# Patient Record
Sex: FEMALE | Race: WHITE | Employment: OTHER | URBAN - METROPOLITAN AREA
[De-identification: names, ages, dates, MRNs, and addresses within clinical notes are randomized per-mention and may not be internally consistent; named-entity substitution may affect disease eponyms.]

---

## 2017-06-09 LAB
AST SERPL-CCNC: 12 U/L (ref 10–35)
CHOLEST SERPL-MCNC: 231 MG/DL (ref 125–200)
CREAT SERPL-MCNC: 0.62 MG/DL (ref 0.5–0.99)
GFR SERPL CREATININE-BSD FRML MDRD: 93 ML/MIN/1.73M2
GLUCOSE SERPL-MCNC: 110 MG/DL (ref 65–99)
HBA1C MFR BLD: 6.5 % (ref 0–5.7)
HDLC SERPL-MCNC: 62 MG/DL
LDLC SERPL CALC-MCNC: 145 MG/DL
NONHDLC SERPL-MCNC: 169 MG/DL
POTASSIUM SERPL-SCNC: 5 MMOL/L (ref 3.5–5.3)
TRIGL SERPL-MCNC: 120 MG/DL

## 2017-07-16 ENCOUNTER — APPOINTMENT (OUTPATIENT)
Dept: GENERAL RADIOLOGY | Facility: CLINIC | Age: 67
End: 2017-07-16
Attending: FAMILY MEDICINE
Payer: COMMERCIAL

## 2017-07-16 ENCOUNTER — HOSPITAL ENCOUNTER (EMERGENCY)
Facility: CLINIC | Age: 67
Discharge: HOME | End: 2017-07-16
Attending: FAMILY MEDICINE | Admitting: FAMILY MEDICINE
Payer: COMMERCIAL

## 2017-07-16 VITALS
WEIGHT: 116 LBS | BODY MASS INDEX: 19.33 KG/M2 | HEIGHT: 65 IN | OXYGEN SATURATION: 95 % | DIASTOLIC BLOOD PRESSURE: 76 MMHG | RESPIRATION RATE: 15 BRPM | SYSTOLIC BLOOD PRESSURE: 136 MMHG | TEMPERATURE: 97.7 F | HEART RATE: 84 BPM

## 2017-07-16 DIAGNOSIS — R41.82 ALTERED MENTAL STATUS, UNSPECIFIED ALTERED MENTAL STATUS TYPE: ICD-10-CM

## 2017-07-16 LAB
ALBUMIN SERPL-MCNC: 3.4 G/DL (ref 3.4–5)
ALBUMIN UR-MCNC: NEGATIVE MG/DL
ALP SERPL-CCNC: 144 U/L (ref 40–150)
ALT SERPL W P-5'-P-CCNC: 16 U/L (ref 0–50)
AMORPH CRY #/AREA URNS HPF: ABNORMAL /HPF
ANION GAP SERPL CALCULATED.3IONS-SCNC: 9 MMOL/L (ref 3–14)
APPEARANCE UR: ABNORMAL
AST SERPL W P-5'-P-CCNC: 16 U/L (ref 0–45)
BACTERIA #/AREA URNS HPF: ABNORMAL /HPF
BASOPHILS # BLD AUTO: 0.1 10E9/L (ref 0–0.2)
BASOPHILS NFR BLD AUTO: 0.5 %
BILIRUB SERPL-MCNC: 0.3 MG/DL (ref 0.2–1.3)
BILIRUB UR QL STRIP: NEGATIVE
BUN SERPL-MCNC: 15 MG/DL (ref 7–30)
CALCIUM SERPL-MCNC: 8.4 MG/DL (ref 8.5–10.1)
CHLORIDE SERPL-SCNC: 108 MMOL/L (ref 94–109)
CO2 SERPL-SCNC: 24 MMOL/L (ref 20–32)
COLOR UR AUTO: YELLOW
CREAT SERPL-MCNC: 0.62 MG/DL (ref 0.52–1.04)
DIFFERENTIAL METHOD BLD: ABNORMAL
EOSINOPHIL # BLD AUTO: 0.1 10E9/L (ref 0–0.7)
EOSINOPHIL NFR BLD AUTO: 0.7 %
ERYTHROCYTE [DISTWIDTH] IN BLOOD BY AUTOMATED COUNT: 13.1 % (ref 10–15)
GFR SERPL CREATININE-BSD FRML MDRD: ABNORMAL ML/MIN/1.7M2
GLUCOSE SERPL-MCNC: 182 MG/DL (ref 70–99)
GLUCOSE UR STRIP-MCNC: 50 MG/DL
HCT VFR BLD AUTO: 42.2 % (ref 35–47)
HGB BLD-MCNC: 13.7 G/DL (ref 11.7–15.7)
HGB UR QL STRIP: NEGATIVE
IMM GRANULOCYTES # BLD: 0 10E9/L (ref 0–0.4)
IMM GRANULOCYTES NFR BLD: 0.2 %
KETONES UR STRIP-MCNC: NEGATIVE MG/DL
LEUKOCYTE ESTERASE UR QL STRIP: NEGATIVE
LYMPHOCYTES # BLD AUTO: 1.5 10E9/L (ref 0.8–5.3)
LYMPHOCYTES NFR BLD AUTO: 13.2 %
MCH RBC QN AUTO: 30.2 PG (ref 26.5–33)
MCHC RBC AUTO-ENTMCNC: 32.5 G/DL (ref 31.5–36.5)
MCV RBC AUTO: 93 FL (ref 78–100)
MONOCYTES # BLD AUTO: 0.6 10E9/L (ref 0–1.3)
MONOCYTES NFR BLD AUTO: 5.7 %
MUCOUS THREADS #/AREA URNS LPF: PRESENT /LPF
NEUTROPHILS # BLD AUTO: 8.8 10E9/L (ref 1.6–8.3)
NEUTROPHILS NFR BLD AUTO: 79.7 %
NITRATE UR QL: NEGATIVE
PH UR STRIP: 5 PH (ref 5–7)
PLATELET # BLD AUTO: 254 10E9/L (ref 150–450)
POTASSIUM SERPL-SCNC: 4.1 MMOL/L (ref 3.4–5.3)
PROT SERPL-MCNC: 6.9 G/DL (ref 6.8–8.8)
RBC # BLD AUTO: 4.54 10E12/L (ref 3.8–5.2)
RBC #/AREA URNS AUTO: 1 /HPF (ref 0–2)
SODIUM SERPL-SCNC: 141 MMOL/L (ref 133–144)
SP GR UR STRIP: 1.01 (ref 1–1.03)
SQUAMOUS #/AREA URNS AUTO: 11 /HPF (ref 0–1)
TROPONIN I SERPL-MCNC: NORMAL UG/L (ref 0–0.04)
URN SPEC COLLECT METH UR: ABNORMAL
UROBILINOGEN UR STRIP-MCNC: 0 MG/DL (ref 0–2)
WBC # BLD AUTO: 11 10E9/L (ref 4–11)
WBC #/AREA URNS AUTO: 2 /HPF (ref 0–2)

## 2017-07-16 PROCEDURE — 80053 COMPREHEN METABOLIC PANEL: CPT | Performed by: FAMILY MEDICINE

## 2017-07-16 PROCEDURE — 93005 ELECTROCARDIOGRAM TRACING: CPT | Performed by: FAMILY MEDICINE

## 2017-07-16 PROCEDURE — 25000128 H RX IP 250 OP 636: Performed by: FAMILY MEDICINE

## 2017-07-16 PROCEDURE — 71010 XR CHEST PORT 1 VW: CPT | Mod: TC

## 2017-07-16 PROCEDURE — 84484 ASSAY OF TROPONIN QUANT: CPT | Performed by: FAMILY MEDICINE

## 2017-07-16 PROCEDURE — 93010 ELECTROCARDIOGRAM REPORT: CPT | Mod: Z6 | Performed by: FAMILY MEDICINE

## 2017-07-16 PROCEDURE — 81001 URINALYSIS AUTO W/SCOPE: CPT | Performed by: FAMILY MEDICINE

## 2017-07-16 PROCEDURE — 85025 COMPLETE CBC W/AUTO DIFF WBC: CPT | Performed by: FAMILY MEDICINE

## 2017-07-16 PROCEDURE — 36415 COLL VENOUS BLD VENIPUNCTURE: CPT | Performed by: FAMILY MEDICINE

## 2017-07-16 PROCEDURE — 96360 HYDRATION IV INFUSION INIT: CPT | Performed by: FAMILY MEDICINE

## 2017-07-16 PROCEDURE — 99285 EMERGENCY DEPT VISIT HI MDM: CPT | Mod: 25 | Performed by: FAMILY MEDICINE

## 2017-07-16 PROCEDURE — 99285 EMERGENCY DEPT VISIT HI MDM: CPT | Mod: Z6 | Performed by: FAMILY MEDICINE

## 2017-07-16 RX ORDER — LIDOCAINE 40 MG/G
CREAM TOPICAL
Status: DISCONTINUED | OUTPATIENT
Start: 2017-07-16 | End: 2017-07-16 | Stop reason: HOSPADM

## 2017-07-16 RX ORDER — SODIUM CHLORIDE 9 MG/ML
1000 INJECTION, SOLUTION INTRAVENOUS CONTINUOUS
Status: DISCONTINUED | OUTPATIENT
Start: 2017-07-16 | End: 2017-07-16 | Stop reason: HOSPADM

## 2017-07-16 RX ADMIN — SODIUM CHLORIDE 1000 ML: 9 INJECTION, SOLUTION INTRAVENOUS at 16:10

## 2017-07-16 ASSESSMENT — ENCOUNTER SYMPTOMS
DIAPHORESIS: 1
HEADACHES: 0
WEAKNESS: 1
NAUSEA: 1
CHILLS: 1
LIGHT-HEADEDNESS: 1
MYALGIAS: 0
ARTHRALGIAS: 0
SEIZURES: 0

## 2017-07-16 NOTE — ED PROVIDER NOTES
"  History     Chief Complaint   Patient presents with     Seizures     The history is provided by the patient, the spouse, a relative and medical records.     Khalida Redding is a 66 year old female who is here with family.  Today she had a sense of upset stomach and nausea and went into the bathroom.  She sat down on the toilet as a place to sit, not to use the bathroom, and had episode of sweating and felt faint.  She was too weak to stand up and eventually her family found her and lowered her to the floor.  She was incontinent of stool and urine.  They were able to get her cleaned up and brought her here to the emergency department.  She remained mentally alert the entire time and did not have any tonic-clonic movement patterns.  She has had this in the past.  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  She has a history of stroke, but is not had any strokelike symptoms.  She does not currently follow up with neurology.  She sees Dr. Wilburn in [place] for her regular care and does not have a neurologist.  No known history of heart disease.    She was seen here in the ED August 25, 2016 by Annalisa Kwan PA-C:  Khalida Redding is a 66 year old female with history of CVA, seizure, hypertension, diabetes who presents to the ED with loss of consciousness. Patient reports her symptoms of altered consciousness, diarrhea, diaphoresis, shakiness are consistent with her typical presentation of \"seizure.\" Daughter and grandson confirm this information that she has these episodes and they usually pass without complication. She denied any symptoms of chest pain, shortness of breath, nausea. Vitals initially with hypotension of 67/47 and heart rate of 49. Within a few minutes blood pressure had improved to 106/62 and a pulse of 71. Patient at that time was growing more alert and appropriately responding to " "questions. Within 30 minutes of presenting to the ED, she had returned to her baseline and was alert and oriented. Blood glucose was 144, CBC and BMP were unremarkable. Troponin was negative. Nurse noted possibly blood tinged stool, however occult blood stool test was negative. EKG showed normal sinus rhythm. Chest x-ray was negative for acute cardiopulmonary process. She received 1L of IV fluids in the ED. She was observed for two hours in the ED without recurrence of symptoms. At that point patient was deemed medically stable for discharge. Though patient reports this is typical for her \"seizures\" I suspect that it is more likely a vasovagal episode. I advised her to continue to hydrate at home and follow up with her PCP within the week to discuss this ED visit and her \"seizure symptoms.\" I discussed with her that if her symptoms recurr she should return to the emergency department. She and her family expressed understanding. She was discharged from the ED in stable condition.    Nurse Triage Note:   Pt states she gets dizzy and diaphoretic before having a seizure. Pt's last seizure was nine months ago and she claims they are time out seizures. Pt states she can usually get to the bathroom and sits on the toilet which is what she did today.  Pt did have abdominal pain which sense subsided. Pt last meal at 8 am this morning.  A&O x4, denies pain, no SOB.       I have reviewed the Medications, Allergies, Past Medical and Surgical History, and Social History in the Epic system.    Allergies:   Allergies   Allergen Reactions     Ampicillin          No current facility-administered medications on file prior to encounter.   No current outpatient prescriptions on file prior to encounter.    There is no problem list on file for this patient.      History reviewed. No pertinent surgical history.    Social History   Substance Use Topics     Smoking status: Current Every Day Smoker     Packs/day: 0.50     Smokeless tobacco: " "Not on file     Alcohol use No         There is no immunization history on file for this patient.    BMI: Estimated body mass index is 19.3 kg/(m^2) as calculated from the following:    Height as of this encounter: 1.651 m (5' 5\").    Weight as of this encounter: 52.6 kg (116 lb).      Review of Systems   Constitutional: Positive for chills and diaphoresis.   Gastrointestinal: Positive for nausea.   Musculoskeletal: Negative for arthralgias and myalgias.   Neurological: Positive for syncope, weakness and light-headedness. Negative for seizures and headaches.   All other systems reviewed and are negative.      Physical Exam   BP: 109/62  Pulse: 84  Temp: 97.7  F (36.5  C)  Resp: 15  Height: 165.1 cm (5' 5\")  Weight: 52.6 kg (116 lb)  SpO2: 98 %    Physical Exam   Constitutional: She is oriented to person, place, and time. She appears well-developed and well-nourished. No distress.   HENT:   Head: Normocephalic and atraumatic.   Right Ear: External ear normal.   Left Ear: External ear normal.   Nose: Nose normal.   Mouth/Throat: Oropharynx is clear and moist.   Eyes: Conjunctivae and EOM are normal. Pupils are equal, round, and reactive to light.   Neck: Normal range of motion. Neck supple. No JVD present.   Cardiovascular: Normal rate, regular rhythm, normal heart sounds and intact distal pulses.    No murmur heard.  Pulmonary/Chest: Effort normal and breath sounds normal. No respiratory distress. She has no wheezes. She has no rales.   Abdominal: Soft. Bowel sounds are normal. She exhibits no distension. There is no tenderness. There is no rebound.   Musculoskeletal: She exhibits no edema, tenderness or deformity.   Lymphadenopathy:     She has no cervical adenopathy.   Neurological: She is alert and oriented to person, place, and time. No cranial nerve deficit.   Skin: Skin is warm and dry. No rash noted. No erythema. No pallor.   Psychiatric: She has a normal mood and affect. Her behavior is normal. Judgment and " thought content normal.   Nursing note and vitals reviewed.      ED Course     ED Course     Procedures    EKG reviewed by me.  Normal sinus rhythm.  Rate 77 bpm.  Normal axis.  No ST segment abnormalities.    Results for orders placed or performed during the hospital encounter of 07/16/17 (from the past 24 hour(s))   XR Chest Port 1 View    Narrative    XR CHEST PORT 1 VW   7/16/2017 3:44 PM     HISTORY: Syncopal episode.    COMPARISON: 8/25/2016.    FINDINGS: Negative chest. No interval change.      Impression    IMPRESSION: Negative.    ANTOINETTE SPICER MD   CBC with platelets differential   Result Value Ref Range    WBC 11.0 4.0 - 11.0 10e9/L    RBC Count 4.54 3.8 - 5.2 10e12/L    Hemoglobin 13.7 11.7 - 15.7 g/dL    Hematocrit 42.2 35.0 - 47.0 %    MCV 93 78 - 100 fl    MCH 30.2 26.5 - 33.0 pg    MCHC 32.5 31.5 - 36.5 g/dL    RDW 13.1 10.0 - 15.0 %    Platelet Count 254 150 - 450 10e9/L    Diff Method Automated Method     % Neutrophils 79.7 %    % Lymphocytes 13.2 %    % Monocytes 5.7 %    % Eosinophils 0.7 %    % Basophils 0.5 %    % Immature Granulocytes 0.2 %    Absolute Neutrophil 8.8 (H) 1.6 - 8.3 10e9/L    Absolute Lymphocytes 1.5 0.8 - 5.3 10e9/L    Absolute Monocytes 0.6 0.0 - 1.3 10e9/L    Absolute Eosinophils 0.1 0.0 - 0.7 10e9/L    Absolute Basophils 0.1 0.0 - 0.2 10e9/L    Abs Immature Granulocytes 0.0 0 - 0.4 10e9/L   Comprehensive metabolic panel   Result Value Ref Range    Sodium 141 133 - 144 mmol/L    Potassium 4.1 3.4 - 5.3 mmol/L    Chloride 108 94 - 109 mmol/L    Carbon Dioxide 24 20 - 32 mmol/L    Anion Gap 9 3 - 14 mmol/L    Glucose 182 (H) 70 - 99 mg/dL    Urea Nitrogen 15 7 - 30 mg/dL    Creatinine 0.62 0.52 - 1.04 mg/dL    GFR Estimate >90  Non  GFR Calc   >60 mL/min/1.7m2    GFR Estimate If Black >90   GFR Calc   >60 mL/min/1.7m2    Calcium 8.4 (L) 8.5 - 10.1 mg/dL    Bilirubin Total 0.3 0.2 - 1.3 mg/dL    Albumin 3.4 3.4 - 5.0 g/dL    Protein Total 6.9  6.8 - 8.8 g/dL    Alkaline Phosphatase 144 40 - 150 U/L    ALT 16 0 - 50 U/L    AST 16 0 - 45 U/L   Troponin I   Result Value Ref Range    Troponin I ES  0.000 - 0.045 ug/L     <0.015  The 99th percentile for upper reference range is 0.045 ug/L.  Troponin values in   the range of 0.045 - 0.120 ug/L may be associated with risks of adverse   clinical events.     UA with Microscopic reflex to Culture   Result Value Ref Range    Color Urine Yellow     Appearance Urine Slightly Cloudy     Glucose Urine 50 (A) NEG mg/dL    Bilirubin Urine Negative NEG    Ketones Urine Negative NEG mg/dL    Specific Gravity Urine 1.009 1.003 - 1.035    Blood Urine Negative NEG    pH Urine 5.0 5.0 - 7.0 pH    Protein Albumin Urine Negative NEG mg/dL    Urobilinogen mg/dL 0.0 0.0 - 2.0 mg/dL    Nitrite Urine Negative NEG    Leukocyte Esterase Urine Negative NEG    Source Unspecified Urine     WBC Urine 2 0 - 2 /HPF    RBC Urine 1 0 - 2 /HPF    Bacteria Urine Few (A) NEG /HPF    Squamous Epithelial /HPF Urine 11 (H) 0 - 1 /HPF    Mucous Urine Present (A) NEG /LPF    Amorphous Crystals Few (A) NEG /HPF       Medications   lidocaine 1 % 1 mL (not administered)   lidocaine (LMX4) kit (not administered)   sodium chloride (PF) 0.9% PF flush 3 mL (3 mLs Intracatheter Given 7/16/17 1611)   sodium chloride (PF) 0.9% PF flush 3 mL (not administered)   0.9% sodium chloride BOLUS (0 mLs Intravenous Stopped 7/16/17 1716)     Followed by   0.9% sodium chloride infusion (not administered)         Assessments & Plan (with Medical Decision Making)  Khalida is a 66-year-old female here with a change in mental status today.  She has had these episodes in the past, and was seen in the emergency department in August 2016 with a similar episode.  Today she had been feeling weak, then was pale and diaphoretic and when she sat down almost fainted and fell to the floor.  Her family lowered her to the floor and she regained consciousness and felt better.  By the time  she arrived in the emergency had normal blood pressures and was feeling better.  Vital signs remained normal in the ED.  Exam shows no abnormalities.  Specifically she has a regular heart rate and regular rhythm.  Her labs today were unremarkable including troponin, urine, CBC and CMP.  Chest x-ray was unremarkable.  I think she is having vasovagal syncopal events or irregular heart rhythms and we are going to set her up for a  Zio Patch to be placed here at [facility].  She was discharged from the ED.       I have reviewed the nursing notes.    I have reviewed the findings, diagnosis, plan and need for follow up with the patient.       New Prescriptions    No medications on file       Final diagnoses:   Altered mental status, unspecified altered mental status type       7/16/2017   [facility] EMERGENCY DEPARTMENT     Julius Kwan MD  07/16/17 5980

## 2017-07-16 NOTE — ED NOTES
Pt states she gets dizzy and diaphoretic before having a seizure. Pt's last seizure was nine months ago and she claims they are time out seizures. Pt states she can usually get to the bathroom and sits on the toilet which is what she did today.  Pt did have abdominal pain which sense subsided. Pt last meal at 8 am this morning.  A&O x4, denies pain, no SOB.

## 2017-07-16 NOTE — ED AVS SNAPSHOT
[facility] Emergency Department    [address] [place] [address]    Phone:  [phone]    Fax:  [phone]                                       Khalida Redding   MRN: [id number]    Department:  [facility] Emergency Department   Date of Visit:  7/16/2017           Patient Information     Date Of Birth          1950        Your diagnoses for this visit were:     Altered mental status, unspecified altered mental status type        You were seen by Julius Kwan MD.      Follow-up Information     Schedule an appointment as soon as possible for a visit with Suhail Wilburn    Specialty:  Family Practice    Why:  As needed    Contact information:    [place] [id number]    [facility] [address]  [phone]          Discharge Instructions         Altered Level of Consciousness  Level of consciousness (LOC) is a measure of a person s ability to interact with other people and to react to the surroundings. A person with an altered level of consciousness may not respond to touch or voices. He or she may look vacant or blank and may not make eye contact with others. He or she may be limp and may not move for a long time or show little interest in moving. He or she may also be confused.  There are many causes of altered LOC. They include low blood sugar, infection, medicines, injuries, or other medical problems.  I think your symptoms are likely caused by an irregular heart beat.   Altered LOC is a medical emergency. The healthcare provider will do tests to help find the cause. These may include blood tests and imaging tests. The person is treated so breathing and heart rate are stable. An intravenous (IV) line may be put into a vein in the arm or hand to give medicines. Once the cause of altered LOC is found, the goal is to treat the cause.  In almost all cases, the person will be admitted to the hospital for observation.  Home care  When your loved one is  released from the hospital, you will be given guidelines for caring for him or her. In general:    Follow the healthcare provider's instructions for giving any prescribed medicines to your child.    Stay with your loved one or have another responsible adult look after him or her. Watch carefully for any return of symptoms or changes in behavior.    If the person has diabetes, make sure that any approved medicines are given on time and as prescribed.  Follow-up care  Follow up with cardiology to get a Zio Patch placed. You can call [phone] to schedule an appointment to get this placed.   When to seek medical advice  Call the healthcare provider right away for any of the following:    Symptoms of altered LOC return    New symptoms appear    Thank you for choosing our Emergency Department for your care.     Sincerely,    Dr Alexandr Kwan M.D.          24 Hour Appointment Hotline       To make an appointment at any [facility] [place], call [phone] ([phone]). If you don't have a family doctor or clinic, we will help you find one. [facility] clinics are conveniently located to serve the needs of you and your family.          ED Discharge Orders     Zio Patch Holter                    Review of your medicines      Notice     You have not been prescribed any medications.            Procedures and tests performed during your visit     CBC with platelets differential    Comprehensive metabolic panel    EKG 12-lead, tracing only    Peripheral IV catheter    Troponin I    UA with Microscopic reflex to Culture    XR Chest Port 1 View      Orders Needing Specimen Collection     None      Pending Results     No orders found from 7/14/2017 to 7/17/2017.            Pending Culture Results     No orders found from 7/14/2017 to 7/17/2017.            Pending Results Instructions     If you had any lab results that were not finalized at the time of your Discharge, you can call the ED Lab Result RN at [phone]. You  "will be contacted by this team for any positive Lab results or changes in treatment. The nurses are available 7 days a week from 10A to 6:30P.  You can leave a message 24 hours per day and they will return your call.        Thank you for choosing [facility]       Thank you for choosing [facility] for your care. Our goal is always to provide you with excellent care. Hearing back from our patients is one way we can continue to improve our services. Please take a few minutes to complete the written survey that you may receive in the mail after you visit with us. Thank you!        [employer] Information     [employer] lets you send messages to your doctor, view your test results, renew your prescriptions, schedule appointments and more. To sign up, go to [email or web address] . Click on \"Log in\" on the left side of the screen, which will take you to the Welcome page. Then click on \"Sign up Now\" on the right side of the page.     You will be asked to enter the access code listed below, as well as some personal information. Please follow the directions to create your username and password.     Your access code is: [email or web address]  Expires: 10/14/2017  3:07 PM     Your access code will  in 90 days. If you need help or a new code, please call your [facility] clinic or [phone].        Care EveryWhere ID     This is your Care EveryWhere ID. This could be used by other organizations to access your [facility] medical records  [id number]        Equal Access to Services     EMIGDIO WALLS : Hadii danni Turner, waaxda luradhaadaha, qaybta kaalmada kaity, doug ulrich. So [facility] [phone].    ATENCIÓN: Si habla español, tiene a peterson disposición servicios gratuitos de asistencia lingüística. Llame al [phone].    We comply with applicable federal civil rights laws and [place] laws. We do not discriminate on the basis of race, color, national origin, age, disability sex, sexual orientation or " gender identity.            After Visit Summary       This is your record. Keep this with you and show to your community pharmacist(s) and doctor(s) at your next visit.

## 2017-07-16 NOTE — DISCHARGE INSTRUCTIONS
Altered Level of Consciousness  Level of consciousness (LOC) is a measure of a person s ability to interact with other people and to react to the surroundings. A person with an altered level of consciousness may not respond to touch or voices. He or she may look vacant or blank and may not make eye contact with others. He or she may be limp and may not move for a long time or show little interest in moving. He or she may also be confused.  There are many causes of altered LOC. They include low blood sugar, infection, medicines, injuries, or other medical problems.  I think your symptoms are likely caused by an irregular heart beat.   Altered LOC is a medical emergency. The healthcare provider will do tests to help find the cause. These may include blood tests and imaging tests. The person is treated so breathing and heart rate are stable. An intravenous (IV) line may be put into a vein in the arm or hand to give medicines. Once the cause of altered LOC is found, the goal is to treat the cause.  In almost all cases, the person will be admitted to the hospital for observation.  Home care  When your loved one is released from the hospital, you will be given guidelines for caring for him or her. In general:    Follow the healthcare provider's instructions for giving any prescribed medicines to your child.    Stay with your loved one or have another responsible adult look after him or her. Watch carefully for any return of symptoms or changes in behavior.    If the person has diabetes, make sure that any approved medicines are given on time and as prescribed.  Follow-up care  Follow up with cardiology to get a Zio Patch placed. You can call [phone] to schedule an appointment to get this placed.   When to seek medical advice  Call the healthcare provider right away for any of the following:    Symptoms of altered LOC return    New symptoms appear    Thank you for choosing our Emergency Department for your care.      Sincerely,    Dr Alexandr Kwan M.D.

## 2017-07-16 NOTE — ED AVS SNAPSHOT
[facility] Emergency Department    [address] [place] [address]    Phone:  [phone]    Fax:  [phone]                                       Khalida Redding   MRN: [id number]    Department:  [facility] Emergency Department   Date of Visit:  7/16/2017           After Visit Summary Signature Page     I have received my discharge instructions, and my questions have been answered. I have discussed any challenges I see with this plan with the nurse or doctor.    ..........................................................................................................................................  Patient/Patient Representative Signature      ..........................................................................................................................................  Patient Representative Print Name and Relationship to Patient    ..................................................               ................................................  Date                                            Time    ..........................................................................................................................................  Reviewed by Signature/Title    ...................................................              ..............................................  Date                                                            Time

## 2017-07-16 NOTE — ED NOTES
Seizure at home, with hx of seizures.  Did not hit head, as she was sitting on toilet, complains of abdominal.

## 2017-07-17 ENCOUNTER — HOSPITAL ENCOUNTER (OUTPATIENT)
Dept: CARDIOLOGY | Facility: CLINIC | Age: 67
Discharge: HOME | End: 2017-07-17
Attending: FAMILY MEDICINE | Admitting: FAMILY MEDICINE
Payer: COMMERCIAL

## 2017-07-17 DIAGNOSIS — R41.82 ALTERED MENTAL STATUS, UNSPECIFIED ALTERED MENTAL STATUS TYPE: ICD-10-CM

## 2017-07-17 PROCEDURE — 0298T ZZC EXT ECG > 48HR TO 21 DAY REVIEW AND INTERPRETATN: CPT | Performed by: INTERNAL MEDICINE

## 2017-07-17 PROCEDURE — 0296T ZIO PATCH HOLTER: CPT | Performed by: FAMILY MEDICINE

## 2017-08-22 ENCOUNTER — TELEPHONE (OUTPATIENT)
Dept: ONCOLOGY | Facility: CLINIC | Age: 67
End: 2017-08-22

## 2017-08-22 NOTE — TELEPHONE ENCOUNTER
Reason for Call:  Request for results:    Name of test or procedure: Zio patch results- daughter very upset that nobody has called her for these results- says this was ordered by ER and that her primary doctor is on vacation for 2 weeks. Can somebody who's covering for Dr. Wilburn please call this patient?     Date of test of procedure: 07/17    Location of the test or procedure: [facility] to leave the result message on voice mail or with a family member? YES    Phone number Patient can be reached at:  Home number on file [phone] (home) Laura     Additional comments: none    Call taken on 8/22/2017 at 3:51 PM by Monica Perez

## 2017-08-22 NOTE — TELEPHONE ENCOUNTER
Patient does not have a primary in [place], she has never been seen by the [facility] except in ER. Ordering provider should advise.     Daniel Pichardo, CMA

## 2017-08-23 ENCOUNTER — APPOINTMENT (OUTPATIENT)
Dept: MRI IMAGING | Facility: CLINIC | Age: 67
End: 2017-08-23
Attending: EMERGENCY MEDICINE
Payer: COMMERCIAL

## 2017-08-23 ENCOUNTER — APPOINTMENT (OUTPATIENT)
Dept: GENERAL RADIOLOGY | Facility: CLINIC | Age: 67
End: 2017-08-23
Attending: EMERGENCY MEDICINE
Payer: COMMERCIAL

## 2017-08-23 ENCOUNTER — HOSPITAL ENCOUNTER (EMERGENCY)
Facility: CLINIC | Age: 67
Discharge: HOME | End: 2017-08-23
Attending: EMERGENCY MEDICINE | Admitting: EMERGENCY MEDICINE
Payer: COMMERCIAL

## 2017-08-23 ENCOUNTER — APPOINTMENT (OUTPATIENT)
Dept: CT IMAGING | Facility: CLINIC | Age: 67
End: 2017-08-23
Attending: EMERGENCY MEDICINE
Payer: COMMERCIAL

## 2017-08-23 VITALS
WEIGHT: 116.2 LBS | TEMPERATURE: 97.8 F | RESPIRATION RATE: 16 BRPM | SYSTOLIC BLOOD PRESSURE: 160 MMHG | BODY MASS INDEX: 19.34 KG/M2 | DIASTOLIC BLOOD PRESSURE: 96 MMHG | OXYGEN SATURATION: 94 %

## 2017-08-23 DIAGNOSIS — S70.02XA CONTUSION OF LEFT HIP, INITIAL ENCOUNTER: ICD-10-CM
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
## 2017-08-23 DIAGNOSIS — S00.03XA SCALP CONTUSION: ICD-10-CM

## 2017-08-23 DIAGNOSIS — S00.03XA HEMATOMA OF SCALP, INITIAL ENCOUNTER: ICD-10-CM

## 2017-08-23 DIAGNOSIS — S61.212A LACERATION OF RIGHT MIDDLE FINGER WITHOUT FOREIGN BODY WITHOUT DAMAGE TO NAIL, INITIAL ENCOUNTER: ICD-10-CM

## 2017-08-23 LAB
CREAT BLD-MCNC: 0.5 MG/DL (ref 0.52–1.04)
GFR SERPL CREATININE-BSD FRML MDRD: >90 ML/MIN/1.7M2

## 2017-08-23 PROCEDURE — A9585 GADOBUTROL INJECTION: HCPCS | Performed by: EMERGENCY MEDICINE

## 2017-08-23 PROCEDURE — 96361 HYDRATE IV INFUSION ADD-ON: CPT | Performed by: EMERGENCY MEDICINE

## 2017-08-23 PROCEDURE — 96374 THER/PROPH/DIAG INJ IV PUSH: CPT | Performed by: EMERGENCY MEDICINE

## 2017-08-23 PROCEDURE — 25000128 H RX IP 250 OP 636: Performed by: EMERGENCY MEDICINE

## 2017-08-23 PROCEDURE — 25000132 ZZH RX MED GY IP 250 OP 250 PS 637: Performed by: EMERGENCY MEDICINE

## 2017-08-23 PROCEDURE — 99285 EMERGENCY DEPT VISIT HI MDM: CPT | Mod: 25 | Performed by: EMERGENCY MEDICINE

## 2017-08-23 PROCEDURE — 82565 ASSAY OF CREATININE: CPT | Performed by: EMERGENCY MEDICINE

## 2017-08-23 PROCEDURE — 70553 MRI BRAIN STEM W/O & W/DYE: CPT

## 2017-08-23 PROCEDURE — 73502 X-RAY EXAM HIP UNI 2-3 VIEWS: CPT | Mod: TC

## 2017-08-23 PROCEDURE — 70450 CT HEAD/BRAIN W/O DYE: CPT

## 2017-08-23 PROCEDURE — 99285 EMERGENCY DEPT VISIT HI MDM: CPT | Mod: Z6 | Performed by: EMERGENCY MEDICINE

## 2017-08-23 RX ORDER — DIPHENHYDRAMINE HYDROCHLORIDE 50 MG/ML
25 INJECTION INTRAMUSCULAR; INTRAVENOUS ONCE
Status: COMPLETED | OUTPATIENT
Start: 2017-08-23 | End: 2017-08-23

## 2017-08-23 RX ORDER — HYDROCODONE BITARTRATE AND ACETAMINOPHEN 5; 325 MG/1; MG/1
1 TABLET ORAL EVERY 6 HOURS PRN
Qty: 10 TABLET | Refills: 0 | Status: SHIPPED | OUTPATIENT
Start: 2017-08-23 | End: 2018-11-23

## 2017-08-23 RX ORDER — LEVETIRACETAM 1000 MG/1
1000 TABLET ORAL
COMMUNITY
End: 2018-10-22

## 2017-08-23 RX ORDER — HYDROCODONE BITARTRATE AND ACETAMINOPHEN 5; 325 MG/1; MG/1
1-2 TABLET ORAL ONCE
Status: COMPLETED | OUTPATIENT
Start: 2017-08-23 | End: 2017-08-23

## 2017-08-23 RX ORDER — GADOBUTROL 604.72 MG/ML
7.5 INJECTION INTRAVENOUS ONCE
Status: COMPLETED | OUTPATIENT
Start: 2017-08-23 | End: 2017-08-23

## 2017-08-23 RX ADMIN — HYDROCODONE BITARTRATE AND ACETAMINOPHEN 2 TABLET: 5; 325 TABLET ORAL at 16:07

## 2017-08-23 RX ADMIN — SODIUM CHLORIDE 1000 ML: 9 INJECTION, SOLUTION INTRAVENOUS at 18:41

## 2017-08-23 RX ADMIN — GADOBUTROL 5.5 ML: 604.72 INJECTION INTRAVENOUS at 17:59

## 2017-08-23 RX ADMIN — DIPHENHYDRAMINE HYDROCHLORIDE 25 MG: 50 INJECTION, SOLUTION INTRAMUSCULAR; INTRAVENOUS at 18:43

## 2017-08-23 ASSESSMENT — ENCOUNTER SYMPTOMS
WOUND: 1
NECK PAIN: 1
NAUSEA: 0
HEADACHES: 1

## 2017-08-23 NOTE — TELEPHONE ENCOUNTER
The ordering doctor was Dr. Kwan in the ER- Can we please find somebody to call this lady back. She was very upset and verbally challenging to talk to on the phone. I will send this to all teams and hope for somebody that can call her.

## 2017-08-23 NOTE — ED AVS SNAPSHOT
[facility] Emergency Department    [address] [place] [address]    Phone:  [phone]    Fax:  [phone]                                       Khalida Redding   MRN: [id number]    Department:  [facility] Emergency Department   Date of Visit:  8/23/2017           Patient Information     Date Of Birth          1950        Your diagnoses for this visit were:     Fall down stairs, initial encounter     Scalp contusion     Laceration of right middle finger without foreign body without damage to nail, initial encounter     Contusion of left hip, initial encounter     Hematoma of scalp, initial encounter        You were seen by Char Gonzalez MD.      Follow-up Information     Follow up with Suhail Wilburn    Specialty:  Family Practice    Contact information:    [place] [address] [address]   [facility] [address]  [phone]          Discharge Instructions       The CT and MRI scan did not show any acute abnormalities.    Okay to continue ice to the painful area of the head.    Ice or heat to muscle soreness.    Tylenol for pain.  Vicodin for severe pain.    Follow-up in clinic if not improving over the next couple weeks.    Return at any time for concerns.    I hope you heal quickly!!        Discharge References/Attachments     [facility] (ENGLISH)      Future Appointments        Provider Department Dept Phone Center    8/24/2017 10:00 AM Richy Joseph MD [facility] [phone] [facility]      24 Hour Appointment Hotline       To make an appointment at any [facility], call [phone] ([phone]). If you don't have a family doctor or clinic, we will help you find one. [facility] are conveniently located to serve the needs of you and your family.             Review of your medicines      START taking        Dose / Directions Last dose taken    HYDROcodone-acetaminophen 5-325 MG per tablet   Commonly known as:   NORCO   Dose:  1 tablet   Quantity:  10 tablet        Take 1 tablet by mouth every 6 hours as needed for moderate to severe pain   Refills:  0          Our records show that you are taking the medicines listed below. If these are incorrect, please call your family doctor or clinic.        Dose / Directions Last dose taken    GABAPENTIN PO   Dose:  300 mg        Take 300 mg by mouth daily   Refills:  0        levETIRAcetam 1000 MG Tabs   Dose:  1000 mg        Take 1,000 mg by mouth 2 times daily   Refills:  0        LISINOPRIL PO   Dose:  2.5 mg        Take 2.5 mg by mouth daily   Refills:  0        METFORMIN HCL PO   Dose:  500 mg        Take 500 mg by mouth daily (with breakfast)   Refills:  0        PLAVIX PO   Dose:  75 mg        Take 75 mg by mouth daily   Refills:  0        SIMVASTATIN PO   Dose:  40 mg        Take 40 mg by mouth daily   Refills:  0        VITAMIN B 12 PO   Dose:  1000 mcg        Take 1,000 mcg by mouth daily   Refills:  0                Prescriptions were sent or printed at these locations (1 Prescription)                   [facility] [address] [place] [address] [address]    [address] [facility] [address]    Telephone:  [phone]   Fax:  [phone]   Hours:                  Printed at Department/Unit printer (1 of 1)         HYDROcodone-acetaminophen (NORCO) 5-325 MG per tablet                Procedures and tests performed during your visit     Creatinine POCT    Head CT w/o contrast    MR Brain w/o & w Contrast    XR Pelvis w Hip Left 1 View      Orders Needing Specimen Collection     None      Pending Results     No orders found from 8/21/2017 to 8/24/2017.            Pending Culture Results     No orders found from 8/21/2017 to 8/24/2017.            Pending Results Instructions     If you had any lab results that were not finalized at the time of your Discharge, you can call the ED Lab Result RN at [phone]. You will be contacted by this team for any positive Lab results  "or changes in treatment. The nurses are available 7 days a week from 10A to 6:30P.  You can leave a message 24 hours per day and they will return your call.        Thank you for choosing [facility]       Thank you for choosing [facility] for your care. Our goal is always to provide you with excellent care. Hearing back from our patients is one way we can continue to improve our services. Please take a few minutes to complete the written survey that you may receive in the mail after you visit with us. Thank you!        [facility] Information     [employer] lets you send messages to your doctor, view your test results, renew your prescriptions, schedule appointments and more. To sign up, go to [email or web address] . Click on \"Log in\" on the left side of the screen, which will take you to the Welcome page. Then click on \"Sign up Now\" on the right side of the page.     You will be asked to enter the access code listed below, as well as some personal information. Please follow the directions to create your username and password.     Your access code is: [email or web address]  Expires: 10/14/2017  3:07 PM     Your access code will  in 90 days. If you need help or a new code, please call your [facility] clinic or [phone].        Care EveryWhere ID     This is your Care EveryWhere ID. This could be used by other organizations to access your [facility] medical records  [id number]        Equal Access to Services     EMIGDIO WALLS : Hadii danni Turner, waaxda kenya, qaybta kaalmadoug palm . So [facility] [phone].    ATENCIÓN: Si habla español, tiene a peterson disposición servicios gratuitos de asistencia lingüística. Llame al [phone].    We comply with applicable federal civil rights laws and [place] laws. We do not discriminate on the basis of race, color, national origin, age, disability sex, sexual orientation or gender identity.            After Visit Summary       This " is your record. Keep this with you and show to your community pharmacist(s) and doctor(s) at your next visit.

## 2017-08-23 NOTE — ED PROVIDER NOTES
History     Chief Complaint   Patient presents with     Fall     The history is provided by the patient and a relative.     Khalida Redding is a 67 year old female who presents to the ED with her  and daughter,  after a fall. She reports that she was walking up the stairs with her phone in her hand when she accidentally fell backwards approximately four steps and landed on concrete. She states that the left posterior area of her head and left buttocks and thigh are the sore spots. The patient says that her daughter had to get her up because she was too sore to due so herself. There was no loss of consciousness. She has a headache but no nausea or other complaints at this point. She also notes a small cut on her right middle finger tip.  She has some tightness and tenderness on the left side of her neck. No midline tenderness. Patient has history of left hip surgery.  Patient denies any weakness or dizziness prior to the fall. She has history of stroke and seizures. She is on Plavix.    I have reviewed the Medications, Allergies, Past Medical and Surgical History, and Social History in the Epic system.    There is no problem list on file for this patient.    Past Medical History:   Diagnosis Date     CVA (cerebral vascular accident) (H)      Diabetes (H)      Seizures (H)      History reviewed. No pertinent surgical history.    No family history on file.    Social History   Substance Use Topics     Smoking status: Current Every Day Smoker     Packs/day: 1.00     Smokeless tobacco: Not on file     Alcohol use No        There is no immunization history on file for this patient.    Allergies   Allergen Reactions     Ampicillin      Current Outpatient Prescriptions   Medication Sig Dispense Refill     Clopidogrel Bisulfate (PLAVIX PO) Take 75 mg by mouth daily       Cyanocobalamin (VITAMIN B 12 PO) Take 1,000 mcg by mouth daily       LISINOPRIL PO Take 2.5 mg by mouth daily       SIMVASTATIN PO Take 40 mg by  mouth daily       METFORMIN HCL PO Take 500 mg by mouth daily (with breakfast)       levETIRAcetam 1000 MG TABS Take 1,000 mg by mouth 2 times daily       GABAPENTIN PO Take 300 mg by mouth daily       HYDROcodone-acetaminophen (NORCO) 5-325 MG per tablet Take 1 tablet by mouth every 6 hours as needed for moderate to severe pain 10 tablet 0     Review of Systems   HENT:        Positive for head injury   Gastrointestinal: Negative for nausea.   Musculoskeletal: Positive for neck pain.        Positive for left buttocks and thigh tenderness   Skin: Positive for wound (small laceration to left middle finger tip).   Neurological: Positive for headaches.        Negative for loss of consciousness   All other systems reviewed and are negative.    Physical Exam   BP: (!) 157/97  Heart Rate: 101  Temp: 97.8  F (36.6  C)  Resp: 20  Weight: 52.7 kg (116 lb 3.2 oz)  SpO2: 94 %  Physical Exam   Constitutional: She is oriented to person, place, and time. She appears well-developed and well-nourished.   HENT:   Head: Head is with contusion.       Right Ear: Tympanic membrane and external ear normal.   Left Ear: Tympanic membrane and external ear normal.   Nose: Nose normal.   Mouth/Throat: Oropharynx is clear and moist.   Eyes: Conjunctivae and EOM are normal. Pupils are equal, round, and reactive to light.   Neck: Normal range of motion. Neck supple. Muscular tenderness present.   No midline neck or any step-offs or tenderness   Cardiovascular: Regular rhythm, normal heart sounds and intact distal pulses.    Borderline tachycardia   Pulmonary/Chest: Effort normal and breath sounds normal. She exhibits no tenderness.   Abdominal: Soft. Bowel sounds are normal. There is no tenderness.   Musculoskeletal: Normal range of motion.        Left upper leg: She exhibits tenderness.        Legs:  Neurological: She is alert and oriented to person, place, and time.   Skin: Skin is warm and dry. Laceration (tiny) noted. She is not diaphoretic.    Psychiatric: She has a normal mood and affect. Her behavior is normal.   Nursing note and vitals reviewed.    ED Course (with Medical Decision Making)    Pt seen and examined by me.  RN and EPIC notes reviewed.      Patient with fall down stairs onto concrete. No loss of consciousness. She is on Plavix. Concern for intracranial bleed. The cut on her finger is very tiny and will not need closure. This will be cleaned. CT scan planned.     CT scan as below shows no acute intracranial bleed. There is some question of mass effect and it is recommended that she have an MRI scan.     IV placed an MRI done. Results as below show nothing acute. Results were discussed with the patient and her family. Recommend continuing to ice the area on her head. Tylenol if needed for pain. I did give her a small amount of Vicodin for severe pain. She will stay with her family and be safe tonight. They will return at any time for concerns.      Procedures       Results for orders placed or performed during the hospital encounter of 08/23/17 (from the past 24 hour(s))   Head CT w/o contrast    Narrative    CT SCAN OF THE HEAD WITHOUT CONTRAST   8/23/2017 3:53 PM     HISTORY: Fall; pain - on Plavix.    TECHNIQUE:  Axial images of the head and coronal reformations without  IV contrast material. Radiation dose for this scan was reduced using  automated exposure control, adjustment of the mA and/or kV according  to patient size, or iterative reconstruction technique.    COMPARISON: None.    FINDINGS: Large left lateral scalp hematoma. No underlying skull  fracture or extra-axial hematoma identified. No evidence of acute  intracranial hemorrhage.    Fairly large region of hypodensity in the anterior paramedian right  frontal lobe. Additional area of cortical and subcortical white matter  encephalomalacia within the posterior paramedian right frontal lobe.  There appears to be ex vacuo dilatation of the frontal horn of the  right lateral  ventricle, however there is also leftward midline shift  anteriorly of 5 mm at the level of the septum pellucidum. Patchy  periventricular white matter hypodensity is also seen in the left  cerebral hemisphere. There is a focal hypodensity in the right basal  ganglia that may represent lacunar infarct or dilated perivascular  space.    Bony calvarium and bones of the skull base appear intact. Visualized  paranasal sinuses are clear.      Impression    IMPRESSION:  1. Large left lateral scalp hematoma without underlying skull  fracture.  2. No evidence of acute intracranial hemorrhage.  3. Fairly large hypodense area in the paramedian right frontal lobe  with associated loss of the cortex. There is also expected dilatation  of the frontal horn of the right lateral ventricle. Findings have the  appearance of encephalomalacia from previous infarct, but there is  mass effect in this region and leftward midline shift. This is  atypical and acute component of ischemia or possibly underlying mass  cannot be excluded. Recommend follow-up with contrast-enhanced brain  MRI. Additional area of encephalomalacia in the posterior right  frontal lobe has the appearance of previous infarct.  4. Patchy periventricular white matter hypodensities which are  nonspecific, but probably related to chronic microvascular ischemic  disease.    Results discussed with Soledad Gonzalez at 4:15 PM on 8/23/2017.    RAYSHAWN BLANKENSHIP MD   Creatinine POCT   Result Value Ref Range    Creatinine 0.5 (L) 0.52 - 1.04 mg/dL    GFR Estimate >90 >60 mL/min/1.7m2    GFR Estimate If Black >90 >60 mL/min/1.7m2   XR Pelvis w Hip Left 1 View    Narrative    PELVIS AND LEFT HIP THREE VIEWS   8/23/2017 6:19 PM     HISTORY: Fall. Pain.    COMPARISON: None.      Impression    IMPRESSION:   1. A left hip arthroplasty is in place. No evidence of hardware  failure.  2. No visualized acute fracture or malalignment of the pelvis or left  hip.     PEPE ESPINOZA MD   MR  Brain w/o & w Contrast    Narrative    MR BRAIN W/O & W CONTRAST  8/23/2017 6:22 PM     HISTORY:  fall; encephalomalacia; h/o stroke; unusual CT findings.    TECHNIQUE: Multiplanar, multisequence MRI of the brain without and  with 5.5 mL Gadavist IV contrast material.    COMPARISON: CT dated 8/23/2017    FINDINGS:  Areas of encephalomalacia are seen in both frontal lobes,  this is more prominent on the right than the left. Ex vacuo  enlargement of the frontal horn of the right lateral ventricle is  noted. The temporal horn of the right lateral ventricle was not  enlarged. The location of the septum pellucidum is in usual in that it  is slightly to the left of midline. With the encephalomalacia on the  right, expected position of the septum pellucidum would be to the  right of midline. The cause of this appearance is uncertain. No  underlying mass lesion is identified. No enhancing structures are  seen. Its possible that there is a ependymal cyst in the frontal horn  of the right lateral ventricle.. There are no gadolinium enhancing  lesions.  The facial structures appear normal.  The arteries at the  base of the brain and the dural venous sinuses appear patent.   Extensive soft tissue swelling is seen over the left parietal region.  No intracranial bleed. No subdural or epidural.      Impression    IMPRESSION:   1. Soft tissue swelling over the left parietal region.  2. Encephalomalacia in both frontal lobes.  3. The location of the septum pellucidum to the left of midline is  unusual with the encephalomalacia being greater on the right than the  left. The exact cause of this is uncertain. No underlying mass lesion  is seen. Its possible it is due to a cyst in the frontal horn of the  right lateral ventricle. These ependymal cysts can be difficult to  appreciate on imaging studies. If there is a cyst in the right lateral  ventricle is not causing any significant obstruction since the  temporal horn of the right  lateral ventricle is not dilated. The  appearance of the lateral ventricles is likely an incidental finding..    ROCIO SARABIA MD       Medications   HYDROcodone-acetaminophen (NORCO) 5-325 MG per tablet 1-2 tablet (2 tablets Oral Given 8/23/17 1607)   gadobutrol (GADAVIST) injection 7.5 mL (5.5 mLs Intravenous Given 8/23/17 1759)   diphenhydrAMINE (BENADRYL) injection 25 mg (25 mg Intravenous Given 8/23/17 1843)   0.9% sodium chloride BOLUS (0 mLs Intravenous Stopped 8/23/17 1925)     Assessments & Plan     I have reviewed the findings, diagnosis, plan and need for follow up with the patient.    Discharge Medication List as of 8/23/2017  7:25 PM      START taking these medications    Details   HYDROcodone-acetaminophen (NORCO) 5-325 MG per tablet Take 1 tablet by mouth every 6 hours as needed for moderate to severe pain, Disp-10 tablet, R-0, Local Print             Final diagnoses:   Fall down stairs, initial encounter   Scalp contusion   Laceration of right middle finger without foreign body without damage to nail, initial encounter   Contusion of left hip, initial encounter   Hematoma of scalp, initial encounter     Disposition: Patient discharged home in the care of her family in stable condition. Follow-up for recheck with primary care provider. Return for concerns.    This document serves as a record of services personally performed by Char Gonzalez MD. It was created on their behalf by Chelsea Mcdaniels, a trained medical scribe. The creation of this record is based on the provider's personal observations and the statements of the patient. This document has been checked and approved by the attending provider.    Note: Chart documentation done in part with Dragon Voice Recognition software. Although reviewed after completion, some word and grammatical errors may remain.    8/23/2017   [facility] EMERGENCY DEPARTMENT     Char Gonzalez MD  08/23/17 7636

## 2017-08-23 NOTE — ED AVS SNAPSHOT
[facility] Emergency Department    [address] [place] [address]    Phone:  [phone]    Fax:  [phone]                                       Khalida Redding   MRN: [id number]    Department:  [facility] Emergency Department   Date of Visit:  8/23/2017           After Visit Summary Signature Page     I have received my discharge instructions, and my questions have been answered. I have discussed any challenges I see with this plan with the nurse or doctor.    ..........................................................................................................................................  Patient/Patient Representative Signature      ..........................................................................................................................................  Patient Representative Print Name and Relationship to Patient    ..................................................               ................................................  Date                                            Time    ..........................................................................................................................................  Reviewed by Signature/Title    ...................................................              ..............................................  Date                                                            Time

## 2017-08-23 NOTE — TELEPHONE ENCOUNTER
I spoke with Laura and she is aware we have an appt made for tomorrow with Dr. Joseph to discuss results.

## 2017-08-23 NOTE — ED NOTES
Was talking on the phone and slipped down 4 cement steps. Patient states hit her head and landed on her left side.

## 2017-08-24 ENCOUNTER — OFFICE VISIT (OUTPATIENT)
Dept: CARDIOLOGY | Facility: CLINIC | Age: 67
End: 2017-08-24
Payer: COMMERCIAL

## 2017-08-24 VITALS
DIASTOLIC BLOOD PRESSURE: 78 MMHG | HEIGHT: 65 IN | SYSTOLIC BLOOD PRESSURE: 130 MMHG | HEART RATE: 98 BPM | BODY MASS INDEX: 19.94 KG/M2 | WEIGHT: 119.7 LBS | OXYGEN SATURATION: 98 %

## 2017-08-24 DIAGNOSIS — R55 SYNCOPE, UNSPECIFIED SYNCOPE TYPE: Primary | ICD-10-CM

## 2017-08-24 PROCEDURE — 99214 OFFICE O/P EST MOD 30 MIN: CPT | Performed by: INTERNAL MEDICINE

## 2017-08-24 NOTE — MR AVS SNAPSHOT
"              After Visit Summary   8/24/2017    Khalida Redding    MRN: [id number]           Patient Information     Date Of Birth          1950        Visit Information        Provider Department      8/24/2017 10:00 AM Richy Joseph MD [facility]        Today's Diagnoses     Syncope, unspecified syncope type    -  1       Follow-ups after your visit        Additional Services     Follow-Up with Cardiac Advanced Practice Provider                 Future tests that were ordered for you today     Open Future Orders        Priority Expected Expires Ordered    Follow-Up with Cardiac Advanced Practice Provider Routine 12/20/2017 8/24/2018 8/24/2017    ICD/Pacemaker/Loop Recorder Procedure Routine 8/31/2017 8/24/2018 8/24/2017    Echocardiogram Routine 8/31/2017 8/24/2018 8/24/2017            Who to contact     If you have questions or need follow up information about today's clinic visit or your schedule please contact [facility] directly at [phone].  Normal or non-critical lab and imaging results will be communicated to you by Validushart, letter or phone within 4 business days after the clinic has received the results. If you do not hear from us within 7 days, please contact the clinic through Validushart or phone. If you have a critical or abnormal lab result, we will notify you by phone as soon as possible.  Submit refill requests through ClickBus or call your pharmacy and they will forward the refill request to us. Please allow 3 business days for your refill to be completed.          Additional Information About Your Visit        MyChart Information     ClickBus lets you send messages to your doctor, view your test results, renew your prescriptions, schedule appointments and more. To sign up, go to [email or web address] . Click on \"Log in\" on the left side of the screen, which will take you to the Welcome page. Then click on \"Sign up Now\" on the right side of the page.     You " "will be asked to enter the access code listed below, as well as some personal information. Please follow the directions to create your username and password.     Your access code is: [email or web address]  Expires: 10/14/2017  3:07 PM     Your access code will  in 90 days. If you need help or a new code, please call your [facility] clinic or [phone].        Care EveryWhere ID     This is your Care EveryWhere ID. This could be used by other organizations to access your [facility] medical records  [id number]        Your Vitals Were     Pulse Height Pulse Oximetry BMI (Body Mass Index)          98 1.651 m (5' 5\") 98% 19.92 kg/m2         Blood Pressure from Last 3 Encounters:   17 130/78   17 (!) 160/96   17 136/76    Weight from Last 3 Encounters:   17 54.3 kg (119 lb 11.2 oz)   17 52.7 kg (116 lb 3.2 oz)   17 52.6 kg (116 lb)               Primary Care Provider Office Phone # Fax #    Suhail HAGER [place] [phone]       [address]   [facility] [address]        Equal Access to Services     EMIGDIO WALLS AH: Hadii danni ku hadasho Soomaali, waaxda luqadaha, qaybta kaalmada adeegyada, waxay idiin haylori herrera . So [facility] [phone].    ATENCIÓN: Si habla español, tiene a peterson disposición servicios gratuitos de asistencia lingüística. Llame al [phone].    We comply with applicable federal civil rights laws and [place] laws. We do not discriminate on the basis of race, color, national origin, age, disability sex, sexual orientation or gender identity.            Thank you!     Thank you for choosing [facility]  for your care. Our goal is always to provide you with excellent care. Hearing back from our patients is one way we can continue to improve our services. Please take a few minutes to complete the written survey that you may receive in the mail after your visit with us. Thank you!             Your Updated " Medication List - Protect others around you: Learn how to safely use, store and throw away your medicines at www.disposemymeds.org.          This list is accurate as of: 8/24/17 10:19 AM.  Always use your most recent med list.                   Brand Name Dispense Instructions for use Diagnosis    GABAPENTIN PO      Take 300 mg by mouth daily        HYDROcodone-acetaminophen 5-325 MG per tablet    NORCO    10 tablet    Take 1 tablet by mouth every 6 hours as needed for moderate to severe pain        levETIRAcetam 1000 MG Tabs      Take 1,000 mg by mouth 2 times daily        LISINOPRIL PO      Take 2.5 mg by mouth daily        METFORMIN HCL PO      Take 500 mg by mouth daily (with breakfast)        PLAVIX PO      Take 75 mg by mouth daily        SIMVASTATIN PO      Take 40 mg by mouth daily        VITAMIN B 12 PO      Take 1,000 mcg by mouth daily

## 2017-08-24 NOTE — PROGRESS NOTES
"HISTORY OF PRESENT ILLNESS:  I saw Ms. Palomino for evaluation of recurrent transient loss of consciousness, frequent falls and nonsustained atrial tachycardia on a ZIO Patch monitor.  She is a 67-year-old long-term heavy smoker who has had a stroke multiple times.  The last stroke was about 2 years ago that happened in [place].  That stroke left her with weakened left arm and leg and she is currently using a walker for ambulation.      The patient has had transient loss of consciousness for over 10 years.  The problem appeared to be more frequent over the last 2 years after her major stroke.  She also has had frequent falls with the last fall occurring yesterday that required ER visit.      She came in to the clinic with her  and daughter today.  They  stated that her last \"seizure\" was in 06/2016.  The patient was sitting on the toilet and she yelled for help.  When the patient was seen by her , she was diaphoretic and partially responsive.  She did not have seizure activities and did not have complete loss of consciousness.  She has been on seizure medications for years but currently does not have a neurologist.      PAST MEDICAL HISTORY:  Remarkable for diabetes.  She continues to smoke cigarettes.      PHYSICAL EXAMINATION:   VITAL SIGNS:  Blood pressure was 130/78, heart rate 98 beats per minute, body weight 119 pounds.  She uses a walker.   HEENT:  Eyes and ENT were unremarkable.   LUNGS:  Clear.   CARDIAC:  Rhythm was regular and heart sounds were normal without murmur.   ABDOMEN:  Examination showed no hepatomegaly.   EXTREMITIES:  There was no pedal edema.      Her recent EKG showed normal sinus rhythm.  She previously had an EKG event monitor during which she did not have symptoms.  The recent ZIO Patch monitor detected some nonsustained atrial tachycardia without symptoms.      ASSESSMENT AND RECOMMENDATIONS:  Ms. Palomino is a 67-year-old white female with long-term heavy " "smoking.  She has had multiple strokes.  There is a diagnosis of seizure disorder but the clinical presentation reported by the family members today seemed to indicate syncope.  The detection of nonsustained atrial tachycardia on the ZIO Patch monitor may not have any relationship to her transient loss of consciousness and usually that does not require intervention.  For further evaluation, I have ordered an echocardiography.  I also recommended an implantable loop recorder for further diagnosis of her \"seizure\" disorder.  Previous event monitor and recent ZIO Patch monitor did not record her cardiac rhythm during her symptoms of loss of consciousness.  Hopefully, the implantable loop recorder will be able to record at least 1 episode of transient loss of consciousness.  The risks and benefits of loop recorder implantation were explained to the patient and her family today.  They expressed understanding and consented for the procedure.  Tentatively, I have also scheduled for the patient to be by our nurse practitioner in 2017 for followup.      cc:      Suhail Wilburn MD    [facility]   [address]   [place] [address]         ROCÍO MCCLURE MD             D: 2017 10:30   T: 2017 11:07   MT: SATHISH      Name:     SHAI CAMPBELL   MRN:      [id number]        Account:      [id number]   :      1950           Service Date: 2017      Document: [id number]      "

## 2017-08-24 NOTE — LETTER
"8/24/2017    ANTOINETTE SHETH  [facility]   [address]  [facility] [address]    RE: Khalida Redding       Dear Colleague,    I had the pleasure of seeing Khalida Redding in the [facility].    I saw Ms. Palomino for evaluation of recurrent transient loss of consciousness, frequent falls and nonsustained atrial tachycardia on a ZIO Patch monitor.  She is a 67-year-old long-term heavy smoker who has had a stroke multiple times.  The last stroke was about 2 years ago that happened in [place].  That stroke left her with weakened left arm and leg and she is currently using a walker for ambulation.      The patient has had transient loss of consciousness for over 10 years.  The problem appeared to be more frequent over the last 2 years after her major stroke.  She also has had frequent falls with the last fall occurring yesterday that required ER visit.      She came in to the clinic with her  and daughter today.  They  stated that her last \"seizure\" was in 06/2016.  The patient was sitting on the toilet and she yelled for help.  When the patient was seen by her , she was diaphoretic and partially responsive.  She did not have seizure activities and did not have complete loss of consciousness.  She has been on seizure medications for years but currently does not have a neurologist.      PAST MEDICAL HISTORY:  Remarkable for diabetes.  She continues to smoke cigarettes.      PHYSICAL EXAMINATION:   VITAL SIGNS:  Blood pressure was 130/78, heart rate 98 beats per minute, body weight 119 pounds.  She uses a walker.   HEENT:  Eyes and ENT were unremarkable.   LUNGS:  Clear.   CARDIAC:  Rhythm was regular and heart sounds were normal without murmur.   ABDOMEN:  Examination showed no hepatomegaly.   EXTREMITIES:  There was no pedal edema.      Her recent EKG showed normal sinus rhythm.  She previously had an EKG event monitor during which she did not " "have symptoms.  The recent ZIO Patch monitor detected some nonsustained atrial tachycardia without symptoms.   Outpatient Encounter Prescriptions as of 2017   Medication Sig Dispense Refill     Clopidogrel Bisulfate (PLAVIX PO) Take 75 mg by mouth daily       Cyanocobalamin (VITAMIN B 12 PO) Take 1,000 mcg by mouth daily       LISINOPRIL PO Take 2.5 mg by mouth daily       SIMVASTATIN PO Take 40 mg by mouth daily       METFORMIN HCL PO Take 500 mg by mouth daily (with breakfast)       levETIRAcetam 1000 MG TABS Take 1,000 mg by mouth 2 times daily       GABAPENTIN PO Take 300 mg by mouth daily       HYDROcodone-acetaminophen (NORCO) 5-325 MG per tablet Take 1 tablet by mouth every 6 hours as needed for moderate to severe pain 10 tablet 0     [] HYDROcodone-acetaminophen (NORCO) 5-325 MG per tablet 1-2 tablet        [] gadobutrol (GADAVIST) injection 7.5 mL        [] diphenhydrAMINE (BENADRYL) injection 25 mg        [] 0.9% sodium chloride BOLUS        No facility-administered encounter medications on file as of 2017.       ASSESSMENT AND RECOMMENDATIONS:  Ms. Palomino is a 67-year-old white female with long-term heavy smoking.  She has had multiple strokes.  There is a diagnosis of seizure disorder but the clinical presentation reported by the family members today seemed to indicate syncope.  The detection of nonsustained atrial tachycardia on the ZIO Patch monitor may not have any relationship to her transient loss of consciousness and usually that does not require intervention.  For further evaluation, I have ordered an echocardiography.  I also recommended an implantable loop recorder for further diagnosis of her \"seizure\" disorder.  Previous event monitor and recent ZIO Patch monitor did not record her cardiac rhythm during her symptoms of loss of consciousness.  Hopefully, the implantable loop recorder will be able to record at least 1 episode of transient loss of " consciousness.  The risks and benefits of loop recorder implantation were explained to the patient and her family today.  They expressed understanding and consented for the procedure.  Tentatively, I have also scheduled for the patient to be by our nurse practitioner in 12/2017 for followup.     Again, thank you for allowing me to participate in the care of your patient.      Sincerely,    Richy Joseph MD     [facility]

## 2017-08-24 NOTE — DISCHARGE INSTRUCTIONS
The CT and MRI scan did not show any acute abnormalities.    Okay to continue ice to the painful area of the head.    Ice or heat to muscle soreness.    Tylenol for pain.  Vicodin for severe pain.    Follow-up in clinic if not improving over the next couple weeks.    Return at any time for concerns.    I hope you heal quickly!!

## 2017-08-24 NOTE — LETTER
8/24/2017      RE: Khalida Redding  [address]  [facility] [address]       Dear Colleague,    Thank you for the opportunity to participate in the care of your patient, Khalida Redding, at the [facility]. Please see a copy of my visit note below.    HPI and Plan:   See dictation    Orders Placed This Encounter   Procedures     Follow-Up with Cardiac Advanced Practice Provider     ICD/Pacemaker/Loop Recorder Procedure     Echocardiogram       No orders of the defined types were placed in this encounter.      There are no discontinued medications.      Encounter Diagnosis   Name Primary?     Syncope, unspecified syncope type Yes       CURRENT MEDICATIONS:  Current Outpatient Prescriptions   Medication Sig Dispense Refill     Clopidogrel Bisulfate (PLAVIX PO) Take 75 mg by mouth daily       Cyanocobalamin (VITAMIN B 12 PO) Take 1,000 mcg by mouth daily       LISINOPRIL PO Take 2.5 mg by mouth daily       SIMVASTATIN PO Take 40 mg by mouth daily       METFORMIN HCL PO Take 500 mg by mouth daily (with breakfast)       levETIRAcetam 1000 MG TABS Take 1,000 mg by mouth 2 times daily       GABAPENTIN PO Take 300 mg by mouth daily       HYDROcodone-acetaminophen (NORCO) 5-325 MG per tablet Take 1 tablet by mouth every 6 hours as needed for moderate to severe pain 10 tablet 0       ALLERGIES     Allergies   Allergen Reactions     Ampicillin        PAST MEDICAL HISTORY:  Past Medical History:   Diagnosis Date     Atrial tachycardia (H)     nonsustained, detected on Ziopatch     CVA (cerebral vascular accident) (H)      Diabetes (H)      Falls      Seizures (H)      Smoking        PAST SURGICAL HISTORY:  No past surgical history on file.    FAMILY HISTORY:  No family history on file.    SOCIAL HISTORY:  Social History     Social History     Marital status:      Spouse name: N/A     Number of children: N/A     Years of education: N/A     Social  "History Main Topics     Smoking status: Current Every Day Smoker     Packs/day: 1.00     Smokeless tobacco: Not on file     Alcohol use No     Drug use: No     Sexual activity: Not on file     Other Topics Concern     Not on file     Social History Narrative       Review of Systems:  Skin:  Positive for bruising From fall yesterday down stairs, lump on head, tripped   Eyes:  Positive for glasses    ENT:  Negative      Respiratory:  Negative       Cardiovascular:  Negative for;palpitations;chest pain;edema;fatigue lightheadedness;dizziness;Positive for    Gastroenterology: Negative      Genitourinary:  Negative      Musculoskeletal:  Positive for   3 out 10 from fall   Neurologic:  Negative      Psychiatric:  Positive for sleep disturbances    Heme/Lymph/Imm:  Positive for allergies    Endocrine:  Positive for diabetes      Physical Exam:  Vitals: /78 (BP Location: Right arm, Patient Position: Fowlers, Cuff Size: Adult Large)  Pulse 98  Ht 1.651 m (5' 5\")  Wt 54.3 kg (119 lb 11.2 oz)  SpO2 98%  BMI 19.92 kg/m2    Constitutional:  cooperative, alert and oriented, well developed, well nourished, in no acute distress        Skin:  warm and dry to the touch, no apparent skin lesions or masses noted        Head:  normocephalic, no masses or lesions        Eyes:  pupils equal and round, conjunctivae and lids unremarkable, sclera white, no xanthalasma, EOMS intact, no nystagmus        ENT:  no pallor or cyanosis, dentition good        Neck:  carotid pulses are full and equal bilaterally, JVP normal, no carotid bruit, no thyromegaly        Chest:  normal breath sounds, clear to auscultation, normal A-P diameter, normal symmetry, normal respiratory excursion, no use of accessory muscles          Cardiac: regular rhythm, normal S1/S2, no S3 or S4, apical impulse not displaced, no murmurs, gallops or rubs                  Abdomen:  abdomen soft, non-tender, BS normoactive, no mass, no HSM, no bruits        Vascular: " "pulses full and equal, no bruits auscultated                                        Extremities and Back:            weakened left arm and leg    Neurological:  affect appropriate, oriented to time, person and place            HISTORY OF PRESENT ILLNESS:  I saw Ms. Palomino for evaluation of recurrent transient loss of consciousness, frequent falls and nonsustained atrial tachycardia on a ZIO Patch monitor.  She is a 67-year-old long-term heavy smoker who has had a stroke multiple times.  The last stroke was about 2 years ago that happened in [place].  That stroke left her with weakened left arm and leg and she is currently using a walker for ambulation.      The patient has had transient loss of consciousness for over 10 years.  The problem appeared to be more frequent over the last 2 years after her major stroke.  She also has had frequent falls with the last fall occurring yesterday that required ER visit.      She came in to the clinic with her  and daughter today.  They  stated that her last \"seizure\" was in 06/2016.  The patient was sitting on the toilet and she yelled for help.  When the patient was seen by her , she was diaphoretic and partially responsive.  She did not have seizure activities and did not have complete loss of consciousness.  She has been on seizure medications for years but currently does not have a neurologist.      PAST MEDICAL HISTORY:  Remarkable for diabetes.  She continues to smoke cigarettes.      PHYSICAL EXAMINATION:   VITAL SIGNS:  Blood pressure was 130/78, heart rate 98 beats per minute, body weight 119 pounds.  She uses a walker.   HEENT:  Eyes and ENT were unremarkable.   LUNGS:  Clear.   CARDIAC:  Rhythm was regular and heart sounds were normal without murmur.   ABDOMEN:  Examination showed no hepatomegaly.   EXTREMITIES:  There was no pedal edema.      Her recent EKG showed normal sinus rhythm.  She previously had an EKG event monitor during which " "she did not have symptoms.  The recent ZIO Patch monitor detected some nonsustained atrial tachycardia without symptoms.      ASSESSMENT AND RECOMMENDATIONS:  Ms. Palomino is a 67-year-old white female with long-term heavy smoking.  She has had multiple strokes.  There is a diagnosis of seizure disorder but the clinical presentation reported by the family members today seemed to indicate syncope.  The detection of nonsustained atrial tachycardia on the ZIO Patch monitor may not have any relationship to her transient loss of consciousness and usually that does not require intervention.  For further evaluation, I have ordered an echocardiography.  I also recommended an implantable loop recorder for further diagnosis of her \"seizure\" disorder.  Previous event monitor and recent ZIO Patch monitor did not record her cardiac rhythm during her symptoms of loss of consciousness.  Hopefully, the implantable loop recorder will be able to record at least 1 episode of transient loss of consciousness.  The risks and benefits of loop recorder implantation were explained to the patient and her family today.  They expressed understanding and consented for the procedure.  Tentatively, I have also scheduled for the patient to be by our nurse practitioner in 12/2017 for followup.      Richy Joseph MD    CC:      Suhail Wilburn MD    [facility]   [address]   [place] [address]          "

## 2017-08-24 NOTE — PROGRESS NOTES
HPI and Plan:   See dictation    Orders Placed This Encounter   Procedures     Follow-Up with Cardiac Advanced Practice Provider     ICD/Pacemaker/Loop Recorder Procedure     Echocardiogram       No orders of the defined types were placed in this encounter.      There are no discontinued medications.      Encounter Diagnosis   Name Primary?     Syncope, unspecified syncope type Yes       CURRENT MEDICATIONS:  Current Outpatient Prescriptions   Medication Sig Dispense Refill     Clopidogrel Bisulfate (PLAVIX PO) Take 75 mg by mouth daily       Cyanocobalamin (VITAMIN B 12 PO) Take 1,000 mcg by mouth daily       LISINOPRIL PO Take 2.5 mg by mouth daily       SIMVASTATIN PO Take 40 mg by mouth daily       METFORMIN HCL PO Take 500 mg by mouth daily (with breakfast)       levETIRAcetam 1000 MG TABS Take 1,000 mg by mouth 2 times daily       GABAPENTIN PO Take 300 mg by mouth daily       HYDROcodone-acetaminophen (NORCO) 5-325 MG per tablet Take 1 tablet by mouth every 6 hours as needed for moderate to severe pain 10 tablet 0       ALLERGIES     Allergies   Allergen Reactions     Ampicillin        PAST MEDICAL HISTORY:  Past Medical History:   Diagnosis Date     Atrial tachycardia (H)     nonsustained, detected on Ziopatch     CVA (cerebral vascular accident) (H)      Diabetes (H)      Falls      Seizures (H)      Smoking        PAST SURGICAL HISTORY:  No past surgical history on file.    FAMILY HISTORY:  No family history on file.    SOCIAL HISTORY:  Social History     Social History     Marital status:      Spouse name: N/A     Number of children: N/A     Years of education: N/A     Social History Main Topics     Smoking status: Current Every Day Smoker     Packs/day: 1.00     Smokeless tobacco: Not on file     Alcohol use No     Drug use: No     Sexual activity: Not on file     Other Topics Concern     Not on file     Social History Narrative       Review of Systems:  Skin:  Positive for bruising From fall  "yesterday down stairs, lump on head, tripped   Eyes:  Positive for glasses    ENT:  Negative      Respiratory:  Negative       Cardiovascular:  Negative for;palpitations;chest pain;edema;fatigue lightheadedness;dizziness;Positive for    Gastroenterology: Negative      Genitourinary:  Negative      Musculoskeletal:  Positive for   3 out 10 from fall   Neurologic:  Negative      Psychiatric:  Positive for sleep disturbances    Heme/Lymph/Imm:  Positive for allergies    Endocrine:  Positive for diabetes      Physical Exam:  Vitals: /78 (BP Location: Right arm, Patient Position: Fowlers, Cuff Size: Adult Large)  Pulse 98  Ht 1.651 m (5' 5\")  Wt 54.3 kg (119 lb 11.2 oz)  SpO2 98%  BMI 19.92 kg/m2    Constitutional:  cooperative, alert and oriented, well developed, well nourished, in no acute distress        Skin:  warm and dry to the touch, no apparent skin lesions or masses noted        Head:  normocephalic, no masses or lesions        Eyes:  pupils equal and round, conjunctivae and lids unremarkable, sclera white, no xanthalasma, EOMS intact, no nystagmus        ENT:  no pallor or cyanosis, dentition good        Neck:  carotid pulses are full and equal bilaterally, JVP normal, no carotid bruit, no thyromegaly        Chest:  normal breath sounds, clear to auscultation, normal A-P diameter, normal symmetry, normal respiratory excursion, no use of accessory muscles          Cardiac: regular rhythm, normal S1/S2, no S3 or S4, apical impulse not displaced, no murmurs, gallops or rubs                  Abdomen:  abdomen soft, non-tender, BS normoactive, no mass, no HSM, no bruits        Vascular: pulses full and equal, no bruits auscultated                                        Extremities and Back:            weakened left arm and leg    Neurological:  affect appropriate, oriented to time, person and place              CC  No referring provider defined for this encounter.              "

## 2017-08-28 ENCOUNTER — HOSPITAL ENCOUNTER (OUTPATIENT)
Dept: CARDIOLOGY | Facility: CLINIC | Age: 67
Discharge: HOME | End: 2017-08-28
Attending: INTERNAL MEDICINE | Admitting: INTERNAL MEDICINE
Payer: COMMERCIAL

## 2017-08-28 DIAGNOSIS — R55 SYNCOPE, UNSPECIFIED SYNCOPE TYPE: ICD-10-CM

## 2017-08-28 PROCEDURE — 93306 TTE W/DOPPLER COMPLETE: CPT | Mod: 26 | Performed by: INTERNAL MEDICINE

## 2017-08-28 PROCEDURE — 93306 TTE W/DOPPLER COMPLETE: CPT

## 2017-08-30 ENCOUNTER — TELEPHONE (OUTPATIENT)
Dept: CARDIOLOGY | Facility: CLINIC | Age: 67
End: 2017-08-30

## 2017-08-30 NOTE — TELEPHONE ENCOUNTER
"Writer called pt with results of normal echo and she verbalized understanding. Wants to cancel loop recorder implantation due to normal echo findings and explained to pt that echo will not diagnose any arrhthymias such as PAF, tachycardia, pauses etc..  can be heard in back ground stating, \"You are going to have it done.\" Pt will call us back if she decides to cancel procedure. AYDEE Hu RN.  "

## 2017-09-08 DIAGNOSIS — R55 SYNCOPE, UNSPECIFIED SYNCOPE TYPE: Primary | ICD-10-CM

## 2017-09-08 RX ORDER — SODIUM CHLORIDE 450 MG/100ML
INJECTION, SOLUTION INTRAVENOUS CONTINUOUS
Status: CANCELLED | OUTPATIENT
Start: 2017-09-08

## 2017-09-08 RX ORDER — CLINDAMYCIN PHOSPHATE 900 MG/50ML
900 INJECTION, SOLUTION INTRAVENOUS
Status: CANCELLED | OUTPATIENT
Start: 2017-09-08

## 2017-09-08 RX ORDER — LIDOCAINE 40 MG/G
CREAM TOPICAL
Status: CANCELLED | OUTPATIENT
Start: 2017-09-08

## 2017-09-11 ENCOUNTER — HOSPITAL ENCOUNTER (OUTPATIENT)
Facility: CLINIC | Age: 67
Discharge: HOME | End: 2017-09-11
Attending: INTERNAL MEDICINE | Admitting: INTERNAL MEDICINE
Payer: COMMERCIAL

## 2017-09-11 ENCOUNTER — APPOINTMENT (OUTPATIENT)
Dept: CARDIOLOGY | Facility: CLINIC | Age: 67
End: 2017-09-11
Attending: INTERNAL MEDICINE
Payer: COMMERCIAL

## 2017-09-11 VITALS
RESPIRATION RATE: 16 BRPM | WEIGHT: 114 LBS | DIASTOLIC BLOOD PRESSURE: 90 MMHG | HEART RATE: 68 BPM | TEMPERATURE: 97 F | OXYGEN SATURATION: 95 % | SYSTOLIC BLOOD PRESSURE: 166 MMHG | HEIGHT: 65 IN | BODY MASS INDEX: 18.99 KG/M2

## 2017-09-11 DIAGNOSIS — R55 SYNCOPE, UNSPECIFIED SYNCOPE TYPE: ICD-10-CM

## 2017-09-11 LAB
ANION GAP SERPL CALCULATED.3IONS-SCNC: 6 MMOL/L (ref 3–14)
BUN SERPL-MCNC: 14 MG/DL (ref 7–30)
CALCIUM SERPL-MCNC: 8.7 MG/DL (ref 8.5–10.1)
CHLORIDE SERPL-SCNC: 107 MMOL/L (ref 94–109)
CO2 SERPL-SCNC: 28 MMOL/L (ref 20–32)
CREAT SERPL-MCNC: 0.67 MG/DL (ref 0.52–1.04)
ERYTHROCYTE [DISTWIDTH] IN BLOOD BY AUTOMATED COUNT: 12.9 % (ref 10–15)
GFR SERPL CREATININE-BSD FRML MDRD: 87 ML/MIN/1.7M2
GLUCOSE SERPL-MCNC: 111 MG/DL (ref 70–99)
HCT VFR BLD AUTO: 38.1 % (ref 35–47)
HGB BLD-MCNC: 12.8 G/DL (ref 11.7–15.7)
MCH RBC QN AUTO: 30.2 PG (ref 26.5–33)
MCHC RBC AUTO-ENTMCNC: 33.6 G/DL (ref 31.5–36.5)
MCV RBC AUTO: 90 FL (ref 78–100)
PLATELET # BLD AUTO: 301 10E9/L (ref 150–450)
POTASSIUM SERPL-SCNC: 3.8 MMOL/L (ref 3.4–5.3)
RBC # BLD AUTO: 4.24 10E12/L (ref 3.8–5.2)
SODIUM SERPL-SCNC: 141 MMOL/L (ref 133–144)
WBC # BLD AUTO: 5.4 10E9/L (ref 4–11)

## 2017-09-11 PROCEDURE — 40000065 ZZH STATISTIC EKG NON-CHARGEABLE

## 2017-09-11 PROCEDURE — 36415 COLL VENOUS BLD VENIPUNCTURE: CPT | Performed by: INTERNAL MEDICINE

## 2017-09-11 PROCEDURE — 93005 ELECTROCARDIOGRAM TRACING: CPT

## 2017-09-11 PROCEDURE — 85027 COMPLETE CBC AUTOMATED: CPT | Performed by: INTERNAL MEDICINE

## 2017-09-11 PROCEDURE — 99152 MOD SED SAME PHYS/QHP 5/>YRS: CPT

## 2017-09-11 PROCEDURE — 27210995 ZZH RX 272: Performed by: INTERNAL MEDICINE

## 2017-09-11 PROCEDURE — 33282 ZZHC LOOP RECORDER IMPLANT: CPT

## 2017-09-11 PROCEDURE — C1764 EVENT RECORDER, CARDIAC: HCPCS

## 2017-09-11 PROCEDURE — S0077 INJECTION, CLINDAMYCIN PHOSP: HCPCS | Performed by: INTERNAL MEDICINE

## 2017-09-11 PROCEDURE — 25000125 ZZHC RX 250: Performed by: INTERNAL MEDICINE

## 2017-09-11 PROCEDURE — 99152 MOD SED SAME PHYS/QHP 5/>YRS: CPT | Performed by: INTERNAL MEDICINE

## 2017-09-11 PROCEDURE — 40000852 ZZH STATISTIC HEART CATH LAB OR EP LAB

## 2017-09-11 PROCEDURE — 25000128 H RX IP 250 OP 636: Performed by: INTERNAL MEDICINE

## 2017-09-11 PROCEDURE — 80048 BASIC METABOLIC PNL TOTAL CA: CPT | Performed by: INTERNAL MEDICINE

## 2017-09-11 PROCEDURE — 0JH602Z INSERTION OF MONITORING DEVICE INTO CHEST SUBCUTANEOUS TISSUE AND FASCIA, OPEN APPROACH: ICD-10-PCS | Performed by: INTERNAL MEDICINE

## 2017-09-11 PROCEDURE — 99153 MOD SED SAME PHYS/QHP EA: CPT

## 2017-09-11 PROCEDURE — 93010 ELECTROCARDIOGRAM REPORT: CPT | Performed by: INTERNAL MEDICINE

## 2017-09-11 PROCEDURE — 27210847 ZZH KIT ACCESS PPM ICD CR2

## 2017-09-11 PROCEDURE — 33282 ZZHC LOOP RECORDER IMPLANT: CPT | Performed by: INTERNAL MEDICINE

## 2017-09-11 RX ORDER — ATROPINE SULFATE 0.1 MG/ML
.5-1 INJECTION INTRAVENOUS
Status: DISCONTINUED | OUTPATIENT
Start: 2017-09-11 | End: 2017-09-11 | Stop reason: HOSPADM

## 2017-09-11 RX ORDER — CLINDAMYCIN PHOSPHATE 900 MG/50ML
900 INJECTION, SOLUTION INTRAVENOUS
Status: COMPLETED | OUTPATIENT
Start: 2017-09-11 | End: 2017-09-11

## 2017-09-11 RX ORDER — PROTAMINE SULFATE 10 MG/ML
1-5 INJECTION, SOLUTION INTRAVENOUS
Status: DISCONTINUED | OUTPATIENT
Start: 2017-09-11 | End: 2017-09-11 | Stop reason: HOSPADM

## 2017-09-11 RX ORDER — BUPIVACAINE HYDROCHLORIDE 2.5 MG/ML
10-30 INJECTION, SOLUTION EPIDURAL; INFILTRATION; INTRACAUDAL
Status: DISCONTINUED | OUTPATIENT
Start: 2017-09-11 | End: 2017-09-11 | Stop reason: HOSPADM

## 2017-09-11 RX ORDER — HEPARIN SODIUM 1000 [USP'U]/ML
1000-10000 INJECTION, SOLUTION INTRAVENOUS; SUBCUTANEOUS EVERY 5 MIN PRN
Status: DISCONTINUED | OUTPATIENT
Start: 2017-09-11 | End: 2017-09-11 | Stop reason: HOSPADM

## 2017-09-11 RX ORDER — DIPHENHYDRAMINE HYDROCHLORIDE 50 MG/ML
25-50 INJECTION INTRAMUSCULAR; INTRAVENOUS
Status: DISCONTINUED | OUTPATIENT
Start: 2017-09-11 | End: 2017-09-11 | Stop reason: HOSPADM

## 2017-09-11 RX ORDER — IBUTILIDE FUMARATE 1 MG/10ML
0.01 INJECTION, SOLUTION INTRAVENOUS
Status: DISCONTINUED | OUTPATIENT
Start: 2017-09-11 | End: 2017-09-11 | Stop reason: HOSPADM

## 2017-09-11 RX ORDER — LORAZEPAM 2 MG/ML
.5-2 INJECTION INTRAMUSCULAR EVERY 10 MIN PRN
Status: DISCONTINUED | OUTPATIENT
Start: 2017-09-11 | End: 2017-09-11 | Stop reason: HOSPADM

## 2017-09-11 RX ORDER — FLUMAZENIL 0.1 MG/ML
0.2 INJECTION, SOLUTION INTRAVENOUS
Status: DISCONTINUED | OUTPATIENT
Start: 2017-09-11 | End: 2017-09-11 | Stop reason: HOSPADM

## 2017-09-11 RX ORDER — LIDOCAINE HYDROCHLORIDE 10 MG/ML
10-30 INJECTION, SOLUTION EPIDURAL; INFILTRATION; INTRACAUDAL; PERINEURAL
Status: DISCONTINUED | OUTPATIENT
Start: 2017-09-11 | End: 2017-09-11 | Stop reason: HOSPADM

## 2017-09-11 RX ORDER — NALOXONE HYDROCHLORIDE 0.4 MG/ML
0.4 INJECTION, SOLUTION INTRAMUSCULAR; INTRAVENOUS; SUBCUTANEOUS EVERY 5 MIN PRN
Status: DISCONTINUED | OUTPATIENT
Start: 2017-09-11 | End: 2017-09-11 | Stop reason: HOSPADM

## 2017-09-11 RX ORDER — NALOXONE HYDROCHLORIDE 0.4 MG/ML
.1-.4 INJECTION, SOLUTION INTRAMUSCULAR; INTRAVENOUS; SUBCUTANEOUS
Status: DISCONTINUED | OUTPATIENT
Start: 2017-09-11 | End: 2017-09-11 | Stop reason: HOSPADM

## 2017-09-11 RX ORDER — FUROSEMIDE 10 MG/ML
20-100 INJECTION INTRAMUSCULAR; INTRAVENOUS
Status: DISCONTINUED | OUTPATIENT
Start: 2017-09-11 | End: 2017-09-11 | Stop reason: HOSPADM

## 2017-09-11 RX ORDER — SODIUM CHLORIDE 450 MG/100ML
INJECTION, SOLUTION INTRAVENOUS CONTINUOUS
Status: DISCONTINUED | OUTPATIENT
Start: 2017-09-11 | End: 2017-09-11 | Stop reason: HOSPADM

## 2017-09-11 RX ORDER — LIDOCAINE 40 MG/G
CREAM TOPICAL
Status: DISCONTINUED | OUTPATIENT
Start: 2017-09-11 | End: 2017-09-11 | Stop reason: HOSPADM

## 2017-09-11 RX ORDER — ADENOSINE 3 MG/ML
6-12 INJECTION, SOLUTION INTRAVENOUS EVERY 5 MIN PRN
Status: DISCONTINUED | OUTPATIENT
Start: 2017-09-11 | End: 2017-09-11 | Stop reason: HOSPADM

## 2017-09-11 RX ORDER — FENTANYL CITRATE 50 UG/ML
25-50 INJECTION, SOLUTION INTRAMUSCULAR; INTRAVENOUS
Status: DISCONTINUED | OUTPATIENT
Start: 2017-09-11 | End: 2017-09-11 | Stop reason: HOSPADM

## 2017-09-11 RX ORDER — PROMETHAZINE HYDROCHLORIDE 25 MG/ML
6.25-25 INJECTION, SOLUTION INTRAMUSCULAR; INTRAVENOUS EVERY 4 HOURS PRN
Status: DISCONTINUED | OUTPATIENT
Start: 2017-09-11 | End: 2017-09-11 | Stop reason: HOSPADM

## 2017-09-11 RX ORDER — ONDANSETRON 2 MG/ML
4 INJECTION INTRAMUSCULAR; INTRAVENOUS EVERY 4 HOURS PRN
Status: DISCONTINUED | OUTPATIENT
Start: 2017-09-11 | End: 2017-09-11 | Stop reason: HOSPADM

## 2017-09-11 RX ORDER — PROTAMINE SULFATE 10 MG/ML
5-40 INJECTION, SOLUTION INTRAVENOUS EVERY 10 MIN PRN
Status: DISCONTINUED | OUTPATIENT
Start: 2017-09-11 | End: 2017-09-11 | Stop reason: HOSPADM

## 2017-09-11 RX ORDER — KETOROLAC TROMETHAMINE 30 MG/ML
15 INJECTION, SOLUTION INTRAMUSCULAR; INTRAVENOUS
Status: DISCONTINUED | OUTPATIENT
Start: 2017-09-11 | End: 2017-09-11 | Stop reason: HOSPADM

## 2017-09-11 RX ORDER — DOBUTAMINE HYDROCHLORIDE 200 MG/100ML
5-40 INJECTION INTRAVENOUS CONTINUOUS PRN
Status: DISCONTINUED | OUTPATIENT
Start: 2017-09-11 | End: 2017-09-11 | Stop reason: HOSPADM

## 2017-09-11 RX ORDER — IBUTILIDE FUMARATE 1 MG/10ML
1 INJECTION, SOLUTION INTRAVENOUS
Status: DISCONTINUED | OUTPATIENT
Start: 2017-09-11 | End: 2017-09-11 | Stop reason: HOSPADM

## 2017-09-11 RX ORDER — LIDOCAINE HYDROCHLORIDE AND EPINEPHRINE 10; 10 MG/ML; UG/ML
10-30 INJECTION, SOLUTION INFILTRATION; PERINEURAL
Status: DISCONTINUED | OUTPATIENT
Start: 2017-09-11 | End: 2017-09-11 | Stop reason: HOSPADM

## 2017-09-11 RX ORDER — MORPHINE SULFATE 2 MG/ML
1-2 INJECTION, SOLUTION INTRAMUSCULAR; INTRAVENOUS EVERY 5 MIN PRN
Status: DISCONTINUED | OUTPATIENT
Start: 2017-09-11 | End: 2017-09-11 | Stop reason: HOSPADM

## 2017-09-11 RX ADMIN — CLINDAMYCIN PHOSPHATE 900 MG: 18 INJECTION, SOLUTION INTRAVENOUS at 12:50

## 2017-09-11 RX ADMIN — SODIUM CHLORIDE: 4.5 INJECTION, SOLUTION INTRAVENOUS at 12:05

## 2017-09-11 RX ADMIN — MIDAZOLAM HYDROCHLORIDE 1 MG: 1 INJECTION, SOLUTION INTRAMUSCULAR; INTRAVENOUS at 13:01

## 2017-09-11 RX ADMIN — FENTANYL CITRATE 25 MCG: 50 INJECTION, SOLUTION INTRAMUSCULAR; INTRAVENOUS at 13:01

## 2017-09-11 NOTE — DISCHARGE INSTRUCTIONS
EP Study & Loop Recorder Implant Discharge Instructions    After you go home:      Have an adult stay with you until tomorrow.    You may resume your normal diet.       For 24 hours - due to the sedation you received:    Relax and take it easy.    Do NOT make any important or legal decisions.    Do NOT drive or operate machines at home or at work.    Do NOT drink alcohol.    Care of Chest Incision:      Keep the bandage on for 3 days. You may remove the dressing on Thursday.. Change it only if it gets loose or soaked. If you need to change it, use 4x4-inch gauze and a large clear bandage.     Leave the strips of tape on. They will fall off on their own, or we will remove them at your first check-up.    Check your wound daily for signs of infection, such as increased redness, severe swelling or draining. Fever may also be a sign of infection. Call us if you see any of these signs.    If there are no signs of infection, you may shower after the bandage comes off in 3 days. If you take a tub bath, keep the wound dry.    No soaking the incision (swimming pool, bathtub, hot tub) for 2 weeks.    You may have mild to medium pain for 3 to 5 days. Take Acetaminophen (Tylenol) or Ibuprofen (Advil) for the pain. If the pain persists or is severe, call us.    Care of Groin Puncture Site:      For the first 24 hrs - check the puncture site every 1-2 hours while awake.    For the first 2 days, when you cough, sneeze, laugh or move your bowels, hold your hand over the puncture site and press firmly.    Remove the bandaid after 24 hours. If there is minor oozing, apply another bandaid and remove it after 12 hours.    It is normal to have a small bruise or pea size lump at the site.    Do NOT take a bath, or use a hot tub or pool for at least 3 days. Do NOT scrub the site. Do not use lotion or powder near the puncture site.    Activity    Chest:    Avoid strenuous activity until incision well healed.    Groin:    For 2 days:    No  stooping or squatting    Do NOT do any heavy activity such as exercise, lifting, or straining.     No housework, yard work or any activity that make you sweat    Do NOT lift more than 10 pounds    Bleeding:    Chest:    If you start bleeding from the incision site, sit down and press firmly on the site for 10 minutes.     Once bleeding stops, call [facility] as soon as you can.    Groin:     If you start bleeding from the site in your groin, lie down flat and press firmly on the site for 10 minutes.     Once bleeding stops, lay flat for 2 hours.     Call [facility] as soon as you can.       Call 911 right away if you have heavy bleeding or bleeding that does not stop.      Medicines:      Take your medications, including blood thinners, unless your provider tells you not to.    If you have stopped any medicines, check with your provider about when to restart them.    If you have pain or shortness of breath, you may take Advil (ibuprofen) or Tylenol (acetaminophen).    Follow Up Appointments:      Follow up with Device Clinic at [facility] of patient preference in 7-10 days.    Call the clinic if:      You have increased pain or a large or growing hard lump around the site.    The site is red, swollen, hot or tender.    Blood or fluid is draining from the site.    You have chills or a fever greater than 101 F (38 C).    Your leg turns feels numb, cool or changes color.    Increased pain in the chest and/or groin.    New pain in the back or belly that you cannot control with Tylenol.    Shortness of breath or chest pain that is not relieved by Advil or Tylenol.    Recurrent irregular or fast heart rate lasting over 2 hours.    Any questions or concerns.    Heart rhythms:    You may have some premature heartbeats. These feel very strong. They may make you feel that the fast heart rhythm (tachycardia) is going to start again.  Give it time. The premature beats should occur less often.    Telling others  about your device:      Before you leave the hospital, you will receive a temporary ID card. A permanent card will be mailed to you about 6 to 8 weeks later. Always carry the ID card with you. It has important details about your device.    You may also get a Medical Alert bracelet or tag that says you have a pacemaker or a defibrillator (ICD). Go to [email or web address].     Always tell doctors, dentists and other care providers that you have a device implanted in you.    Let us know before you plan any surgeries. Your care team must take special steps to keep you safe during certain procedures. These steps will depend on the type of device you have. Your provider will need to see your ID card. They may need to call us for instructions.    Device Safety:      Please refer to device  s booklet for further information.      [facility] [place]:    [phone] [facility] (7 days a week)

## 2017-09-11 NOTE — PROGRESS NOTES
1210 Report received from Veronica Pack RN.  1235 Dr Joseph at bedside to speak with pt & family.   1250 Pt to EP lab at this time.

## 2017-09-11 NOTE — PROGRESS NOTES
Admitted for loop recorder. Denies c/o. Procedure explained and questions answered. States understanding. Family at bedside.

## 2017-09-11 NOTE — IP AVS SNAPSHOT
MRN:[id number]                      After Visit Summary   9/11/2017    Khalida Redding    MRN: [id number]           Visit Information        Department      9/11/2017 10:49 AM [facility]          Review of your medicines      CONTINUE these medicines which have NOT CHANGED        Dose / Directions    GABAPENTIN PO        Dose:  300 mg   Take 300 mg by mouth daily   Refills:  0       HYDROcodone-acetaminophen 5-325 MG per tablet   Commonly known as:  NORCO        Dose:  1 tablet   Take 1 tablet by mouth every 6 hours as needed for moderate to severe pain   Quantity:  10 tablet   Refills:  0       levETIRAcetam 1000 MG Tabs        Dose:  1000 mg   Take 1,000 mg by mouth 2 times daily   Refills:  0       LISINOPRIL PO        Dose:  5 mg   Take 5 mg by mouth daily   Refills:  0       METFORMIN HCL PO        Dose:  500 mg   Take 500 mg by mouth daily (with breakfast)   Refills:  0       PLAVIX PO        Dose:  75 mg   Take 75 mg by mouth daily   Refills:  0       SIMVASTATIN PO        Dose:  40 mg   Take 40 mg by mouth daily   Refills:  0       VITAMIN B 12 PO        Dose:  1000 mcg   Take 1,000 mcg by mouth daily   Refills:  0                Protect others around you: Learn how to safely use, store and throw away your medicines at www.disposemymeds.org.         Follow-ups after your visit        Additional Services     Follow-Up with Electrophysiologist                  Care Instructions        After Care Instructions     Discharge Instructions - Follow up with Device Check RN        Follow up with Device Check RN in 7-10 days.            Discharge Instructions - Keep incision dry for 72 hours       Keep incision dry for 72 hours (unless Derma Luu was applied)                  Further instructions from your care team         EP Study & Loop Recorder Implant Discharge Instructions    After you go home:      Have an adult stay with you until tomorrow.    You may resume your  normal diet.       For 24 hours - due to the sedation you received:    Relax and take it easy.    Do NOT make any important or legal decisions.    Do NOT drive or operate machines at home or at work.    Do NOT drink alcohol.    Care of Chest Incision:      Keep the bandage on for 3 days. You may remove the dressing on Thursday.. Change it only if it gets loose or soaked. If you need to change it, use 4x4-inch gauze and a large clear bandage.     Leave the strips of tape on. They will fall off on their own, or we will remove them at your first check-up.    Check your wound daily for signs of infection, such as increased redness, severe swelling or draining. Fever may also be a sign of infection. Call us if you see any of these signs.    If there are no signs of infection, you may shower after the bandage comes off in 3 days. If you take a tub bath, keep the wound dry.    No soaking the incision (swimming pool, bathtub, hot tub) for 2 weeks.    You may have mild to medium pain for 3 to 5 days. Take Acetaminophen (Tylenol) or Ibuprofen (Advil) for the pain. If the pain persists or is severe, call us.    Care of Groin Puncture Site:      For the first 24 hrs - check the puncture site every 1-2 hours while awake.    For the first 2 days, when you cough, sneeze, laugh or move your bowels, hold your hand over the puncture site and press firmly.    Remove the bandaid after 24 hours. If there is minor oozing, apply another bandaid and remove it after 12 hours.    It is normal to have a small bruise or pea size lump at the site.    Do NOT take a bath, or use a hot tub or pool for at least 3 days. Do NOT scrub the site. Do not use lotion or powder near the puncture site.    Activity    Chest:    Avoid strenuous activity until incision well healed.    Groin:    For 2 days:    No stooping or squatting    Do NOT do any heavy activity such as exercise, lifting, or straining.     No housework, yard work or any activity that make  you sweat    Do NOT lift more than 10 pounds    Bleeding:    Chest:    If you start bleeding from the incision site, sit down and press firmly on the site for 10 minutes.     Once bleeding stops, call [facility] as soon as you can.    Groin:     If you start bleeding from the site in your groin, lie down flat and press firmly on the site for 10 minutes.     Once bleeding stops, lay flat for 2 hours.     Call [facility] as soon as you can.       Call 911 right away if you have heavy bleeding or bleeding that does not stop.      Medicines:      Take your medications, including blood thinners, unless your provider tells you not to.    If you have stopped any medicines, check with your provider about when to restart them.    If you have pain or shortness of breath, you may take Advil (ibuprofen) or Tylenol (acetaminophen).    Follow Up Appointments:      Follow up with Device Clinic at [facility] of patient preference in 7-10 days.    Call the clinic if:      You have increased pain or a large or growing hard lump around the site.    The site is red, swollen, hot or tender.    Blood or fluid is draining from the site.    You have chills or a fever greater than 101 F (38 C).    Your leg turns feels numb, cool or changes color.    Increased pain in the chest and/or groin.    New pain in the back or belly that you cannot control with Tylenol.    Shortness of breath or chest pain that is not relieved by Advil or Tylenol.    Recurrent irregular or fast heart rate lasting over 2 hours.    Any questions or concerns.    Heart rhythms:    You may have some premature heartbeats. These feel very strong. They may make you feel that the fast heart rhythm (tachycardia) is going to start again.  Give it time. The premature beats should occur less often.    Telling others about your device:      Before you leave the hospital, you will receive a temporary ID card. A permanent card will be mailed to you about 6 to 8  "weeks later. Always carry the ID card with you. It has important details about your device.    You may also get a Medical Alert bracelet or tag that says you have a pacemaker or a defibrillator (ICD). Go to [email or web address].     Always tell doctors, dentists and other care providers that you have a device implanted in you.    Let us know before you plan any surgeries. Your care team must take special steps to keep you safe during certain procedures. These steps will depend on the type of device you have. Your provider will need to see your ID card. They may need to call us for instructions.    Device Safety:      Please refer to device  s booklet for further information.      [facility] [place]:    [phone] [facility] (7 days a week)             Additional Information About Your Visit        MyChart Information     [employer] Mobile[employer] lets you send messages to your doctor, view your test results, renew your prescriptions, schedule appointments and more. To sign up, go to [email or web address] . Click on \"Log in\" on the left side of the screen, which will take you to the Welcome page. Then click on \"Sign up Now\" on the right side of the page.     You will be asked to enter the access code listed below, as well as some personal information. Please follow the directions to create your username and password.     Your access code is: [email or web address]  Expires: 10/14/2017  3:07 PM     Your access code will  in 90 days. If you need help or a new code, please call your [place] clinic or [phone].        Care EveryWhere ID     This is your Care EveryWhere ID. This could be used by other organizations to access your [place] medical records  [id number]        Your Vitals Were     Blood Pressure Pulse Temperature Respirations Height Weight    159/87 (BP Location: Right arm, Cuff Size: Adult Small) 68 97  F (36.1  C) (Oral) 16 1.651 m (5' 5\") 51.7 kg (114 lb)    Pulse Oximetry BMI " (Body Mass Index)                95% 18.97 kg/m2           Primary Care Provider Office Phone # Fax #    Suhail Wilburn [phone]      Equal Access to Services     EMIGDIO WALLS : Hadchristian danni hughes gilberto Turner, waaxda luqadaha, qaybta kaalmada kaity, doug rae alonangel luis abdul laUsmanlori ulrich. So [facility] [phone].    ATENCIÓN: Si habla español, tiene a peterson disposición servicios gratuitos de asistencia lingüística. Llame al [phone].    We comply with applicable federal civil rights laws and [place] laws. We do not discriminate on the basis of race, color, national origin, age, disability sex, sexual orientation or gender identity.            Thank you!     Thank you for choosing [facility] for your care. Our goal is always to provide you with excellent care. Hearing back from our patients is one way we can continue to improve our services. Please take a few minutes to complete the written survey that you may receive in the mail after you visit with us. Thank you!             Medication List: This is a list of all your medications and when to take them. Check marks below indicate your daily home schedule. Keep this list as a reference.      Medications           Morning Afternoon Evening Bedtime As Needed    GABAPENTIN PO   Take 300 mg by mouth daily                                HYDROcodone-acetaminophen 5-325 MG per tablet   Commonly known as:  NORCO   Take 1 tablet by mouth every 6 hours as needed for moderate to severe pain                                levETIRAcetam 1000 MG Tabs   Take 1,000 mg by mouth 2 times daily                                LISINOPRIL PO   Take 5 mg by mouth daily                                METFORMIN HCL PO   Take 500 mg by mouth daily (with breakfast)                                PLAVIX PO   Take 75 mg by mouth daily                                SIMVASTATIN PO   Take 40 mg by mouth daily                                VITAMIN B 12 PO   Take 1,000 mcg by  mouth daily

## 2017-09-11 NOTE — IP AVS SNAPSHOT
[place] [address]    [place] [address]    Phone:  [phone]                                       After Visit Summary   9/11/2017    Khalida Redding    MRN: [id number]           After Visit Summary Signature Page     I have received my discharge instructions, and my questions have been answered. I have discussed any challenges I see with this plan with the nurse or doctor.    ..........................................................................................................................................  Patient/Patient Representative Signature      ..........................................................................................................................................  Patient Representative Print Name and Relationship to Patient    ..................................................               ................................................  Date                                            Time    ..........................................................................................................................................  Reviewed by Signature/Title    ...................................................              ..............................................  Date                                                            Time

## 2017-09-11 NOTE — PROGRESS NOTES
Up to bathroom x 2 to void. Denies pain. BP elevated. Will take BP meds when she gets home. Left chest drsg D/I. [employer] here to see pt. Discharge instructions done with pt and family. IV DCD.     1430 Discharged to door #1 with family and instructions.

## 2017-09-11 NOTE — PROGRESS NOTES
Loop recorder implantation for recurrent transient loss of consciousness with unknown etiology.  No complications.

## 2017-09-13 LAB — INTERPRETATION ECG - MUSE: NORMAL

## 2017-09-14 ENCOUNTER — OFFICE VISIT (OUTPATIENT)
Dept: CARDIOLOGY | Facility: CLINIC | Age: 67
End: 2017-09-14
Payer: COMMERCIAL

## 2017-09-14 DIAGNOSIS — Z45.09 ENCOUNTER FOR LOOP RECORDER CHECK: ICD-10-CM

## 2017-09-14 DIAGNOSIS — R55 SYNCOPE: Primary | ICD-10-CM

## 2017-09-14 PROCEDURE — 93291 INTERROG DEV EVAL SCRMS IP: CPT

## 2017-09-14 NOTE — PROGRESS NOTES
MedL2 Reveal Linq Loop Recorder   Symptom: NONE Tachy: NONE Pause: NONE Jeffery: NONE  AT: NONE AF: 0 % of time in AT/AF: NONE  Heart rate: 92 bpm/ SR Battery: NEW  Careplan: Start quarterly remote transmissions.discussed activator and carelink monitor.   Steri strip removed. Edges well approx; minimal bruising, no drainage sml

## 2017-09-14 NOTE — MR AVS SNAPSHOT
"              After Visit Summary   9/14/2017    Khalida Redding    MRN: [id number]           Patient Information     Date Of Birth          1950        Visit Information        Provider Department      9/14/2017 2:30 PM CARDIO DEVICE NURSE [facility]        Today's Diagnoses     Syncope    -  1    Encounter for loop recorder check           Follow-ups after your visit        Your next 10 appointments already scheduled     Jan 04, 2018  4:30 PM CST   Remote ICD Check with CARRILLO DCR2   [facility] [place] ([facility])    [address] [place] [id number] [phone]           This appointment is for a remote check of your debrillator.  This is not an appointment at the office.              Who to contact     If you have questions or need follow up information about today's clinic visit or your schedule please contact [facility] directly at [phone].  Normal or non-critical lab and imaging results will be communicated to you by Strikinglyhart, letter or phone within 4 business days after the clinic has received the results. If you do not hear from us within 7 days, please contact the clinic through [employer]kinglyhart or phone. If you have a critical or abnormal lab result, we will notify you by phone as soon as possible.  Submit refill requests through [employer] or call your pharmacy and they will forward the refill request to us. Please allow 3 business days for your refill to be completed.          Additional Information About Your Visit        Strikinglyhart Information     [employer] lets you send messages to your doctor, view your test results, renew your prescriptions, schedule appointments and more. To sign up, go to [email or web address] . Click on \"Log in\" on the left side of the screen, which will take you to the Welcome page. Then click on \"Sign up Now\" on the right side of the page.     You will be asked to enter the access code " listed below, as well as some personal information. Please follow the directions to create your username and password.     Your access code is: [email or web address]  Expires: 10/14/2017  3:07 PM     Your access code will  in 90 days. If you need help or a new code, please call your [place] clinic or [phone].        Care EveryWhere ID     This is your Care EveryWhere ID. This could be used by other organizations to access your [place] medical records  [id number]         Blood Pressure from Last 3 Encounters:   17 166/90   17 130/78   17 (!) 160/96    Weight from Last 3 Encounters:   17 51.7 kg (114 lb)   17 54.3 kg (119 lb 11.2 oz)   17 52.7 kg (116 lb 3.2 oz)              We Performed the Following     INTERR DEVICE EVAL IN PERSON, ILR ([id number])        Primary Care Provider Office Phone # Fax #    Suhail HAGER Cosmo [phone]       [place] [id number]   [facility] [address]        Equal Access to Services     JEFF WALLS : Hadii aad ku hadasho Soomaali, waaxda luqadaha, qaybta kaalmada adeegyada, doug ulrich. So [facility] [phone].    ATENCIÓN: Si habla español, tiene a peterson disposición servicios gratuitos de asistencia lingüística. [facility] [phone].    We comply with applicable federal civil rights laws and [place] laws. We do not discriminate on the basis of race, color, national origin, age, disability sex, sexual orientation or gender identity.            Thank you!     Thank you for choosing [facility]  for your care. Our goal is always to provide you with excellent care. Hearing back from our patients is one way we can continue to improve our services. Please take a few minutes to complete the written survey that you may receive in the mail after your visit with us. Thank you!             Your Updated Medication List - Protect others around you: Learn how to safely use, store and throw away  your medicines at www.disposemymeds.org.          This list is accurate as of: 9/14/17  3:05 PM.  Always use your most recent med list.                   Brand Name Dispense Instructions for use Diagnosis    GABAPENTIN PO      Take 300 mg by mouth daily        HYDROcodone-acetaminophen 5-325 MG per tablet    NORCO    10 tablet    Take 1 tablet by mouth every 6 hours as needed for moderate to severe pain        levETIRAcetam 1000 MG Tabs      Take 1,000 mg by mouth 2 times daily        LISINOPRIL PO      Take 5 mg by mouth daily        METFORMIN HCL PO      Take 500 mg by mouth daily (with breakfast)        PLAVIX PO      Take 75 mg by mouth daily        SIMVASTATIN PO      Take 40 mg by mouth daily        VITAMIN B 12 PO      Take 1,000 mcg by mouth daily

## 2017-12-07 ENCOUNTER — OFFICE VISIT (OUTPATIENT)
Dept: CARDIOLOGY | Facility: CLINIC | Age: 67
End: 2017-12-07
Payer: COMMERCIAL

## 2017-12-07 VITALS
HEIGHT: 65 IN | RESPIRATION RATE: 12 BRPM | BODY MASS INDEX: 19.46 KG/M2 | DIASTOLIC BLOOD PRESSURE: 86 MMHG | WEIGHT: 116.8 LBS | SYSTOLIC BLOOD PRESSURE: 140 MMHG | HEART RATE: 96 BPM | OXYGEN SATURATION: 97 %

## 2017-12-07 DIAGNOSIS — R55 SYNCOPE, UNSPECIFIED SYNCOPE TYPE: Primary | ICD-10-CM

## 2017-12-07 PROCEDURE — 99213 OFFICE O/P EST LOW 20 MIN: CPT | Performed by: NURSE PRACTITIONER

## 2017-12-07 ASSESSMENT — PAIN SCALES - GENERAL: PAINLEVEL: NO PAIN (0)

## 2017-12-07 NOTE — MR AVS SNAPSHOT
"              After Visit Summary   12/7/2017    Khalida Redding    MRN: [id number]           Patient Information     Date Of Birth          1950        Visit Information        Provider Department      12/7/2017 2:45 PM Monica Gutierrez APRN CNP [facility]        Care Instructions    Have your blood pressure checked in 2-3 months  See us in 1 year          Follow-ups after your visit        Your next 10 appointments already scheduled     Jan 04, 2018  4:30 PM CST   Remote ICD Check with CARRILLO DCR2   [facility] ([facility])    [address] [place] [address] [address]  [facility] [address] [phone]           This appointment is for a remote check of your debrillator.  This is not an appointment at the office.              Who to contact     If you have questions or need follow up information about today's clinic visit or your schedule please contact [facility] directly at [phone].  Normal or non-critical lab and imaging results will be communicated to you by Cl[employer]hart, letter or phone within 4 business days after the clinic has received the results. If you do not hear from us within 7 days, please contact the clinic through App.nett or phone. If you have a critical or abnormal lab result, we will notify you by phone as soon as possible.  Submit refill requests through Yozons or call your pharmacy and they will forward the refill request to us. Please allow 3 business days for your refill to be completed.          Additional Information About Your Visit        [employer] Information     [employer] lets you send messages to your doctor, view your test results, renew your prescriptions, schedule appointments and more. To sign up, go to [email or web address] . Click on \"Log in\" on the left side of the screen, which will take you to the Welcome page. Then click on \"Sign up Now\" on the " "right side of the page.     You will be asked to enter the access code listed below, as well as some personal information. Please follow the directions to create your username and password.     Your access code is: [email or web address]  Expires: 3/7/2018  3:08 PM     Your access code will  in 90 days. If you need help or a new code, please call your [place] clinic or [phone].        Care EveryWhere ID     This is your Care EveryWhere ID. This could be used by other organizations to access your [place] medical records  [id number]        Your Vitals Were     Pulse Respirations Height Pulse Oximetry BMI (Body Mass Index)       96 12 1.651 m (5' 5\") 97% 19.44 kg/m2        Blood Pressure from Last 3 Encounters:   17 140/86   17 166/90   17 130/78    Weight from Last 3 Encounters:   17 53 kg (116 lb 12.8 oz)   17 51.7 kg (114 lb)   17 54.3 kg (119 lb 11.2 oz)              Today, you had the following     No orders found for display       Primary Care Provider Office Phone # Fax #    Suhail HAGER [place] [phone]       [address]   [facility] [address]        Equal Access to Services     [facility] TITI : Hadii aad ku hadasho Soomaali, waaxda luqadaha, qaybta kaalmada adeegyada, doug herrera . So [facility] [phone].    ATENCIÓN: Si habla español, tiene a peterson disposición servicios gratuitos de asistencia lingüística. Llame al [phone].    We comply with applicable federal civil rights laws and [place] laws. We do not discriminate on the basis of race, color, national origin, age, disability, sex, sexual orientation, or gender identity.            Thank you!     Thank you for choosing [facility]  for your care. Our goal is always to provide you with excellent care. Hearing back from our patients is one way we can continue to improve our services. Please take a few minutes to " complete the written survey that you may receive in the mail after your visit with us. Thank you!             Your Updated Medication List - Protect others around you: Learn how to safely use, store and throw away your medicines at www.disposemymeds.org.          This list is accurate as of: 12/7/17  3:14 PM.  Always use your most recent med list.                   Brand Name Dispense Instructions for use Diagnosis    GABAPENTIN PO      Take 300 mg by mouth daily        HYDROcodone-acetaminophen 5-325 MG per tablet    NORCO    10 tablet    Take 1 tablet by mouth every 6 hours as needed for moderate to severe pain        levETIRAcetam 1000 MG Tabs      Take 1,000 mg by mouth 2 times daily        LISINOPRIL PO      Take 5 mg by mouth daily        METFORMIN HCL PO      Take 500 mg by mouth daily (with breakfast)        PLAVIX PO      Take 75 mg by mouth daily        SIMVASTATIN PO      Take 40 mg by mouth daily        VITAMIN B 12 PO      Take 1,000 mcg by mouth daily

## 2017-12-07 NOTE — LETTER
"12/7/2017      ANTOINETTE SHETH  [facility] [address]  [facility] [address]      RE: Khalida Redding       Dear Colleague,    I had the pleasure of seeing Khalida Redding in the [facility].    HISTORY OF PRESENT ILLNESS:  Ms. Redding is a delightful 67-year-old woman that I am having the pleasure of meeting today at our [place] location.  She was seen in consultation by Dr. Joseph on 08/24.  She has a long-term smoking history and has had multiple strokes in the past.  The last stroke was 2 years ago while in [place].  This left her with left arm weakness and leg weakness.  She currently uses a walker for ambulation.      The patient has had a history of transient loss of consciousness and had an episode in 06/2016 where they suspected a \"seizure.\"  Since the transient loss of consciousness as well as the strokes, a loop recorder was recommended to help diagnose the cause of these issues.      Overall, she has felt well.  She has not had any recurrent episodes of loss of consciousness or seizures.  Her initial device check showed no arrhythmias.  Pocket has healed nicely.  Blood pressure is slightly elevated, but stable.  She is here today with her daughter and .  All other review of systems are noted below.      ASSESSMENT AND PLAN:  Khalida is a delightful 67-year-old woman here today for followup.   1.  The loss of consciousness as well as previous strokes.  Etiology is unknown.  She underwent a loop recorder.  So far no arrhythmias are noted.  We will check this every 3 months.  Pocket has healed nicely.     2.  Hypertension.  On low-dose lisinopril at 5 mg daily.  The patient states that her blood pressure is low.  I have asked that she have a blood pressure check again in 2-3 months with her primary care provider just to assure that this is stable.   3.  Seizures.  She has been no activity.  I do note that the patient is on Keppra 1000 mg b.i.d. "      We will plan on seeing her in 1 year at our [place] location.  If there are questions or concerns or any arrhythmias, we will be in touch with the patient.        Outpatient Encounter Prescriptions as of 12/7/2017   Medication Sig Dispense Refill     Clopidogrel Bisulfate (PLAVIX PO) Take 75 mg by mouth daily       Cyanocobalamin (VITAMIN B 12 PO) Take 1,000 mcg by mouth daily       LISINOPRIL PO Take 5 mg by mouth daily        SIMVASTATIN PO Take 40 mg by mouth daily       METFORMIN HCL PO Take 500 mg by mouth daily (with breakfast)       levETIRAcetam 1000 MG TABS Take 1,000 mg by mouth 2 times daily       GABAPENTIN PO Take 300 mg by mouth daily       HYDROcodone-acetaminophen (NORCO) 5-325 MG per tablet Take 1 tablet by mouth every 6 hours as needed for moderate to severe pain 10 tablet 0     No facility-administered encounter medications on file as of 12/7/2017.        Again, thank you for allowing me to participate in the care of your patient.      Sincerely,    Monica Gutierrez, CLARISSA, APRN CNP     [facility]

## 2017-12-08 NOTE — PROGRESS NOTES
"HISTORY OF PRESENT ILLNESS:  Ms. Redding is a delightful 67-year-old woman that I am having the pleasure of meeting today at our [place] location.  She was seen in consultation by Dr. Joseph on 08/24.  She has a long-term smoking history and has had multiple strokes in the past.  The last stroke was 2 years ago while in [place].  This left her with left arm weakness and leg weakness.  She currently uses a walker for ambulation.      The patient has had a history of transient loss of consciousness and had an episode in 06/2016 where they suspected a \"seizure.\"  Since the transient loss of consciousness as well as the strokes, a loop recorder was recommended to help diagnose the cause of these issues.      Overall, she has felt well.  She has not had any recurrent episodes of loss of consciousness or seizures.  Her initial device check showed no arrhythmias.  Pocket has healed nicely.  Blood pressure is slightly elevated, but stable.  She is here today with her daughter and .  All other review of systems are noted below.      ASSESSMENT AND PLAN:  Khalida is a delightful 67-year-old woman here today for followup.   1.  The loss of consciousness as well as previous strokes.  Etiology is unknown.  She underwent a loop recorder.  So far no arrhythmias are noted.  We will check this every 3 months.  Pocket has healed nicely.     2.  Hypertension.  On low-dose lisinopril at 5 mg daily.  The patient states that her blood pressure is low.  I have asked that she have a blood pressure check again in 2-3 months with her primary care provider just to assure that this is stable.   3.  Seizures.  She has been no activity.  I do note that the patient is on Keppra 1000 mg b.i.d.      We will plan on seeing her in 1 year at our [place] location.  If there are questions or concerns or any arrhythmias, we will be in touch with the patient.         GIANA ZULUAGA NP             D: 12/08/2017 15:05   T: 12/08/2017 17:42   " MT: JOSE      Name:     SHAI CAMPBELL   MRN:      [id number]        Account:      [id number]   :      1950           Service Date: 2017      Document: [id number]

## 2017-12-08 NOTE — PROGRESS NOTES
HPI and Plan: #[id number]  See dictation    Orders Placed This Encounter   Procedures     Follow-Up with Cardiac Advanced Practice Provider       No orders of the defined types were placed in this encounter.      There are no discontinued medications.      Encounter Diagnosis   Name Primary?     Syncope, unspecified syncope type Yes       CURRENT MEDICATIONS:  Current Outpatient Prescriptions   Medication Sig Dispense Refill     Clopidogrel Bisulfate (PLAVIX PO) Take 75 mg by mouth daily       Cyanocobalamin (VITAMIN B 12 PO) Take 1,000 mcg by mouth daily       LISINOPRIL PO Take 5 mg by mouth daily        SIMVASTATIN PO Take 40 mg by mouth daily       METFORMIN HCL PO Take 500 mg by mouth daily (with breakfast)       levETIRAcetam 1000 MG TABS Take 1,000 mg by mouth 2 times daily       GABAPENTIN PO Take 300 mg by mouth daily       HYDROcodone-acetaminophen (NORCO) 5-325 MG per tablet Take 1 tablet by mouth every 6 hours as needed for moderate to severe pain 10 tablet 0       ALLERGIES     Allergies   Allergen Reactions     Ampicillin        PAST MEDICAL HISTORY:  Past Medical History:   Diagnosis Date     Atrial tachycardia (H)     nonsustained, detected on Ziopatch     CVA (cerebral vascular accident) (H)      Diabetes (H)      Falls      Seizures (H)      Smoking      Syncope        PAST SURGICAL HISTORY:  Past Surgical History:   Procedure Laterality Date     HIP SURGERY Left        FAMILY HISTORY:  No family history on file.    SOCIAL HISTORY:  Social History     Social History     Marital status:      Spouse name: N/A     Number of children: N/A     Years of education: N/A     Social History Main Topics     Smoking status: Current Every Day Smoker     Packs/day: 1.00     Smokeless tobacco: Not on file     Alcohol use No     Drug use: No     Sexual activity: Not on file     Other Topics Concern     Not on file     Social History Narrative       Review of Systems:  Skin:  Positive for bruising on plavix  "  Eyes:  Positive for      ENT:  Negative      Respiratory:  Negative cough     Cardiovascular:  Negative for;palpitations;chest pain;edema;fatigue      Gastroenterology: Negative heartburn    Genitourinary:  Negative      Musculoskeletal:  Positive for back pain    Neurologic:  Negative headaches    Psychiatric:  Positive for sleep disturbances fragmented sleep   Heme/Lymph/Imm:  Positive for allergies    Endocrine:  Positive for diabetes      Physical Exam:  Vitals: /86  Pulse 96  Resp 12  Ht 1.651 m (5' 5\")  Wt 53 kg (116 lb 12.8 oz)  SpO2 97%  BMI 19.44 kg/m2    Constitutional:  cooperative, alert and oriented, well developed, well nourished, in no acute distress        Skin:  warm and dry to the touch, no apparent skin lesions or masses noted     loop implant well healed    Head:  normocephalic, no masses or lesions        Eyes:  pupils equal and round        Lymph:      ENT:  no pallor or cyanosis, dentition good        Neck:  JVP normal        Respiratory:  clear to auscultation prolonged expiration       Cardiac: regular rhythm, normal S1/S2, no S3 or S4, apical impulse not displaced, no murmurs, gallops or rubs                not assessed this visit                                        GI:  not assessed this visit        Extremities and Muscular Skeletal:  no edema;no deformities, clubbing, cyanosis, erythema observed         weakened left arm and leg    Neurological:  no gross motor deficits        Psych:  Alert and Oriented x 3        CC  No referring provider defined for this encounter.              "

## 2018-01-05 ENCOUNTER — ALLIED HEALTH/NURSE VISIT (OUTPATIENT)
Dept: CARDIOLOGY | Facility: CLINIC | Age: 68
End: 2018-01-05
Payer: COMMERCIAL

## 2018-01-05 DIAGNOSIS — Z45.09 ENCOUNTER FOR LOOP RECORDER CHECK: Primary | ICD-10-CM

## 2018-01-05 DIAGNOSIS — R55 SYNCOPE, UNSPECIFIED SYNCOPE TYPE: ICD-10-CM

## 2018-01-05 PROCEDURE — 93297 REM INTERROG DEV EVAL ICPMS: CPT | Performed by: INTERNAL MEDICINE

## 2018-01-05 PROCEDURE — 93299 C ICM/ILR REMOTE TECH SERV: CPT | Performed by: INTERNAL MEDICINE

## 2018-01-05 NOTE — MR AVS SNAPSHOT
After Visit Summary   1/5/2018    Khalida Redding    MRN: [id number]           Patient Information     Date Of Birth          1950        Visit Information        Provider Department      1/5/2018 4:30 PM CARRILLO DCR2 [facility]        Today's Diagnoses     Encounter for loop recorder check    -  1    Syncope, unspecified syncope type           Follow-ups after your visit        Your next 10 appointments already scheduled     Jan 05, 2018  4:30 PM CST   Remote ICD Check with CARRILLO DCR2   [facility] ([facility])    [address]  [facility] [address] [phone]           This appointment is for a remote check of your debrillator.  This is not an appointment at the office.            Apr 19, 2018  4:30 PM CDT   Remote ICD Check with CARRILLO DCR2   [facility] ([facility])    [address]  [facility] [id number] [phone]           This appointment is for a remote check of your debrillator.  This is not an appointment at the office.              Who to contact     If you have questions or need follow up information about today's clinic visit or your schedule please contact [facility] directly at [phone].  Normal or non-critical lab and imaging results will be communicated to you by MyChart, letter or phone within 4 business days after the clinic has received the results. If you do not hear from us within 7 days, please contact the clinic through MyChart or phone. If you have a critical or abnormal lab result, we will notify you by phone as soon as possible.  Submit refill requests through Asurint or call your pharmacy and they will forward the refill request to us. Please allow 3 business days for your refill to be completed.          Additional Information About Your Visit        [facility]  "Information     [employer] lets you send messages to your doctor, view your test results, renew your prescriptions, schedule appointments and more. To sign up, go to [email or web address] . Click on \"Log in\" on the left side of the screen, which will take you to the Welcome page. Then click on \"Sign up Now\" on the right side of the page.     You will be asked to enter the access code listed below, as well as some personal information. Please follow the directions to create your username and password.     Your access code is: [email or web address]  Expires: 3/7/2018  3:08 PM     Your access code will  in 90 days. If you need help or a new code, please call your [place] clinic or [phone].        Care EveryWhere ID     This is your Care EveryWhere ID. This could be used by other organizations to access your [place] medical records  [id number]         Blood Pressure from Last 3 Encounters:   17 140/86   17 166/90   17 130/78    Weight from Last 3 Encounters:   17 53 kg (116 lb 12.8 oz)   17 51.7 kg (114 lb)   17 54.3 kg (119 lb 11.2 oz)              We Performed the Following     C ICM DEVICE INTERROGAT REMOTE (60908)     ILR REMOTE TECH SERV (48579)        Primary Care Provider Office Phone # Fax #    Suhail Wilburn [phone]       [address]   [facility] [address]        Equal Access to Services     [facility]: Hadii aad ku hadasho Soomaali, waaxda luqadaha, qaybta kaalmada adeegjoslyn, doug herrera . So [facility] [phone].    ATENCIÓN: Si habla español, tiene a peterson disposición servicios gratuitos de asistencia lingüística. Llame al [phone].    We comply with applicable federal civil rights laws and [place] laws. We do not discriminate on the basis of race, color, national origin, age, disability, sex, sexual orientation, or gender identity.            Thank you!     Thank you for choosing [facility]  for your care. Our goal is always to provide you with excellent care. Hearing back from our patients is one way we can continue to improve our services. Please take a few minutes to complete the written survey that you may receive in the mail after your visit with us. Thank you!             Your Updated Medication List - Protect others around you: Learn how to safely use, store and throw away your medicines at www.disposemymeds.org.          This list is accurate as of: 1/5/18 10:34 AM.  Always use your most recent med list.                   Brand Name Dispense Instructions for use Diagnosis    GABAPENTIN PO      Take 300 mg by mouth daily        HYDROcodone-acetaminophen 5-325 MG per tablet    NORCO    10 tablet    Take 1 tablet by mouth every 6 hours as needed for moderate to severe pain        levETIRAcetam 1000 MG Tabs      Take 1,000 mg by mouth 2 times daily        LISINOPRIL PO      Take 5 mg by mouth daily        METFORMIN HCL PO      Take 500 mg by mouth daily (with breakfast)        PLAVIX PO      Take 75 mg by mouth daily        SIMVASTATIN PO      Take 40 mg by mouth daily        VITAMIN B 12 PO      Take 1,000 mcg by mouth daily

## 2018-01-05 NOTE — PROGRESS NOTES
"[employer] Reveal Linq Loop Recorder   Symptom: 0 Tachy: 0 Pause: 0 Jeffery: 0  AT: 0 AF: 0  Time in AT/AF: 0 %  Heart rate: Stable with good variability Battery: \"OK\"  Careplan: Next remote in 3 month or with any syncope      I have reviewed and interpreted the device interrogation. The device is functioning within normal device parameters. I agree with the current findings, assessment and plan.     "

## 2018-02-19 LAB
AST SERPL-CCNC: 12 U/L (ref 10–35)
CHOLEST SERPL-MCNC: 166 MG/DL
CREAT SERPL-MCNC: 0.77 MG/DL (ref 0.5–0.99)
GFR SERPL CREATININE-BSD FRML MDRD: 79 ML/MIN/1.73M2
GLUCOSE SERPL-MCNC: 135 MG/DL (ref 65–99)
HBA1C MFR BLD: 5.9 % (ref 0–?)
HDLC SERPL-MCNC: 53 MG/DL
LDLC SERPL CALC-MCNC: 88 MG/DL
NONHDLC SERPL-MCNC: 113 MG/DL
POTASSIUM SERPL-SCNC: 4.8 MMOL/L (ref 3.5–5.3)
TRIGL SERPL-MCNC: 146 MG/DL
TSH SERPL-ACNC: 3.72 MIU/L (ref 0.4–4.5)

## 2018-04-20 ENCOUNTER — ALLIED HEALTH/NURSE VISIT (OUTPATIENT)
Dept: CARDIOLOGY | Facility: CLINIC | Age: 68
End: 2018-04-20
Payer: COMMERCIAL

## 2018-04-20 DIAGNOSIS — Z45.09 ENCOUNTER FOR LOOP RECORDER CHECK: Primary | ICD-10-CM

## 2018-04-20 DIAGNOSIS — R55 SYNCOPE, UNSPECIFIED SYNCOPE TYPE: ICD-10-CM

## 2018-04-20 PROCEDURE — 93298 REM INTERROG DEV EVAL SCRMS: CPT | Performed by: INTERNAL MEDICINE

## 2018-04-20 PROCEDURE — 93299 C ICM/ILR REMOTE TECH SERV: CPT | Performed by: INTERNAL MEDICINE

## 2018-04-20 NOTE — PROGRESS NOTES
[employer] Reveal Linq Loop Recorder   Symptom: 0 Tachy: 0 Pause: 0 Jeffery: 0  AT: 0 AF: 0  Time in AT/AF: 0 %  Heart rate: no data available Battery: OK  Careplan: remote in 3 months, scheduled. Called pt with results and plan.   REBECA FOOTE

## 2018-04-20 NOTE — MR AVS SNAPSHOT
After Visit Summary   4/20/2018    Khalida Redding    MRN: [id number]           Patient Information     Date Of Birth          1950        Visit Information        Provider Department      4/20/2018 4:30 PM CARRILLO DCR2 [facility]        Today's Diagnoses     Encounter for loop recorder check    -  1    Syncope, unspecified syncope type           Follow-ups after your visit        Your next 10 appointments already scheduled     Apr 20, 2018  4:30 PM CDT   Remote ICD Check with CARRILLO DCR2   [facility] ([facility])    [id number]  [facility] [id number] [phone] OPT 2           This appointment is for a remote check of your debrillator.  This is not an appointment at the office.            Aug 09, 2018  4:30 PM CDT   Remote ICD Check with CARRILLO DCR2   [facility] ([facility])    [id number]  [facility] [address] [phone] OPT 2           This appointment is for a remote check of your debrillator.  This is not an appointment at the office.              Who to contact     If you have questions or need follow up information about today's clinic visit or your schedule please contact [facility] directly at [phone].  Normal or non-critical lab and imaging results will be communicated to you by MyChart, letter or phone within 4 business days after the clinic has received the results. If you do not hear from us within 7 days, please contact the clinic through MyChart or phone. If you have a critical or abnormal lab result, we will notify you by phone as soon as possible.  Submit refill requests through [employer] or call your pharmacy and they will forward the refill request to us. Please allow 3 business days for your refill to be completed.          Additional Information About Your Visit       "  MyChart Information     eSight lets you send messages to your doctor, view your test results, renew your prescriptions, schedule appointments and more. To sign up, go to [email or web address] . Click on \"Log in\" on the left side of the screen, which will take you to the Welcome page. Then click on \"Sign up Now\" on the right side of the page.     You will be asked to enter the access code listed below, as well as some personal information. Please follow the directions to create your username and password.     Your access code is: [email or web address]  Expires: 2018  4:02 PM     Your access code will  in 90 days. If you need help or a new code, please call your [facility] clinic or [phone].        Care EveryWhere ID     This is your Care EveryWhere ID. This could be used by other organizations to access your [facility] medical records  [id number]         Blood Pressure from Last 3 Encounters:   17 140/86   17 166/90   17 130/78    Weight from Last 3 Encounters:   17 53 kg (116 lb 12.8 oz)   17 51.7 kg (114 lb)   17 54.3 kg (119 lb 11.2 oz)              We Performed the Following     C INTERROGATION DEVICE EVAL, ILR Remote (85543)     ILR REMOTE TECH SERV (32974)        Primary Care Provider Office Phone # Fax #    Suhail Wilburn [phone]       [address]   [facility] [address]        Equal Access to Services     [facility]: Hadii aad ku hadasho Soomaali, waaxda luqadaha, qaybta kaalmada adeegyalópez, doug ulrich. So [facility] [phone].    ATENCIÓN: Si habla español, tiene a peterson disposición servicios gratuitos de asistencia lingüística. Brianame al [phone].    We comply with applicable federal civil rights laws and [place] laws. We do not discriminate on the basis of race, color, national origin, age, disability, sex, sexual orientation, or gender identity.            Thank you!     Thank you for " choosing [facility]  for your care. Our goal is always to provide you with excellent care. Hearing back from our patients is one way we can continue to improve our services. Please take a few minutes to complete the written survey that you may receive in the mail after your visit with us. Thank you!             Your Updated Medication List - Protect others around you: Learn how to safely use, store and throw away your medicines at www.disposemymeds.org.          This list is accurate as of 4/20/18  4:02 PM.  Always use your most recent med list.                   Brand Name Dispense Instructions for use Diagnosis    GABAPENTIN PO      Take 300 mg by mouth daily        HYDROcodone-acetaminophen 5-325 MG per tablet    NORCO    10 tablet    Take 1 tablet by mouth every 6 hours as needed for moderate to severe pain        levETIRAcetam 1000 MG Tabs      Take 1,000 mg by mouth 2 times daily        LISINOPRIL PO      Take 5 mg by mouth daily        METFORMIN HCL PO      Take 500 mg by mouth daily (with breakfast)        PLAVIX PO      Take 75 mg by mouth daily        SIMVASTATIN PO      Take 40 mg by mouth daily        VITAMIN B 12 PO      Take 1,000 mcg by mouth daily

## 2018-07-16 ENCOUNTER — TRANSFERRED RECORDS (OUTPATIENT)
Dept: HEALTH INFORMATION MANAGEMENT | Facility: CLINIC | Age: 68
End: 2018-07-16

## 2018-07-16 LAB
AST SERPL-CCNC: 13 U/L (ref 10–35)
CHOLEST SERPL-MCNC: 167 MG/DL
CREAT SERPL-MCNC: 0.69 MG/DL (ref 0.5–0.99)
GFR SERPL CREATININE-BSD FRML MDRD: 89 ML/MIN/1.73M2
GLUCOSE SERPL-MCNC: 121 MG/DL (ref 65–99)
HBA1C MFR BLD: 6.4 % (ref 0–5.7)
HDLC SERPL-MCNC: 53 MG/DL
LDLC SERPL CALC-MCNC: 89 MG/DL
NONHDLC SERPL-MCNC: 114 MG/DL
POTASSIUM SERPL-SCNC: 4.8 MMOL/L (ref 3.5–5.3)
TRIGL SERPL-MCNC: 148 MG/DL

## 2018-07-30 ENCOUNTER — HOSPITAL ENCOUNTER (EMERGENCY)
Facility: CLINIC | Age: 68
Discharge: HOME | End: 2018-07-30
Attending: PHYSICIAN ASSISTANT | Admitting: PHYSICIAN ASSISTANT
Payer: COMMERCIAL

## 2018-07-30 VITALS
DIASTOLIC BLOOD PRESSURE: 92 MMHG | RESPIRATION RATE: 19 BRPM | HEART RATE: 71 BPM | BODY MASS INDEX: 20.63 KG/M2 | WEIGHT: 124 LBS | OXYGEN SATURATION: 96 % | SYSTOLIC BLOOD PRESSURE: 149 MMHG | TEMPERATURE: 96.3 F

## 2018-07-30 DIAGNOSIS — R19.7 DIARRHEA, UNSPECIFIED TYPE: ICD-10-CM

## 2018-07-30 DIAGNOSIS — R41.82 ALTERED MENTAL STATUS, UNSPECIFIED ALTERED MENTAL STATUS TYPE: ICD-10-CM

## 2018-07-30 LAB
ALBUMIN SERPL-MCNC: 3.8 G/DL (ref 3.4–5)
ALBUMIN UR-MCNC: NEGATIVE MG/DL
ALP SERPL-CCNC: 121 U/L (ref 40–150)
ALT SERPL W P-5'-P-CCNC: 16 U/L (ref 0–50)
ANION GAP SERPL CALCULATED.3IONS-SCNC: 12 MMOL/L (ref 3–14)
APPEARANCE UR: ABNORMAL
AST SERPL W P-5'-P-CCNC: 11 U/L (ref 0–45)
BASOPHILS # BLD AUTO: 0.1 10E9/L (ref 0–0.2)
BASOPHILS NFR BLD AUTO: 0.6 %
BILIRUB SERPL-MCNC: 0.5 MG/DL (ref 0.2–1.3)
BILIRUB UR QL STRIP: NEGATIVE
BUN SERPL-MCNC: 19 MG/DL (ref 7–30)
CALCIUM SERPL-MCNC: 9.3 MG/DL (ref 8.5–10.1)
CHLORIDE SERPL-SCNC: 106 MMOL/L (ref 94–109)
CO2 SERPL-SCNC: 25 MMOL/L (ref 20–32)
COLOR UR AUTO: YELLOW
CREAT SERPL-MCNC: 0.74 MG/DL (ref 0.52–1.04)
DIFFERENTIAL METHOD BLD: ABNORMAL
EOSINOPHIL NFR BLD AUTO: 0.6 %
ERYTHROCYTE [DISTWIDTH] IN BLOOD BY AUTOMATED COUNT: 13 % (ref 10–15)
GFR SERPL CREATININE-BSD FRML MDRD: 77 ML/MIN/1.7M2
GLUCOSE SERPL-MCNC: 164 MG/DL (ref 70–99)
GLUCOSE UR STRIP-MCNC: 50 MG/DL
HCT VFR BLD AUTO: 47.9 % (ref 35–47)
HGB BLD-MCNC: 15.9 G/DL (ref 11.7–15.7)
HGB UR QL STRIP: NEGATIVE
HYALINE CASTS #/AREA URNS LPF: 4 /LPF (ref 0–2)
IMM GRANULOCYTES # BLD: 0.1 10E9/L (ref 0–0.4)
IMM GRANULOCYTES NFR BLD: 0.7 %
KETONES UR STRIP-MCNC: NEGATIVE MG/DL
LEUKOCYTE ESTERASE UR QL STRIP: NEGATIVE
LYMPHOCYTES # BLD AUTO: 1.4 10E9/L (ref 0.8–5.3)
LYMPHOCYTES NFR BLD AUTO: 11.4 %
MAGNESIUM SERPL-MCNC: 2 MG/DL (ref 1.6–2.3)
MCH RBC QN AUTO: 30.4 PG (ref 26.5–33)
MCHC RBC AUTO-ENTMCNC: 33.2 G/DL (ref 31.5–36.5)
MCV RBC AUTO: 92 FL (ref 78–100)
MONOCYTES # BLD AUTO: 0.5 10E9/L (ref 0–1.3)
MONOCYTES NFR BLD AUTO: 4.1 %
MUCOUS THREADS #/AREA URNS LPF: PRESENT /LPF
NEUTROPHILS # BLD AUTO: 10.2 10E9/L (ref 1.6–8.3)
NEUTROPHILS NFR BLD AUTO: 82.6 %
NITRATE UR QL: NEGATIVE
NRBC # BLD AUTO: 0 10*3/UL
NRBC BLD AUTO-RTO: 0 /100
PH UR STRIP: 5 PH (ref 5–7)
PLATELET # BLD AUTO: 222 10E9/L (ref 150–450)
POTASSIUM SERPL-SCNC: 3.8 MMOL/L (ref 3.4–5.3)
PROT SERPL-MCNC: 7.5 G/DL (ref 6.8–8.8)
RBC # BLD AUTO: 5.23 10E12/L (ref 3.8–5.2)
RBC #/AREA URNS AUTO: <1 /HPF (ref 0–2)
SODIUM SERPL-SCNC: 143 MMOL/L (ref 133–144)
SOURCE: ABNORMAL
SP GR UR STRIP: 1.01 (ref 1–1.03)
SQUAMOUS #/AREA URNS AUTO: 5 /HPF (ref 0–1)
UROBILINOGEN UR STRIP-MCNC: 0 MG/DL (ref 0–2)
WBC # BLD AUTO: 12.3 10E9/L (ref 4–11)
WBC #/AREA URNS AUTO: 3 /HPF (ref 0–5)

## 2018-07-30 PROCEDURE — 80177 DRUG SCRN QUAN LEVETIRACETAM: CPT | Performed by: PHYSICIAN ASSISTANT

## 2018-07-30 PROCEDURE — 99284 EMERGENCY DEPT VISIT MOD MDM: CPT | Performed by: PHYSICIAN ASSISTANT

## 2018-07-30 PROCEDURE — 85025 COMPLETE CBC W/AUTO DIFF WBC: CPT | Performed by: PHYSICIAN ASSISTANT

## 2018-07-30 PROCEDURE — 83735 ASSAY OF MAGNESIUM: CPT | Performed by: PHYSICIAN ASSISTANT

## 2018-07-30 PROCEDURE — 80053 COMPREHEN METABOLIC PANEL: CPT | Performed by: PHYSICIAN ASSISTANT

## 2018-07-30 PROCEDURE — 99283 EMERGENCY DEPT VISIT LOW MDM: CPT | Mod: 25 | Performed by: PHYSICIAN ASSISTANT

## 2018-07-30 PROCEDURE — 93010 ELECTROCARDIOGRAM REPORT: CPT | Mod: Z6 | Performed by: PHYSICIAN ASSISTANT

## 2018-07-30 PROCEDURE — 93005 ELECTROCARDIOGRAM TRACING: CPT | Performed by: PHYSICIAN ASSISTANT

## 2018-07-30 PROCEDURE — 81001 URINALYSIS AUTO W/SCOPE: CPT | Performed by: PHYSICIAN ASSISTANT

## 2018-07-30 PROCEDURE — 25000128 H RX IP 250 OP 636: Performed by: PHYSICIAN ASSISTANT

## 2018-07-30 RX ORDER — SODIUM CHLORIDE 9 MG/ML
1000 INJECTION, SOLUTION INTRAVENOUS CONTINUOUS
Status: DISCONTINUED | OUTPATIENT
Start: 2018-07-30 | End: 2018-07-30 | Stop reason: HOSPADM

## 2018-07-30 RX ADMIN — SODIUM CHLORIDE 1000 ML: 9 INJECTION, SOLUTION INTRAVENOUS at 19:02

## 2018-07-30 NOTE — ED PROVIDER NOTES
History     Chief Complaint   Patient presents with     Loss of Consciousness     The history is provided by the patient, the spouse and a relative.     Khalida Redding is a 68 year old female who presents to the emergency department for loss of consciousness. Patients  reports Khalida was in the bathroom when she yelled for him. He states when he got to the bathroom she was passed out, slumped over on the toilet with her head down. He states he got her out of the bathroom, she was dead weight, and got her to her bedroom and laid her down on her bed. He went to get something and when he came back to the room she was on the floor. He then picked her back up and put her in bed again. He states he left the room again and once again when he returned, she was sitting on the potty chair. He states by that time his daughter was there to help and they took her to the shower to wash her off, put a pull up on her and brought her here to the emergency department. Patient states she is sleepy. She states she remembers calling for her  and the next thing she remembers is her daughter arriving at the house. Patient reports not having any pain, no chest pain or headache and no recent fall.    Problem List:    There are no active problems to display for this patient.       Past Medical History:    Past Medical History:   Diagnosis Date     Atrial tachycardia (H)      CVA (cerebral vascular accident) (H)      Diabetes (H)      Falls      Seizures (H)      Smoking      Syncope        Past Surgical History:    Past Surgical History:   Procedure Laterality Date     HIP SURGERY Left        Family History:    No family history on file.    Social History:  Marital Status:   [2]  Social History   Substance Use Topics     Smoking status: Current Every Day Smoker     Packs/day: 1.00     Smokeless tobacco: Not on file     Alcohol use No        Medications:      Clopidogrel Bisulfate (PLAVIX PO)   Cyanocobalamin  (VITAMIN B 12 PO)   GABAPENTIN PO   HYDROcodone-acetaminophen (NORCO) 5-325 MG per tablet   levETIRAcetam 1000 MG TABS   LISINOPRIL PO   METFORMIN HCL PO   SIMVASTATIN PO         Review of Systems   All other systems reviewed and are negative.      Physical Exam   BP: (!) 89/54  Pulse: 71  Heart Rate: 75  Temp: 96.3  F (35.7  C)  Resp: 12  Weight: 56.2 kg (124 lb)  SpO2: 96 %      Physical Exam   Constitutional: She is oriented to person, place, and time. She appears well-developed and well-nourished. No distress.   HENT:   Head: Normocephalic and atraumatic.   Mouth/Throat: Oropharynx is clear and moist. No oropharyngeal exudate.   Eyes: Pupils are equal, round, and reactive to light. Right eye exhibits no discharge. Left eye exhibits no discharge.   Neck: Normal range of motion. Neck supple. No thyromegaly present.   Cardiovascular: Normal rate, regular rhythm, normal heart sounds and intact distal pulses.  Exam reveals no gallop and no friction rub.    No murmur heard.  Pulmonary/Chest: Effort normal and breath sounds normal. No respiratory distress. She has no wheezes. She has no rales. She exhibits no tenderness.   Abdominal: Soft. Bowel sounds are normal. She exhibits no distension and no mass. There is no tenderness. There is no rebound and no guarding.   Musculoskeletal: Normal range of motion. She exhibits no edema, tenderness or deformity.   Lymphadenopathy:     She has no cervical adenopathy.   Neurological: She is alert and oriented to person, place, and time. She has normal strength. No cranial nerve deficit or sensory deficit. She displays a negative Romberg sign. GCS eye subscore is 4. GCS verbal subscore is 5. GCS motor subscore is 6.   Reflex Scores:       Tricep reflexes are 2+ on the right side and 2+ on the left side.       Bicep reflexes are 2+ on the right side and 2+ on the left side.       Patellar reflexes are 2+ on the right side and 2+ on the left side.  Skin: Skin is warm and dry. No rash  noted. She is not diaphoretic. No erythema. No pallor.   Psychiatric: She has a normal mood and affect. Her behavior is normal. Judgment normal.   Nursing note and vitals reviewed.      ED Course     ED Course     Procedures               EKG Interpretation:      Interpreted by Keron Stubbs  Time reviewed: 1845  Symptoms at time of EKG: none   Rhythm: normal sinus   Rate: normal  Axis: normal  Ectopy: none  Conduction: normal  ST Segments/ T Waves: No ST-T wave changes  Q Waves: none  Comparison to prior: No old EKG available    Clinical Impression: normal EKG          Critical Care time:  none               Results for orders placed or performed during the hospital encounter of 07/30/18 (from the past 24 hour(s))   CBC with platelets differential   Result Value Ref Range    WBC 12.3 (H) 4.0 - 11.0 10e9/L    RBC Count 5.23 (H) 3.8 - 5.2 10e12/L    Hemoglobin 15.9 (H) 11.7 - 15.7 g/dL    Hematocrit 47.9 (H) 35.0 - 47.0 %    MCV 92 78 - 100 fl    MCH 30.4 26.5 - 33.0 pg    MCHC 33.2 31.5 - 36.5 g/dL    RDW 13.0 10.0 - 15.0 %    Platelet Count 222 150 - 450 10e9/L    Diff Method Automated Method     % Neutrophils 82.6 %    % Lymphocytes 11.4 %    % Monocytes 4.1 %    % Eosinophils 0.6 %    % Basophils 0.6 %    % Immature Granulocytes 0.7 %    Nucleated RBCs 0 0 /100    Absolute Neutrophil 10.2 (H) 1.6 - 8.3 10e9/L    Absolute Lymphocytes 1.4 0.8 - 5.3 10e9/L    Absolute Monocytes 0.5 0.0 - 1.3 10e9/L    Absolute Basophils 0.1 0.0 - 0.2 10e9/L    Abs Immature Granulocytes 0.1 0 - 0.4 10e9/L    Absolute Nucleated RBC 0.0    Comprehensive metabolic panel   Result Value Ref Range    Sodium 143 133 - 144 mmol/L    Potassium 3.8 3.4 - 5.3 mmol/L    Chloride 106 94 - 109 mmol/L    Carbon Dioxide 25 20 - 32 mmol/L    Anion Gap 12 3 - 14 mmol/L    Glucose 164 (H) 70 - 99 mg/dL    Urea Nitrogen 19 7 - 30 mg/dL    Creatinine 0.74 0.52 - 1.04 mg/dL    GFR Estimate 77 >60 mL/min/1.7m2    GFR Estimate If Black >90 >60  mL/min/1.7m2    Calcium 9.3 8.5 - 10.1 mg/dL    Bilirubin Total 0.5 0.2 - 1.3 mg/dL    Albumin 3.8 3.4 - 5.0 g/dL    Protein Total 7.5 6.8 - 8.8 g/dL    Alkaline Phosphatase 121 40 - 150 U/L    ALT 16 0 - 50 U/L    AST 11 0 - 45 U/L   Magnesium   Result Value Ref Range    Magnesium 2.0 1.6 - 2.3 mg/dL   UA reflex to Microscopic and Culture   Result Value Ref Range    Color Urine Yellow     Appearance Urine Slightly Cloudy     Glucose Urine 50 (A) NEG^Negative mg/dL    Bilirubin Urine Negative NEG^Negative    Ketones Urine Negative NEG^Negative mg/dL    Specific Gravity Urine 1.012 1.003 - 1.035    Blood Urine Negative NEG^Negative    pH Urine 5.0 5.0 - 7.0 pH    Protein Albumin Urine Negative NEG^Negative mg/dL    Urobilinogen mg/dL 0.0 0.0 - 2.0 mg/dL    Nitrite Urine Negative NEG^Negative    Leukocyte Esterase Urine Negative NEG^Negative    Source Unspecified Urine     RBC Urine <1 0 - 2 /HPF    WBC Urine 3 0 - 5 /HPF    Squamous Epithelial /HPF Urine 5 (H) 0 - 1 /HPF    Mucous Urine Present (A) NEG^Negative /LPF    Hyaline Casts 4 (H) 0 - 2 /LPF       Medications   0.9% sodium chloride BOLUS (0 mLs Intravenous Stopped 7/30/18 2006)     Followed by   sodium chloride 0.9% infusion (not administered)       Assessments & Plan (with Medical Decision Making)  1. Diarrhea, unspecified type    2. Altered mental status,  Syncope vs Seizure         68 year old female with a history of generalized tonic-clonic seizure disorder, atrial tachycardia, diabetes, and CVA who presents for evaluation of an episode of altered level of consciousness at home.  She was sitting on the toilet and experiencing explosive diarrhea when she became weak and slumped over.  She called for her 's help.  He reports that she was not responsive at the time but was not having her typical tonic-clonic movements that she has with previous seizure issues.  She had another episode of diarrhea in the bedroom, when her  carried her there.   She was alert and oriented after the episode, and did not have any postictal state.  No tongue biting.  The patient's daughter and   drove her to the ED.  On exam her initial blood pressure was 89/54, but on recheck evaluation it was in the 116/70 range on multiple rechecks.  Pulse 71, temperature 96.3, and oxygen saturation 96% on room air.  Patient was alert and oriented.  Normal neurological exam.  She did not appear to be postictal.  Exam completely normal.  Laboratory evaluation with CBC showing a mild elevation in the white blood cell count 12,300 and stable differential.  She does not have any signs or symptoms suggestive of infection at this time.  Afebrile.  I think this is likely related to demargination.  Hemoglobin stable at 15.9.  Comprehensive metabolic panel with a mild elevation in the glucose 264 related to her type 2 diabetes.  Magnesium 2.0 normal.  Patient did check her blood sugar about 2 hours prior to this episode, and it was 160 at that time.  Therefore, I doubt hypoglycemia episode.  The symptoms could have all been triggered by the explosive diarrhea.  Symptoms are most consistent with an episode of syncope, but she certainly could have had a seizure.  I did run Kera lab levels, but these will not return for the next couple days.  She was given 1 L of IV normal saline for rehydration here in the ED.  She did not experience any further diarrhea here.  She denies any blood or pus in the stool.  No abdominal pain currently.  Therefore, I do not think stool studies are indicated in the acute setting.  Urinalysis displays no evidence for infection..  Patient and family states that she has had multiple episodes like this in the past where they have been unsure if it is true syncope or seizure disorder.  I recommended further follow-up with her neurologist as well.  Follow-up with PCP for more of a generalized recheck in the next 1 week.  They will schedule that.  The patient was in  agreement with this plan and was suitable for discharge.  She did walk herself to the bathroom without assistance and had no alteration in her gait.  She feels that her strength has returned.  Return to the ED if symptoms change or worsening.     I have reviewed the nursing notes.    I have reviewed the findings, diagnosis, plan and need for follow up with the patient.       New Prescriptions    No medications on file       Final diagnoses:   Diarrhea, unspecified type   Altered mental status,  Syncope vs Seizure     This document serves as a record of services personally performed by Keron Stubbs PA-C. It was created on their behalf by Lolis Jones, a trained medical scribe. The creation of this record is based on the provider's personal observations and the statements of the patient. This document has been checked and approved by the attending provider.    Note: Chart documentation done in part with Dragon Voice Recognition software. Although reviewed after completion, some word and grammatical errors may remain.    7/30/2018   Keron Stubbs PA-C   [facility] EMERGENCY DEPARTMENT     Keron Stubbs PA-C  07/30/18 2034

## 2018-07-30 NOTE — ED TRIAGE NOTES
Patient reportedly passed out for about 15 minutes and slid off the bed, She was drenched in sweat and was incontinent of stool at that time. She is hypotensive at this time.

## 2018-07-30 NOTE — ED AVS SNAPSHOT
[facility] Emergency Department    [address] [place] [address]    Phone:  [phone]    Fax:  [phone]                                       Khalida Redding   MRN: [id number]    Department:  [facility] Emergency Department   Date of Visit:  7/30/2018           Patient Information     Date Of Birth          1950        Your diagnoses for this visit were:     Diarrhea, unspecified type     Altered mental status,  Syncope vs Seizure        You were seen by Keron Stubbs PA-C.      Follow-up Information     Follow up with Suhail Wilburn Schedule an appointment as soon as possible for a visit in 3 days.    Specialty:  Family Practice    Why:  For ED recheck    Contact information:    [address]   [facility] [address]  [phone]          Discharge Instructions       It was a pleasure working with you today!  I am thankful that you are feeling better!    Please continue your regular medication regimen.  Please ensure that you are drinking at least 8 glasses of water daily to maintain adequate hydration.    Please schedule a recheck appointment with your primary care provider the end of this week to recheck your condition.  It would also be wise to have a follow-up appointment with your neurologist in the next few weeks.        Your next 10 appointments already scheduled     Aug 09, 2018  4:30 PM CDT   Remote ICD Check with CARRILLO DCR2   [facility] ([facility])    [address] [place], [place] [id number]  [facility] [id number] [phone] OPT 2           This appointment is for a remote check of your debrillator.  This is not an appointment at the office.              24 Hour Appointment Hotline       To make an appointment at any [facility], call [phone] ([phone]). If you don't have a family doctor or clinic, we will help you find one. [facility] are conveniently located to  serve the needs of you and your family.             Review of your medicines      Our records show that you are taking the medicines listed below. If these are incorrect, please call your family doctor or clinic.        Dose / Directions Last dose taken    GABAPENTIN PO   Dose:  300 mg        Take 300 mg by mouth daily   Refills:  0        HYDROcodone-acetaminophen 5-325 MG per tablet   Commonly known as:  NORCO   Dose:  1 tablet   Quantity:  10 tablet        Take 1 tablet by mouth every 6 hours as needed for moderate to severe pain   Refills:  0        levETIRAcetam 1000 MG Tabs   Dose:  1000 mg        Take 1,000 mg by mouth 2 times daily   Refills:  0        LISINOPRIL PO   Dose:  5 mg        Take 5 mg by mouth daily   Refills:  0        METFORMIN HCL PO   Dose:  500 mg        Take 500 mg by mouth daily (with breakfast)   Refills:  0        PLAVIX PO   Dose:  75 mg        Take 75 mg by mouth daily   Refills:  0        SIMVASTATIN PO   Dose:  40 mg        Take 40 mg by mouth daily   Refills:  0        VITAMIN B 12 PO   Dose:  1000 mcg        Take 1,000 mcg by mouth daily   Refills:  0                Procedures and tests performed during your visit     CBC with platelets differential    Comprehensive metabolic panel    EKG 12-lead, tracing only    Keppra (Levetiracetam) Level    Magnesium    Peripheral IV catheter    UA reflex to Microscopic and Culture      Orders Needing Specimen Collection     None      Pending Results     Date and Time Order Name Status Description    7/30/2018 1817 Keppra (Levetiracetam) Level In process             Pending Culture Results     No orders found from 7/28/2018 to 7/31/2018.            Pending Results Instructions     If you had any lab results that were not finalized at the time of your Discharge, you can call the ED Lab Result RN at [phone]. You will be contacted by this team for any positive Lab results or changes in treatment. The nurses are available 7 days a week from  "10A to 6:30P.  You can leave a message 24 hours per day and they will return your call.        Thank you for choosing [facility]       Thank you for choosing [facility] for your care. Our goal is always to provide you with excellent care. Hearing back from our patients is one way we can continue to improve our services. Please take a few minutes to complete the written survey that you may receive in the mail after you visit with us. Thank you!        [employer] Information     [employer] lets you send messages to your doctor, view your test results, renew your prescriptions, schedule appointments and more. To sign up, go to [email or web address] . Click on \"Log in\" on the left side of the screen, which will take you to the Welcome page. Then click on \"Sign up Now\" on the right side of the page.     You will be asked to enter the access code listed below, as well as some personal information. Please follow the directions to create your username and password.     Your access code is: [email or web address]  Expires: 10/28/2018  8:35 PM     Your access code will  in 90 days. If you need help or a new code, please call your [facility] clinic or [phone].        Care EveryWhere ID     This is your Care EveryWhere ID. This could be used by other organizations to access your [facility] medical records  [id number]        Equal Access to Services     [employer] WALLS : Hadii danni wigginso Sonicol, waaxda luqadaha, qaybta kaalmada kaity, doug ulrich. So [facility] [phone].    ATENCIÓN: Si habla español, tiene a peterson disposición servicios gratuitos de asistencia lingüística. Vernell cope [phone].    We comply with applicable federal civil rights laws and [place] laws. We do not discriminate on the basis of race, color, national origin, age, disability, sex, sexual orientation, or gender identity.            After Visit Summary       This is your record. Keep this with you and show to your community " pharmacist(s) and doctor(s) at your next visit.

## 2018-07-30 NOTE — ED AVS SNAPSHOT
[facility] Emergency Department    [address] [place] [address]    Phone:  [phone]    Fax:  [phone]                                       Khalida Redding   MRN: [id number]    Department:  [facility] Emergency Department   Date of Visit:  7/30/2018           After Visit Summary Signature Page     I have received my discharge instructions, and my questions have been answered. I have discussed any challenges I see with this plan with the nurse or doctor.    ..........................................................................................................................................  Patient/Patient Representative Signature      ..........................................................................................................................................  Patient Representative Print Name and Relationship to Patient    ..................................................               ................................................  Date                                            Time    ..........................................................................................................................................  Reviewed by Signature/Title    ...................................................              ..............................................  Date                                                            Time

## 2018-07-31 NOTE — DISCHARGE INSTRUCTIONS
It was a pleasure working with you today!  I am thankful that you are feeling better!    Please continue your regular medication regimen.  Please ensure that you are drinking at least 8 glasses of water daily to maintain adequate hydration.    Please schedule a recheck appointment with your primary care provider the end of this week to recheck your condition.  It would also be wise to have a follow-up appointment with your neurologist in the next few weeks.

## 2018-08-01 ENCOUNTER — TELEPHONE (OUTPATIENT)
Dept: EMERGENCY MEDICINE | Facility: CLINIC | Age: 68
End: 2018-08-01

## 2018-08-01 LAB — LEVETIRACETAM SERPL-MCNC: <2 UG/ML (ref 12–46)

## 2018-08-01 NOTE — TELEPHONE ENCOUNTER
[facility] Emergency Department Lab result notification:    [facility] ED lab result protocol used  Miscellaneous Protocol    Reason for call  Notify of lab results, assess symptoms,  review ED providers recommendations/discharge instructions (if necessary) and advise per ED lab result f/u protocol    Lab Result  Abnormal Result.  Final Keppra level is <2 and this level is [low].  Normal reference range is 12-46  Resulted after [facility] ED visit on this date 7/30/18.  [facility] patient, RN to notify patient/parent of result and advise to relay result to their PCP immediately.     Information table from ED Provider visit on 7/30/18  Symptoms reported at ED visit (Chief complaint, HPI) Khalida Redding is a 68 year old female who presents to the emergency department for loss of consciousness. Patients  reports Khalida was in the bathroom when she yelled for him. He states when he got to the bathroom she was passed out, slumped over on the toilet with her head down. He states he got her out of the bathroom, she was dead weight, and got her to her bedroom and laid her down on her bed. He went to get something and when he came back to the room she was on the floor. He then picked her back up and put her in bed again. He states he left the room again and once again when he returned, she was sitting on the potty chair. He states by that time his daughter was there to help and they took her to the shower to wash her off, put a pull up on her and brought her here to the emergency department. Patient states she is sleepy. She states she remembers calling for her  and the next thing she remembers is her daughter arriving at the house. Patient reports not having any pain, no chest pain or headache and no recent fall.   ED providers Impression and Plan (applicable information) 68 year old female with a history of generalized tonic-clonic seizure disorder, atrial tachycardia,  diabetes, and CVA who presents for evaluation of an episode of altered level of consciousness at home.  She was sitting on the toilet and experiencing explosive diarrhea when she became weak and slumped over.  She called for her 's help.  He reports that she was not responsive at the time but was not having her typical tonic-clonic movements that she has with previous seizure issues.  She had another episode of diarrhea in the bedroom, when her  carried her there.  She was alert and oriented after the episode, and did not have any postictal state.  No tongue biting.  The patient's daughter and   drove her to the ED.  On exam her initial blood pressure was 89/54, but on recheck evaluation it was in the 116/70 range on multiple rechecks.  Pulse 71, temperature 96.3, and oxygen saturation 96% on room air.  Patient was alert and oriented.  Normal neurological exam.  She did not appear to be postictal.  Exam completely normal.  Laboratory evaluation with CBC showing a mild elevation in the white blood cell count 12,300 and stable differential.  She does not have any signs or symptoms suggestive of infection at this time.  Afebrile.  I think this is likely related to demargination.  Hemoglobin stable at 15.9.  Comprehensive metabolic panel with a mild elevation in the glucose 264 related to her type 2 diabetes.  Magnesium 2.0 normal.  Patient did check her blood sugar about 2 hours prior to this episode, and it was 160 at that time.  Therefore, I doubt hypoglycemia episode.  The symptoms could have all been triggered by the explosive diarrhea.  Symptoms are most consistent with an episode of syncope, but she certainly could have had a seizure.  I did run [facility] lab levels, but these will not return for the next couple days.  She was given 1 L of IV normal saline for rehydration here in the ED.  She did not experience any further diarrhea here.  She denies any blood or pus in the stool.  No abdominal  pain currently.  Therefore, I do not think stool studies are indicated in the acute setting.  Urinalysis displays no evidence for infection..  Patient and family states that she has had multiple episodes like this in the past where they have been unsure if it is true syncope or seizure disorder.  I recommended further follow-up with her neurologist as well.  Follow-up with PCP for more of a generalized recheck in the next 1 week.  They will schedule that.  The patient was in agreement with this plan and was suitable for discharge.  She did walk herself to the bathroom without assistance and had no alteration in her gait.  She feels that her strength has returned.  Return to the ED if symptoms change or worsening.   Miscellaneous information N/A     RN Assessment (Patient s current Symptoms), include time called.  [Insert Left message here if message left]  Advised of Keppra level in addition to normal reference range which she did write down, did advise to relay to PCP.  Khalida states she does not have a neurologist, only sees her PCP.  States she may have her dtr call for results and she does give her permission to release results to her.    Please Contact your PCP clinic or return to the Emergency department if your:    Symptoms return.    Symptoms worsen or other concerning symptom's.    PCP follow-up Questions asked: YES       Sandra Castellano RN    [facility] RN  Lung Nodule and ED Lab Results F/U RN  Epic pool (ED late result f/u RN) : [id number]   # [phone]    Copy of Lab result   Order   Keppra (Levetiracetam) Level [SSB1152] (Order [id number])   Exam Information   Exam Date Exam Time Accession # Results    7/30/18  6:45 PM [id number]    Component Results   Component Value Flag Ref Range Units Status Collected Lab   Keppra (Levetiracetam) Level <2 (L) 12 - 46 ug/mL Final 07/30/2018  6:45 PM [facility] Lab

## 2018-08-17 ENCOUNTER — DOCUMENTATION ONLY (OUTPATIENT)
Dept: CARDIOLOGY | Facility: CLINIC | Age: 68
End: 2018-08-17

## 2018-08-17 NOTE — PROGRESS NOTES
Patient missed [facility] tranmission on 8/9/18. Sent letter 8/10, no response. Sent 1 week letter today

## 2018-09-19 ENCOUNTER — TRANSFERRED RECORDS (OUTPATIENT)
Dept: HEALTH INFORMATION MANAGEMENT | Facility: CLINIC | Age: 68
End: 2018-09-19

## 2018-09-20 PROBLEM — M19.90 OSTEOARTHRITIS: Status: ACTIVE | Noted: 2018-09-20

## 2018-09-20 PROBLEM — M79.662 PAIN IN BOTH LOWER LEGS: Status: ACTIVE | Noted: 2018-09-20

## 2018-09-20 PROBLEM — M79.661 PAIN IN BOTH LOWER LEGS: Status: ACTIVE | Noted: 2018-09-20

## 2018-10-18 ENCOUNTER — TELEPHONE (OUTPATIENT)
Dept: FAMILY MEDICINE | Facility: OTHER | Age: 68
End: 2018-10-18

## 2018-10-18 NOTE — PROGRESS NOTES
SUBJECTIVE:   Khalida Redding is a 68 year old female who presents to clinic today for the following health issues:    COGNITIVE SCREEN  1) Repeat 3 items (Leader, Season, Table)    2) Clock draw: NORMAL  3) 3 item recall: Recalls NO objects   Results: 0 items recalled: PROBABLE COGNITIVE IMPAIRMENT, **INFORM PROVIDER**    Mini-CogTM Copyright ROLO Barbosa. Licensed by the author for use in [facility] [employer]; reprinted with permission (irving@[employer]). All rights reserved.          History of Present Illness     CVA:     Patient history::  Unknown    Residual symptoms::  Difficulty walking, Headaches, Right arm weakness and Left leg weakness    Headache location::  Top of head, Back of the head and Posterior neck    Location of tingling::  Not applicable    Worsened or new symptoms since last visit::  No    Use of ASA daily?:  NO    Diabetes:     Frequency of checking blood sugars::  2 times a day    Diabetic concerns::  None    Hypoglycemia symptoms::  Weak    Paraesthesia present::  No    Eye Exam in the last year::  Yes    10-    Diabetes Management Resources    Hypertension:     Outpatient blood pressures:  Are not being checked    Dietary sodium intake::  No added salt diet    Migraines:     Headache Symptoms are:  Stable    Migraine frequency::  5 per week    Migraine Duration::  1 hour    Ability to perform ADL's::  No    Migraine Rescue/Relief Medications::  None    Effectiveness of rescue/relief medications::  No relief    Migraine Preventative Medications::  None    Neurological symptoms::  None    ER or UC Visits::  None    Diet:  Regular (no restrictions)  Frequency of exercise:  None  Taking medications regularly:  Yes  Medication side effects:  None  Additional concerns today:  No    Problem list and histories reviewed & adjusted, as indicated.  Additional history: as documented    Patient Active Problem List   Diagnosis     Type 2 diabetes mellitus with circulatory disorder, without  long-term current use of insulin (H)     Hyperlipidemia LDL goal <100     Hypertension goal BP (blood pressure) < 140/90     History of CVA (cerebrovascular accident)     Convulsions, unspecified convulsion type (H)     Osteoarthritis     Tobacco abuse disorder     Pain in both lower legs     Dementia     Supraventricular tachycardia (H)     Gait instability     Past Surgical History:   Procedure Laterality Date     HIP SURGERY Left        Social History   Substance Use Topics     Smoking status: Current Every Day Smoker     Packs/day: 1.00     Smokeless tobacco: Never Used     Alcohol use No     History reviewed. No pertinent family history.      Current Outpatient Prescriptions   Medication Sig Dispense Refill     atorvastatin (LIPITOR) 20 MG tablet Take 20 mg by mouth daily       blood glucose monitoring (NO BRAND SPECIFIED) test strip Accu-Chek Violette Plus Meter       cilostazol (PLETAL) 50 MG tablet Take 50 mg by mouth 2 times daily       Clopidogrel Bisulfate (PLAVIX PO) Take 75 mg by mouth daily       Cyanocobalamin (VITAMIN B 12 PO) Take 1,000 mcg by mouth daily       gabapentin (NEURONTIN) 300 MG capsule Take 300 mg by mouth At Bedtime       HYDROcodone-acetaminophen (NORCO) 5-325 MG per tablet Take 1 tablet by mouth every 6 hours as needed for moderate to severe pain 10 tablet 0     levETIRAcetam (KEPPRA) 500 MG tablet Take 500 mg by mouth 2 times daily 2 tabs twice daily       LISINOPRIL PO Take 5 mg by mouth daily        meloxicam (MOBIC) 7.5 MG tablet Take 7.5 mg by mouth daily       memantine (NAMENDA) 10 MG tablet Take 10 mg by mouth 2 times daily Take 0.5 tab twice daily       metFORMIN (GLUCOPHAGE-XR) 500 MG 24 hr tablet Take 500 mg by mouth daily Take 3 tabs daily       quinapril (ACCUPRIL) 5 MG tablet Take 5 mg by mouth daily       SIMVASTATIN PO Take 40 mg by mouth daily       [DISCONTINUED] GABAPENTIN PO Take 300 mg by mouth daily       [DISCONTINUED] levETIRAcetam 1000 MG TABS Take 1,000 mg by  mouth 2 times daily       [DISCONTINUED] METFORMIN HCL PO Take 500 mg by mouth daily (with breakfast)       Allergies   Allergen Reactions     Ampicillin      Recent Labs   Lab Test  07/30/18   1845 07/16/18 02/19/18 07/16/17   1632 06/09/17   A1C   --   6.4*  5.9*   --    --   6.5*   LDL   --   89  88   --    --   145*   HDL   --   53  53   --    --   62   TRIG   --   148  146   --    --   120   ALT  16   --    --    --   16   --    CR  0.74  0.69  0.77   < >  0.62  0.62   GFRESTIMATED  77  89  79   < >  >90  Non  GFR Calc    93   GFRESTBLACK  >90  103  91   < >  >90   GFR Calc    107   POTASSIUM  3.8  4.8  4.8   < >  4.1  5.0   TSH   --    --   3.72   --    --    --     < > = values in this interval not displayed.      BP Readings from Last 3 Encounters:   10/22/18 138/80   07/30/18 (!) 149/92   12/07/17 140/86    Wt Readings from Last 3 Encounters:   10/22/18 132 lb 4.8 oz (60 kg)   07/30/18 124 lb (56.2 kg)   12/07/17 116 lb 12.8 oz (53 kg)                  Labs reviewed in EPIC    ROS:  Constitutional, HEENT, cardiovascular, pulmonary, gi and gu systems are negative, except as otherwise noted.    OBJECTIVE:     /80  Pulse 84  Temp 97.8  F (36.6  C) (Temporal)  Resp 14  Wt 132 lb 4.8 oz (60 kg)  SpO2 99%  BMI 22.02 kg/m2  Body mass index is 22.02 kg/(m^2).  GENERAL: healthy, alert and no distress  RESP: lungs clear to auscultation - no rales, rhonchi or wheezes  CV: regular rate and rhythm, normal S1 S2, no S3 or S4, no murmur, click or rub, no peripheral edema and peripheral pulses strong  ABDOMEN: soft, nontender, no hepatosplenomegaly, no masses and bowel sounds normal  MS: no gross musculoskeletal defects noted, no edema  SKIN: no suspicious lesions or rashes to visible skin today.  NEURO: Normal strength and tone, mentation intact and speech normal  PSYCH: tangential and appears to be distracted.    Diagnostic Test Results:  No results found for this or any  previous visit (from the past 24 hour(s)).    ASSESSMENT/PLAN:       ICD-10-CM    1. Type 2 diabetes mellitus with other circulatory complication, without long-term current use of insulin (H) E11.59 Albumin Random Urine Quantitative with Creat Ratio     NEUROLOGY ADULT REFERRAL   2. Screen for colon cancer Z12.11 Colonoscopy [06497]     GASTROENTEROLOGY ADULT REF PROCEDURE ONLY [facility] [phone]; [place] Provider   3. Asymptomatic postmenopausal status Z78.0 DEXA HIP/PELVIS/SPINE - Future   4. Visit for screening mammogram Z12.31 MA SCREENING DIGITAL BILAT - Future  (s+30)   5. Need for hepatitis C screening test Z11.59 Hepatitis C Screen Reflex to HCV RNA Quant and Genotype     **Hepatitis C Screen Reflex to RNA FUTURE anytime   6. Need for prophylactic vaccination and inoculation against influenza Z23 FLU VACCINE, INCREASED ANTIGEN, PRESV FREE, AGE 65+ [05948]     ADMIN INFLUENZA (For MEDICARE Patients ONLY) []   7. Need for prophylactic vaccination with tetanus-diphtheria (Td) Z23 TDAP, IM (10 - 64 YRS) - Adacel   8. Convulsions, unspecified convulsion type (H) R56.9 NEUROLOGY ADULT REFERRAL     Keppra (Levetiracetam) Level   9. Dementia without behavioral disturbance, unspecified dementia type F03.90 NEUROLOGY ADULT REFERRAL   10. Supraventricular tachycardia (H) I47.1 NEUROLOGY ADULT REFERRAL   11. Gait instability R26.81 NEUROLOGY ADULT REFERRAL   12. History of CVA (cerebrovascular accident) Z86.73 NEUROLOGY ADULT REFERRAL   13. Hypertension goal BP (blood pressure) < 140/90 I10 NEUROLOGY ADULT REFERRAL       At this point time would have her continue her current medications and follow-up with neurology for the concerns as noted within the request for consultation.  Further evaluation with colonoscopy, DEXA scan, mammography and labs today is recommended as well.  In any case I would want her back in to see us in about 3 months.    Abdirahman Shrestha PA-C  [facility]  for HPI/ROS submitted by the patient on 10/22/2018   If you checked off any problems, how difficult have these problems made it for you to do your work, take care of things at home, or get along with other people?: Extremely difficult  PHQ9 TOTAL SCORE: 16    Conflicting answers have been found for some questions. Please document the patient's answers manually.

## 2018-10-22 ENCOUNTER — OFFICE VISIT (OUTPATIENT)
Dept: FAMILY MEDICINE | Facility: OTHER | Age: 68
End: 2018-10-22
Payer: COMMERCIAL

## 2018-10-22 VITALS
BODY MASS INDEX: 22.02 KG/M2 | OXYGEN SATURATION: 99 % | RESPIRATION RATE: 14 BRPM | SYSTOLIC BLOOD PRESSURE: 138 MMHG | TEMPERATURE: 97.8 F | WEIGHT: 132.3 LBS | HEART RATE: 84 BPM | DIASTOLIC BLOOD PRESSURE: 80 MMHG

## 2018-10-22 DIAGNOSIS — R56.9 CONVULSIONS, UNSPECIFIED CONVULSION TYPE (H): ICD-10-CM

## 2018-10-22 DIAGNOSIS — Z12.31 VISIT FOR SCREENING MAMMOGRAM: ICD-10-CM

## 2018-10-22 DIAGNOSIS — I10 HYPERTENSION GOAL BP (BLOOD PRESSURE) < 140/90: ICD-10-CM

## 2018-10-22 DIAGNOSIS — Z11.59 NEED FOR HEPATITIS C SCREENING TEST: ICD-10-CM

## 2018-10-22 DIAGNOSIS — F03.90 DEMENTIA WITHOUT BEHAVIORAL DISTURBANCE, UNSPECIFIED DEMENTIA TYPE: ICD-10-CM

## 2018-10-22 DIAGNOSIS — E11.59 TYPE 2 DIABETES MELLITUS WITH OTHER CIRCULATORY COMPLICATION, WITHOUT LONG-TERM CURRENT USE OF INSULIN (H): Primary | ICD-10-CM

## 2018-10-22 DIAGNOSIS — I47.10 SUPRAVENTRICULAR TACHYCARDIA (H): ICD-10-CM

## 2018-10-22 DIAGNOSIS — R26.81 GAIT INSTABILITY: ICD-10-CM

## 2018-10-22 DIAGNOSIS — Z12.11 SCREEN FOR COLON CANCER: ICD-10-CM

## 2018-10-22 DIAGNOSIS — Z23 NEED FOR PROPHYLACTIC VACCINATION AND INOCULATION AGAINST INFLUENZA: ICD-10-CM

## 2018-10-22 DIAGNOSIS — Z23 NEED FOR PROPHYLACTIC VACCINATION WITH TETANUS-DIPHTHERIA (TD): ICD-10-CM

## 2018-10-22 DIAGNOSIS — Z86.73 HISTORY OF CVA (CEREBROVASCULAR ACCIDENT): ICD-10-CM

## 2018-10-22 DIAGNOSIS — Z78.0 ASYMPTOMATIC POSTMENOPAUSAL STATUS: ICD-10-CM

## 2018-10-22 LAB
CREAT UR-MCNC: 32 MG/DL
MICROALBUMIN UR-MCNC: <5 MG/L
MICROALBUMIN/CREAT UR: NORMAL MG/G CR (ref 0–25)

## 2018-10-22 PROCEDURE — 82043 UR ALBUMIN QUANTITATIVE: CPT | Performed by: PHYSICIAN ASSISTANT

## 2018-10-22 PROCEDURE — 90662 IIV NO PRSV INCREASED AG IM: CPT | Performed by: PHYSICIAN ASSISTANT

## 2018-10-22 PROCEDURE — G0008 ADMIN INFLUENZA VIRUS VAC: HCPCS | Performed by: PHYSICIAN ASSISTANT

## 2018-10-22 PROCEDURE — 36415 COLL VENOUS BLD VENIPUNCTURE: CPT | Performed by: PHYSICIAN ASSISTANT

## 2018-10-22 PROCEDURE — G0472 HEP C SCREEN HIGH RISK/OTHER: HCPCS | Performed by: PHYSICIAN ASSISTANT

## 2018-10-22 PROCEDURE — 99214 OFFICE O/P EST MOD 30 MIN: CPT | Mod: 25 | Performed by: PHYSICIAN ASSISTANT

## 2018-10-22 PROCEDURE — 80177 DRUG SCRN QUAN LEVETIRACETAM: CPT | Mod: 90 | Performed by: PHYSICIAN ASSISTANT

## 2018-10-22 PROCEDURE — 90715 TDAP VACCINE 7 YRS/> IM: CPT | Performed by: PHYSICIAN ASSISTANT

## 2018-10-22 PROCEDURE — 99000 SPECIMEN HANDLING OFFICE-LAB: CPT | Performed by: PHYSICIAN ASSISTANT

## 2018-10-22 PROCEDURE — 90472 IMMUNIZATION ADMIN EACH ADD: CPT | Mod: GY | Performed by: PHYSICIAN ASSISTANT

## 2018-10-22 RX ORDER — MEMANTINE HYDROCHLORIDE 10 MG/1
10 TABLET ORAL 2 TIMES DAILY
COMMUNITY
End: 2019-05-17

## 2018-10-22 RX ORDER — QUINAPRIL 5 1/1
5 TABLET ORAL DAILY
COMMUNITY
End: 2019-03-14

## 2018-10-22 RX ORDER — METFORMIN HCL 500 MG
500 TABLET, EXTENDED RELEASE 24 HR ORAL DAILY
COMMUNITY
End: 2019-02-11

## 2018-10-22 RX ORDER — CILOSTAZOL 50 MG/1
50 TABLET ORAL 2 TIMES DAILY
COMMUNITY
End: 2019-05-17

## 2018-10-22 RX ORDER — LEVETIRACETAM 500 MG/1
500 TABLET ORAL 2 TIMES DAILY
COMMUNITY
End: 2019-05-17

## 2018-10-22 RX ORDER — ATORVASTATIN CALCIUM 20 MG/1
20 TABLET, FILM COATED ORAL DAILY
COMMUNITY
End: 2019-04-02

## 2018-10-22 RX ORDER — GABAPENTIN 300 MG/1
300 CAPSULE ORAL AT BEDTIME
COMMUNITY
End: 2019-05-17

## 2018-10-22 RX ORDER — MELOXICAM 7.5 MG/1
7.5 TABLET ORAL DAILY
COMMUNITY
End: 2019-03-14

## 2018-10-22 ASSESSMENT — PATIENT HEALTH QUESTIONNAIRE - PHQ9
SUM OF ALL RESPONSES TO PHQ QUESTIONS 1-9: 16
10. IF YOU CHECKED OFF ANY PROBLEMS, HOW DIFFICULT HAVE THESE PROBLEMS MADE IT FOR YOU TO DO YOUR WORK, TAKE CARE OF THINGS AT HOME, OR GET ALONG WITH OTHER PEOPLE: EXTREMELY DIFFICULT
SUM OF ALL RESPONSES TO PHQ QUESTIONS 1-9: 16

## 2018-10-22 ASSESSMENT — PAIN SCALES - GENERAL: PAINLEVEL: MODERATE PAIN (5)

## 2018-10-22 NOTE — PROGRESS NOTES
Prior to injection verified patient identity using patient's name and date of birth.  Due to injection administration, patient instructed to remain in clinic for 15 minutes  afterwards, and to report any adverse reaction to me immediately.      Injectable Influenza Immunization Documentation    1.  Is the person to be vaccinated sick today?   No    2. Does the person to be vaccinated have an allergy to a component   of the vaccine?   No  Egg Allergy Algorithm Link    3. Has the person to be vaccinated ever had a serious reaction   to influenza vaccine in the past?   No    4. Has the person to be vaccinated ever had Guillain-Barré syndrome?   No    Form completed by Radha Elias [facility] ([facility])         Screening Questionnaire for Adult Immunization    Are you sick today?   No   Do you have allergies to medications, food, a vaccine component or latex?   No   Have you ever had a serious reaction after receiving a vaccination?   No   Do you have a long-term health problem with heart disease, lung disease, asthma, kidney disease, metabolic disease (e.g. diabetes), anemia, or other blood disorder?   No   Do you have cancer, leukemia, HIV/AIDS, or any other immune system problem?   No   In the past 3 months, have you taken medications that affect  your immune system, such as prednisone, other steroids, or anticancer drugs; drugs for the treatment of rheumatoid arthritis, Crohn s disease, or psoriasis; or have you had radiation treatments?   No   Have you had a seizure, or a brain or other nervous system problem?   No   During the past year, have you received a transfusion of blood or blood     products, or been given immune (gamma) globulin or antiviral drug?   No   For women: Are you pregnant or is there a chance you could become        pregnant during the next month?   No   Have you received any vaccinations in the past 4 weeks?   No     Immunization questionnaire answers were all negative.        Per orders of   Abdirahman Shrestha PA-C  , injection of Tdap. Flu given by Radha Elias. Patient instructed to remain in clinic for 15 minutes afterwards, and to report any adverse reaction to me immediately.       Screening performed by Radha Elias on 10/22/2018 at 11:09 AM.

## 2018-10-22 NOTE — MR AVS SNAPSHOT
After Visit Summary   10/22/2018    Khalida Redding    MRN: [id number]           Patient Information     Date Of Birth          1950        Visit Information        Provider Department      10/22/2018 10:20 AM Abdirahman Munoz PA-C [facility]        Today's Diagnoses     Type 2 diabetes mellitus with other circulatory complication, without long-term current use of insulin (H)    -  1    Screen for colon cancer        Asymptomatic postmenopausal status        Visit for screening mammogram        Need for hepatitis C screening test        Need for prophylactic vaccination and inoculation against influenza        Need for prophylactic vaccination with tetanus-diphtheria (Td)        Convulsions, unspecified convulsion type (H)        Dementia without behavioral disturbance, unspecified dementia type        Supraventricular tachycardia (H)        Gait instability        History of CVA (cerebrovascular accident)        Hypertension goal BP (blood pressure) < 140/90           Follow-ups after your visit        Additional Services     GASTROENTEROLOGY ADULT REF PROCEDURE ONLY [facility] [phone]; [place] Provider       Last Lab Result: Creatinine (mg/dL)       Date                     Value                 07/30/2018               0.74             ----------  Body mass index is 22.02 kg/(m^2).     Needed:  No  Language:  English    Patient will be contacted to schedule procedure.     Please be aware that coverage of these services is subject to the terms and limitations of your health insurance plan.  Call member services at your health plan with any benefit or coverage questions.  Any procedures must be performed at a [facility] facility OR coordinated by your clinic's referral office.    Please bring the following with you to your appointment:    (1) Any X-Rays, CTs or MRIs which have been performed.  Contact the facility where they were done to arrange for pick  up prior to your scheduled appointment.    (2) List of current medications   (3) This referral request   (4) Any documents/labs given to you for this referral            NEUROLOGY ADULT REFERRAL       Your provider has referred you for the following:   Consult at [facility]: [facility] - [place] [phone]   [email or web address]  [place] [phone]   [email or web address]    Please be aware that coverage of these services is subject to the terms and limitations of your health insurance plan.  Call member services at your health plan with any benefit or coverage questions.      Please bring the following with you to your appointment:    (1) Any X-Rays, CTs or MRIs which have been performed.  Contact the facility where they were done to arrange for  prior to your scheduled appointment.    (2) List of current medications  (3) This referral request   (4) Any documents/labs given to you for this referral                  Follow-up notes from your care team     Return in about 3 months (around 1/22/2019) for Medication Recheck.      Future tests that were ordered for you today     Open Future Orders        Priority Expected Expires Ordered    **Hepatitis C Screen Reflex to RNA FUTURE anytime Routine 10/22/2018 10/22/2019 10/22/2018    Colonoscopy [26534] Routine  1/20/2019 10/22/2018    DEXA HIP/PELVIS/SPINE - Future Routine  10/18/2019 10/22/2018    MA SCREENING DIGITAL BILAT - Future  (s+30) Routine  10/18/2019 10/22/2018            Who to contact     If you have questions or need follow up information about today's clinic visit or your schedule please contact [facility] directly at [phone].  Normal or non-critical lab and imaging results will be communicated to you by MyChart, letter or phone within 4 business days after the clinic has received the results. If you do not hear from us  within 7 days, please contact the clinic through [employer] or phone. If you have a critical or abnormal lab result, we will notify you by phone as soon as possible.  Submit refill requests through [email or web address] or call your pharmacy and they will forward the refill request to us. Please allow 3 business days for your refill to be completed.          Additional Information About Your Visit        Care EveryWhere ID     This is your Care EveryWhere ID. This could be used by other organizations to access your [facility] medical records  [id number]        Your Vitals Were     Pulse Temperature Respirations Pulse Oximetry BMI (Body Mass Index)       84 97.8  F (36.6  C) (Temporal) 14 99% 22.02 kg/m2        Blood Pressure from Last 3 Encounters:   10/22/18 138/80   07/30/18 (!) 149/92   12/07/17 140/86    Weight from Last 3 Encounters:   10/22/18 132 lb 4.8 oz (60 kg)   07/30/18 124 lb (56.2 kg)   12/07/17 116 lb 12.8 oz (53 kg)              We Performed the Following     Albumin Random Urine Quantitative with Creat Ratio     GASTROENTEROLOGY ADULT REF PROCEDURE ONLY [facility] [phone]; [place] Provider     Hepatitis C Screen Reflex to HCV RNA Quant and Genotype     Keppra (Levetiracetam) Level     NEUROLOGY ADULT REFERRAL     TDAP, IM (10 - 64 YRS) - Adacel        Primary Care Provider Office Phone # Fax #    Abdirahman Munoz PA-C [phone] [address] [facility] [address]        Equal Access to Services     EMIGDIO WALLS AH: Hadii aad ku hadasho Soomaali, waaxda luqadaha, qaybta kaalmada adeegyada, doug juanin butch ulrich. So [facility] [phone].    ATENCIÓN: Si habla español, tiene a peterson disposición servicios gratuitos de asistencia lingüística. Llame al [phone].    We comply with applicable federal civil rights laws and [place] laws. We do not discriminate on the basis of race, color, national origin, age, disability, sex, sexual orientation, or gender  identity.            Thank you!     Thank you for choosing [facility]  for your care. Our goal is always to provide you with excellent care. Hearing back from our patients is one way we can continue to improve our services. Please take a few minutes to complete the written survey that you may receive in the mail after your visit with us. Thank you!             Your Updated Medication List - Protect others around you: Learn how to safely use, store and throw away your medicines at www.disposemymeds.org.          This list is accurate as of 10/22/18 10:48 AM.  Always use your most recent med list.                   Brand Name Dispense Instructions for use Diagnosis    atorvastatin 20 MG tablet    LIPITOR     Take 20 mg by mouth daily        blood glucose monitoring test strip    no brand specified     Accu-Chek Violette Plus Meter        cilostazol 50 MG tablet    PLETAL     Take 50 mg by mouth 2 times daily        gabapentin 300 MG capsule    NEURONTIN     Take 300 mg by mouth At Bedtime        HYDROcodone-acetaminophen 5-325 MG per tablet    NORCO    10 tablet    Take 1 tablet by mouth every 6 hours as needed for moderate to severe pain        levETIRAcetam 500 MG tablet    KEPPRA     Take 500 mg by mouth 2 times daily 2 tabs twice daily        LISINOPRIL PO      Take 5 mg by mouth daily        meloxicam 7.5 MG tablet    MOBIC     Take 7.5 mg by mouth daily        memantine 10 MG tablet    NAMENDA     Take 10 mg by mouth 2 times daily Take 0.5 tab twice daily        metFORMIN 500 MG 24 hr tablet    GLUCOPHAGE-XR     Take 500 mg by mouth daily Take 3 tabs daily        PLAVIX PO      Take 75 mg by mouth daily        quinapril 5 MG tablet    Accupril     Take 5 mg by mouth daily        SIMVASTATIN PO      Take 40 mg by mouth daily        VITAMIN B 12 PO      Take 1,000 mcg by mouth daily

## 2018-10-23 LAB
HCV AB SERPL QL IA: NONREACTIVE
LEVETIRACETAM SERPL-MCNC: <2 UG/ML (ref 12–46)

## 2018-10-23 ASSESSMENT — PATIENT HEALTH QUESTIONNAIRE - PHQ9: SUM OF ALL RESPONSES TO PHQ QUESTIONS 1-9: 16

## 2018-10-24 ENCOUNTER — TELEPHONE (OUTPATIENT)
Dept: FAMILY MEDICINE | Facility: OTHER | Age: 68
End: 2018-10-24

## 2018-10-24 NOTE — LETTER
October 24, 2018      Khalida Redding  [address]  [place] [address]        Dear ,    We are writing to inform you of your test results.  Other than the Keppra level being low her labs are within normal limits.  If you are not having any seizure-like activity I would not adjust the dose of her Keppra.  In any case I would encourage you to recheck this with a neurologist sometime soon.  Electronically signed:        If you have any questions or concerns, please call the clinic at the number listed above.       Sincerely,        Abdirahman Shrestha PA-C

## 2018-10-24 NOTE — TELEPHONE ENCOUNTER
Per provider letter written.  Radha Elias CMA ([facility])      Notes Recorded by Abdirahman Munoz PA-C on 10/24/2018 at 7:07 AM  Please send a normal lab letter with copy of the results as recorded previously.  Electronically signed:    Abdirahman Munoz PA-C  ------    Notes Recorded by Abdirahman Munoz PA-C on 10/24/2018 at 7:07 AM  Other than the Keppra level being low her labs are within normal limits.  If she is not having any seizure-like activity I would not adjust the dose of her Keppra.  In any case I would encourage you to recheck this with a neurologist sometime soon.  Electronically signed:    Abdirahman Munoz PA-C

## 2018-11-02 ENCOUNTER — TELEPHONE (OUTPATIENT)
Dept: FAMILY MEDICINE | Facility: OTHER | Age: 68
End: 2018-11-02

## 2018-11-28 ENCOUNTER — HOSPITAL ENCOUNTER (OUTPATIENT)
Facility: CLINIC | Age: 68
Discharge: HOME | End: 2018-11-28
Attending: FAMILY MEDICINE | Admitting: FAMILY MEDICINE
Payer: COMMERCIAL

## 2018-11-28 ENCOUNTER — SURGERY (OUTPATIENT)
Age: 68
End: 2018-11-28

## 2018-11-28 VITALS
OXYGEN SATURATION: 97 % | DIASTOLIC BLOOD PRESSURE: 84 MMHG | SYSTOLIC BLOOD PRESSURE: 157 MMHG | RESPIRATION RATE: 10 BRPM | TEMPERATURE: 97.5 F

## 2018-11-28 LAB
COLONOSCOPY: NORMAL
GLUCOSE BLDC GLUCOMTR-MCNC: 123 MG/DL (ref 70–99)

## 2018-11-28 PROCEDURE — 25000128 H RX IP 250 OP 636: Performed by: SURGERY

## 2018-11-28 PROCEDURE — 40000296 ZZH STATISTIC ENDO RECOVERY CLASS 1:2 FIRST HOUR: Performed by: SURGERY

## 2018-11-28 PROCEDURE — 88305 TISSUE EXAM BY PATHOLOGIST: CPT | Performed by: SURGERY

## 2018-11-28 PROCEDURE — 45380 COLONOSCOPY AND BIOPSY: CPT | Mod: PT | Performed by: SURGERY

## 2018-11-28 PROCEDURE — G0500 MOD SEDAT ENDO SERVICE >5YRS: HCPCS | Performed by: SURGERY

## 2018-11-28 PROCEDURE — 88305 TISSUE EXAM BY PATHOLOGIST: CPT | Mod: 26 | Performed by: SURGERY

## 2018-11-28 PROCEDURE — 82962 GLUCOSE BLOOD TEST: CPT

## 2018-11-28 RX ORDER — LIDOCAINE 40 MG/G
CREAM TOPICAL
Status: DISCONTINUED | OUTPATIENT
Start: 2018-11-28 | End: 2018-11-28 | Stop reason: HOSPADM

## 2018-11-28 RX ORDER — ONDANSETRON 2 MG/ML
4 INJECTION INTRAMUSCULAR; INTRAVENOUS
Status: DISCONTINUED | OUTPATIENT
Start: 2018-11-28 | End: 2018-11-28 | Stop reason: HOSPADM

## 2018-11-28 RX ORDER — FENTANYL CITRATE 50 UG/ML
INJECTION, SOLUTION INTRAMUSCULAR; INTRAVENOUS PRN
Status: DISCONTINUED | OUTPATIENT
Start: 2018-11-28 | End: 2018-11-28 | Stop reason: HOSPADM

## 2018-11-28 RX ADMIN — FENTANYL CITRATE 25 MCG: 50 INJECTION, SOLUTION INTRAMUSCULAR; INTRAVENOUS at 10:00

## 2018-11-28 RX ADMIN — MIDAZOLAM 2 MG: 1 INJECTION INTRAMUSCULAR; INTRAVENOUS at 09:49

## 2018-11-28 RX ADMIN — MIDAZOLAM 1 MG: 1 INJECTION INTRAMUSCULAR; INTRAVENOUS at 10:00

## 2018-11-28 RX ADMIN — FENTANYL CITRATE 50 MCG: 50 INJECTION, SOLUTION INTRAMUSCULAR; INTRAVENOUS at 09:49

## 2018-11-28 NOTE — IP AVS SNAPSHOT
[facility]    [address] [facility] [address]    Phone:  [phone]                                       After Visit Summary   11/28/2018    Khalida Redding    MRN: [id number]           After Visit Summary Signature Page     I have received my discharge instructions, and my questions have been answered. I have discussed any challenges I see with this plan with the nurse or doctor.    ..........................................................................................................................................  Patient/Patient Representative Signature      ..........................................................................................................................................  Patient Representative Print Name and Relationship to Patient    ..................................................               ................................................  Date                                   Time    ..........................................................................................................................................  Reviewed by Signature/Title    ...................................................              ..............................................  Date                                               Time          22EPIC Rev 08/18

## 2018-11-28 NOTE — LETTER
[facility]           [address] [facility] [place] [address]           Tel [phone]  Khalida GOMES [facility]  [address] [facility] [place] [address]      November 29, 2018    Dear Khalida,  This letter is to inform you of the results of your pathology report on your colonoscopy.  If you have questions please feel free to call my assistant  At [phone] .    Your pathology report was:  Colon, sigmoid, polypectomy   - Tubular adenoma   - No evidence of high-grade dysplasia or invasive malignancy    Showed an Adenomatous polyp. This is a benign (not cancerous) growth; however these can become cancer over time. This polyp is usually removed completely at the time of the biopsy. Because it is an Adenomatous polyp you do have a slight higher risk for colon cancer. This is why you will need a repeat colonoscopy in approximately 5 years.  If you do have further questions please don t hesitate to call my assistant at  .  We do not have someone answering the phone all the time at my assistants number so if leave a message may take a day or so to get back to you.  So if more urgent then call the below number.    To make an appointment call [phone]: .   Sincerely,   Arcadio Mcmullen D.O.

## 2018-11-28 NOTE — IP AVS SNAPSHOT
MRN:[id number]                      After Visit Summary   11/28/2018    Khalida Redding    MRN: [id number]           Thank you!     Thank you for choosing [facility] for your care. Our goal is always to provide you with excellent care. Hearing back from our patients is one way we can continue to improve our services. Please take a few minutes to complete the written survey that you may receive in the mail after you visit with us. Thank you!        Patient Information     Date Of Birth          1950        About your hospital stay     You were admitted on:  November 28, 2018 You last received care in the:  [facility]    You were discharged on:  November 28, 2018       Who to Call     For medical emergencies, please call 911.  For non-urgent questions about your medical care, please call your primary care provider or clinic, [phone]  For questions related to your surgery, please call your surgery clinic        Attending Provider     Provider Kit Gamez MD Family Practice       Primary Care Provider Office Phone # Fax #    Abdirahman Munoz PA-C [phone]      Your next 10 appointments already scheduled     Dec 06, 2018  2:15 PM CST   Return Visit with CONCEPCION Stapleton CNP   [facility]    [address] [place] [address] [phone]              Further instructions from your care team         [facility]    Home Care Following Endoscopy          Activity:    You have just undergone an endoscopic procedure usually performed with conscious sedation.  Do not work or operate machinery (including a car) for at least 12 hours.      I encourage you to walk and attempt to pass this air as soon as possible.    Diet:    Return to the diet you were on before your procedure but eat lightly for the first 12-24 hours.    Drink plenty of  water.    Resume any regular medications unless otherwise advised by your physician.  Please begin any new medication prescribed as a result of your procedure as directed by your physician.     If you had any biopsy or polyp removed please refrain from aspirin or aspirin products for 2 days.  If on Coumadin please restart as instructed by your physician.   Pain:    You may take Tylenol as needed for pain.  Expected Recovery:    You can expect some mild abdominal fullness and/or discomfort due to the air used to inflate your intestinal tract. It is also normal to have a mild sore throat after upper endoscopy.    Call Your Physician if You Have:      After Colonoscopy:  o Worsening persisting abdominal pain which is worse with activity.  o Fevers (>101 degrees F), chills or shakes.  o Passage of continued blood with bowel movements.   Any questions or concerns about your recovery, please call [phone] or after hours [phone] Nurse Advice Line.    Follow-up Care:    You should receive a call or letter with your results within 1 week. Please call if you have not received a notification of your results.  If asked to return to clinic please make an appointment 1 week after your procedure.  Call [phone].       Pending Results     No orders found from 11/26/2018 to 11/29/2018.            Admission Information     Date & Time Provider Department Dept. Phone    11/28/2018 Kit Thorpe MD [facility] [phone]      Your Vitals Were     Blood Pressure Temperature Respirations Pulse Oximetry          149/73 97.5  F (36.4  C) (Oral) 10 97%        Care EveryWhere ID     This is your Care EveryWhere ID. This could be used by other organizations to access your [facility] medical records  [id number]        Equal Access to Services     [facility] TITI : Dawson Turner, darline zambrano, qadoug contreras. So [facility]  [phone].    ATENCIÓN: Si kyle sanches, tiene a peterson disposición servicios gratuitos de asistencia lingüística. Vernell cope [phone].    We comply with applicable federal civil rights laws and [place] laws. We do not discriminate on the basis of race, color, national origin, age, disability, sex, sexual orientation, or gender identity.               Review of your medicines      UNREVIEWED medicines. Ask your doctor about these medicines        Dose / Directions    atorvastatin 20 MG tablet   Commonly known as:  LIPITOR        Dose:  20 mg   Take 20 mg by mouth daily   Refills:  0       cilostazol 50 MG tablet   Commonly known as:  PLETAL        Dose:  50 mg   Take 50 mg by mouth 2 times daily   Refills:  0       gabapentin 300 MG capsule   Commonly known as:  NEURONTIN        Dose:  300 mg   Take 300 mg by mouth At Bedtime   Refills:  0       levETIRAcetam 500 MG tablet   Commonly known as:  KEPPRA        Dose:  500 mg   Take 500 mg by mouth 2 times daily 2 tabs twice daily   Refills:  0       meloxicam 7.5 MG tablet   Commonly known as:  MOBIC        Dose:  7.5 mg   Take 7.5 mg by mouth daily   Refills:  0       memantine 10 MG tablet   Commonly known as:  NAMENDA        Dose:  10 mg   Take 10 mg by mouth 2 times daily Take 0.5 tab twice daily   Refills:  0       metFORMIN 500 MG 24 hr tablet   Commonly known as:  GLUCOPHAGE-XR        Dose:  500 mg   Take 500 mg by mouth daily Take 3 tabs daily   Refills:  0       PLAVIX PO        Dose:  75 mg   Take 75 mg by mouth daily   Refills:  0       quinapril 5 MG tablet   Commonly known as:  Accupril        Dose:  5 mg   Take 5 mg by mouth daily   Refills:  0       VITAMIN B 12 PO        Dose:  1000 mcg   Take 1,000 mcg by mouth daily   Refills:  0         CONTINUE these medicines which have NOT CHANGED        Dose / Directions    blood glucose monitoring test strip   Commonly known as:  NO BRAND SPECIFIED        Accu-Chek Violette Plus Meter   Refills:  0                 Protect others around you: Learn how to safely use, store and throw away your medicines at www.disposemymeds.org.             Medication List: This is a list of all your medications and when to take them. Check marks below indicate your daily home schedule. Keep this list as a reference.      Medications           Morning Afternoon Evening Bedtime As Needed    atorvastatin 20 MG tablet   Commonly known as:  LIPITOR   Take 20 mg by mouth daily                                blood glucose monitoring test strip   Commonly known as:  NO BRAND SPECIFIED   Accu-Chek Violette Plus Meter                                cilostazol 50 MG tablet   Commonly known as:  PLETAL   Take 50 mg by mouth 2 times daily                                gabapentin 300 MG capsule   Commonly known as:  NEURONTIN   Take 300 mg by mouth At Bedtime                                levETIRAcetam 500 MG tablet   Commonly known as:  KEPPRA   Take 500 mg by mouth 2 times daily 2 tabs twice daily                                meloxicam 7.5 MG tablet   Commonly known as:  MOBIC   Take 7.5 mg by mouth daily                                memantine 10 MG tablet   Commonly known as:  NAMENDA   Take 10 mg by mouth 2 times daily Take 0.5 tab twice daily                                metFORMIN 500 MG 24 hr tablet   Commonly known as:  GLUCOPHAGE-XR   Take 500 mg by mouth daily Take 3 tabs daily                                PLAVIX PO   Take 75 mg by mouth daily                                quinapril 5 MG tablet   Commonly known as:  Accupril   Take 5 mg by mouth daily                                VITAMIN B 12 PO   Take 1,000 mcg by mouth daily

## 2018-11-28 NOTE — DISCHARGE INSTRUCTIONS
[facility]    Home Care Following Endoscopy          Activity:    You have just undergone an endoscopic procedure usually performed with conscious sedation.  Do not work or operate machinery (including a car) for at least 12 hours.      I encourage you to walk and attempt to pass this air as soon as possible.    Diet:    Return to the diet you were on before your procedure but eat lightly for the first 12-24 hours.    Drink plenty of water.    Resume any regular medications unless otherwise advised by your physician.  Please begin any new medication prescribed as a result of your procedure as directed by your physician.     If you had any biopsy or polyp removed please refrain from aspirin or aspirin products for 2 days.  If on Coumadin please restart as instructed by your physician.   Pain:    You may take Tylenol as needed for pain.  Expected Recovery:    You can expect some mild abdominal fullness and/or discomfort due to the air used to inflate your intestinal tract. It is also normal to have a mild sore throat after upper endoscopy.    Call Your Physician if You Have:      After Colonoscopy:  o Worsening persisting abdominal pain which is worse with activity.  o Fevers (>101 degrees F), chills or shakes.  o Passage of continued blood with bowel movements.   Any questions or concerns about your recovery, please call [phone] or after hours [phone] Nurse Advice Line.    Follow-up Care:    You should receive a call or letter with your results within 1 week. Please call if you have not received a notification of your results.  If asked to return to clinic please make an appointment 1 week after your procedure.  Call [phone].

## 2018-11-29 PROBLEM — D12.6 TUBULAR ADENOMA OF COLON: Status: ACTIVE | Noted: 2018-11-29

## 2018-11-29 LAB — COPATH REPORT: NORMAL

## 2018-12-04 ENCOUNTER — TELEPHONE (OUTPATIENT)
Dept: FAMILY MEDICINE | Facility: OTHER | Age: 68
End: 2018-12-04

## 2018-12-04 NOTE — TELEPHONE ENCOUNTER
You placed a referral to [facility] on 10/22/18.    It is unclear if the patient has scheduled yet, not finding a report showing they were seen.     Please forward to your team if further follow up is needed to see if they have made this appointment.      Thank you!   Rachel/Referral Representative for [facility]

## 2018-12-04 NOTE — TELEPHONE ENCOUNTER
"Spoke to patient's daughter (c2c on file), daughter does not understand why patient was referred to [facility] and states \"if it's just for consultation, she is not going to sit through that\". No longer interested in referral to [facility].  "

## 2018-12-06 ENCOUNTER — OFFICE VISIT (OUTPATIENT)
Dept: CARDIOLOGY | Facility: CLINIC | Age: 68
End: 2018-12-06
Payer: COMMERCIAL

## 2018-12-06 VITALS
SYSTOLIC BLOOD PRESSURE: 140 MMHG | HEART RATE: 80 BPM | WEIGHT: 133 LBS | BODY MASS INDEX: 22.16 KG/M2 | HEIGHT: 65 IN | DIASTOLIC BLOOD PRESSURE: 80 MMHG

## 2018-12-06 DIAGNOSIS — R41.82 ALTERED MENTAL STATUS, UNSPECIFIED ALTERED MENTAL STATUS TYPE: Primary | ICD-10-CM

## 2018-12-06 DIAGNOSIS — R55 SYNCOPE, UNSPECIFIED SYNCOPE TYPE: ICD-10-CM

## 2018-12-06 PROCEDURE — 99214 OFFICE O/P EST MOD 30 MIN: CPT | Performed by: NURSE PRACTITIONER

## 2018-12-06 NOTE — LETTER
12/6/2018      Abdirahman Shrestha PA-C  [address] [address] [facility] [address]      RE: Khalida Redding       Dear Colleague,    I had the pleasure of seeing Khalida Redding in the [facility].    Service Date: 12/06/2018      HISTORY OF PRESENT ILLNESS:  Khalida Redding is a 68-year-old woman here today with her .  She was seen in consultation by Dr. Joseph 08/24/2017 for a history of syncope.  She has a long-term tobacco history.  She has had multiple strokes in the past.  Last stroke was about 3 years ago in [place].      She had an episode 06/2016 where they suspected a seizure.  She had transient loss of consciousness.  She underwent implantation of a loop recorder.  This took place 09/11/2017.  Loop transmissions to date have not shown any ventricular arrhythmias.  The patient did have a brief loss of consciousness in October.  There was no transmission noted from the loop recorder.  She did have her transmitter with her today.  I did send a transmission and contacted our device nurse to interrogate this.  She did not send a transmission in August or November.  At today's visit, the patient and her  would like the link removed.  They state nothing has been noted for an arrhythmia.  She states that it gives her quite a bit of discomfort on the current location.      Currently, she denies chest pain, orthopnea, PND or peripheral edema.  Echocardiogram completed 08/2017 showed a normal ejection fraction with impaired relaxation.  All other review of systems and past medical history are noted below.      ASSESSMENT AND PLAN:  Ms. Redding is a 68-year-old woman here today for followup.  Again, she has a history of transient loss of consciousness, frequent falls, nonsustained atrial tachycardia and ZIO Patch.  She has also had multiple strokes in the past.  She has had transient loss of consciousness over the past 11 years.  She underwent a loop implant and has  not had any tess or tachyarrhythmias documented.  I did review this with Dr. Joseph.  He is fine with explanting the device.  An order was placed.  Procedure was explained to the patient.  I did give the option to leave the device in place, however, due to the discomfort she has she wants it removed.  She understands that there is some bleeding, tenderness and a small risk of infection by removing the device.      She will follow up with Cardiology on an as needed basis.  I do not believe her history of syncope is related to an arrhythmia at this time.  Perhaps it is more of a vasovagal or symptomatic hypotension.  She is normotensive at today's visit.      Thank you as always for including us in her care.  Feel free to contact us if you have questions regarding today's assessment and plan.         GIANA ZULUAGA NP             D: 2018   T: 2018   MT: HARLEEN      Name:     SHAI CAMPBELL   MRN:      [id number]        Account:      [id number]   :      1950           Service Date: 2018      Document: [id number]         Outpatient Encounter Medications as of 2018   Medication Sig Dispense Refill     atorvastatin (LIPITOR) 20 MG tablet Take 20 mg by mouth daily       blood glucose monitoring (NO BRAND SPECIFIED) test strip Accu-Chek Violette Plus Meter       cilostazol (PLETAL) 50 MG tablet Take 50 mg by mouth 2 times daily       Clopidogrel Bisulfate (PLAVIX PO) Take 75 mg by mouth daily       Cyanocobalamin (VITAMIN B 12 PO) Take 1,000 mcg by mouth daily       gabapentin (NEURONTIN) 300 MG capsule Take 300 mg by mouth At Bedtime       levETIRAcetam (KEPPRA) 500 MG tablet Take 500 mg by mouth 2 times daily 2 tabs twice daily       meloxicam (MOBIC) 7.5 MG tablet Take 7.5 mg by mouth daily       memantine (NAMENDA) 10 MG tablet Take 10 mg by mouth 2 times daily Take 0.5 tab twice daily       metFORMIN (GLUCOPHAGE-XR) 500 MG 24 hr tablet Take 500 mg by mouth daily Take 3 tabs daily        quinapril (ACCUPRIL) 5 MG tablet Take 5 mg by mouth daily       No facility-administered encounter medications on file as of 12/6/2018.        Again, thank you for allowing me to participate in the care of your patient.      Sincerely,    Monica Gutierrez NP, APRN [facility]

## 2018-12-06 NOTE — LETTER
12/6/2018    Abdirahman Shrestha PA-C  [address] [address] [facility] [address]    RE: Khalida Redding       Dear Colleague,    I had the pleasure of seeing Khalida Redding in the [facility].    HPI and Plan: #[id number]  See dictation    Orders Placed This Encounter   Procedures     ICD/Pacemaker/Loop Recorder Procedure       No orders of the defined types were placed in this encounter.      There are no discontinued medications.      Encounter Diagnosis   Name Primary?     Syncope, unspecified syncope type        CURRENT MEDICATIONS:  Current Outpatient Prescriptions   Medication Sig Dispense Refill     atorvastatin (LIPITOR) 20 MG tablet Take 20 mg by mouth daily       blood glucose monitoring (NO BRAND SPECIFIED) test strip Accu-Chek Violette Plus Meter       cilostazol (PLETAL) 50 MG tablet Take 50 mg by mouth 2 times daily       Clopidogrel Bisulfate (PLAVIX PO) Take 75 mg by mouth daily       Cyanocobalamin (VITAMIN B 12 PO) Take 1,000 mcg by mouth daily       gabapentin (NEURONTIN) 300 MG capsule Take 300 mg by mouth At Bedtime       levETIRAcetam (KEPPRA) 500 MG tablet Take 500 mg by mouth 2 times daily 2 tabs twice daily       meloxicam (MOBIC) 7.5 MG tablet Take 7.5 mg by mouth daily       memantine (NAMENDA) 10 MG tablet Take 10 mg by mouth 2 times daily Take 0.5 tab twice daily       metFORMIN (GLUCOPHAGE-XR) 500 MG 24 hr tablet Take 500 mg by mouth daily Take 3 tabs daily       quinapril (ACCUPRIL) 5 MG tablet Take 5 mg by mouth daily         ALLERGIES     Allergies   Allergen Reactions     Ampicillin        PAST MEDICAL HISTORY:  Past Medical History:   Diagnosis Date     Atrial tachycardia (H)     nonsustained, detected on Ziopatch     Convulsions, unspecified convulsion type (H) 9/20/2018     CVA (cerebral vascular accident) (H)      Dementia 9/20/2018     Diabetes (H)      Falls      History of CVA (cerebrovascular accident) 9/20/2018     Hyperlipidemia LDL goal <100  "9/20/2018     Hypertension goal BP (blood pressure) < 140/90 9/20/2018     Osteoarthritis 9/20/2018     Pain in both lower legs 9/20/2018     Seizures (H)      Smoking      Syncope      Tobacco abuse disorder 9/20/2018     Tubular adenoma of colon 11/29/2018     Type 2 diabetes mellitus with circulatory disorder, 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    SOCIAL HISTORY:  Social History     Social History     Marital status:      Spouse name: N/A     Number of children: N/A     Years of education: N/A     Social History Main Topics     Smoking status: Current Every Day Smoker     Packs/day: 1.00     Smokeless tobacco: Never Used     Alcohol use No     Drug use: No     Sexual activity: Not on file     Other Topics Concern     Not on file     Social History Narrative       Review of Systems:  Skin:          Eyes:         ENT:         Respiratory:          Cardiovascular:         Gastroenterology:        Genitourinary:         Musculoskeletal:         Neurologic:         Psychiatric:         Heme/Lymph/Imm:         Endocrine:           Physical Exam:  Vitals: /80  Pulse 80  Ht 1.651 m (5' 5\")  Wt 60.3 kg (133 lb)  BMI 22.13 kg/m2    Constitutional:  cooperative, alert and oriented, well developed, well nourished, in no acute distress        Skin:  warm and dry to the touch, no apparent skin lesions or masses noted     loop implant well healed    Head:  normocephalic, no masses or lesions        Eyes:  pupils equal and round        Lymph:      ENT:  no pallor or cyanosis, dentition good        Neck:  JVP normal        Respiratory:  clear to auscultation prolonged expiration       Cardiac: regular " rhythm, normal S1/S2, no S3 or S4, apical impulse not displaced, no murmurs, gallops or rubs                not assessed this visit                                        GI:  not assessed this visit        Extremities and Muscular Skeletal:  no edema;no deformities, clubbing, cyanosis, erythema observed         weakened left arm and leg    Neurological:  no gross motor deficits        Psych:  Alert and Oriented x 3        CC  No referring provider defined for this encounter.                Thank you for allowing me to participate in the care of your patient.      Sincerely,     Monica Gutierrez, CLARISSA, APRN [facility] [place]    cc:   No referring provider defined for this encounter.

## 2018-12-06 NOTE — PROGRESS NOTES
Service Date: 12/06/2018      HISTORY OF PRESENT ILLNESS:  Khalida Redding is a 68-year-old woman here today with her .  She was seen in consultation by Dr. Joseph 08/24/2017 for a history of syncope.  She has a long-term tobacco history.  She has had multiple strokes in the past.  Last stroke was about 3 years ago in [place].      She had an episode 06/2016 where they suspected a seizure.  She had transient loss of consciousness.  She underwent implantation of a loop recorder.  This took place 09/11/2017.  Loop transmissions to date have not shown any ventricular arrhythmias.  The patient did have a brief loss of consciousness in October.  There was no transmission noted from the loop recorder.  She did have her transmitter with her today.  I did send a transmission and contacted our device nurse to interrogate this.  She did not send a transmission in August or November.  At today's visit, the patient and her  would like the link removed.  They state nothing has been noted for an arrhythmia.  She states that it gives her quite a bit of discomfort on the current location.      Currently, she denies chest pain, orthopnea, PND or peripheral edema.  Echocardiogram completed 08/2017 showed a normal ejection fraction with impaired relaxation.  All other review of systems and past medical history are noted below.      ASSESSMENT AND PLAN:  Ms. Redding is a 68-year-old woman here today for followup.  Again, she has a history of transient loss of consciousness, frequent falls, nonsustained atrial tachycardia and ZIO Patch.  She has also had multiple strokes in the past.  She has had transient loss of consciousness over the past 11 years.  She underwent a loop implant and has not had any tess or tachyarrhythmias documented.  I did review this with Dr. Joseph.  He is fine with explanting the device.  An order was placed.  Procedure was explained to the patient.  I did give the option to leave the device in  place, however, due to the discomfort she has she wants it removed.  She understands that there is some bleeding, tenderness and a small risk of infection by removing the device.      She will follow up with Cardiology on an as needed basis.  I do not believe her history of syncope is related to an arrhythmia at this time.  Perhaps it is more of a vasovagal or symptomatic hypotension.  She is normotensive at today's visit.      Thank you as always for including us in her care.  Feel free to contact us if you have questions regarding today's assessment and plan.         GIANA ZULUAGA NP             D: 2018   T: 2018   MT: HARLEEN      Name:     SHAI CAMPBELL   MRN:      [id number]        Account:      [id number]   :      1950           Service Date: 2018      Document: [id number]

## 2018-12-06 NOTE — MR AVS SNAPSHOT
"              After Visit Summary   12/6/2018    Khalida Redding    MRN: [id number]           Patient Information     Date Of Birth          1950        Visit Information        Provider Department      12/6/2018 2:15 PM Monica Gutierrez APRN CNP [facility]        Today's Diagnoses     Syncope, unspecified syncope type          Care Instructions    Will have device explanted per your request          Follow-ups after your visit        Future tests that were ordered for you today     Open Future Orders        Priority Expected Expires Ordered    ICD/Pacemaker/Loop Recorder Procedure Routine 12/13/2018 12/6/2019 12/6/2018            Who to contact     If you have questions or need follow up information about today's clinic visit or your schedule please contact [facility] directly at [phone].  Normal or non-critical lab and imaging results will be communicated to you by MyChart, letter or phone within 4 business days after the clinic has received the results. If you do not hear from us within 7 days, please contact the clinic through MyChart or phone. If you have a critical or abnormal lab result, we will notify you by phone as soon as possible.  Submit refill requests through [employer] or call your pharmacy and they will forward the refill request to us. Please allow 3 business days for your refill to be completed.          Additional Information About Your Visit        Care EveryWhere ID     This is your Care EveryWhere ID. This could be used by other organizations to access your [place] medical records  [id number]        Your Vitals Were     Pulse Height BMI (Body Mass Index)             80 1.651 m (5' 5\") 22.13 kg/m2          Blood Pressure from Last 3 Encounters:   12/06/18 140/80   11/28/18 157/84   10/22/18 138/80    Weight from Last 3 Encounters:   12/06/18 60.3 kg (133 lb)   10/22/18 60 kg (132 lb 4.8 oz) "   07/30/18 56.2 kg (124 lb)              We Performed the Following     Follow-Up with Cardiac Advanced Practice Provider        Primary Care Provider Office Phone # Fax #    Abdirahman Munoz PA-C [phone] [address] [facility] [address]        Equal Access to Services     EMIGDIO WALLS : Hadii aad ku hadasho Soomaali, waaxda luqadaha, qaybta kaalmada adeegyada, waxay idiin hayaan adeeg khmartha la'aan ah. So [facility] [phone].    ATENCIÓN: Si habla español, tiene a peterson disposición servicios gratuitos de asistencia lingüística. [facility] [phone].    We comply with applicable federal civil rights laws and [place] laws. We do not discriminate on the basis of race, color, national origin, age, disability, sex, sexual orientation, or gender identity.            Thank you!     Thank you for choosing [facility]  for your care. Our goal is always to provide you with excellent care. Hearing back from our patients is one way we can continue to improve our services. Please take a few minutes to complete the written survey that you may receive in the mail after your visit with us. Thank you!             Your Updated Medication List - Protect others around you: Learn how to safely use, store and throw away your medicines at www.disposemymeds.org.          This list is accurate as of 12/6/18  2:31 PM.  Always use your most recent med list.                   Brand Name Dispense Instructions for use Diagnosis    atorvastatin 20 MG tablet    LIPITOR     Take 20 mg by mouth daily        blood glucose monitoring test strip    NO BRAND SPECIFIED     Accu-Chek Violette Plus Meter        cilostazol 50 MG tablet    PLETAL     Take 50 mg by mouth 2 times daily        gabapentin 300 MG capsule    NEURONTIN     Take 300 mg by mouth At Bedtime        levETIRAcetam 500 MG tablet    KEPPRA     Take 500 mg by mouth 2 times daily 2 tabs twice daily        meloxicam 7.5 MG  tablet    MOBIC     Take 7.5 mg by mouth daily        memantine 10 MG tablet    NAMENDA     Take 10 mg by mouth 2 times daily Take 0.5 tab twice daily        metFORMIN 500 MG 24 hr tablet    GLUCOPHAGE-XR     Take 500 mg by mouth daily Take 3 tabs daily        PLAVIX PO      Take 75 mg by mouth daily        quinapril 5 MG tablet    ACCUPRIL     Take 5 mg by mouth daily        VITAMIN B 12 PO      Take 1,000 mcg by mouth daily

## 2018-12-06 NOTE — PROGRESS NOTES
HPI and Plan: #[id number]  See dictation    Orders Placed This Encounter   Procedures     ICD/Pacemaker/Loop Recorder Procedure       No orders of the defined types were placed in this encounter.      There are no discontinued medications.      Encounter Diagnosis   Name Primary?     Syncope, unspecified syncope type        CURRENT MEDICATIONS:  Current Outpatient Prescriptions   Medication Sig Dispense Refill     atorvastatin (LIPITOR) 20 MG tablet Take 20 mg by mouth daily       blood glucose monitoring (NO BRAND SPECIFIED) test strip Accu-Chek Violette Plus Meter       cilostazol (PLETAL) 50 MG tablet Take 50 mg by mouth 2 times daily       Clopidogrel Bisulfate (PLAVIX PO) Take 75 mg by mouth daily       Cyanocobalamin (VITAMIN B 12 PO) Take 1,000 mcg by mouth daily       gabapentin (NEURONTIN) 300 MG capsule Take 300 mg by mouth At Bedtime       levETIRAcetam (KEPPRA) 500 MG tablet Take 500 mg by mouth 2 times daily 2 tabs twice daily       meloxicam (MOBIC) 7.5 MG tablet Take 7.5 mg by mouth daily       memantine (NAMENDA) 10 MG tablet Take 10 mg by mouth 2 times daily Take 0.5 tab twice daily       metFORMIN (GLUCOPHAGE-XR) 500 MG 24 hr tablet Take 500 mg by mouth daily Take 3 tabs daily       quinapril (ACCUPRIL) 5 MG tablet Take 5 mg by mouth daily         ALLERGIES     Allergies   Allergen Reactions     Ampicillin        PAST MEDICAL HISTORY:  Past Medical History:   Diagnosis Date     Atrial tachycardia (H)     nonsustained, detected on Ziopatch     Convulsions, unspecified convulsion type (H) 9/20/2018     CVA (cerebral vascular accident) (H)      Dementia 9/20/2018     Diabetes (H)      Falls      History of CVA (cerebrovascular accident) 9/20/2018     Hyperlipidemia LDL goal <100 9/20/2018     Hypertension goal BP (blood pressure) < 140/90 9/20/2018     Osteoarthritis 9/20/2018     Pain in both lower legs 9/20/2018     Seizures (H)      Smoking      Syncope      Tobacco abuse disorder 9/20/2018     Tubular  "adenoma of colon 11/29/2018     Type 2 diabetes mellitus with circulatory disorder, 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    SOCIAL HISTORY:  Social History     Social History     Marital status:      Spouse name: N/A     Number of children: N/A     Years of education: N/A     Social History Main Topics     Smoking status: Current Every Day Smoker     Packs/day: 1.00     Smokeless tobacco: Never Used     Alcohol use No     Drug use: No     Sexual activity: Not on file     Other Topics Concern     Not on file     Social History Narrative       Review of Systems:  Skin:          Eyes:         ENT:         Respiratory:          Cardiovascular:         Gastroenterology:        Genitourinary:         Musculoskeletal:         Neurologic:         Psychiatric:         Heme/Lymph/Imm:         Endocrine:           Physical Exam:  Vitals: /80  Pulse 80  Ht 1.651 m (5' 5\")  Wt 60.3 kg (133 lb)  BMI 22.13 kg/m2    Constitutional:  cooperative, alert and oriented, well developed, well nourished, in no acute distress        Skin:  warm and dry to the touch, no apparent skin lesions or masses noted     loop implant well healed    Head:  normocephalic, no masses or lesions        Eyes:  pupils equal and round        Lymph:      ENT:  no pallor or cyanosis, dentition good        Neck:  JVP normal        Respiratory:  clear to auscultation prolonged expiration       Cardiac: regular rhythm, normal S1/S2, no S3 or S4, apical impulse not displaced, no murmurs, gallops or rubs                not assessed this visit                                        GI:  not assessed this visit        Extremities and Muscular " Skeletal:  no edema;no deformities, clubbing, cyanosis, erythema observed         weakened left arm and leg    Neurological:  no gross motor deficits        Psych:  Alert and Oriented x 3        CC  No referring provider defined for this encounter.

## 2018-12-07 ENCOUNTER — APPOINTMENT (OUTPATIENT)
Dept: CARDIOLOGY | Facility: CLINIC | Age: 68
End: 2018-12-07
Payer: COMMERCIAL

## 2018-12-11 ENCOUNTER — ANCILLARY PROCEDURE (OUTPATIENT)
Dept: CARDIOLOGY | Facility: CLINIC | Age: 68
End: 2018-12-11
Attending: INTERNAL MEDICINE
Payer: COMMERCIAL

## 2018-12-11 DIAGNOSIS — Z95.818 STATUS POST PLACEMENT OF IMPLANTABLE LOOP RECORDER: ICD-10-CM

## 2018-12-11 PROCEDURE — 93298 REM INTERROG DEV EVAL SCRMS: CPT | Performed by: INTERNAL MEDICINE

## 2018-12-11 PROCEDURE — 93299 CARDIAC DEVICE CHECK - REMOTE: CPT | Performed by: INTERNAL MEDICINE

## 2018-12-13 ENCOUNTER — TELEPHONE (OUTPATIENT)
Dept: FAMILY MEDICINE | Facility: OTHER | Age: 68
End: 2018-12-13

## 2018-12-13 DIAGNOSIS — R55 SYNCOPE, UNSPECIFIED SYNCOPE TYPE: Primary | ICD-10-CM

## 2018-12-13 NOTE — LETTER
[facility]  [address] [place] [id number]  Phone: [phone]  March 11, 2019      Khalida Redding  [address] [place] [address]      Dear Khalida,    We care about your health and have reviewed your health plan including your medical conditions, medications, and lab results.  Based on this review, it is recommended that you follow up regarding the following health topic(s):  -Breast Cancer Screening    We recommend you take the following action(s):  -schedule a MAMMOGRAM which is due. Please disregard this reminder if you have had this exam elsewhere within the last 1-2 years please let us know so we can update your records.     Please call us at the [facility] - [phone] (or use [employer]) to address the above recommendations.     Thank you for trusting [facility] and we appreciate the opportunity to serve you.  We look forward to supporting your healthcare needs in the future.    Healthy Regards,    Your Health Care Team  [facility]

## 2018-12-13 NOTE — TELEPHONE ENCOUNTER
Summary:    Patient is due/failing the following:   MAMMOGRAM    Action needed:   Schedule a mammogram     Type of outreach:    Phone, spoke to patient.  patient declines at this time. Patient might call back after the holidays    Questions for provider review:    None                                                                                                                                    Carrol Galvin       Chart routed to Care Team .          Panel Management Review      Patient has the following on her problem list:     Diabetes    ASA: Passed    Last A1C  Lab Results   Component Value Date    A1C 6.4 07/16/2018    A1C 5.9 02/19/2018    A1C 6.5 06/09/2017     A1C tested: Passed    Last LDL:    Lab Results   Component Value Date    CHOL 167 07/16/2018     Lab Results   Component Value Date    HDL 53 07/16/2018     Lab Results   Component Value Date    LDL 89 07/16/2018     Lab Results   Component Value Date    TRIG 148 07/16/2018     No results found for: CHOLHDLRATIO  Lab Results   Component Value Date    NHDL 114 07/16/2018       Is the patient on a Statin? YES             Is the patient on Aspirin? NO    Medications     HMG CoA Reductase Inhibitors    atorvastatin (LIPITOR) 20 MG tablet          Last three blood pressure readings:  BP Readings from Last 3 Encounters:   12/06/18 140/80   11/28/18 157/84   10/22/18 138/80            Tobacco History:     History   Smoking Status     Current Every Day Smoker     Packs/day: 1.00   Smokeless Tobacco     Never Used         Hypertension   Last three blood pressure readings:  BP Readings from Last 3 Encounters:   12/06/18 140/80   11/28/18 157/84   10/22/18 138/80     Blood pressure: FAILED    HTN Guidelines:  Age 18-59 BP range:  Less than 140/90  Age 60-85 with Diabetes:  Less than 140/90  Age 60-85 without Diabetes:  less than 150/90      Composite cancer screening  Chart review shows that this patient is due/due soon for the following Mammogram

## 2018-12-17 ENCOUNTER — HOSPITAL ENCOUNTER (OUTPATIENT)
Facility: CLINIC | Age: 68
End: 2018-12-17
Attending: INTERNAL MEDICINE | Admitting: INTERNAL MEDICINE
Payer: COMMERCIAL

## 2018-12-17 DIAGNOSIS — R55 SYNCOPE, UNSPECIFIED SYNCOPE TYPE: ICD-10-CM

## 2019-01-01 ENCOUNTER — TRANSFERRED RECORDS (OUTPATIENT)
Dept: MULTI SPECIALTY CLINIC | Facility: CLINIC | Age: 69
End: 2019-01-01

## 2019-01-01 LAB — RETINOPATHY: NORMAL

## 2019-01-22 ENCOUNTER — TELEPHONE (OUTPATIENT)
Dept: FAMILY MEDICINE | Facility: OTHER | Age: 69
End: 2019-01-22

## 2019-01-22 NOTE — TELEPHONE ENCOUNTER
Summary:    Patient is due/failing the following:   Diabetic follow up and A1C    Action needed:   Patient needs office visit for follow up.    Type of outreach:    Phone, left message for patient to call back.     Questions for provider review:    None                                                                                                                                    Carrol Galvin     Chart routed to Care Team .          Panel Management Review      Patient has the following on her problem list:     Diabetes    ASA: Passed    Last A1C  Lab Results   Component Value Date    A1C 6.4 07/16/2018    A1C 5.9 02/19/2018    A1C 6.5 06/09/2017     A1C tested: Passed    Last LDL:    Lab Results   Component Value Date    CHOL 167 07/16/2018     Lab Results   Component Value Date    HDL 53 07/16/2018     Lab Results   Component Value Date    LDL 89 07/16/2018     Lab Results   Component Value Date    TRIG 148 07/16/2018     No results found for: CHOLHDLRATIO  Lab Results   Component Value Date    NHDL 114 07/16/2018       Is the patient on a Statin? YES             Is the patient on Aspirin? NO    Medications     HMG CoA Reductase Inhibitors    atorvastatin (LIPITOR) 20 MG tablet          Last three blood pressure readings:  BP Readings from Last 3 Encounters:   12/06/18 140/80   11/28/18 157/84   10/22/18 138/80        Tobacco History:     History   Smoking Status     Current Every Day Smoker     Packs/day: 1.00   Smokeless Tobacco     Never Used         Hypertension   Last three blood pressure readings:  BP Readings from Last 3 Encounters:   12/06/18 140/80   11/28/18 157/84   10/22/18 138/80     Blood pressure: Passed    HTN Guidelines:  Age 18-59 BP range:  Less than 140/90  Age 60-85 with Diabetes:  Less than 140/90  Age 60-85 without Diabetes:  less than 150/90      Composite cancer screening  Chart review shows that this patient is due/due soon for the following Mammogram

## 2019-01-22 NOTE — LETTER
[address]  [place] [address]  Phone: [phone]  February 1, 2019      Khalida Redding  [address] [place] [address]      Dear Khalida,    We care about your health and have reviewed your health plan including your medical conditions, medications, and lab results.  Based on this review, it is recommended that you follow up regarding the following health topic(s):  -Diabetes    We recommend you take the following action(s):  -schedule a FOLLOWUP OFFICE APPOINTMENT.  We will perform the following labs:  A1c.     Please call us at the [facility] - [phone] (or use [employer]) to address the above recommendations.     Thank you for trusting [facility] and we appreciate the opportunity to serve you.  We look forward to supporting your healthcare needs in the future.    Healthy Regards,    Your Health Care Team  [facility]

## 2019-02-03 ENCOUNTER — HOSPITAL ENCOUNTER (EMERGENCY)
Facility: CLINIC | Age: 69
Discharge: HOME | End: 2019-02-03
Attending: FAMILY MEDICINE | Admitting: FAMILY MEDICINE
Payer: COMMERCIAL

## 2019-02-03 ENCOUNTER — APPOINTMENT (OUTPATIENT)
Dept: GENERAL RADIOLOGY | Facility: CLINIC | Age: 69
End: 2019-02-03
Payer: COMMERCIAL

## 2019-02-03 VITALS
DIASTOLIC BLOOD PRESSURE: 108 MMHG | RESPIRATION RATE: 19 BRPM | SYSTOLIC BLOOD PRESSURE: 184 MMHG | OXYGEN SATURATION: 93 % | WEIGHT: 133 LBS | BODY MASS INDEX: 22.13 KG/M2 | TEMPERATURE: 96 F | HEART RATE: 86 BPM

## 2019-02-03 DIAGNOSIS — R55 SYNCOPE, UNSPECIFIED SYNCOPE TYPE: ICD-10-CM

## 2019-02-03 DIAGNOSIS — I10 ESSENTIAL HYPERTENSION: ICD-10-CM

## 2019-02-03 DIAGNOSIS — S49.92XA SHOULDER INJURY, LEFT, INITIAL ENCOUNTER: ICD-10-CM

## 2019-02-03 LAB
ALBUMIN SERPL-MCNC: 3.7 G/DL (ref 3.4–5)
ALBUMIN UR-MCNC: NEGATIVE MG/DL
ALP SERPL-CCNC: 143 U/L (ref 40–150)
ALT SERPL W P-5'-P-CCNC: 14 U/L (ref 0–50)
ANION GAP SERPL CALCULATED.3IONS-SCNC: 9 MMOL/L (ref 3–14)
APPEARANCE UR: ABNORMAL
AST SERPL W P-5'-P-CCNC: 19 U/L (ref 0–45)
BACTERIA #/AREA URNS HPF: ABNORMAL /HPF
BASOPHILS # BLD AUTO: 0.1 10E9/L (ref 0–0.2)
BASOPHILS NFR BLD AUTO: 0.6 %
BILIRUB SERPL-MCNC: 0.5 MG/DL (ref 0.2–1.3)
BILIRUB UR QL STRIP: NEGATIVE
BUN SERPL-MCNC: 13 MG/DL (ref 7–30)
CALCIUM SERPL-MCNC: 8.7 MG/DL (ref 8.5–10.1)
CHLORIDE SERPL-SCNC: 105 MMOL/L (ref 94–109)
CO2 SERPL-SCNC: 25 MMOL/L (ref 20–32)
COLOR UR AUTO: ABNORMAL
CREAT SERPL-MCNC: 0.57 MG/DL (ref 0.52–1.04)
DIFFERENTIAL METHOD BLD: ABNORMAL
EOSINOPHIL NFR BLD AUTO: 0.4 %
ERYTHROCYTE [DISTWIDTH] IN BLOOD BY AUTOMATED COUNT: 12.6 % (ref 10–15)
GFR SERPL CREATININE-BSD FRML MDRD: >90 ML/MIN/{1.73_M2}
GLUCOSE SERPL-MCNC: 245 MG/DL (ref 70–99)
GLUCOSE UR STRIP-MCNC: >499 MG/DL
HCT VFR BLD AUTO: 48.7 % (ref 35–47)
HGB BLD-MCNC: 15.7 G/DL (ref 11.7–15.7)
HGB UR QL STRIP: ABNORMAL
IMM GRANULOCYTES # BLD: 0.1 10E9/L (ref 0–0.4)
IMM GRANULOCYTES NFR BLD: 0.5 %
KETONES UR STRIP-MCNC: NEGATIVE MG/DL
LEUKOCYTE ESTERASE UR QL STRIP: ABNORMAL
LYMPHOCYTES # BLD AUTO: 1 10E9/L (ref 0.8–5.3)
LYMPHOCYTES NFR BLD AUTO: 7.7 %
MCH RBC QN AUTO: 29.5 PG (ref 26.5–33)
MCHC RBC AUTO-ENTMCNC: 32.2 G/DL (ref 31.5–36.5)
MCV RBC AUTO: 91 FL (ref 78–100)
MONOCYTES # BLD AUTO: 0.6 10E9/L (ref 0–1.3)
MONOCYTES NFR BLD AUTO: 4.6 %
MUCOUS THREADS #/AREA URNS LPF: PRESENT /LPF
NEUTROPHILS # BLD AUTO: 10.9 10E9/L (ref 1.6–8.3)
NEUTROPHILS NFR BLD AUTO: 86.2 %
NITRATE UR QL: NEGATIVE
NRBC # BLD AUTO: 0 10*3/UL
NRBC BLD AUTO-RTO: 0 /100
NT-PROBNP SERPL-MCNC: 49 PG/ML (ref 0–900)
PH UR STRIP: 5 PH (ref 5–7)
PLATELET # BLD AUTO: 205 10E9/L (ref 150–450)
POTASSIUM SERPL-SCNC: 4.1 MMOL/L (ref 3.4–5.3)
PROT SERPL-MCNC: 7.6 G/DL (ref 6.8–8.8)
RBC # BLD AUTO: 5.33 10E12/L (ref 3.8–5.2)
RBC #/AREA URNS AUTO: 5 /HPF (ref 0–2)
SODIUM SERPL-SCNC: 139 MMOL/L (ref 133–144)
SOURCE: ABNORMAL
SP GR UR STRIP: 1.01 (ref 1–1.03)
SQUAMOUS #/AREA URNS AUTO: 10 /HPF (ref 0–1)
TROPONIN I SERPL-MCNC: <0.015 UG/L (ref 0–0.04)
UROBILINOGEN UR STRIP-MCNC: 0 MG/DL (ref 0–2)
WBC # BLD AUTO: 12.7 10E9/L (ref 4–11)
WBC #/AREA URNS AUTO: 31 /HPF (ref 0–5)

## 2019-02-03 PROCEDURE — 93005 ELECTROCARDIOGRAM TRACING: CPT | Performed by: FAMILY MEDICINE

## 2019-02-03 PROCEDURE — 81001 URINALYSIS AUTO W/SCOPE: CPT | Performed by: FAMILY MEDICINE

## 2019-02-03 PROCEDURE — 71046 X-RAY EXAM CHEST 2 VIEWS: CPT | Mod: TC

## 2019-02-03 PROCEDURE — 93010 ELECTROCARDIOGRAM REPORT: CPT | Mod: Z6 | Performed by: FAMILY MEDICINE

## 2019-02-03 PROCEDURE — 25000128 H RX IP 250 OP 636: Performed by: FAMILY MEDICINE

## 2019-02-03 PROCEDURE — 96374 THER/PROPH/DIAG INJ IV PUSH: CPT | Performed by: FAMILY MEDICINE

## 2019-02-03 PROCEDURE — 84484 ASSAY OF TROPONIN QUANT: CPT | Performed by: FAMILY MEDICINE

## 2019-02-03 PROCEDURE — 80053 COMPREHEN METABOLIC PANEL: CPT | Performed by: FAMILY MEDICINE

## 2019-02-03 PROCEDURE — 99285 EMERGENCY DEPT VISIT HI MDM: CPT | Mod: 25 | Performed by: FAMILY MEDICINE

## 2019-02-03 PROCEDURE — 83880 ASSAY OF NATRIURETIC PEPTIDE: CPT | Performed by: FAMILY MEDICINE

## 2019-02-03 PROCEDURE — 85025 COMPLETE CBC W/AUTO DIFF WBC: CPT | Performed by: FAMILY MEDICINE

## 2019-02-03 RX ORDER — KETOROLAC TROMETHAMINE 15 MG/ML
15 INJECTION, SOLUTION INTRAMUSCULAR; INTRAVENOUS ONCE
Status: COMPLETED | OUTPATIENT
Start: 2019-02-03 | End: 2019-02-03

## 2019-02-03 RX ADMIN — KETOROLAC TROMETHAMINE 15 MG: 15 INJECTION, SOLUTION INTRAMUSCULAR; INTRAVENOUS at 17:03

## 2019-02-03 NOTE — ED AVS SNAPSHOT
[facility] Emergency Department  [address] [place] [address]  Phone:  [phone]  Fax:  [phone]                                    Khalida Redding   MRN: [id number]    Department:  [facility] Emergency Department   Date of Visit:  2/3/2019           After Visit Summary Signature Page    I have received my discharge instructions, and my questions have been answered. I have discussed any challenges I see with this plan with the nurse or doctor.    ..........................................................................................................................................  Patient/Patient Representative Signature      ..........................................................................................................................................  Patient Representative Print Name and Relationship to Patient    ..................................................               ................................................  Date                                   Time    ..........................................................................................................................................  Reviewed by Signature/Title    ...................................................              ..............................................  Date                                               Time          22EPIC Rev 08/18

## 2019-02-03 NOTE — ED PROVIDER NOTES
[facility] ED Provider Note   Patient: Khalida Redding  MRN #:  [id number]  Date of Visit: February 3, 2019    CC:     Chief Complaint   Patient presents with     Dizziness     HPI:  Khalida Redding is a 68 year old female who presented to the emergency department presents to the ED after a syncopal episode. Patient was on the toilet trying to take a bowel movement when she became diaphoretic, dizzy, and lightheaded just prior to fainting. She called for help, her  came into the bathroom to help her to the ground. He heard a pop to her left shoulder when he held her up and she has not been able to move or lift it since. Patient lost control of her bowels when she fainted. He did not witness any seizure-like activity. This event happened around 1300 today and is now feeling 70% back to normal but is feeling very tired which is typical for her recovery. Her s/o explains this type of syncopal episode has happened 10-15X over the years, the last one being 1 year ago. He also mentions that it always happens when she is going to the bathroom on the toilet and always loses control of her bowels. She has some neck stiffness. Patient denies any other symptoms and is not having troubles breathing.    Problem List:  Patient Active Problem List    Diagnosis Date Noted     Syncope, unspecified syncope type 12/17/2018     Priority: Medium     Added automatically from request for surgery [id number]       Tubular adenoma of colon 11/29/2018     Priority: Medium     Supraventricular tachycardia (H) 10/22/2018     Priority: Medium     Gait instability 10/22/2018     Priority: Medium     Type 2 diabetes mellitus with circulatory disorder, without long-term current use of insulin (H) 09/20/2018     Priority: Medium     Hyperlipidemia LDL goal <100 09/20/2018     Priority: Medium     Hypertension goal BP (blood pressure) < 140/90 09/20/2018     Priority:  Medium     History of CVA (cerebrovascular accident) 09/20/2018     Priority: Medium     Convulsions, unspecified convulsion type (H) 09/20/2018     Priority: Medium     Osteoarthritis 09/20/2018     Priority: Medium     Tobacco abuse disorder 09/20/2018     Priority: Medium     Pain in both lower legs 09/20/2018     Priority: Medium     Dementia 09/20/2018     Priority: Medium       Past Medical History:   Diagnosis Date     Atrial tachycardia (H)      Convulsions, unspecified convulsion type (H) 9/20/2018     CVA (cerebral vascular accident) (H)      Dementia 9/20/2018     Diabetes (H)      Falls      History of CVA (cerebrovascular accident) 9/20/2018     Hyperlipidemia LDL goal <100 9/20/2018     Hypertension goal BP (blood pressure) < 140/90 9/20/2018     Osteoarthritis 9/20/2018     Pain in both lower legs 9/20/2018     Seizures (H)      Smoking      Syncope      Tobacco abuse disorder 9/20/2018     Tubular adenoma of colon 11/29/2018     Type 2 diabetes mellitus with circulatory disorder, without long-term current use of insulin (H) 9/20/2018       MEDS:     atorvastatin (LIPITOR) 20 MG tablet   blood glucose monitoring (NO BRAND SPECIFIED) test strip   cilostazol (PLETAL) 50 MG tablet   Clopidogrel Bisulfate (PLAVIX PO)   Cyanocobalamin (VITAMIN B 12 PO)   gabapentin (NEURONTIN) 300 MG capsule   levETIRAcetam (KEPPRA) 500 MG tablet   meloxicam (MOBIC) 7.5 MG tablet   memantine (NAMENDA) 10 MG tablet   metFORMIN (GLUCOPHAGE-XR) 500 MG 24 hr tablet   quinapril (ACCUPRIL) 5 MG tablet       ALLERGIES:    Allergies   Allergen Reactions     Amoxicillin      Ampicillin        Past Surgical History:   Procedure Laterality Date     COLONOSCOPY N/A 11/28/2018    Procedure: Colonoscopy, Polypectomy by Biopsy;  Surgeon: Arcadio Mcmullen DO;  Location: [facility]     HIP SURGERY Left        Social History     Tobacco Use     Smoking status: Current Every Day Smoker     Packs/day: 0.50     Smokeless tobacco:  Never Used   Substance Use Topics     Alcohol use: No     Drug use: No         Review of Systems   Except as noted in HPI, all other systems were reviewed and are negative    Physical Exam     Vitals were reviewed  Patient Vitals for the past 12 hrs:   BP Temp Temp src Pulse Heart Rate Resp SpO2 Weight   02/03/19 1700 (!) 184/108 -- -- 86 87 19 93 % --   02/03/19 1545 (!) 189/113 -- -- 104 104 -- -- --   02/03/19 1543 (!) 197/107 -- -- 81 81 -- -- --   02/03/19 1541 (!) 200/118 -- -- 86 86 -- -- --   02/03/19 1442 159/84 96  F (35.6  C) Temporal -- 84 16 92 % 60.3 kg (133 lb)     GENERAL APPEARANCE: Alert and oriented, mild to moderate distress with pain over the left shoulder and clavicle  FACE: normal facies  EYES: Pupils are equal  HENT: normal external exam  NECK: no adenopathy or asymmetry; tenderness over the left clavicle; low suspicion for shoulder dislocation  RESP: normal respiratory effort; clear breath sounds bilaterally  CV: regular rate and rhythm; no significant murmurs, gallops or rubs  ABD: soft, no tenderness; no rebound or guarding; bowel sounds are normal  MS: no gross deformities noted; normal muscle tone.  EXT: No calf tenderness or pitting edema; as above  SKIN: no worrisome rash  NEURO: no facial droop; no focal deficits, speech is normal        Available Lab/Imaging Results     Results for orders placed or performed during the hospital encounter of 02/03/19 (from the past 24 hour(s))   CBC with platelets differential   Result Value Ref Range    WBC 12.7 (H) 4.0 - 11.0 10e9/L    RBC Count 5.33 (H) 3.8 - 5.2 10e12/L    Hemoglobin 15.7 11.7 - 15.7 g/dL    Hematocrit 48.7 (H) 35.0 - 47.0 %    MCV 91 78 - 100 fl    MCH 29.5 26.5 - 33.0 pg    MCHC 32.2 31.5 - 36.5 g/dL    RDW 12.6 10.0 - 15.0 %    Platelet Count 205 150 - 450 10e9/L    Diff Method Automated Method     % Neutrophils 86.2 %    % Lymphocytes 7.7 %    % Monocytes 4.6 %    % Eosinophils 0.4 %    % Basophils 0.6 %    % Immature  Granulocytes 0.5 %    Nucleated RBCs 0 0 /100    Absolute Neutrophil 10.9 (H) 1.6 - 8.3 10e9/L    Absolute Lymphocytes 1.0 0.8 - 5.3 10e9/L    Absolute Monocytes 0.6 0.0 - 1.3 10e9/L    Absolute Basophils 0.1 0.0 - 0.2 10e9/L    Abs Immature Granulocytes 0.1 0 - 0.4 10e9/L    Absolute Nucleated RBC 0.0    Comprehensive metabolic panel   Result Value Ref Range    Sodium 139 133 - 144 mmol/L    Potassium 4.1 3.4 - 5.3 mmol/L    Chloride 105 94 - 109 mmol/L    Carbon Dioxide 25 20 - 32 mmol/L    Anion Gap 9 3 - 14 mmol/L    Glucose 245 (H) 70 - 99 mg/dL    Urea Nitrogen 13 7 - 30 mg/dL    Creatinine 0.57 0.52 - 1.04 mg/dL    GFR Estimate >90 >60 mL/min/[1.73_m2]    GFR Estimate If Black >90 >60 mL/min/[1.73_m2]    Calcium 8.7 8.5 - 10.1 mg/dL    Bilirubin Total 0.5 0.2 - 1.3 mg/dL    Albumin 3.7 3.4 - 5.0 g/dL    Protein Total 7.6 6.8 - 8.8 g/dL    Alkaline Phosphatase 143 40 - 150 U/L    ALT 14 0 - 50 U/L    AST 19 0 - 45 U/L   Troponin I   Result Value Ref Range    Troponin I ES <0.015 0.000 - 0.045 ug/L   Nt probnp inpatient (BNP)   Result Value Ref Range    N-Terminal Pro BNP Inpatient 49 0 - 900 pg/mL   XR Chest 2 Views    Narrative    XR CHEST TWO VIEWS   2/3/2019 4:36 PM     HISTORY: Syncope. Possible clavicle injury.    COMPARISON: 7/16/2017      Impression    IMPRESSION: No acute abnormality. Lungs are well-inflated and clear.  Heart size is normal. Loop recorder noted. No clavicle fractures are  identified. No displaced rib fractures are identified. Old fracture of  the left proximal humerus noted.    JUS VALADEZ MD   UA reflex to Microscopic   Result Value Ref Range    Color Urine Roxi     Appearance Urine Cloudy     Glucose Urine >499 (A) NEG^Negative mg/dL    Bilirubin Urine Negative NEG^Negative    Ketones Urine Negative NEG^Negative mg/dL    Specific Gravity Urine 1.013 1.003 - 1.035    Blood Urine Small (A) NEG^Negative    pH Urine 5.0 5.0 - 7.0 pH    Protein Albumin Urine Negative NEG^Negative  mg/dL    Urobilinogen mg/dL 0.0 0.0 - 2.0 mg/dL    Nitrite Urine Negative NEG^Negative    Leukocyte Esterase Urine Small (A) NEG^Negative    Source Midstream Urine     RBC Urine 5 (H) 0 - 2 /HPF    WBC Urine 31 (H) 0 - 5 /HPF    Bacteria Urine Many (A) NEG^Negative /HPF    Squamous Epithelial /HPF Urine 10 (H) 0 - 1 /HPF    Mucous Urine Present (A) NEG^Negative /LPF            EKG reviewed by me: Normal sinus rhythm with heart rate of 84.  Normal OR, QT and QRS intervals.  Normal axis.  No acute ST-T wave changes.  No evidence for ischemia.    Impression     Final diagnoses:   Syncope   Shoulder injury, left, initial encounter   Essential hypertension         ED Course & Medical Decision Making   Khalida Redding is a 68 year old female who presented to the emergency department with a recurrent episode of syncope that occurred while she was on the toilet trying to have a bowel movement.  She has had approximately 10-15 episodes of syncope over her lifetime with last episode occurring about a year and a half ago.  Patient has had a prior episode of syncope when she was in bed.  She does not think that she was straining or having any severe pain.  She remembers having diaphoresis, and then does not recall anything after that.  Her  reports that she was in the bathroom a long time, and when he called out to see how she was doing, she reported that she was not doing well.  As he went into the bathroom, she was slumped over.  He eventually helped her to the floor where she became incontinent of stool.  Patient then slowly recovered and now is here in the emergency department.  She states that she is almost back to her baseline level of functioning.  She has been worked up in the past including a loop recorder and there has been no abnormal dysrhythmias.  She reports left shoulder pain today, possibly from her  who was trying to prop her up initially and then helped her to the floor.  She has not had any  recent illness except for mild cold.  Her workup reveals an elevation of the white blood count 12.7, with 86% neutrophils.  Hemoglobin is 15.7.  She is afebrile with a temperature of 96 degrees.  She had several elevated blood pressure readings as high as 197/118.  EKG revealed no dysrhythmia or abnormal QT prolongation.  Her competence of metabolic panel reveals an elevated glucose of 245 but all other parameters are normal.  Chest x-ray reveals no acute abnormality.  There is no evidence for clavicle fracture or left shoulder deformity.  Urinalysis reveals many bacteria but 10 squamous epithelial cells.  There is 31 white blood cells and 5 red blood cells.  Urine culture is pending.  Patient had a recurrent syncope episode without any other serious concerns or findings.  She has returned back to her baseline and she did not recall having any palpitations or chest pains before, during or after.  Patient is medically stable for discharge home.  I asked her to push lots of water for hydration.  This may decrease her risk of syncope.  Follow-up with her primary care provider or cardiologist in the next week.  Return if she develops another recurrent episode of syncope.  The patient's blood pressure was elevated during the ER visit and I asked her to monitor her blood pressures at home closely.  Follow-up for recheck later this week.  There are no signs of fracture or dislocation of the left shoulder and she will be treated with a sling.  Apply ice frequently and take ibuprofen/Tylenol.  Consider reimaging if she still has a lot of pain in 7-10 days.             Written after-visit summary and instructions were given at the time of discharge.    Follow up Plan:   No follow-up provider specified.    Discharge Medications: None       This document serves as a record of services personally performed by Prosper Tony MD. It was created on their behalf by Jeffery Lantigua, a trained medical scribe. The creation of this  record is based on the provider's personal observations and the statements of the patient. This document has been checked and approved by the attending provider.    This note was dictated using electronic voice recognition software and although reviewed, may contain grammatical and spelling errors.        Prosper Tony MD  02/03/19 [id number]       Prosper Tony MD  02/03/19 1107

## 2019-02-03 NOTE — DISCHARGE INSTRUCTIONS
We did not find a worrisome cause for your syncope (fainting spell).  Please see the attached handout.  Drink plenty of water throughout the day to keep the urine pale color.  Stay in the sling for comfort and begin range of motion of the left arm as tolerated.  Take Tylenol/ibuprofen as needed for pain.  Ice the left shoulder and collarbone frequently.  Follow-up with your primary care provider within the next several days.  Recheck blood pressures frequently over the next several days.

## 2019-02-03 NOTE — ED TRIAGE NOTES
"Was in the bathroom today and stated she \"didn't feel well.\"   found her in the bathroom and she was \"all sweaty and lost control of her bowels.\"  Now having left shoulder pain.  "

## 2019-02-08 NOTE — PROGRESS NOTES
"  SUBJECTIVE:   Khalida Redding is a 68 year old female who presents to clinic today for the following health issues:      HPI  ED/UC Followup:    Facility:  [facility]   Date of visit: New 2/3  Reason for visit: had a \"spell\"  tried to pick her up and now she has a protruding shoulder.   Current Status: still hurts       Problem list and histories reviewed & adjusted, as indicated.  Additional history: as documented    Patient Active Problem List   Diagnosis     Type 2 diabetes mellitus with circulatory disorder, without long-term current use of insulin (H)     Hyperlipidemia LDL goal <100     Hypertension goal BP (blood pressure) < 140/90     History of CVA (cerebrovascular accident)     Convulsions, unspecified convulsion type (H)     Osteoarthritis     Tobacco abuse disorder     Pain in both lower legs     Dementia     Supraventricular tachycardia (H)     Gait instability     Tubular adenoma of colon     Syncope, unspecified syncope type     Past Surgical History:   Procedure Laterality Date     COLONOSCOPY N/A 11/28/2018    Procedure: Colonoscopy, Polypectomy by Biopsy;  Surgeon: Arcadio Mcmullen DO;  Location:  GI     HIP SURGERY Left        Social History     Tobacco Use     Smoking status: Current Every Day Smoker     Packs/day: 0.50     Smokeless tobacco: Never Used   Substance Use Topics     Alcohol use: No     Frequency: Never     History reviewed. No pertinent family history.      Current Outpatient Medications   Medication Sig Dispense Refill     atorvastatin (LIPITOR) 20 MG tablet Take 20 mg by mouth daily       cilostazol (PLETAL) 50 MG tablet Take 50 mg by mouth 2 times daily       Clopidogrel Bisulfate (PLAVIX PO) Take 75 mg by mouth daily       Cyanocobalamin (VITAMIN B 12 PO) Take 1,000 mcg by mouth daily       gabapentin (NEURONTIN) 300 MG capsule Take 300 mg by mouth At Bedtime       levETIRAcetam (KEPPRA) 500 MG tablet Take 500 mg by mouth 2 times daily 2 tabs twice " daily       meloxicam (MOBIC) 7.5 MG tablet Take 7.5 mg by mouth daily       memantine (NAMENDA) 10 MG tablet Take 10 mg by mouth 2 times daily Take 0.5 tab twice daily       metFORMIN (GLUCOPHAGE-XR) 500 MG 24 hr tablet Take 500 mg by mouth daily Take 3 tabs daily       quinapril (ACCUPRIL) 5 MG tablet Take 5 mg by mouth daily       blood glucose monitoring (NO BRAND SPECIFIED) test strip Accu-Chek Violette Plus Meter       Allergies   Allergen Reactions     Amoxicillin      Ampicillin      Recent Labs   Lab Test 02/03/19  1605 07/30/18  1845 07/16/18 02/19/18 07/16/17  1632 06/09/17   A1C  --   --  6.4* 5.9*  --   --  6.5*   LDL  --   --  89 88  --   --  145*   HDL  --   --  53 53  --   --  62   TRIG  --   --  148 146  --   --  120   ALT 14 16  --   --   --  16  --    CR 0.57 0.74 0.69 0.77   < > 0.62 0.62   GFRESTIMATED >90 77 89 79   < > >90  Non  GFR Calc   93   GFRESTBLACK >90 >90 103 91   < > >90   GFR Calc   107   POTASSIUM 4.1 3.8 4.8 4.8   < > 4.1 5.0   TSH  --   --   --  3.72  --   --   --     < > = values in this interval not displayed.      BP Readings from Last 3 Encounters:   02/11/19 160/80   02/03/19 (!) 184/108   12/06/18 140/80    Wt Readings from Last 3 Encounters:   02/03/19 60.3 kg (133 lb)   12/06/18 60.3 kg (133 lb)   10/22/18 60 kg (132 lb 4.8 oz)                  Labs reviewed in EPIC    ROS:  Constitutional, HEENT, cardiovascular, pulmonary, GI, , musculoskeletal, neuro, skin, endocrine and psych systems are negative, except as otherwise noted.    OBJECTIVE:     /80   Pulse 78   Temp 97.3  F (36.3  C) (Temporal)   Resp 16   There is no height or weight on file to calculate BMI.  GENERAL: healthy, alert and no distress  NECK: no adenopathy, no asymmetry, masses, or scars and thyroid normal to palpation  RESP: lungs clear to auscultation - no rales, rhonchi or wheezes  CV: regular rate and rhythm, normal S1 S2, no S3 or S4, no murmur, click or rub, no  peripheral edema and peripheral pulses strong  NEURO: Normal strength and tone, mentation intact and speech normal  PSYCH: mentation appears normal, affect normal/bright  Musculoskeletal: This point time she struggles to move her left arm at the shoulder.  On visual exam the clavicle appears to be misshapen on the left.  It is tender over the prominence of the proximal clavicle to the midshaft.  Since her spell where her  picked her up was more recent than the last chest x-ray we are going to re-take a look at her clavicle.    Diagnostic Test Results:  No results found for this or any previous visit (from the past 24 hour(s)).  X-ray: fracture of the left clavicle pathology today to my eye.  It will be overread by radiology.    ASSESSMENT/PLAN:     1. Injury of left clavicle, initial encounter  2. Closed displaced fracture of left clavicle, unspecified part of clavicle, initial encounter  Conservative treatment -follow-up in 6-8 weeks with repeat x-rays.  - XR Clavicle Left; Future  - traMADol (ULTRAM) 50 MG tablet; Take 1 tablet (50 mg) by mouth every 6 hours as needed for severe pain  Dispense: 20 tablet; Refill: 0    3. Hypertension goal BP (blood pressure) < 140/90  Problematic with pain from the fracture.  Recheck in 3-4 weeks advised.    4. Type 2 diabetes mellitus with diabetic peripheral angiopathy without gangrene, without long-term current use of insulin (H)  Hemoglobin A1c is elevated today versus previous values and we will increase her medication see her back in 3 months.  - HEMOGLOBIN A1C  - metFORMIN (GLUCOPHAGE-XR) 500 MG 24 hr tablet; Take 2 tablets (1,000 mg) by mouth 2 times daily (with meals)  Dispense: 360 tablet; Refill: 1  - Hemoglobin A1c; Future  - order for DME; Equipment being ordered: glucometer and related supplies.  Dispense: 1 Units; Refill: 0    5. History of CVA (cerebrovascular accident)  6. Convulsions, unspecified convulsion type (H)  7. Supraventricular tachycardia (H)  8.  Asymptomatic postmenopausal status  9. Visit for screening mammogram  Screening advised for the concerns related to the aging process.  Orders have been in place she has declined to have these done in the past.  Encouraged her to reconsider.    Abdirahman Shrestha PA-C  [facility]

## 2019-02-11 ENCOUNTER — OFFICE VISIT (OUTPATIENT)
Dept: FAMILY MEDICINE | Facility: OTHER | Age: 69
End: 2019-02-11
Payer: COMMERCIAL

## 2019-02-11 ENCOUNTER — ANCILLARY PROCEDURE (OUTPATIENT)
Dept: GENERAL RADIOLOGY | Facility: OTHER | Age: 69
End: 2019-02-11
Attending: PHYSICIAN ASSISTANT
Payer: COMMERCIAL

## 2019-02-11 VITALS
DIASTOLIC BLOOD PRESSURE: 80 MMHG | SYSTOLIC BLOOD PRESSURE: 160 MMHG | RESPIRATION RATE: 16 BRPM | TEMPERATURE: 97.3 F | HEART RATE: 78 BPM

## 2019-02-11 DIAGNOSIS — E11.51 TYPE 2 DIABETES MELLITUS WITH DIABETIC PERIPHERAL ANGIOPATHY WITHOUT GANGRENE, WITHOUT LONG-TERM CURRENT USE OF INSULIN (H): ICD-10-CM

## 2019-02-11 DIAGNOSIS — S49.92XA INJURY OF LEFT CLAVICLE, INITIAL ENCOUNTER: ICD-10-CM

## 2019-02-11 DIAGNOSIS — I47.10 SUPRAVENTRICULAR TACHYCARDIA (H): ICD-10-CM

## 2019-02-11 DIAGNOSIS — Z78.0 ASYMPTOMATIC POSTMENOPAUSAL STATUS: ICD-10-CM

## 2019-02-11 DIAGNOSIS — S42.002A CLOSED DISPLACED FRACTURE OF LEFT CLAVICLE, UNSPECIFIED PART OF CLAVICLE, INITIAL ENCOUNTER: ICD-10-CM

## 2019-02-11 DIAGNOSIS — Z86.73 HISTORY OF CVA (CEREBROVASCULAR ACCIDENT): ICD-10-CM

## 2019-02-11 DIAGNOSIS — R56.9 CONVULSIONS, UNSPECIFIED CONVULSION TYPE (H): ICD-10-CM

## 2019-02-11 DIAGNOSIS — Z12.31 VISIT FOR SCREENING MAMMOGRAM: ICD-10-CM

## 2019-02-11 DIAGNOSIS — I10 HYPERTENSION GOAL BP (BLOOD PRESSURE) < 140/90: Primary | ICD-10-CM

## 2019-02-11 LAB — HBA1C MFR BLD: 8 % (ref 0–5.6)

## 2019-02-11 PROCEDURE — 99214 OFFICE O/P EST MOD 30 MIN: CPT | Performed by: PHYSICIAN ASSISTANT

## 2019-02-11 PROCEDURE — 73000 X-RAY EXAM OF COLLAR BONE: CPT | Mod: LT

## 2019-02-11 PROCEDURE — 36415 COLL VENOUS BLD VENIPUNCTURE: CPT | Performed by: PHYSICIAN ASSISTANT

## 2019-02-11 PROCEDURE — 83036 HEMOGLOBIN GLYCOSYLATED A1C: CPT | Performed by: PHYSICIAN ASSISTANT

## 2019-02-11 RX ORDER — METFORMIN HCL 500 MG
1000 TABLET, EXTENDED RELEASE 24 HR ORAL 2 TIMES DAILY WITH MEALS
Qty: 360 TABLET | Refills: 1 | Status: SHIPPED | OUTPATIENT
Start: 2019-02-11 | End: 2019-11-18

## 2019-02-11 RX ORDER — TRAMADOL HYDROCHLORIDE 50 MG/1
50 TABLET ORAL EVERY 6 HOURS PRN
Qty: 20 TABLET | Refills: 0 | Status: SHIPPED | OUTPATIENT
Start: 2019-02-11 | End: 2019-02-14

## 2019-02-11 SDOH — HEALTH STABILITY: MENTAL HEALTH: HOW OFTEN DO YOU HAVE A DRINK CONTAINING ALCOHOL?: NEVER

## 2019-02-11 ASSESSMENT — PAIN SCALES - GENERAL: PAINLEVEL: MODERATE PAIN (5)

## 2019-03-14 ENCOUNTER — TELEPHONE (OUTPATIENT)
Dept: FAMILY MEDICINE | Facility: OTHER | Age: 69
End: 2019-03-14

## 2019-03-14 DIAGNOSIS — S49.92XA INJURY OF LEFT CLAVICLE, INITIAL ENCOUNTER: ICD-10-CM

## 2019-03-14 DIAGNOSIS — S42.002A CLOSED DISPLACED FRACTURE OF LEFT CLAVICLE, UNSPECIFIED PART OF CLAVICLE, INITIAL ENCOUNTER: ICD-10-CM

## 2019-03-14 DIAGNOSIS — I10 HYPERTENSION GOAL BP (BLOOD PRESSURE) < 140/90: Primary | ICD-10-CM

## 2019-03-14 RX ORDER — QUINAPRIL 5 1/1
5 TABLET ORAL DAILY
Qty: 90 TABLET | Refills: 1 | Status: SHIPPED | OUTPATIENT
Start: 2019-03-14 | End: 2019-09-30

## 2019-03-14 RX ORDER — MELOXICAM 7.5 MG/1
7.5 TABLET ORAL DAILY
Qty: 90 TABLET | Refills: 1 | Status: SHIPPED | OUTPATIENT
Start: 2019-03-14 | End: 2019-08-19

## 2019-03-14 NOTE — TELEPHONE ENCOUNTER
Please contact the patient and find out what kind of doses and frequency that she needs refills of.  I do not recall having prescribe these for her before.  I do not recall denying any medications for her.  A previous provider may have denied her medications because they had not seen her for a while.  Electronically signed:    Abdirahman Shrestha PA-C

## 2019-03-14 NOTE — TELEPHONE ENCOUNTER
Called and informed  that we did not receive a refill request and that it may have went to the previous provider. Verified that patient takes the quinapril 5 mg once daily and the meloxicam 7.5 mg once daily.     Pended medication and pharmacy.     Robert Patrick,

## 2019-03-14 NOTE — TELEPHONE ENCOUNTER
I do not see that we received a refill request for either of these medications but I also do not see these on patients medication list. Abdirahman are you willing to refill these medications for patient or should they be going to another provider, perhaps a specialist she sees?    Robert Patrick,

## 2019-03-14 NOTE — TELEPHONE ENCOUNTER
Reason for Call:  Other call back    Detailed comments: pt calling, stating that her [facility] told her that [facility] denied Khalida's Meloxicam and Quinapril. They are wondering why, as this is something she needs. Please advise.    Phone Number Patient can be reached at: Other phone number:  [phone]    Best Time: any     Can we leave a detailed message on this number? YES    Call taken on 3/14/2019 at 1:31 PM by Delaney Goldberg

## 2019-04-02 ENCOUNTER — TELEPHONE (OUTPATIENT)
Dept: FAMILY MEDICINE | Facility: OTHER | Age: 69
End: 2019-04-02

## 2019-04-02 DIAGNOSIS — E78.5 HYPERLIPIDEMIA LDL GOAL <100: ICD-10-CM

## 2019-04-02 DIAGNOSIS — R56.9 CONVULSIONS, UNSPECIFIED CONVULSION TYPE (H): Primary | ICD-10-CM

## 2019-04-02 DIAGNOSIS — Z86.73 HISTORY OF CVA (CEREBROVASCULAR ACCIDENT): ICD-10-CM

## 2019-04-02 RX ORDER — ATORVASTATIN CALCIUM 20 MG/1
20 TABLET, FILM COATED ORAL DAILY
Qty: 90 TABLET | Refills: 1 | Status: SHIPPED | OUTPATIENT
Start: 2019-04-02 | End: 2019-08-19

## 2019-04-02 RX ORDER — CLOPIDOGREL BISULFATE 75 MG/1
75 TABLET ORAL DAILY
Qty: 90 TABLET | Refills: 1 | Status: SHIPPED | OUTPATIENT
Start: 2019-04-02 | End: 2019-08-19

## 2019-04-02 NOTE — TELEPHONE ENCOUNTER
Reason for Call:  Medication or medication refill:    Do you use a [facility]?  Name of the pharmacy and phone number for the current request:  [facility]    Name of the medication requested: Clopidogrel Bisulfate (PLAVIX PO) and atorvastatin (LIPITOR) 20 MG tablet    Other request: Patient has no refills left at pharmacy please call in    Can we leave a detailed message on this number? YES    Phone number patient can be reached at: Home number on file [phone] (home)    Best Time: any    Call taken on 4/2/2019 at 1:50 PM by Cris Dallas

## 2019-04-23 ENCOUNTER — TELEPHONE (OUTPATIENT)
Dept: FAMILY MEDICINE | Facility: OTHER | Age: 69
End: 2019-04-23

## 2019-04-23 NOTE — TELEPHONE ENCOUNTER
I do not see this form present in my in basket around my desk at all.  Electronically signed:    Abdirahman Shrestha PA-C

## 2019-04-25 NOTE — TELEPHONE ENCOUNTER
In my MA box (overhead file over my desk) for completion and scan of the document into the medical record.  Electronically signed:    Abdirahman Shrestha PA-C

## 2019-05-14 NOTE — PROGRESS NOTES
SUBJECTIVE:   Khalida Redding is a 68 year old female who presents to clinic today for the following health issues:  Patient is wondering how many of the medications that she has been prescribed that she has to take.     Patient is here to follow up on her Memantine medication. Pt has been sleeping more than normal. Patient always feels that she is tired. Therefore, that is the reason to discuss which medications she needs to take.     History of Present Illness   Frequency of exercise:  None  Taking medications regularly:  Yes  Medication side effects:  Other  Additional concerns today:  Yes    There is concern as to whether or not she truly has had some convulsions or seizures from her past.  There is a history of [facility] but seems to be some duplication of medications on her med list today.  We clarify the fact that she indeed has been getting clopidogrel and cilostazol and we decided to stop the cilostazol today.  She is on Keppra and gabapentin.  Due to the fact that she has not had another seizure since moving here from [place] and that the true concern for this is minimal and she has had a chance to wean off some of her Keppra in the past we will have her stop that medication completely.  She is been tired recently and we wonder if it may be related to polypharmacy.  We will have her stop her melatonin since it does not seem to help her sleep anyway and she has been quite sleepy during the daytime and follow-up in about 4 weeks.    Additional history: as documented    Reviewed and updated as needed this visit by clinical staff         Reviewed and updated as needed this visit by Provider         Patient Active Problem List   Diagnosis     Type 2 diabetes mellitus with circulatory disorder, without long-term current use of insulin (H)     Hyperlipidemia LDL goal <100     Hypertension goal BP (blood pressure) < 140/90     History of CVA (cerebrovascular accident)     Convulsions, unspecified convulsion  type (H)     Osteoarthritis     Tobacco abuse disorder     Pain in both lower legs     Dementia     Supraventricular tachycardia (H)     Gait instability     Tubular adenoma of colon     Syncope, unspecified syncope type     Past Surgical History:   Procedure Laterality Date     COLONOSCOPY N/A 11/28/2018    Procedure: Colonoscopy, Polypectomy by Biopsy;  Surgeon: Arcadio Mcmullen DO;  Location:  GI     HIP SURGERY Left        Social History     Tobacco Use     Smoking status: Current Every Day Smoker     Packs/day: 0.50     Smokeless tobacco: Never Used   Substance Use Topics     Alcohol use: No     Frequency: Never     History reviewed. No pertinent family history.      Current Outpatient Medications   Medication Sig Dispense Refill     atorvastatin (LIPITOR) 20 MG tablet Take 1 tablet (20 mg) by mouth daily 90 tablet 1     blood glucose monitoring (NO BRAND SPECIFIED) test strip Accu-Chek Violette Plus Meter       clopidogrel (PLAVIX) 75 MG tablet Take 1 tablet (75 mg) by mouth daily 90 tablet 1     Cyanocobalamin (VITAMIN B 12 PO) Take 1,000 mcg by mouth daily       gabapentin (NEURONTIN) 300 MG capsule Take 1 capsule (300 mg) by mouth At Bedtime 90 capsule 1     meloxicam (MOBIC) 7.5 MG tablet Take 1 tablet (7.5 mg) by mouth daily 90 tablet 1     memantine (NAMENDA) 10 MG tablet Take 1 tablet (10 mg) by mouth daily 90 tablet 1     metFORMIN (GLUCOPHAGE-XR) 500 MG 24 hr tablet Take 2 tablets (1,000 mg) by mouth 2 times daily (with meals) 360 tablet 1     order for DME Equipment being ordered: glucometer and related supplies. 1 Units 0     quinapril (ACCUPRIL) 5 MG tablet Take 1 tablet (5 mg) by mouth daily 90 tablet 1     Allergies   Allergen Reactions     Amoxicillin      Ampicillin      BP Readings from Last 3 Encounters:   05/17/19 122/60   02/11/19 160/80   02/03/19 (!) 184/108    Wt Readings from Last 3 Encounters:   05/17/19 62.4 kg (137 lb 9.6 oz)   02/03/19 60.3 kg (133 lb)   12/06/18 60.3  "kg (133 lb)                  Labs reviewed in EPIC    ROS:  Constitutional, HEENT, cardiovascular, pulmonary, GI, , musculoskeletal, neuro, skin, endocrine and psych systems are negative, except as otherwise noted.    OBJECTIVE:     /60 (BP Location: Left arm, Patient Position: Sitting, Cuff Size: Adult Regular)   Pulse 92   Temp 96.7  F (35.9  C) (Temporal)   Resp 16   Ht 1.651 m (5' 5\")   Wt 62.4 kg (137 lb 9.6 oz)   SpO2 96%   BMI 22.90 kg/m    Body mass index is 22.9 kg/m .  GENERAL: healthy, alert and no distress  NECK: no adenopathy, no asymmetry, masses, or scars and trachea midline and normal to palpation  RESP: lungs clear to auscultation - no rales, rhonchi or wheezes  CV: regular rate and rhythm, normal S1 S2, no S3 or S4, no murmur, click or rub, no peripheral edema and peripheral pulses strong  MS: no gross musculoskeletal defects noted, no edema  SKIN: no suspicious lesions or rashes to visible skin today.  NEURO: Normal strength and tone, mentation intact and speech normal  PSYCH: mentation appears normal, affect normal/bright    Diagnostic Test Results:  No results found for this or any previous visit (from the past 24 hour(s)).    ASSESSMENT/PLAN:     1. Asymptomatic postmenopausal status  2. Visit for screening mammogram  3. Dementia without behavioral disturbance, unspecified dementia type  We will check related labs and refilled medications and make the most sense with what she has going on.  Stop medications as noted above.  - memantine (NAMENDA) 10 MG tablet; Take 1 tablet (10 mg) by mouth daily  Dispense: 90 tablet; Refill: 1  - gabapentin (NEURONTIN) 300 MG capsule; Take 1 capsule (300 mg) by mouth At Bedtime  Dispense: 90 capsule; Refill: 1  - Vitamin D Deficiency  - Vitamin B12  - TSH with free T4 reflex  - CBC with platelets    4. Other long term (current) drug therapy   Rechecking labs today.  - Vitamin D Deficiency    5. Hypertension goal BP (blood pressure) < 140/90  Good " control this at this point time no new concerns.    6. Type 2 diabetes mellitus with diabetic peripheral angiopathy without gangrene, without long-term current use of insulin (H)    7. Convulsions, unspecified convulsion type (H)  History of convulsions and somewhat clouded at this point in time.  I have offered a neurology consultation but this point it is declined and she is going to try changing medications and observing carefully.    I have a silvia discussion with the patient her  and the daughter around the stopping medications and the inherent risks of doing so with respect to this patient's health.  We talked about the potential for blood clots, stroke, heart attack, neurologic deficits and changes as well as potential seizures.  They are willing to take the risk and verbalized this.    Follow-up in 4 weeks is advised    Abdirahman Shrestha PA-C  [facility]  Answers for HPI/ROS submitted by the patient on 5/17/2019   Chronic problems general questions HPI Form  If you checked off any problems, how difficult have these problems made it for you to do your work, take care of things at home, or get along with other people?: Somewhat difficult  PHQ9 TOTAL SCORE: 4

## 2019-05-17 ENCOUNTER — OFFICE VISIT (OUTPATIENT)
Dept: FAMILY MEDICINE | Facility: OTHER | Age: 69
End: 2019-05-17
Payer: COMMERCIAL

## 2019-05-17 VITALS
OXYGEN SATURATION: 96 % | BODY MASS INDEX: 22.92 KG/M2 | TEMPERATURE: 96.7 F | SYSTOLIC BLOOD PRESSURE: 122 MMHG | RESPIRATION RATE: 16 BRPM | DIASTOLIC BLOOD PRESSURE: 60 MMHG | WEIGHT: 137.6 LBS | HEART RATE: 92 BPM | HEIGHT: 65 IN

## 2019-05-17 DIAGNOSIS — E11.51 TYPE 2 DIABETES MELLITUS WITH DIABETIC PERIPHERAL ANGIOPATHY WITHOUT GANGRENE, WITHOUT LONG-TERM CURRENT USE OF INSULIN (H): ICD-10-CM

## 2019-05-17 DIAGNOSIS — R56.9 CONVULSIONS, UNSPECIFIED CONVULSION TYPE (H): ICD-10-CM

## 2019-05-17 DIAGNOSIS — Z78.0 ASYMPTOMATIC POSTMENOPAUSAL STATUS: ICD-10-CM

## 2019-05-17 DIAGNOSIS — Z79.899 OTHER LONG TERM (CURRENT) DRUG THERAPY: ICD-10-CM

## 2019-05-17 DIAGNOSIS — I10 HYPERTENSION GOAL BP (BLOOD PRESSURE) < 140/90: Primary | ICD-10-CM

## 2019-05-17 DIAGNOSIS — F03.90 DEMENTIA WITHOUT BEHAVIORAL DISTURBANCE, UNSPECIFIED DEMENTIA TYPE: ICD-10-CM

## 2019-05-17 DIAGNOSIS — Z12.31 VISIT FOR SCREENING MAMMOGRAM: ICD-10-CM

## 2019-05-17 LAB
ERYTHROCYTE [DISTWIDTH] IN BLOOD BY AUTOMATED COUNT: 13.4 % (ref 10–15)
HCT VFR BLD AUTO: 43.1 % (ref 35–47)
HGB BLD-MCNC: 14.1 G/DL (ref 11.7–15.7)
MCH RBC QN AUTO: 30.1 PG (ref 26.5–33)
MCHC RBC AUTO-ENTMCNC: 32.7 G/DL (ref 31.5–36.5)
MCV RBC AUTO: 92 FL (ref 78–100)
PLATELET # BLD AUTO: 248 10E9/L (ref 150–450)
RBC # BLD AUTO: 4.69 10E12/L (ref 3.8–5.2)
TSH SERPL DL<=0.005 MIU/L-ACNC: 2.5 MU/L (ref 0.4–4)
VIT B12 SERPL-MCNC: 463 PG/ML (ref 193–986)
WBC # BLD AUTO: 6.6 10E9/L (ref 4–11)

## 2019-05-17 PROCEDURE — 84443 ASSAY THYROID STIM HORMONE: CPT | Performed by: PHYSICIAN ASSISTANT

## 2019-05-17 PROCEDURE — 82607 VITAMIN B-12: CPT | Performed by: PHYSICIAN ASSISTANT

## 2019-05-17 PROCEDURE — 83036 HEMOGLOBIN GLYCOSYLATED A1C: CPT | Performed by: PHYSICIAN ASSISTANT

## 2019-05-17 PROCEDURE — 85027 COMPLETE CBC AUTOMATED: CPT | Performed by: PHYSICIAN ASSISTANT

## 2019-05-17 PROCEDURE — 36415 COLL VENOUS BLD VENIPUNCTURE: CPT | Performed by: PHYSICIAN ASSISTANT

## 2019-05-17 PROCEDURE — 99214 OFFICE O/P EST MOD 30 MIN: CPT | Performed by: PHYSICIAN ASSISTANT

## 2019-05-17 PROCEDURE — 82306 VITAMIN D 25 HYDROXY: CPT | Performed by: PHYSICIAN ASSISTANT

## 2019-05-17 RX ORDER — MEMANTINE HYDROCHLORIDE 10 MG/1
10 TABLET ORAL DAILY
Qty: 90 TABLET | Refills: 1 | Status: SHIPPED | OUTPATIENT
Start: 2019-05-17 | End: 2019-11-18

## 2019-05-17 RX ORDER — GABAPENTIN 300 MG/1
300 CAPSULE ORAL AT BEDTIME
Qty: 90 CAPSULE | Refills: 1 | Status: SHIPPED | OUTPATIENT
Start: 2019-05-17 | End: 2019-11-18

## 2019-05-17 RX ORDER — PHENOL 1.4 %
10 AEROSOL, SPRAY (ML) MUCOUS MEMBRANE
COMMUNITY
End: 2019-05-17

## 2019-05-17 ASSESSMENT — PATIENT HEALTH QUESTIONNAIRE - PHQ9
10. IF YOU CHECKED OFF ANY PROBLEMS, HOW DIFFICULT HAVE THESE PROBLEMS MADE IT FOR YOU TO DO YOUR WORK, TAKE CARE OF THINGS AT HOME, OR GET ALONG WITH OTHER PEOPLE: SOMEWHAT DIFFICULT
SUM OF ALL RESPONSES TO PHQ QUESTIONS 1-9: 4
SUM OF ALL RESPONSES TO PHQ QUESTIONS 1-9: 4

## 2019-05-17 ASSESSMENT — MIFFLIN-ST. JEOR: SCORE: 1145.03

## 2019-05-17 ASSESSMENT — PAIN SCALES - GENERAL: PAINLEVEL: MODERATE PAIN (4)

## 2019-05-18 LAB — DEPRECATED CALCIDIOL+CALCIFEROL SERPL-MC: 14 UG/L (ref 20–75)

## 2019-05-18 ASSESSMENT — PATIENT HEALTH QUESTIONNAIRE - PHQ9: SUM OF ALL RESPONSES TO PHQ QUESTIONS 1-9: 4

## 2019-05-20 ENCOUNTER — TELEPHONE (OUTPATIENT)
Dept: FAMILY MEDICINE | Facility: OTHER | Age: 69
End: 2019-05-20

## 2019-05-20 NOTE — LETTER
May 20, 2019      Khalida Redding  [address]  [place] [address]        Dear ,    We are writing to inform you of your test results.      Your vitamin D level is low.  It would be wise for you to take a multivitamin with vitamin D every single day and have this rechecked in 2 months.      Abdirahman Shrestha PA-C    Results for orders placed or performed in visit on 05/17/19   Vitamin D Deficiency   Result Value Ref Range    Vitamin D Deficiency screening 14 (L) 20 - 75 ug/L   Vitamin B12   Result Value Ref Range    Vitamin B12 463 193 - 986 pg/mL   TSH with free T4 reflex   Result Value Ref Range    TSH 2.50 0.40 - 4.00 mU/L   CBC with platelets   Result Value Ref Range    WBC 6.6 4.0 - 11.0 10e9/L    RBC Count 4.69 3.8 - 5.2 10e12/L    Hemoglobin 14.1 11.7 - 15.7 g/dL    Hematocrit 43.1 35.0 - 47.0 %    MCV 92 78 - 100 fl    MCH 30.1 26.5 - 33.0 pg    MCHC 32.7 31.5 - 36.5 g/dL    RDW 13.4 10.0 - 15.0 %    Platelet Count 248 150 - 450 10e9/L       If you have any questions or concerns, please call the clinic at the number listed above.       Sincerely,        Abdirahman Shrestha PA-C

## 2019-05-20 NOTE — TELEPHONE ENCOUNTER
Per provider letter written.  Radha Elias CMA ([facility])      Notes recorded by Abdirahman Munoz PA-C on 5/20/2019 at 7:38 AM CDT  Her vitamin D level is low.  It would be wise for her to take a multivitamin with vitamin D every single day and have this rechecked in 2 months.  Staff is to feel free to send a letter with these results.  Electronically signed:    Abdirahman Munoz PA-C

## 2019-05-21 DIAGNOSIS — E11.51 TYPE 2 DIABETES MELLITUS WITH DIABETIC PERIPHERAL ANGIOPATHY WITHOUT GANGRENE, WITHOUT LONG-TERM CURRENT USE OF INSULIN (H): ICD-10-CM

## 2019-05-21 LAB — HBA1C MFR BLD: 7.7 % (ref 0–5.6)

## 2019-05-22 ENCOUNTER — HOSPITAL ENCOUNTER (EMERGENCY)
Facility: CLINIC | Age: 69
Discharge: HOME | End: 2019-05-22
Attending: EMERGENCY MEDICINE | Admitting: EMERGENCY MEDICINE
Payer: COMMERCIAL

## 2019-05-22 ENCOUNTER — APPOINTMENT (OUTPATIENT)
Dept: CT IMAGING | Facility: CLINIC | Age: 69
End: 2019-05-22
Attending: EMERGENCY MEDICINE
Payer: COMMERCIAL

## 2019-05-22 ENCOUNTER — TELEPHONE (OUTPATIENT)
Dept: FAMILY MEDICINE | Facility: OTHER | Age: 69
End: 2019-05-22

## 2019-05-22 VITALS
HEART RATE: 79 BPM | TEMPERATURE: 97.6 F | SYSTOLIC BLOOD PRESSURE: 175 MMHG | DIASTOLIC BLOOD PRESSURE: 90 MMHG | RESPIRATION RATE: 16 BRPM | WEIGHT: 135.6 LBS | OXYGEN SATURATION: 96 % | BODY MASS INDEX: 22.57 KG/M2

## 2019-05-22 DIAGNOSIS — R55 SYNCOPE AND COLLAPSE: ICD-10-CM

## 2019-05-22 LAB
ALBUMIN UR-MCNC: NEGATIVE MG/DL
AMORPH CRY #/AREA URNS HPF: ABNORMAL /HPF
ANION GAP SERPL CALCULATED.3IONS-SCNC: 6 MMOL/L (ref 3–14)
APPEARANCE UR: ABNORMAL
BACTERIA #/AREA URNS HPF: ABNORMAL /HPF
BASOPHILS # BLD AUTO: 0.1 10E9/L (ref 0–0.2)
BASOPHILS NFR BLD AUTO: 0.7 %
BILIRUB UR QL STRIP: NEGATIVE
BUN SERPL-MCNC: 12 MG/DL (ref 7–30)
CALCIUM SERPL-MCNC: 8.8 MG/DL (ref 8.5–10.1)
CHLORIDE SERPL-SCNC: 105 MMOL/L (ref 94–109)
CO2 SERPL-SCNC: 31 MMOL/L (ref 20–32)
COLOR UR AUTO: YELLOW
CREAT SERPL-MCNC: 0.66 MG/DL (ref 0.52–1.04)
DIFFERENTIAL METHOD BLD: NORMAL
EOSINOPHIL NFR BLD AUTO: 1.8 %
ERYTHROCYTE [DISTWIDTH] IN BLOOD BY AUTOMATED COUNT: 12.6 % (ref 10–15)
GFR SERPL CREATININE-BSD FRML MDRD: 89 ML/MIN/{1.73_M2}
GLUCOSE SERPL-MCNC: 235 MG/DL (ref 70–99)
GLUCOSE UR STRIP-MCNC: >499 MG/DL
HCT VFR BLD AUTO: 41.1 % (ref 35–47)
HGB BLD-MCNC: 13.3 G/DL (ref 11.7–15.7)
HGB UR QL STRIP: ABNORMAL
IMM GRANULOCYTES # BLD: 0 10E9/L (ref 0–0.4)
IMM GRANULOCYTES NFR BLD: 0.3 %
KETONES UR STRIP-MCNC: NEGATIVE MG/DL
LEUKOCYTE ESTERASE UR QL STRIP: NEGATIVE
LYMPHOCYTES # BLD AUTO: 2.1 10E9/L (ref 0.8–5.3)
LYMPHOCYTES NFR BLD AUTO: 31.5 %
MCH RBC QN AUTO: 29.6 PG (ref 26.5–33)
MCHC RBC AUTO-ENTMCNC: 32.4 G/DL (ref 31.5–36.5)
MCV RBC AUTO: 91 FL (ref 78–100)
MONOCYTES # BLD AUTO: 0.5 10E9/L (ref 0–1.3)
MONOCYTES NFR BLD AUTO: 7.4 %
MUCOUS THREADS #/AREA URNS LPF: PRESENT /LPF
NEUTROPHILS # BLD AUTO: 4 10E9/L (ref 1.6–8.3)
NEUTROPHILS NFR BLD AUTO: 58.3 %
NITRATE UR QL: NEGATIVE
NRBC # BLD AUTO: 0 10*3/UL
NRBC BLD AUTO-RTO: 0 /100
PH UR STRIP: 5 PH (ref 5–7)
PLATELET # BLD AUTO: 245 10E9/L (ref 150–450)
POTASSIUM SERPL-SCNC: 3.3 MMOL/L (ref 3.4–5.3)
RBC # BLD AUTO: 4.5 10E12/L (ref 3.8–5.2)
RBC #/AREA URNS AUTO: 0 /HPF (ref 0–2)
SODIUM SERPL-SCNC: 142 MMOL/L (ref 133–144)
SOURCE: ABNORMAL
SP GR UR STRIP: 1.01 (ref 1–1.03)
SQUAMOUS #/AREA URNS AUTO: 1 /HPF (ref 0–1)
UROBILINOGEN UR STRIP-MCNC: 0 MG/DL (ref 0–2)
WBC # BLD AUTO: 6.8 10E9/L (ref 4–11)
WBC #/AREA URNS AUTO: 1 /HPF (ref 0–5)

## 2019-05-22 PROCEDURE — 80048 BASIC METABOLIC PNL TOTAL CA: CPT | Performed by: EMERGENCY MEDICINE

## 2019-05-22 PROCEDURE — 70450 CT HEAD/BRAIN W/O DYE: CPT

## 2019-05-22 PROCEDURE — 99285 EMERGENCY DEPT VISIT HI MDM: CPT | Mod: 25 | Performed by: EMERGENCY MEDICINE

## 2019-05-22 PROCEDURE — 81001 URINALYSIS AUTO W/SCOPE: CPT | Performed by: EMERGENCY MEDICINE

## 2019-05-22 PROCEDURE — 93010 ELECTROCARDIOGRAM REPORT: CPT | Mod: Z6 | Performed by: EMERGENCY MEDICINE

## 2019-05-22 PROCEDURE — 87086 URINE CULTURE/COLONY COUNT: CPT | Performed by: EMERGENCY MEDICINE

## 2019-05-22 PROCEDURE — 85025 COMPLETE CBC W/AUTO DIFF WBC: CPT | Performed by: EMERGENCY MEDICINE

## 2019-05-22 PROCEDURE — 93005 ELECTROCARDIOGRAM TRACING: CPT | Performed by: EMERGENCY MEDICINE

## 2019-05-22 NOTE — ED PROVIDER NOTES
History     Chief Complaint   Patient presents with     Loss of Consciousness     HPI  Khalida Redding is a 68 year old female with a history of supraventricular tachycardia, syncope, type 2 diabetes, CVA 5-10 years ago, convulsions and dementia presents to the emergency department complaining of syncope. Patient states she has had recurrent episodes of syncope for the past 4 years. Over the past 4 years she generally has 5-10 episodes a year and the syncope usually happens after she has abdominal pain/bowel movement or any GI distress. With previous episodes she generally has an episode of bowel incontinence. Until last night her only other episode of syncope this year that was in February.     Last night around 6-7 PM she was sitting on the toilet because she had some abdominal pain and felt she had to have a bowel movement. The patient became pale, diaphoretic, shaky, nauseous, and had 1 episode of emesis before she had an episode of syncope.  She states she felt like she was going to pass out so she called for her . Her  ran to the bathroom and caught her before she fell. He laid her down and believes she was unconscious for about 3 minutes. During that time he noted she was not talking, shaking, or moaning. She had an episode of bowel incontinence and gradually came back to consciousness. This morning the patient started having abdominal pain, nausea, and felt the need to go to the bathroom again. She went to the bathroom and felt near syncopal, but she did not have an episode. The patient called her PCP who recommended she come to the ED. The patient had a stroke about 5-10 years ago and has left sided weakness. Daughter states the patient appears to have a slight left sided facial droop when these events occur. She denies chest pain, palpitations, aura, visual changes, headaches, slurred speech, EEG, or neurology consult.    The only time she is had brain imaging was when she had her  stroke.  The daughter would like a CT scan to be done today.  The patient does not feel any focal weakness at this time.  She did not have any pre-syncope palpitations or chest pain.  On previous visits there was concern regarding that this may be cardiac in etiology and therefore a monitor was placed which the patient still has.  It is not turned off but there has not been any episodes of arrhythmia according to the patient and her family that would suggest an etiology for her syncope.        Allergies:  Allergies   Allergen Reactions     Amoxicillin      Ampicillin        Problem List:    Patient Active Problem List    Diagnosis Date Noted     Syncope, unspecified syncope type 12/17/2018     Priority: Medium     Added automatically from request for surgery [id number]       Tubular adenoma of colon 11/29/2018     Priority: Medium     Supraventricular tachycardia (H) 10/22/2018     Priority: Medium     Gait instability 10/22/2018     Priority: Medium     Type 2 diabetes mellitus with circulatory disorder, without long-term current use of insulin (H) 09/20/2018     Priority: Medium     Hyperlipidemia LDL goal <100 09/20/2018     Priority: Medium     Hypertension goal BP (blood pressure) < 140/90 09/20/2018     Priority: Medium     History of CVA (cerebrovascular accident) 09/20/2018     Priority: Medium     Convulsions, unspecified convulsion type (H) 09/20/2018     Priority: Medium     Osteoarthritis 09/20/2018     Priority: Medium     Tobacco abuse disorder 09/20/2018     Priority: Medium     Pain in both lower legs 09/20/2018     Priority: Medium     Dementia 09/20/2018     Priority: Medium        Past Medical History:    Past Medical History:   Diagnosis Date     Atrial tachycardia (H)      Convulsions, unspecified convulsion type (H) 9/20/2018     CVA (cerebral vascular accident) (H)      Dementia 9/20/2018     Diabetes (H)      Falls      History of CVA (cerebrovascular accident) 9/20/2018     Hyperlipidemia LDL  goal <100 9/20/2018     Hypertension goal BP (blood pressure) < 140/90 9/20/2018     Osteoarthritis 9/20/2018     Pain in both lower legs 9/20/2018     Seizures (H)      Smoking      Syncope      Tobacco abuse disorder 9/20/2018     Tubular adenoma of colon 11/29/2018     Type 2 diabetes mellitus with circulatory disorder, without long-term current use of insulin (H) 9/20/2018       Past Surgical History:    Past Surgical History:   Procedure Laterality Date     COLONOSCOPY N/A 11/28/2018    Procedure: Colonoscopy, Polypectomy by Biopsy;  Surgeon: Arcadio Mcmullen DO;  Location:  GI     HIP SURGERY Left        Family History:    History reviewed. No pertinent family history.    Social History:  Marital Status:   [2]  Social History     Tobacco Use     Smoking status: Current Every Day Smoker     Packs/day: 0.50     Smokeless tobacco: Never Used   Substance Use Topics     Alcohol use: No     Frequency: Never     Drug use: No        Medications:      atorvastatin (LIPITOR) 20 MG tablet   blood glucose monitoring (NO BRAND SPECIFIED) test strip   clopidogrel (PLAVIX) 75 MG tablet   Cyanocobalamin (VITAMIN B 12 PO)   gabapentin (NEURONTIN) 300 MG capsule   meloxicam (MOBIC) 7.5 MG tablet   memantine (NAMENDA) 10 MG tablet   metFORMIN (GLUCOPHAGE-XR) 500 MG 24 hr tablet   order for DME   quinapril (ACCUPRIL) 5 MG tablet         Review of Systems   All other systems reviewed and are negative.      Physical Exam   BP: (!) 144/92  Pulse: 79  Heart Rate: 89  Temp: 97.6  F (36.4  C)  Resp: 16  Weight: 61.5 kg (135 lb 9.6 oz)  SpO2: 96 %      Physical Exam   Constitutional: She is oriented to person, place, and time. She appears well-developed and well-nourished. No distress.   HENT:   Head: Normocephalic and atraumatic.   Eyes: No scleral icterus.   Neck: Normal range of motion. Neck supple.   Cardiovascular: Normal rate and regular rhythm.   Pulmonary/Chest: Effort normal and breath sounds normal.    Abdominal: Soft. She exhibits no distension. There is no tenderness. There is no guarding.   Musculoskeletal: Normal range of motion. She exhibits no edema, tenderness or deformity.   Neurological: She is alert and oriented to person, place, and time. No cranial nerve deficit or sensory deficit. She exhibits normal muscle tone. Coordination normal.   Skin: Skin is warm and dry. No rash noted. She is not diaphoretic. No erythema. No pallor.   Psychiatric: She has a normal mood and affect. Her behavior is normal.   Nursing note and vitals reviewed.      ED Course        Procedures               EKG Interpretation:      Interpreted by Danilo Rucker  Time reviewed: 1152  Symptoms at time of EKG: recent syncope    Rhythm: normal sinus   Rate: normal  Axis: normal  Ectopy: none  Conduction: normal  ST Segments/ T Waves: No ST-T wave changes  Q Waves: none  Comparison to prior: No old EKG available    Clinical Impression: normal EKG          Critical Care time:  none       Results for orders placed or performed during the hospital encounter of 05/22/19 (from the past 24 hour(s))   CBC with platelets differential   Result Value Ref Range    WBC 6.8 4.0 - 11.0 10e9/L    RBC Count 4.50 3.8 - 5.2 10e12/L    Hemoglobin 13.3 11.7 - 15.7 g/dL    Hematocrit 41.1 35.0 - 47.0 %    MCV 91 78 - 100 fl    MCH 29.6 26.5 - 33.0 pg    MCHC 32.4 31.5 - 36.5 g/dL    RDW 12.6 10.0 - 15.0 %    Platelet Count 245 150 - 450 10e9/L    Diff Method Automated Method     % Neutrophils 58.3 %    % Lymphocytes 31.5 %    % Monocytes 7.4 %    % Eosinophils 1.8 %    % Basophils 0.7 %    % Immature Granulocytes 0.3 %    Nucleated RBCs 0 0 /100    Absolute Neutrophil 4.0 1.6 - 8.3 10e9/L    Absolute Lymphocytes 2.1 0.8 - 5.3 10e9/L    Absolute Monocytes 0.5 0.0 - 1.3 10e9/L    Absolute Basophils 0.1 0.0 - 0.2 10e9/L    Abs Immature Granulocytes 0.0 0 - 0.4 10e9/L    Absolute Nucleated RBC 0.0    Basic metabolic panel   Result Value Ref Range    Sodium  142 133 - 144 mmol/L    Potassium 3.3 (L) 3.4 - 5.3 mmol/L    Chloride 105 94 - 109 mmol/L    Carbon Dioxide 31 20 - 32 mmol/L    Anion Gap 6 3 - 14 mmol/L    Glucose 235 (H) 70 - 99 mg/dL    Urea Nitrogen 12 7 - 30 mg/dL    Creatinine 0.66 0.52 - 1.04 mg/dL    GFR Estimate 89 >60 mL/min/[1.73_m2]    GFR Estimate If Black >90 >60 mL/min/[1.73_m2]    Calcium 8.8 8.5 - 10.1 mg/dL   UA reflex to Microscopic and Culture   Result Value Ref Range    Color Urine Yellow     Appearance Urine Slightly Cloudy     Glucose Urine >499 (A) NEG^Negative mg/dL    Bilirubin Urine Negative NEG^Negative    Ketones Urine Negative NEG^Negative mg/dL    Specific Gravity Urine 1.009 1.003 - 1.035    Blood Urine Small (A) NEG^Negative    pH Urine 5.0 5.0 - 7.0 pH    Protein Albumin Urine Negative NEG^Negative mg/dL    Urobilinogen mg/dL 0.0 0.0 - 2.0 mg/dL    Nitrite Urine Negative NEG^Negative    Leukocyte Esterase Urine Negative NEG^Negative    Source Unspecified Urine     RBC Urine 0 0 - 2 /HPF    WBC Urine 1 0 - 5 /HPF    Bacteria Urine Many (A) NEG^Negative /HPF    Squamous Epithelial /HPF Urine 1 0 - 1 /HPF    Mucous Urine Present (A) NEG^Negative /LPF    Amorphous Crystals Few (A) NEG^Negative /HPF   Head CT w/o contrast    Narrative    CT OF THE HEAD WITHOUT CONTRAST May 22, 2019 12:50 PM     HISTORY: Syncope, remote history of CVA, non focal exam.    TECHNIQUE: 5 mm thick axial CT images of the head were acquired  without IV contrast material. Radiation dose for this scan was reduced  using automated exposure control, adjustment of the mA and/or kV  according to patient size, or iterative reconstruction technique.    COMPARISON: Brain MR 8/23/2017.    FINDINGS: Chronic encephalomalacia of the anteromedial aspect of the  right frontal lobe is again noted. There is mild diffuse cerebral  volume loss. There are extensive confluent areas of decreased density  in the cerebral white matter bilaterally that are consistent with  sequela of  chronic small vessel ischemic disease.     The ventricles and basal cisterns are within normal limits in  configuration given the degree of cerebral volume loss. There is no  midline shift. There are no extra-axial fluid collections.     No intracranial hemorrhage, mass or recent infarct.    The visualized paranasal sinuses are well-aerated. There is no  mastoiditis. There are no fractures of the visualized bones.       Impression    IMPRESSION: Diffuse cerebral volume loss and cerebral white matter  changes consistent with chronic small vessel ischemic disease. No  evidence for acute intracranial pathology. Chronic encephalomalacia of  the anterior aspect of the right frontal lobe again noted.        NARCISA HEBERT MD       Medications - No data to display    Assessments & Plan (with Medical Decision Making)    Recurrent syncope  Although it is possible this could be a seizure disorder I think is unlikely given that each episode is preceded by some GI distress.  I think this is most likely vasovagal syncope related to her GI issues.  They would like further investigations into whether or not this is a seizure disorder.  I have referred them to neurology.  I do not think this is cardiac in etiology given her normal EKG, no preceding palpitations and her recent cardiac monitoring showing no arrhythmia.  The differential diagnosis, treatment options, risks and follow-up discussed with the patient and her family who agree with the plan.     I have reviewed the nursing notes.    I have reviewed the findings, diagnosis, plan and need for follow up with the patient.       Medication List      There are no discharge medications for this visit.         Final diagnoses:   Syncope and collapse     This document serves as a record of services personally performed by Danilo Rucker MD. It was created on their behalf by Marie Lu, a trained medical scribe. The creation of this record is based on the provider's  personal observations and the statements of the patient. This document has been checked and approved by the attending provider.    Disclaimer: This note consists of symbols derived from keyboarding, dictation and/or voice recognition software. As a result, there may be errors in the script that have gone undetected. Please consider this when interpreting information found in this chart.    5/22/2019   [facility] EMERGENCY DEPARTMENT     Danilo Rucker MD  05/22/19 [id number]

## 2019-05-22 NOTE — TELEPHONE ENCOUNTER
Khalida Redding is a 68 year old female who calls with symptoms. I spoke with her daughter, Laura.     NURSING ASSESSMENT:  Description: Patient has had two episodes of loss of bowel and bladder control, full body and head shaking, and then passing out. Last night she had LOC for about three minutes. She just had another episode this morning, without LOC.  Onset/duration:  Yesterday  Precip. factors:  Daughter reports that she has been seen for this before but never got a diagnosis.   Last exam/Treatment:  5/17/19  Allergies:   Allergies   Allergen Reactions     Amoxicillin      Ampicillin      RECOMMENDED DISPOSITION:  Call 911 - Daughter states she will drive her to the ED.   Will comply with recommendation: YES   If further questions/concerns or if Sx do not improve, worsen or new Sx develop, call your PCP or [facility] as soon as possible.    NOTES:  Disposition was determined by the first positive assessment question, therefore all previous assessment questions were negative.     Guideline used:  Telephone Triage Protocols for Nurses, Fifth Edition, Judi Corona, RN, BSN

## 2019-05-22 NOTE — ED AVS SNAPSHOT
[facility] Emergency Department  [address] [place] [address]  Phone:  [phone]  Fax:  [phone]                                    Khalida Redding   MRN: [id number]    Department:  [facility] Emergency Department   Date of Visit:  5/22/2019           After Visit Summary Signature Page    I have received my discharge instructions, and my questions have been answered. I have discussed any challenges I see with this plan with the nurse or doctor.    ..........................................................................................................................................  Patient/Patient Representative Signature      ..........................................................................................................................................  Patient Representative Print Name and Relationship to Patient    ..................................................               ................................................  Date                                   Time    ..........................................................................................................................................  Reviewed by Signature/Title    ...................................................              ..............................................  Date                                               Time          22EPIC Rev 08/18

## 2019-05-22 NOTE — ED TRIAGE NOTES
Pt comes in with complaints of a syncopal episodes. Pt states she has been having multiple lately.

## 2019-05-23 LAB
BACTERIA SPEC CULT: NORMAL
Lab: NORMAL
SPECIMEN SOURCE: NORMAL

## 2019-05-23 NOTE — RESULT ENCOUNTER NOTE
Emergency Dept/Urgent Care discharge antibiotic (if prescribed): None  No changes in treatment per Urine culture protocol.   no known mental health issues.

## 2019-05-24 NOTE — RESULT ENCOUNTER NOTE
Final urine culture report is NEGATIVE per [facility] Lab Result protocol.    If NEGATIVE result, no change in treatment, per [facility] Lab Result protocol.

## 2019-05-28 ENCOUNTER — OFFICE VISIT (OUTPATIENT)
Dept: FAMILY MEDICINE | Facility: OTHER | Age: 69
End: 2019-05-28
Payer: COMMERCIAL

## 2019-05-28 VITALS
BODY MASS INDEX: 22.81 KG/M2 | RESPIRATION RATE: 20 BRPM | TEMPERATURE: 97.1 F | OXYGEN SATURATION: 96 % | SYSTOLIC BLOOD PRESSURE: 106 MMHG | WEIGHT: 137.1 LBS | HEART RATE: 80 BPM | DIASTOLIC BLOOD PRESSURE: 68 MMHG

## 2019-05-28 DIAGNOSIS — I47.10 SUPRAVENTRICULAR TACHYCARDIA (H): ICD-10-CM

## 2019-05-28 DIAGNOSIS — F03.90 DEMENTIA WITHOUT BEHAVIORAL DISTURBANCE, UNSPECIFIED DEMENTIA TYPE: ICD-10-CM

## 2019-05-28 DIAGNOSIS — E11.51 TYPE 2 DIABETES MELLITUS WITH DIABETIC PERIPHERAL ANGIOPATHY WITHOUT GANGRENE, WITHOUT LONG-TERM CURRENT USE OF INSULIN (H): Primary | ICD-10-CM

## 2019-05-28 DIAGNOSIS — I10 HYPERTENSION GOAL BP (BLOOD PRESSURE) < 140/90: ICD-10-CM

## 2019-05-28 PROCEDURE — 99214 OFFICE O/P EST MOD 30 MIN: CPT | Performed by: PHYSICIAN ASSISTANT

## 2019-05-28 ASSESSMENT — PAIN SCALES - GENERAL: PAINLEVEL: NO PAIN (0)

## 2019-05-28 NOTE — PROGRESS NOTES
Subjective     Khalida Redding is a 68 year old female who presents to clinic today for the following health issues:    HPI   ED/UC Followup:    Facility:  [facility]  Date of visit: 05/22/19  Reason for visit: Loss of consciousness  Current Status: Patient reports feeling a little better.        Patient Active Problem List   Diagnosis     Type 2 diabetes mellitus with circulatory disorder, without long-term current use of insulin (H)     Hyperlipidemia LDL goal <100     Hypertension goal BP (blood pressure) < 140/90     History of CVA (cerebrovascular accident)     Convulsions, unspecified convulsion type (H)     Osteoarthritis     Tobacco abuse disorder     Pain in both lower legs     Dementia     Supraventricular tachycardia (H)     Gait instability     Tubular adenoma of colon     Syncope, unspecified syncope type     Past Surgical History:   Procedure Laterality Date     COLONOSCOPY N/A 11/28/2018    Procedure: Colonoscopy, Polypectomy by Biopsy;  Surgeon: Arcadio Mcmullen DO;  Location:  GI     HIP SURGERY Left        Social History     Tobacco Use     Smoking status: Current Every Day Smoker     Packs/day: 0.50     Smokeless tobacco: Never Used   Substance Use Topics     Alcohol use: No     Frequency: Never     History reviewed. No pertinent family history.      Current Outpatient Medications   Medication Sig Dispense Refill     atorvastatin (LIPITOR) 20 MG tablet Take 1 tablet (20 mg) by mouth daily 90 tablet 1     blood glucose monitoring (NO BRAND SPECIFIED) test strip Accu-Chek Violette Plus Meter       clopidogrel (PLAVIX) 75 MG tablet Take 1 tablet (75 mg) by mouth daily 90 tablet 1     Cyanocobalamin (VITAMIN B 12 PO) Take 1,000 mcg by mouth daily       gabapentin (NEURONTIN) 300 MG capsule Take 1 capsule (300 mg) by mouth At Bedtime 90 capsule 1     meloxicam (MOBIC) 7.5 MG tablet Take 1 tablet (7.5 mg) by mouth daily 90 tablet 1     memantine (NAMENDA) 10 MG tablet Take 1 tablet (10  mg) by mouth daily 90 tablet 1     metFORMIN (GLUCOPHAGE-XR) 500 MG 24 hr tablet Take 2 tablets (1,000 mg) by mouth 2 times daily (with meals) 360 tablet 1     order for DME Equipment being ordered: glucometer and related supplies. 1 Units 0     quinapril (ACCUPRIL) 5 MG tablet Take 1 tablet (5 mg) by mouth daily 90 tablet 1     Allergies   Allergen Reactions     Amoxicillin      Ampicillin      Recent Labs   Lab Test 05/22/19  1233 05/17/19  1345 02/11/19  1550 02/03/19  1605 07/30/18  1845 07/16/18 02/19/18 07/16/17  1632 06/09/17   A1C  --  7.7* 8.0*  --   --  6.4* 5.9*  --   --  6.5*   LDL  --   --   --   --   --  89 88  --   --  145*   HDL  --   --   --   --   --  53 53  --   --  62   TRIG  --   --   --   --   --  148 146  --   --  120   ALT  --   --   --  14 16  --   --   --  16  --    CR 0.66  --   --  0.57 0.74 0.69 0.77   < > 0.62 0.62   GFRESTIMATED 89  --   --  >90 77 89 79   < > >90  Non  GFR Calc   93   GFRESTBLACK >90  --   --  >90 >90 103 91   < > >90   GFR Calc   107   POTASSIUM 3.3*  --   --  4.1 3.8 4.8 4.8   < > 4.1 5.0   TSH  --  2.50  --   --   --   --  3.72  --   --   --     < > = values in this interval not displayed.      BP Readings from Last 3 Encounters:   05/28/19 106/68   05/22/19 175/90   05/17/19 122/60    Wt Readings from Last 3 Encounters:   05/28/19 62.2 kg (137 lb 1.6 oz)   05/22/19 61.5 kg (135 lb 9.6 oz)   05/17/19 62.4 kg (137 lb 9.6 oz)                    Reviewed and updated as needed this visit by Provider         Review of Systems   ROS COMP: Constitutional, HEENT, cardiovascular, pulmonary, GI, , musculoskeletal, neuro, skin, endocrine and psych systems are negative, except as otherwise noted.      Objective    /68 (Cuff Size: Adult Regular)   Pulse 80   Temp 97.1  F (36.2  C) (Temporal)   Resp 20   Wt 62.2 kg (137 lb 1.6 oz)   SpO2 96%   BMI 22.81 kg/m    Body mass index is 22.81 kg/m .  Physical Exam   GENERAL: healthy, alert  and no distress  NECK: no adenopathy, no asymmetry, masses, or scars and thyroid normal to palpation  RESP: lungs clear to auscultation - no rales, rhonchi or wheezes  CV: regular rate and rhythm, normal S1 S2, no S3 or S4, no murmur, click or rub, no peripheral edema and peripheral pulses strong  ABDOMEN: soft, nontender, no hepatosplenomegaly, no masses and bowel sounds normal  MS: no gross musculoskeletal defects noted, no edema  PSYCH: mentation appears normal, affect normal/bright          Assessment & Plan     1. Dementia without behavioral disturbance, unspecified dementia type  2. Type 2 diabetes mellitus with diabetic peripheral angiopathy without gangrene, without long-term current use of insulin (H)  3. Hypertension goal BP (blood pressure) < 140/90  4. Supraventricular tachycardia (H)  - NEUROLOGY ADULT REFERRAL     Tobacco Cessation:   reports that she has been smoking.  She has been smoking about 0.50 packs per day. She has never used smokeless tobacco.  Tobacco Cessation Action Plan: Information offered: Patient not interested at this time        Work on weight loss  Regular exercise    No follow-ups on file.    Abdirahman Shrestha PA-C  [facility]

## 2019-06-06 ENCOUNTER — TELEPHONE (OUTPATIENT)
Dept: FAMILY MEDICINE | Facility: OTHER | Age: 69
End: 2019-06-06

## 2019-06-06 NOTE — TELEPHONE ENCOUNTER
Reason for Call:  Other appointment    Detailed comments: Daughter Laura called, stating neurology consult is booked out to August. Is it ok to wait that long or does she need to be seen sooner? Please call to advise. C2C on file.    Phone Number Patient can be reached at: Other phone number:  [phone]    Best Time: Anytime    Can we leave a detailed message on this number? YES    Call taken on 6/6/2019 at 2:39 PM by Vandana Mckeon

## 2019-06-07 NOTE — TELEPHONE ENCOUNTER
I would advise taking the soonest available appointment that is offered and carefully monitor between now and then.  Electronically signed:    Abdirahman Shrestha PA-C'

## 2019-08-16 ENCOUNTER — TELEPHONE (OUTPATIENT)
Dept: FAMILY MEDICINE | Facility: OTHER | Age: 69
End: 2019-08-16

## 2019-08-16 NOTE — TELEPHONE ENCOUNTER
Summary:    Patient is due/failing the following:   MAMMOGRAM    Action needed:   Schedule a mammogram    Type of outreach:    Phone, spoke to patient.  declines mammograms    Questions for provider review:    None                                                                                                                                    Carrol Galvin     Chart routed to Care Team .    Panel Management Review      Patient has the following on her problem list:     Diabetes    ASA: Passed    Last A1C  Lab Results   Component Value Date    A1C 7.7 05/17/2019    A1C 8.0 02/11/2019    A1C 6.4 07/16/2018    A1C 5.9 02/19/2018    A1C 6.5 06/09/2017     A1C tested: Passed    Last LDL:    Lab Results   Component Value Date    CHOL 167 07/16/2018     Lab Results   Component Value Date    HDL 53 07/16/2018     Lab Results   Component Value Date    LDL 89 07/16/2018     Lab Results   Component Value Date    TRIG 148 07/16/2018     No results found for: CHOLHDLRATIO  Lab Results   Component Value Date    NHDL 114 07/16/2018       Is the patient on a Statin? YES             Is the patient on Aspirin? NO    Medications     HMG CoA Reductase Inhibitors     atorvastatin (LIPITOR) 20 MG tablet             Last three blood pressure readings:  BP Readings from Last 3 Encounters:   05/28/19 106/68   05/22/19 175/90   05/17/19 122/60          Tobacco History:     History   Smoking Status     Current Every Day Smoker     Packs/day: 0.50   Smokeless Tobacco     Never Used         Hypertension   Last three blood pressure readings:  BP Readings from Last 3 Encounters:   05/28/19 106/68   05/22/19 175/90   05/17/19 122/60     Blood pressure: Passed    HTN Guidelines:  Less than 140/90      Composite cancer screening  Chart review shows that this patient is due/due soon for the following Mammogram

## 2019-08-19 DIAGNOSIS — S42.002A CLOSED DISPLACED FRACTURE OF LEFT CLAVICLE, UNSPECIFIED PART OF CLAVICLE, INITIAL ENCOUNTER: ICD-10-CM

## 2019-08-19 DIAGNOSIS — Z86.73 HISTORY OF CVA (CEREBROVASCULAR ACCIDENT): ICD-10-CM

## 2019-08-19 DIAGNOSIS — E78.5 HYPERLIPIDEMIA LDL GOAL <100: ICD-10-CM

## 2019-08-19 DIAGNOSIS — R56.9 CONVULSIONS, UNSPECIFIED CONVULSION TYPE (H): ICD-10-CM

## 2019-08-19 DIAGNOSIS — S49.92XA INJURY OF LEFT CLAVICLE, INITIAL ENCOUNTER: ICD-10-CM

## 2019-08-20 RX ORDER — ATORVASTATIN CALCIUM 20 MG/1
20 TABLET, FILM COATED ORAL DAILY
Qty: 90 TABLET | Refills: 0 | Status: SHIPPED | OUTPATIENT
Start: 2019-08-20 | End: 2019-11-18

## 2019-08-20 RX ORDER — CLOPIDOGREL BISULFATE 75 MG/1
75 TABLET ORAL DAILY
Qty: 90 TABLET | Refills: 2 | Status: SHIPPED | OUTPATIENT
Start: 2019-08-20 | End: 2019-11-18

## 2019-08-20 RX ORDER — MELOXICAM 7.5 MG/1
7.5 TABLET ORAL DAILY
Qty: 90 TABLET | Refills: 0 | Status: SHIPPED | OUTPATIENT
Start: 2019-08-20 | End: 2019-11-18

## 2019-08-20 NOTE — TELEPHONE ENCOUNTER
Pending Prescriptions:                       Disp   Refills    meloxicam (MOBIC) 7.5 MG tablet [Pharmacy*90 tab*1            Sig: TAKE 1 TABLET EVERY DAY    Routing refill request to provider for review/approval because:  Patient is > 64 years old        atorvastatin (LIPITOR) 20 MG tablet [Phar*90 tab*1            Sig: TAKE 1 TABLET (20 MG) BY MOUTH DAILY    Routing refill request to provider for review/approval because:  Labs not current:  LDL        clopidogrel (PLAVIX) 75 MG tablet [Pharma*90 tab*1            Sig: TAKE 1 TABLET (75 MG) BY MOUTH DAILY    Prescription approved per [facility] Refill Protocol.      Zehra Peter, RN, BSN

## 2019-08-22 ENCOUNTER — TRANSFERRED RECORDS (OUTPATIENT)
Dept: HEALTH INFORMATION MANAGEMENT | Facility: CLINIC | Age: 69
End: 2019-08-22

## 2019-09-04 LAB
MDC_IDC_EPISODE_DTM: NORMAL
MDC_IDC_EPISODE_ID: 1
MDC_IDC_EPISODE_TYPE: NORMAL
MDC_IDC_MSMT_BATTERY_STATUS: NORMAL
MDC_IDC_PG_IMPLANT_DTM: NORMAL
MDC_IDC_PG_MFG: NORMAL
MDC_IDC_PG_MODEL: NORMAL
MDC_IDC_PG_SERIAL: NORMAL
MDC_IDC_PG_TYPE: NORMAL
MDC_IDC_SESS_CLINIC_NAME: NORMAL
MDC_IDC_SESS_DTM: NORMAL
MDC_IDC_SESS_TYPE: NORMAL
MDC_IDC_SET_ZONE_DETECTION_INTERVAL: 2000 MS
MDC_IDC_SET_ZONE_DETECTION_INTERVAL: 3000 MS
MDC_IDC_SET_ZONE_DETECTION_INTERVAL: 370 MS
MDC_IDC_SET_ZONE_TYPE: NORMAL
MDC_IDC_STAT_AT_BURDEN_PERCENT: 0 %
MDC_IDC_STAT_AT_DTM_END: NORMAL
MDC_IDC_STAT_AT_DTM_START: NORMAL
MDC_IDC_STAT_EPISODE_RECENT_COUNT: 0
MDC_IDC_STAT_EPISODE_RECENT_COUNT: 1
MDC_IDC_STAT_EPISODE_RECENT_COUNT_DTM_END: NORMAL
MDC_IDC_STAT_EPISODE_RECENT_COUNT_DTM_START: NORMAL
MDC_IDC_STAT_EPISODE_TOTAL_COUNT: 0
MDC_IDC_STAT_EPISODE_TOTAL_COUNT: 1
MDC_IDC_STAT_EPISODE_TOTAL_COUNT_DTM_END: NORMAL
MDC_IDC_STAT_EPISODE_TOTAL_COUNT_DTM_START: NORMAL
MDC_IDC_STAT_EPISODE_TYPE: NORMAL

## 2019-09-09 ENCOUNTER — HOSPITAL ENCOUNTER (OUTPATIENT)
Dept: CT IMAGING | Facility: CLINIC | Age: 69
Discharge: HOME | End: 2019-09-09
Attending: PSYCHIATRY & NEUROLOGY | Admitting: PSYCHIATRY & NEUROLOGY
Payer: COMMERCIAL

## 2019-09-09 DIAGNOSIS — I63.511 CEREBROVASCULAR ACCIDENT (CVA) DUE TO OCCLUSION OF RIGHT MIDDLE CEREBRAL ARTERY (H): ICD-10-CM

## 2019-09-09 DIAGNOSIS — R41.3 MEMORY CHANGE: ICD-10-CM

## 2019-09-09 DIAGNOSIS — G81.94 LEFT HEMIPARESIS (H): ICD-10-CM

## 2019-09-09 DIAGNOSIS — G40.419 EPILEPSY, GENERALIZED TONIC-CLONIC, INTRACTABLE (H): ICD-10-CM

## 2019-09-09 PROCEDURE — 70450 CT HEAD/BRAIN W/O DYE: CPT

## 2019-09-09 NOTE — PROGRESS NOTES
Appropriate assistive devices provided during their visit. yes (Yes, No, N/A) wheelchair/cane,  (list device)    Exam table and/or cart  placed in the lowest position. Yes (Yes, No, N/A)    Brakes on tables/carts/wheelchairs used at all times. yes (Yes, No, N/A)    Non slip footwear applied. yes (Yes, No, NA)    Patient was accompanied by staff throughout visit. yes (Yes, No, N/A)    Equipment safety straps used. n/a (Yes, No, N/A)    Assist with toileting. n/a (Yes, No, N/A)    Анна Madrid  9/9/2019  9:01 AM

## 2019-09-19 ENCOUNTER — TELEPHONE (OUTPATIENT)
Dept: FAMILY MEDICINE | Facility: OTHER | Age: 69
End: 2019-09-19

## 2019-09-19 NOTE — TELEPHONE ENCOUNTER
Spoke with patients daughter informed that she would need to call the specialist for results. Follow up appointment with PCP also made.  Radha Elias CMA (MA)

## 2019-09-19 NOTE — TELEPHONE ENCOUNTER
Clarification is needed.  They should be reviewing this with the ordering provider which appears to be our neurologist.  Electronically signed:    Abdirahman Shrestha PA-C

## 2019-09-19 NOTE — TELEPHONE ENCOUNTER
Reason for Call:  Request for results:    Name of test or procedure: CT scan     Date of test of procedure: 9-9    Location of the test or procedure: [facility] to leave the result message on voice mail or with a family member? YES    Phone number Patient can be reached at:  Other phone number:  Lucy Sanchez [phone]    Additional comments: please call with results.    Call taken on 9/19/2019 at 10:50 AM by Billie Haley

## 2019-09-30 DIAGNOSIS — I10 HYPERTENSION GOAL BP (BLOOD PRESSURE) < 140/90: ICD-10-CM

## 2019-09-30 RX ORDER — QUINAPRIL 5 1/1
5 TABLET ORAL DAILY
Qty: 90 TABLET | Refills: 1 | Status: SHIPPED | OUTPATIENT
Start: 2019-09-30 | End: 2019-11-18

## 2019-09-30 NOTE — TELEPHONE ENCOUNTER
Reason for Call:  Medication or medication refill:    Do you use a [facility]?  Name of the pharmacy and phone number for the current request:  [facility] - [place], [place] - [address]    Name of the medication requested: quinapril (ACCUPRIL) 5 MG     Other request: Patient is completely out of medication. Daughter is unsure how long she can go without this medication. Send through [facility] if it is not urgent . If they need to pick it up asap they stated they would like it sent to [facility] in [place].     Can we leave a detailed message on this number? YES    Phone number patient can be reached at: Home number on file [phone] (home)    Best Time: any    Call taken on 9/30/2019 at 12:16 PM by Marily Lehman

## 2019-09-30 NOTE — TELEPHONE ENCOUNTER
Spoke to patients daughter informed of message below.  Radha Elias CMA ([facility])

## 2019-09-30 NOTE — TELEPHONE ENCOUNTER
Please see below, is it okay for patient to be off medication and wait for it to come from [facility]?    Robert Patrick,

## 2019-11-18 ENCOUNTER — TELEPHONE (OUTPATIENT)
Dept: FAMILY MEDICINE | Facility: OTHER | Age: 69
End: 2019-11-18

## 2019-11-18 ENCOUNTER — OFFICE VISIT (OUTPATIENT)
Dept: FAMILY MEDICINE | Facility: OTHER | Age: 69
End: 2019-11-18
Payer: COMMERCIAL

## 2019-11-18 ENCOUNTER — ANCILLARY PROCEDURE (OUTPATIENT)
Dept: GENERAL RADIOLOGY | Facility: OTHER | Age: 69
End: 2019-11-18
Attending: PHYSICIAN ASSISTANT
Payer: COMMERCIAL

## 2019-11-18 VITALS
BODY MASS INDEX: 23.34 KG/M2 | HEIGHT: 65 IN | WEIGHT: 140.1 LBS | SYSTOLIC BLOOD PRESSURE: 108 MMHG | DIASTOLIC BLOOD PRESSURE: 64 MMHG | RESPIRATION RATE: 16 BRPM | TEMPERATURE: 96.9 F | HEART RATE: 88 BPM

## 2019-11-18 DIAGNOSIS — M54.16 LUMBAR RADICULOPATHY: ICD-10-CM

## 2019-11-18 DIAGNOSIS — E11.59 TYPE 2 DIABETES MELLITUS WITH OTHER CIRCULATORY COMPLICATION, WITHOUT LONG-TERM CURRENT USE OF INSULIN (H): Primary | ICD-10-CM

## 2019-11-18 DIAGNOSIS — M54.16 LUMBAR RADICULOPATHY: Primary | ICD-10-CM

## 2019-11-18 DIAGNOSIS — Z78.0 ASYMPTOMATIC MENOPAUSAL STATE: ICD-10-CM

## 2019-11-18 DIAGNOSIS — S42.002A CLOSED DISPLACED FRACTURE OF LEFT CLAVICLE, UNSPECIFIED PART OF CLAVICLE, INITIAL ENCOUNTER: ICD-10-CM

## 2019-11-18 DIAGNOSIS — E11.51 TYPE 2 DIABETES MELLITUS WITH DIABETIC PERIPHERAL ANGIOPATHY WITHOUT GANGRENE, WITHOUT LONG-TERM CURRENT USE OF INSULIN (H): ICD-10-CM

## 2019-11-18 DIAGNOSIS — F03.90 DEMENTIA WITHOUT BEHAVIORAL DISTURBANCE, UNSPECIFIED DEMENTIA TYPE: ICD-10-CM

## 2019-11-18 DIAGNOSIS — E78.5 HYPERLIPIDEMIA LDL GOAL <100: ICD-10-CM

## 2019-11-18 DIAGNOSIS — R56.9 CONVULSIONS, UNSPECIFIED CONVULSION TYPE (H): ICD-10-CM

## 2019-11-18 DIAGNOSIS — I10 HYPERTENSION GOAL BP (BLOOD PRESSURE) < 140/90: ICD-10-CM

## 2019-11-18 DIAGNOSIS — Z86.73 HISTORY OF CVA (CEREBROVASCULAR ACCIDENT): ICD-10-CM

## 2019-11-18 DIAGNOSIS — Z12.31 ENCOUNTER FOR SCREENING MAMMOGRAM FOR BREAST CANCER: ICD-10-CM

## 2019-11-18 DIAGNOSIS — Z13.820 SCREENING FOR OSTEOPOROSIS: ICD-10-CM

## 2019-11-18 DIAGNOSIS — S49.92XA INJURY OF LEFT CLAVICLE, INITIAL ENCOUNTER: ICD-10-CM

## 2019-11-18 LAB
ALBUMIN UR-MCNC: NEGATIVE MG/DL
APPEARANCE UR: ABNORMAL
BACTERIA #/AREA URNS HPF: ABNORMAL /HPF
BILIRUB UR QL STRIP: NEGATIVE
COLOR UR AUTO: YELLOW
CREAT UR-MCNC: 79 MG/DL
GLUCOSE UR STRIP-MCNC: 500 MG/DL
HBA1C MFR BLD: 8.1 % (ref 0–5.6)
HGB UR QL STRIP: ABNORMAL
KETONES UR STRIP-MCNC: NEGATIVE MG/DL
LDLC SERPL DIRECT ASSAY-MCNC: 87 MG/DL
LEUKOCYTE ESTERASE UR QL STRIP: ABNORMAL
MICROALBUMIN UR-MCNC: 10 MG/L
MICROALBUMIN/CREAT UR: 13.23 MG/G CR (ref 0–25)
NITRATE UR QL: NEGATIVE
NON-SQ EPI CELLS #/AREA URNS LPF: ABNORMAL /LPF
PH UR STRIP: 5 PH (ref 5–7)
RBC #/AREA URNS AUTO: ABNORMAL /HPF
SOURCE: ABNORMAL
SP GR UR STRIP: 1.02 (ref 1–1.03)
UROBILINOGEN UR STRIP-ACNC: 0.2 EU/DL (ref 0.2–1)
WBC #/AREA URNS AUTO: ABNORMAL /HPF

## 2019-11-18 PROCEDURE — 36415 COLL VENOUS BLD VENIPUNCTURE: CPT | Performed by: PHYSICIAN ASSISTANT

## 2019-11-18 PROCEDURE — 83721 ASSAY OF BLOOD LIPOPROTEIN: CPT | Performed by: PHYSICIAN ASSISTANT

## 2019-11-18 PROCEDURE — 99214 OFFICE O/P EST MOD 30 MIN: CPT | Performed by: PHYSICIAN ASSISTANT

## 2019-11-18 PROCEDURE — 82043 UR ALBUMIN QUANTITATIVE: CPT | Performed by: PHYSICIAN ASSISTANT

## 2019-11-18 PROCEDURE — 83036 HEMOGLOBIN GLYCOSYLATED A1C: CPT | Performed by: PHYSICIAN ASSISTANT

## 2019-11-18 PROCEDURE — 72100 X-RAY EXAM L-S SPINE 2/3 VWS: CPT | Mod: FY

## 2019-11-18 PROCEDURE — 81001 URINALYSIS AUTO W/SCOPE: CPT | Performed by: PHYSICIAN ASSISTANT

## 2019-11-18 RX ORDER — ATORVASTATIN CALCIUM 20 MG/1
20 TABLET, FILM COATED ORAL DAILY
Qty: 90 TABLET | Refills: 1 | Status: SHIPPED | OUTPATIENT
Start: 2019-11-18 | End: 2020-05-21

## 2019-11-18 RX ORDER — METFORMIN HCL 500 MG
1000 TABLET, EXTENDED RELEASE 24 HR ORAL 2 TIMES DAILY WITH MEALS
Qty: 360 TABLET | Refills: 1 | Status: SHIPPED | OUTPATIENT
Start: 2019-11-18 | End: 2020-06-16

## 2019-11-18 RX ORDER — MELOXICAM 7.5 MG/1
7.5 TABLET ORAL DAILY
Qty: 90 TABLET | Refills: 1 | Status: SHIPPED | OUTPATIENT
Start: 2019-11-18 | End: 2020-05-21

## 2019-11-18 RX ORDER — MEMANTINE HYDROCHLORIDE 10 MG/1
10 TABLET ORAL DAILY
Qty: 90 TABLET | Refills: 1 | Status: SHIPPED | OUTPATIENT
Start: 2019-11-18 | End: 2020-05-21

## 2019-11-18 RX ORDER — QUINAPRIL 5 1/1
5 TABLET ORAL DAILY
Qty: 90 TABLET | Refills: 1 | Status: SHIPPED | OUTPATIENT
Start: 2019-11-18 | End: 2020-06-16

## 2019-11-18 RX ORDER — CLOPIDOGREL BISULFATE 75 MG/1
75 TABLET ORAL DAILY
Qty: 90 TABLET | Refills: 1 | Status: SHIPPED | OUTPATIENT
Start: 2019-11-18 | End: 2020-06-16

## 2019-11-18 RX ORDER — GABAPENTIN 300 MG/1
300 CAPSULE ORAL AT BEDTIME
Qty: 90 CAPSULE | Refills: 1 | Status: SHIPPED | OUTPATIENT
Start: 2019-11-18 | End: 2020-06-16

## 2019-11-18 ASSESSMENT — MIFFLIN-ST. JEOR: SCORE: 1151.37

## 2019-11-18 ASSESSMENT — PAIN SCALES - GENERAL: PAINLEVEL: MODERATE PAIN (5)

## 2019-11-18 NOTE — TELEPHONE ENCOUNTER
"Spoke to pt and spouse. Gave information below and scheduled recheck. Would like to use the [facility] pharmacy.    \"Notes recorded by Abdirahman Munoz PA-C on 11/18/2019 at 5:03 PM CST  Relatively stable labs are noted with exception of loss of control of hemoglobin A1c and diabetes.  Would recommend that she begin a low-dose glipizide at 2.5 mg daily and follow-up in 3 months.  If she is willing to do this please send a message to me so that I can send a prescription to her pharmacy for her.  Electronically signed:    Abdirahman Munoz PA-C\"      Cary Naranjo CMA ([facility])    "

## 2019-11-19 RX ORDER — GLIPIZIDE 2.5 MG/1
2.5 TABLET, EXTENDED RELEASE ORAL DAILY
Qty: 90 TABLET | Refills: 1 | Status: SHIPPED | OUTPATIENT
Start: 2019-11-19 | End: 2020-05-21

## 2019-11-25 ENCOUNTER — TELEPHONE (OUTPATIENT)
Dept: FAMILY MEDICINE | Facility: OTHER | Age: 69
End: 2019-11-25

## 2019-11-25 ENCOUNTER — HOSPITAL ENCOUNTER (OUTPATIENT)
Dept: BONE DENSITY | Facility: CLINIC | Age: 69
Discharge: HOME | End: 2019-11-25
Attending: PHYSICIAN ASSISTANT | Admitting: PHYSICIAN ASSISTANT
Payer: COMMERCIAL

## 2019-11-25 DIAGNOSIS — Z78.0 ASYMPTOMATIC MENOPAUSAL STATE: ICD-10-CM

## 2019-11-25 DIAGNOSIS — Z13.820 SCREENING FOR OSTEOPOROSIS: ICD-10-CM

## 2019-11-25 DIAGNOSIS — M81.0 AGE-RELATED OSTEOPOROSIS WITHOUT CURRENT PATHOLOGICAL FRACTURE: Primary | ICD-10-CM

## 2019-11-25 PROCEDURE — 77080 DXA BONE DENSITY AXIAL: CPT

## 2019-11-25 RX ORDER — ALENDRONATE SODIUM 70 MG/1
70 TABLET ORAL
Qty: 13 TABLET | Refills: 3 | Status: SHIPPED | OUTPATIENT
Start: 2019-11-25 | End: 2020-06-16

## 2019-11-25 NOTE — PROGRESS NOTES
Patient is osteoporosis and I have advised that she take Fosamax on a weekly basis as directed prescription has been sent to the pharmacy for her.  Electronically signed:    Abdirahman Shrestha PA-C

## 2019-11-25 NOTE — PROGRESS NOTES
Appropriate assistive devices provided during their visit. na (Yes, No, N/A) cane (list device)    Exam table and/or cart  placed in the lowest position. na (Yes, No, N/A)    Brakes on tables/carts/wheelchairs used at all times. na (Yes, No, N/A)    Non slip footwear applied. na (Yes, No, NA)    Patient was accompanied by staff throughout visit. yes (Yes, No, N/A)    Equipment safety straps used. na (Yes, No, N/A)    Assist with toileting. na (Yes, No, N/A)

## 2019-11-25 NOTE — TELEPHONE ENCOUNTER
Left message to call back. Prescription glipizide was refilled for SIX months on 11/19/19. This was sent to [facility].   Radha CHRISTIANSON)

## 2019-11-25 NOTE — TELEPHONE ENCOUNTER
Please call patient to verify.  All medications appear to have been ordered 11/18/2019 with refills.    Azam Lilly RN, BSN

## 2019-11-25 NOTE — TELEPHONE ENCOUNTER
Reason for Call:  Medication or medication refill:    Do you use a [facility]?  Name of the pharmacy and phone number for the current request:  [facility]    Name of the medication requested: for her blood sugars-did not know name of rx    Other request: patient was supposed to get Rx when her other Rx's were refilled but it was not sent to pharmacy.       Can we leave a detailed message on this number? YES    Phone number patient can be reached at: Other phone number:  Laura [phone]    Best Time: any    Call taken on 11/25/2019 at 10:45 AM by Billie Haley

## 2019-11-26 NOTE — TELEPHONE ENCOUNTER
Spoke to daughter. Both messages below given.   Radha Elias CMA ([facility])    Notes recorded by Abdirahman Munoz PA-C on 11/25/2019 at 5:49 PM CST  Thinning of the bones of the lumbar spine is noted on exam.  There is significant bone thinning noted to the right hip.    Patient needs to begin Fosamax 70 mg weekly and follow-up in 1 year.  She is to also be as weightbearing as she possibly can be with plenty of physical exercise.  Electronically signed:    Abdirahman Munoz PA-C

## 2019-12-30 ENCOUNTER — TELEPHONE (OUTPATIENT)
Dept: FAMILY MEDICINE | Facility: OTHER | Age: 69
End: 2019-12-30

## 2019-12-30 NOTE — TELEPHONE ENCOUNTER
Summary:    Patient is due/failing the following:   Eye exam, shingles, lipids, flu shot, a1c, MAMMOGRAM and PHYSICAL    Action needed: 1/1/2019 eye exam  Patient needs office visit for annual medicare wellness visit.  Patient needs fasting lab only appointment, orders placed.  Patient needs nurse only appointment for shingles and flu. Please have pt check with insurance as this may be cheaper at the pharmacy. Our [facility] pharmacy offers this vaccine and then goes into pt's [facility] chart. Can also be done at OV or nurse visit if covered in clinic.  Patient needs to schedule mammogram. Order placed.    Type of outreach:    Phone, left message for patient to call back.     Questions for provider review:    None                                                                                                                                    Cary Naranoj CMA ([facility])       Chart routed to Care Team .    Please abstract the following data from this visit with this patient into the appropriate field in Epic:    Tests that can be patient reported without a hard copy:    Eye exam with ophthalmology on this date: 1/1/2019 and is unchanged from previous exam    Other Tests found in the patient's chart through Chart Review/Care Everywhere:      Panel Management Review      Patient has the following on her problem list:     Diabetes    ASA: Failed    Last A1C  Lab Results   Component Value Date    A1C 8.1 11/18/2019    A1C 7.7 05/17/2019    A1C 8.0 02/11/2019    A1C 6.4 07/16/2018    A1C 5.9 02/19/2018     A1C tested: MONITOR    Last LDL:    Lab Results   Component Value Date    CHOL 167 07/16/2018     Lab Results   Component Value Date    HDL 53 07/16/2018     Lab Results   Component Value Date    LDL 87 11/18/2019    LDL 89 07/16/2018     Lab Results   Component Value Date    TRIG 148 07/16/2018     No results found for: CHOLHDLRATIO  Lab Results   Component Value Date    NHDL 114 07/16/2018       Is the patient on a Statin?  YES             Is the patient on Aspirin? NO    Medications     HMG CoA Reductase Inhibitors     atorvastatin (LIPITOR) 20 MG tablet             Last three blood pressure readings:  BP Readings from Last 3 Encounters:   11/18/19 108/64   05/28/19 106/68 05/22/19 175/90       Date of last diabetes office visit: 11/18/19     Tobacco History:     History   Smoking Status     Current Every Day Smoker     Packs/day: 0.50   Smokeless Tobacco     Never Used         Hypertension   Last three blood pressure readings:  BP Readings from Last 3 Encounters:   11/18/19 108/64   05/28/19 106/68 05/22/19 175/90     Blood pressure: Passed    HTN Guidelines:  Less than 140/90      Composite cancer screening  Chart review shows that this patient is due/due soon for the following Mammogram  Note to [employer]straction: If this section is blank, no results were found via Chart Review/Care Everywhere.

## 2019-12-31 NOTE — TELEPHONE ENCOUNTER
Left message for patient to return call.  Please see below and assist scheduling.  Rosalie Moulton MA on 12/31/2019 at 10:14 AM

## 2019-12-31 NOTE — TELEPHONE ENCOUNTER
Patient's daughter returned call and scheduled physical with fasting labs.     Patient refused mammogram.

## 2020-01-07 NOTE — PATIENT INSTRUCTIONS
Preventive Health Recommendations    See your health care provider every year to    Review health changes.     Discuss preventive care.      Review your medicines if your doctor has prescribed any.      You no longer need a yearly Pap test unless you've had an abnormal Pap test in the past 10 years. If you have vaginal symptoms, such as bleeding or discharge, be sure to talk with your provider about a Pap test.      Every 1 to 2 years, have a mammogram.  If you are over 69, talk with your health care provider about whether or not you want to continue having screening mammograms.      Every 10 years, have a colonoscopy. Or, have a yearly FIT test (stool test). These exams will check for colon cancer.       Have a cholesterol test every 5 years, or more often if your doctor advises it.       Have a diabetes test (fasting glucose) every three years. If you are at risk for diabetes, you should have this test more often.       At age 65, have a bone density scan (DEXA) to check for osteoporosis (brittle bone disease).    Shots:    Get a flu shot each year.    Get a tetanus shot every 10 years.    Talk to your doctor about your pneumonia vaccines. There are now two you should receive - Pneumovax (PPSV 23) and Prevnar (PCV 13).    Talk to your pharmacist about the shingles vaccine.    Talk to your doctor about the hepatitis B vaccine.    Nutrition:     Eat at least 5 servings of fruits and vegetables each day.      Eat whole-grain bread, whole-wheat pasta and brown rice instead of white grains and rice.      Get adequate about Calcium and Vitamin D.     Lifestyle    Exercise at least 150 minutes a week (30 minutes a day, 5 days a week). This will help you control your weight and prevent disease.      Limit alcohol to one drink per day.      No smoking.       Wear sunscreen to prevent skin cancer.       See your dentist twice a year for an exam and cleaning.      See your eye doctor every 1 to 2 years to screen for  conditions such as glaucoma, macular degeneration, cataracts, etc.    Personalized Prevention Plan  You are due for the preventive services outlined below.  Your care team is available to assist you in scheduling these services.  If you have already completed any of these items, please share that information with your care team to update in your medical record.    Health Maintenance Due   Topic Date Due     Discuss Advance Care Planning  1950     Mammogram  1950     Zoster (Shingles) Vaccine (1 of 2) 07/24/1998     Annual Wellness Visit  07/24/2013     Cholesterol Lab  07/16/2019     Diabetic Foot Exam  07/16/2019     Flu Vaccine (1) 09/01/2019     FALL RISK ASSESSMENT  10/22/2019     Eye Exam  01/01/2020     PHQ-2  01/01/2020

## 2020-01-07 NOTE — PROGRESS NOTES
"SUBJECTIVE:   Khalida Redding is a 69 year old female who presents for Preventive Visit.    Are you in the first 12 months of your Medicare coverage?  No    Healthy Habits:     In general, how would you rate your overall health?  Fair    Frequency of exercise:  None    Do you usually eat at least 4 servings of fruit and vegetables a day, include whole grains    & fiber and avoid regularly eating high fat or \"junk\" foods?  No    Taking medications regularly:  Yes    Medication side effects:  None    Ability to successfully perform activities of daily living:  Transportation requires assistance and shopping requires assistance    Home Safety:  No safety concerns identified    Hearing Impairment:  Need to ask people to speak up or repeat themselves    In the past 6 months, have you been bothered by leaking of urine?  No    In general, how would you rate your overall mental or emotional health?  Good      PHQ-2 Total Score: 3    Additional concerns today:  No    Do you feel safe in your environment? Yes    Have you ever done Advance Care Planning? (For example, a Health Directive, POLST, or a discussion with a medical provider or your loved ones about your wishes): No, advance care planning information given to patient to review.  Advanced care planning was discussed at today's visit.      Fall risk  Fallen 2 or more times in the past year?: (P) No  Any fall with injury in the past year?: (P) No    Cognitive Screening Not appropriate due to known dementia    Do you have sleep apnea, excessive snoring or daytime drowsiness?: no    Reviewed and updated as needed this visit by clinical staff         Reviewed and updated as needed this visit by Provider        Social History     Tobacco Use     Smoking status: Current Every Day Smoker     Packs/day: 0.50     Smokeless tobacco: Never Used   Substance Use Topics     Alcohol use: No     Frequency: Never       No flowsheet data found.    Current providers sharing in care " for this patient include:   Patient Care Team:  Abdirahman Munoz PA-C as PCP - General (Physician Assistant)  Abdirahman Munoz PA-C as Assigned PCP    The following health maintenance items are reviewed in Epic and correct as of today:  Health Maintenance   Topic Date Due     ADVANCE CARE PLANNING  1950     MAMMO SCREENING  1950     ZOSTER IMMUNIZATION (1 of 2) 07/24/1998     MEDICARE ANNUAL WELLNESS VISIT  07/24/2013     LIPID  07/16/2019     DIABETIC FOOT EXAM  07/16/2019     INFLUENZA VACCINE (1) 09/01/2019     FALL RISK ASSESSMENT  10/22/2019     EYE EXAM  01/01/2020     PHQ-2  01/01/2020     A1C  05/18/2020     BMP  05/22/2020     MICROALBUMIN  11/18/2020     TSH W/FREE T4 REFLEX  05/17/2021     COLONOSCOPY  11/28/2021     DTAP/TDAP/TD IMMUNIZATION (2 - Td) 10/22/2028     DEXA  Completed     HEPATITIS C SCREENING  Completed     PNEUMOCOCCAL IMMUNIZATION 65+ LOW/MEDIUM RISK  Completed     IPV IMMUNIZATION  Aged Out     MENINGITIS IMMUNIZATION  Aged Out     Lab work is in process  Labs reviewed in EPIC  BP Readings from Last 3 Encounters:   01/13/20 122/74   11/18/19 108/64   05/28/19 106/68    Wt Readings from Last 3 Encounters:   01/13/20 63.6 kg (140 lb 3.2 oz)   11/18/19 63.5 kg (140 lb 1.6 oz)   05/28/19 62.2 kg (137 lb 1.6 oz)                  Patient Active Problem List   Diagnosis     Type 2 diabetes mellitus with circulatory disorder, without long-term current use of insulin (H)     Hyperlipidemia LDL goal <100     Hypertension goal BP (blood pressure) < 140/90     History of CVA (cerebrovascular accident)     Convulsions, unspecified convulsion type (H)     Osteoarthritis     Tobacco abuse disorder     Pain in both lower legs     Dementia (H)     Supraventricular tachycardia (H)     Gait instability     Tubular adenoma of colon     Syncope, unspecified syncope type     Lumbar radiculopathy     Closed displaced fracture of left clavicle, unspecified part of clavicle, initial encounter     Injury  of left clavicle, initial encounter     Age-related osteoporosis without current pathological fracture     Left hemiparesis (H)     Past Surgical History:   Procedure Laterality Date     COLONOSCOPY N/A 11/28/2018    Procedure: Colonoscopy, Polypectomy by Biopsy;  Surgeon: Arcadio Mcmullen DO;  Location: [facility]     HIP SURGERY Left        Social History     Tobacco Use     Smoking status: Current Every Day Smoker     Packs/day: 0.50     Smokeless tobacco: Never Used   Substance Use Topics     Alcohol use: No     Frequency: Never     History reviewed. No pertinent family history.      Current Outpatient Medications   Medication Sig Dispense Refill     alendronate (FOSAMAX) 70 MG tablet Take 1 tablet (70 mg) by mouth every 7 days 13 tablet 3     atorvastatin (LIPITOR) 20 MG tablet Take 1 tablet (20 mg) by mouth daily No further refills without office visit. 90 tablet 1     blood glucose monitoring (NO BRAND SPECIFIED) test strip Accu-Chek Violette Plus Meter       clopidogrel (PLAVIX) 75 MG tablet Take 1 tablet (75 mg) by mouth daily 90 tablet 1     Cyanocobalamin (VITAMIN B 12 PO) Take 1,000 mcg by mouth daily       gabapentin (NEURONTIN) 300 MG capsule Take 1 capsule (300 mg) by mouth At Bedtime 90 capsule 1     glipiZIDE (GLUCOTROL XL) 2.5 MG 24 hr tablet Take 1 tablet (2.5 mg) by mouth daily 90 tablet 1     meloxicam (MOBIC) 7.5 MG tablet Take 1 tablet (7.5 mg) by mouth daily 90 tablet 1     memantine (NAMENDA) 10 MG tablet Take 1 tablet (10 mg) by mouth daily 90 tablet 1     metFORMIN (GLUCOPHAGE-XR) 500 MG 24 hr tablet Take 2 tablets (1,000 mg) by mouth 2 times daily (with meals) 360 tablet 1     order for DME Equipment being ordered: glucometer and related supplies. 1 Units 0     quinapril (ACCUPRIL) 5 MG tablet Take 1 tablet (5 mg) by mouth daily 90 tablet 1     Allergies   Allergen Reactions     Amoxicillin      Ampicillin      Recent Labs   Lab Test 11/18/19  0816 11/18/19  0813 05/22/19  1233  05/17/19  1345 02/11/19  1550 02/03/19  1605 07/30/18  1845 07/16/18 02/19/18 07/16/17  1632 06/09/17   A1C  --  8.1*  --  7.7* 8.0*  --   --  6.4* 5.9*  --   --  6.5*   LDL 87  --   --   --   --   --   --  89 88  --   --  145*   HDL  --   --   --   --   --   --   --  53 53  --   --  62   TRIG  --   --   --   --   --   --   --  148 146  --   --  120   ALT  --   --   --   --   --  14 16  --   --   --  16  --    CR  --   --  0.66  --   --  0.57 0.74 0.69 0.77   < > 0.62 0.62   GFRESTIMATED  --   --  89  --   --  >90 77 89 79   < > >90  Non  GFR Calc   93   GFRESTBLACK  --   --  >90  --   --  >90 >90 103 91   < > >90   GFR Calc   107   POTASSIUM  --   --  3.3*  --   --  4.1 3.8 4.8 4.8   < > 4.1 5.0   TSH  --   --   --  2.50  --   --   --   --  3.72  --   --   --     < > = values in this interval not displayed.      Mammogram Screening: Mammogram Screening: Patient over age 50, mutual decision to screen reflected in health maintenance.  Last 3 Pap and HPV Results:      Review of Systems   Constitutional: Negative for chills and fever.   HENT: Positive for ear pain and hearing loss. Negative for congestion and sore throat.    Eyes: Negative for pain and visual disturbance.   Respiratory: Positive for cough. Negative for shortness of breath.    Cardiovascular: Negative for chest pain, palpitations and peripheral edema.   Gastrointestinal: Positive for constipation and heartburn. Negative for abdominal pain, diarrhea, hematochezia and nausea.   Breasts:  Negative for tenderness, breast mass and discharge.   Genitourinary: Positive for frequency and urgency. Negative for dysuria, genital sores, hematuria, pelvic pain, vaginal bleeding and vaginal discharge.   Musculoskeletal: Positive for arthralgias and myalgias. Negative for joint swelling.   Skin: Negative for rash.   Neurological: Positive for weakness. Negative for dizziness, headaches and paresthesias.   Psychiatric/Behavioral:  "Positive for mood changes. The patient is not nervous/anxious.      Constitutional, HEENT, cardiovascular, pulmonary, GI, , musculoskeletal, neuro, skin, endocrine and psych systems are negative, except as otherwise noted.    OBJECTIVE:   There were no vitals taken for this visit. Estimated body mass index is 23.31 kg/m  as calculated from the following:    Height as of 11/18/19: 1.651 m (5' 5\").    Weight as of 11/18/19: 63.5 kg (140 lb 1.6 oz).  Physical Exam  GENERAL APPEARANCE: healthy, alert and no distress  EYES: Eyes grossly normal to inspection, PERRL and conjunctivae and sclerae normal  HENT: ear canals and TM's normal, nose and mouth without ulcers or lesions, oropharynx clear and oral mucous membranes moist  NECK: no adenopathy, no asymmetry, masses, or scars and thyroid normal to palpation  RESP: lungs clear to auscultation - no rales, rhonchi or wheezes  BREAST: normal without masses, tenderness or nipple discharge and no palpable axillary masses or adenopathy  CV: regular rate and rhythm, normal S1 S2, no S3 or S4, no murmur, click or rub, no peripheral edema and peripheral pulses strong  ABDOMEN: soft, nontender, no hepatosplenomegaly, no masses and bowel sounds normal  MS: no musculoskeletal defects are noted and gait is age appropriate without ataxia  SKIN: no suspicious lesions or rashes  NEURO: Normal strength and tone, sensory exam grossly normal, mentation intact and speech normal  PSYCH: mentation appears normal and affect normal/bright    Diagnostic Test Results:  Labs reviewed in Epic    ASSESSMENT / PLAN:   1. Routine general medical examination at a health care facility  - MA SCREENING DIGITAL BILAT - Future  (s+30); Future  - Lipid panel reflex to direct LDL Fasting  - CBC with platelets  - Comprehensive metabolic panel    2. Left hemiparesis (H)  3. Type 2 diabetes mellitus with diabetic peripheral angiopathy without gangrene, without long-term current use of insulin (H)  - Lipid panel " "reflex to direct LDL Fasting  - FOOT EXAM  - Comprehensive metabolic panel  - TSH  - Hemoglobin A1c    4. Supraventricular tachycardia (H)  5. Convulsions, unspecified convulsion type (H)    6. Encounter for screening mammogram for malignant neoplasm of breast   - MA SCREENING DIGITAL BILAT - Future  (s+30); Future    7. Tobacco abuse disorder  Strongly advised to stop smoking ASAP.    8. Hypertension goal BP (blood pressure) < 140/90  Good control at this point time no new concerns refill medications PRN for neck 6 months    9. Hyperlipidemia LDL goal <100  Rechecking labs today      COUNSELING:  Reviewed preventive health counseling, as reflected in patient instructions       Regular exercise       Healthy diet/nutrition       Vision screening       Hearing screening       Dental care       Bladder control       Fall risk prevention    Estimated body mass index is 23.31 kg/m  as calculated from the following:    Height as of 11/18/19: 1.651 m (5' 5\").    Weight as of 11/18/19: 63.5 kg (140 lb 1.6 oz).    Weight management plan: Discussed healthy diet and exercise guidelines     reports that she has been smoking. She has been smoking about 0.50 packs per day. She has never used smokeless tobacco.  Tobacco Cessation Action Plan: Information offered: Patient not interested at this time    Appropriate preventive services were discussed with this patient, including applicable screening as appropriate for cardiovascular disease, diabetes, osteopenia/osteoporosis, and glaucoma.  As appropriate for age/gender, discussed screening for colorectal cancer, prostate cancer, breast cancer, and cervical cancer. Checklist reviewing preventive services available has been given to the patient.    Reviewed patients plan of care and provided an AVS. The Intermediate Care Plan ( asthma action plan, low back pain action plan, and migraine action plan) for Khalida meets the Care Plan requirement. This Care Plan has been established and " reviewed with the Patient and daughter.    Counseling Resources:  ATP IV Guidelines  Pooled Cohorts Equation Calculator  Breast Cancer Risk Calculator  FRAX Risk Assessment  ICSI Preventive Guidelines  Dietary Guidelines for Americans, 2010  USDA's MyPlate  ASA Prophylaxis  Lung CA Screening    Abdirahman Shrestha PA-C  [facility]    Identified Health Risks:  Answers for HPI/ROS submitted by the patient on 1/13/2020   Annual Exam:  If you checked off any problems, how difficult have these problems made it for you to do your work, take care of things at home, or get along with other people?: Not difficult at all  PHQ9 TOTAL SCORE: 6

## 2020-01-13 ENCOUNTER — OFFICE VISIT (OUTPATIENT)
Dept: FAMILY MEDICINE | Facility: OTHER | Age: 70
End: 2020-01-13
Payer: COMMERCIAL

## 2020-01-13 ENCOUNTER — HOSPITAL ENCOUNTER (OUTPATIENT)
Facility: CLINIC | Age: 70
Discharge: HOME | End: 2020-01-13
Attending: PHYSICIAN ASSISTANT | Admitting: PHYSICIAN ASSISTANT
Payer: COMMERCIAL

## 2020-01-13 VITALS
HEIGHT: 65 IN | HEART RATE: 80 BPM | DIASTOLIC BLOOD PRESSURE: 74 MMHG | BODY MASS INDEX: 23.36 KG/M2 | RESPIRATION RATE: 20 BRPM | TEMPERATURE: 97.6 F | WEIGHT: 140.2 LBS | SYSTOLIC BLOOD PRESSURE: 122 MMHG

## 2020-01-13 DIAGNOSIS — Z12.31 ENCOUNTER FOR SCREENING MAMMOGRAM FOR MALIGNANT NEOPLASM OF BREAST: ICD-10-CM

## 2020-01-13 DIAGNOSIS — R56.9 CONVULSIONS, UNSPECIFIED CONVULSION TYPE (H): ICD-10-CM

## 2020-01-13 DIAGNOSIS — Z72.0 TOBACCO ABUSE DISORDER: ICD-10-CM

## 2020-01-13 DIAGNOSIS — E11.51 TYPE 2 DIABETES MELLITUS WITH DIABETIC PERIPHERAL ANGIOPATHY WITHOUT GANGRENE, WITHOUT LONG-TERM CURRENT USE OF INSULIN (H): ICD-10-CM

## 2020-01-13 DIAGNOSIS — Z00.00 ROUTINE GENERAL MEDICAL EXAMINATION AT A HEALTH CARE FACILITY: Primary | ICD-10-CM

## 2020-01-13 DIAGNOSIS — E78.5 HYPERLIPIDEMIA LDL GOAL <100: ICD-10-CM

## 2020-01-13 DIAGNOSIS — I10 HYPERTENSION GOAL BP (BLOOD PRESSURE) < 140/90: ICD-10-CM

## 2020-01-13 DIAGNOSIS — I47.10 SUPRAVENTRICULAR TACHYCARDIA (H): ICD-10-CM

## 2020-01-13 DIAGNOSIS — E11.59 TYPE 2 DIABETES MELLITUS WITH OTHER CIRCULATORY COMPLICATION, WITHOUT LONG-TERM CURRENT USE OF INSULIN (H): Primary | ICD-10-CM

## 2020-01-13 DIAGNOSIS — G81.94 LEFT HEMIPARESIS (H): ICD-10-CM

## 2020-01-13 LAB
ALBUMIN SERPL-MCNC: 3.5 G/DL (ref 3.4–5)
ALP SERPL-CCNC: 109 U/L (ref 40–150)
ALT SERPL W P-5'-P-CCNC: 17 U/L (ref 0–50)
ANION GAP SERPL CALCULATED.3IONS-SCNC: 5 MMOL/L (ref 3–14)
AST SERPL W P-5'-P-CCNC: 11 U/L (ref 0–45)
BILIRUB SERPL-MCNC: 0.4 MG/DL (ref 0.2–1.3)
BUN SERPL-MCNC: 14 MG/DL (ref 7–30)
CALCIUM SERPL-MCNC: 8.8 MG/DL (ref 8.5–10.1)
CHLORIDE SERPL-SCNC: 110 MMOL/L (ref 94–109)
CHOLEST SERPL-MCNC: 160 MG/DL
CO2 SERPL-SCNC: 27 MMOL/L (ref 20–32)
CREAT SERPL-MCNC: 0.7 MG/DL (ref 0.52–1.04)
ERYTHROCYTE [DISTWIDTH] IN BLOOD BY AUTOMATED COUNT: 14 % (ref 10–15)
GFR SERPL CREATININE-BSD FRML MDRD: 87 ML/MIN/{1.73_M2}
GLUCOSE SERPL-MCNC: 107 MG/DL (ref 70–99)
HBA1C MFR BLD: 6.6 % (ref 0–5.6)
HCT VFR BLD AUTO: 45.9 % (ref 35–47)
HDLC SERPL-MCNC: 57 MG/DL
HGB BLD-MCNC: 14.4 G/DL (ref 11.7–15.7)
LDLC SERPL CALC-MCNC: 80 MG/DL
MCH RBC QN AUTO: 29.6 PG (ref 26.5–33)
MCHC RBC AUTO-ENTMCNC: 31.4 G/DL (ref 31.5–36.5)
MCV RBC AUTO: 94 FL (ref 78–100)
NONHDLC SERPL-MCNC: 103 MG/DL
PLATELET # BLD AUTO: 261 10E9/L (ref 150–450)
POTASSIUM SERPL-SCNC: 3.8 MMOL/L (ref 3.4–5.3)
PROT SERPL-MCNC: 7.1 G/DL (ref 6.8–8.8)
RBC # BLD AUTO: 4.87 10E12/L (ref 3.8–5.2)
SODIUM SERPL-SCNC: 142 MMOL/L (ref 133–144)
T4 FREE SERPL-MCNC: 0.99 NG/DL (ref 0.76–1.46)
TRIGL SERPL-MCNC: 114 MG/DL
TSH SERPL DL<=0.005 MIU/L-ACNC: 4.45 MU/L (ref 0.4–4)
WBC # BLD AUTO: 7.2 10E9/L (ref 4–11)

## 2020-01-13 PROCEDURE — 84443 ASSAY THYROID STIM HORMONE: CPT | Performed by: PHYSICIAN ASSISTANT

## 2020-01-13 PROCEDURE — 83036 HEMOGLOBIN GLYCOSYLATED A1C: CPT | Performed by: PHYSICIAN ASSISTANT

## 2020-01-13 PROCEDURE — 80061 LIPID PANEL: CPT | Performed by: PHYSICIAN ASSISTANT

## 2020-01-13 PROCEDURE — 80053 COMPREHEN METABOLIC PANEL: CPT | Performed by: PHYSICIAN ASSISTANT

## 2020-01-13 PROCEDURE — 99207 C FOOT EXAM  NO CHARGE: CPT | Performed by: PHYSICIAN ASSISTANT

## 2020-01-13 PROCEDURE — 85027 COMPLETE CBC AUTOMATED: CPT | Performed by: PHYSICIAN ASSISTANT

## 2020-01-13 PROCEDURE — 84439 ASSAY OF FREE THYROXINE: CPT | Performed by: PHYSICIAN ASSISTANT

## 2020-01-13 PROCEDURE — 99214 OFFICE O/P EST MOD 30 MIN: CPT | Mod: 25 | Performed by: PHYSICIAN ASSISTANT

## 2020-01-13 PROCEDURE — G0438 PPPS, INITIAL VISIT: HCPCS | Performed by: PHYSICIAN ASSISTANT

## 2020-01-13 PROCEDURE — 36415 COLL VENOUS BLD VENIPUNCTURE: CPT | Performed by: PHYSICIAN ASSISTANT

## 2020-01-13 ASSESSMENT — PAIN SCALES - GENERAL: PAINLEVEL: MODERATE PAIN (5)

## 2020-01-13 ASSESSMENT — ENCOUNTER SYMPTOMS
ABDOMINAL PAIN: 0
ARTHRALGIAS: 1
HEMATURIA: 0
NERVOUS/ANXIOUS: 0
SHORTNESS OF BREATH: 0
HEADACHES: 0
FREQUENCY: 1
DIARRHEA: 0
CONSTIPATION: 1
SORE THROAT: 0
CHILLS: 0
JOINT SWELLING: 0
NAUSEA: 0
DYSURIA: 0
PARESTHESIAS: 0
COUGH: 1
WEAKNESS: 1
DIZZINESS: 0
EYE PAIN: 0
HEMATOCHEZIA: 0
MYALGIAS: 1
PALPITATIONS: 0
HEARTBURN: 1
BREAST MASS: 0
FEVER: 0

## 2020-01-13 ASSESSMENT — PATIENT HEALTH QUESTIONNAIRE - PHQ9
SUM OF ALL RESPONSES TO PHQ QUESTIONS 1-9: 6
10. IF YOU CHECKED OFF ANY PROBLEMS, HOW DIFFICULT HAVE THESE PROBLEMS MADE IT FOR YOU TO DO YOUR WORK, TAKE CARE OF THINGS AT HOME, OR GET ALONG WITH OTHER PEOPLE: NOT DIFFICULT AT ALL
SUM OF ALL RESPONSES TO PHQ QUESTIONS 1-9: 6

## 2020-01-13 ASSESSMENT — ACTIVITIES OF DAILY LIVING (ADL)
CURRENT_FUNCTION: TRANSPORTATION REQUIRES ASSISTANCE
CURRENT_FUNCTION: SHOPPING REQUIRES ASSISTANCE

## 2020-01-13 ASSESSMENT — MIFFLIN-ST. JEOR: SCORE: 1144.94

## 2020-01-13 NOTE — LETTER
January 13, 2020      Khalida Redding  [address]   [facility] [address]        Dear ,    We are writing to inform you of your test results. Your hemoglobin A1c shows excellent diabetes control no change in medication follow-up in 3 months.  Your complete blood cell count is within normal limits.  Your electrolytes kidney and liver function are stable at this point in time with evidence of good diabetic control noted as her glucose shows.  Your thyroid stimulating hormone or TSH is slightly elevated but when we look at how this is made up it is not necessary to make any changes with respect to her thyroid.  This should be rechecked in 3 months as well.    Resulted Orders   Lipid panel reflex to direct LDL Fasting   Result Value Ref Range    Cholesterol 160 <200 mg/dL    Triglycerides 114 <150 mg/dL    HDL Cholesterol 57 >49 mg/dL    LDL Cholesterol Calculated 80 <100 mg/dL      Comment:      Desirable:       <100 mg/dl    Non HDL Cholesterol 103 <130 mg/dL   CBC with platelets   Result Value Ref Range    WBC 7.2 4.0 - 11.0 10e9/L    RBC Count 4.87 3.8 - 5.2 10e12/L    Hemoglobin 14.4 11.7 - 15.7 g/dL    Hematocrit 45.9 35.0 - 47.0 %    MCV 94 78 - 100 fl    MCH 29.6 26.5 - 33.0 pg    MCHC 31.4 (L) 31.5 - 36.5 g/dL    RDW 14.0 10.0 - 15.0 %    Platelet Count 261 150 - 450 10e9/L   Comprehensive metabolic panel   Result Value Ref Range    Sodium 142 133 - 144 mmol/L    Potassium 3.8 3.4 - 5.3 mmol/L    Chloride 110 (H) 94 - 109 mmol/L    Carbon Dioxide 27 20 - 32 mmol/L    Anion Gap 5 3 - 14 mmol/L    Glucose 107 (H) 70 - 99 mg/dL    Urea Nitrogen 14 7 - 30 mg/dL    Creatinine 0.70 0.52 - 1.04 mg/dL    GFR Estimate 87 >60 mL/min/[1.73_m2]      Comment:      Non  GFR Calc  Starting 12/18/2018, serum creatinine based estimated GFR (eGFR) will be   calculated using the Chronic Kidney Disease Epidemiology Collaboration   (CKD-EPI) equation.      GFR Estimate If Black >90 >60  mL/min/[1.73_m2]      Comment:       GFR Calc  Starting 12/18/2018, serum creatinine based estimated GFR (eGFR) will be   calculated using the Chronic Kidney Disease Epidemiology Collaboration   (CKD-EPI) equation.      Calcium 8.8 8.5 - 10.1 mg/dL    Bilirubin Total 0.4 0.2 - 1.3 mg/dL    Albumin 3.5 3.4 - 5.0 g/dL    Protein Total 7.1 6.8 - 8.8 g/dL    Alkaline Phosphatase 109 40 - 150 U/L    ALT 17 0 - 50 U/L    AST 11 0 - 45 U/L   TSH   Result Value Ref Range    TSH 4.45 (H) 0.40 - 4.00 mU/L   Hemoglobin A1c   Result Value Ref Range    Hemoglobin A1C 6.6 (H) 0 - 5.6 %      Comment:      Normal <5.7% Prediabetes 5.7-6.4%  Diabetes 6.5% or higher - adopted from ADA   consensus guidelines.         If you have any questions or concerns, please call the clinic at the number listed above.       Sincerely,        Abdirahman Shrestha PA-C

## 2020-01-14 ASSESSMENT — PATIENT HEALTH QUESTIONNAIRE - PHQ9: SUM OF ALL RESPONSES TO PHQ QUESTIONS 1-9: 6

## 2020-01-20 ENCOUNTER — HOSPITAL ENCOUNTER (OUTPATIENT)
Dept: MAMMOGRAPHY | Facility: CLINIC | Age: 70
Discharge: HOME | End: 2020-01-20
Attending: PHYSICIAN ASSISTANT | Admitting: PHYSICIAN ASSISTANT
Payer: COMMERCIAL

## 2020-01-20 DIAGNOSIS — Z12.31 VISIT FOR SCREENING MAMMOGRAM: ICD-10-CM

## 2020-01-20 PROCEDURE — 77067 SCR MAMMO BI INCL CAD: CPT

## 2020-05-20 DIAGNOSIS — S49.92XA INJURY OF LEFT CLAVICLE, INITIAL ENCOUNTER: ICD-10-CM

## 2020-05-20 DIAGNOSIS — E78.5 HYPERLIPIDEMIA LDL GOAL <100: ICD-10-CM

## 2020-05-20 DIAGNOSIS — F03.90 DEMENTIA WITHOUT BEHAVIORAL DISTURBANCE, UNSPECIFIED DEMENTIA TYPE: ICD-10-CM

## 2020-05-20 DIAGNOSIS — S42.002A CLOSED DISPLACED FRACTURE OF LEFT CLAVICLE, UNSPECIFIED PART OF CLAVICLE, INITIAL ENCOUNTER: ICD-10-CM

## 2020-05-20 DIAGNOSIS — E11.59 TYPE 2 DIABETES MELLITUS WITH OTHER CIRCULATORY COMPLICATION, WITHOUT LONG-TERM CURRENT USE OF INSULIN (H): ICD-10-CM

## 2020-05-21 RX ORDER — ATORVASTATIN CALCIUM 20 MG/1
TABLET, FILM COATED ORAL
Qty: 90 TABLET | Refills: 1 | Status: SHIPPED | OUTPATIENT
Start: 2020-05-21 | End: 2020-12-24

## 2020-05-21 RX ORDER — MEMANTINE HYDROCHLORIDE 10 MG/1
TABLET ORAL
Qty: 90 TABLET | Refills: 1 | Status: SHIPPED | OUTPATIENT
Start: 2020-05-21 | End: 2020-10-20

## 2020-05-21 RX ORDER — GLIPIZIDE 2.5 MG/1
2.5 TABLET, EXTENDED RELEASE ORAL DAILY
Qty: 60 TABLET | Refills: 0 | Status: SHIPPED | OUTPATIENT
Start: 2020-05-21 | End: 2020-08-12

## 2020-05-21 RX ORDER — MELOXICAM 7.5 MG/1
TABLET ORAL
Qty: 90 TABLET | Refills: 1 | Status: SHIPPED | OUTPATIENT
Start: 2020-05-21 | End: 2020-10-20

## 2020-05-21 NOTE — TELEPHONE ENCOUNTER
Pending Prescriptions:                       Disp   Refills    glipiZIDE (GLUCOTROL XL) 2.5 MG 24 hr tab*90 tab*1            Sig: TAKE 1 TABLET (2.5 MG) BY MOUTH DAILY  Prescription approved per [facility] Refill Protocol.      memantine (NAMENDA) 10 MG tablet [Pharmac*90 tab*1            Sig: TAKE 1 TABLET EVERY DAY  Prescription approved per [facility] Refill Protocol.      atorvastatin (LIPITOR) 20 MG tablet [Phar*90 tab*1            Sig: TAKE 1 TABLET DAILY. NO FURTHER REFILLS WITHOUT           OFFICE VISIT.  Prescription approved per [facility] Refill Protocol.      meloxicam (MOBIC) 7.5 MG tablet [Pharmacy*90 tab*1            Sig: TAKE 1 TABLET EVERY DAY  Routing refill request to provider for review/approval because:  Provider review, patient is age 6-64 years    Zehra Peter, SANJANAN, RN, PHN

## 2020-06-10 NOTE — PROGRESS NOTES
Subjective     Khalida Redding is a 69 year old female who presents to clinic today for the following health issues:    HPI   ABDOMINAL   PAIN     Onset: 2 months    Description:   Character: Dull ache, Fullness and Cramping  Location: epigastric region  Radiation: None    Intensity: severe    Progression of Symptoms:  same    Accompanying Signs & Symptoms:  Fever/Chills?: no   Gas/Bloating: YES  Nausea: no   Vomitting: no   Diarrhea?: no   Constipation:YES  Dysuria or Hematuria: no    History:   Trauma: no   Previous similar pain: no    Previous tests done: none    Precipitating factors:   Does the pain change with:     Food: YES- Worse with eating     BM: YES- Better    Urination: no     Alleviating factors:  Tums- Large bottle lasting less than one month    Therapies Tried and outcome: Tums, stool softner    LMP:  not applicable     Patient Active Problem List   Diagnosis     Type 2 diabetes mellitus with circulatory disorder, without long-term current use of insulin (H)     Hyperlipidemia LDL goal <100     Hypertension goal BP (blood pressure) < 140/90     History of CVA (cerebrovascular accident)     Convulsions, unspecified convulsion type (H)     Osteoarthritis     Tobacco abuse disorder     Pain in both lower legs     Dementia (H)     Supraventricular tachycardia (H)     Gait instability     Tubular adenoma of colon     Syncope, unspecified syncope type     Lumbar radiculopathy     Closed displaced fracture of left clavicle, unspecified part of clavicle, initial encounter     Injury of left clavicle, initial encounter     Age-related osteoporosis without current pathological fracture     Left hemiparesis (H)     Abdominal pain, generalized     Past Surgical History:   Procedure Laterality Date     COLONOSCOPY N/A 11/28/2018    Procedure: Colonoscopy, Polypectomy by Biopsy;  Surgeon: Arcadio Mcmullen DO;  Location: [facility]     HIP SURGERY Left        Social History     Tobacco Use     Smoking  status: Current Every Day Smoker     Packs/day: 0.50     Smokeless tobacco: Never Used   Substance Use Topics     Alcohol use: No     Frequency: Never     History reviewed. No pertinent family history.      Current Outpatient Medications   Medication Sig Dispense Refill     alendronate (FOSAMAX) 70 MG tablet Take 1 tablet (70 mg) by mouth every 7 days 13 tablet 3     atorvastatin (LIPITOR) 20 MG tablet TAKE 1 TABLET DAILY. NO FURTHER REFILLS WITHOUT OFFICE VISIT. 90 tablet 1     blood glucose monitoring (NO BRAND SPECIFIED) test strip Accu-Chek Violette Plus Meter       clopidogrel (PLAVIX) 75 MG tablet Take 1 tablet (75 mg) by mouth daily 90 tablet 1     Cyanocobalamin (VITAMIN B 12 PO) Take 1,000 mcg by mouth daily       gabapentin (NEURONTIN) 300 MG capsule Take 1 capsule (300 mg) by mouth At Bedtime 90 capsule 1     glipiZIDE (GLUCOTROL XL) 2.5 MG 24 hr tablet TAKE 1 TABLET (2.5 MG) BY MOUTH DAILY 60 tablet 0     meloxicam (MOBIC) 7.5 MG tablet TAKE 1 TABLET EVERY DAY 90 tablet 1     memantine (NAMENDA) 10 MG tablet TAKE 1 TABLET EVERY DAY 90 tablet 1     metFORMIN (GLUCOPHAGE-XR) 500 MG 24 hr tablet Take 2 tablets (1,000 mg) by mouth 2 times daily (with meals) 360 tablet 1     order for DME Equipment being ordered: glucometer and related supplies. 1 Units 0     quinapril (ACCUPRIL) 5 MG tablet Take 1 tablet (5 mg) by mouth daily 90 tablet 1     Allergies   Allergen Reactions     Amoxicillin      Ampicillin      Recent Labs   Lab Test 01/13/20  0813 11/18/19  0816 11/18/19  0813 05/22/19  1233 05/17/19  1345  02/03/19  1605 07/30/18  1845 07/16/18 02/19/18   A1C 6.6*  --  8.1*  --  7.7*   < >  --   --  6.4* 5.9*   LDL 80 87  --   --   --   --   --   --  89 88   HDL 57  --   --   --   --   --   --   --  53 53   TRIG 114  --   --   --   --   --   --   --  148 146   ALT 17  --   --   --   --   --  14 16  --   --    CR 0.70  --   --  0.66  --   --  0.57 0.74 0.69 0.77   GFRESTIMATED 87  --   --  89  --   --  >90 77 89  "79   GFRESTBLACK >90  --   --  >90  --   --  >90 >90 103 91   POTASSIUM 3.8  --   --  3.3*  --   --  4.1 3.8 4.8 4.8   TSH 4.45*  --   --   --  2.50  --   --   --   --  3.72    < > = values in this interval not displayed.      BP Readings from Last 3 Encounters:   06/16/20 (!) 142/94   01/13/20 122/74   11/18/19 108/64    Wt Readings from Last 3 Encounters:   06/16/20 65.3 kg (144 lb)   01/13/20 63.6 kg (140 lb 3.2 oz)   11/18/19 63.5 kg (140 lb 1.6 oz)                    Reviewed and updated as needed this visit by Provider         Review of Systems   Constitutional, HEENT, cardiovascular, pulmonary, GI, , musculoskeletal, neuro, skin, endocrine and psych systems are negative, except as otherwise noted.      Objective    BP (!) 142/94   Pulse 90   Temp 99.1  F (37.3  C) (Oral)   Resp 18   Ht 1.64 m (5' 4.57\")   Wt 65.3 kg (144 lb)   SpO2 92%   BMI 24.28 kg/m    Body mass index is 24.28 kg/m .  Physical Exam   GENERAL: healthy, alert and no distress  NECK: no adenopathy, no asymmetry, masses, or scars and thyroid normal to palpation  RESP: lungs clear to auscultation - no rales, rhonchi or wheezes  CV: regular rate and rhythm, normal S1 S2, no S3 or S4, no murmur, click or rub, no peripheral edema and peripheral pulses strong  ABDOMEN: soft, tenderness epigastric and LUQ, bowel sounds normal and probable stool presence noted more on the left upper than the right.  MS: no gross musculoskeletal defects noted, no edema    Diagnostic Test Results:  Labs reviewed in Epic  No results found for this or any previous visit (from the past 24 hour(s)).  Xray -some stool retention is noted as well is air throughout the colon.  I suspect that this is giving her some intra-abdominal pressure and pressing up on the stomach making things worse when she eats.  Labs are pending at this point time.    Assessment and plan:    1. Abdominal pain, generalized  2. Chronic upset stomach  9. Slow transit constipation  Trial of " medication as noted below and follow-up in 3 to 4 weeks.  - XR Abdomen 2 Views; Future  - omeprazole (PRILOSEC) 40 MG DR capsule; Take 1 capsule (40 mg) by mouth daily Take 30-60 minutes before a meal.  Dispense: 90 capsule; Refill: 1  - polyethylene glycol (MIRALAX) 17 GM/SCOOP powder; Take 17 g (1 capful) by mouth daily  Dispense: 578 g; Refill: 0    3. Age-related osteoporosis without current pathological fracture  Refills requested and granted.  No new concerns.  Follow-up in 1 year.  - alendronate (FOSAMAX) 70 MG tablet; Take 1 tablet (70 mg) by mouth every 7 days  Dispense: 13 tablet; Refill: 3    4. Convulsions, unspecified convulsion type (H)  5. History of CVA (cerebrovascular accident)  No new concerns with respect to this refills granted at this point time follow-up in 6 months.  - clopidogrel (PLAVIX) 75 MG tablet; Take 1 tablet (75 mg) by mouth daily  Dispense: 90 tablet; Refill: 1    6. Dementia without behavioral disturbance, unspecified dementia type (H)  - gabapentin (NEURONTIN) 300 MG capsule; Take 1 capsule (300 mg) by mouth At Bedtime  Dispense: 90 capsule; Refill: 1    7. Type 2 diabetes mellitus with diabetic peripheral angiopathy without gangrene, without long-term current use of insulin (H)  Refills requested labs due follow-up in 6 months.  - metFORMIN (GLUCOPHAGE-XR) 500 MG 24 hr tablet; Take 2 tablets (1,000 mg) by mouth 2 times daily (with meals)  Dispense: 360 tablet; Refill: 1  - Hemoglobin A1c  - Comprehensive metabolic panel    8. Hypertension goal BP (blood pressure) < 140/90  - quinapril (ACCUPRIL) 5 MG tablet; Take 1 tablet (5 mg) by mouth daily  Dispense: 90 tablet; Refill: 1        Work on weight loss  Regular exercise  Dietary constraint    Return in about 4 weeks (around 7/14/2020) for recheck of current condition, if symptoms do not improve.    Abdirahman Shrestha PA-C  [facility]

## 2020-06-16 ENCOUNTER — ANCILLARY PROCEDURE (OUTPATIENT)
Dept: GENERAL RADIOLOGY | Facility: OTHER | Age: 70
End: 2020-06-16
Attending: PHYSICIAN ASSISTANT
Payer: COMMERCIAL

## 2020-06-16 ENCOUNTER — OFFICE VISIT (OUTPATIENT)
Dept: FAMILY MEDICINE | Facility: OTHER | Age: 70
End: 2020-06-16
Payer: COMMERCIAL

## 2020-06-16 VITALS
DIASTOLIC BLOOD PRESSURE: 84 MMHG | SYSTOLIC BLOOD PRESSURE: 136 MMHG | HEIGHT: 65 IN | BODY MASS INDEX: 23.99 KG/M2 | TEMPERATURE: 99.1 F | RESPIRATION RATE: 18 BRPM | HEART RATE: 90 BPM | OXYGEN SATURATION: 92 % | WEIGHT: 144 LBS

## 2020-06-16 DIAGNOSIS — Z86.73 HISTORY OF CVA (CEREBROVASCULAR ACCIDENT): ICD-10-CM

## 2020-06-16 DIAGNOSIS — K30 CHRONIC UPSET STOMACH: ICD-10-CM

## 2020-06-16 DIAGNOSIS — M81.0 AGE-RELATED OSTEOPOROSIS WITHOUT CURRENT PATHOLOGICAL FRACTURE: ICD-10-CM

## 2020-06-16 DIAGNOSIS — R10.84 ABDOMINAL PAIN, GENERALIZED: Primary | ICD-10-CM

## 2020-06-16 DIAGNOSIS — K59.01 SLOW TRANSIT CONSTIPATION: ICD-10-CM

## 2020-06-16 DIAGNOSIS — E11.51 TYPE 2 DIABETES MELLITUS WITH DIABETIC PERIPHERAL ANGIOPATHY WITHOUT GANGRENE, WITHOUT LONG-TERM CURRENT USE OF INSULIN (H): ICD-10-CM

## 2020-06-16 DIAGNOSIS — I10 HYPERTENSION GOAL BP (BLOOD PRESSURE) < 140/90: ICD-10-CM

## 2020-06-16 DIAGNOSIS — R56.9 CONVULSIONS, UNSPECIFIED CONVULSION TYPE (H): ICD-10-CM

## 2020-06-16 DIAGNOSIS — R10.84 ABDOMINAL PAIN, GENERALIZED: ICD-10-CM

## 2020-06-16 DIAGNOSIS — F03.90 DEMENTIA WITHOUT BEHAVIORAL DISTURBANCE, UNSPECIFIED DEMENTIA TYPE: ICD-10-CM

## 2020-06-16 LAB
ALBUMIN SERPL-MCNC: 3.6 G/DL (ref 3.4–5)
ALP SERPL-CCNC: 100 U/L (ref 40–150)
ALT SERPL W P-5'-P-CCNC: 12 U/L (ref 0–50)
ANION GAP SERPL CALCULATED.3IONS-SCNC: 6 MMOL/L (ref 3–14)
AST SERPL W P-5'-P-CCNC: 12 U/L (ref 0–45)
BILIRUB SERPL-MCNC: 0.6 MG/DL (ref 0.2–1.3)
BUN SERPL-MCNC: 13 MG/DL (ref 7–30)
CALCIUM SERPL-MCNC: 8.7 MG/DL (ref 8.5–10.1)
CHLORIDE SERPL-SCNC: 107 MMOL/L (ref 94–109)
CO2 SERPL-SCNC: 27 MMOL/L (ref 20–32)
CREAT SERPL-MCNC: 0.8 MG/DL (ref 0.52–1.04)
GFR SERPL CREATININE-BSD FRML MDRD: 73 ML/MIN/{1.73_M2}
GLUCOSE SERPL-MCNC: 86 MG/DL (ref 70–99)
HBA1C MFR BLD: 6.1 % (ref 0–5.6)
POTASSIUM SERPL-SCNC: 3.9 MMOL/L (ref 3.4–5.3)
PROT SERPL-MCNC: 7.2 G/DL (ref 6.8–8.8)
SODIUM SERPL-SCNC: 140 MMOL/L (ref 133–144)

## 2020-06-16 PROCEDURE — 83036 HEMOGLOBIN GLYCOSYLATED A1C: CPT | Performed by: PHYSICIAN ASSISTANT

## 2020-06-16 PROCEDURE — 99214 OFFICE O/P EST MOD 30 MIN: CPT | Performed by: PHYSICIAN ASSISTANT

## 2020-06-16 PROCEDURE — 36415 COLL VENOUS BLD VENIPUNCTURE: CPT | Performed by: PHYSICIAN ASSISTANT

## 2020-06-16 PROCEDURE — 80053 COMPREHEN METABOLIC PANEL: CPT | Performed by: PHYSICIAN ASSISTANT

## 2020-06-16 PROCEDURE — 74019 RADEX ABDOMEN 2 VIEWS: CPT

## 2020-06-16 RX ORDER — METFORMIN HCL 500 MG
1000 TABLET, EXTENDED RELEASE 24 HR ORAL 2 TIMES DAILY WITH MEALS
Qty: 360 TABLET | Refills: 1 | Status: SHIPPED | OUTPATIENT
Start: 2020-06-16 | End: 2021-04-19

## 2020-06-16 RX ORDER — CLOPIDOGREL BISULFATE 75 MG/1
75 TABLET ORAL DAILY
Qty: 90 TABLET | Refills: 1 | Status: SHIPPED | OUTPATIENT
Start: 2020-06-16 | End: 2020-12-24

## 2020-06-16 RX ORDER — POLYETHYLENE GLYCOL 3350 17 G/17G
1 POWDER, FOR SOLUTION ORAL DAILY
Qty: 578 G | Refills: 0 | Status: SHIPPED | OUTPATIENT
Start: 2020-06-16 | End: 2020-07-13

## 2020-06-16 RX ORDER — GABAPENTIN 300 MG/1
300 CAPSULE ORAL AT BEDTIME
Qty: 90 CAPSULE | Refills: 1 | Status: SHIPPED | OUTPATIENT
Start: 2020-06-16 | End: 2021-04-19

## 2020-06-16 RX ORDER — QUINAPRIL 5 1/1
5 TABLET ORAL DAILY
Qty: 90 TABLET | Refills: 1 | Status: SHIPPED | OUTPATIENT
Start: 2020-06-16 | End: 2020-12-24

## 2020-06-16 RX ORDER — ALENDRONATE SODIUM 70 MG/1
70 TABLET ORAL
Qty: 13 TABLET | Refills: 3 | Status: SHIPPED | OUTPATIENT
Start: 2020-06-16 | End: 2021-03-26

## 2020-06-16 RX ORDER — OMEPRAZOLE 40 MG/1
40 CAPSULE, DELAYED RELEASE ORAL DAILY
Qty: 90 CAPSULE | Refills: 1 | Status: SHIPPED | OUTPATIENT
Start: 2020-06-16 | End: 2021-01-04

## 2020-06-16 ASSESSMENT — MIFFLIN-ST. JEOR: SCORE: 1162.23

## 2020-06-16 ASSESSMENT — PAIN SCALES - GENERAL: PAINLEVEL: NO PAIN (0)

## 2020-06-17 ENCOUNTER — TELEPHONE (OUTPATIENT)
Dept: FAMILY MEDICINE | Facility: OTHER | Age: 70
End: 2020-06-17

## 2020-06-17 NOTE — LETTER
June 17, 2020      Khalida GOMES [address]  [address] [facility] [address]        Dear ,    We are writing to inform you of your test results.    Stable labs at this time.   Repeat in 6 months.        If you have any questions or concerns, please call the clinic at the number listed above.       Sincerely,        Abdirahman Shrestha PA-C

## 2020-06-17 NOTE — TELEPHONE ENCOUNTER
Stable labs per provider. Sending letter.  Radha Elias CMA ([facility])  Notes recorded by Abdirahman Munoz PA-C on 6/17/2020 at 8:44 AM CDT   Khalida:   Stable labs at this time.   Repeat in 6 months.   Electronically signed:     Abdirahman Munoz PA-C

## 2020-07-07 NOTE — PROGRESS NOTES
Subjective     Khalida Redding is a 69 year old female who presents to clinic today for the following health issues:    HPI     Follow up abdominal pain.   Patient states that she is still have trouble going to the bathroom, nothing seem to help. Pt reports no new symptoms since LOV.     Patient Active Problem List   Diagnosis     Type 2 diabetes mellitus with circulatory disorder, without long-term current use of insulin (H)     Hyperlipidemia LDL goal <100     Hypertension goal BP (blood pressure) < 140/90     History of CVA (cerebrovascular accident)     Convulsions, unspecified convulsion type (H)     Osteoarthritis     Tobacco abuse disorder     Pain in both lower legs     Dementia (H)     Supraventricular tachycardia (H)     Gait instability     Tubular adenoma of colon     Syncope, unspecified syncope type     Lumbar radiculopathy     Closed displaced fracture of left clavicle, unspecified part of clavicle, initial encounter     Injury of left clavicle, initial encounter     Age-related osteoporosis without current pathological fracture     Left hemiparesis (H)     Abdominal pain, generalized     Slow transit constipation     Chronic upset stomach     History of colonic polyps     Past Surgical History:   Procedure Laterality Date     COLONOSCOPY N/A 11/28/2018    Procedure: Colonoscopy, Polypectomy by Biopsy;  Surgeon: Arcadio Mcmullen DO;  Location:  GI     HIP SURGERY Left        Social History     Tobacco Use     Smoking status: Current Every Day Smoker     Packs/day: 0.50     Types: Cigarettes     Smokeless tobacco: Never Used   Substance Use Topics     Alcohol use: No     Frequency: Never     History reviewed. No pertinent family history.      Current Outpatient Medications   Medication Sig Dispense Refill     alendronate (FOSAMAX) 70 MG tablet Take 1 tablet (70 mg) by mouth every 7 days 13 tablet 3     atorvastatin (LIPITOR) 20 MG tablet TAKE 1 TABLET DAILY. NO FURTHER REFILLS  WITHOUT OFFICE VISIT. 90 tablet 1     clopidogrel (PLAVIX) 75 MG tablet Take 1 tablet (75 mg) by mouth daily 90 tablet 1     Cyanocobalamin (VITAMIN B 12 PO) Take 1,000 mcg by mouth daily       gabapentin (NEURONTIN) 300 MG capsule Take 1 capsule (300 mg) by mouth At Bedtime 90 capsule 1     glipiZIDE (GLUCOTROL XL) 2.5 MG 24 hr tablet TAKE 1 TABLET (2.5 MG) BY MOUTH DAILY 60 tablet 0     meloxicam (MOBIC) 7.5 MG tablet TAKE 1 TABLET EVERY DAY 90 tablet 1     memantine (NAMENDA) 10 MG tablet TAKE 1 TABLET EVERY DAY 90 tablet 1     metFORMIN (GLUCOPHAGE-XR) 500 MG 24 hr tablet Take 2 tablets (1,000 mg) by mouth 2 times daily (with meals) 360 tablet 1     omeprazole (PRILOSEC) 40 MG DR capsule Take 1 capsule (40 mg) by mouth daily Take 30-60 minutes before a meal. 90 capsule 1     polyethylene glycol (MIRALAX) 17 GM/SCOOP powder Take 17 g (1 capful) by mouth 2 times daily 1000 g 1     quinapril (ACCUPRIL) 5 MG tablet Take 1 tablet (5 mg) by mouth daily 90 tablet 1     blood glucose monitoring (NO BRAND SPECIFIED) test strip Accu-Chek Violette Plus Meter       order for DME Equipment being ordered: glucometer and related supplies. (Patient not taking: Reported on 7/10/2020) 1 Units 0     Allergies   Allergen Reactions     Amoxicillin      Ampicillin      Recent Labs   Lab Test 06/16/20  1617 01/13/20  0813 11/18/19  0816 11/18/19  0813  05/17/19  1345  02/03/19  1605  07/16/18 02/19/18   A1C 6.1* 6.6*  --  8.1*  --  7.7*   < >  --   --  6.4* 5.9*   LDL  --  80 87  --   --   --   --   --   --  89 88   HDL  --  57  --   --   --   --   --   --   --  53 53   TRIG  --  114  --   --   --   --   --   --   --  148 146   ALT 12 17  --   --   --   --   --  14   < >  --   --    CR 0.80 0.70  --   --    < >  --   --  0.57   < > 0.69 0.77   GFRESTIMATED 73 87  --   --    < >  --   --  >90   < > 89 79   GFRESTBLACK 85 >90  --   --    < >  --   --  >90   < > 103 91   POTASSIUM 3.9 3.8  --   --    < >  --   --  4.1   < > 4.8 4.8   TSH   "--  4.45*  --   --   --  2.50  --   --   --   --  3.72    < > = values in this interval not displayed.      BP Readings from Last 3 Encounters:   07/13/20 134/82   06/16/20 136/84   01/13/20 122/74    Wt Readings from Last 3 Encounters:   07/13/20 65.3 kg (144 lb)   06/16/20 65.3 kg (144 lb)   01/13/20 63.6 kg (140 lb 3.2 oz)                    Reviewed and updated as needed this visit by Provider         Review of Systems   Constitutional, HEENT, cardiovascular, pulmonary, GI, , musculoskeletal, neuro, skin, endocrine and psych systems are negative, except as otherwise noted.      Objective    /82   Pulse 87   Temp 97.3  F (36.3  C) (Temporal)   Resp 16   Ht 1.638 m (5' 4.5\")   Wt 65.3 kg (144 lb)   SpO2 96%   BMI 24.34 kg/m    Body mass index is 24.34 kg/m .  Physical Exam   GENERAL: healthy, alert and no distress  RESP: lungs clear to auscultation - no rales, rhonchi or wheezes  CV: regular rate and rhythm, normal S1 S2, no S3 or S4, no murmur, click or rub, no peripheral edema and peripheral pulses strong  ABDOMEN: soft, nontender, no hepatosplenomegaly, no masses and bowel sounds normal  MS: no gross musculoskeletal defects noted, no edema  SKIN: no suspicious lesions or rashes to exposed skin  NEURO: Normal strength and tone, mentation intact and speech normal  PSYCH: mentation appears normal, affect normal/bright    Diagnostic Test Results:  Labs reviewed in Epic        Assessment & Plan     1. Abdominal pain, generalized  2. Chronic upset stomach  3. Special screening for malignant neoplasms, colon  4. History of colonic polyps  My suspicion at this point time is that this patient has slow chronic constipation and with a combination of redundant colon and diverticuli is the reason why she is struggling more of late.  We will have her increase her use of MiraLAX for the next couple of weeks and submit for colonoscopy for further evaluation and treatment options.  - polyethylene glycol (MIRALAX) " 17 GM/SCOOP powder; Take 17 g (1 capful) by mouth 2 times daily  Dispense: 1000 g; Refill: 1  - GASTROENTEROLOGY ADULT REF PROCEDURE ONLY; Future     Return in about 4 weeks (around 8/10/2020) for recheck of current condition, if symptoms do not improve.    Abdirahman Shrestha PA-C  [facility]

## 2020-07-13 ENCOUNTER — OFFICE VISIT (OUTPATIENT)
Dept: FAMILY MEDICINE | Facility: OTHER | Age: 70
End: 2020-07-13
Payer: COMMERCIAL

## 2020-07-13 VITALS
OXYGEN SATURATION: 96 % | BODY MASS INDEX: 23.99 KG/M2 | HEART RATE: 87 BPM | RESPIRATION RATE: 16 BRPM | DIASTOLIC BLOOD PRESSURE: 82 MMHG | TEMPERATURE: 97.3 F | SYSTOLIC BLOOD PRESSURE: 134 MMHG | HEIGHT: 65 IN | WEIGHT: 144 LBS

## 2020-07-13 DIAGNOSIS — Z86.0100 HISTORY OF COLONIC POLYPS: ICD-10-CM

## 2020-07-13 DIAGNOSIS — R10.84 ABDOMINAL PAIN, GENERALIZED: ICD-10-CM

## 2020-07-13 DIAGNOSIS — Z12.11 SPECIAL SCREENING FOR MALIGNANT NEOPLASMS, COLON: ICD-10-CM

## 2020-07-13 DIAGNOSIS — K30 CHRONIC UPSET STOMACH: ICD-10-CM

## 2020-07-13 PROCEDURE — 99214 OFFICE O/P EST MOD 30 MIN: CPT | Performed by: PHYSICIAN ASSISTANT

## 2020-07-13 RX ORDER — POLYETHYLENE GLYCOL 3350 17 G/17G
1 POWDER, FOR SOLUTION ORAL 2 TIMES DAILY
Qty: 1000 G | Refills: 1 | Status: SHIPPED | OUTPATIENT
Start: 2020-07-13 | End: 2021-04-19

## 2020-07-13 ASSESSMENT — MIFFLIN-ST. JEOR: SCORE: 1161.12

## 2020-07-14 ENCOUNTER — TELEPHONE (OUTPATIENT)
Dept: FAMILY MEDICINE | Facility: OTHER | Age: 70
End: 2020-07-14

## 2020-07-14 NOTE — TELEPHONE ENCOUNTER
Please call Laura her daughter to sched. Ok to schedule with her please call her at#[phone]. Thank You Sandi

## 2020-07-14 NOTE — TELEPHONE ENCOUNTER
Left message for patient to return call to schedule EGD/colonoscopy. If Kena or Анна are not available, please transfer to same day surgery

## 2020-07-14 NOTE — LETTER
[facility]  [address]  [facility] [id number] [phone]        July 27, 2020    Khalida Redding  [address]  [facility] [address]

## 2020-07-15 DIAGNOSIS — Z11.59 ENCOUNTER FOR SCREENING FOR OTHER VIRAL DISEASES: Primary | ICD-10-CM

## 2020-07-15 NOTE — TELEPHONE ENCOUNTER
This becomes a question of risk versus benefit.  At this point time I think it would be wise for her to stop her Plavix 5 days ahead of her evaluation and then begin it again the next day.  She certainly is not without risk factors but adverse surgical risk on Plavix is more than likely higher.  Electronically signed:    Abdirahman Shrestha PA-C

## 2020-07-15 NOTE — TELEPHONE ENCOUNTER
attempted tto contact daughter [phone], voicemail has not been set up, try again later  Thanks  Rama Waters RT (R)

## 2020-07-15 NOTE — TELEPHONE ENCOUNTER
Date of colonoscopy/EGD: 8/17  Surgeon: Dr. Hines  Prep:Miralax  Packet:Colonoscopy/EGD instructions mailed to patient's home address.   Date: 7/15/2020      Surgery Scheduler

## 2020-07-15 NOTE — TELEPHONE ENCOUNTER
Called and informed daughter. She expressed gratitude in advise and had no further questions.     Robert Patrick,

## 2020-07-15 NOTE — TELEPHONE ENCOUNTER
Daughter states patient on Plavix- please call with instructions prior to colonoscopy 8/17    Thanks

## 2020-08-11 DIAGNOSIS — E11.59 TYPE 2 DIABETES MELLITUS WITH OTHER CIRCULATORY COMPLICATION, WITHOUT LONG-TERM CURRENT USE OF INSULIN (H): ICD-10-CM

## 2020-08-12 RX ORDER — GLIPIZIDE 2.5 MG/1
TABLET, EXTENDED RELEASE ORAL
Qty: 90 TABLET | Refills: 0 | Status: SHIPPED | OUTPATIENT
Start: 2020-08-12 | End: 2020-10-20

## 2020-08-12 NOTE — TELEPHONE ENCOUNTER
Prescription approved per [facility] Refill Protocol.  Zehra Peter, BSN, RN, PHN

## 2020-08-17 NOTE — OR NURSING
Pre call done. Patient given instruction to contact MD about blood thinners and stopping per recommendation prior to procedure.

## 2020-08-20 DIAGNOSIS — Z11.59 ENCOUNTER FOR SCREENING FOR OTHER VIRAL DISEASES: ICD-10-CM

## 2020-08-20 LAB
SARS-COV-2 RNA SPEC QL NAA+PROBE: NOT DETECTED
SPECIMEN SOURCE: NORMAL

## 2020-08-20 PROCEDURE — U0003 INFECTIOUS AGENT DETECTION BY NUCLEIC ACID (DNA OR RNA); SEVERE ACUTE RESPIRATORY SYNDROME CORONAVIRUS 2 (SARS-COV-2) (CORONAVIRUS DISEASE [COVID-19]), AMPLIFIED PROBE TECHNIQUE, MAKING USE OF HIGH THROUGHPUT TECHNOLOGIES AS DESCRIBED BY CMS-2020-01-R: HCPCS | Performed by: INTERNAL MEDICINE

## 2020-08-24 ENCOUNTER — SURGERY (OUTPATIENT)
Age: 70
End: 2020-08-24
Payer: COMMERCIAL

## 2020-08-24 ENCOUNTER — HOSPITAL ENCOUNTER (OUTPATIENT)
Facility: CLINIC | Age: 70
Discharge: HOME | End: 2020-08-24
Attending: INTERNAL MEDICINE | Admitting: INTERNAL MEDICINE
Payer: COMMERCIAL

## 2020-08-24 VITALS
OXYGEN SATURATION: 95 % | TEMPERATURE: 98.6 F | DIASTOLIC BLOOD PRESSURE: 97 MMHG | SYSTOLIC BLOOD PRESSURE: 160 MMHG | RESPIRATION RATE: 16 BRPM | HEART RATE: 94 BPM

## 2020-08-24 LAB
COLONOSCOPY: NORMAL
GLUCOSE BLDC GLUCOMTR-MCNC: 113 MG/DL (ref 70–99)

## 2020-08-24 PROCEDURE — 45378 DIAGNOSTIC COLONOSCOPY: CPT | Performed by: INTERNAL MEDICINE

## 2020-08-24 PROCEDURE — G0500 MOD SEDAT ENDO SERVICE >5YRS: HCPCS | Performed by: INTERNAL MEDICINE

## 2020-08-24 PROCEDURE — 25000128 H RX IP 250 OP 636: Performed by: INTERNAL MEDICINE

## 2020-08-24 PROCEDURE — 82962 GLUCOSE BLOOD TEST: CPT

## 2020-08-24 RX ORDER — NALOXONE HYDROCHLORIDE 0.4 MG/ML
.1-.4 INJECTION, SOLUTION INTRAMUSCULAR; INTRAVENOUS; SUBCUTANEOUS
Status: DISCONTINUED | OUTPATIENT
Start: 2020-08-24 | End: 2020-08-24 | Stop reason: HOSPADM

## 2020-08-24 RX ORDER — ONDANSETRON 2 MG/ML
4 INJECTION INTRAMUSCULAR; INTRAVENOUS EVERY 6 HOURS PRN
Status: DISCONTINUED | OUTPATIENT
Start: 2020-08-24 | End: 2020-08-24 | Stop reason: HOSPADM

## 2020-08-24 RX ORDER — LIDOCAINE 40 MG/G
CREAM TOPICAL
Status: DISCONTINUED | OUTPATIENT
Start: 2020-08-24 | End: 2020-08-24 | Stop reason: HOSPADM

## 2020-08-24 RX ORDER — FENTANYL CITRATE 50 UG/ML
INJECTION, SOLUTION INTRAMUSCULAR; INTRAVENOUS PRN
Status: DISCONTINUED | OUTPATIENT
Start: 2020-08-24 | End: 2020-08-24 | Stop reason: HOSPADM

## 2020-08-24 RX ORDER — FLUMAZENIL 0.1 MG/ML
0.2 INJECTION, SOLUTION INTRAVENOUS
Status: DISCONTINUED | OUTPATIENT
Start: 2020-08-24 | End: 2020-08-24 | Stop reason: HOSPADM

## 2020-08-24 RX ORDER — ONDANSETRON 2 MG/ML
4 INJECTION INTRAMUSCULAR; INTRAVENOUS
Status: DISCONTINUED | OUTPATIENT
Start: 2020-08-24 | End: 2020-08-24 | Stop reason: HOSPADM

## 2020-08-24 RX ORDER — ONDANSETRON 4 MG/1
4 TABLET, ORALLY DISINTEGRATING ORAL EVERY 6 HOURS PRN
Status: DISCONTINUED | OUTPATIENT
Start: 2020-08-24 | End: 2020-08-24 | Stop reason: HOSPADM

## 2020-08-24 RX ADMIN — FENTANYL CITRATE 50 MCG: 50 INJECTION, SOLUTION INTRAMUSCULAR; INTRAVENOUS at 14:25

## 2020-08-24 RX ADMIN — MIDAZOLAM 2 MG: 1 INJECTION INTRAMUSCULAR; INTRAVENOUS at 14:30

## 2020-08-24 RX ADMIN — MIDAZOLAM 2 MG: 1 INJECTION INTRAMUSCULAR; INTRAVENOUS at 14:25

## 2020-08-24 NOTE — DISCHARGE INSTRUCTIONS
[facility]    Home Care Following Endoscopy          Activity:    You have just undergone an endoscopic procedure usually performed with conscious sedation.  Do not work or operate machinery (including a car) for at least 12 hours.      I encourage you to walk and attempt to pass this air as soon as possible.    Diet:    Return to the diet you were on before your procedure but eat lightly for the first 12-24 hours.    Drink plenty of water.    Resume any regular medications unless otherwise advised by your physician.  Please begin any new medication prescribed as a result of your procedure as directed by your physician.     If you had any biopsy or polyp removed please refrain from aspirin or aspirin products for 2 days.  If on Coumadin please restart as instructed by your physician.   Pain:    You may take Tylenol as needed for pain.  Expected Recovery:    You can expect some mild abdominal fullness and/or discomfort due to the air used to inflate your intestinal tract. It is also normal to have a mild sore throat after upper endoscopy.    Call Your Physician if You Have:    After Upper Endoscopy:  o Shoulder, back or chest pain.  o Difficulty breathing or swallowing.  o Vomiting blood.    After Colonoscopy:  o Worsening persisting abdominal pain which is worse with activity.  o Fevers (>101 degrees F), chills or shakes.  o Passage of continued blood with bowel movements.   Any questions or concerns about your recovery, please call [phone] or after hours [phone] Nurse Advice Line.    Follow-up Care:    You should receive a call or letter with your results within 1 week. Please call if you have not received a notification of your results.

## 2020-08-24 NOTE — CONSULTS
[facility] GI Pre-Procedure Physical Assessment    Khalida Redding MRN# [id number]   Age: 70 year old YOB: 1950      Date of Surgery: 8/24/2020  Location [facility]      Date of Exam 8/24/2020 Facility (Same day)       Home clinic: [facility]  Primary care provider: Abdirahman Munoz         Active problem list:   Patient Active Problem List   Diagnosis     Type 2 diabetes mellitus with circulatory disorder, without long-term current use of insulin (H)     Hyperlipidemia LDL goal <100     Hypertension goal BP (blood pressure) < 140/90     History of CVA (cerebrovascular accident)     Convulsions, unspecified convulsion type (H)     Osteoarthritis     Tobacco abuse disorder     Pain in both lower legs     Dementia (H)     Supraventricular tachycardia (H)     Gait instability     Tubular adenoma of colon     Syncope, unspecified syncope type     Lumbar radiculopathy     Closed displaced fracture of left clavicle, unspecified part of clavicle, initial encounter     Injury of left clavicle, initial encounter     Age-related osteoporosis without current pathological fracture     Left hemiparesis (H)     Abdominal pain, generalized     Slow transit constipation     Chronic upset stomach     History of colonic polyps            Medications (include herbals and vitamins):   Any Plavix use in the last 7 days?  No     Current Facility-Administered Medications   Medication     lidocaine (LMX4) kit     lidocaine 1 % 0.1-1 mL     ondansetron (ZOFRAN) injection 4 mg     sodium chloride (PF) 0.9% PF flush 3 mL     sodium chloride (PF) 0.9% PF flush 3 mL             Allergies:      Allergies   Allergen Reactions     Amoxicillin      Ampicillin      Allergy to Latex?  No  Allergy to tape?    No          Social History:     Social History     Tobacco Use     Smoking status: Current Every Day Smoker     Packs/day: 0.50     Types: Cigarettes     Smokeless tobacco: Never Used    Substance Use Topics     Alcohol use: No     Frequency: Never            Physical Exam:   All vitals have been reviewed  Blood pressure (!) 163/84, pulse 100, temperature 98.6  F (37  C), temperature source Oral, resp. rate 18, SpO2 96 %.  Airway assessment:   Patient is able to open mouth wide  Patient is able to stick out tongue      Lungs:   No increased work of breathing, good air exchange, clear to auscultation bilaterally, no crackles or wheezing      Cardiovascular:   Normal apical impulse, regular rate and rhythm, normal S1 and S2, no S3 or S4, and no murmur noted           Lab / Radiology Results:   All laboratory data reviewed          Assessment:   Appropriately NPO  Chief complaint or anatomic assessment of involved area: constipation with episodic incontinence         Plan:   Moderate (conscious) sedation     Patient's active problems diagnostically and therapeutically optimized for the planned procedure  Risks, benefits, alternatives to sedation and blood explained and consent obtained  Risks, benefits, alternatives to procedure explained and consent obtained  Orders and progress notes are in the chart  Discharge from Phase 1 and / or Phase 2 recovery when patient meets criteria    I have reviewed the history and physical, lab finding(s), diagnostic data, medicaitons, and the plan for sedation.  I have determined this patient to be an appropriate candidate for the planned sedation / procedure and have reassessed the patient immediately prior to sedation / procedure.    I have personally and medically directed the administration of medications used.    Fabián Hines MD

## 2020-10-19 DIAGNOSIS — S42.002A CLOSED DISPLACED FRACTURE OF LEFT CLAVICLE, UNSPECIFIED PART OF CLAVICLE, INITIAL ENCOUNTER: ICD-10-CM

## 2020-10-19 DIAGNOSIS — F03.90 DEMENTIA WITHOUT BEHAVIORAL DISTURBANCE, UNSPECIFIED DEMENTIA TYPE: ICD-10-CM

## 2020-10-19 DIAGNOSIS — S49.92XA INJURY OF LEFT CLAVICLE, INITIAL ENCOUNTER: ICD-10-CM

## 2020-10-19 DIAGNOSIS — E11.59 TYPE 2 DIABETES MELLITUS WITH OTHER CIRCULATORY COMPLICATION, WITHOUT LONG-TERM CURRENT USE OF INSULIN (H): ICD-10-CM

## 2020-10-20 RX ORDER — MEMANTINE HYDROCHLORIDE 10 MG/1
TABLET ORAL
Qty: 90 TABLET | Refills: 0 | Status: SHIPPED | OUTPATIENT
Start: 2020-10-20 | End: 2020-12-28

## 2020-10-20 RX ORDER — MELOXICAM 7.5 MG/1
TABLET ORAL
Qty: 90 TABLET | Refills: 0 | Status: SHIPPED | OUTPATIENT
Start: 2020-10-20 | End: 2020-12-28

## 2020-10-20 RX ORDER — GLIPIZIDE 2.5 MG/1
TABLET, EXTENDED RELEASE ORAL
Qty: 90 TABLET | Refills: 0 | Status: SHIPPED | OUTPATIENT
Start: 2020-10-20 | End: 2020-12-28

## 2020-10-20 NOTE — TELEPHONE ENCOUNTER
Pending Prescriptions:                       Disp   Refills    glipiZIDE (GLUCOTROL XL) 2.5 MG 24 hr tab*90 tab*0            Sig: TAKE 1 TABLET EVERY DAY    meloxicam (MOBIC) 7.5 MG tablet [Pharmacy*90 tab*1            Sig: TAKE 1 TABLET EVERY DAY    memantine (NAMENDA) 10 MG tablet [Pharmac*90 tab*1            Sig: TAKE 1 TABLET EVERY DAY    Medication is being filled for 1 time mechelle refill only due to:  Patient is due for due for nurse BP check.     Please call and help schedule.  Thank you!    Lei Esposito RN  October 20, 2020

## 2020-12-23 DIAGNOSIS — I10 HYPERTENSION GOAL BP (BLOOD PRESSURE) < 140/90: ICD-10-CM

## 2020-12-23 DIAGNOSIS — E78.5 HYPERLIPIDEMIA LDL GOAL <100: ICD-10-CM

## 2020-12-23 DIAGNOSIS — Z86.73 HISTORY OF CVA (CEREBROVASCULAR ACCIDENT): ICD-10-CM

## 2020-12-23 DIAGNOSIS — R56.9 CONVULSIONS, UNSPECIFIED CONVULSION TYPE (H): ICD-10-CM

## 2020-12-24 RX ORDER — QUINAPRIL 5 1/1
TABLET ORAL
Qty: 90 TABLET | Refills: 1 | Status: SHIPPED | OUTPATIENT
Start: 2020-12-24 | End: 2021-03-26

## 2020-12-24 RX ORDER — CLOPIDOGREL BISULFATE 75 MG/1
TABLET ORAL
Qty: 90 TABLET | Refills: 1 | Status: SHIPPED | OUTPATIENT
Start: 2020-12-24 | End: 2021-04-19

## 2020-12-24 RX ORDER — ATORVASTATIN CALCIUM 20 MG/1
TABLET, FILM COATED ORAL
Qty: 90 TABLET | Refills: 1 | Status: SHIPPED | OUTPATIENT
Start: 2020-12-24 | End: 2021-04-19

## 2020-12-27 DIAGNOSIS — S49.92XA INJURY OF LEFT CLAVICLE, INITIAL ENCOUNTER: ICD-10-CM

## 2020-12-27 DIAGNOSIS — S42.002A CLOSED DISPLACED FRACTURE OF LEFT CLAVICLE, UNSPECIFIED PART OF CLAVICLE, INITIAL ENCOUNTER: ICD-10-CM

## 2020-12-27 DIAGNOSIS — F03.90 DEMENTIA WITHOUT BEHAVIORAL DISTURBANCE, UNSPECIFIED DEMENTIA TYPE: ICD-10-CM

## 2020-12-27 DIAGNOSIS — E11.59 TYPE 2 DIABETES MELLITUS WITH OTHER CIRCULATORY COMPLICATION, WITHOUT LONG-TERM CURRENT USE OF INSULIN (H): ICD-10-CM

## 2020-12-28 RX ORDER — MEMANTINE HYDROCHLORIDE 10 MG/1
TABLET ORAL
Qty: 90 TABLET | Refills: 0 | Status: SHIPPED | OUTPATIENT
Start: 2020-12-28 | End: 2021-04-19

## 2020-12-28 RX ORDER — MELOXICAM 7.5 MG/1
TABLET ORAL
Qty: 90 TABLET | Refills: 0 | Status: SHIPPED | OUTPATIENT
Start: 2020-12-28 | End: 2021-04-19

## 2020-12-28 RX ORDER — GLIPIZIDE 2.5 MG/1
TABLET, EXTENDED RELEASE ORAL
Qty: 90 TABLET | Refills: 0 | Status: SHIPPED | OUTPATIENT
Start: 2020-12-28 | End: 2021-04-19

## 2021-01-04 DIAGNOSIS — K30 CHRONIC UPSET STOMACH: ICD-10-CM

## 2021-01-04 DIAGNOSIS — R10.84 ABDOMINAL PAIN, GENERALIZED: ICD-10-CM

## 2021-01-04 RX ORDER — OMEPRAZOLE 40 MG/1
CAPSULE, DELAYED RELEASE ORAL
Qty: 90 CAPSULE | Refills: 0 | Status: SHIPPED | OUTPATIENT
Start: 2021-01-04 | End: 2021-03-26

## 2021-03-26 DIAGNOSIS — R10.84 ABDOMINAL PAIN, GENERALIZED: ICD-10-CM

## 2021-03-26 DIAGNOSIS — K30 CHRONIC UPSET STOMACH: ICD-10-CM

## 2021-03-26 DIAGNOSIS — I10 HYPERTENSION GOAL BP (BLOOD PRESSURE) < 140/90: ICD-10-CM

## 2021-03-26 DIAGNOSIS — M81.0 AGE-RELATED OSTEOPOROSIS WITHOUT CURRENT PATHOLOGICAL FRACTURE: ICD-10-CM

## 2021-03-26 RX ORDER — OMEPRAZOLE 40 MG/1
CAPSULE, DELAYED RELEASE ORAL
Qty: 90 CAPSULE | Refills: 0 | Status: SHIPPED | OUTPATIENT
Start: 2021-03-26 | End: 2021-04-19

## 2021-03-26 RX ORDER — QUINAPRIL 5 1/1
TABLET ORAL
Qty: 90 TABLET | Refills: 1 | Status: SHIPPED | OUTPATIENT
Start: 2021-03-26 | End: 2021-04-19

## 2021-03-26 RX ORDER — ALENDRONATE SODIUM 70 MG/1
70 TABLET ORAL
Qty: 13 TABLET | Refills: 3 | Status: SHIPPED | OUTPATIENT
Start: 2021-03-26 | End: 2021-04-19

## 2021-04-15 NOTE — PROGRESS NOTES
"SUBJECTIVE:   Khalida Redding is a 70 year old female who presents for Preventive Visit.      Patient has been advised of split billing requirements and indicates understanding: Yes   Are you in the first 12 months of your Medicare coverage?  No    Healthy Habits:     In general, how would you rate your overall health?  Fair    Frequency of exercise:  None    Do you usually eat at least 4 servings of fruit and vegetables a day, include whole grains    & fiber and avoid regularly eating high fat or \"junk\" foods?  No    Taking medications regularly:  Yes    Medication side effects:  None    Ability to successfully perform activities of daily living:  Transportation requires assistance, shopping requires assistance and medication administration requires assistance    Home Safety:  No safety concerns identified    Hearing Impairment:  No hearing concerns    In the past 6 months, have you been bothered by leaking of urine? Yes    In general, how would you rate your overall mental or emotional health?  Fair      PHQ-2 Total Score: 1    Additional concerns today:  No    Do you feel safe in your environment? Yes    Have you ever done Advance Care Planning? (For example, a Health Directive, POLST, or a discussion with a medical provider or your loved ones about your wishes): No, advance care planning information given to patient to review.  Patient plans to discuss their wishes with loved ones or provider.      Declines hearing test   Fall risk  Fallen 2 or more times in the past year?: No  Any fall with injury in the past year?: No  click delete button to remove this line now  Cognitive Screening   1) Repeat 3 items (Leader, Season, Table)    2) Clock draw: NORMAL  3) 3 item recall: {Say: \"What were the three words I asked you to remember?\"  Results: NORMAL clock, 1-2 items recalled: COGNITIVE IMPAIRMENT LESS LIKELY    Mini-CogTM Copyright ROLO Barbosa. Licensed by the author for use in [facility]; reprinted " with permission (irving@[facility]). All rights reserved.      Do you have sleep apnea, excessive snoring or daytime drowsiness?: no    Reviewed and updated as needed this visit by clinical staff                 Reviewed and updated as needed this visit by Provider                Social History     Tobacco Use     Smoking status: Current Every Day Smoker     Packs/day: 0.50     Types: Cigarettes     Smokeless tobacco: Never Used   Substance Use Topics     Alcohol use: No     Frequency: Never     If you drink alcohol do you typically have >3 drinks per day or >7 drinks per week? No    Alcohol Use 1/13/2020   Prescreen: >3 drinks/day or >7 drinks/week? No     Current providers sharing in care for this patient include:   Patient Care Team:  Abdirahman Munoz PA-C as PCP - General (Physician Assistant)  Abdirahman Munoz PA-C as Assigned PCP    The following health maintenance items are reviewed in Epic and correct as of today:  Health Maintenance Due   Topic Date Due     COVID-19 Vaccine (1) Never done     ZOSTER IMMUNIZATION (1 of 2) Never done     EYE EXAM  01/01/2020     INFLUENZA VACCINE (1) 09/01/2020     MICROALBUMIN  11/18/2020     A1C  12/16/2020     PHQ-2  01/01/2021     MEDICARE ANNUAL WELLNESS VISIT  01/13/2021     LIPID  01/13/2021     DIABETIC FOOT EXAM  01/13/2021     FALL RISK ASSESSMENT  01/13/2021     Lab work is in process  Labs reviewed in EPIC  BP Readings from Last 3 Encounters:   04/19/21 132/80   08/24/20 (!) 160/97   07/13/20 134/82    Wt Readings from Last 3 Encounters:   04/19/21 64.9 kg (143 lb)   07/13/20 65.3 kg (144 lb)   06/16/20 65.3 kg (144 lb)                  Patient Active Problem List   Diagnosis     Type 2 diabetes mellitus with circulatory disorder, without long-term current use of insulin (H)     Hyperlipidemia LDL goal <100     Hypertension goal BP (blood pressure) < 140/90     History of CVA (cerebrovascular accident)     Convulsions, unspecified convulsion type (H)     Osteoarthritis      Tobacco abuse disorder     Pain in both lower legs     Dementia (H)     Supraventricular tachycardia (H)     Gait instability     Tubular adenoma of colon     Syncope, unspecified syncope type     Lumbar radiculopathy     Closed displaced fracture of left clavicle, unspecified part of clavicle, initial encounter     Injury of left clavicle, initial encounter     Age-related osteoporosis without current pathological fracture     Left hemiparesis (H)     Abdominal pain, generalized     Slow transit constipation     Chronic upset stomach     History of colonic polyps     Past Surgical History:   Procedure Laterality Date     COLONOSCOPY N/A 11/28/2018    Procedure: Colonoscopy, Polypectomy by Biopsy;  Surgeon: Arcadio Mcmullen DO;  Location:  GI     COLONOSCOPY N/A 8/24/2020    Procedure: Colonoscopy with possible biopsy and/or polypectomy;  Surgeon: Fabián Hines MD;  Location:  GI     HIP SURGERY Left        Social History     Tobacco Use     Smoking status: Current Every Day Smoker     Packs/day: 0.50     Types: Cigarettes     Smokeless tobacco: Never Used   Substance Use Topics     Alcohol use: No     Frequency: Never     History reviewed. No pertinent family history.      Current Outpatient Medications   Medication Sig Dispense Refill     alendronate (FOSAMAX) 70 MG tablet Take 1 tablet (70 mg) by mouth every 7 days 13 tablet 3     atorvastatin (LIPITOR) 20 MG tablet TAKE 1 TABLET EVERY DAY (NEED MD APPOINTMENT) 90 tablet 1     blood glucose monitoring (NO BRAND SPECIFIED) test strip Accu-Chek Violette Plus Meter       clopidogrel (PLAVIX) 75 MG tablet TAKE 1 TABLET EVERY DAY 90 tablet 1     Cyanocobalamin (VITAMIN B 12 PO) Take 1,000 mcg by mouth daily       gabapentin (NEURONTIN) 300 MG capsule Take 1 capsule (300 mg) by mouth At Bedtime 90 capsule 1     glipiZIDE (GLUCOTROL XL) 2.5 MG 24 hr tablet TAKE 1 TABLET EVERY DAY 90 tablet 1     meloxicam (MOBIC) 7.5 MG tablet TAKE 1 TABLET EVERY DAY  90 tablet 1     memantine (NAMENDA) 10 MG tablet TAKE 1 TABLET EVERY DAY 90 tablet 1     metFORMIN (GLUCOPHAGE-XR) 500 MG 24 hr tablet Take 2 tablets (1,000 mg) by mouth 2 times daily (with meals) 360 tablet 1     omeprazole (PRILOSEC) 40 MG DR capsule TAKE 1 CAPSULE BY MOUTH ONCE DAILY TAKE  30  TO  60  MINUTES  BEFORE  A  MEAL 90 capsule 1     order for DME Equipment being ordered: glucometer and related supplies. 1 Units 0     polyethylene glycol (MIRALAX) 17 GM/Dose powder Take 17 g (1 capful) by mouth 2 times daily 1000 g 1     quinapril (ACCUPRIL) 5 MG tablet TAKE 1 TABLET EVERY DAY 90 tablet 1     Allergies   Allergen Reactions     Amoxicillin Hives and Rash     Pt reports she has taken PCN without problems     Ampicillin Rash     Recent Labs   Lab Test 06/16/20  1617 01/13/20  0813 11/18/19  0816 11/18/19  0813 05/17/19  1345 05/17/19  1345 02/03/19  1605 02/03/19  1605 07/16/18  0000 07/16/18 02/19/18   A1C 6.1* 6.6*  --  8.1*  --  7.7*   < >  --   --  6.4* 5.9*   LDL  --  80 87  --   --   --   --   --   --  89 88   HDL  --  57  --   --   --   --   --   --   --  53 53   TRIG  --  114  --   --   --   --   --   --   --  148 146   ALT 12 17  --   --   --   --   --  14   < >  --   --    CR 0.80 0.70  --   --    < >  --   --  0.57   < > 0.69 0.77   GFRESTIMATED 73 87  --   --    < >  --   --  >90   < > 89 79   GFRESTBLACK 85 >90  --   --    < >  --   --  >90   < > 103 91   POTASSIUM 3.9 3.8  --   --    < >  --   --  4.1   < > 4.8 4.8   TSH  --  4.45*  --   --   --  2.50  --   --   --   --  3.72    < > = values in this interval not displayed.        Review of Systems   Constitutional: Negative for chills and fever.   HENT: Negative for congestion, ear pain, hearing loss and sore throat.    Eyes: Negative for pain and visual disturbance.   Respiratory: Negative for cough and shortness of breath.    Cardiovascular: Negative for chest pain, palpitations and peripheral edema.   Gastrointestinal: Positive for  "constipation. Negative for abdominal pain, diarrhea, heartburn, hematochezia and nausea.   Breasts:  Negative for tenderness, breast mass and discharge.   Genitourinary: Positive for frequency. Negative for dysuria, genital sores, hematuria, pelvic pain, urgency, vaginal bleeding and vaginal discharge.   Musculoskeletal: Positive for arthralgias and myalgias. Negative for joint swelling.   Skin: Negative for rash.   Neurological: Positive for weakness. Negative for dizziness, headaches and paresthesias.   Psychiatric/Behavioral: Negative for mood changes. The patient is nervous/anxious.        OBJECTIVE:   There were no vitals taken for this visit. Estimated body mass index is 24.34 kg/m  as calculated from the following:    Height as of 7/13/20: 1.638 m (5' 4.5\").    Weight as of 7/13/20: 65.3 kg (144 lb).  Physical Exam  GENERAL APPEARANCE: healthy, alert and no distress  EYES: Eyes grossly normal to inspection, PERRL and conjunctivae and sclerae normal  HENT: ear canals and TM's normal, nose and mouth without ulcers or lesions, oropharynx clear and oral mucous membranes moist  NECK: no adenopathy, no asymmetry, masses, or scars and thyroid normal to palpation  RESP: lungs clear to auscultation - no rales, rhonchi or wheezes  BREAST: normal without masses, tenderness or nipple discharge and no palpable axillary masses or adenopathy  CV: regular rate and rhythm, normal S1 S2, no S3 or S4, no murmur, click or rub, no peripheral edema and peripheral pulses strong  ABDOMEN: soft, nontender, no hepatosplenomegaly, no masses and bowel sounds normal  MS: no musculoskeletal defects are noted and gait is age appropriate without ataxia  SKIN: no suspicious lesions or rashes  NEURO: Normal strength and tone, sensory exam grossly normal, mentation intact and speech normal  PSYCH: mentation appears normal and affect normal/bright    Diagnostic Test Results:  Labs reviewed in Epic  No results found for any visits on " 04/19/21.    ASSESSMENT / PLAN:   1. Encounter for Medicare annual wellness exam  Discussed overall concerns and she seems to be as stable as we could expect given the following secondary diagnoses.    2. Dementia without behavioral disturbance, unspecified dementia type (H)  Labs pending refills granted follow-up in 6 months.  - CBC with platelets  - Comprehensive metabolic panel  - Lipid panel reflex to direct LDL Fasting  - TSH with free T4 reflex  - gabapentin (NEURONTIN) 300 MG capsule; Take 1 capsule (300 mg) by mouth At Bedtime  Dispense: 90 capsule; Refill: 1  - memantine (NAMENDA) 10 MG tablet; TAKE 1 TABLET EVERY DAY  Dispense: 90 tablet; Refill: 1    3. Type 2 diabetes mellitus with diabetic peripheral angiopathy without gangrene, without long-term current use of insulin (H)  Labs pending refills granted follow-up in 6 months. Adjustments in medication based on results.  - CBC with platelets  - Comprehensive metabolic panel  - Lipid panel reflex to direct LDL Fasting  - TSH with free T4 reflex  - metFORMIN (GLUCOPHAGE-XR) 500 MG 24 hr tablet; Take 2 tablets (1,000 mg) by mouth 2 times daily (with meals)  Dispense: 360 tablet; Refill: 1  - Hemoglobin A1c    4. Supraventricular tachycardia (H)  Labs pending refills granted follow-up in 6 months.   - CBC with platelets  - Comprehensive metabolic panel  - Lipid panel reflex to direct LDL Fasting  - TSH with free T4 reflex    5. Convulsions, unspecified convulsion type (H)  - clopidogrel (PLAVIX) 75 MG tablet; TAKE 1 TABLET EVERY DAY  Dispense: 90 tablet; Refill: 1    6. Lumbar radiculopathy  7. Pain in both lower legs  Patient continues to struggle with pain down both her legs left greater than right today. Advised against narcotics but did agree to x-rays and MRI.  - XR Lumbar Spine 2/3 Views; Future  - MR Lumbar Spine w/o Contrast; Future    8. Hypertension goal BP (blood pressure) < 140/90  - CBC with platelets  - Comprehensive metabolic panel  - Lipid  panel reflex to direct LDL Fasting  - TSH with free T4 reflex  - quinapril (ACCUPRIL) 5 MG tablet; TAKE 1 TABLET EVERY DAY  Dispense: 90 tablet; Refill: 1    9. Gait instability  - XR Lumbar Spine 2/3 Views; Future  - MR Lumbar Spine w/o Contrast; Future    10. Abdominal pain, generalized  - omeprazole (PRILOSEC) 40 MG DR capsule; TAKE 1 CAPSULE BY MOUTH ONCE DAILY TAKE  30  TO  60  MINUTES  BEFORE  A  MEAL  Dispense: 90 capsule; Refill: 1  - polyethylene glycol (MIRALAX) 17 GM/Dose powder; Take 17 g (1 capful) by mouth 2 times daily  Dispense: 1000 g; Refill: 1    11. Chronic upset stomach  - omeprazole (PRILOSEC) 40 MG DR capsule; TAKE 1 CAPSULE BY MOUTH ONCE DAILY TAKE  30  TO  60  MINUTES  BEFORE  A  MEAL  Dispense: 90 capsule; Refill: 1  - polyethylene glycol (MIRALAX) 17 GM/Dose powder; Take 17 g (1 capful) by mouth 2 times daily  Dispense: 1000 g; Refill: 1    12. Age-related osteoporosis without current pathological fracture  - alendronate (FOSAMAX) 70 MG tablet; Take 1 tablet (70 mg) by mouth every 7 days  Dispense: 13 tablet; Refill: 3    13. History of CVA (cerebrovascular accident)  - clopidogrel (PLAVIX) 75 MG tablet; TAKE 1 TABLET EVERY DAY  Dispense: 90 tablet; Refill: 1    14. Type 2 diabetes mellitus with other circulatory complication, without long-term current use of insulin (H)  Labs pending refills granted follow-up in 6 months. Adjustments in medication based on results.  - glipiZIDE (GLUCOTROL XL) 2.5 MG 24 hr tablet; TAKE 1 TABLET EVERY DAY  Dispense: 90 tablet; Refill: 1    15. Injury of left clavicle, initial encounter  16. Closed displaced fracture of left clavicle, unspecified part of clavicle, initial encounter  Fair control at this point time follow-up as needed.  - meloxicam (MOBIC) 7.5 MG tablet; TAKE 1 TABLET EVERY DAY  Dispense: 90 tablet; Refill: 1    17. Hyperlipidemia LDL goal <100  Labs pending refills granted follow-up in 6 months. Adjustments in medication based on results.  -  "atorvastatin (LIPITOR) 20 MG tablet; TAKE 1 TABLET EVERY DAY (NEED MD APPOINTMENT)  Dispense: 90 tablet; Refill: 1    Patient has been advised of split billing requirements and indicates understanding: Yes  COUNSELING:  Reviewed preventive health counseling, as reflected in patient instructions       Regular exercise       Healthy diet/nutrition       Vision screening       Hearing screening       Dental care       Bladder control       Fall risk prevention    Estimated body mass index is 24.34 kg/m  as calculated from the following:    Height as of 7/13/20: 1.638 m (5' 4.5\").    Weight as of 7/13/20: 65.3 kg (144 lb).        She reports that she has been smoking cigarettes. She has been smoking about 0.50 packs per day. She has never used smokeless tobacco.  Tobacco Cessation Action Plan:   Information offered: Patient not interested at this time      Appropriate preventive services were discussed with this patient, including applicable screening as appropriate for cardiovascular disease, diabetes, osteopenia/osteoporosis, and glaucoma.  As appropriate for age/gender, discussed screening for colorectal cancer, prostate cancer, breast cancer, and cervical cancer. Checklist reviewing preventive services available has been given to the patient.    Reviewed patients plan of care and provided an AVS. The Intermediate Care Plan ( asthma action plan, low back pain action plan, and migraine action plan) for Khalida meets the Care Plan requirement. This Care Plan has been established and reviewed with the Patient and daughter.    Counseling Resources:  ATP IV Guidelines  Pooled Cohorts Equation Calculator  Breast Cancer Risk Calculator  Breast Cancer: Medication to Reduce Risk  FRAX Risk Assessment  ICSI Preventive Guidelines  Dietary Guidelines for Americans, 2010  [employer]'s MyPlate  ASA Prophylaxis  Lung CA Screening    Abdirahman Shrestha PA-C  [facility]    Identified Health Risks:  "

## 2021-04-19 ENCOUNTER — ANCILLARY PROCEDURE (OUTPATIENT)
Dept: GENERAL RADIOLOGY | Facility: OTHER | Age: 71
End: 2021-04-19
Attending: PHYSICIAN ASSISTANT
Payer: COMMERCIAL

## 2021-04-19 ENCOUNTER — OFFICE VISIT (OUTPATIENT)
Dept: FAMILY MEDICINE | Facility: OTHER | Age: 71
End: 2021-04-19
Payer: COMMERCIAL

## 2021-04-19 VITALS
SYSTOLIC BLOOD PRESSURE: 132 MMHG | WEIGHT: 143 LBS | HEIGHT: 65 IN | DIASTOLIC BLOOD PRESSURE: 80 MMHG | TEMPERATURE: 97.5 F | RESPIRATION RATE: 16 BRPM | OXYGEN SATURATION: 94 % | BODY MASS INDEX: 23.82 KG/M2 | HEART RATE: 74 BPM

## 2021-04-19 DIAGNOSIS — R26.81 GAIT INSTABILITY: ICD-10-CM

## 2021-04-19 DIAGNOSIS — K30 CHRONIC UPSET STOMACH: ICD-10-CM

## 2021-04-19 DIAGNOSIS — M81.0 AGE-RELATED OSTEOPOROSIS WITHOUT CURRENT PATHOLOGICAL FRACTURE: ICD-10-CM

## 2021-04-19 DIAGNOSIS — M79.662 PAIN IN BOTH LOWER LEGS: ICD-10-CM

## 2021-04-19 DIAGNOSIS — R56.9 CONVULSIONS, UNSPECIFIED CONVULSION TYPE (H): ICD-10-CM

## 2021-04-19 DIAGNOSIS — I47.10 SUPRAVENTRICULAR TACHYCARDIA (H): ICD-10-CM

## 2021-04-19 DIAGNOSIS — S49.92XA INJURY OF LEFT CLAVICLE, INITIAL ENCOUNTER: ICD-10-CM

## 2021-04-19 DIAGNOSIS — F03.90 DEMENTIA WITHOUT BEHAVIORAL DISTURBANCE, UNSPECIFIED DEMENTIA TYPE: ICD-10-CM

## 2021-04-19 DIAGNOSIS — R10.84 ABDOMINAL PAIN, GENERALIZED: ICD-10-CM

## 2021-04-19 DIAGNOSIS — I10 HYPERTENSION GOAL BP (BLOOD PRESSURE) < 140/90: ICD-10-CM

## 2021-04-19 DIAGNOSIS — E11.59 TYPE 2 DIABETES MELLITUS WITH OTHER CIRCULATORY COMPLICATION, WITHOUT LONG-TERM CURRENT USE OF INSULIN (H): ICD-10-CM

## 2021-04-19 DIAGNOSIS — Z86.73 HISTORY OF CVA (CEREBROVASCULAR ACCIDENT): ICD-10-CM

## 2021-04-19 DIAGNOSIS — Z00.00 ENCOUNTER FOR MEDICARE ANNUAL WELLNESS EXAM: Primary | ICD-10-CM

## 2021-04-19 DIAGNOSIS — M54.16 LUMBAR RADICULOPATHY: ICD-10-CM

## 2021-04-19 DIAGNOSIS — E78.5 HYPERLIPIDEMIA LDL GOAL <100: ICD-10-CM

## 2021-04-19 DIAGNOSIS — E11.51 TYPE 2 DIABETES MELLITUS WITH DIABETIC PERIPHERAL ANGIOPATHY WITHOUT GANGRENE, WITHOUT LONG-TERM CURRENT USE OF INSULIN (H): ICD-10-CM

## 2021-04-19 DIAGNOSIS — M79.661 PAIN IN BOTH LOWER LEGS: ICD-10-CM

## 2021-04-19 DIAGNOSIS — S42.002A CLOSED DISPLACED FRACTURE OF LEFT CLAVICLE, UNSPECIFIED PART OF CLAVICLE, INITIAL ENCOUNTER: ICD-10-CM

## 2021-04-19 LAB
ALBUMIN SERPL-MCNC: 3.5 G/DL (ref 3.4–5)
ALP SERPL-CCNC: 105 U/L (ref 40–150)
ALT SERPL W P-5'-P-CCNC: 16 U/L (ref 0–50)
ANION GAP SERPL CALCULATED.3IONS-SCNC: 3 MMOL/L (ref 3–14)
AST SERPL W P-5'-P-CCNC: 14 U/L (ref 0–45)
BILIRUB SERPL-MCNC: 0.4 MG/DL (ref 0.2–1.3)
BUN SERPL-MCNC: 11 MG/DL (ref 7–30)
CALCIUM SERPL-MCNC: 8.1 MG/DL (ref 8.5–10.1)
CHLORIDE SERPL-SCNC: 108 MMOL/L (ref 94–109)
CHOLEST SERPL-MCNC: 158 MG/DL
CO2 SERPL-SCNC: 29 MMOL/L (ref 20–32)
CREAT SERPL-MCNC: 0.68 MG/DL (ref 0.52–1.04)
ERYTHROCYTE [DISTWIDTH] IN BLOOD BY AUTOMATED COUNT: 13.9 % (ref 10–15)
GFR SERPL CREATININE-BSD FRML MDRD: 87 ML/MIN/{1.73_M2}
GLUCOSE SERPL-MCNC: 78 MG/DL (ref 70–99)
HBA1C MFR BLD: 6.1 % (ref 0–5.6)
HCT VFR BLD AUTO: 45.4 % (ref 35–47)
HDLC SERPL-MCNC: 48 MG/DL
HGB BLD-MCNC: 14.4 G/DL (ref 11.7–15.7)
LDLC SERPL CALC-MCNC: 78 MG/DL
MCH RBC QN AUTO: 30.1 PG (ref 26.5–33)
MCHC RBC AUTO-ENTMCNC: 31.7 G/DL (ref 31.5–36.5)
MCV RBC AUTO: 95 FL (ref 78–100)
NONHDLC SERPL-MCNC: 110 MG/DL
PLATELET # BLD AUTO: 240 10E9/L (ref 150–450)
POTASSIUM SERPL-SCNC: 3.9 MMOL/L (ref 3.4–5.3)
PROT SERPL-MCNC: 6.9 G/DL (ref 6.8–8.8)
RBC # BLD AUTO: 4.79 10E12/L (ref 3.8–5.2)
SODIUM SERPL-SCNC: 140 MMOL/L (ref 133–144)
TRIGL SERPL-MCNC: 162 MG/DL
TSH SERPL DL<=0.005 MIU/L-ACNC: 2.32 MU/L (ref 0.4–4)
WBC # BLD AUTO: 8.1 10E9/L (ref 4–11)

## 2021-04-19 PROCEDURE — 36415 COLL VENOUS BLD VENIPUNCTURE: CPT | Performed by: PHYSICIAN ASSISTANT

## 2021-04-19 PROCEDURE — 85027 COMPLETE CBC AUTOMATED: CPT | Performed by: PHYSICIAN ASSISTANT

## 2021-04-19 PROCEDURE — 83036 HEMOGLOBIN GLYCOSYLATED A1C: CPT | Performed by: PHYSICIAN ASSISTANT

## 2021-04-19 PROCEDURE — 99397 PER PM REEVAL EST PAT 65+ YR: CPT | Performed by: PHYSICIAN ASSISTANT

## 2021-04-19 PROCEDURE — 72100 X-RAY EXAM L-S SPINE 2/3 VWS: CPT | Performed by: RADIOLOGY

## 2021-04-19 PROCEDURE — 84443 ASSAY THYROID STIM HORMONE: CPT | Performed by: PHYSICIAN ASSISTANT

## 2021-04-19 PROCEDURE — 80053 COMPREHEN METABOLIC PANEL: CPT | Performed by: PHYSICIAN ASSISTANT

## 2021-04-19 PROCEDURE — 99214 OFFICE O/P EST MOD 30 MIN: CPT | Mod: 25 | Performed by: PHYSICIAN ASSISTANT

## 2021-04-19 PROCEDURE — 80061 LIPID PANEL: CPT | Performed by: PHYSICIAN ASSISTANT

## 2021-04-19 RX ORDER — MELOXICAM 7.5 MG/1
TABLET ORAL
Qty: 90 TABLET | Refills: 1 | Status: SHIPPED | OUTPATIENT
Start: 2021-04-19 | End: 2021-11-10

## 2021-04-19 RX ORDER — QUINAPRIL 5 1/1
TABLET ORAL
Qty: 90 TABLET | Refills: 1 | Status: ON HOLD | OUTPATIENT
Start: 2021-04-19 | End: 2021-12-09

## 2021-04-19 RX ORDER — GABAPENTIN 300 MG/1
300 CAPSULE ORAL AT BEDTIME
Qty: 90 CAPSULE | Refills: 1 | Status: SHIPPED | OUTPATIENT
Start: 2021-04-19 | End: 2021-12-27

## 2021-04-19 RX ORDER — ALENDRONATE SODIUM 70 MG/1
70 TABLET ORAL
Qty: 13 TABLET | Refills: 3 | Status: SHIPPED | OUTPATIENT
Start: 2021-04-19 | End: 2022-02-04

## 2021-04-19 RX ORDER — GLIPIZIDE 2.5 MG/1
TABLET, EXTENDED RELEASE ORAL
Qty: 90 TABLET | Refills: 1 | Status: SHIPPED | OUTPATIENT
Start: 2021-04-19 | End: 2021-05-07 | Stop reason: SINTOL

## 2021-04-19 RX ORDER — POLYETHYLENE GLYCOL 3350 17 G/17G
1 POWDER, FOR SOLUTION ORAL 2 TIMES DAILY
Qty: 1000 G | Refills: 1 | Status: ON HOLD | OUTPATIENT
Start: 2021-04-19 | End: 2023-01-01

## 2021-04-19 RX ORDER — METFORMIN HCL 500 MG
1000 TABLET, EXTENDED RELEASE 24 HR ORAL 2 TIMES DAILY WITH MEALS
Qty: 360 TABLET | Refills: 1 | Status: SHIPPED | OUTPATIENT
Start: 2021-04-19 | End: 2021-08-23

## 2021-04-19 RX ORDER — CLOPIDOGREL BISULFATE 75 MG/1
TABLET ORAL
Qty: 90 TABLET | Refills: 1 | Status: SHIPPED | OUTPATIENT
Start: 2021-04-19 | End: 2021-11-10

## 2021-04-19 RX ORDER — MEMANTINE HYDROCHLORIDE 10 MG/1
TABLET ORAL
Qty: 90 TABLET | Refills: 1 | Status: SHIPPED | OUTPATIENT
Start: 2021-04-19 | End: 2021-11-10

## 2021-04-19 RX ORDER — ATORVASTATIN CALCIUM 20 MG/1
TABLET, FILM COATED ORAL
Qty: 90 TABLET | Refills: 1 | Status: SHIPPED | OUTPATIENT
Start: 2021-04-19 | End: 2021-09-27

## 2021-04-19 RX ORDER — OMEPRAZOLE 40 MG/1
CAPSULE, DELAYED RELEASE ORAL
Qty: 90 CAPSULE | Refills: 1 | Status: SHIPPED | OUTPATIENT
Start: 2021-04-19 | End: 2021-12-27

## 2021-04-19 ASSESSMENT — ENCOUNTER SYMPTOMS
DYSURIA: 0
NERVOUS/ANXIOUS: 1
SHORTNESS OF BREATH: 0
HEADACHES: 0
FEVER: 0
EYE PAIN: 0
FREQUENCY: 1
HEARTBURN: 0
PALPITATIONS: 0
CHILLS: 0
HEMATURIA: 0
WEAKNESS: 1
NAUSEA: 0
MYALGIAS: 1
BREAST MASS: 0
HEMATOCHEZIA: 0
COUGH: 0
ABDOMINAL PAIN: 0
ARTHRALGIAS: 1
DIARRHEA: 0
JOINT SWELLING: 0
SORE THROAT: 0
PARESTHESIAS: 0
CONSTIPATION: 1
DIZZINESS: 0

## 2021-04-19 ASSESSMENT — ACTIVITIES OF DAILY LIVING (ADL)
CURRENT_FUNCTION: TRANSPORTATION REQUIRES ASSISTANCE
CURRENT_FUNCTION: MEDICATION ADMINISTRATION REQUIRES ASSISTANCE
CURRENT_FUNCTION: SHOPPING REQUIRES ASSISTANCE

## 2021-04-19 ASSESSMENT — MIFFLIN-ST. JEOR: SCORE: 1154.51

## 2021-04-20 ENCOUNTER — TELEPHONE (OUTPATIENT)
Dept: FAMILY MEDICINE | Facility: OTHER | Age: 71
End: 2021-04-20

## 2021-04-20 DIAGNOSIS — I71.40 ABDOMINAL AORTIC ANEURYSM (AAA) WITHOUT RUPTURE (H): Primary | ICD-10-CM

## 2021-04-20 NOTE — TELEPHONE ENCOUNTER
----- Message from Abdirahman Munoz PA-C sent at 4/20/2021  6:35 AM CDT -----  Slight dilation of the abdominal aorta is noted on x-ray.  I have placed orders for AAA ultrasound to quantify this low bit more accurately.  Please contact the patient to help arrange for this ultrasound to be done in [place].  Electronically signed:    Abdirahman Munoz PA-C

## 2021-04-21 ENCOUNTER — TELEPHONE (OUTPATIENT)
Dept: FAMILY MEDICINE | Facility: OTHER | Age: 71
End: 2021-04-21

## 2021-04-21 NOTE — TELEPHONE ENCOUNTER
Proton pump inhibitor may interact with Clopidogrel.    Please either acknowledge the warning or a new medication.    Call [facility]    [phone]    Ref# [id number]

## 2021-04-22 NOTE — TELEPHONE ENCOUNTER
Acknowledged.  We will monitor patient and she has done well with this in the past.  Electronically signed:    Abdirahman Shrestha PA-C

## 2021-04-27 ENCOUNTER — HOSPITAL ENCOUNTER (OUTPATIENT)
Dept: ULTRASOUND IMAGING | Facility: CLINIC | Age: 71
Discharge: HOME | End: 2021-04-27
Attending: PHYSICIAN ASSISTANT | Admitting: PHYSICIAN ASSISTANT
Payer: COMMERCIAL

## 2021-04-27 DIAGNOSIS — I71.40 ABDOMINAL AORTIC ANEURYSM (AAA) WITHOUT RUPTURE (H): ICD-10-CM

## 2021-04-27 PROCEDURE — 76775 US EXAM ABDO BACK WALL LIM: CPT

## 2021-05-07 ENCOUNTER — OFFICE VISIT (OUTPATIENT)
Dept: FAMILY MEDICINE | Facility: OTHER | Age: 71
End: 2021-05-07
Payer: COMMERCIAL

## 2021-05-07 VITALS
TEMPERATURE: 97.8 F | HEART RATE: 75 BPM | OXYGEN SATURATION: 96 % | BODY MASS INDEX: 24.1 KG/M2 | DIASTOLIC BLOOD PRESSURE: 80 MMHG | SYSTOLIC BLOOD PRESSURE: 120 MMHG | WEIGHT: 143.4 LBS

## 2021-05-07 DIAGNOSIS — E11.51 TYPE 2 DIABETES MELLITUS WITH DIABETIC PERIPHERAL ANGIOPATHY WITHOUT GANGRENE, WITHOUT LONG-TERM CURRENT USE OF INSULIN (H): ICD-10-CM

## 2021-05-07 DIAGNOSIS — E16.2 HYPOGLYCEMIA: Primary | ICD-10-CM

## 2021-05-07 DIAGNOSIS — Z23 NEED FOR VACCINATION: ICD-10-CM

## 2021-05-07 LAB
CREAT UR-MCNC: 157 MG/DL
MICROALBUMIN UR-MCNC: 15 MG/L
MICROALBUMIN/CREAT UR: 9.68 MG/G CR (ref 0–25)

## 2021-05-07 PROCEDURE — 82043 UR ALBUMIN QUANTITATIVE: CPT | Performed by: FAMILY MEDICINE

## 2021-05-07 PROCEDURE — 99215 OFFICE O/P EST HI 40 MIN: CPT | Performed by: FAMILY MEDICINE

## 2021-05-07 RX ORDER — GLUCOSAMINE HCL/CHONDROITIN SU 500-400 MG
CAPSULE ORAL
Qty: 100 EACH | Refills: 3 | Status: SHIPPED | OUTPATIENT
Start: 2021-05-07

## 2021-05-07 RX ORDER — LANCETS
EACH MISCELLANEOUS
Qty: 100 EACH | Refills: 6 | Status: SHIPPED | OUTPATIENT
Start: 2021-05-07

## 2021-05-07 NOTE — PROGRESS NOTES
{PROVIDER CHARTING PREFERENCE:165601}    Subjective   Khalida is a 70 year old who presents for the following health issues {ACCOMPANIED BY STATEMENT (Optional):436346}    History of Present Illness       She eats 2-3 servings of fruits and vegetables daily.She consumes 2 sweetened beverage(s) daily.She exercises with enough effort to increase her heart rate 9 or less minutes per day.  She exercises with enough effort to increase her heart rate 3 or less days per week. She is missing 1 dose(s) of medications per week.  She is not taking prescribed medications regularly due to other.     Review of Systems   {ROS COMP (Optional):351076}      Objective    There were no vitals taken for this visit.  There is no height or weight on file to calculate BMI.  Physical Exam   {Exam List (Optional):597880}    {Diagnostic Test Results (Optional):470392}    {AMBULATORY ATTESTATION (Optional):340709}

## 2021-05-07 NOTE — PROGRESS NOTES
"Assessment & Plan       ICD-10-CM    1. Hypoglycemia  E16.2    2. Type 2 diabetes mellitus with diabetic peripheral angiopathy without gangrene, without long-term current use of insulin (H)  E11.51 blood glucose monitoring (NO BRAND SPECIFIED) meter device kit     blood glucose (NO BRAND SPECIFIED) test strip     thin (NO BRAND SPECIFIED) lancets     alcohol swab prep pads     Albumin Random Urine Quantitative with Creat Ratio   3. Need for vaccination  Z23       1.  I suspect that her episodes are related to hypoglycemia.  This is most likely secondary to the glipizide XL.  Given her low A1c's and apparent hypoglycemic events, I recommended that she stop her glipizide.  Metformin alone will likely be adequate.  We will need to recheck A1c in a few months.  If she continues to have episodes, will consider the possibility of other etiologies including seizure disorder.  2.  Has been well controlled, but probably too tightly.  Will need A1c within 3 months.  Recommended that she update her eye exam.  Foot exam was performed today.  3.  I did recommend Covid vaccination today.  They are significantly vaccine hesitant regarding this.  I attempted to address their concerns to the best of my ability.  Patient's daughter got very emotional about this.  I expressed understanding of her fears related to Covid, but stressed that the vaccine is actually much less dangerous than the disease itself.  In fact, the reason I am recommending the vaccine is so that she does not have to \"lose her parents\".    Portions of this note were completed using Dragon dictation software.  Although reviewed, there may be typographical and other inadvertent errors that remain.     Prescription drug management  45 minutes spent on the date of the encounter doing chart review, history and exam, documentation and further activities per the note       Patient Instructions   Thank you for visiting [facility]    Let's stop the " glipizide for now.    If you keep getting episodes, let us know.    Please update your eye exam.  Have them get us a copy of your results.    We'll let you know your lab results as soon as we can.     Let us know if you need help scheduling your COVID vaccine.    Contact us or return if questions or concerns.     Have a nice day!    Dr. Yun     No follow-ups on file.      If you need medication refills, please contact your pharmacy 3 days before your prescriptions runs out or download the [email or web address] brittany for your smart phone. If you are out of refills, your pharmacy will contact contact the clinic.                                     [facility] Assistance [phone]                       No follow-ups on file.    Danilo Yun MD, MD  [facility] RAMIREZ Gaxiola is a 70 year old who presents for the following health issues     History of Present Illness       Diabetes:   She presents for follow up of diabetes.  She is not checking blood glucose. Blood glucose is never over 200 and never under 70. She is aware of hypoglycemia symptoms including other. She is concerned about low blood sugar, several less than 70 in the past few weeks. She is not experiencing numbness or burning in feet, excessive thirst, blurry vision, weight changes or redness, sores or blisters on feet. The patient has not had a diabetic eye exam in the last 12 months.         Hypertension: She presents for follow up of hypertension.  She does check blood pressure  regularly outside of the clinic. Outside blood pressures have been over 140/90. She follows a low salt diet.     She eats 2-3 servings of fruits and vegetables daily.She consumes 2 sweetened beverage(s) daily.She exercises with enough effort to increase her heart rate 9 or less minutes per day.  She exercises with enough effort to increase her heart rate 3 or less days per week. She is missing 1 dose(s) of medications per week.  She is not  "taking prescribed medications regularly due to other.    Pt gets these \"spells\"  Where she starts sweating, and loses fluid out of both ends. She starts to get more sweaty and knows something is going on. Sometimes will lose consciousness. She will usually come out of it in a 1 minute or two and then needs a nap. Sometimes will go to ER and get fluids. When she has gone to the hospital, they are unsure of the cause. Does not shake. Daughter holds head when she goes into the spell. Has been getting more headaches. Takes 6 Tylenol per day.     One doctor has told her it was seizures back in [place]. History of CVA back in 2010.     EMS was called to the house yesterday and they checked vitals and looked at her heart rhythm but she refused to go to ER. Yesterday was her third spell of the week. EMS gave oral glucose. Recheck was 81. Has not been checking blood sugar at home. Wanting new monitor.     Had low RMG yesterday in the afternoon of 57. Had bowl of cereal and coffee yesterday morning. She hadn't had anything after that until the episode.  Low blood sugars come out of the blue with no rhyme or reason. Diabetes runs in the family.   Has not recently been started on any new medications. RMG's typically in the 100's. No episodes of hyperglycemia.     Blood pressures have also been high. Has home monitor. Sometimes in the 140's to 160's just depends on the day. Has not missed any medications.     Review of Systems   CONSTITUTIONAL:NEGATIVE for fever, chills, change in weight  EYES: NEGATIVE for vision changes or irritation  RESP:POSITIVE forcough-non productive and Hx of smoking. Smoking about 1.5-2 PPD.   CV: NEGATIVE for chest pain, palpitations or peripheral edema  NEURO: Hx CVA, some dizziness and lightheadedness.   ENDOCRINE: NEGATIVE for temperature intolerance, skin/hair changes. Hx diabetes      Objective    /80   Pulse 75   Temp 97.8  F (36.6  C) (Temporal)   Wt 65 kg (143 lb 6.4 oz)   SpO2 " 96%   BMI 24.10 kg/m    Body mass index is 24.1 kg/m .  Physical Exam   GENERAL: healthy, alert and no distress  EYES: Eyes grossly normal to inspection, PERRL and conjunctivae and sclerae normal  NECK: no adenopathy, no asymmetry, masses, or scars and thyroid normal to palpation  RESP: lungs clear to auscultation - no rales, rhonchi or wheezes  CV: regular rate and rhythm, normal S1 S2, no S3 or S4, no murmur, click or rub, no peripheral edema and peripheral pulses strong  ABDOMEN: soft, nontender, no hepatosplenomegaly, no masses and bowel sounds normal  MS: no gross musculoskeletal defects noted, no edema  Diabetic foot exam: normal DP and PT pulses, no trophic changes or ulcerative lesions, normal sensory exam and normal monofilament exam    Office Visit on 04/19/2021   Component Date Value Ref Range Status     WBC 04/19/2021 8.1  4.0 - 11.0 10e9/L Final     RBC Count 04/19/2021 4.79  3.8 - 5.2 10e12/L Final     Hemoglobin 04/19/2021 14.4  11.7 - 15.7 g/dL Final     Hematocrit 04/19/2021 45.4  35.0 - 47.0 % Final     MCV 04/19/2021 95  78 - 100 fl Final     MCH 04/19/2021 30.1  26.5 - 33.0 pg Final     MCHC 04/19/2021 31.7  31.5 - 36.5 g/dL Final     RDW 04/19/2021 13.9  10.0 - 15.0 % Final     Platelet Count 04/19/2021 240  150 - 450 10e9/L Final     Sodium 04/19/2021 140  133 - 144 mmol/L Final     Potassium 04/19/2021 3.9  3.4 - 5.3 mmol/L Final     Chloride 04/19/2021 108  94 - 109 mmol/L Final     Carbon Dioxide 04/19/2021 29  20 - 32 mmol/L Final     Anion Gap 04/19/2021 3  3 - 14 mmol/L Final     Glucose 04/19/2021 78  70 - 99 mg/dL Final     Urea Nitrogen 04/19/2021 11  7 - 30 mg/dL Final     Creatinine 04/19/2021 0.68  0.52 - 1.04 mg/dL Final     GFR Estimate 04/19/2021 87  >60 mL/min/[1.73_m2] Final    Comment: Non  GFR Calc  Starting 12/18/2018, serum creatinine based estimated GFR (eGFR) will be   calculated using the Chronic Kidney Disease Epidemiology Collaboration   (CKD-EPI)  equation.       GFR Estimate If Black 04/19/2021 >90  >60 mL/min/[1.73_m2] Final    Comment:  GFR Calc  Starting 12/18/2018, serum creatinine based estimated GFR (eGFR) will be   calculated using the Chronic Kidney Disease Epidemiology Collaboration   (CKD-EPI) equation.       Calcium 04/19/2021 8.1* 8.5 - 10.1 mg/dL Final     Bilirubin Total 04/19/2021 0.4  0.2 - 1.3 mg/dL Final     Albumin 04/19/2021 3.5  3.4 - 5.0 g/dL Final     Protein Total 04/19/2021 6.9  6.8 - 8.8 g/dL Final     Alkaline Phosphatase 04/19/2021 105  40 - 150 U/L Final     ALT 04/19/2021 16  0 - 50 U/L Final     AST 04/19/2021 14  0 - 45 U/L Final     Cholesterol 04/19/2021 158  <200 mg/dL Final     Triglycerides 04/19/2021 162* <150 mg/dL Final    Comment: Borderline high:  150-199 mg/dl  High:             200-499 mg/dl  Very high:       >499 mg/dl       HDL Cholesterol 04/19/2021 48* >49 mg/dL Final     LDL Cholesterol Calculated 04/19/2021 78  <100 mg/dL Final    Desirable:       <100 mg/dl     Non HDL Cholesterol 04/19/2021 110  <130 mg/dL Final     TSH 04/19/2021 2.32  0.40 - 4.00 mU/L Final     Hemoglobin A1C 04/19/2021 6.1* 0 - 5.6 % Final    Comment: Normal <5.7% Prediabetes 5.7-6.4%  Diabetes 6.5% or higher - adopted from ADA   consensus guidelines.           Physician Attestation   I, Danilo Yun MD, was present with the medical/VINIT[employer] student who participated in the service and in the documentation of the note.  I have verified the history and personally performed the physical exam and medical decision making.  I agree with the assessment and plan of care as documented in the note.      Items personally reviewed: vitals, labs and exam and agree with the interpretation documented in the note.    Danilo Yun MD, MD

## 2021-05-07 NOTE — PATIENT INSTRUCTIONS
Thank you for visiting [facility]    Let's stop the glipizide for now.    If you keep getting episodes, let us know.    Please update your eye exam.  Have them get us a copy of your results.    We'll let you know your lab results as soon as we can.     Let us know if you need help scheduling your COVID vaccine.    Contact us or return if questions or concerns.     Have a nice day!    Dr. Yun     No follow-ups on file.      If you need medication refills, please contact your pharmacy 3 days before your prescriptions runs out or download the [facility] brittany for your smart phone. If you are out of refills, your pharmacy will contact contact the clinic.                                     [employer] [phone]

## 2021-06-01 ENCOUNTER — APPOINTMENT (OUTPATIENT)
Dept: CT IMAGING | Facility: CLINIC | Age: 71
End: 2021-06-01
Attending: EMERGENCY MEDICINE
Payer: COMMERCIAL

## 2021-06-01 ENCOUNTER — APPOINTMENT (OUTPATIENT)
Dept: GENERAL RADIOLOGY | Facility: CLINIC | Age: 71
End: 2021-06-01
Attending: EMERGENCY MEDICINE
Payer: COMMERCIAL

## 2021-06-01 ENCOUNTER — HOSPITAL ENCOUNTER (EMERGENCY)
Facility: CLINIC | Age: 71
Discharge: HOME | End: 2021-06-01
Attending: EMERGENCY MEDICINE | Admitting: EMERGENCY MEDICINE
Payer: COMMERCIAL

## 2021-06-01 VITALS
TEMPERATURE: 98.1 F | RESPIRATION RATE: 15 BRPM | BODY MASS INDEX: 24.36 KG/M2 | HEART RATE: 71 BPM | WEIGHT: 145 LBS | DIASTOLIC BLOOD PRESSURE: 85 MMHG | SYSTOLIC BLOOD PRESSURE: 170 MMHG | OXYGEN SATURATION: 96 %

## 2021-06-01 DIAGNOSIS — R10.31 ABDOMINAL PAIN, RIGHT LOWER QUADRANT: ICD-10-CM

## 2021-06-01 DIAGNOSIS — R55 SYNCOPE, UNSPECIFIED SYNCOPE TYPE: ICD-10-CM

## 2021-06-01 DIAGNOSIS — S93.402A SPRAIN OF LEFT ANKLE, UNSPECIFIED LIGAMENT, INITIAL ENCOUNTER: ICD-10-CM

## 2021-06-01 LAB
ALBUMIN SERPL-MCNC: 3.9 G/DL (ref 3.4–5)
ALP SERPL-CCNC: 104 U/L (ref 40–150)
ALT SERPL W P-5'-P-CCNC: 17 U/L (ref 0–50)
ANION GAP SERPL CALCULATED.3IONS-SCNC: 4 MMOL/L (ref 3–14)
AST SERPL W P-5'-P-CCNC: 11 U/L (ref 0–45)
BASOPHILS # BLD AUTO: 0.1 10E9/L (ref 0–0.2)
BASOPHILS NFR BLD AUTO: 0.8 %
BILIRUB SERPL-MCNC: 0.5 MG/DL (ref 0.2–1.3)
BUN SERPL-MCNC: 19 MG/DL (ref 7–30)
CALCIUM SERPL-MCNC: 9 MG/DL (ref 8.5–10.1)
CHLORIDE SERPL-SCNC: 106 MMOL/L (ref 94–109)
CO2 SERPL-SCNC: 30 MMOL/L (ref 20–32)
CREAT SERPL-MCNC: 0.69 MG/DL (ref 0.52–1.04)
DIFFERENTIAL METHOD BLD: NORMAL
EOSINOPHIL # BLD AUTO: 0.1 10E9/L (ref 0–0.7)
EOSINOPHIL NFR BLD AUTO: 0.6 %
ERYTHROCYTE [DISTWIDTH] IN BLOOD BY AUTOMATED COUNT: 13.5 % (ref 10–15)
GFR SERPL CREATININE-BSD FRML MDRD: 86 ML/MIN/{1.73_M2}
GLUCOSE SERPL-MCNC: 141 MG/DL (ref 70–99)
HCT VFR BLD AUTO: 47 % (ref 35–47)
HGB BLD-MCNC: 15.1 G/DL (ref 11.7–15.7)
IMM GRANULOCYTES # BLD: 0 10E9/L (ref 0–0.4)
IMM GRANULOCYTES NFR BLD: 0.3 %
LYMPHOCYTES # BLD AUTO: 1.7 10E9/L (ref 0.8–5.3)
LYMPHOCYTES NFR BLD AUTO: 16.9 %
MCH RBC QN AUTO: 30.1 PG (ref 26.5–33)
MCHC RBC AUTO-ENTMCNC: 32.1 G/DL (ref 31.5–36.5)
MCV RBC AUTO: 94 FL (ref 78–100)
MONOCYTES # BLD AUTO: 0.5 10E9/L (ref 0–1.3)
MONOCYTES NFR BLD AUTO: 5 %
NEUTROPHILS # BLD AUTO: 7.9 10E9/L (ref 1.6–8.3)
NEUTROPHILS NFR BLD AUTO: 76.4 %
NRBC # BLD AUTO: 0 10*3/UL
NRBC BLD AUTO-RTO: 0 /100
PLATELET # BLD AUTO: 218 10E9/L (ref 150–450)
POTASSIUM SERPL-SCNC: 4 MMOL/L (ref 3.4–5.3)
PROT SERPL-MCNC: 7.6 G/DL (ref 6.8–8.8)
RBC # BLD AUTO: 5.02 10E12/L (ref 3.8–5.2)
SODIUM SERPL-SCNC: 140 MMOL/L (ref 133–144)
TROPONIN I SERPL-MCNC: <0.015 UG/L (ref 0–0.04)
WBC # BLD AUTO: 10.3 10E9/L (ref 4–11)

## 2021-06-01 PROCEDURE — 85025 COMPLETE CBC W/AUTO DIFF WBC: CPT | Performed by: EMERGENCY MEDICINE

## 2021-06-01 PROCEDURE — 84484 ASSAY OF TROPONIN QUANT: CPT | Performed by: EMERGENCY MEDICINE

## 2021-06-01 PROCEDURE — 250N000011 HC RX IP 250 OP 636: Performed by: EMERGENCY MEDICINE

## 2021-06-01 PROCEDURE — 250N000009 HC RX 250: Performed by: EMERGENCY MEDICINE

## 2021-06-01 PROCEDURE — 74177 CT ABD & PELVIS W/CONTRAST: CPT

## 2021-06-01 PROCEDURE — 99285 EMERGENCY DEPT VISIT HI MDM: CPT | Mod: 25 | Performed by: EMERGENCY MEDICINE

## 2021-06-01 PROCEDURE — 93005 ELECTROCARDIOGRAM TRACING: CPT | Performed by: EMERGENCY MEDICINE

## 2021-06-01 PROCEDURE — 93010 ELECTROCARDIOGRAM REPORT: CPT | Performed by: EMERGENCY MEDICINE

## 2021-06-01 PROCEDURE — 73610 X-RAY EXAM OF ANKLE: CPT | Mod: LT

## 2021-06-01 PROCEDURE — 80053 COMPREHEN METABOLIC PANEL: CPT | Performed by: EMERGENCY MEDICINE

## 2021-06-01 RX ORDER — IOPAMIDOL 755 MG/ML
500 INJECTION, SOLUTION INTRAVASCULAR ONCE
Status: COMPLETED | OUTPATIENT
Start: 2021-06-01 | End: 2021-06-01

## 2021-06-01 RX ADMIN — IOPAMIDOL 71 ML: 755 INJECTION, SOLUTION INTRAVENOUS at 13:19

## 2021-06-01 RX ADMIN — SODIUM CHLORIDE 71 ML: 9 INJECTION, SOLUTION INTRAVENOUS at 13:19

## 2021-06-01 NOTE — ED PROVIDER NOTES
History     Chief Complaint   Patient presents with     Syncope     HPI  Khalida Redding is a 70 year old female who presents for evaluation after a syncopal episode.  She was sitting on the toilet when she had the onset of right lower quadrant abdominal pain.  She then got up and felt like she was going to pass out.  She called for her  who helped ease her to the ground.  She passed out at that time.  She denies chest pain or any other new symptoms.  She has a history of recurrent syncopal episodes approximately twice a week per her and her daughter.  She persists with some right lower quadrant abdominal pain.  She has had no vomiting.  No fever.  Pain is dull.    Allergies:  Allergies   Allergen Reactions     Amoxicillin Hives and Rash     Pt reports she has taken PCN without problems     Ampicillin Rash       Problem List:    Patient Active Problem List    Diagnosis Date Noted     History of colonic polyps 07/13/2020     Priority: Medium     Abdominal pain, generalized 06/16/2020     Priority: Medium     Slow transit constipation 06/16/2020     Priority: Medium     Chronic upset stomach 06/16/2020     Priority: Medium     Left hemiparesis (H) 01/13/2020     Priority: Medium     Age-related osteoporosis without current pathological fracture 11/25/2019     Priority: Medium     Lumbar radiculopathy 11/18/2019     Priority: Medium     Closed displaced fracture of left clavicle, unspecified part of clavicle, initial encounter 11/18/2019     Priority: Medium     Injury of left clavicle, initial encounter 11/18/2019     Priority: Medium     Syncope, unspecified syncope type 12/17/2018     Priority: Medium     Added automatically from request for surgery [id number]       Tubular adenoma of colon 11/29/2018     Priority: Medium     Supraventricular tachycardia (H) 10/22/2018     Priority: Medium     Gait instability 10/22/2018     Priority: Medium     Type 2 diabetes mellitus with circulatory disorder, without  long-term current use of insulin (H) 09/20/2018     Priority: Medium     Hyperlipidemia LDL goal <100 09/20/2018     Priority: Medium     Hypertension goal BP (blood pressure) < 140/90 09/20/2018     Priority: Medium     History of CVA (cerebrovascular accident) 09/20/2018     Priority: Medium     Convulsions, unspecified convulsion type (H) 09/20/2018     Priority: Medium     Osteoarthritis 09/20/2018     Priority: Medium     Tobacco abuse disorder 09/20/2018     Priority: Medium     Pain in both lower legs 09/20/2018     Priority: Medium     Dementia (H) 09/20/2018     Priority: Medium        Past Medical History:    Past Medical History:   Diagnosis Date     Atrial tachycardia (H)      Convulsions, unspecified convulsion type (H) 9/20/2018     CVA (cerebral vascular accident) (H)      Dementia (H) 9/20/2018     Diabetes (H)      Falls      History of CVA (cerebrovascular accident) 9/20/2018     Hyperlipidemia LDL goal <100 9/20/2018     Hypertension goal BP (blood pressure) < 140/90 9/20/2018     Osteoarthritis 9/20/2018     Pain in both lower legs 9/20/2018     Seizures (H)      Smoking      Syncope      Tobacco abuse disorder 9/20/2018     Tubular adenoma of colon 11/29/2018     Type 2 diabetes mellitus with circulatory disorder, without long-term current use of insulin (H) 9/20/2018       Past Surgical History:    Past Surgical History:   Procedure Laterality Date     COLONOSCOPY N/A 11/28/2018    Procedure: Colonoscopy, Polypectomy by Biopsy;  Surgeon: Arcadio Mcmullen DO;  Location:  GI     COLONOSCOPY N/A 8/24/2020    Procedure: Colonoscopy with possible biopsy and/or polypectomy;  Surgeon: Fabián Hines MD;  Location:  GI     HIP SURGERY Left        Family History:    No family history on file.    Social History:  Marital Status:   [2]  Social History     Tobacco Use     Smoking status: Current Every Day Smoker     Packs/day: 0.50     Types: Cigarettes     Smokeless tobacco:  Never Used   Substance Use Topics     Alcohol use: No     Frequency: Never     Drug use: No        Medications:    alcohol swab prep pads  alendronate (FOSAMAX) 70 MG tablet  atorvastatin (LIPITOR) 20 MG tablet  blood glucose (NO BRAND SPECIFIED) test strip  blood glucose monitoring (NO BRAND SPECIFIED) meter device kit  blood glucose monitoring (NO BRAND SPECIFIED) test strip  clopidogrel (PLAVIX) 75 MG tablet  Cyanocobalamin (VITAMIN B 12 PO)  gabapentin (NEURONTIN) 300 MG capsule  meloxicam (MOBIC) 7.5 MG tablet  memantine (NAMENDA) 10 MG tablet  metFORMIN (GLUCOPHAGE-XR) 500 MG 24 hr tablet  omeprazole (PRILOSEC) 40 MG DR capsule  order for DME  polyethylene glycol (MIRALAX) 17 GM/Dose powder  quinapril (ACCUPRIL) 5 MG tablet  thin (NO BRAND SPECIFIED) lancets          Review of Systems  All other systems are reviewed and are negative    Physical Exam   BP: 117/72  Pulse: 77  Temp: 98.1  F (36.7  C)  Resp: 18  Weight: 65.8 kg (145 lb)  SpO2: 100 %      Physical Exam  Vitals signs and nursing note reviewed.   Constitutional:       General: She is not in acute distress.     Appearance: She is well-developed. She is not diaphoretic.   HENT:      Head: Normocephalic and atraumatic.   Eyes:      General: No scleral icterus.     Pupils: Pupils are equal, round, and reactive to light.   Neck:      Musculoskeletal: Normal range of motion and neck supple.   Cardiovascular:      Rate and Rhythm: Normal rate and regular rhythm.      Heart sounds: Normal heart sounds. No murmur.   Pulmonary:      Effort: No respiratory distress.      Breath sounds: No stridor. No wheezing or rales.   Abdominal:      Palpations: Abdomen is soft.      Tenderness: There is abdominal tenderness (recurrent) in the right lower quadrant. There is no guarding or rebound.   Musculoskeletal:         General: No tenderness.   Skin:     General: Skin is warm and dry.      Coloration: Skin is not pale.      Findings: No erythema or rash.   Neurological:       Mental Status: She is alert.         ED Course        Procedures          EKG: Normal sinus rhythm, normal axis.  Rate of 72 beats per minute.  No acute ST or T wave changes.  Interpreted by myself.      Critical Care time:  none               Results for orders placed or performed during the hospital encounter of 06/01/21 (from the past 24 hour(s))   CBC with platelets differential   Result Value Ref Range    WBC 10.3 4.0 - 11.0 10e9/L    RBC Count 5.02 3.8 - 5.2 10e12/L    Hemoglobin 15.1 11.7 - 15.7 g/dL    Hematocrit 47.0 35.0 - 47.0 %    MCV 94 78 - 100 fl    MCH 30.1 26.5 - 33.0 pg    MCHC 32.1 31.5 - 36.5 g/dL    RDW 13.5 10.0 - 15.0 %    Platelet Count 218 150 - 450 10e9/L    Diff Method Automated Method     % Neutrophils 76.4 %    % Lymphocytes 16.9 %    % Monocytes 5.0 %    % Eosinophils 0.6 %    % Basophils 0.8 %    % Immature Granulocytes 0.3 %    Nucleated RBCs 0 0 /100    Absolute Neutrophil 7.9 1.6 - 8.3 10e9/L    Absolute Lymphocytes 1.7 0.8 - 5.3 10e9/L    Absolute Monocytes 0.5 0.0 - 1.3 10e9/L    Absolute Eosinophils 0.1 0.0 - 0.7 10e9/L    Absolute Basophils 0.1 0.0 - 0.2 10e9/L    Abs Immature Granulocytes 0.0 0 - 0.4 10e9/L    Absolute Nucleated RBC 0.0    Comprehensive metabolic panel   Result Value Ref Range    Sodium 140 133 - 144 mmol/L    Potassium 4.0 3.4 - 5.3 mmol/L    Chloride 106 94 - 109 mmol/L    Carbon Dioxide 30 20 - 32 mmol/L    Anion Gap 4 3 - 14 mmol/L    Glucose 141 (H) 70 - 99 mg/dL    Urea Nitrogen 19 7 - 30 mg/dL    Creatinine 0.69 0.52 - 1.04 mg/dL    GFR Estimate 86 >60 mL/min/[1.73_m2]    GFR Estimate If Black >90 >60 mL/min/[1.73_m2]    Calcium 9.0 8.5 - 10.1 mg/dL    Bilirubin Total 0.5 0.2 - 1.3 mg/dL    Albumin 3.9 3.4 - 5.0 g/dL    Protein Total 7.6 6.8 - 8.8 g/dL    Alkaline Phosphatase 104 40 - 150 U/L    ALT 17 0 - 50 U/L    AST 11 0 - 45 U/L   Troponin I   Result Value Ref Range    Troponin I ES <0.015 0.000 - 0.045 ug/L   CT Abdomen Pelvis w Contrast     Narrative    CT ABDOMEN PELVIS W CONTRAST 6/1/2021 1:30 PM    CLINICAL HISTORY: RLQ abdominal pain, appendicitis suspected (Age >=  14y)    TECHNIQUE: CT scan of the abdomen and pelvis was performed following  injection of IV contrast. Multiplanar reformats were obtained. Dose  reduction techniques were used.  CONTRAST: 71mL Isovue-370    COMPARISON: Ultrasound 4/27/2021    FINDINGS:   LOWER CHEST: Bibasilar emphysema and scarring.    HEPATOBILIARY: Normal.    PANCREAS: Normal.    SPLEEN: Normal.    ADRENAL GLANDS: Normal.    KIDNEYS/BLADDER: Incidental malrotated left kidney. No hydronephrosis.  Mild to moderate cystocele.    BOWEL: Tiny fat-containing umbilical hernia. Normal caliber small  bowel. Normal appendix. Distal colonic diverticulosis without evidence  of acute diverticulitis. No free air or free fluid.    PELVIC ORGANS: Normal.    ADDITIONAL FINDINGS: Moderate atherosclerosis. Fusiform infrarenal  abdominal aortic aneurysm measuring up to 3.8 cm in AP diameter.    MUSCULOSKELETAL: Left total hip arthroplasty. Spinal degenerative  changes.      Impression    IMPRESSION:   1.  No acute findings in the abdomen or pelvis. Normal appendix.  2.  Infrarenal abdominal aortic aneurysm measuring 3.8 cm in diameter.  3.  Additional chronic findings as described above.    SARA JIMENEZ MD   XR Ankle Left 3 Views    Narrative    XR ANKLE LEFT G/E 3 VIEWS   6/1/2021 2:06 PM     HISTORY:  trauma      Impression    IMPRESSION: There may be a tibiotalar joint effusion. No fracture  identified.     EDIE HUGHES MD       Medications   iopamidol (ISOVUE-370) solution 500 mL (71 mLs Intravenous Given 6/1/21 1319)   new 100 ml saline bag (71 mLs Intravenous Given 6/1/21 1319)   sodium chloride (PF) 0.9% PF flush 3 mL (3 mLs Intracatheter Given 6/1/21 1318)       Assessments & Plan (with Medical Decision Making)  70-year-old female with a history of recurrent syncope who presents after a reported syncopal episode.  She was  just getting off the toilet and had some right lower quadrant abdominal pain.  CT of the abdomen without acute finding.  Troponin and EKG without significant acute finding either.  Some injury to her left ankle, possible sprain.  X-rays negative for fracture.  She appears stable for discharge home.  She reports she has had follow-up on this through cardiology and they did not see anything but cardiac standpoint.  Have recommended she follow-up with her primary care provider in the next week.     I have reviewed the nursing notes.    I have reviewed the findings, diagnosis, plan and need for follow up with the patient.       New Prescriptions    No medications on file       Final diagnoses:   Syncope, unspecified syncope type   Sprain of left ankle, unspecified ligament, initial encounter   Abdominal pain, right lower quadrant       6/1/2021   [facility] EMERGENCY DEPT     Gustavo Chanel MD  06/01/21 [id number]

## 2021-06-14 ENCOUNTER — OFFICE VISIT (OUTPATIENT)
Dept: FAMILY MEDICINE | Facility: OTHER | Age: 71
End: 2021-06-14
Payer: COMMERCIAL

## 2021-06-14 VITALS
BODY MASS INDEX: 22.92 KG/M2 | OXYGEN SATURATION: 99 % | TEMPERATURE: 98.1 F | DIASTOLIC BLOOD PRESSURE: 76 MMHG | RESPIRATION RATE: 18 BRPM | WEIGHT: 136.4 LBS | SYSTOLIC BLOOD PRESSURE: 122 MMHG | HEART RATE: 84 BPM

## 2021-06-14 DIAGNOSIS — R55 SYNCOPE, UNSPECIFIED SYNCOPE TYPE: ICD-10-CM

## 2021-06-14 DIAGNOSIS — I10 HYPERTENSION GOAL BP (BLOOD PRESSURE) < 140/90: ICD-10-CM

## 2021-06-14 DIAGNOSIS — E11.51 TYPE 2 DIABETES MELLITUS WITH DIABETIC PERIPHERAL ANGIOPATHY WITHOUT GANGRENE, WITHOUT LONG-TERM CURRENT USE OF INSULIN (H): ICD-10-CM

## 2021-06-14 DIAGNOSIS — R26.81 GAIT INSTABILITY: ICD-10-CM

## 2021-06-14 DIAGNOSIS — R56.9 CONVULSIONS, UNSPECIFIED CONVULSION TYPE (H): Primary | ICD-10-CM

## 2021-06-14 DIAGNOSIS — I47.10 SUPRAVENTRICULAR TACHYCARDIA (H): ICD-10-CM

## 2021-06-14 DIAGNOSIS — Z72.0 TOBACCO ABUSE DISORDER: ICD-10-CM

## 2021-06-14 PROCEDURE — 99214 OFFICE O/P EST MOD 30 MIN: CPT | Performed by: PHYSICIAN ASSISTANT

## 2021-06-14 RX ORDER — GLIPIZIDE 2.5 MG/1
TABLET, EXTENDED RELEASE ORAL
COMMUNITY
Start: 2021-04-19 | End: 2021-10-22

## 2021-06-14 NOTE — PROGRESS NOTES
Assessment & Plan     Convulsions, unspecified convulsion type (H)  Type 2 diabetes mellitus with diabetic peripheral angiopathy without gangrene, without long-term current use of insulin (H)  Hypertension goal BP (blood pressure) < 140/90  Supraventricular tachycardia (H)  Syncope, unspecified syncope type  Tobacco abuse disorder  Gait instability  Reviewed the results of recent visits to the ER and [facility] practice medicine.  Also reviewed the results from a neurologist from a couple of years ago and note that this evaluation for convulsions was not completed and any sleep deprived EEG was ordered but not done.  At this point time I suspect that this is more related to potential seizure disorder than truly medication related hypoglycemia but we will attempt to prove that.  We are not changing any medications right now.  Follow-up in the next couple of weeks as needed.  - EEG EXTENDED MONITORING > 1HR     Work on weight loss  Regular exercise  No follow-ups on file.    Abdirahman Shrestha PA-C  [facility]    Subjective     Khalida Redding is a 70 year old female who presents to clinic today for the following health issues accompanied by her :    HPI     Patient here for follow-up on sprain ankle during a syncope episode on 06/01. The left ankle, its very sore. It helps when she rests and she doesn't use it. It get swells up when she walks on it.     ED/UC Followup:    Facility:  [facility]   Date of visit: 06/01  Reason for visit: Syncope  Current Status: Had a couple more episodes per daughter.        Review of Systems   Constitutional, HEENT, cardiovascular, pulmonary, GI, , musculoskeletal, neuro, skin, endocrine and psych systems are negative, except as otherwise noted.      Objective    /76   Pulse 84   Temp 98.1  F (36.7  C) (Temporal)   Resp 18   Wt 61.9 kg (136 lb 6.4 oz)   SpO2 99%   BMI 22.92 kg/m    Body mass index is 22.92 kg/m .  Physical Exam   GENERAL: healthy, alert  and no distress  NECK: no adenopathy, no asymmetry, masses, or scars and thyroid normal to palpation  RESP: lungs clear to auscultation - no rales, rhonchi or wheezes  CV: regular rate and rhythm, normal S1 S2, no S3 or S4, no murmur, click or rub, no peripheral edema and peripheral pulses strong  ABDOMEN: soft, nontender, no hepatosplenomegaly, no masses and bowel sounds normal  MS: no gross musculoskeletal defects noted, no edema  Psychological:   Negative for memory, mood or flight of ideas at this time with appropriate affect.  Good recall of date, time and place.  NEURO:  DTRs are normal and symmetric throughout.  Cranial nerves 2-12 grossly intact.  Sensation intact to light touch.    No results found for this or any previous visit (from the past 24 hour(s)).

## 2021-08-23 ENCOUNTER — TELEPHONE (OUTPATIENT)
Dept: FAMILY MEDICINE | Facility: OTHER | Age: 71
End: 2021-08-23

## 2021-08-23 DIAGNOSIS — E11.51 TYPE 2 DIABETES MELLITUS WITH DIABETIC PERIPHERAL ANGIOPATHY WITHOUT GANGRENE, WITHOUT LONG-TERM CURRENT USE OF INSULIN (H): ICD-10-CM

## 2021-08-23 RX ORDER — METFORMIN HCL 500 MG
500 TABLET, EXTENDED RELEASE 24 HR ORAL 2 TIMES DAILY WITH MEALS
Qty: 1 TABLET | Refills: 1 | COMMUNITY
Start: 2021-08-23 | End: 2021-08-24

## 2021-08-23 NOTE — TELEPHONE ENCOUNTER
Routing refill request to provider for review/approval:    Spoke to pt's  (Mr. Orona) in regard to metFORMIN (GLUCOPHAGE-XR) 500 MG. He states his wife currently takes 1 tablet (500 mg) by mouth 2 times daily according to her PCP's recommendation to lower daily dosage to half. Please, consider updating the existing Rx on file to current dose pt is taking, for medication adherence purposes.     Thank you  Hanny Urena, PharmD   [facility]

## 2021-08-24 RX ORDER — METFORMIN HCL 500 MG
500 TABLET, EXTENDED RELEASE 24 HR ORAL 2 TIMES DAILY WITH MEALS
Qty: 180 TABLET | Refills: 1 | Status: SHIPPED | OUTPATIENT
Start: 2021-08-24 | End: 2022-01-01

## 2021-08-24 NOTE — TELEPHONE ENCOUNTER
Could you please send the new order of metFORMIN (GLUCOPHAGE-XR) 500 MG for 90 Day Supply refills to pharmacy on file?  [facility] - [address] [facility], [address]    Thank you

## 2021-08-25 ENCOUNTER — HOSPITAL ENCOUNTER (EMERGENCY)
Facility: CLINIC | Age: 71
Discharge: HOME | End: 2021-08-25
Attending: NURSE PRACTITIONER | Admitting: NURSE PRACTITIONER
Payer: COMMERCIAL

## 2021-08-25 VITALS
HEART RATE: 95 BPM | TEMPERATURE: 94.1 F | RESPIRATION RATE: 18 BRPM | SYSTOLIC BLOOD PRESSURE: 139 MMHG | OXYGEN SATURATION: 90 % | DIASTOLIC BLOOD PRESSURE: 83 MMHG | BODY MASS INDEX: 21.68 KG/M2 | WEIGHT: 129 LBS

## 2021-08-25 DIAGNOSIS — R53.1 GENERALIZED WEAKNESS: ICD-10-CM

## 2021-08-25 LAB
ALBUMIN SERPL-MCNC: 3.3 G/DL (ref 3.4–5)
ALBUMIN UR-MCNC: 30 MG/DL
ALP SERPL-CCNC: 92 U/L (ref 40–150)
ALT SERPL W P-5'-P-CCNC: 17 U/L (ref 0–50)
ANION GAP SERPL CALCULATED.3IONS-SCNC: 5 MMOL/L (ref 3–14)
APPEARANCE UR: ABNORMAL
AST SERPL W P-5'-P-CCNC: 18 U/L (ref 0–45)
BACTERIA #/AREA URNS HPF: ABNORMAL /HPF
BASOPHILS # BLD AUTO: 0 10E3/UL (ref 0–0.2)
BASOPHILS NFR BLD AUTO: 1 %
BILIRUB SERPL-MCNC: 0.4 MG/DL (ref 0.2–1.3)
BILIRUB UR QL STRIP: NEGATIVE
BUN SERPL-MCNC: 31 MG/DL (ref 7–30)
CALCIUM SERPL-MCNC: 8.6 MG/DL (ref 8.5–10.1)
CHLORIDE BLD-SCNC: 108 MMOL/L (ref 94–109)
CO2 SERPL-SCNC: 28 MMOL/L (ref 20–32)
COLOR UR AUTO: ABNORMAL
CREAT SERPL-MCNC: 0.88 MG/DL (ref 0.52–1.04)
EOSINOPHIL # BLD AUTO: 0 10E3/UL (ref 0–0.7)
EOSINOPHIL NFR BLD AUTO: 0 %
ERYTHROCYTE [DISTWIDTH] IN BLOOD BY AUTOMATED COUNT: 14.3 % (ref 10–15)
GFR SERPL CREATININE-BSD FRML MDRD: 65 ML/MIN/1.73M2
GLUCOSE BLD-MCNC: 122 MG/DL (ref 70–99)
GLUCOSE UR STRIP-MCNC: NEGATIVE MG/DL
HCT VFR BLD AUTO: 48 % (ref 35–47)
HGB BLD-MCNC: 15.2 G/DL (ref 11.7–15.7)
HGB UR QL STRIP: NEGATIVE
HOLD SPECIMEN: NORMAL
HYALINE CASTS: 1 /LPF
IMM GRANULOCYTES # BLD: 0 10E3/UL
IMM GRANULOCYTES NFR BLD: 1 %
KETONES UR STRIP-MCNC: 5 MG/DL
LACTATE SERPL-SCNC: 1.5 MMOL/L (ref 0.7–2)
LEUKOCYTE ESTERASE UR QL STRIP: NEGATIVE
LYMPHOCYTES # BLD AUTO: 1.1 10E3/UL (ref 0.8–5.3)
LYMPHOCYTES NFR BLD AUTO: 21 %
MCH RBC QN AUTO: 28.9 PG (ref 26.5–33)
MCHC RBC AUTO-ENTMCNC: 31.7 G/DL (ref 31.5–36.5)
MCV RBC AUTO: 91 FL (ref 78–100)
MONOCYTES # BLD AUTO: 0.6 10E3/UL (ref 0–1.3)
MONOCYTES NFR BLD AUTO: 11 %
MUCOUS THREADS #/AREA URNS LPF: PRESENT /LPF
NEUTROPHILS # BLD AUTO: 3.6 10E3/UL (ref 1.6–8.3)
NEUTROPHILS NFR BLD AUTO: 66 %
NITRATE UR QL: NEGATIVE
NRBC # BLD AUTO: 0 10E3/UL
NRBC BLD AUTO-RTO: 0 /100
PH UR STRIP: 5 [PH] (ref 5–7)
PLATELET # BLD AUTO: 210 10E3/UL (ref 150–450)
POTASSIUM BLD-SCNC: 3.7 MMOL/L (ref 3.4–5.3)
PROT SERPL-MCNC: 7.4 G/DL (ref 6.8–8.8)
RBC # BLD AUTO: 5.26 10E6/UL (ref 3.8–5.2)
RBC URINE: 4 /HPF
SARS-COV-2 RNA RESP QL NAA+PROBE: POSITIVE
SODIUM SERPL-SCNC: 141 MMOL/L (ref 133–144)
SP GR UR STRIP: 1.03 (ref 1–1.03)
SQUAMOUS EPITHELIAL: 1 /HPF
TROPONIN I SERPL-MCNC: <0.015 UG/L (ref 0–0.04)
UROBILINOGEN UR STRIP-MCNC: 2 MG/DL
WBC # BLD AUTO: 5.3 10E3/UL (ref 4–11)
WBC URINE: 1 /HPF

## 2021-08-25 PROCEDURE — 99282 EMERGENCY DEPT VISIT SF MDM: CPT | Mod: 25 | Performed by: NURSE PRACTITIONER

## 2021-08-25 PROCEDURE — 93005 ELECTROCARDIOGRAM TRACING: CPT

## 2021-08-25 PROCEDURE — 85025 COMPLETE CBC W/AUTO DIFF WBC: CPT | Performed by: NURSE PRACTITIONER

## 2021-08-25 PROCEDURE — 93010 ELECTROCARDIOGRAM REPORT: CPT | Performed by: NURSE PRACTITIONER

## 2021-08-25 PROCEDURE — 81001 URINALYSIS AUTO W/SCOPE: CPT | Performed by: NURSE PRACTITIONER

## 2021-08-25 PROCEDURE — 99284 EMERGENCY DEPT VISIT MOD MDM: CPT | Mod: 25

## 2021-08-25 PROCEDURE — 258N000003 HC RX IP 258 OP 636: Performed by: NURSE PRACTITIONER

## 2021-08-25 PROCEDURE — 83605 ASSAY OF LACTIC ACID: CPT | Performed by: NURSE PRACTITIONER

## 2021-08-25 PROCEDURE — U0005 INFEC AGEN DETEC AMPLI PROBE: HCPCS | Performed by: NURSE PRACTITIONER

## 2021-08-25 PROCEDURE — 96360 HYDRATION IV INFUSION INIT: CPT

## 2021-08-25 PROCEDURE — 80053 COMPREHEN METABOLIC PANEL: CPT | Performed by: NURSE PRACTITIONER

## 2021-08-25 PROCEDURE — 36415 COLL VENOUS BLD VENIPUNCTURE: CPT | Performed by: NURSE PRACTITIONER

## 2021-08-25 PROCEDURE — 84484 ASSAY OF TROPONIN QUANT: CPT | Performed by: NURSE PRACTITIONER

## 2021-08-25 RX ORDER — IBUPROFEN 200 MG
200 TABLET ORAL 2 TIMES DAILY
COMMUNITY
End: 2021-11-26

## 2021-08-25 RX ADMIN — SODIUM CHLORIDE 1000 ML: 9 INJECTION, SOLUTION INTRAVENOUS at 14:54

## 2021-08-25 NOTE — ED PROVIDER NOTES
History     Chief Complaint   Patient presents with     Generalized Weakness     HPI  Khalida Redding is a 71 year old female with history of hypertension, T2DM, hyperlipidemia, dementia, seizures, CVA/left hemiparesis, atrial tachycardia, and osteoarthritis who presents to the emergency department accompanied by her granddaughter for evaluation of generalized weakness.  Symptoms started 1 week ago.  Daughter states she has been getting progressively weaker.  Patient did have a fall 1 week ago in her kitchen.  States she was in her kitchen and 1 AM and fell.  She tried to grab a chair to break her fall but the chair went down with her.  She suffered a bruise on her left arm.  She denies hitting her head.  Patient states she laid there until 3 AM when her  woke up.  Patient denies fever or chills.  Denies chest pain, shortness of breath, cough or congestion.  Reports a smoker's cough that is unchanged.  Denies nausea or vomiting.  Denies constipation or diarrhea.  Denies black or bloody stools.  Denies urinary symptoms.  She does have decreased appetite.  Prefers to drink pop on a regular basis and does not drink much water.  Current everyday smoker.  Does not drink alcohol.      Allergies:  Allergies   Allergen Reactions     Amoxicillin Hives and Rash     Pt reports she has taken PCN without problems     Ampicillin Rash       Problem List:    Patient Active Problem List    Diagnosis Date Noted     History of colonic polyps 07/13/2020     Priority: Medium     Abdominal pain, generalized 06/16/2020     Priority: Medium     Slow transit constipation 06/16/2020     Priority: Medium     Chronic upset stomach 06/16/2020     Priority: Medium     Left hemiparesis (H) 01/13/2020     Priority: Medium     Age-related osteoporosis without current pathological fracture 11/25/2019     Priority: Medium     Lumbar radiculopathy 11/18/2019     Priority: Medium     Closed displaced fracture of left clavicle, unspecified  part of clavicle, initial encounter 11/18/2019     Priority: Medium     Injury of left clavicle, initial encounter 11/18/2019     Priority: Medium     Syncope, unspecified syncope type 12/17/2018     Priority: Medium     Added automatically from request for surgery [id number]       Tubular adenoma of colon 11/29/2018     Priority: Medium     Supraventricular tachycardia (H) 10/22/2018     Priority: Medium     Gait instability 10/22/2018     Priority: Medium     Type 2 diabetes mellitus with circulatory disorder, without long-term current use of insulin (H) 09/20/2018     Priority: Medium     Hyperlipidemia LDL goal <100 09/20/2018     Priority: Medium     Hypertension goal BP (blood pressure) < 140/90 09/20/2018     Priority: Medium     History of CVA (cerebrovascular accident) 09/20/2018     Priority: Medium     Convulsions, unspecified convulsion type (H) 09/20/2018     Priority: Medium     Osteoarthritis 09/20/2018     Priority: Medium     Tobacco abuse disorder 09/20/2018     Priority: Medium     Pain in both lower legs 09/20/2018     Priority: Medium     Dementia (H) 09/20/2018     Priority: Medium        Past Medical History:    Past Medical History:   Diagnosis Date     Atrial tachycardia (H)      Convulsions, unspecified convulsion type (H) 9/20/2018     CVA (cerebral vascular accident) (H)      Dementia (H) 9/20/2018     Diabetes (H)      Falls      History of CVA (cerebrovascular accident) 9/20/2018     Hyperlipidemia LDL goal <100 9/20/2018     Hypertension goal BP (blood pressure) < 140/90 9/20/2018     Osteoarthritis 9/20/2018     Pain in both lower legs 9/20/2018     Seizures (H)      Smoking      Syncope      Tobacco abuse disorder 9/20/2018     Tubular adenoma of colon 11/29/2018     Type 2 diabetes mellitus with circulatory disorder, without long-term current use of insulin (H) 9/20/2018       Past Surgical History:    Past Surgical History:   Procedure Laterality Date     COLONOSCOPY N/A 11/28/2018     Procedure: Colonoscopy, Polypectomy by Biopsy;  Surgeon: Arcadio Mcmullen DO;  Location:  GI     COLONOSCOPY N/A 8/24/2020    Procedure: Colonoscopy with possible biopsy and/or polypectomy;  Surgeon: Fabián Hines MD;  Location:  GI     HIP SURGERY Left        Family History:    No family history on file.    Social History:  Marital Status:   [2]  Social History     Tobacco Use     Smoking status: Current Every Day Smoker     Packs/day: 0.50     Types: Cigarettes     Smokeless tobacco: Never Used   Substance Use Topics     Alcohol use: No     Drug use: No        Medications:    alendronate (FOSAMAX) 70 MG tablet  atorvastatin (LIPITOR) 20 MG tablet  blood glucose (NO BRAND SPECIFIED) test strip  clopidogrel (PLAVIX) 75 MG tablet  Cyanocobalamin (VITAMIN B 12 PO)  gabapentin (NEURONTIN) 300 MG capsule  ibuprofen (ADVIL/MOTRIN) 200 MG tablet  meloxicam (MOBIC) 7.5 MG tablet  memantine (NAMENDA) 10 MG tablet  metFORMIN (GLUCOPHAGE-XR) 500 MG 24 hr tablet  quinapril (ACCUPRIL) 5 MG tablet  alcohol swab prep pads  blood glucose monitoring (NO BRAND SPECIFIED) meter device kit  glipiZIDE (GLUCOTROL XL) 2.5 MG 24 hr tablet  omeprazole (PRILOSEC) 40 MG DR capsule  order for DME  polyethylene glycol (MIRALAX) 17 GM/Dose powder  thin (NO BRAND SPECIFIED) lancets          Review of Systems  As mentioned above in the history present illness. All other systems were reviewed and are negative.    Physical Exam   BP: 100/66  Pulse: 101  Temp: (!) 94.1  F (34.5  C)  Resp: 18  Weight: 58.5 kg (129 lb)  SpO2: 91 %   Patient's temp was taken axillary, thermometer was not working for oral. Pt refused rectal temp to check accuracy of her temperature reading.     Physical Exam  Vitals reviewed.   Constitutional:       General: She is not in acute distress.     Appearance: Normal appearance. She is well-developed. She is not ill-appearing.   HENT:      Head: Normocephalic and atraumatic.      Right Ear:  "External ear normal.      Left Ear: External ear normal.      Nose: Nose normal.      Mouth/Throat:      Pharynx: No oropharyngeal exudate.   Eyes:      General: No scleral icterus.     Conjunctiva/sclera: Conjunctivae normal.   Cardiovascular:      Rate and Rhythm: Normal rate and regular rhythm.      Heart sounds: Normal heart sounds. No murmur heard.     Pulmonary:      Effort: Pulmonary effort is normal. No respiratory distress.      Breath sounds: Wheezing (expiratory throughout) present.   Abdominal:      General: Bowel sounds are normal. There is no distension.      Palpations: Abdomen is soft.      Tenderness: There is abdominal tenderness (states \"twinge\" with palpation of the LLQ).   Musculoskeletal:         General: Normal range of motion.      Right lower leg: No edema.      Left lower leg: No edema.   Skin:     General: Skin is warm and dry.      Findings: No rash.   Neurological:      General: No focal deficit present.      Mental Status: She is alert and oriented to person, place, and time.         ED Course        Procedures               EKG Interpretation:      Interpreted by CONCEPCION Bustos CNP  Time reviewed:1402  Symptoms at time of EKG: None   Rhythm: Normal sinus   Rate: Normal  Axis: Normal  Ectopy: None  Conduction: Normal  ST Segments/ T Waves: No ST-T wave changes and No acute ischemic changes  Q Waves: None  Comparison to prior: Unchanged from 06/01/2021    Clinical Impression: NSR, no acute ischemic changes.         Results for orders placed or performed during the hospital encounter of 08/25/21 (from the past 24 hour(s))   Comprehensive metabolic panel   Result Value Ref Range    Sodium 141 133 - 144 mmol/L    Potassium 3.7 3.4 - 5.3 mmol/L    Chloride 108 94 - 109 mmol/L    Carbon Dioxide (CO2) 28 20 - 32 mmol/L    Anion Gap 5 3 - 14 mmol/L    Urea Nitrogen 31 (H) 7 - 30 mg/dL    Creatinine 0.88 0.52 - 1.04 mg/dL    Calcium 8.6 8.5 - 10.1 mg/dL    Glucose 122 (H) 70 - 99 " mg/dL    Alkaline Phosphatase 92 40 - 150 U/L    AST 18 0 - 45 U/L    ALT 17 0 - 50 U/L    Protein Total 7.4 6.8 - 8.8 g/dL    Albumin 3.3 (L) 3.4 - 5.0 g/dL    Bilirubin Total 0.4 0.2 - 1.3 mg/dL    GFR Estimate 65 >60 mL/min/1.73m2   CBC with platelets differential    Narrative    The following orders were created for panel order CBC with platelets differential.  Procedure                               Abnormality         Status                     ---------                               -----------         ------                     CBC with platelets and d...[[id number]]  Abnormal            Final result                 Please view results for these tests on the individual orders.   Lactic acid whole blood STAT   Result Value Ref Range    Lactic Acid 1.5 0.7 - 2.0 mmol/L   Troponin I   Result Value Ref Range    Troponin I <0.015 0.000 - 0.045 ug/L   CBC with platelets and differential   Result Value Ref Range    WBC Count 5.3 4.0 - 11.0 10e3/uL    RBC Count 5.26 (H) 3.80 - 5.20 10e6/uL    Hemoglobin 15.2 11.7 - 15.7 g/dL    Hematocrit 48.0 (H) 35.0 - 47.0 %    MCV 91 78 - 100 fL    MCH 28.9 26.5 - 33.0 pg    MCHC 31.7 31.5 - 36.5 g/dL    RDW 14.3 10.0 - 15.0 %    Platelet Count 210 150 - 450 10e3/uL    % Neutrophils 66 %    % Lymphocytes 21 %    % Monocytes 11 %    % Eosinophils 0 %    % Basophils 1 %    % Immature Granulocytes 1 %    NRBCs per 100 WBC 0 <1 /100    Absolute Neutrophils 3.6 1.6 - 8.3 10e3/uL    Absolute Lymphocytes 1.1 0.8 - 5.3 10e3/uL    Absolute Monocytes 0.6 0.0 - 1.3 10e3/uL    Absolute Eosinophils 0.0 0.0 - 0.7 10e3/uL    Absolute Basophils 0.0 0.0 - 0.2 10e3/uL    Absolute Immature Granulocytes 0.0 <=0.0 10e3/uL    Absolute NRBCs 0.0 10e3/uL   Extra Tube    Narrative    The following orders were created for panel order Extra Tube.  Procedure                               Abnormality         Status                     ---------                               -----------         ------                      Extra Red Top Tube[[id number]]                               Final result                 Please view results for these tests on the individual orders.   Extra Red Top Tube   Result Value Ref Range    Hold Specimen JIC    UA with Microscopic reflex to Culture    Specimen: Urine, Catheter   Result Value Ref Range    Color Urine Roxi (A) Colorless, Straw, Light Yellow, Yellow    Appearance Urine Slightly Cloudy (A) Clear    Glucose Urine Negative Negative mg/dL    Bilirubin Urine Negative Negative    Ketones Urine 5  (A) Negative mg/dL    Specific Gravity Urine 1.026 1.003 - 1.035    Blood Urine Negative Negative    pH Urine 5.0 5.0 - 7.0    Protein Albumin Urine 30  (A) Negative mg/dL    Urobilinogen Urine 2.0 Normal, 2.0 mg/dL    Nitrite Urine Negative Negative    Leukocyte Esterase Urine Negative Negative    Bacteria Urine Moderate (A) None Seen /HPF    Mucus Urine Present (A) None Seen /LPF    RBC Urine 4 (H) <=2 /HPF    WBC Urine 1 <=5 /HPF    Squamous Epithelials Urine 1 <=1 /HPF    Hyaline Casts Urine 1 <=2 /LPF    Narrative    Urine Culture not indicated       Medications   0.9% sodium chloride BOLUS (0 mLs Intravenous Stopped 8/25/21 1602)       Assessments & Plan (with Medical Decision Making)   71-year-old female with generalized weakness that started about a week ago, but per her granddaughter been gradually worsening over a prolonged period of time. On exam patient is alert and oriented. Does not appear ill or distressed. Lung sounds with expiratory wheezing throughout consistent with being an everyday smoker. She has no tachypnea or increased work of breathing. SPO2 90-94%, which is probably her baseline. CBC is unremarkable. No anemia. Electrolytes are normal. BUN mildly elevated at 31. Normal creat and GFR. LFTs are normal. Glucose 122. UA (cath specimen) is negative for evidence of infection, 5 ketones, 4 RBCs(likely due to trauma from catheterization. EKG is NSR, no acute ischemic  "changes. Normal troponin. Unclear etiology for her generalized weakness. Could relate to her fluid intake--patient prefers to drink \"pop\" and does not drink a lot of water.  Granddaughter requested COVID-19 test and this has been obtained and the result is pending.  Plan as follows:  Drink 6-8 ounces of water daily.  Covid-19 test obtained and result is pending. You will be called if the result is positive.  Make appointment for recheck in clinic.  Return for any new or worsening symptoms.    I have reviewed the nursing notes.    I have reviewed the findings, diagnosis, plan and need for follow up with the patient.      New Prescriptions    No medications on file       Final diagnoses:   Generalized weakness       8/25/2021   [facility] EMERGENCY DEPT     Bev Davis APRN CNP  08/25/21 [id number]    "

## 2021-08-25 NOTE — DISCHARGE INSTRUCTIONS
Drink 6-8 ounces of water daily.  Covid-19 test obtained and result is pending. You will be called if the result is positive.  Make appointment for recheck in clinic.  Return for any new or worsening symptoms.

## 2021-08-26 ENCOUNTER — TELEPHONE (OUTPATIENT)
Dept: EMERGENCY MEDICINE | Facility: CLINIC | Age: 71
End: 2021-08-26

## 2021-08-26 ENCOUNTER — PATIENT OUTREACH (OUTPATIENT)
Dept: FAMILY MEDICINE | Facility: OTHER | Age: 71
End: 2021-08-26

## 2021-08-26 NOTE — TELEPHONE ENCOUNTER
"  ED for acute condition Discharge Protocol    \"Hi, my name is Rama Allen RN, a registered nurse, and I am calling from [facility].  I am calling to follow up and see how things are going for you after your recent emergency visit.\"    Tell me how you are doing now that you are home?\" a little tired, her symptoms are about the same.      Discharge Instructions    \"Let's review your discharge instructions.  What is/are the follow-up recommendations?  Pt. Response: patient will call back if she has increasing symptoms    \"Has an appointment with your primary care provider been scheduled?\"  No (not needed)    Medications    \"Tell me what changed about your medicines when you discharged?\"    none    \"What questions do you have about your medications?\"   None        Call Summary    \"What questions or concerns do you have about your recent visit and your follow-up care?\"     none    \"If you have questions or things don't continue to improve, we encourage you contact us through the main clinic number (give number).  Even if the clinic is not open, triage nurses are available 24/7 to help you.     We would like you to know that our clinic has extended hours (provide information).  We also have urgent care (provide details on closest location and hours/contact info)\"    \"Thank you for your time and take care!\"      Rama Allen RN on 8/26/2021 at 9:16 AM          "

## 2021-08-26 NOTE — TELEPHONE ENCOUNTER
Reason for follow up call: Khalida GOMES Alexxbk appeared on our list for being seen in and recenlty discharge from the Emergency Room/In Patient Hospital Admission.    Chief Complaint   Patient presents with     Hospital F/U     7% green    Generalized Weakness    TCM Episode No    Encounter routed for Clinic Triage RN to call for follow up    FRANCISCA Cobb, RN, N  [place]  August 26, 2021

## 2021-08-26 NOTE — TELEPHONE ENCOUNTER
Coronavirus (COVID-19) Notification    Reason for call  Notify of POSITIVE  COVID-19 lab result, assess symptoms,  review [facility] recommendations    Lab Result   Lab test for 2019-nCoV rRt-PCR or SARS-COV-2 PCR  Oropharyngeal AND/OR nasopharyngeal swabs were POSITIVE for 2019-nCoV RNA [OR] SARS-COV-2 RNA (COVID-19) RNA     We have been unable to reach Patient by phone at this time to notify of their Positive COVID-19 result.  Left voicemail message requesting a call back to [phone] [facility] for results.        POSITIVE COVID-19 Letter sent.    Cindy Shipley LPN

## 2021-09-26 DIAGNOSIS — E78.5 HYPERLIPIDEMIA LDL GOAL <100: ICD-10-CM

## 2021-09-27 RX ORDER — ATORVASTATIN CALCIUM 20 MG/1
20 TABLET, FILM COATED ORAL DAILY
Qty: 90 TABLET | Refills: 3 | Status: ON HOLD | OUTPATIENT
Start: 2021-09-27 | End: 2021-12-09

## 2021-10-22 DIAGNOSIS — E11.51 TYPE 2 DIABETES MELLITUS WITH DIABETIC PERIPHERAL ANGIOPATHY WITHOUT GANGRENE, WITHOUT LONG-TERM CURRENT USE OF INSULIN (H): Primary | ICD-10-CM

## 2021-10-22 RX ORDER — GLIPIZIDE 2.5 MG/1
2.5 TABLET, EXTENDED RELEASE ORAL DAILY
Qty: 90 TABLET | Refills: 1 | Status: SHIPPED | OUTPATIENT
Start: 2021-10-22 | End: 2022-02-25

## 2021-10-22 NOTE — TELEPHONE ENCOUNTER
[facility] calling to request a refill of the following medication:   RX Reference Number: [id number]    RN is unable to fill due to the medication being historical.   Abdirahman, please review and advise pended medication.    Disp Refills Start End LAURA   glipiZIDE (GLUCOTROL XL) 2.5 MG 24 hr tablet   4/19/2021  --   Class: Historical   Order: [id number]           FRANCISCA Cobb, RN, PHN  [place]  October 22, 2021

## 2021-11-10 DIAGNOSIS — F03.90 DEMENTIA WITHOUT BEHAVIORAL DISTURBANCE, UNSPECIFIED DEMENTIA TYPE: ICD-10-CM

## 2021-11-10 DIAGNOSIS — R56.9 CONVULSIONS, UNSPECIFIED CONVULSION TYPE (H): ICD-10-CM

## 2021-11-10 DIAGNOSIS — S49.92XA INJURY OF LEFT CLAVICLE, INITIAL ENCOUNTER: ICD-10-CM

## 2021-11-10 DIAGNOSIS — Z86.73 HISTORY OF CVA (CEREBROVASCULAR ACCIDENT): ICD-10-CM

## 2021-11-10 DIAGNOSIS — S42.002A CLOSED DISPLACED FRACTURE OF LEFT CLAVICLE, UNSPECIFIED PART OF CLAVICLE, INITIAL ENCOUNTER: ICD-10-CM

## 2021-11-10 RX ORDER — CLOPIDOGREL BISULFATE 75 MG/1
TABLET ORAL
Qty: 90 TABLET | Refills: 3 | Status: ON HOLD | OUTPATIENT
Start: 2021-11-10 | End: 2021-12-09

## 2021-11-10 RX ORDER — MELOXICAM 7.5 MG/1
7.5 TABLET ORAL DAILY
Qty: 90 TABLET | Refills: 3 | Status: ON HOLD | OUTPATIENT
Start: 2021-11-10 | End: 2022-04-08

## 2021-11-10 RX ORDER — MEMANTINE HYDROCHLORIDE 10 MG/1
10 TABLET ORAL DAILY
Qty: 90 TABLET | Refills: 3 | Status: ON HOLD | OUTPATIENT
Start: 2021-11-10 | End: 2022-04-08

## 2021-11-26 ENCOUNTER — APPOINTMENT (OUTPATIENT)
Dept: CT IMAGING | Facility: CLINIC | Age: 71
End: 2021-11-26
Attending: EMERGENCY MEDICINE
Payer: COMMERCIAL

## 2021-11-26 ENCOUNTER — HOSPITAL ENCOUNTER (EMERGENCY)
Facility: CLINIC | Age: 71
Discharge: ANOTHER HOSPITAL | End: 2021-11-26
Attending: EMERGENCY MEDICINE | Admitting: EMERGENCY MEDICINE
Payer: COMMERCIAL

## 2021-11-26 ENCOUNTER — HOSPITAL ENCOUNTER (INPATIENT)
Facility: CLINIC | Age: 71
LOS: 13 days | Discharge: NURSING, REHAB OR LONG-TERM CARE | DRG: 100 | End: 2021-12-09
Attending: PSYCHIATRY & NEUROLOGY | Admitting: PSYCHIATRY & NEUROLOGY
Payer: COMMERCIAL

## 2021-11-26 VITALS
DIASTOLIC BLOOD PRESSURE: 110 MMHG | OXYGEN SATURATION: 92 % | HEART RATE: 102 BPM | TEMPERATURE: 98.3 F | WEIGHT: 130 LBS | RESPIRATION RATE: 18 BRPM | BODY MASS INDEX: 21.84 KG/M2 | SYSTOLIC BLOOD PRESSURE: 189 MMHG

## 2021-11-26 DIAGNOSIS — R56.9 SEIZURE (H): ICD-10-CM

## 2021-11-26 DIAGNOSIS — I69.319 CVA, OLD, COGNITIVE DEFICITS: ICD-10-CM

## 2021-11-26 DIAGNOSIS — G93.40 ACUTE ENCEPHALOPATHY: ICD-10-CM

## 2021-11-26 DIAGNOSIS — G40.909 SEIZURE DISORDER (H): ICD-10-CM

## 2021-11-26 DIAGNOSIS — E11.9 TYPE 2 DIABETES MELLITUS WITHOUT COMPLICATION, WITHOUT LONG-TERM CURRENT USE OF INSULIN (H): ICD-10-CM

## 2021-11-26 DIAGNOSIS — I63.422 CEREBROVASCULAR ACCIDENT (CVA) DUE TO EMBOLISM OF LEFT ANTERIOR CEREBRAL ARTERY (H): Primary | ICD-10-CM

## 2021-11-26 DIAGNOSIS — F01.53 VASCULAR DEMENTIA WITH DEPRESSED MOOD (H): ICD-10-CM

## 2021-11-26 DIAGNOSIS — I16.9 HYPERTENSIVE CRISIS: ICD-10-CM

## 2021-11-26 LAB
ALBUMIN SERPL-MCNC: 4 G/DL (ref 3.4–5)
ALP SERPL-CCNC: 98 U/L (ref 40–150)
ALT SERPL W P-5'-P-CCNC: 15 U/L (ref 0–50)
AMMONIA PLAS-SCNC: 16 UMOL/L (ref 10–50)
ANION GAP SERPL CALCULATED.3IONS-SCNC: 5 MMOL/L (ref 3–14)
APAP SERPL-MCNC: <2 MG/L (ref 10–30)
APTT PPP: 25 SECONDS (ref 22–38)
AST SERPL W P-5'-P-CCNC: 13 U/L (ref 0–45)
BASE EXCESS BLDV CALC-SCNC: 2.4 MMOL/L (ref -7.7–1.9)
BASOPHILS # BLD AUTO: 0.1 10E3/UL (ref 0–0.2)
BASOPHILS NFR BLD AUTO: 1 %
BILIRUB DIRECT SERPL-MCNC: 0.2 MG/DL (ref 0–0.2)
BILIRUB SERPL-MCNC: 0.7 MG/DL (ref 0.2–1.3)
BUN SERPL-MCNC: 22 MG/DL (ref 7–30)
CALCIUM SERPL-MCNC: 8.4 MG/DL (ref 8.5–10.1)
CHLORIDE BLD-SCNC: 106 MMOL/L (ref 94–109)
CO2 SERPL-SCNC: 28 MMOL/L (ref 20–32)
CREAT SERPL-MCNC: 0.53 MG/DL (ref 0.52–1.04)
EOSINOPHIL # BLD AUTO: 0.1 10E3/UL (ref 0–0.7)
EOSINOPHIL NFR BLD AUTO: 1 %
ERYTHROCYTE [DISTWIDTH] IN BLOOD BY AUTOMATED COUNT: 14.2 % (ref 10–15)
GFR SERPL CREATININE-BSD FRML MDRD: >90 ML/MIN/1.73M2
GLUCOSE BLD-MCNC: 167 MG/DL (ref 70–99)
GLUCOSE BLDC GLUCOMTR-MCNC: 160 MG/DL (ref 70–99)
GLUCOSE BLDC GLUCOMTR-MCNC: 219 MG/DL (ref 70–99)
HCO3 BLDV-SCNC: 29 MMOL/L (ref 21–28)
HCT VFR BLD AUTO: 47.1 % (ref 35–47)
HGB BLD-MCNC: 15.4 G/DL (ref 11.7–15.7)
IMM GRANULOCYTES # BLD: 0 10E3/UL
IMM GRANULOCYTES NFR BLD: 0 %
INR PPP: 1 (ref 0.85–1.15)
LACTATE SERPL-SCNC: 1.8 MMOL/L (ref 0.7–2)
LYMPHOCYTES # BLD AUTO: 2.2 10E3/UL (ref 0.8–5.3)
LYMPHOCYTES NFR BLD AUTO: 25 %
MCH RBC QN AUTO: 30.1 PG (ref 26.5–33)
MCHC RBC AUTO-ENTMCNC: 32.7 G/DL (ref 31.5–36.5)
MCV RBC AUTO: 92 FL (ref 78–100)
MONOCYTES # BLD AUTO: 0.5 10E3/UL (ref 0–1.3)
MONOCYTES NFR BLD AUTO: 6 %
NEUTROPHILS # BLD AUTO: 6 10E3/UL (ref 1.6–8.3)
NEUTROPHILS NFR BLD AUTO: 67 %
NRBC # BLD AUTO: 0 10E3/UL
NRBC BLD AUTO-RTO: 0 /100
O2/TOTAL GAS SETTING VFR VENT: 21 %
PCO2 BLDV: 49 MM HG (ref 40–50)
PH BLDV: 7.38 [PH] (ref 7.32–7.43)
PLATELET # BLD AUTO: 257 10E3/UL (ref 150–450)
PO2 BLDV: 24 MM HG (ref 25–47)
POTASSIUM BLD-SCNC: 3.4 MMOL/L (ref 3.4–5.3)
PROT SERPL-MCNC: 7.9 G/DL (ref 6.8–8.8)
RBC # BLD AUTO: 5.12 10E6/UL (ref 3.8–5.2)
SALICYLATES SERPL-MCNC: 3 MG/DL
SARS-COV-2 RNA RESP QL NAA+PROBE: NEGATIVE
SODIUM SERPL-SCNC: 139 MMOL/L (ref 133–144)
TROPONIN I SERPL HS-MCNC: 5 NG/L
TROPONIN I SERPL-MCNC: <0.015 UG/L (ref 0–0.04)
TSH SERPL DL<=0.005 MIU/L-ACNC: 2.32 MU/L (ref 0.4–4)
WBC # BLD AUTO: 8.8 10E3/UL (ref 4–11)

## 2021-11-26 PROCEDURE — 96376 TX/PRO/DX INJ SAME DRUG ADON: CPT | Performed by: EMERGENCY MEDICINE

## 2021-11-26 PROCEDURE — G0426 INPT/ED TELECONSULT50: HCPCS | Mod: G0 | Performed by: PHYSICIAN ASSISTANT

## 2021-11-26 PROCEDURE — 82140 ASSAY OF AMMONIA: CPT | Performed by: EMERGENCY MEDICINE

## 2021-11-26 PROCEDURE — 80179 DRUG ASSAY SALICYLATE: CPT | Performed by: EMERGENCY MEDICINE

## 2021-11-26 PROCEDURE — 82803 BLOOD GASES ANY COMBINATION: CPT | Performed by: EMERGENCY MEDICINE

## 2021-11-26 PROCEDURE — 93010 ELECTROCARDIOGRAM REPORT: CPT | Performed by: EMERGENCY MEDICINE

## 2021-11-26 PROCEDURE — 36415 COLL VENOUS BLD VENIPUNCTURE: CPT | Performed by: EMERGENCY MEDICINE

## 2021-11-26 PROCEDURE — 99291 CRITICAL CARE FIRST HOUR: CPT | Mod: 25 | Performed by: EMERGENCY MEDICINE

## 2021-11-26 PROCEDURE — C9803 HOPD COVID-19 SPEC COLLECT: HCPCS | Performed by: EMERGENCY MEDICINE

## 2021-11-26 PROCEDURE — 80048 BASIC METABOLIC PNL TOTAL CA: CPT | Performed by: EMERGENCY MEDICINE

## 2021-11-26 PROCEDURE — 70496 CT ANGIOGRAPHY HEAD: CPT

## 2021-11-26 PROCEDURE — 250N000011 HC RX IP 250 OP 636: Performed by: EMERGENCY MEDICINE

## 2021-11-26 PROCEDURE — 82248 BILIRUBIN DIRECT: CPT | Performed by: EMERGENCY MEDICINE

## 2021-11-26 PROCEDURE — 80143 DRUG ASSAY ACETAMINOPHEN: CPT | Performed by: EMERGENCY MEDICINE

## 2021-11-26 PROCEDURE — 120N000002 HC R&B MED SURG/OB UMMC

## 2021-11-26 PROCEDURE — 85025 COMPLETE CBC W/AUTO DIFF WBC: CPT | Performed by: EMERGENCY MEDICINE

## 2021-11-26 PROCEDURE — 250N000009 HC RX 250: Performed by: EMERGENCY MEDICINE

## 2021-11-26 PROCEDURE — 85610 PROTHROMBIN TIME: CPT | Performed by: EMERGENCY MEDICINE

## 2021-11-26 PROCEDURE — 84443 ASSAY THYROID STIM HORMONE: CPT | Performed by: EMERGENCY MEDICINE

## 2021-11-26 PROCEDURE — 84484 ASSAY OF TROPONIN QUANT: CPT | Performed by: EMERGENCY MEDICINE

## 2021-11-26 PROCEDURE — 85730 THROMBOPLASTIN TIME PARTIAL: CPT | Performed by: EMERGENCY MEDICINE

## 2021-11-26 PROCEDURE — 96375 TX/PRO/DX INJ NEW DRUG ADDON: CPT | Mod: 59 | Performed by: EMERGENCY MEDICINE

## 2021-11-26 PROCEDURE — 36592 COLLECT BLOOD FROM PICC: CPT | Performed by: EMERGENCY MEDICINE

## 2021-11-26 PROCEDURE — 99207 PR NO BILLABLE SERVICE THIS VISIT: CPT | Performed by: STUDENT IN AN ORGANIZED HEALTH CARE EDUCATION/TRAINING PROGRAM

## 2021-11-26 PROCEDURE — 70450 CT HEAD/BRAIN W/O DYE: CPT | Mod: XS

## 2021-11-26 PROCEDURE — 87635 SARS-COV-2 COVID-19 AMP PRB: CPT | Performed by: EMERGENCY MEDICINE

## 2021-11-26 PROCEDURE — 93005 ELECTROCARDIOGRAM TRACING: CPT | Performed by: EMERGENCY MEDICINE

## 2021-11-26 PROCEDURE — 83605 ASSAY OF LACTIC ACID: CPT | Performed by: EMERGENCY MEDICINE

## 2021-11-26 PROCEDURE — 96365 THER/PROPH/DIAG IV INF INIT: CPT | Mod: 59 | Performed by: EMERGENCY MEDICINE

## 2021-11-26 PROCEDURE — 250N000011 HC RX IP 250 OP 636: Performed by: FAMILY MEDICINE

## 2021-11-26 PROCEDURE — 82962 GLUCOSE BLOOD TEST: CPT

## 2021-11-26 PROCEDURE — 258N000003 HC RX IP 258 OP 636: Performed by: EMERGENCY MEDICINE

## 2021-11-26 RX ORDER — PROCHLORPERAZINE 25 MG
12.5 SUPPOSITORY, RECTAL RECTAL EVERY 12 HOURS PRN
Status: DISCONTINUED | OUTPATIENT
Start: 2021-11-26 | End: 2021-12-09 | Stop reason: HOSPADM

## 2021-11-26 RX ORDER — LANOLIN ALCOHOL/MO/W.PET/CERES
3 CREAM (GRAM) TOPICAL AT BEDTIME
Status: DISCONTINUED | OUTPATIENT
Start: 2021-11-26 | End: 2021-12-09 | Stop reason: HOSPADM

## 2021-11-26 RX ORDER — PROCHLORPERAZINE MALEATE 5 MG
5 TABLET ORAL EVERY 6 HOURS PRN
Status: DISCONTINUED | OUTPATIENT
Start: 2021-11-26 | End: 2021-12-09 | Stop reason: HOSPADM

## 2021-11-26 RX ORDER — LIDOCAINE 40 MG/G
CREAM TOPICAL
Status: DISCONTINUED | OUTPATIENT
Start: 2021-11-26 | End: 2021-12-09 | Stop reason: HOSPADM

## 2021-11-26 RX ORDER — AMOXICILLIN 250 MG
2 CAPSULE ORAL 2 TIMES DAILY PRN
Status: DISCONTINUED | OUTPATIENT
Start: 2021-11-26 | End: 2021-12-09 | Stop reason: HOSPADM

## 2021-11-26 RX ORDER — IOPAMIDOL 755 MG/ML
500 INJECTION, SOLUTION INTRAVASCULAR ONCE
Status: COMPLETED | OUTPATIENT
Start: 2021-11-26 | End: 2021-11-26

## 2021-11-26 RX ORDER — LABETALOL HYDROCHLORIDE 5 MG/ML
10-20 INJECTION, SOLUTION INTRAVENOUS
Status: DISCONTINUED | OUTPATIENT
Start: 2021-11-26 | End: 2021-11-27

## 2021-11-26 RX ORDER — LABETALOL HYDROCHLORIDE 5 MG/ML
10 INJECTION, SOLUTION INTRAVENOUS ONCE
Status: COMPLETED | OUTPATIENT
Start: 2021-11-26 | End: 2021-11-26

## 2021-11-26 RX ORDER — LORAZEPAM 2 MG/ML
1 INJECTION INTRAMUSCULAR ONCE
Status: COMPLETED | OUTPATIENT
Start: 2021-11-26 | End: 2021-11-26

## 2021-11-26 RX ORDER — HYDRALAZINE HYDROCHLORIDE 20 MG/ML
10 INJECTION INTRAMUSCULAR; INTRAVENOUS EVERY 4 HOURS PRN
Status: DISCONTINUED | OUTPATIENT
Start: 2021-11-26 | End: 2021-12-04

## 2021-11-26 RX ORDER — ACETAMINOPHEN 500 MG
1000 TABLET ORAL EVERY 6 HOURS PRN
COMMUNITY

## 2021-11-26 RX ORDER — ONDANSETRON 4 MG/1
4 TABLET, ORALLY DISINTEGRATING ORAL EVERY 6 HOURS PRN
Status: DISCONTINUED | OUTPATIENT
Start: 2021-11-26 | End: 2021-12-09 | Stop reason: HOSPADM

## 2021-11-26 RX ORDER — NICOTINE POLACRILEX 4 MG
15-30 LOZENGE BUCCAL
Status: DISCONTINUED | OUTPATIENT
Start: 2021-11-26 | End: 2021-12-09 | Stop reason: HOSPADM

## 2021-11-26 RX ORDER — LEVETIRACETAM 10 MG/ML
1000 INJECTION INTRAVASCULAR EVERY 12 HOURS
Status: DISCONTINUED | OUTPATIENT
Start: 2021-11-27 | End: 2021-11-28

## 2021-11-26 RX ORDER — LABETALOL HYDROCHLORIDE 5 MG/ML
20 INJECTION, SOLUTION INTRAVENOUS ONCE
Status: COMPLETED | OUTPATIENT
Start: 2021-11-26 | End: 2021-11-26

## 2021-11-26 RX ORDER — ONDANSETRON 2 MG/ML
4 INJECTION INTRAMUSCULAR; INTRAVENOUS EVERY 6 HOURS PRN
Status: DISCONTINUED | OUTPATIENT
Start: 2021-11-26 | End: 2021-12-09 | Stop reason: HOSPADM

## 2021-11-26 RX ORDER — AMOXICILLIN 250 MG
1 CAPSULE ORAL 2 TIMES DAILY PRN
Status: DISCONTINUED | OUTPATIENT
Start: 2021-11-26 | End: 2021-12-09 | Stop reason: HOSPADM

## 2021-11-26 RX ORDER — DEXTROSE MONOHYDRATE 25 G/50ML
25-50 INJECTION, SOLUTION INTRAVENOUS
Status: DISCONTINUED | OUTPATIENT
Start: 2021-11-26 | End: 2021-12-09 | Stop reason: HOSPADM

## 2021-11-26 RX ADMIN — LABETALOL HYDROCHLORIDE 10 MG: 5 INJECTION, SOLUTION INTRAVENOUS at 21:08

## 2021-11-26 RX ADMIN — LABETALOL HYDROCHLORIDE 20 MG: 5 INJECTION, SOLUTION INTRAVENOUS at 18:01

## 2021-11-26 RX ADMIN — LEVETIRACETAM 2000 MG: 100 INJECTION, SOLUTION INTRAVENOUS at 17:34

## 2021-11-26 RX ADMIN — LORAZEPAM 1 MG: 2 INJECTION INTRAMUSCULAR; INTRAVENOUS at 15:51

## 2021-11-26 RX ADMIN — LABETALOL HYDROCHLORIDE 20 MG: 5 INJECTION, SOLUTION INTRAVENOUS at 19:50

## 2021-11-26 RX ADMIN — IOPAMIDOL 80 ML: 755 INJECTION, SOLUTION INTRAVENOUS at 16:09

## 2021-11-26 RX ADMIN — SODIUM CHLORIDE 100 ML: 9 INJECTION, SOLUTION INTRAVENOUS at 16:09

## 2021-11-26 ASSESSMENT — ACTIVITIES OF DAILY LIVING (ADL): ADLS_ACUITY_SCORE: 12

## 2021-11-26 NOTE — ED NOTES
Symptoms started at 1404.  Blood sugar on arrival was 160.  Stroke eval called when pt arrived to room 15.
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
## 2021-11-26 NOTE — LETTER
Health Information Management Services               Recipient:  Jocelyne        Sender:  Hui Jocarson, MELISSA, LGSW  [facility]   Ph:[phone]  Pager [phone]      Date: December 3, 2021  Patient Name:  Khalida Redding  Routing Message:  Please review for TCU placement at [facility]. Thanks!          The documents accompanying this notice contain confidential information belonging to the sender.  This information is intended only for the use of the individual or entity named above.  The authorized recipient of this information is prohibited from disclosing this information to any other party and is required to destroy the information after its stated need has been fulfilled, unless otherwise required by state law.      If you are not the intended recipient, you are hereby notified that any disclosure, copy, distribution or action taken in reliance on the contents of these documents is strictly prohibited.  If you have received this document in error, please return it by fax to [phone] with a note on the cover sheet explaining why you are returning it (e.g. not your patient, not your provider, etc.).  If you need further assistance, please call [facility] Centralized Transcription at [phone].  Documents may also be returned by mail to [employer], , [address], [place], [place] [address].

## 2021-11-26 NOTE — LETTER
Health Information Management Services               Recipient:  [facility] Admissions - ARU      Sender:  MELISSA Mirza, LGSW  6D Adult Acute Care   Ph:[phone]          Date: December 2, 2021  Patient Name:  Khalida Redding  Routing Message:  Please review for ARU placement. Thanks!          The documents accompanying this notice contain confidential information belonging to the sender.  This information is intended only for the use of the individual or entity named above.  The authorized recipient of this information is prohibited from disclosing this information to any other party and is required to destroy the information after its stated need has been fulfilled, unless otherwise required by state law.      If you are not the intended recipient, you are hereby notified that any disclosure, copy, distribution or action taken in reliance on the contents of these documents is strictly prohibited.  If you have received this document in error, please return it by fax to [phone] with a note on the cover sheet explaining why you are returning it (e.g. not your patient, not your provider, etc.).  If you need further assistance, please call [facility] Centralized Transcription at [phone].  Documents may also be returned by mail to [employer], , [facility] [address], [place], [place] [address].

## 2021-11-26 NOTE — CONSULTS
[facility]    Stroke Consult Note    Reason for Consult: Stroke Code     Chief Complaint: Seizures    HPI  Khalida Redding is a 71 year old female with history of right MCA stroke [residual L sided deficits], hx of seizures [no AEDs PTA], who presents to the emergency department for evaluation of AMS and seizure like activity.     Per report, patient was driving in the car with her  this afternoon about two hours prior to presentation when she was noted to become confused and have some kind of seizure like activity.  drove the patient home and called her daughter who subsequently brought her in to ED for further evaluation. In ED, patient with continued AMS, not following many commands besides opening eyes and looking at examiner. Further history is limited due to patient's current altered state. Per report from daughter, patient has intermittent seizures up to 2 to 3 times a week.     On exam via tele-stroke cart, patient remains encephalopathic, opening eyes briefly to daughters voice and attempting to reposition herself in bed. Patient moving all extremities.     Imaging Findings  CTH negative for acute bleed, there is evidence of encephalomalacia likely secondary to hx of R MCA infarct, vessel imaging negative for proximal LVO     Thrombolytic Treatment   Not given due to stroke mimic: non-focal clinical presenation with higher suspicion for alternative etiology such as seizure [NCSE].    Endovascular Treatment  Not initiated due to absence of proximal vessel occlusion    Impression   71 year old female with hx of stroke and seizure presents to ED for evaluation of AMS, initial imaging unrevealing. Clinical presentation non-focal which decreases suspicion for acute ischemic infarct as etiology, however MRI brain should be obtained to definitively rule this out, there is a higher suspicion for symptoms to be secondary to seizure. Given new evidence of  "prolonged what appears like a post ictal state vs NCSE, would recommend further investigation with vEEG monitoring    Recommendations  -Load with 2g Keppra and continue patient on Keppra 1g BID  -If patient's symptoms do not improve, would recommend transfer to facility for further medical management and  vEEG capabilities  -MRI brain w/wo when able   -Gradually work to bring SBP <180      Patient Follow-up    - final recommendation pending work-up    Thank you for this consult.     Sydney Ibarra PA-C   Neurology  To page me or covering stroke neurology team member, click here: [facility]COM   Choose \"On Call\" tab at top, then search dropdown box for \"Neurology Adult\", select location, press Enter, then look for stroke/neuro ICU/telestroke.  ______________________________________________________    Clinically Significant Risk Factors Present on Admission             # Platelet Defect: home medication list includes an antiplatelet medication       Past Medical History   Past Medical History:   Diagnosis Date     Atrial tachycardia (H)     nonsustained, detected on Ziopatch     Convulsions, unspecified convulsion type (H) 9/20/2018     CVA (cerebral vascular accident) (H)      Dementia (H) 9/20/2018     Diabetes (H)      Falls      History of CVA (cerebrovascular accident) 9/20/2018     Hyperlipidemia LDL goal <100 9/20/2018     Hypertension goal BP (blood pressure) < 140/90 9/20/2018     Osteoarthritis 9/20/2018     Pain in both lower legs 9/20/2018     Seizures (H)      Smoking      Syncope      Tobacco abuse disorder 9/20/2018     Tubular adenoma of colon 11/29/2018     Type 2 diabetes mellitus with circulatory disorder, without long-term current use of insulin (H) 9/20/2018     Past Surgical History   Past Surgical History:   Procedure Laterality Date     COLONOSCOPY N/A 11/28/2018    Procedure: Colonoscopy, Polypectomy by Biopsy;  Surgeon: Arcadio Mcmullen DO;  Location:  GI     COLONOSCOPY N/A 8/24/2020 "    Procedure: Colonoscopy with possible biopsy and/or polypectomy;  Surgeon: Fabián Hines MD;  Location:  GI     HIP SURGERY Left      Medications   Home Meds  Prior to Admission medications    Medication Sig Start Date End Date Taking? Authorizing Provider   alcohol swab prep pads Use to swab area of injection/carlos manuel as directed. 5/7/21   Danilo Yun MD   alendronate (FOSAMAX) 70 MG tablet Take 1 tablet (70 mg) by mouth every 7 days  Patient taking differently: Take 70 mg by mouth every 7 days Tuesdays 4/19/21   Abdirahman Munoz PA-C   atorvastatin (LIPITOR) 20 MG tablet Take 1 tablet (20 mg) by mouth daily 9/27/21   Abdirahman Munoz PA-C   blood glucose (NO BRAND SPECIFIED) test strip Use to test blood sugar once daily. To accompany: Blood Glucose Monitor Brands: per insurance. 5/7/21   Danilo Yun MD   blood glucose monitoring (NO BRAND SPECIFIED) meter device kit Use to test blood sugar once daily. Preferred blood glucose meter OR supplies to accompany: Blood Glucose Monitor Brands: per insurance. 5/7/21   Danilo Yun MD   clopidogrel (PLAVIX) 75 MG tablet TAKE 1 TABLET BY MOUTH  DAILY 11/10/21   Abdirahman Munoz PA-C   Cyanocobalamin (VITAMIN B 12 PO) Take 1,000 mcg by mouth daily    Reported, Patient   gabapentin (NEURONTIN) 300 MG capsule Take 1 capsule (300 mg) by mouth At Bedtime 4/19/21   Abdirahman Munoz PA-C   glipiZIDE (GLUCOTROL XL) 2.5 MG 24 hr tablet Take 1 tablet (2.5 mg) by mouth daily 10/22/21   Abdirahman Munoz PA-C   ibuprofen (ADVIL/MOTRIN) 200 MG tablet Take 200 mg by mouth 2 times daily    Reported, Patient   meloxicam (MOBIC) 7.5 MG tablet Take 1 tablet (7.5 mg) by mouth daily 11/10/21   Abdirahman Munoz PA-C   memantine (NAMENDA) 10 MG tablet Take 1 tablet (10 mg) by mouth daily 11/10/21   Abdirahman Munoz PA-C   metFORMIN (GLUCOPHAGE-XR) 500 MG 24 hr tablet Take 1 tablet (500 mg) by mouth 2 times daily (with meals) 8/24/21   Abdirahman Munoz PA-C   omeprazole  "(PRILOSEC) 40 MG DR capsule TAKE 1 CAPSULE BY MOUTH ONCE DAILY TAKE  30  TO  60  MINUTES  BEFORE  A  MEAL  Patient not taking: Reported on 8/25/2021 4/19/21   Abdirahman Munoz PA-C   order for DME Equipment being ordered: glucometer and related supplies.  Patient not taking: Reported on 5/7/2021 2/11/19   Abdirahman Munoz PA-C   polyethylene glycol (MIRALAX) 17 GM/Dose powder Take 17 g (1 capful) by mouth 2 times daily  Patient not taking: Reported on 8/25/2021 4/19/21   Abdirahman Munoz PA-C   quinapril (ACCUPRIL) 5 MG tablet TAKE 1 TABLET EVERY DAY  Patient taking differently: Take 5 mg by mouth daily  4/19/21   Abdirahman Munoz PA-C   thin (NO BRAND SPECIFIED) lancets Use with lanceting device. To accompany: Blood Glucose Monitor Brands: per insurance. 5/7/21   Danilo Yun MD     Scheduled Meds    Infusion Meds    PRN Meds    Allergies   Allergies   Allergen Reactions     Amoxicillin Hives and Rash     Pt reports she has taken PCN without problems     Ampicillin Rash     Family History   History reviewed. No pertinent family history.  Social History   Social History     Tobacco Use     Smoking status: Current Every Day Smoker     Packs/day: 1.00     Types: Cigarettes     Smokeless tobacco: Never Used   Substance Use Topics     Alcohol use: No     Drug use: No     Review of Systems   Review of systems not obtained due to patient factors - confusion, critical condition and mental status     PHYSICAL EXAMINATION  Temp:  [96.3  F (35.7  C)] 96.3  F (35.7  C)  Pulse:  [110-126] 126  Resp:  [16] 16  BP: (197-246)/(119-143) 246/143  SpO2:  [95 %] 95 %     Neuro Exam  Mental Status:  pt with eyes closed on exam but will briefly open to daughter's who was bedsides voice, will open eyes to command but does not follow any other commands, mummbles \"what\"   Cranial Nerves: pt forces eyes closed when examiner/nurse tries to open, facial symmetry grossly symmetric, hearing not formally tested but intact to " conversation  Motor: spontaneously moving RUE, LUE briefly antigravity when assisted into place, Spontaneously moving RLE, withdraws in LLE to noxious    Reflexes:  unable to test (telestroke)  Sensory:  see motor exam   Coordination:  unable to assess   Station/Gait:  unable to test due to telestroke    Dysphagia Screen  Per Nursing    Stroke Scales  NIHSS  Interval     Interval Comments     1a. Level of Consciousness 1-->Not alert, but arousable by minor stimulation to obey, answer, or respond   1b. LOC Questions 2-->Answers neither question correctly   1c. LOC Commands 2-->Performs neither task correctly   2.   Best Gaze 0-->Normal   3.   Visual 0-->No visual loss   4.   Facial Palsy 0-->Normal symmetrical movements   5a. Motor Arm, Left 2-->Some effort against gravity, limb cannot get to or maintain (if cued) 90 (or 45) degrees, drifts down to bed, but has some effort against gravity   5b. Motor Arm, Right 2-->Some effort against gravity, limb cannot get to or maintain (if cued) 90 (or 45) degrees, drifts down to bed, but has some effort against gravity   6a. Motor Leg, Left 2-->Some effort against gravity, leg falls to bed by 5 secs, but has some effort against gravity   6b. Motor Leg, right 2-->Some effort against gravity, leg falls to bed by 5 secs, but has some effort against gravity   7.   Limb Ataxia 0-->Absent   8.   Sensory 0-->Normal, no sensory loss   9.   Best Language 3-->Mute, global aphasia, no usable speech or auditory comprehension   10. Dysarthria 2-->Severe dysarthria, patients speech is so slurred as to be unintelligible in the absence of or out of proportion to any dysphasia, or is mute/anarthric   11. Extinction and Inattention  0-->No abnormality   Total 18 (11/26/21 1628)     Imaging  I personally reviewed all imaging; relevant findings per HPI.     Lab Results Data   CBC  Recent Labs   Lab 11/26/21  1547   WBC 8.8   RBC 5.12   HGB 15.4   HCT 47.1*        Basic Metabolic Panel     Recent Labs   Lab 11/26/21  1547 11/26/21  1536     --    POTASSIUM 3.4  --    CHLORIDE 106  --    CO2 28  --    BUN 22  --    CR 0.53  --    * 160*   PINA 8.4*  --      Liver Panel  No results for input(s): PROTTOTAL, ALBUMIN, BILITOTAL, ALKPHOS, AST, ALT, BILIDIRECT in the last 168 hours.  INR    Recent Labs   Lab Test 11/26/21  1547   INR 1.00      Lipid Profile    Recent Labs   Lab Test 04/19/21  1451 01/13/20  0813 11/18/19  0816 07/16/18  0000   CHOL 158 160  --  167   HDL 48* 57  --  53   LDL 78 80 87 89   TRIG 162* 114  --  148     A1C    Recent Labs   Lab Test 04/19/21  1451 06/16/20  1617 01/13/20  0813   A1C 6.1* 6.1* 6.6*     Troponin I    Recent Labs   Lab 11/26/21  1547   TROPONIN <0.015          Stroke Code Data Data   Stroke Code Data  (for stroke code with tele)  Stroke code activated 11/26/21   1559   First stroke provider response 11/26/21   1601   Video start time 11/26/21   1627   Video end time 11/26/21   1635   Last known normal 11/26/21     Time of discovery  (or onset of symptoms)  11/26/21     Head CT read by Stroke Neuro Dr/Provider 11/26/21   1611   Was stroke code de-escalated?   11/26/21 1639           Telestroke Service Details  Type of service telemedicine diagnostic assessment of acute neurological changes   Reason telemedicine is appropriate patient requires assessment with a specialist for diagnosis and treatment of neurological symptoms   Mode of transmission secure interactive audio and video communication per Kaya   Originating site (patient location) [facility]    Distant site (provider location) [facility]

## 2021-11-27 ENCOUNTER — APPOINTMENT (OUTPATIENT)
Dept: NEUROLOGY | Facility: CLINIC | Age: 71
DRG: 100 | End: 2021-11-27
Attending: PSYCHIATRY & NEUROLOGY
Payer: COMMERCIAL

## 2021-11-27 LAB
ALBUMIN SERPL-MCNC: 3.8 G/DL (ref 3.4–5)
ALBUMIN UR-MCNC: 70 MG/DL
ALP SERPL-CCNC: 89 U/L (ref 40–150)
ALT SERPL W P-5'-P-CCNC: 14 U/L (ref 0–50)
ANION GAP SERPL CALCULATED.3IONS-SCNC: 8 MMOL/L (ref 3–14)
APPEARANCE UR: ABNORMAL
AST SERPL W P-5'-P-CCNC: 13 U/L (ref 0–45)
BILIRUB SERPL-MCNC: 0.7 MG/DL (ref 0.2–1.3)
BILIRUB UR QL STRIP: NEGATIVE
BUN SERPL-MCNC: 25 MG/DL (ref 7–30)
CALCIUM SERPL-MCNC: 8.7 MG/DL (ref 8.5–10.1)
CHLORIDE BLD-SCNC: 104 MMOL/L (ref 94–109)
CO2 SERPL-SCNC: 24 MMOL/L (ref 20–32)
COLOR UR AUTO: YELLOW
CREAT SERPL-MCNC: 0.67 MG/DL (ref 0.52–1.04)
ERYTHROCYTE [DISTWIDTH] IN BLOOD BY AUTOMATED COUNT: 14.6 % (ref 10–15)
GFR SERPL CREATININE-BSD FRML MDRD: 87 ML/MIN/1.73M2
GLUCOSE BLD-MCNC: 197 MG/DL (ref 70–99)
GLUCOSE BLDC GLUCOMTR-MCNC: 133 MG/DL (ref 70–99)
GLUCOSE BLDC GLUCOMTR-MCNC: 145 MG/DL (ref 70–99)
GLUCOSE BLDC GLUCOMTR-MCNC: 146 MG/DL (ref 70–99)
GLUCOSE BLDC GLUCOMTR-MCNC: 185 MG/DL (ref 70–99)
GLUCOSE BLDC GLUCOMTR-MCNC: 186 MG/DL (ref 70–99)
GLUCOSE UR STRIP-MCNC: 300 MG/DL
HBA1C MFR BLD: 5.4 % (ref 0–5.6)
HCT VFR BLD AUTO: 46.3 % (ref 35–47)
HGB BLD-MCNC: 15.4 G/DL (ref 11.7–15.7)
HGB UR QL STRIP: ABNORMAL
KETONES UR STRIP-MCNC: 40 MG/DL
LACTATE SERPL-SCNC: 2 MMOL/L (ref 0.7–2)
LEUKOCYTE ESTERASE UR QL STRIP: NEGATIVE
MCH RBC QN AUTO: 30.4 PG (ref 26.5–33)
MCHC RBC AUTO-ENTMCNC: 33.3 G/DL (ref 31.5–36.5)
MCV RBC AUTO: 92 FL (ref 78–100)
NITRATE UR QL: NEGATIVE
PH UR STRIP: 5.5 [PH] (ref 5–7)
PLATELET # BLD AUTO: 283 10E3/UL (ref 150–450)
POTASSIUM BLD-SCNC: 3.4 MMOL/L (ref 3.4–5.3)
PROT SERPL-MCNC: 7.8 G/DL (ref 6.8–8.8)
RBC # BLD AUTO: 5.06 10E6/UL (ref 3.8–5.2)
RBC URINE: 2 /HPF
SODIUM SERPL-SCNC: 136 MMOL/L (ref 133–144)
SP GR UR STRIP: 1.03 (ref 1–1.03)
UROBILINOGEN UR STRIP-MCNC: NORMAL MG/DL
WBC # BLD AUTO: 15.6 10E3/UL (ref 4–11)
WBC URINE: 1 /HPF

## 2021-11-27 PROCEDURE — 99207 EEG VIDEO 12-26 HR UNMONITORED: CPT | Performed by: PSYCHIATRY & NEUROLOGY

## 2021-11-27 PROCEDURE — 36415 COLL VENOUS BLD VENIPUNCTURE: CPT | Performed by: STUDENT IN AN ORGANIZED HEALTH CARE EDUCATION/TRAINING PROGRAM

## 2021-11-27 PROCEDURE — 999N000128 HC STATISTIC PERIPHERAL IV START W/O US GUIDANCE

## 2021-11-27 PROCEDURE — 81001 URINALYSIS AUTO W/SCOPE: CPT | Performed by: STUDENT IN AN ORGANIZED HEALTH CARE EDUCATION/TRAINING PROGRAM

## 2021-11-27 PROCEDURE — 83605 ASSAY OF LACTIC ACID: CPT | Performed by: PSYCHIATRY & NEUROLOGY

## 2021-11-27 PROCEDURE — 95720 EEG PHY/QHP EA INCR W/VEEG: CPT | Performed by: PSYCHIATRY & NEUROLOGY

## 2021-11-27 PROCEDURE — 250N000011 HC RX IP 250 OP 636: Performed by: STUDENT IN AN ORGANIZED HEALTH CARE EDUCATION/TRAINING PROGRAM

## 2021-11-27 PROCEDURE — 250N000012 HC RX MED GY IP 250 OP 636 PS 637: Performed by: STUDENT IN AN ORGANIZED HEALTH CARE EDUCATION/TRAINING PROGRAM

## 2021-11-27 PROCEDURE — 80053 COMPREHEN METABOLIC PANEL: CPT | Performed by: STUDENT IN AN ORGANIZED HEALTH CARE EDUCATION/TRAINING PROGRAM

## 2021-11-27 PROCEDURE — 120N000002 HC R&B MED SURG/OB UMMC

## 2021-11-27 PROCEDURE — 99207 PR CONSULT E&M CHANGED TO INITIAL LEVEL: CPT | Performed by: INTERNAL MEDICINE

## 2021-11-27 PROCEDURE — 87040 BLOOD CULTURE FOR BACTERIA: CPT | Performed by: STUDENT IN AN ORGANIZED HEALTH CARE EDUCATION/TRAINING PROGRAM

## 2021-11-27 PROCEDURE — 258N000003 HC RX IP 258 OP 636: Performed by: STUDENT IN AN ORGANIZED HEALTH CARE EDUCATION/TRAINING PROGRAM

## 2021-11-27 PROCEDURE — 83036 HEMOGLOBIN GLYCOSYLATED A1C: CPT | Performed by: STUDENT IN AN ORGANIZED HEALTH CARE EDUCATION/TRAINING PROGRAM

## 2021-11-27 PROCEDURE — 36415 COLL VENOUS BLD VENIPUNCTURE: CPT | Performed by: PSYCHIATRY & NEUROLOGY

## 2021-11-27 PROCEDURE — 99222 1ST HOSP IP/OBS MODERATE 55: CPT | Performed by: INTERNAL MEDICINE

## 2021-11-27 PROCEDURE — 95714 VEEG EA 12-26 HR UNMNTR: CPT

## 2021-11-27 PROCEDURE — 85027 COMPLETE CBC AUTOMATED: CPT | Performed by: STUDENT IN AN ORGANIZED HEALTH CARE EDUCATION/TRAINING PROGRAM

## 2021-11-27 PROCEDURE — 99207 PR APP CREDIT; MD BILLING SHARED VISIT: CPT | Performed by: PHYSICIAN ASSISTANT

## 2021-11-27 RX ORDER — LABETALOL HYDROCHLORIDE 5 MG/ML
20 INJECTION, SOLUTION INTRAVENOUS EVERY 6 HOURS PRN
Status: DISCONTINUED | OUTPATIENT
Start: 2021-11-27 | End: 2021-12-04

## 2021-11-27 RX ADMIN — LEVETIRACETAM 1000 MG: 10 INJECTION INTRAVENOUS at 08:27

## 2021-11-27 RX ADMIN — LABETALOL HYDROCHLORIDE 10 MG: 5 INJECTION, SOLUTION INTRAVENOUS at 08:48

## 2021-11-27 RX ADMIN — SODIUM CHLORIDE, POTASSIUM CHLORIDE, SODIUM LACTATE AND CALCIUM CHLORIDE 1000 ML: 600; 310; 30; 20 INJECTION, SOLUTION INTRAVENOUS at 09:28

## 2021-11-27 RX ADMIN — INSULIN ASPART 1 UNITS: 100 INJECTION, SOLUTION INTRAVENOUS; SUBCUTANEOUS at 04:06

## 2021-11-27 RX ADMIN — INSULIN ASPART 1 UNITS: 100 INJECTION, SOLUTION INTRAVENOUS; SUBCUTANEOUS at 22:36

## 2021-11-27 RX ADMIN — HYDRALAZINE HYDROCHLORIDE 10 MG: 20 INJECTION INTRAMUSCULAR; INTRAVENOUS at 08:29

## 2021-11-27 RX ADMIN — LEVETIRACETAM 1000 MG: 10 INJECTION INTRAVENOUS at 19:48

## 2021-11-27 RX ADMIN — INSULIN ASPART 1 UNITS: 100 INJECTION, SOLUTION INTRAVENOUS; SUBCUTANEOUS at 17:34

## 2021-11-27 RX ADMIN — INSULIN ASPART 1 UNITS: 100 INJECTION, SOLUTION INTRAVENOUS; SUBCUTANEOUS at 09:01

## 2021-11-27 ASSESSMENT — ACTIVITIES OF DAILY LIVING (ADL)
ADLS_ACUITY_SCORE: 22
ADLS_ACUITY_SCORE: 12
ADLS_ACUITY_SCORE: 18
ADLS_ACUITY_SCORE: 22
ADLS_ACUITY_SCORE: 12
ADLS_ACUITY_SCORE: 22
ADLS_ACUITY_SCORE: 22
ADLS_ACUITY_SCORE: 18
ADLS_ACUITY_SCORE: 22
ADLS_ACUITY_SCORE: 18
ADLS_ACUITY_SCORE: 20
ADLS_ACUITY_SCORE: 22
ADLS_ACUITY_SCORE: 22
ADLS_ACUITY_SCORE: 18
ADLS_ACUITY_SCORE: 22
ADLS_ACUITY_SCORE: 12
ADLS_ACUITY_SCORE: 18
ADLS_ACUITY_SCORE: 18

## 2021-11-27 NOTE — H&P
[facility]  Neurology History and Physical    Patient Name:  Khalida Redding  MRN:  [id number]    :  1950  Date of Service:  2021  Primary care provider:  Abdirahman Munoz      Chief Complaint: Spell   History of Present Illness   Khalida Redding is a 71 year old female with PMH of  R MCA territory ischemic stroke with residual left sided weakness, seizures (GTC, not on PTA AEDs), dementia, who presented to [facility] ED following a spell concerning for a seizure.  Per report, the patient was the passenger in a vehicle when her  who was driving the car at the time, noted confusion and abnormal movements in her upper and lower extremities. Family has reported that she can have a couple of seizures per week without significant post-ictal confusion.     Upon arrival in the ED, she was found to be afebrile, hypertensive to 234/130, and tachycardic to 112. She continued to be confused upon examination by providers, but was reportedly opening eyes to command (not following other commands). The stroke team was contacted on arrival and non-con head CT was negative for acute hemorrhage or ischemia. She was loaded with IV levetiracetam 2g with recommendation to follow with levetiracetam 1g twice daily maintenance dose.    On arrival to [facility], she is not speaking to providers. She is localizing to sternal rub and withdrawing all extremities to noxious stimuli (R hemibody > L hemibody). She is resisting when examiner attempts to open eyes and also resists movements of extremities.    ROS  A 10-point ROS performed and negative unless documented in HPI.    PMH  Right MCA stroke  Seizures  Dementia  Type II diabetes  HLD  HTN    Medications   Medications Prior to Admission   Medication Sig Dispense Refill Last Dose     acetaminophen (TYLENOL) 500 MG tablet Take 1,000 mg by mouth every 6 hours as needed for mild pain        alcohol swab prep pads Use to  swab area of injection/carlos manuel as directed. 100 each 3      alendronate (FOSAMAX) 70 MG tablet Take 1 tablet (70 mg) by mouth every 7 days (Patient taking differently: Take 70 mg by mouth every 7 days Tuesdays) 13 tablet 3      atorvastatin (LIPITOR) 20 MG tablet Take 1 tablet (20 mg) by mouth daily 90 tablet 3      blood glucose (NO BRAND SPECIFIED) test strip Use to test blood sugar once daily. To accompany: Blood Glucose Monitor Brands: per insurance. 100 strip 6      blood glucose monitoring (NO BRAND SPECIFIED) meter device kit Use to test blood sugar once daily. Preferred blood glucose meter OR supplies to accompany: Blood Glucose Monitor Brands: per insurance. 1 kit 0      clopidogrel (PLAVIX) 75 MG tablet TAKE 1 TABLET BY MOUTH  DAILY (Patient taking differently: Take 75 mg by mouth daily ) 90 tablet 3      Cyanocobalamin (VITAMIN B 12 PO) Take 1,000 mcg by mouth daily        gabapentin (NEURONTIN) 300 MG capsule Take 1 capsule (300 mg) by mouth At Bedtime 90 capsule 1      glipiZIDE (GLUCOTROL XL) 2.5 MG 24 hr tablet Take 1 tablet (2.5 mg) by mouth daily 90 tablet 1      meloxicam (MOBIC) 7.5 MG tablet Take 1 tablet (7.5 mg) by mouth daily 90 tablet 3      memantine (NAMENDA) 10 MG tablet Take 1 tablet (10 mg) by mouth daily 90 tablet 3      metFORMIN (GLUCOPHAGE-XR) 500 MG 24 hr tablet Take 1 tablet (500 mg) by mouth 2 times daily (with meals) 180 tablet 1      omeprazole (PRILOSEC) 40 MG DR capsule TAKE 1 CAPSULE BY MOUTH ONCE DAILY TAKE  30  TO  60  MINUTES  BEFORE  A  MEAL (Patient taking differently: Take 40 mg by mouth At Bedtime ) 90 capsule 1      order for DME Equipment being ordered: glucometer and related supplies. (Patient not taking: Reported on 5/7/2021) 1 Units 0      polyethylene glycol (MIRALAX) 17 GM/Dose powder Take 17 g (1 capful) by mouth 2 times daily (Patient taking differently: Take 1 capful by mouth daily as needed for constipation ) 1000 g 1      quinapril (ACCUPRIL) 5 MG tablet TAKE  1 TABLET EVERY DAY (Patient taking differently: Take 5 mg by mouth daily ) 90 tablet 1      thin (NO BRAND SPECIFIED) lancets Use with lanceting device. To accompany: Blood Glucose Monitor Brands: per insurance. 100 each 6      Allergies  Allergies   Allergen Reactions     Amoxicillin Hives and Rash     Pt reports she has taken PCN without problems     Ampicillin Rash     Social History  Unable to obtain due to the patient's altered mental status.    Family History    Unable to obtain due to the patient's altered mental status.     Physical Exam   Vitals:   BP (!) 153/91 (BP Location: Right arm)   Pulse 102   Temp 97.6  F (36.4  C) (Axillary)   Resp 19   SpO2 92%     General: Laying in bed, NAD.   HEENT: Normocephalic, atraumatic. Sclera anicteric.   Cardiac: Extremities appear well-perfused.  Chest: Non-labored, no accessory muscle use.  Abdomen: Soft, non-distended, non-tender.  Extremities: Warm, no edema, pedal pulses palpable.  Skin: Warm, dry. No jaundice.     Neuro:  Mental status: Eyes are closed and do not open to voice or noxious stimuli. She is not following commands.  Cranial nerves: Gaze is conjugate, no gaze deviation. Pupils are 2-3mm, round, reactive to light. Facial movements appear to be symmetric.  Motor: There is spontaneous and purposeful movement in all extremities (Left hemibody > R hemibody). She is resisting movements by examiner in all extremities. There are no abnormal movements noted.  Reflexes: No clonus, toes mute.  Sensory: Localizing to sternal rub, withdrawing to noxious stimuli in all extremities.  Coordination: Patient does not participate.  Gait: Deferred.     Investigations   Labs  BMP  Recent Labs   Lab 11/26/21  1547      POTASSIUM 3.4   CHLORIDE 106   CO2 28   BUN 22   CR 0.53   PINA 8.4*     CBC  Recent Labs   Lab 11/26/21  1547   WBC 8.8   HGB 15.4        Imaging  Personally reviewed. The radiologists interpretation is documented below.    Head CT without  contrast 11/26:  IMPRESSION:   1. Area of encephalomalacia in the anterior medial right frontal lobe  is stable since the prior CT examination.  2. Small vessel white matter ischemic changes and age-related atrophy are again noted.  3. Shift of the interventricular septum to the left is again noted.  4. No acute intracranial hemorrhage or obvious acute intracranial  ischemic event is identified. CT can be insensitive for acute ischemic  events for up to 24 hours. Extension of the areas of chronic infarct  is difficult to exclude.       Assessment & Plan   Khalida Redding is a 71yoF with PMH of  R MCA territory ischemic stroke with residual left sided weakness, seizures, dementia, who presented to [facility] ED following a spell concerning for a seizure.    She was found to be hypertensive at [facility] ED (SBP 230s). DDx remains broad including breakthrough seizure, hypertensive encephalopathy, and PRES.     #Acute encephalopathy  #Spell concerning for seizure  -Admit to neurology/6A  -Continue levetiracetam 1g twice daily for now, will considers adjustments pending work-up  -vEEG   -Brain MRI w/ and w/o contrast  -UA/Ucx  -Seizure precautions  -NPO while encephalopathic    #Hypertensive emergency  #HLD  -SBP goal <180  -PRN hydralazine and labetalol PRN to maintain goals  -Hold PTA quinapril while NPO  -Hold PTA atorvastatin while NPO  -Consider medicine consult if BP remain elevated despite PRNs    #History of R MCA ischemic stroke  #Residual left sided hemiparesis  -Hold PTA clopidorel 75mg daily while NPO    #Dementia  -Hold PTA Namenda    #Diabetes mellitus, type II  -Hold PTA metformin and glipizide while NPO  -POC glucose  -Correctional dose insulin, low intensity  -Hypoglycemia protocol    Lines/tubes/drains: PIVs.  FEN: NPO while encephalopathic, hold IV maintenance fluids for now and consider starting if she remains NPO tomorrow.  Ppx: DVT - SCDs.  Code: Full.  Dispo: Pending medical  work-up/hospital course. Attempted to contact  to update, no response.    The patient was discussed with the attending neurologist, Dr. Marquez.    Naida Sesay MD  Neurology PGY-3  11/26/2021 9:50 PM      Addendum:    Interval/Subjective:  BP treated with labetalol 20 mg x2 and 10 mg x1.   Pt remained stable overnight.  Blood pressure has decreased significantly to 106/62.  However this AM pt's BP again spike to 209/103  Plan above remains the same, except for:  - Medicine consulted for management of hypertension.   - 1L bolus IVF  - Start IVF @ maintenance   - UA  - Blood cultures  - follow-up EEG    Physical exam is unchanged.    Refugio Jessica MD  Neurology Resident , PGY-2  November 27, 2021     I saw and evaluated the patient with the resident and agree with the assessment and plan. I performed a neurological examination which is consistent with the resident's description of his own. EEG is currently being performed (11:43 AM). I will follow up on results.    Liv Marquez MD  Chief, Multiple Sclerosis Division  Department of Neurology  [facility] [place]
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
## 2021-11-27 NOTE — ED NOTES
Daughter updated on status and transfer to [facility] 6A. Daughter, [facility] [phone]

## 2021-11-27 NOTE — PLAN OF CARE
Time 0299-3026    Reason for admission: AMS, Seizure like activity, H/O R MCA stroke and seizures, Dementia, DM  Vitals: VSS on RA, Afebrile  Activity: Bedrest  Pain: No nonverbal signs of pain  Neuro:  Difficult to assess d/t lethargy, does not follow commands,  Spontaneously moves all extremities L>R.   Cardiac:  Tachycardic  Respiratory:  LS clear  GI/: Incontinent of B/B. +BS  Diet: NPO  Lines: R PIV SL  Skin/Wounds: CDI  Labs/imaging:  BG  219 and 186      New changes this shift: Pt arrived from [facility] ED approximately 2230. Pt very lethargic, arouses to pain only.     Plan: MRI today, Continue Keppra.     Continue to monitor and follow POC

## 2021-11-27 NOTE — PROVIDER NOTIFICATION
Patient's /103, , MD notified, 10mg IV Hydralazine given and 10mg IV Labetalol given per MD Jessica, repeat /71. MRI checklist sent, MRI tech stated cannot complete until Monday, VEEG ordered, sepsis triggered, lactic acid 2.0, 1000mL Lactated Ringer's bolus infusing currently, will continue to monitor.

## 2021-11-27 NOTE — ED PROVIDER NOTES
Patient was signed out to me at change of shift by Dr. Cornejo to monitor the patient till he can be transferred to the [facility].  During my stay, patient's blood pressures did creep up again.  Patient was given another dose of labetalol which did bring the blood pressure down nicely.  There are no other issues noted during my care.  EMS has arrived and patient will be transferred via ground ambulance at this time.  Patient remained stable for transfer.     Franky Richardson MD  11/26/21 2112

## 2021-11-27 NOTE — PLAN OF CARE
Status: Patient admitted on 11/26 with AMS concerning for seizure   Vitals: Patient hypertensive this /103, 10mg IV Hydralazine given, repeat /95, 10mg IV Labetalol given with good effect, 's to 130's, MD aware, T max 99.1, 1000mL LR bolus given  Neuros: BREONNA, patient non-verbal, only responding to painful stimuli, pupils 2mm and brisk, moves all extremities but not to command, Somnolent throughout day  IV: PIV saline lcoked  Labs/Electrolytes: Lactic acid 2.0, WBC's 15.6  Resp/trach: Lung sounds diminished throughout  Diet: NPO  Bowel status: Last BM unkown  : Voiding spontaneously with incontinence, straight cathed for UA this AM  Skin: Intact  Pain: BREONNA  Activity: Bedrest  Social:  at bedside, supportive  Plan: MRI Monday, continue with VEEG monitoring and follow POC

## 2021-11-28 ENCOUNTER — APPOINTMENT (OUTPATIENT)
Dept: NEUROLOGY | Facility: CLINIC | Age: 71
DRG: 100 | End: 2021-11-28
Attending: STUDENT IN AN ORGANIZED HEALTH CARE EDUCATION/TRAINING PROGRAM
Payer: COMMERCIAL

## 2021-11-28 ENCOUNTER — APPOINTMENT (OUTPATIENT)
Dept: GENERAL RADIOLOGY | Facility: CLINIC | Age: 71
DRG: 100 | End: 2021-11-28
Attending: STUDENT IN AN ORGANIZED HEALTH CARE EDUCATION/TRAINING PROGRAM
Payer: COMMERCIAL

## 2021-11-28 LAB
ALBUMIN UR-MCNC: 30 MG/DL
ANION GAP SERPL CALCULATED.3IONS-SCNC: 6 MMOL/L (ref 3–14)
APPEARANCE UR: ABNORMAL
BILIRUB UR QL STRIP: NEGATIVE
BUN SERPL-MCNC: 31 MG/DL (ref 7–30)
CALCIUM SERPL-MCNC: 8.2 MG/DL (ref 8.5–10.1)
CHLORIDE BLD-SCNC: 105 MMOL/L (ref 94–109)
CO2 SERPL-SCNC: 27 MMOL/L (ref 20–32)
COLOR UR AUTO: YELLOW
CREAT SERPL-MCNC: 0.58 MG/DL (ref 0.52–1.04)
ERYTHROCYTE [DISTWIDTH] IN BLOOD BY AUTOMATED COUNT: 14.7 % (ref 10–15)
GFR SERPL CREATININE-BSD FRML MDRD: >90 ML/MIN/1.73M2
GLUCOSE BLD-MCNC: 163 MG/DL (ref 70–99)
GLUCOSE BLDC GLUCOMTR-MCNC: 114 MG/DL (ref 70–99)
GLUCOSE BLDC GLUCOMTR-MCNC: 120 MG/DL (ref 70–99)
GLUCOSE BLDC GLUCOMTR-MCNC: 129 MG/DL (ref 70–99)
GLUCOSE BLDC GLUCOMTR-MCNC: 133 MG/DL (ref 70–99)
GLUCOSE BLDC GLUCOMTR-MCNC: 135 MG/DL (ref 70–99)
GLUCOSE BLDC GLUCOMTR-MCNC: 138 MG/DL (ref 70–99)
GLUCOSE BLDC GLUCOMTR-MCNC: 143 MG/DL (ref 70–99)
GLUCOSE UR STRIP-MCNC: NEGATIVE MG/DL
HCT VFR BLD AUTO: 43.9 % (ref 35–47)
HGB BLD-MCNC: 14.3 G/DL (ref 11.7–15.7)
HGB UR QL STRIP: NEGATIVE
KETONES UR STRIP-MCNC: 20 MG/DL
LEUKOCYTE ESTERASE UR QL STRIP: NEGATIVE
MCH RBC QN AUTO: 30.4 PG (ref 26.5–33)
MCHC RBC AUTO-ENTMCNC: 32.6 G/DL (ref 31.5–36.5)
MCV RBC AUTO: 93 FL (ref 78–100)
MUCOUS THREADS #/AREA URNS LPF: PRESENT /LPF
NITRATE UR QL: NEGATIVE
PH UR STRIP: 5.5 [PH] (ref 5–7)
PLATELET # BLD AUTO: 221 10E3/UL (ref 150–450)
POTASSIUM BLD-SCNC: 2.9 MMOL/L (ref 3.4–5.3)
POTASSIUM BLD-SCNC: 3 MMOL/L (ref 3.4–5.3)
RBC # BLD AUTO: 4.71 10E6/UL (ref 3.8–5.2)
RBC URINE: 2 /HPF
SODIUM SERPL-SCNC: 138 MMOL/L (ref 133–144)
SP GR UR STRIP: 1.03 (ref 1–1.03)
SQUAMOUS EPITHELIAL: 1 /HPF
UROBILINOGEN UR STRIP-MCNC: NORMAL MG/DL
WBC # BLD AUTO: 8.1 10E3/UL (ref 4–11)
WBC URINE: 2 /HPF

## 2021-11-28 PROCEDURE — 71045 X-RAY EXAM CHEST 1 VIEW: CPT

## 2021-11-28 PROCEDURE — 250N000011 HC RX IP 250 OP 636: Performed by: STUDENT IN AN ORGANIZED HEALTH CARE EDUCATION/TRAINING PROGRAM

## 2021-11-28 PROCEDURE — 36415 COLL VENOUS BLD VENIPUNCTURE: CPT | Performed by: STUDENT IN AN ORGANIZED HEALTH CARE EDUCATION/TRAINING PROGRAM

## 2021-11-28 PROCEDURE — 84132 ASSAY OF SERUM POTASSIUM: CPT | Performed by: STUDENT IN AN ORGANIZED HEALTH CARE EDUCATION/TRAINING PROGRAM

## 2021-11-28 PROCEDURE — 82565 ASSAY OF CREATININE: CPT | Performed by: STUDENT IN AN ORGANIZED HEALTH CARE EDUCATION/TRAINING PROGRAM

## 2021-11-28 PROCEDURE — 80048 BASIC METABOLIC PNL TOTAL CA: CPT | Performed by: STUDENT IN AN ORGANIZED HEALTH CARE EDUCATION/TRAINING PROGRAM

## 2021-11-28 PROCEDURE — 999N000128 HC STATISTIC PERIPHERAL IV START W/O US GUIDANCE

## 2021-11-28 PROCEDURE — 250N000011 HC RX IP 250 OP 636: Performed by: PHYSICIAN ASSISTANT

## 2021-11-28 PROCEDURE — 99233 SBSQ HOSP IP/OBS HIGH 50: CPT | Mod: GC | Performed by: PSYCHIATRY & NEUROLOGY

## 2021-11-28 PROCEDURE — 258N000003 HC RX IP 258 OP 636: Performed by: STUDENT IN AN ORGANIZED HEALTH CARE EDUCATION/TRAINING PROGRAM

## 2021-11-28 PROCEDURE — 95720 EEG PHY/QHP EA INCR W/VEEG: CPT | Performed by: PSYCHIATRY & NEUROLOGY

## 2021-11-28 PROCEDURE — 95714 VEEG EA 12-26 HR UNMNTR: CPT

## 2021-11-28 PROCEDURE — 250N000011 HC RX IP 250 OP 636: Performed by: PSYCHIATRY & NEUROLOGY

## 2021-11-28 PROCEDURE — 71045 X-RAY EXAM CHEST 1 VIEW: CPT | Mod: 26 | Performed by: RADIOLOGY

## 2021-11-28 PROCEDURE — 99233 SBSQ HOSP IP/OBS HIGH 50: CPT | Performed by: STUDENT IN AN ORGANIZED HEALTH CARE EDUCATION/TRAINING PROGRAM

## 2021-11-28 PROCEDURE — 120N000002 HC R&B MED SURG/OB UMMC

## 2021-11-28 PROCEDURE — 999N000202 HC STATISTICAL VASC ACCESS NURSE TIME, 1-15 MINUTES

## 2021-11-28 PROCEDURE — 85027 COMPLETE CBC AUTOMATED: CPT | Performed by: STUDENT IN AN ORGANIZED HEALTH CARE EDUCATION/TRAINING PROGRAM

## 2021-11-28 PROCEDURE — 258N000003 HC RX IP 258 OP 636: Performed by: PSYCHIATRY & NEUROLOGY

## 2021-11-28 PROCEDURE — 81001 URINALYSIS AUTO W/SCOPE: CPT | Performed by: STUDENT IN AN ORGANIZED HEALTH CARE EDUCATION/TRAINING PROGRAM

## 2021-11-28 RX ORDER — SODIUM CHLORIDE 9 MG/ML
INJECTION, SOLUTION INTRAVENOUS CONTINUOUS
Status: DISCONTINUED | OUTPATIENT
Start: 2021-11-28 | End: 2021-12-03

## 2021-11-28 RX ORDER — POTASSIUM CHLORIDE 7.45 MG/ML
10 INJECTION INTRAVENOUS
Status: COMPLETED | OUTPATIENT
Start: 2021-11-28 | End: 2021-11-29

## 2021-11-28 RX ORDER — LORAZEPAM 2 MG/ML
3 INJECTION INTRAMUSCULAR ONCE
Status: COMPLETED | OUTPATIENT
Start: 2021-11-28 | End: 2021-11-28

## 2021-11-28 RX ORDER — LEVETIRACETAM 15 MG/ML
1500 INJECTION INTRAVASCULAR EVERY 12 HOURS
Status: DISCONTINUED | OUTPATIENT
Start: 2021-11-28 | End: 2021-11-30

## 2021-11-28 RX ORDER — LISINOPRIL 5 MG/1
5 TABLET ORAL DAILY
Status: DISCONTINUED | OUTPATIENT
Start: 2021-11-28 | End: 2021-12-02

## 2021-11-28 RX ADMIN — LORAZEPAM 3 MG: 2 INJECTION INTRAMUSCULAR; INTRAVENOUS at 20:23

## 2021-11-28 RX ADMIN — POTASSIUM CHLORIDE 10 MEQ: 7.46 INJECTION, SOLUTION INTRAVENOUS at 23:11

## 2021-11-28 RX ADMIN — LEVETIRACETAM 1500 MG: 15 INJECTION INTRAVENOUS at 22:00

## 2021-11-28 RX ADMIN — POTASSIUM CHLORIDE 10 MEQ: 7.46 INJECTION, SOLUTION INTRAVENOUS at 21:43

## 2021-11-28 RX ADMIN — LEVETIRACETAM 750 MG: 100 INJECTION, SOLUTION INTRAVENOUS at 12:42

## 2021-11-28 RX ADMIN — LEVETIRACETAM 1000 MG: 10 INJECTION INTRAVENOUS at 08:27

## 2021-11-28 RX ADMIN — SODIUM CHLORIDE 900 MG PE: 9 INJECTION, SOLUTION INTRAVENOUS at 20:34

## 2021-11-28 RX ADMIN — SODIUM CHLORIDE: 9 INJECTION, SOLUTION INTRAVENOUS at 10:36

## 2021-11-28 RX ADMIN — POTASSIUM CHLORIDE 10 MEQ: 7.46 INJECTION, SOLUTION INTRAVENOUS at 18:28

## 2021-11-28 RX ADMIN — POTASSIUM CHLORIDE 10 MEQ: 7.46 INJECTION, SOLUTION INTRAVENOUS at 17:17

## 2021-11-28 RX ADMIN — LABETALOL HYDROCHLORIDE 20 MG: 5 INJECTION, SOLUTION INTRAVENOUS at 04:24

## 2021-11-28 RX ADMIN — ENOXAPARIN SODIUM 40 MG: 40 INJECTION SUBCUTANEOUS at 12:42

## 2021-11-28 ASSESSMENT — ACTIVITIES OF DAILY LIVING (ADL)
ADLS_ACUITY_SCORE: 26

## 2021-11-28 NOTE — PLAN OF CARE
Status: Admit 11/26 for AMS and hypertensive crisis, work up for seizure vs stroke, no events witnessed  Vitals: Tachycardic 100's, HTN within parameters, OVSS  Neuros: Somnolent/lethargic, unable to participate in assessment. Opens eyes spontaneously, does not follow commands, moves extremities spontaneously, withdraws to pain.   IV: PIV SL  Resp/trach: 2L NC, LS diminished throughout   Diet: NPO  Bowel status: incontinent  : incontinent   Skin: no new deficits  Pain: nonverbal indicators of pain absent  Activity: Ax2, bedrest, turn q2hrs, pt weight shifting in bed independently   Plan: Continue with vEEG, continue to monitor       Updates this shift:   -gave IV 20mg labetalol x1

## 2021-11-28 NOTE — PLAN OF CARE
Status: AMS and hypertensive crisis. Concern for seizure vs stroke. No seizure events witnessed.  Vitals: Tachy 100s - 120s intermittently. HTN within parameters. OVSS, RA.   Neuros: Somnolent. Slightly more alert toward end of shift. Opens eyes spontaneously. No command following. Moves all extremities spontaneously. Withdraws to pain.   IV: PIV SL.   Resp/trach: LS diminished upon ascultation.  Diet: NPO. Not appropriate for PO.  Bowel status: Incontinent of smear BM 11/27.  : Incontinent of bladder.  Skin: No deficits noted.  Pain: BREONNA.   Activity: Bedrest. Picks at VEEG leads intermittently.   Social: Daughter, Laura, updated via phone.   Plan: Still needs MRI. Continue with plan of care.   Updates this shift: Some rings were removed from patient's fingers as they looked tight. These were stored in a specimen cup with patient's additional belongings in closet. Laura is aware of this and will have them picked up tomorrow.

## 2021-11-28 NOTE — PROGRESS NOTES
[facility]  Neurology Progress Note    Patient Name:  Khalida Redding    MRN:  [id number]      :  1950  Date of Service:  2021  Primary care provider:  Abdirahman Munoz      Subjective:  Some increased spontaneous activity today, although not back to baseline. She is able to say her name today, but has limited responses otherwise. She is otherwise quite purposeful with movements in all extremities.      ASSESSMENT/PLAN:  Khalida Redding is a 70yo woman with PMH of DM2, HTN, HLD, SVT, previous R EDWARD territory ischemic stroke with residual left sided weakness, seizures (previously not on AED's), major neurocognitive disorder, who presented to [facility] ED following a spell of altered awareness concerning for a seizure.    She was found to be hypertensive at [facility] ED (SBP 230s). She was treated for seizure with 2g Keppra load and 1g BID maintenance, but she did not clear appropriately and was transferred to [facility] for further cares. She still remained quite encephalopathic with labile blood pressures, so broader workup was started. EEG with intermittent brief seizures, no clinical correlation with them thus far.      #1 Acute encephalopathy, likely post-ictal  #2 Spell concerning for seizure  - Neurochecks q4 hours  - Increase Levetiracetam to 1.5g BID   - s/p 750mg load now   - vEEG  - Lateralization likely due to prior MCA stroke; brief intermittent seizures, currently subclinical   - continue for now  - MRI brain wo/w contrast  - UA/Ucx - unremarkable  - Blood cultures - unremarkable thus far  - Seizure precautions  - NPO while encephalopathic, advance diet as tolerated     #3 Hypertensive emergency, resolving  #4 Labile BP's  #5 HLD  - SBP goal <180  - PRN hydralazine and labetalol PRN to maintain goals  - Hold PTA quinapril while NPO  - Hold PTA atorvastatin while NPO  - Medicine consulted for BP lability    - Recommend ACEi with  "persistently high pressures; Will start Lisinopril 5mg daily    #6 History of R EDWARD ischemic stroke, residual L-sided hemiparesis  - continue PTA clopidorel 75mg daily when taking PO    #7 Major neurocognitive disorder  - Hold PTA Namenda while NPO    #8 Diabetes mellitus, type II  - Hold PTA metformin and glipizide while NPO  - POC glucose  - Correctional dose insulin, low intensity  - Hypoglycemia protocol      Checklist:  FEN: INF not needed if taking PO; Electrolyte protocol; advance diet as tolerate  LDA: PIV x2  PPx: Will start chemoppx if no improvement in exam/ambulation  Code: FULL    Dispo: Pending clinical course      Patient discussed with Attending Dr. Jimmy Henriquez MD  PGY-3 Neurology Resident  Securely message with the Vocera Web Console (learn more here)  11/28/2021    ______________________________  Physical Examination  Vitals: BP (!) 182/86 (BP Location: Left arm)   Pulse 100   Temp 98.5  F (36.9  C) (Axillary)   Resp 16   SpO2 97%     General: Aroused by loud voice and noxious, although minimal language output  HEENT: Normocephalic, atraumatic, no epistaxis, no oral lesions, MMM  Resp: CTAB, no increased work of breathing  Cardio: RRR, S1/S2 appropriate  Abdomen: Soft, non-distended, non-tender  Extremities: Warm and well perfused, peripheral pulses present   Skin: Not jaundiced, no rash, rare tiny ecchymoses  Neuro:  Mental status: Lethargic, arouses to noxious and loud voice with purposeful movement, but has minimal language output other than name and \"ouch\". Does not follow commands or answer questions. Speech fluency, comprehension and repetition are all impaired. No dysarthria.  Cranial nerves: Eyes conjugate, Pupils 3mm and reactive, EOMI to tracking face, visual fields full to threat, face symmetric, hearing intact to loud voice. Remainder of cranial nerves unable to be assessed due to mental status.   Motor: Tone normal with some degree of paratonia. Moving all " extremities equally, strongly and purposefully; Formal power cannot be assess. No abnormal movements noted (no myoclonus).   Reflexes: Normoreflexic and symmetric biceps, brachioradialis, patellar, and achilles. Toes down-going.  Sensory: Withdraws to noxious stimuli in all four extremities.   Coordination: Unable to adequately assess due to mental status.  Gait: Unable to assess due to mental status.      IMAGING:  CT head (11/26/21):   IMPRESSION:   1. Area of encephalomalacia in the anterior medial right frontal lobe  is stable since the prior CT examination.  2. Small vessel white matter ischemic changes and age-related atrophy  are again noted.  3. Shift of the interventricular septum to the left is again noted.  4. No acute intracranial hemorrhage or obvious acute intracranial  ischemic event is identified. CT can be insensitive for acute ischemic  events for up to 24 hours. Extension of the areas of chronic infarct  is difficult to exclude.    CTA head/neck (11/26/21):  IMPRESSION:  HEAD CTA:  1.  Severe focal left MCA M2 inferior division stenosis.  2.  Multifocal left EDWARD A2 and A3 segment stenoses.  3.  Diminished arborization and small caliber of right EDWARD and M2  branches in the region of right frontal encephalomalacia.   NECK CTA:  1.  Normal neck CTA  for age.  2.  No significant stenosis as per NASCET criteria.    MRI:  pending      CURRENT MEDICATIONS:    insulin aspart  1-4 Units Subcutaneous Q4H     levETIRAcetam  1,000 mg Intravenous Q12H     melatonin  3 mg Oral At Bedtime     sodium chloride (PF)  3 mL Intracatheter Q8H       PRN MEDICATIONS:  glucose **OR** dextrose **OR** glucagon, hydrALAZINE, labetalol, lidocaine 4%, lidocaine (buffered or not buffered), ondansetron **OR** ondansetron, prochlorperazine **OR** prochlorperazine **OR** prochlorperazine, senna-docusate **OR** senna-docusate, sodium chloride (PF)    INFUSIONS:      ALLERGIES:  Allergies   Allergen Reactions     Amoxicillin Hives  and Rash     Pt reports she has taken PCN without problems     Ampicillin Rash

## 2021-11-28 NOTE — PLAN OF CARE
Care [id number]    Status: Admit 11/26 for AMS and hypertensive crisis, work up for seizure vs stroke, no events witnessed  Vitals: VSS on 2L nc. PRN available for SBP >180  Neuros: Lethargic and difficult for 0800 assessment; withdrawing from painful stimuli. 1200 & 1600 assessment: Pt opens eyes to touch and voice, lethargic but opening eyes and talking. Oriented to self only. LUE 5/5, RUE, 0/5, LLE 4/5, RLE 1/5. Slow to respond to commands.  IV: PIV infusing normal saline 50mL/hr  Labs/Electrolytes: K+ being replaced. BG WNL. UA sent down.   Resp/trach: 2L nc for O2 sats in the mid 90s.   Diet: NPO no exceptions   Bowel status: BS+ last BM 11/27.  : Purewick in place. Large incontinent void this morning. No void from 4806-2072, straight cath for 500mL x1 this afternoon   Skin: EEG leads intact. LUE had normal saline infiltration.  Pain: Denies   Activity: Bedrest. Q2hr repo   Flu Shot Status (given or declined): Unable to assess  Social: Daughter at bedside, supportive   Plan: Continue to monitor and follow POC.   Updates this shift: Per neurology team pt continues to have seizures with increasing frequency. IV dilantin being ordered to give.

## 2021-11-28 NOTE — PROVIDER NOTIFICATION
Notified charge RN and neurology team that patient woke up and was responding to some commands, but not moving her right arm.

## 2021-11-28 NOTE — PROGRESS NOTES
VEEG monitoring preliminary results:    VEEG has been running for over one hour.  EEG showed moderate to severe generalized slowing, left more slowing than the right.  No epileptiform activities, no clinical or electrographic seizures were observed.    Jamie Lopes MD  Neurology

## 2021-11-28 NOTE — PROGRESS NOTES
[facility]    Medicine Consult - Progress Note - Hospitalist Service, Gold 7       Date of Admission:  11/26/2021    Assessment & Plan         Khalida Redding is a 71 year old female with PMH generalized tonic-clonic seizure disorder/epilepsy, memory changes concerning for dementia, prior CVA, DM2, hx, SVT, hx syncope, constipation, HTN, HLD, OA, osteoperosis admitted on 11/26/2021 for concern for seizure vs stroke. Medicine was consulted for assistance in managing severe hypertension and blood pressure lability.    Blood pressures in the past 24 hours have ranged -178 and DBP .  She has required 20 mg of IV labetalol at about 4:00 this morning and has not required hydralazine PRNs over the past day.  When I saw her this morning, her most recent blood pressure was 114/60. CT head imaging did not show any evidence of bleed or reasons for increased ICP that would lead to systemic hypertension.     It looks like her blood pressures are generally well managed as an outpatient, and she was normotensive at her last 2 primary care visits in May and June of this year.  I suspect that her acute illness this admission contributed to her hypertension, but considering that she is now normotensive with minimal PRNs, I wouldn't add any antihypertensive agents. Most likely, she will be able to resume her prior outpatient hypertension medications (ACE inhibitor) at time of discharge, so long as her renal function remains stable.     # Severe hypertension, resolved  # Hypertension secondary to acute illness  - Continue managing pressures with PRN labetalol and PRN hydralazine if SBP>180   - If persistently hypertensive, could increase frequency of PRNs to q2h   - When tolerating PO or has enteral access, can restart PTA quinapril 5mg (lisinopril 5mg daily is acceptable substitute)   - If blood pressures are persistently elevated (SBP>140 or DBP>90), would recommend  primary care follow up within 1 week of discharge for blood pressure recheck and medication titration or consider starting 5mg amlodipine daily and having patient follow up with PCP within 1     Medicine will sign off at this time.  Please reconsult us or page if any other questions arise.  Thank you for this consult.     The patient's care was discussed with the primary team.     Lilibeth Joseph MD  Hospitalist Service, [address]  Securely message with the Vocera Web Console (learn more here)  Text page via [employer] Paging/Directory    Please see sign in/sign out for up to date coverage information    ______________________________________________________________________    Interval History   No acute events overnight.  It looks that patient received 20 mg IV labetalol earlier this morning for blood pressure control.  When I saw her, patient turned her head towards me as I talked to her, but she was not opening her eyes or responding to my questions.  Nurses updated on my recommendations.  Noted that her last blood pressure prior to my visiting her was 114/60.    Data reviewed today: I reviewed all medications, new labs and imaging results over the last 24 hours. I personally reviewed no images or EKG's today.    Physical Exam   Vital Signs: Temp: 97.3  F (36.3  C) Temp src: Axillary BP: 129/64 Pulse: 82   Resp: 18 SpO2: 96 % O2 Device: Nasal cannula Oxygen Delivery: 2 LPM  Weight: 0 lbs 0 oz    GENERAL: The patient is undergoing vEEG. She turned her head towards my voice but did not follow commands or open her eyes. She does grimace and wince to pain. She turns her head in the direction of whoever is talking. In no acute distress.   HEAD: Atraumatic, normocephalic. EEG leads were in place.   LUNGS: Clear to auscultation bilaterally. No rales, rhonchi or wheezes are appreciated. Good air movement is auscultated in all 4 lung fields.  HEART: Regular rate and  rhythm. No murmur, rubs, or gallops noted. No S3, S4 or rub is auscultated.   ABDOMEN: Soft, nondistended. Normal bowel sounds are auscultated.  EXTREMITIES: No clubbing, cyanosis or edema. 2+ R radial pulse.  SKIN: No rashes. No jaundice. Pink and warm with good turgor.

## 2021-11-29 ENCOUNTER — APPOINTMENT (OUTPATIENT)
Dept: CT IMAGING | Facility: CLINIC | Age: 71
DRG: 100 | End: 2021-11-29
Attending: PSYCHIATRY & NEUROLOGY
Payer: COMMERCIAL

## 2021-11-29 ENCOUNTER — APPOINTMENT (OUTPATIENT)
Dept: NEUROLOGY | Facility: CLINIC | Age: 71
DRG: 100 | End: 2021-11-29
Attending: STUDENT IN AN ORGANIZED HEALTH CARE EDUCATION/TRAINING PROGRAM
Payer: COMMERCIAL

## 2021-11-29 ENCOUNTER — APPOINTMENT (OUTPATIENT)
Dept: SPEECH THERAPY | Facility: CLINIC | Age: 71
DRG: 100 | End: 2021-11-29
Attending: PSYCHIATRY & NEUROLOGY
Payer: COMMERCIAL

## 2021-11-29 LAB
ANION GAP SERPL CALCULATED.3IONS-SCNC: 6 MMOL/L (ref 3–14)
BUN SERPL-MCNC: 34 MG/DL (ref 7–30)
CALCIUM SERPL-MCNC: 7.6 MG/DL (ref 8.5–10.1)
CHLORIDE BLD-SCNC: 111 MMOL/L (ref 94–109)
CO2 SERPL-SCNC: 26 MMOL/L (ref 20–32)
CREAT SERPL-MCNC: 0.52 MG/DL (ref 0.52–1.04)
ERYTHROCYTE [DISTWIDTH] IN BLOOD BY AUTOMATED COUNT: 14.6 % (ref 10–15)
GFR SERPL CREATININE-BSD FRML MDRD: >90 ML/MIN/1.73M2
GLUCOSE BLD-MCNC: 109 MG/DL (ref 70–99)
GLUCOSE BLDC GLUCOMTR-MCNC: 104 MG/DL (ref 70–99)
GLUCOSE BLDC GLUCOMTR-MCNC: 106 MG/DL (ref 70–99)
GLUCOSE BLDC GLUCOMTR-MCNC: 106 MG/DL (ref 70–99)
GLUCOSE BLDC GLUCOMTR-MCNC: 109 MG/DL (ref 70–99)
GLUCOSE BLDC GLUCOMTR-MCNC: 112 MG/DL (ref 70–99)
GLUCOSE BLDC GLUCOMTR-MCNC: 124 MG/DL (ref 70–99)
GLUCOSE BLDC GLUCOMTR-MCNC: 127 MG/DL (ref 70–99)
HCT VFR BLD AUTO: 40.6 % (ref 35–47)
HGB BLD-MCNC: 12.7 G/DL (ref 11.7–15.7)
MCH RBC QN AUTO: 30 PG (ref 26.5–33)
MCHC RBC AUTO-ENTMCNC: 31.3 G/DL (ref 31.5–36.5)
MCV RBC AUTO: 96 FL (ref 78–100)
PHENYTOIN FREE SERPL-MCNC: 1.26 UG/ML
PHENYTOIN SERPL-MCNC: 13.8 MG/L
PLATELET # BLD AUTO: 195 10E3/UL (ref 150–450)
POTASSIUM BLD-SCNC: 3.6 MMOL/L (ref 3.4–5.3)
RBC # BLD AUTO: 4.23 10E6/UL (ref 3.8–5.2)
SODIUM SERPL-SCNC: 143 MMOL/L (ref 133–144)
WBC # BLD AUTO: 5.8 10E3/UL (ref 4–11)

## 2021-11-29 PROCEDURE — 99233 SBSQ HOSP IP/OBS HIGH 50: CPT | Mod: 25 | Performed by: PSYCHIATRY & NEUROLOGY

## 2021-11-29 PROCEDURE — 70450 CT HEAD/BRAIN W/O DYE: CPT

## 2021-11-29 PROCEDURE — 80048 BASIC METABOLIC PNL TOTAL CA: CPT | Performed by: STUDENT IN AN ORGANIZED HEALTH CARE EDUCATION/TRAINING PROGRAM

## 2021-11-29 PROCEDURE — 80185 ASSAY OF PHENYTOIN TOTAL: CPT | Performed by: STUDENT IN AN ORGANIZED HEALTH CARE EDUCATION/TRAINING PROGRAM

## 2021-11-29 PROCEDURE — 258N000003 HC RX IP 258 OP 636: Performed by: STUDENT IN AN ORGANIZED HEALTH CARE EDUCATION/TRAINING PROGRAM

## 2021-11-29 PROCEDURE — 250N000011 HC RX IP 250 OP 636: Performed by: PSYCHIATRY & NEUROLOGY

## 2021-11-29 PROCEDURE — 258N000003 HC RX IP 258 OP 636: Performed by: PSYCHIATRY & NEUROLOGY

## 2021-11-29 PROCEDURE — 92610 EVALUATE SWALLOWING FUNCTION: CPT | Mod: GN

## 2021-11-29 PROCEDURE — 250N000011 HC RX IP 250 OP 636: Performed by: STUDENT IN AN ORGANIZED HEALTH CARE EDUCATION/TRAINING PROGRAM

## 2021-11-29 PROCEDURE — 80186 ASSAY OF PHENYTOIN FREE: CPT | Performed by: STUDENT IN AN ORGANIZED HEALTH CARE EDUCATION/TRAINING PROGRAM

## 2021-11-29 PROCEDURE — 70450 CT HEAD/BRAIN W/O DYE: CPT | Mod: 26 | Performed by: RADIOLOGY

## 2021-11-29 PROCEDURE — 95714 VEEG EA 12-26 HR UNMNTR: CPT

## 2021-11-29 PROCEDURE — 36415 COLL VENOUS BLD VENIPUNCTURE: CPT | Performed by: STUDENT IN AN ORGANIZED HEALTH CARE EDUCATION/TRAINING PROGRAM

## 2021-11-29 PROCEDURE — 250N000013 HC RX MED GY IP 250 OP 250 PS 637: Performed by: STUDENT IN AN ORGANIZED HEALTH CARE EDUCATION/TRAINING PROGRAM

## 2021-11-29 PROCEDURE — 85027 COMPLETE CBC AUTOMATED: CPT | Performed by: STUDENT IN AN ORGANIZED HEALTH CARE EDUCATION/TRAINING PROGRAM

## 2021-11-29 PROCEDURE — 120N000002 HC R&B MED SURG/OB UMMC

## 2021-11-29 PROCEDURE — 95720 EEG PHY/QHP EA INCR W/VEEG: CPT | Performed by: PSYCHIATRY & NEUROLOGY

## 2021-11-29 RX ORDER — ACETAMINOPHEN 325 MG/1
650 TABLET ORAL EVERY 4 HOURS PRN
Status: DISCONTINUED | OUTPATIENT
Start: 2021-11-29 | End: 2021-12-09 | Stop reason: HOSPADM

## 2021-11-29 RX ADMIN — ENOXAPARIN SODIUM 40 MG: 40 INJECTION SUBCUTANEOUS at 10:16

## 2021-11-29 RX ADMIN — LEVETIRACETAM 1500 MG: 15 INJECTION INTRAVENOUS at 10:16

## 2021-11-29 RX ADMIN — SODIUM CHLORIDE 100 MG PE: 9 INJECTION, SOLUTION INTRAVENOUS at 08:25

## 2021-11-29 RX ADMIN — ACETAMINOPHEN 650 MG: 325 TABLET, FILM COATED ORAL at 17:37

## 2021-11-29 RX ADMIN — Medication 3 MG: at 22:43

## 2021-11-29 RX ADMIN — SODIUM CHLORIDE: 9 INJECTION, SOLUTION INTRAVENOUS at 13:11

## 2021-11-29 RX ADMIN — SODIUM CHLORIDE 100 MG PE: 9 INJECTION, SOLUTION INTRAVENOUS at 20:54

## 2021-11-29 RX ADMIN — LEVETIRACETAM 1500 MG: 15 INJECTION INTRAVENOUS at 22:40

## 2021-11-29 ASSESSMENT — ACTIVITIES OF DAILY LIVING (ADL)
ADLS_ACUITY_SCORE: 26
ADLS_ACUITY_SCORE: 22
ADLS_ACUITY_SCORE: 26
ADLS_ACUITY_SCORE: 22
ADLS_ACUITY_SCORE: 26
ADLS_ACUITY_SCORE: 22
ADLS_ACUITY_SCORE: 26
ADLS_ACUITY_SCORE: 22
ADLS_ACUITY_SCORE: 26

## 2021-11-29 NOTE — PROGRESS NOTES
11/29/21 1520   General Information   Onset of Illness/Injury or Date of Surgery 11/26/21   Referring Physician Tray Anderson MD   Patient/Family Therapy Goal Statement (SLP) Pt unable to state personal goal, but did appear motivated to eat/drink   Pertinent History of Current Problem Orders received for swallow evaluation. Pt  with PMH of DM2, HTN, HLD, SVT, previous R EDWARD territory ischemic stroke with residual left sided weakness, seizures (previously not on AED's), major neurocognitive disorder, who presented to [facility] ED following a spell of altered awareness concerning for a seizure.   General Observations Pt lethargic throughout exam. Responds to some questions and follows simple commands intermittently   Past History of Dysphagia none per chart review       Present no   Pain Assessment   Patient Currently in Pain No   Type of Evaluation   Type of Evaluation Swallow Evaluation   Oral Motor   Oral Musculature unable to assess due to poor participation/comprehension  (Pt with limited effort/ability to participate in exam)   Structural Abnormalities none present   Mucosal Quality dry   Dentition (Oral Motor)   Dentition (Oral Motor) dental appliance/dentures   Dental Appliance/Denture (Oral Motor) upper  (no lower dentition)   General Swallowing Observations   Current Diet/Method of Nutritional Intake (General Swallowing Observations, NIS) NPO   Respiratory Support (General Swallowing Observations) none   Swallowing Evaluation Clinical swallow evaluation   Clinical Swallow Evaluation   Feeding Assistance dependent   Clinical Swallow Evaluation Textures Trialed thin liquids;pureed   Clinical Swallow Eval: Thin Liquid Texture Trial   Mode of Presentation, Thin Liquids spoon;straw;fed by clinician   Volume of Liquid or Food Presented ice chips x3; ~5oz thin water   Oral Phase of Swallow WFL   Pharyngeal Phase of Swallow intact  (no s/sx of aspiration observed)    Diagnostic Statement swallow functional for thin liquids on exam   Clinical Swallow Evaluation: Puree Solid Texture Trial   Mode of Presentation, Puree spoon;fed by clinician   Volume of Puree Presented tsp bites x7   Oral Phase, Puree WFL   Pharyngeal Phase, Puree intact  (no s/sx of aspiration observed)   Diagnostic Statement swallow functional for puree textures. Advanced solids not appropriate this date due to lethargy   Esophageal Phase of Swallow   Patient reports or presents with symptoms of esophageal dysphagia No   Swallowing Recommendations   Diet Consistency Recommendations full liquid diet   Supervision Level for Intake 1:1 supervision needed   Mode of Delivery Recommendations bolus size, small   Recommended Feeding/Eating Techniques (Swallow Eval) maintain upright sitting position for eating   Medication Administration Recommendations, Swallowing (SLP) crushed in bite of puree or liquid form as able   General Therapy Interventions   Planned Therapy Interventions Dysphagia Treatment   Dysphagia treatment Instruction of safe swallow strategies;Modified diet education   SLP Therapy Assessment/Plan   Criteria for Skilled Therapeutic Interventions Met (SLP Eval) yes   SLP Diagnosis mild dysphagia   Rehab Potential (SLP Eval) good, to achieve stated therapy goals   Therapy Frequency (SLP Eval) 5 times/wk   Predicted Duration of Therapy Intervention (SLP Eval) 1 week   Comment, Therapy Assessment/Plan (SLP) Pt presents with mild oral-pharyngeal dysphagia in the setting of lethargy. Recommend initiate full liquid diet as tolerated with supervision/assist when awake/alert and sitting upright. Anticipate ability to advance diet once Pt more awake/alert/participatory.   Therapy Plan Review/Discharge Plan (SLP)   Therapy Plan Review (SLP) evaluation/treatment results reviewed;participants voiced agreement with care plan   SLP Discharge Planning    SLP Discharge Recommendation (DC Rec) Transitional Care  Facility;home with assist   SLP Rationale for DC Rec Pt is below baseline function   SLP Brief overview of current status  Recommend full liquid diet as tolerated when awake/alert and sitting upright. Oral meds should be served crushed in puree, or in liquid form as able. Anticipate ability to advance diet once Pt more awake/alert.    Total Evaluation Time   Total Evaluation Time (Minutes) 15

## 2021-11-29 NOTE — PLAN OF CARE
Cared for from [id number]    Status: admitted on 11/26 for seizure vs stroke and hypertensive crisis  Vitals: VSS on 2L via NC.  Neuros: Lethargic/somnolent- waxes/wanes. Slow to respond. LUE 5/5, RUE 0/5, LLE 4/5, RLE 1/5.   IV: PIV infusing NS at 50ml/hr; other PIV TKO   Resp/trach: LS diminished   Diet: NPO no exceptions   Bowel status: BS+, LBM 11/27  : purewick in place with little output. Last bladder scan at 0630 was 202ml.   Skin: EEG leads intact.   Pain: denies  Activity: bedrest- T&R q2hrs. Last repo at 0600.  Plan: frequent seizure activity per neurology- no s/s present. Ativan 3mg given at 2023 and IV cerebryx given at 2034.

## 2021-11-29 NOTE — PROGRESS NOTES
[facility]  Neurology Progress Note    Patient Name:  Khalida Redding    MRN:  [id number]      :  1950  Date of Service:  2021  Primary care provider:  Abdirahman Munoz      Subjective:  Patient was loaded with phenytoin 15 mg/kg and started on 100 mg BID yesterday given EEG findings of two subclinical seizure. Also given 3 mg Ativan with improvement in EEG. This morning patient arouses to vocalization however does not answer any questions. She does withdraw to noxious stimuli in all 4 extremities.     Upon examination later this morning patient slightly more alert. She is able to say her name and birth date. She follows commands to wiggle her toes and hand  on the left. She does lift her left upper extremity anti-gravity however does not lift right. Also with decreased  strength on the right compared to the left.       ASSESSMENT/PLAN:  Khalida Redding is a 72yo woman with PMH of DM2, HTN, HLD, SVT, previous R EDWARD territory ischemic stroke with residual left sided weakness, seizures (previously not on AED's), major neurocognitive disorder, who presented to [facility] ED following a spell of altered awareness concerning for a seizure.    She was found to be hypertensive at [facility] ED (SBP 230s). She was treated for seizure with 2g Keppra load and 1g BID maintenance, but she did not clear appropriately and was transferred to [facility] for further cares. She still remained quite encephalopathic with labile blood pressures, so broader workup was started. EEG with intermittent brief seizures, that have since improved with addition of fosphenytoin.      Acute encephalopathy, likely subclinical seizure vs post-ictal  - Neurochecks q4 hours  - Continue Levetiracetam to 1.5 g BID  - Continue fosphenytoin 100 mg BID  - Continue vEEG for now   - MRI brain wo/w contrast when off of vEEG  - UA/Ucx - unremarkable  - Blood cultures - unremarkable thus  "far  - Seizure precautions  - NPO while encephalopathic, advance diet as tolerated     Hypertensive emergency, improving  Labile BP's  HLD  - SBP goal <180  - Medicine consulted for BP lability    - Re-started lisinopril 5 mg daily (equivalent to pta quinapril)   - PRN hydralazine and labetalol to maintain SBP < 180  - Hold PTA atorvastatin while NPO    History of R EDWARD ischemic stroke, residual L-sided hemiparesis  New right upper extremity weakness  - continue PTA clopidorel 75mg daily when taking PO  - CT head without contrast today    Major neurocognitive disorder  - Hold PTA Namenda while NPO    Diabetes mellitus, type II  - Hold PTA metformin and glipizide while NPO  - POC glucose  - Correctional dose insulin, low intensity  - Hypoglycemia protocol      Checklist:  FEN: IVF if not taking PO; Electrolyte protocol; advance diet as tolerate  LDA: PIV x2  PPx: lovenox   Code: FULL    Dispo: Pending clinical course      Patient discussed with Attending Dr. Anderson.    Billie Molina MD  PGY-2 Neurology Resident    ______________________________  Physical Examination  Vitals: /59 (BP Location: Left arm)   Pulse 87   Temp (!) 96  F (35.6  C) (Axillary)   Resp 16   SpO2 96%     General: Aroused by loud voice and noxious, although minimal language output  HEENT: Normocephalic, atraumatic, no epistaxis, no oral lesions, MMM  Resp: CTAB, no increased work of breathing  Cardio: RRR, S1/S2 appropriate  Abdomen: Soft, non-distended, non-tender  Extremities: Warm and well perfused, peripheral pulses present   Skin: Not jaundiced, no rash, rare tiny ecchymoses  Neuro:  Mental status: Lethargic, arouses to noxious and loud voice with purposeful movement, but has minimal language output other than \"ouch\". Does not follow commands or answer questions. Speech fluency, comprehension and repetition are all impaired. No dysarthria.  Cranial nerves: Eyes conjugate, Pupils 3mm and reactive, EOMI to tracking face, visual " fields full to threat, face symmetric, hearing intact to loud voice. Remainder of cranial nerves unable to be assessed due to mental status.   Motor: Tone normal with some degree of paratonia. Decreased movement in right upper extremity compared to left. Does not lift anti-gravity. Formal power cannot be assess as patient does not follow commands. No abnormal movements noted (no myoclonus).   Reflexes: Normoreflexic and symmetric biceps, brachioradialis, patellar, and achilles. Toes down-going.  Sensory: Withdraws to noxious stimuli in all four extremities.   Coordination: Unable to adequately assess due to mental status.  Gait: Unable to assess due to mental status.      IMAGING:  CT head (11/26/21):   IMPRESSION:   1. Area of encephalomalacia in the anterior medial right frontal lobe  is stable since the prior CT examination.  2. Small vessel white matter ischemic changes and age-related atrophy  are again noted.  3. Shift of the interventricular septum to the left is again noted.  4. No acute intracranial hemorrhage or obvious acute intracranial  ischemic event is identified. CT can be insensitive for acute ischemic  events for up to 24 hours. Extension of the areas of chronic infarct  is difficult to exclude.    CTA head/neck (11/26/21):  IMPRESSION:  HEAD CTA:  1.  Severe focal left MCA M2 inferior division stenosis.  2.  Multifocal left EDWARD A2 and A3 segment stenoses.  3.  Diminished arborization and small caliber of right EDWARD and M2  branches in the region of right frontal encephalomalacia.   NECK CTA:  1.  Normal neck CTA  for age.  2.  No significant stenosis as per NASCET criteria.    MRI:  pending      CURRENT MEDICATIONS:    enoxaparin ANTICOAGULANT  40 mg Subcutaneous Q24H     fosphenytoin (CEREBYX) intermittent infusion (maintenance)  100 mg PE Intravenous Q12H     insulin aspart  1-4 Units Subcutaneous Q4H     levETIRAcetam  1,500 mg Intravenous Q12H     lisinopril  5 mg Oral Daily     melatonin  3 mg  Oral At Bedtime     sodium chloride (PF)  3 mL Intracatheter Q8H       PRN MEDICATIONS:  glucose **OR** dextrose **OR** glucagon, hydrALAZINE, labetalol, lidocaine 4%, lidocaine (buffered or not buffered), ondansetron **OR** ondansetron, prochlorperazine **OR** prochlorperazine **OR** prochlorperazine, senna-docusate **OR** senna-docusate, sodium chloride (PF)    INFUSIONS:    sodium chloride 50 mL/hr at 11/28/21 1900       ALLERGIES:  Allergies   Allergen Reactions     Amoxicillin Hives and Rash     Pt reports she has taken PCN without problems     Ampicillin Rash

## 2021-11-29 NOTE — PLAN OF CARE
Status: Admitted 11/26 for AMS and HTN crisis. Seizure vs stroke workup.  Vitals: VSS on 2L NC  Neuros: Lethargic this shift. Slow to respond. Only oriented to self. Knew she was in the hospital but didn't know which city. L side 4/5, RUE 0/5, RLE 2/5.  IV: PIV infusing NS @ 50 mL/hr. Other PIV TKO  Resp/trach: LS diminished  Diet: Full liquid diet. Possibly could swallow pills crushed in apple sauce  Bowel status: No BM this shift. Passing gas. BS+  : Purewick in place with no output. Straight cathed @ 1130 for 250 mL. Output is odorous, and mehreen w/ sediment  Skin: EEG leads intact  Pain: Denies  Activity: Bedrest. A2 turn/repo  Social: Gave update to daughter Laura.  Yeyo at bedside.  Plan: Continue to monitor and follow POC.  Updates this shift: Head CT done this shift.

## 2021-11-30 ENCOUNTER — APPOINTMENT (OUTPATIENT)
Dept: SPEECH THERAPY | Facility: CLINIC | Age: 71
DRG: 100 | End: 2021-11-30
Attending: PSYCHIATRY & NEUROLOGY
Payer: COMMERCIAL

## 2021-11-30 ENCOUNTER — APPOINTMENT (OUTPATIENT)
Dept: PHYSICAL THERAPY | Facility: CLINIC | Age: 71
DRG: 100 | End: 2021-11-30
Attending: PSYCHIATRY & NEUROLOGY
Payer: COMMERCIAL

## 2021-11-30 ENCOUNTER — APPOINTMENT (OUTPATIENT)
Dept: MRI IMAGING | Facility: CLINIC | Age: 71
DRG: 100 | End: 2021-11-30
Attending: PSYCHIATRY & NEUROLOGY
Payer: COMMERCIAL

## 2021-11-30 ENCOUNTER — APPOINTMENT (OUTPATIENT)
Dept: NEUROLOGY | Facility: CLINIC | Age: 71
DRG: 100 | End: 2021-11-30
Attending: STUDENT IN AN ORGANIZED HEALTH CARE EDUCATION/TRAINING PROGRAM
Payer: COMMERCIAL

## 2021-11-30 LAB
ANION GAP SERPL CALCULATED.3IONS-SCNC: 6 MMOL/L (ref 3–14)
BUN SERPL-MCNC: 17 MG/DL (ref 7–30)
CALCIUM SERPL-MCNC: 7.9 MG/DL (ref 8.5–10.1)
CHLORIDE BLD-SCNC: 110 MMOL/L (ref 94–109)
CHOLEST SERPL-MCNC: 163 MG/DL
CO2 SERPL-SCNC: 25 MMOL/L (ref 20–32)
CREAT SERPL-MCNC: 0.47 MG/DL (ref 0.52–1.04)
ERYTHROCYTE [DISTWIDTH] IN BLOOD BY AUTOMATED COUNT: 14.1 % (ref 10–15)
GFR SERPL CREATININE-BSD FRML MDRD: >90 ML/MIN/1.73M2
GLUCOSE BLD-MCNC: 101 MG/DL (ref 70–99)
GLUCOSE BLDC GLUCOMTR-MCNC: 106 MG/DL (ref 70–99)
GLUCOSE BLDC GLUCOMTR-MCNC: 119 MG/DL (ref 70–99)
GLUCOSE BLDC GLUCOMTR-MCNC: 191 MG/DL (ref 70–99)
GLUCOSE BLDC GLUCOMTR-MCNC: 199 MG/DL (ref 70–99)
GLUCOSE BLDC GLUCOMTR-MCNC: 224 MG/DL (ref 70–99)
GLUCOSE BLDC GLUCOMTR-MCNC: 96 MG/DL (ref 70–99)
HCT VFR BLD AUTO: 40.9 % (ref 35–47)
HDLC SERPL-MCNC: 32 MG/DL
HGB BLD-MCNC: 12.7 G/DL (ref 11.7–15.7)
HOLD SPECIMEN: NORMAL
LDLC SERPL CALC-MCNC: 96 MG/DL
MCH RBC QN AUTO: 29.8 PG (ref 26.5–33)
MCHC RBC AUTO-ENTMCNC: 31.1 G/DL (ref 31.5–36.5)
MCV RBC AUTO: 96 FL (ref 78–100)
NONHDLC SERPL-MCNC: 131 MG/DL
PHENYTOIN SERPL-MCNC: 14.2 MG/L
PLATELET # BLD AUTO: 207 10E3/UL (ref 150–450)
POTASSIUM BLD-SCNC: 3.3 MMOL/L (ref 3.4–5.3)
POTASSIUM BLD-SCNC: 3.6 MMOL/L (ref 3.4–5.3)
RADIOLOGIST FLAGS: ABNORMAL
RBC # BLD AUTO: 4.26 10E6/UL (ref 3.8–5.2)
SODIUM SERPL-SCNC: 141 MMOL/L (ref 133–144)
TRIGL SERPL-MCNC: 174 MG/DL
WBC # BLD AUTO: 7.2 10E3/UL (ref 4–11)

## 2021-11-30 PROCEDURE — 95718 EEG PHYS/QHP 2-12 HR W/VEEG: CPT | Mod: GC | Performed by: PSYCHIATRY & NEUROLOGY

## 2021-11-30 PROCEDURE — 999N000128 HC STATISTIC PERIPHERAL IV START W/O US GUIDANCE

## 2021-11-30 PROCEDURE — 250N000013 HC RX MED GY IP 250 OP 250 PS 637: Performed by: STUDENT IN AN ORGANIZED HEALTH CARE EDUCATION/TRAINING PROGRAM

## 2021-11-30 PROCEDURE — 36415 COLL VENOUS BLD VENIPUNCTURE: CPT | Performed by: PSYCHIATRY & NEUROLOGY

## 2021-11-30 PROCEDURE — 97530 THERAPEUTIC ACTIVITIES: CPT | Mod: GP

## 2021-11-30 PROCEDURE — 95711 VEEG 2-12 HR UNMONITORED: CPT

## 2021-11-30 PROCEDURE — 120N000002 HC R&B MED SURG/OB UMMC

## 2021-11-30 PROCEDURE — 250N000011 HC RX IP 250 OP 636: Performed by: PSYCHIATRY & NEUROLOGY

## 2021-11-30 PROCEDURE — 92526 ORAL FUNCTION THERAPY: CPT | Mod: GN

## 2021-11-30 PROCEDURE — 70551 MRI BRAIN STEM W/O DYE: CPT

## 2021-11-30 PROCEDURE — 250N000011 HC RX IP 250 OP 636: Performed by: STUDENT IN AN ORGANIZED HEALTH CARE EDUCATION/TRAINING PROGRAM

## 2021-11-30 PROCEDURE — 82310 ASSAY OF CALCIUM: CPT | Performed by: STUDENT IN AN ORGANIZED HEALTH CARE EDUCATION/TRAINING PROGRAM

## 2021-11-30 PROCEDURE — 250N000013 HC RX MED GY IP 250 OP 250 PS 637: Performed by: PSYCHIATRY & NEUROLOGY

## 2021-11-30 PROCEDURE — 80185 ASSAY OF PHENYTOIN TOTAL: CPT | Performed by: PSYCHIATRY & NEUROLOGY

## 2021-11-30 PROCEDURE — 97161 PT EVAL LOW COMPLEX 20 MIN: CPT | Mod: GP

## 2021-11-30 PROCEDURE — 36415 COLL VENOUS BLD VENIPUNCTURE: CPT | Performed by: STUDENT IN AN ORGANIZED HEALTH CARE EDUCATION/TRAINING PROGRAM

## 2021-11-30 PROCEDURE — 80061 LIPID PANEL: CPT | Performed by: PSYCHIATRY & NEUROLOGY

## 2021-11-30 PROCEDURE — 70551 MRI BRAIN STEM W/O DYE: CPT | Mod: 26 | Performed by: RADIOLOGY

## 2021-11-30 PROCEDURE — 99233 SBSQ HOSP IP/OBS HIGH 50: CPT | Mod: 25 | Performed by: PSYCHIATRY & NEUROLOGY

## 2021-11-30 PROCEDURE — 258N000003 HC RX IP 258 OP 636: Performed by: PSYCHIATRY & NEUROLOGY

## 2021-11-30 PROCEDURE — 85027 COMPLETE CBC AUTOMATED: CPT | Performed by: STUDENT IN AN ORGANIZED HEALTH CARE EDUCATION/TRAINING PROGRAM

## 2021-11-30 PROCEDURE — 84132 ASSAY OF SERUM POTASSIUM: CPT | Performed by: PSYCHIATRY & NEUROLOGY

## 2021-11-30 RX ORDER — CLOPIDOGREL BISULFATE 75 MG/1
75 TABLET ORAL DAILY
Status: DISCONTINUED | OUTPATIENT
Start: 2021-11-30 | End: 2021-12-09 | Stop reason: HOSPADM

## 2021-11-30 RX ORDER — POTASSIUM CHLORIDE 750 MG/1
10 TABLET, EXTENDED RELEASE ORAL ONCE
Status: COMPLETED | OUTPATIENT
Start: 2021-11-30 | End: 2021-11-30

## 2021-11-30 RX ORDER — POTASSIUM CHLORIDE 1.5 G/1.58G
20 POWDER, FOR SOLUTION ORAL ONCE
Status: COMPLETED | OUTPATIENT
Start: 2021-11-30 | End: 2021-11-30

## 2021-11-30 RX ORDER — POTASSIUM CHLORIDE 20MEQ/15ML
20 LIQUID (ML) ORAL ONCE
Status: DISCONTINUED | OUTPATIENT
Start: 2021-11-30 | End: 2021-11-30

## 2021-11-30 RX ORDER — POTASSIUM CHLORIDE 750 MG/1
20 TABLET, EXTENDED RELEASE ORAL ONCE
Status: DISCONTINUED | OUTPATIENT
Start: 2021-11-30 | End: 2021-11-30 | Stop reason: ALTCHOICE

## 2021-11-30 RX ORDER — PHENYTOIN 50 MG/1
100 TABLET, CHEWABLE ORAL 2 TIMES DAILY
Status: DISCONTINUED | OUTPATIENT
Start: 2021-11-30 | End: 2021-12-09 | Stop reason: HOSPADM

## 2021-11-30 RX ORDER — ATORVASTATIN CALCIUM 20 MG/1
20 TABLET, FILM COATED ORAL DAILY
Status: DISCONTINUED | OUTPATIENT
Start: 2021-11-30 | End: 2021-12-01

## 2021-11-30 RX ORDER — LEVETIRACETAM 750 MG/1
1500 TABLET ORAL 2 TIMES DAILY
Status: DISCONTINUED | OUTPATIENT
Start: 2021-11-30 | End: 2021-12-09 | Stop reason: HOSPADM

## 2021-11-30 RX ADMIN — Medication 3 MG: at 22:25

## 2021-11-30 RX ADMIN — LEVETIRACETAM 1500 MG: 15 INJECTION INTRAVENOUS at 10:58

## 2021-11-30 RX ADMIN — INSULIN ASPART 1 UNITS: 100 INJECTION, SOLUTION INTRAVENOUS; SUBCUTANEOUS at 13:49

## 2021-11-30 RX ADMIN — PHENYTOIN 100 MG: 50 TABLET, CHEWABLE ORAL at 20:27

## 2021-11-30 RX ADMIN — INSULIN ASPART 1 UNITS: 100 INJECTION, SOLUTION INTRAVENOUS; SUBCUTANEOUS at 16:04

## 2021-11-30 RX ADMIN — LEVETIRACETAM 1500 MG: 750 TABLET ORAL at 20:27

## 2021-11-30 RX ADMIN — LISINOPRIL 5 MG: 5 TABLET ORAL at 11:04

## 2021-11-30 RX ADMIN — ATORVASTATIN CALCIUM 20 MG: 20 TABLET, FILM COATED ORAL at 20:27

## 2021-11-30 RX ADMIN — SODIUM CHLORIDE 100 MG PE: 9 INJECTION, SOLUTION INTRAVENOUS at 08:22

## 2021-11-30 RX ADMIN — ONDANSETRON 4 MG: 2 INJECTION INTRAMUSCULAR; INTRAVENOUS at 15:01

## 2021-11-30 RX ADMIN — INSULIN ASPART 1 UNITS: 100 INJECTION, SOLUTION INTRAVENOUS; SUBCUTANEOUS at 20:27

## 2021-11-30 RX ADMIN — POTASSIUM CHLORIDE 20 MEQ: 1.5 POWDER, FOR SOLUTION ORAL at 16:04

## 2021-11-30 RX ADMIN — CLOPIDOGREL BISULFATE 75 MG: 75 TABLET ORAL at 11:57

## 2021-11-30 RX ADMIN — ENOXAPARIN SODIUM 40 MG: 40 INJECTION SUBCUTANEOUS at 11:02

## 2021-11-30 RX ADMIN — POTASSIUM CHLORIDE 10 MEQ: 750 TABLET, EXTENDED RELEASE ORAL at 22:25

## 2021-11-30 ASSESSMENT — ACTIVITIES OF DAILY LIVING (ADL)
ADLS_ACUITY_SCORE: 26
ADLS_ACUITY_SCORE: 22
ADLS_ACUITY_SCORE: 22
ADLS_ACUITY_SCORE: 26
ADLS_ACUITY_SCORE: 28
ADLS_ACUITY_SCORE: 26
ADLS_ACUITY_SCORE: 22
ADLS_ACUITY_SCORE: 28
ADLS_ACUITY_SCORE: 22
ADLS_ACUITY_SCORE: 28
ADLS_ACUITY_SCORE: 28
ADLS_ACUITY_SCORE: 22
ADLS_ACUITY_SCORE: 26
ADLS_ACUITY_SCORE: 28
ADLS_ACUITY_SCORE: 28
ADLS_ACUITY_SCORE: 26
ADLS_ACUITY_SCORE: 22
ADLS_ACUITY_SCORE: 22
ADLS_ACUITY_SCORE: 26

## 2021-11-30 NOTE — PLAN OF CARE
Status: Pt admitted for AMS and HTN crisis. Stroke vs seizure workup.   Vitals: VSS on RA.   Neuros: Lethargic. Slow to respond. Oriented to self, knows that she's in a hospital in [place]. L side 4/5. RUE 0/5, RLE 2/5.   IV: PIV infusing NS @ 50 mL/hr, other PIV TKO.   Labs/Electrolytes: WDL.   Resp/trach: LS diminished. Denies SOB.   Diet: Advanced to a full liquid diet, tolerating well.   Bowel status: BS+. No BM this shift.   : Purewick in place, pt also using bedpan.   Skin: EEG leads intact.   Pain: C/o HA, PRN Tylenol given x1, effective.   Activity: Bedrest. Turn/repo q2h.   Social:  at bedside at start of shift. Daughter updated over the phone.  Plan: Continue to monitor and follow plan of care.

## 2021-11-30 NOTE — PLAN OF CARE
Status: AMS and HTN crisis. Stroke vs seizure workup.  Vitals: VSS on RA  Neuros: Lethargic. Slow to respond.  Alert and oriented to self, place and situation.  L side 4/5.  RUE 1/5.  RLE 3/5  IV: PIV infusing NS@50mL/hr.   Labs/Electrolytes: K+ will be replaced.  Patient became nauseous, zofran given.    Resp/trach: WNL  Diet: Level 5 (minced and moist). Speech advanced diet to minced, with thin liq.  Requires feeding, 1:1 supervision.  Bowel status: Small BM this shift  : voiding spont, freq, brief in place.  Malodorous.  Skin: mepilex to coccyx, preventative.  Blanchable redness to coccyx area.  Pain: denies  Activity: A2/GB or Lift.  Has not been OOB this shift. Turn Repo Q2hrs.  Social: son visiting  Plan: Brain MRI ordered.  Updates this shift: MRI safety checklist completed and sent. PCD ordered.

## 2021-11-30 NOTE — PROGRESS NOTES
11/30/21 1555   Quick Adds   Type of Visit Initial PT Evaluation   Living Environment   People in home spouse   Current Living Arrangements apartment   Home Accessibility no concerns   Transportation Anticipated family or friend will provide   Living Environment Comments Pt lives in one level apartment with . Both pt and  do not drive, daughter does driving for them.   Self-Care   Usual Activity Tolerance moderate   Current Activity Tolerance poor   Regular Exercise No   Equipment Currently Used at Home cane, straight;grab bar, toilet;grab bar, tub/shower;shower chair;walker, rolling;wheelchair, manual   Activity/Exercise/Self-Care Comment Pt typically [place] with cane inside home and walker outside of home, occasionally will walk without AD inside home. Independent with bathing and dressing,  assists with cooking, cleaning, laundry.    Disability/Function   Hearing Difficulty or Deaf no   Wear Glasses or Blind no   Concentrating, Remembering or Making Decisions Difficulty no   Difficulty Communicating no   Difficulty Eating/Swallowing no   Walking or Climbing Stairs Difficulty yes   Walking or Climbing Stairs ambulation difficulty, requires equipment   Mobility Management walker or cane   Dressing/Bathing Difficulty yes   Dressing/Bathing bathing difficulty, requires equipment   Dressing/Bathing Management shower chair, grab bars   Toileting issues no   Doing Errands Independently Difficulty (such as shopping) yes   Errands Management family assists   Fall history within last six months yes   Number of times patient has fallen within last six months 1   Change in Functional Status Since Onset of Current Illness/Injury yes   General Information   Onset of Illness/Injury or Date of Surgery 11/26/21   Referring Physician Billie Molina MD   Patient/Family Therapy Goals Statement (PT) to return home   Pertinent History of Current Problem (include personal factors and/or comorbidities that  impact the POC) Per chart, Khalida Redding is a 72yo woman with PMH of DM2, HTN, HLD, SVT, previous R EDWARD territory ischemic stroke with residual left sided weakness, seizures (previously not on AED's), major neurocognitive disorder, who presented to [facility] ED following a spell of altered awareness concerning for a seizure.   Existing Precautions/Restrictions fall;seizures   General Observations Activity orders: up with assist   Cognition   Orientation Status (Cognition) oriented to;person;place;verbal cues/prompts needed for orientation  (VCs for exact date)   Affect/Mental Status (Cognition) low arousal/lethargic   Follows Commands (Cognition) follows one-step commands   Integumentary/Edema   Integumentary/Edema no deficits were identifed   Posture    Posture Forward head position;Protracted shoulders   Range of Motion (ROM)   ROM Comment No active ROM noted in R UE and very limited in R LE, L UE and LE ROM WFL   Strength   Strength Comments trace R finger movements, 3/5 R LE; all other strength grossly 4/5   Bed Mobility   Comment (Bed Mobility) MaxA supine>sit   Transfers   Transfer Safety Comments Unable to assess; pt unable to safely maintain EOB balance to progress to standing   Gait/Stairs (Locomotion)   Comment (Gait/Stairs) Unable to assess   Balance   Balance Comments Mod-Zulema required for unsupported sitting balance at EOB; persistent R posterolateral lean; unable to maintain midline positioning without Zulema   Sensory Examination   Sensory Perception patient reports no sensory changes   Clinical Impression   Criteria for Skilled Therapeutic Intervention yes, treatment indicated   PT Diagnosis (PT) impaired functional mobility   Influenced by the following impairments R sided weakness and reduced ROM, impaired balance, reduced activity tolerance   Functional limitations due to impairments pt requires assist for safe functional mobility   Clinical Presentation Stable/Uncomplicated   Clinical  Presentation Rationale PMH and clinical judgment   Clinical Decision Making (Complexity) low complexity   Therapy Frequency (PT) 5x/week   Predicted Duration of Therapy Intervention (days/wks) 1 week   Planned Therapy Interventions (PT) balance training;bed mobility training;gait training;home exercise program;neuromuscular re-education;ROM (range of motion);strengthening;transfer training;risk factor education;home program guidelines;wheelchair management/propulsion training;progressive activity/exercise   Anticipated Equipment Needs at Discharge (PT)   (TBD)   Risk & Benefits of therapy have been explained evaluation/treatment results reviewed;care plan/treatment goals reviewed;risks/benefits reviewed;current/potential barriers reviewed;participants voiced agreement with care plan;participants included;patient;son   PT Discharge Planning    PT Discharge Recommendation (DC Rec) Transitional Care Facility;home with assist;home with home care physical therapy   PT Rationale for DC Rec Pt below functional baseline with significant R sided weakness impacting safe and independent functional mobility. Pt currently requiring maxA for bed mobility and sitting balance, unable to safely stand or ambulate. Pt will benefit from continued skilled rehab to maximize functional independence. If pt were to return home at this time, would require lift equipment, assist of 2 for all mobility, specialty wc, hospital bed, and home therapies.   PT Brief overview of current status  OH lift   Total Evaluation Time   Total Evaluation Time (Minutes) 5

## 2021-11-30 NOTE — PLAN OF CARE
Pt hypertensive but within MD parameters and intermittently requiring 2L 02 while asleep.  Pt lethargic, oriented to self and intermittent situation, slow garbled speech, STR, RUE 1-2/5, RLE 3/5, L side 4/5, weak R d/p, moderate R d/f, following majority of commands.  VEEG leads in place; no events witnessed or reported.  MIVF's running 50 ml/hr in between IV AED's.  Purewick in place for intermittent incontinence; void x 1 on bedpan, incontinent x 1.  Last BM on 11/27 per charting.  On a full liquid diet with 1:1 supervision; no intake overnight 2/2 lethargy.  Remains on bedrest; on charge RN list to clarify if bedrest still needed.  Repo q2h. SLP following.  Plan is for Brain MRI after EEG leads removed.  Continue with POC.

## 2021-12-01 ENCOUNTER — APPOINTMENT (OUTPATIENT)
Dept: PHYSICAL THERAPY | Facility: CLINIC | Age: 71
DRG: 100 | End: 2021-12-01
Attending: PSYCHIATRY & NEUROLOGY
Payer: COMMERCIAL

## 2021-12-01 ENCOUNTER — APPOINTMENT (OUTPATIENT)
Dept: EDUCATION SERVICES | Facility: CLINIC | Age: 71
DRG: 100 | End: 2021-12-01
Attending: PSYCHIATRY & NEUROLOGY
Payer: COMMERCIAL

## 2021-12-01 ENCOUNTER — APPOINTMENT (OUTPATIENT)
Dept: OCCUPATIONAL THERAPY | Facility: CLINIC | Age: 71
DRG: 100 | End: 2021-12-01
Attending: PSYCHIATRY & NEUROLOGY
Payer: COMMERCIAL

## 2021-12-01 ENCOUNTER — APPOINTMENT (OUTPATIENT)
Dept: CARDIOLOGY | Facility: CLINIC | Age: 71
DRG: 100 | End: 2021-12-01
Attending: PSYCHIATRY & NEUROLOGY
Payer: COMMERCIAL

## 2021-12-01 LAB
ANION GAP SERPL CALCULATED.3IONS-SCNC: 5 MMOL/L (ref 3–14)
BUN SERPL-MCNC: 13 MG/DL (ref 7–30)
CALCIUM SERPL-MCNC: 8.3 MG/DL (ref 8.5–10.1)
CHLORIDE BLD-SCNC: 112 MMOL/L (ref 94–109)
CHOLEST SERPL-MCNC: 146 MG/DL
CO2 SERPL-SCNC: 27 MMOL/L (ref 20–32)
CREAT SERPL-MCNC: 0.48 MG/DL (ref 0.52–1.04)
GFR SERPL CREATININE-BSD FRML MDRD: >90 ML/MIN/1.73M2
GLUCOSE BLD-MCNC: 139 MG/DL (ref 70–99)
GLUCOSE BLDC GLUCOMTR-MCNC: 101 MG/DL (ref 70–99)
GLUCOSE BLDC GLUCOMTR-MCNC: 106 MG/DL (ref 70–99)
GLUCOSE BLDC GLUCOMTR-MCNC: 126 MG/DL (ref 70–99)
GLUCOSE BLDC GLUCOMTR-MCNC: 132 MG/DL (ref 70–99)
GLUCOSE BLDC GLUCOMTR-MCNC: 174 MG/DL (ref 70–99)
GLUCOSE BLDC GLUCOMTR-MCNC: 192 MG/DL (ref 70–99)
HDLC SERPL-MCNC: 33 MG/DL
HOLD SPECIMEN: NORMAL
LDLC SERPL CALC-MCNC: 93 MG/DL
LVEF ECHO: NORMAL
NONHDLC SERPL-MCNC: 113 MG/DL
POTASSIUM BLD-SCNC: 3.7 MMOL/L (ref 3.4–5.3)
SODIUM SERPL-SCNC: 144 MMOL/L (ref 133–144)
TRIGL SERPL-MCNC: 102 MG/DL

## 2021-12-01 PROCEDURE — 120N000002 HC R&B MED SURG/OB UMMC

## 2021-12-01 PROCEDURE — 93306 TTE W/DOPPLER COMPLETE: CPT | Mod: 26 | Performed by: INTERNAL MEDICINE

## 2021-12-01 PROCEDURE — 97530 THERAPEUTIC ACTIVITIES: CPT | Mod: GP

## 2021-12-01 PROCEDURE — 250N000013 HC RX MED GY IP 250 OP 250 PS 637: Performed by: STUDENT IN AN ORGANIZED HEALTH CARE EDUCATION/TRAINING PROGRAM

## 2021-12-01 PROCEDURE — 36415 COLL VENOUS BLD VENIPUNCTURE: CPT | Performed by: STUDENT IN AN ORGANIZED HEALTH CARE EDUCATION/TRAINING PROGRAM

## 2021-12-01 PROCEDURE — 80061 LIPID PANEL: CPT | Performed by: STUDENT IN AN ORGANIZED HEALTH CARE EDUCATION/TRAINING PROGRAM

## 2021-12-01 PROCEDURE — 250N000011 HC RX IP 250 OP 636: Performed by: STUDENT IN AN ORGANIZED HEALTH CARE EDUCATION/TRAINING PROGRAM

## 2021-12-01 PROCEDURE — 97112 NEUROMUSCULAR REEDUCATION: CPT | Mod: GP

## 2021-12-01 PROCEDURE — 93306 TTE W/DOPPLER COMPLETE: CPT

## 2021-12-01 PROCEDURE — 97535 SELF CARE MNGMENT TRAINING: CPT | Mod: GO

## 2021-12-01 PROCEDURE — 99233 SBSQ HOSP IP/OBS HIGH 50: CPT | Mod: GC | Performed by: PSYCHIATRY & NEUROLOGY

## 2021-12-01 PROCEDURE — 80048 BASIC METABOLIC PNL TOTAL CA: CPT | Performed by: STUDENT IN AN ORGANIZED HEALTH CARE EDUCATION/TRAINING PROGRAM

## 2021-12-01 PROCEDURE — 97165 OT EVAL LOW COMPLEX 30 MIN: CPT | Mod: GO

## 2021-12-01 RX ORDER — ASPIRIN 81 MG/1
81 TABLET, CHEWABLE ORAL DAILY
Status: DISCONTINUED | OUTPATIENT
Start: 2021-12-01 | End: 2021-12-09 | Stop reason: HOSPADM

## 2021-12-01 RX ORDER — ATORVASTATIN CALCIUM 40 MG/1
40 TABLET, FILM COATED ORAL DAILY
Status: DISCONTINUED | OUTPATIENT
Start: 2021-12-02 | End: 2021-12-09 | Stop reason: HOSPADM

## 2021-12-01 RX ADMIN — LEVETIRACETAM 1500 MG: 750 TABLET ORAL at 08:03

## 2021-12-01 RX ADMIN — ATORVASTATIN CALCIUM 20 MG: 20 TABLET, FILM COATED ORAL at 08:03

## 2021-12-01 RX ADMIN — INSULIN ASPART 1 UNITS: 100 INJECTION, SOLUTION INTRAVENOUS; SUBCUTANEOUS at 12:21

## 2021-12-01 RX ADMIN — Medication 3 MG: at 21:54

## 2021-12-01 RX ADMIN — INSULIN ASPART 1 UNITS: 100 INJECTION, SOLUTION INTRAVENOUS; SUBCUTANEOUS at 20:44

## 2021-12-01 RX ADMIN — PHENYTOIN 100 MG: 50 TABLET, CHEWABLE ORAL at 20:43

## 2021-12-01 RX ADMIN — PHENYTOIN 100 MG: 50 TABLET, CHEWABLE ORAL at 08:03

## 2021-12-01 RX ADMIN — LISINOPRIL 5 MG: 5 TABLET ORAL at 08:03

## 2021-12-01 RX ADMIN — ASPIRIN 81 MG CHEWABLE TABLET 81 MG: 81 TABLET CHEWABLE at 12:21

## 2021-12-01 RX ADMIN — ACETAMINOPHEN 650 MG: 325 TABLET, FILM COATED ORAL at 00:49

## 2021-12-01 RX ADMIN — LEVETIRACETAM 1500 MG: 750 TABLET ORAL at 20:43

## 2021-12-01 RX ADMIN — CLOPIDOGREL BISULFATE 75 MG: 75 TABLET ORAL at 08:03

## 2021-12-01 RX ADMIN — ENOXAPARIN SODIUM 40 MG: 40 INJECTION SUBCUTANEOUS at 12:20

## 2021-12-01 ASSESSMENT — ACTIVITIES OF DAILY LIVING (ADL)
ADLS_ACUITY_SCORE: 22
DEPENDENT_IADLS:: CLEANING;COOKING;LAUNDRY;TRANSPORTATION
ADLS_ACUITY_SCORE: 22

## 2021-12-01 ASSESSMENT — VISUAL ACUITY
OU: NORMAL ACUITY

## 2021-12-01 NOTE — PLAN OF CARE
Status: AMS and HTN crisis. Stroke vs seizure workup  Vitals: VSS on RA, HTN within parameters. On CCM  Neuros: Lethargic. Slow garbled speech. Disoriented to time. Slight R facial droop. RUE 2/5, RLE 3/5, L side 4  IV: PIV infusing NS @ 50mL/hr  Resp/trach: WNL  Diet: Level 5 minced and moist diet with thins, needs assistance to eat  Bowel status: Several small BMs today via bedpan  : Purewick in place for incontinence  Skin: No new deficits   Pain: Denied  Activity: Heavy assist of 2 and pivot  Social:  here today and supportive  Plan: Stroke PLC ordered. Continue to monitor and follow POC

## 2021-12-01 NOTE — CONSULTS
Care Management Initial Consult    General Information  Assessment completed with: Spouse (Yeyo) at bedside  Type of CM/SW Visit: Initial Assessment  Primary Care Provider verified and updated as needed: Yes   Readmission within the last 30 days: no previous admission in last 30 days   Reason for Consult: discharge planning  Advance Care Planning: Advance Care Planning Reviewed: no concerns identified          Communication Assessment  Patient's communication style: spoken language (English or Bilingual)    Hearing Difficulty or Deaf: no   Wear Glasses or Blind: no    Cognitive  Cognitive/Neuro/Behavioral: .WDL except  Level of Consciousness: lethargic  Arousal Level: opens eyes spontaneously  Orientation: disoriented to,time  Mood/Behavior: calm,cooperative  Best Language: 0 - No aphasia  Speech: garbled,slow    Living Environment:   People in home: spouse     Current living Arrangements: apartment      Able to return to prior arrangements: yes       Family/Social Support:  Care provided by: self,spouse/significant other  Provides care for: no one  Marital Status:   , Children (Laura ([place]), Asher ([place]) and Lashae ([place].))       Description of Support System: Supportive,Involved         Current Resources:   Patient receiving home care services: No  Community Resources: None  Equipment currently used at home: cane, straight,grab bar, toilet,grab bar, tub/shower,shower chair,walker, rolling,wheelchair, manual,commode chair  Supplies currently used at home:  NA    Employment/Financial:  Employment Status: retired        Financial Concerns: No concerns identified      Functional Status:  Prior to admission patient needed assistance:   Dependent ADLs:: Ambulation-cane,Ambulation-walker  Dependent IADLs:: Cleaning,Cooking,Laundry,Transportation    Spouse assists with IADL's and daughter Laura assists with transportation.     Mental Health Status:  Mental Health Status: No Current  Concerns       Chemical Dependency Status:  Chemical Dependency Status: No Current Concerns             Values/Beliefs:  Spiritual, Cultural Beliefs, Yazidism Practices, Values that affect care: NA             Additional Information:  RODNEY met with pt and spouse, Yeyo at bedside. SW introduced self and role on treatment team. Pt was sleeping on and off throughout conversation. RODNEY discussed current TCU recommendation, Yeyo in agreement. Yeyo stated he would like pt to go [facility] in [place] or [place]. SW provided list of [facility] in-network providers. RODNEY noted that [facility] was not on list, but that SW can call to confirm. Yeyo appreciative. Yeyo stated he will talk with his daughter tonight and requested SW come back tomorrow to follow up. RODNEY provided this SW phone number if pt's daughter would like to discuss. Yeyo appreciative.   RODNEY called [facility] - they are on hold for admissions at this time.   RODNEY called [facility] and left  requesting return phone call.   RODNEY will follow up with pt and spouse tomorrow.     Addend 1530: RODNEY received return phone call from [facility] stating they do not have a contract / [facility].    MELISSA Mirza, SW  6D Adult Acute Care SW  Ph:[phone]  Pager [phone]        MELISSA Mirza

## 2021-12-01 NOTE — PROGRESS NOTES
[facility]  Neurology Progress Note    Patient Name:  Khalida Redding    MRN:  [id number]      :  1950  Date of Service:  2021  Primary care provider:  Abdirahman Munoz    Subjective:  No acute events overnight. Patient sitting up in bed this morning. She is interactive, asking if she can go home. She has no other complaints this morning.     ASSESSMENT/PLAN:  Khalida Redding is a 72yo woman with PMH of DM2, HTN, HLD, SVT, previous R EDWARD territory ischemic stroke with residual left sided weakness, seizures (previously not on AED's), major neurocognitive disorder, who presented to [facility] ED following a spell of altered awareness concerning for a seizure.    She was found to be hypertensive at [facility] ED (SBP 230s). She was treated for seizure with 2g Keppra load and 1g BID maintenance, but she did not clear appropriately and was transferred to [facility] for further cares. She still remained quite encephalopathic with labile blood pressures, so broader workup was started. EEG with intermittent brief seizures, that have since improved with addition of fosphenytoin. Her mental status has greatly improved since seizures have been better controlled.      Acute encephalopathy, due to subclinical seizure and post-ictal state, improving  - Neurochecks q4 hours  - Continue Levetiracetam to 1.5 g BID  - Continue phenytoin 100 mg BID  - Discontinued vEEG  - Seizure precautions  - SLP following: minced and moist  - PT/OT: recommending TCU    Acute ischemic stroke, left frontal lobe, likely intracranial atherosclerosis vs emoblic  History of R EDWARD ischemic stroke, residual L-sided hemiparesis  - Lipid panel  - A1c : 5.4  - Echocardiogram today  - Continuous cardiac monitoring  - Start DAPT x 3 months for intracranial atherosclerosis on CTA, followed by monotherapy  - Increase atorvastatin to 40 mg daily  - Stroke education  - Ziopatch on  discharge     Hypertensive emergency, resolved  Labile BP's  HLD  - SBP goal <180  - Medicine consulted, appreciate recommendations   - Re-started lisinopril 5 mg daily (equivalent to pta quinapril)   - PRN hydralazine and labetalol to maintain SBP < 180  - Re-start pta atorvastatin now that taking PO    Major neurocognitive disorder  - Hold PTA Namenda    Diabetes mellitus, type II  - Hold PTA metformin and glipizide while in hospital with lactic acidosis on arrival  - POC glucose  - Correctional dose insulin, low intensity  - Hypoglycemia protocol      Checklist:  FEN: IVF if not taking PO; Electrolyte protocol; advance diet as tolerate  LDA: PIV x2  PPx: lovenox   Code: FULL    Dispo: Pending clinical course      Patient discussed with Attending Dr. Anderson.    Billie Molina MD  PGY-2 Neurology Resident    ______________________________  Physical Examination  Vitals: BP (!) 170/72 (BP Location: Left arm)   Pulse 93   Temp 97  F (36.1  C) (Oral)   Resp 16   SpO2 95%     General: laying in bed, alert, no acute distress  HEENT: Normocephalic, atraumatic, no epistaxis, no oral lesions, MMM  Resp: no increased work of breathing  Cardio: regular rate  Abdomen: soft, non-tender  Extremities: Warm and well perfused, peripheral pulses present   Skin: no rashes or lesions  Neuro:  Mental status: Alert, answers person, place and month accurately. Follows simple commands. Soft but fluent speech.   Cranial nerves: Eyes conjugate, Pupils 3mm and reactive, EOMI intact and tracks finger, visual fields full, face symmetric at rest and with activation, hearing intact to normal conversation. No dysarthria. Shoulder shrug strong.    Motor: Tone normal. Able to move all extremities anti-gravity, however right upper and lower extremity with drift. 5/5 in left upper and lower both proximally and distally. Right upper 4/5 at deltoid, elbow flexion and extension, 3/5  strength. 4/5 hip flexion on right, dorsi and plantar  flexion 5/5.  Reflexes: Normoreflexic and symmetric biceps, brachioradialis, patellar, and achilles. Toes down-going.  Sensory: intact and symmetric to light touch in upper and lower extremities  Coordination: Difficulty performing on the right upper with FNF due to weakness. Normal FNF on left.   Gait: deferred due to need for assistance      IMAGING:  CT head (11/26/21):   IMPRESSION:   1. Area of encephalomalacia in the anterior medial right frontal lobe  is stable since the prior CT examination.  2. Small vessel white matter ischemic changes and age-related atrophy  are again noted.  3. Shift of the interventricular septum to the left is again noted.  4. No acute intracranial hemorrhage or obvious acute intracranial  ischemic event is identified. CT can be insensitive for acute ischemic  events for up to 24 hours. Extension of the areas of chronic infarct  is difficult to exclude.    CTA head/neck (11/26/21):  IMPRESSION:  HEAD CTA:  1.  Severe focal left MCA M2 inferior division stenosis.  2.  Multifocal left EDWARD A2 and A3 segment stenoses.  3.  Diminished arborization and small caliber of right EDWARD and M2  branches in the region of right frontal encephalomalacia.   NECK CTA:  1.  Normal neck CTA  for age.  2.  No significant stenosis as per NASCET criteria.    MRI:  Pending    CT head (11/29/21):  Impression:  1. No acute intracranial pathology.   2. Stable area of encephalomalacia in the anteromedial right frontal  lobe. Also multiple old bilateral basal ganglia and right thalamic  infarcts. Sequelae of chronic small vessel ischemic disease.       CURRENT MEDICATIONS:    atorvastatin  20 mg Oral Daily     clopidogrel  75 mg Oral Daily     enoxaparin ANTICOAGULANT  40 mg Subcutaneous Q24H     insulin aspart  1-4 Units Subcutaneous Q4H     levETIRAcetam  1,500 mg Oral BID     lisinopril  5 mg Oral Daily     melatonin  3 mg Oral At Bedtime     phenytoin  100 mg Oral BID     sodium chloride (PF)  3 mL  Intracatheter Q8H       PRN MEDICATIONS:  acetaminophen, glucose **OR** dextrose **OR** glucagon, hydrALAZINE, labetalol, lidocaine 4%, lidocaine (buffered or not buffered), ondansetron **OR** ondansetron, prochlorperazine **OR** prochlorperazine **OR** prochlorperazine, senna-docusate **OR** senna-docusate, sodium chloride (PF)    INFUSIONS:    sodium chloride 50 mL/hr at 12/01/21 0036       ALLERGIES:  Allergies   Allergen Reactions     Amoxicillin Hives and Rash     Pt reports she has taken PCN without problems     Ampicillin Rash

## 2021-12-01 NOTE — CONSULTS
12/01/21 1716 Adriana Huggins, DARY     Stroke Education Note     The following information has been reviewed with the patient and family:     1. Warning signs of stroke     2. Calling 911 if having warning signs of stroke     3. All modifiable risk factors: hypertension, CAD, atrial fib, diabetes, hypercholesterolemia, smoking, substance abuse, diet, physical inactivity, obesity, sleep apnea.     4. Patient's risk factors for stroke which include: HTN, DM, HLD, Smoking, physical inactivity, sleep apnea     5. Follow-up plan for after discharge     6. Discharge medications which include: HTN, HLD, ANti coagualtion      In addition, the above information was given to the patient and family in writing as a part of the [facility] Stroke Class Handout.     Learner's response to risk factors / lifestyle modification education: Ability to change Reasons to change Need to change      Adriana Huggins RN, RN

## 2021-12-01 NOTE — PROGRESS NOTES
"   12/01/21 1200   Quick Adds   Type of Visit Initial Occupational Therapy Evaluation       Present no   Language English   Living Environment   People in home spouse   Current Living Arrangements apartment   Home Accessibility no concerns   Transportation Anticipated family or friend will provide  (daughter assists)   Living Environment Comments Per pt report, lives in an apartment with  and \"very accessible,\" walk-in shower with shower chair and grab bars, toilet with grab bars, BSC, and rolling walker.    Self-Care   Usual Activity Tolerance moderate   Current Activity Tolerance poor   Regular Exercise No   Equipment Currently Used at Home cane, straight;grab bar, toilet;grab bar, tub/shower;shower chair;walker, rolling;wheelchair, manual;commode chair   Activity/Exercise/Self-Care Comment Per  report, pt completes most ADL tasks IND and would require assist prn for functional transfers in/out of shower. Utilizes rolling walker/cane for functional mobility at baseline.   Instrumental Activities of Daily Living (IADL)   IADL Comments Pt husbands assists with most IADL tasks including home mgmt, cooking, laundry, pt and pt  receive transportation assist from daughter.    Disability/Function   Hearing Difficulty or Deaf no   Wear Glasses or Blind no   Concentrating, Remembering or Making Decisions Difficulty no   Difficulty Communicating no   Difficulty Eating/Swallowing no   Walking or Climbing Stairs Difficulty yes   Walking or Climbing Stairs ambulation difficulty, requires equipment   Mobility Management walker or cane   Dressing/Bathing Difficulty yes   Dressing/Bathing bathing difficulty, requires equipment   Dressing/Bathing Management shower chair, grab bars   Toileting issues no   Doing Errands Independently Difficulty (such as shopping) yes   Errands Management family assists   Fall history within last six months yes   Number of times patient has fallen within " "last six months 1   Change in Functional Status Since Onset of Current Illness/Injury yes   General Information   Onset of Illness/Injury or Date of Surgery 11/26/21   Referring Physician Billie Molina MD   Patient/Family Therapy Goal Statement (OT) Return home   Additional Occupational Profile Info/Pertinent History of Current Problem Per chart, Khalida Redding is a 72yo woman with PMH of DM2, HTN, HLD, SVT, previous R EDWARD territory ischemic stroke with residual left sided weakness, seizures (previously not on AED's), major neurocognitive disorder, who presented to [facility] ED following a spell of altered awareness concerning for a seizure.   Existing Precautions/Restrictions fall;seizures   Limitations/Impairments safety/cognitive   Left Upper Extremity (Weight-bearing Status) full weight-bearing (FWB)   Right Upper Extremity (Weight-bearing Status) full weight-bearing (FWB)   Left Lower Extremity (Weight-bearing Status) full weight-bearing (FWB)   Right Lower Extremity (Weight-bearing Status) full weight-bearing (FWB)   General Observations and Info Activity: Up with Assist   Cognitive Status Examination   Orientation Status person   Affect/Mental Status (Cognitive) confused;low arousal/lethargic   Cognitive Status Comments Pt oriented to self and able to recall exact month and city however unable to state name of place and states year \"1992\"; Pt presents having visual hallucinations 2x during session stating \"there are two children in the room, a boy and a girl\"   Visual Perception   Visual Impairment/Limitations WFL   Impact of Vision Impairment on Function (Vision) Denies acute vision changes   Sensory   Sensory Quick Adds No deficits were identified   Sensory Comments Denies N/T in B UE   Pain Assessment   Patient Currently in Pain No   Integumentary/Edema   Integumentary/Edema no deficits were identifed   Posture   Posture forward head position;protracted shoulders   Posture Comments while " seated EOB   Range of Motion Comprehensive   General Range of Motion upper extremity range of motion deficits identified   General Upper Extremity Assessment (Range of Motion)   Comment: Upper Extremity ROM R UE significantly limited AROM   Strength Comprehensive (MMT)   General Manual Muscle Testing (MMT) Assessment upper extremity strength deficits identified   Upper Extremity (Manual Muscle Testing)   Comment, MMT: Upper Extremity L UE 5/5, R UE 3/5   Muscle Tone Assessment   Muscle Tone Quick Adds No deficits were identified   Coordination   Upper Extremity Coordination Right UE impaired   Gross Motor Coordination R UE decreased speed of movement   Fine Motor Coordination R UE limited in FMC   Bed Mobility   Bed Mobility supine-sit;sit-supine   Supine-Sit [place] (Bed Mobility) maximum assist (25% patient effort);verbal cues   Sit-Supine [place] (Bed Mobility) maximum assist (25% patient effort);verbal cues   Assistive Device (Bed Mobility) bed rails   Comment (Bed Mobility) max A to transition from supine<>seated EOB with assist for trunk and B LE   Transfers   Transfer Comments NT: not safe to trial STS this date, pt requiring mod A for static sitting balance    Balance   Balance Assessment sitting balance: static   Sitting Balance: Static moderate impairment;sitting, edge of bed   Balance Comments mod A to maintain static sitting balance at EOB   Clinical Impression   Criteria for Skilled Therapeutic Interventions Met (OT) yes;meets criteria;skilled treatment is necessary   OT Diagnosis Decreased ADL function, functional mobility, cognition, and overall activity tolerance    OT Problem List-Impairments impacting ADL problems related to;activity tolerance impaired;cognition;mobility;range of motion (ROM);strength   Assessment of Occupational Performance 5 or more Performance Deficits   Identified Performance Deficits bed mobility, g/h tasks, FB bathing, FB dressing, functional mobility, toileting,  cognition, home mgmt tasks   Planned Therapy Interventions (OT) ADL retraining;IADL retraining;balance training;cognition;ROM;strengthening;transfer training;home program guidelines;progressive activity/exercise   Clinical Decision Making Complexity (OT) low complexity   Therapy Frequency (OT) 5x/week   Predicted Duration of Therapy 2 weeks   Anticipated Equipment Needs Upon Discharge (OT)   (TBD)   Risk & Benefits of therapy have been explained evaluation/treatment results reviewed;care plan/treatment goals reviewed;participants included;patient;spouse/significant other   Comment-Clinical Impression Pt presents significantly below baseline for ADL function, functional mobility, cognition, and overall activity tolerance this date;   OT Discharge Planning    OT Discharge Recommendation (DC Rec) Transitional Care Facility;Home with assist;home with home care occupational therapy   OT Rationale for DC Rec Pt presenting below baseline function for ADL tasks, functional mobility, cognition, and overall activity tolerance this date; Per  report, pt was IND with most ADL tasks at baseline and he would assist with functional transfers in/out of shower for safety. Pt received assist for most IADL tasks at baseline and daughter provides transportation for pt and . Recommend continued skilled therapy at TCU to progress ADL IND/safety, cognition, and functional mobility. If pt were to discharge home, recommend 24/7 assist for all ADL/IADL tasks, hospital bed, OH lift.    OT Brief overview of current status  max A supine<>sit, mod A static sitting balance, mod A g/h tasks    Total Evaluation Time (Minutes)   Total Evaluation Time (Minutes) 5

## 2021-12-01 NOTE — PROGRESS NOTES
Status: AMS and HTN crisis. Stroke vs seizure workup.  Vitals: VSS on RA  Neuros: Lethargic. Slow to respond.  Alert and oriented to self, place and situation for first neuro assessment. Second neuro assessment alert to self only until reoriented. L side 4/5.  RUE 3/5.  RLE 3/5  IV: PIV infusing NS@50mL/hr.   Labs/Electrolytes: K+ replaced, redraw done at 2000, now at 3.6  Resp/trach: WNL  Diet: Tolerating level 5 (minced and moist). Requires feeding, 1:1 supervision. Consumed 25% of dinner.   Bowel status: Medium incontinent BM this shift  : incontinent of urine x2. Malodorous.   Skin: mepilex to coccyx, preventative.  Blanchable redness to coccyx area.  Pain: denies  Activity: Lift per PT advice. Has not been OOB this shift. Turn Repo Q2hrs.  Social: son visiting  Plan: Neurology due to communicate Brain MRI results with patient/family.   Updates this shift: Right side is starting to show more strength. Brain MRI completed. Patient tolerated whole pills with water. One pill at a time. Writer put each pill in patient's mouth since patient was unable to.

## 2021-12-01 NOTE — PLAN OF CARE
"  Pt hypertensive but within MD parameters.  Pt oriented to self, situation, and intermittent month and year.  Pt with slow garbled speech, STR, RUE 2/5, RLE 3+/5, L side 4/5, moderate R grasp and dorsiplantar/flexion.   Pt having new visual hallucinations overnight; saw a \"weird looking\" man and pigs.  Dr. Laurent notified.  MIVF's running 50 ml/hr. Purewick in place for incontinence.  Last BM on 11/30.  On a level 5 minced and moist diet with thins; needs assist to eat.  Tolerated an ice cream cup overnight, taking pills whole with water. Up to commode overnight with a heavy assist of 2 and pivot.  Repo q2h. PT/OT/SLP following.  K+ replaced yesterday x 2; redraw this AM.  Stroke PLC ordered; not yet scheduled. Continue with POC.            "

## 2021-12-02 ENCOUNTER — APPOINTMENT (OUTPATIENT)
Dept: OCCUPATIONAL THERAPY | Facility: CLINIC | Age: 71
DRG: 100 | End: 2021-12-02
Attending: PSYCHIATRY & NEUROLOGY
Payer: COMMERCIAL

## 2021-12-02 ENCOUNTER — APPOINTMENT (OUTPATIENT)
Dept: SPEECH THERAPY | Facility: CLINIC | Age: 71
DRG: 100 | End: 2021-12-02
Attending: PSYCHIATRY & NEUROLOGY
Payer: COMMERCIAL

## 2021-12-02 ENCOUNTER — APPOINTMENT (OUTPATIENT)
Dept: PHYSICAL THERAPY | Facility: CLINIC | Age: 71
DRG: 100 | End: 2021-12-02
Attending: PSYCHIATRY & NEUROLOGY
Payer: COMMERCIAL

## 2021-12-02 LAB
ANION GAP SERPL CALCULATED.3IONS-SCNC: 5 MMOL/L (ref 3–14)
BACTERIA BLD CULT: NO GROWTH
BACTERIA BLD CULT: NO GROWTH
BUN SERPL-MCNC: 13 MG/DL (ref 7–30)
CALCIUM SERPL-MCNC: 8.2 MG/DL (ref 8.5–10.1)
CHLORIDE BLD-SCNC: 107 MMOL/L (ref 94–109)
CO2 SERPL-SCNC: 30 MMOL/L (ref 20–32)
CREAT SERPL-MCNC: 0.57 MG/DL (ref 0.52–1.04)
GFR SERPL CREATININE-BSD FRML MDRD: >90 ML/MIN/1.73M2
GLUCOSE BLD-MCNC: 132 MG/DL (ref 70–99)
GLUCOSE BLDC GLUCOMTR-MCNC: 115 MG/DL (ref 70–99)
GLUCOSE BLDC GLUCOMTR-MCNC: 121 MG/DL (ref 70–99)
GLUCOSE BLDC GLUCOMTR-MCNC: 121 MG/DL (ref 70–99)
GLUCOSE BLDC GLUCOMTR-MCNC: 133 MG/DL (ref 70–99)
GLUCOSE BLDC GLUCOMTR-MCNC: 138 MG/DL (ref 70–99)
GLUCOSE BLDC GLUCOMTR-MCNC: 160 MG/DL (ref 70–99)
GLUCOSE BLDC GLUCOMTR-MCNC: 210 MG/DL (ref 70–99)
HOLD SPECIMEN: NORMAL
HOLD SPECIMEN: NORMAL
PA ADP BLD-ACNC: 50 PRU
PHENYTOIN SERPL-MCNC: 15.2 MG/L
POTASSIUM BLD-SCNC: 3.6 MMOL/L (ref 3.4–5.3)
SODIUM SERPL-SCNC: 142 MMOL/L (ref 133–144)

## 2021-12-02 PROCEDURE — 80185 ASSAY OF PHENYTOIN TOTAL: CPT | Performed by: PSYCHIATRY & NEUROLOGY

## 2021-12-02 PROCEDURE — 97530 THERAPEUTIC ACTIVITIES: CPT | Mod: GP

## 2021-12-02 PROCEDURE — 92526 ORAL FUNCTION THERAPY: CPT | Mod: GN

## 2021-12-02 PROCEDURE — 97530 THERAPEUTIC ACTIVITIES: CPT | Mod: GO

## 2021-12-02 PROCEDURE — 250N000013 HC RX MED GY IP 250 OP 250 PS 637: Performed by: STUDENT IN AN ORGANIZED HEALTH CARE EDUCATION/TRAINING PROGRAM

## 2021-12-02 PROCEDURE — 250N000011 HC RX IP 250 OP 636: Performed by: STUDENT IN AN ORGANIZED HEALTH CARE EDUCATION/TRAINING PROGRAM

## 2021-12-02 PROCEDURE — 80177 DRUG SCRN QUAN LEVETIRACETAM: CPT | Performed by: STUDENT IN AN ORGANIZED HEALTH CARE EDUCATION/TRAINING PROGRAM

## 2021-12-02 PROCEDURE — 97535 SELF CARE MNGMENT TRAINING: CPT | Mod: GO

## 2021-12-02 PROCEDURE — 82310 ASSAY OF CALCIUM: CPT | Performed by: STUDENT IN AN ORGANIZED HEALTH CARE EDUCATION/TRAINING PROGRAM

## 2021-12-02 PROCEDURE — 120N000002 HC R&B MED SURG/OB UMMC

## 2021-12-02 PROCEDURE — 999N000128 HC STATISTIC PERIPHERAL IV START W/O US GUIDANCE

## 2021-12-02 PROCEDURE — 97110 THERAPEUTIC EXERCISES: CPT | Mod: GO

## 2021-12-02 PROCEDURE — 99232 SBSQ HOSP IP/OBS MODERATE 35: CPT | Mod: GC | Performed by: PSYCHIATRY & NEUROLOGY

## 2021-12-02 PROCEDURE — 85576 BLOOD PLATELET AGGREGATION: CPT | Mod: TC

## 2021-12-02 PROCEDURE — 36415 COLL VENOUS BLD VENIPUNCTURE: CPT | Performed by: PSYCHIATRY & NEUROLOGY

## 2021-12-02 PROCEDURE — 258N000003 HC RX IP 258 OP 636: Performed by: STUDENT IN AN ORGANIZED HEALTH CARE EDUCATION/TRAINING PROGRAM

## 2021-12-02 RX ORDER — LISINOPRIL 10 MG/1
10 TABLET ORAL DAILY
Status: DISCONTINUED | OUTPATIENT
Start: 2021-12-02 | End: 2021-12-05

## 2021-12-02 RX ADMIN — ENOXAPARIN SODIUM 40 MG: 40 INJECTION SUBCUTANEOUS at 11:23

## 2021-12-02 RX ADMIN — ASPIRIN 81 MG CHEWABLE TABLET 81 MG: 81 TABLET CHEWABLE at 08:00

## 2021-12-02 RX ADMIN — PHENYTOIN 100 MG: 50 TABLET, CHEWABLE ORAL at 07:59

## 2021-12-02 RX ADMIN — ATORVASTATIN CALCIUM 40 MG: 40 TABLET, FILM COATED ORAL at 07:59

## 2021-12-02 RX ADMIN — LEVETIRACETAM 1500 MG: 750 TABLET ORAL at 07:59

## 2021-12-02 RX ADMIN — LISINOPRIL 10 MG: 10 TABLET ORAL at 08:02

## 2021-12-02 RX ADMIN — LEVETIRACETAM 1500 MG: 750 TABLET ORAL at 20:36

## 2021-12-02 RX ADMIN — CLOPIDOGREL BISULFATE 75 MG: 75 TABLET ORAL at 08:03

## 2021-12-02 RX ADMIN — PHENYTOIN 100 MG: 50 TABLET, CHEWABLE ORAL at 20:36

## 2021-12-02 RX ADMIN — SODIUM CHLORIDE: 9 INJECTION, SOLUTION INTRAVENOUS at 03:36

## 2021-12-02 RX ADMIN — Medication 3 MG: at 20:36

## 2021-12-02 RX ADMIN — INSULIN ASPART 1 UNITS: 100 INJECTION, SOLUTION INTRAVENOUS; SUBCUTANEOUS at 16:22

## 2021-12-02 RX ADMIN — INSULIN ASPART 1 UNITS: 100 INJECTION, SOLUTION INTRAVENOUS; SUBCUTANEOUS at 11:24

## 2021-12-02 RX ADMIN — ACETAMINOPHEN 650 MG: 325 TABLET, FILM COATED ORAL at 20:37

## 2021-12-02 ASSESSMENT — ACTIVITIES OF DAILY LIVING (ADL)
ADLS_ACUITY_SCORE: 24
ADLS_ACUITY_SCORE: 22
ADLS_ACUITY_SCORE: 24
ADLS_ACUITY_SCORE: 24
ADLS_ACUITY_SCORE: 20
ADLS_ACUITY_SCORE: 24
ADLS_ACUITY_SCORE: 22
ADLS_ACUITY_SCORE: 24
ADLS_ACUITY_SCORE: 24
ADLS_ACUITY_SCORE: 22
ADLS_ACUITY_SCORE: 20
ADLS_ACUITY_SCORE: 22
ADLS_ACUITY_SCORE: 22
ADLS_ACUITY_SCORE: 20
ADLS_ACUITY_SCORE: 24
ADLS_ACUITY_SCORE: 22
ADLS_ACUITY_SCORE: 24
ADLS_ACUITY_SCORE: 22

## 2021-12-02 ASSESSMENT — VISUAL ACUITY
OU: NORMAL ACUITY
OU: NORMAL ACUITY

## 2021-12-02 NOTE — PLAN OF CARE
Pt hypertensive but within MD parameters, on RA.  Denied pain.  Pt disoriented to time,  slow and slightly garbled speech, STR at times, RUE 3/5 with weak R shoulder shrug, all other extremities 4/5, moderate R grasp and R dorsiplantar/flexion.  No hallucinations overnight.  On cardiac monitoring; no afib noted.  MIVF's running 50 ml/hr. Purewick in place for intermittent incontinence.  Did void on bedpan x 1.  Last BM on 12/01.  On a level 5 minced and moist diet with thins; 1:1 supervision/assist.  Up with a heavy assist of 2 and pivot.  Repo q2h. PT/OT/SLP following.  Continue with POC.

## 2021-12-02 NOTE — PLAN OF CARE
Status: AMS and HTN crisis. Stroke vs seizure workup  Vitals: HTN, within parameters. CCM. On RA.   Neuros: D/o time. Lethargic. Slow, garbled speech. Slight R droop. RUE 2/5, RLE 3/5, L side 4  IV: PIV infusing NS 50 mL/hr  Diet: Level 5 diet, good intake this shift. Needs assist with ordering and eating  Bowel status: LBM today  : Voiding, incont. Purewick in place.  Skin: no new deficits  Pain: denies  Activity: Heavy 2 assist, pivot.   Social:  at bedside today, supportive.   Plan: TCU recommended  Updates this shift: Stroke PLC completed today with  at bedside and daughter on phone. Updated daughter via phone this evening.

## 2021-12-02 NOTE — PROGRESS NOTES
"Care Management Follow Up    Length of Stay (days): 6    Expected Discharge Date:  TBD - pending placement      Concerns to be Addressed: discharge planning     Patient plan of care discussed at interdisciplinary rounds: Yes    Anticipated Discharge Disposition: ARU      Anticipated Discharge Services: ARU  Anticipated Discharge DME: NA    Patient/family educated on Medicare website which has current facility and service quality ratings: yes  Education Provided on the Discharge Plan:  yes  Patient/Family in Agreement with the Plan: yes    Referrals Placed by CM/SW:   Pending:  -[facility]   [phone]  Referral sent     - [facility]  [phone] f: [phone]   Referral sent     - [facility]  [phone] f: [phone]  Referral sent     - [facility]   [phone] f: [phone]  Referral sent     Declined:  - [facility] *[id number]  Does not meet criteria      - [facility]   [phone] f: [phone]  Not taking outside admissions.     Private pay costs discussed: Not applicable    Additional Information:  RODNEY attempted to meet with pt at bedside, pt working with OT and pt's spouse Yeyo not at bedside.   RODNEY called Yeyo [phone], Yeyo stated he will be at [facility] shortly and requested to speak with RODNEY then.  RODNEY received phone call from OT stating that recs have been changed to ARU - RODNEY will address this when Yeyo arrives.    Addend 1030: RODNEY met with pt and Yeyo at bedside. RODNEY discussed recommendation w/ Yeyo and pt. Pt stating she wants to return home. Yeyo reports that he cannot care for pt at this time and encouraged pt to go to ARU. RODNEY discussed difference between TCU and ARU. RODNEY questioned it pf is vaccinated, per pt \"no I am not and I'm not getting it.\" RODNEY informed pt that if she were to go to TCU instead of ARU then she would need to do a 2 week quarantine and Yeyo would not be able to visit her, where if she were to go to ARU, Yeyo could " "still visit her. Yeyo stated he felt overwhelmed with the information and requested SW call their daughter, Laura. SW called Laura [phone], no voicemail set up so SW unable to leave voicemail. SW will continue to attempt to reach Laura.    Addend 1145: RODNEY had a long discussion with Laura regarding TCU vs ARU. RODNEY explained the above information to Laura. Laura stated she wants the best for her mom and is going to encourage her to go to ARU. Laura stated her dad is staying near [facility] w/ her nephew for the ease of transportation. Laura stated she will call her dad to discuss. Laura stated she would prefer [facility].   RODNEY made referral to [facility].    Addend 1400: Per [facility] admissions, pt does not meet criteria. RODNEY called Laura to update her. Laura requested SW send referrals to \"all the ARU's.\" Laura is concerned that pt and spouse will not be agreeable to TCU, due to unvaccinated patient's needing a 2 week quarantine. Laura stated \"we will cross that bridge if it comes to it.\" RODNEY sent the above referrals.    MELISSA Mirza, LGSW  [facility]   Ph:[phone]  Pager [phone]          "

## 2021-12-02 NOTE — PLAN OF CARE
Status: AMS and HTN crisis. Stroke vs seizure workup  Vitals: VSS on RA, HTN within parameters. On CCM  Neuros: Slow garbled speech. Disoriented to time. R side 3/5, L side 4/5  IV: PIV infusing NS @ 50mL/hr  Resp/trach: WNL  Diet: Level 5 minced and moist diet with thins, needs assistance to eat. Good PO today  Bowel status: LBM 12/1  : Purewick in place for incontinence  Skin: No new deficits   Pain: Denied  Activity: Heavy assist of 2 and pivot or up with 2 and lift  Social:  here today and supportive  Plan: Stroke PLC completed. Waiting for placement

## 2021-12-02 NOTE — PROGRESS NOTES
[facility]  Neurology Progress Note    Patient Name:  Khalida Redding    MRN:  [id number]      :  1950  Date of Service:  2021  Primary care provider:  Abdirahman Munoz    Subjective:  No acute events overnight. Patient thinks strength in her right sinan-body is improving.     ASSESSMENT/PLAN:  Khalida Redding is a 72yo woman with PMH of DM2, HTN, HLD, SVT, previous R EDWARD territory ischemic stroke with residual left sided weakness, seizures (previously not on AED's), major neurocognitive disorder, who presented to [facility] ED following a spell of altered awareness concerning for a seizure.    She was found to be hypertensive at [facility] ED (SBP 230s). She was treated for seizure with 2g Keppra load and 1g BID maintenance, but she did not clear appropriately and was transferred to [facility] for further cares. She still remained quite encephalopathic with labile blood pressures, so broader workup was started. EEG was done that was significant for intermittent brief seizures, that have since improved with addition of maintainence fosphenytoin and keppra. Her mental status has greatly improved since seizures have been better controlled. Unfortunately develops new right upper extremity weakness while inpatient, found to have acute ischemic stroke in the right frontal lobe.     PT/OT recommending ARU. Patient is medically ready for discharge.     Acute encephalopathy, due to subclinical seizure and post-ictal state, improving  - Neurochecks q4 hours  - Continue levetiracetam to 1.5 g BID  - Continue phenytoin 100 mg BID  - Seizure precautions  - SLP following: minced and moist  - PT/OT: updated to recommend ARU    Acute ischemic stroke, left frontal lobe, likely intracranial atherosclerosis vs emoblic  History of R EDWARD ischemic stroke, residual L-sided hemiparesis  - LDL : 93  - A1c : 5.4  - Echocardiogram : no cardioembolic source identified  -  Continuous cardiac monitoring  - Start DAPT x 3 months for intracranial atherosclerosis on CTA, followed by monotherapy  - Increased atorvastatin to 40 mg daily, LDL goal < 70  - Stroke education  - Ziopatch on discharge     Hypertensive emergency, resolved  Labile BP's  HLD  - SBP goal <180  - Increased Lisinopril to 10 mg today  - PRN hydralazine and labetalol to maintain SBP < 180  - Atorvastatin 40 mg daily    Major neurocognitive disorder  - Hold PTA Namenda    Diabetes mellitus, type II  - Hold PTA metformin and glipizide while in hospital with lactic acidosis on arrival  - POC glucose  - Correctional dose insulin, low intensity  - Hypoglycemia protocol      Checklist:  FEN: minced and moist diet, thin liquids  LDA: PIV x2  PPx: lovenox   Code: FULL    Dispo: ARU, medically ready    Patient discussed with Attending Dr. Anderson.    Billie Molina MD  PGY-2 Neurology Resident    ______________________________  Physical Examination  Vitals: BP (!) 158/86 (BP Location: Left arm)   Pulse 90   Temp 98.9  F (37.2  C) (Oral)   Resp 16   SpO2 94%     General: laying in bed, alert, no acute distress  HEENT: Normocephalic, atraumatic, no epistaxis, no oral lesions, MMM  Resp: no increased work of breathing, on room air  Cardio: regular rate  Abdomen: soft, non-tender  Extremities: Warm and well perfused, peripheral pulses present   Skin: no rashes or lesions  Neuro:  Mental status: Alert, answers person, place and month accurately. Follows simple commands. Soft but fluent speech.   Cranial nerves: Eyes conjugate, Pupils 3 - 4 mm and reactive, EOMI intact and tracks finger, visual fields full, face symmetric at rest and with activation, hearing intact to normal conversation. No dysarthria. Shoulder shrug strong.    Motor: Tone normal. Able to move all extremities anti-gravity, however right upper and lower extremity with drift. 5/5 in left upper and lower both proximally and distally. Right upper 4/5 at deltoid,  elbow flexion and extension, 3/5  strength. 4/5 hip flexion on right, dorsi flexion 4/5, plantar flexion 5/5.  Reflexes: Normoreflexic and symmetric biceps, brachioradialis, patellar, and achilles. Toes down-going.  Sensory: intact and symmetric to light touch in upper and lower extremities  Coordination: FNF intact bilaterally without dysmetria  Gait: deferred due to need for assistance x2      IMAGING:  CT head (11/26/21):   IMPRESSION:   1. Area of encephalomalacia in the anterior medial right frontal lobe  is stable since the prior CT examination.  2. Small vessel white matter ischemic changes and age-related atrophy  are again noted.  3. Shift of the interventricular septum to the left is again noted.  4. No acute intracranial hemorrhage or obvious acute intracranial  ischemic event is identified. CT can be insensitive for acute ischemic  events for up to 24 hours. Extension of the areas of chronic infarct  is difficult to exclude.    CTA head/neck (11/26/21):  IMPRESSION:  HEAD CTA:  1.  Severe focal left MCA M2 inferior division stenosis.  2.  Multifocal left EDWARD A2 and A3 segment stenoses.  3.  Diminished arborization and small caliber of right EDWARD and M2  branches in the region of right frontal encephalomalacia.   NECK CTA:  1.  Normal neck CTA  for age.  2.  No significant stenosis as per NASCET criteria.    CT head (11/29/21):  Impression:  1. No acute intracranial pathology.   2. Stable area of encephalomalacia in the anteromedial right frontal  lobe. Also multiple old bilateral basal ganglia and right thalamic  infarcts. Sequelae of chronic small vessel ischemic disease.     MRI (11/30/21):  Impression:   1. Small foci of acute infarction in the high left frontal lobe  including involvement of the precentral gyrus.   2. Chronic right frontal encephalomalacia.       CURRENT MEDICATIONS:    aspirin  81 mg Oral Daily     atorvastatin  40 mg Oral Daily     clopidogrel  75 mg Oral Daily     enoxaparin  ANTICOAGULANT  40 mg Subcutaneous Q24H     insulin aspart  1-4 Units Subcutaneous Q4H     levETIRAcetam  1,500 mg Oral BID     lisinopril  10 mg Oral Daily     melatonin  3 mg Oral At Bedtime     phenytoin  100 mg Oral BID     sodium chloride (PF)  3 mL Intracatheter Q8H       PRN MEDICATIONS:  acetaminophen, glucose **OR** dextrose **OR** glucagon, hydrALAZINE, labetalol, lidocaine 4%, lidocaine (buffered or not buffered), ondansetron **OR** ondansetron, prochlorperazine **OR** prochlorperazine **OR** prochlorperazine, senna-docusate **OR** senna-docusate, sodium chloride (PF)    INFUSIONS:    sodium chloride 50 mL/hr at 12/02/21 0336       ALLERGIES:  Allergies   Allergen Reactions     Amoxicillin Hives and Rash     Pt reports she has taken PCN without problems     Ampicillin Rash

## 2021-12-03 ENCOUNTER — APPOINTMENT (OUTPATIENT)
Dept: PHYSICAL THERAPY | Facility: CLINIC | Age: 71
DRG: 100 | End: 2021-12-03
Attending: PSYCHIATRY & NEUROLOGY
Payer: COMMERCIAL

## 2021-12-03 LAB
ANION GAP SERPL CALCULATED.3IONS-SCNC: 6 MMOL/L (ref 3–14)
BUN SERPL-MCNC: 18 MG/DL (ref 7–30)
CALCIUM SERPL-MCNC: 8.4 MG/DL (ref 8.5–10.1)
CHLORIDE BLD-SCNC: 107 MMOL/L (ref 94–109)
CO2 SERPL-SCNC: 26 MMOL/L (ref 20–32)
CREAT SERPL-MCNC: 0.56 MG/DL (ref 0.52–1.04)
GFR SERPL CREATININE-BSD FRML MDRD: >90 ML/MIN/1.73M2
GLUCOSE BLD-MCNC: 142 MG/DL (ref 70–99)
GLUCOSE BLDC GLUCOMTR-MCNC: 115 MG/DL (ref 70–99)
GLUCOSE BLDC GLUCOMTR-MCNC: 129 MG/DL (ref 70–99)
GLUCOSE BLDC GLUCOMTR-MCNC: 130 MG/DL (ref 70–99)
GLUCOSE BLDC GLUCOMTR-MCNC: 136 MG/DL (ref 70–99)
GLUCOSE BLDC GLUCOMTR-MCNC: 150 MG/DL (ref 70–99)
GLUCOSE BLDC GLUCOMTR-MCNC: 189 MG/DL (ref 70–99)
HOLD SPECIMEN: NORMAL
LEVETIRACETAM SERPL-MCNC: 25 UG/ML
POTASSIUM BLD-SCNC: 3.5 MMOL/L (ref 3.4–5.3)
SODIUM SERPL-SCNC: 139 MMOL/L (ref 133–144)

## 2021-12-03 PROCEDURE — 97530 THERAPEUTIC ACTIVITIES: CPT | Mod: GP

## 2021-12-03 PROCEDURE — 250N000013 HC RX MED GY IP 250 OP 250 PS 637: Performed by: STUDENT IN AN ORGANIZED HEALTH CARE EDUCATION/TRAINING PROGRAM

## 2021-12-03 PROCEDURE — 97110 THERAPEUTIC EXERCISES: CPT | Mod: GP

## 2021-12-03 PROCEDURE — 999N000128 HC STATISTIC PERIPHERAL IV START W/O US GUIDANCE

## 2021-12-03 PROCEDURE — 120N000002 HC R&B MED SURG/OB UMMC

## 2021-12-03 PROCEDURE — 99232 SBSQ HOSP IP/OBS MODERATE 35: CPT | Mod: GC | Performed by: PSYCHIATRY & NEUROLOGY

## 2021-12-03 PROCEDURE — 36415 COLL VENOUS BLD VENIPUNCTURE: CPT | Performed by: STUDENT IN AN ORGANIZED HEALTH CARE EDUCATION/TRAINING PROGRAM

## 2021-12-03 PROCEDURE — 82310 ASSAY OF CALCIUM: CPT | Performed by: STUDENT IN AN ORGANIZED HEALTH CARE EDUCATION/TRAINING PROGRAM

## 2021-12-03 PROCEDURE — 250N000011 HC RX IP 250 OP 636: Performed by: STUDENT IN AN ORGANIZED HEALTH CARE EDUCATION/TRAINING PROGRAM

## 2021-12-03 RX ORDER — LISINOPRIL 10 MG/1
10 TABLET ORAL DAILY
Status: CANCELLED | DISCHARGE
Start: 2021-12-03

## 2021-12-03 RX ADMIN — ACETAMINOPHEN 650 MG: 325 TABLET, FILM COATED ORAL at 08:36

## 2021-12-03 RX ADMIN — LEVETIRACETAM 1500 MG: 750 TABLET ORAL at 20:59

## 2021-12-03 RX ADMIN — PHENYTOIN 100 MG: 50 TABLET, CHEWABLE ORAL at 20:59

## 2021-12-03 RX ADMIN — CLOPIDOGREL BISULFATE 75 MG: 75 TABLET ORAL at 08:35

## 2021-12-03 RX ADMIN — Medication 3 MG: at 20:59

## 2021-12-03 RX ADMIN — ACETAMINOPHEN 650 MG: 325 TABLET, FILM COATED ORAL at 15:32

## 2021-12-03 RX ADMIN — ENOXAPARIN SODIUM 40 MG: 40 INJECTION SUBCUTANEOUS at 11:04

## 2021-12-03 RX ADMIN — PHENYTOIN 100 MG: 50 TABLET, CHEWABLE ORAL at 08:35

## 2021-12-03 RX ADMIN — INSULIN ASPART 1 UNITS: 100 INJECTION, SOLUTION INTRAVENOUS; SUBCUTANEOUS at 16:06

## 2021-12-03 RX ADMIN — ATORVASTATIN CALCIUM 40 MG: 40 TABLET, FILM COATED ORAL at 08:35

## 2021-12-03 RX ADMIN — LISINOPRIL 10 MG: 10 TABLET ORAL at 08:35

## 2021-12-03 RX ADMIN — ASPIRIN 81 MG CHEWABLE TABLET 81 MG: 81 TABLET CHEWABLE at 08:34

## 2021-12-03 RX ADMIN — LEVETIRACETAM 1500 MG: 750 TABLET ORAL at 08:35

## 2021-12-03 ASSESSMENT — ACTIVITIES OF DAILY LIVING (ADL)
ADLS_ACUITY_SCORE: 21
ADLS_ACUITY_SCORE: 20
ADLS_ACUITY_SCORE: 20
ADLS_ACUITY_SCORE: 21
ADLS_ACUITY_SCORE: 20
ADLS_ACUITY_SCORE: 21
ADLS_ACUITY_SCORE: 20
ADLS_ACUITY_SCORE: 21
ADLS_ACUITY_SCORE: 20
ADLS_ACUITY_SCORE: 21
ADLS_ACUITY_SCORE: 20
ADLS_ACUITY_SCORE: 21
ADLS_ACUITY_SCORE: 21

## 2021-12-03 ASSESSMENT — VISUAL ACUITY
OU: NORMAL ACUITY
OU: NORMAL ACUITY

## 2021-12-03 NOTE — PROGRESS NOTES
Care Management Follow Up    Length of Stay (days): 7    Expected Discharge Date:  TBD - pending placement      Concerns to be Addressed: discharge planning     Patient plan of care discussed at interdisciplinary rounds: Yes     Anticipated Discharge Disposition: ARU      Anticipated Discharge Services: ARU  Anticipated Discharge DME: NA     Patient/family educated on Medicare website which has current facility and service quality ratings: yes  Education Provided on the Discharge Plan:  yes  Patient/Family in Agreement with the Plan: yes     Referrals Placed by CM/SW:   Pending:  - [facility]   [phone] f: [phone]  Referral sent    - [facility] [phone] f: [phone]  Referral sent    Declined:  - [facility] *[id number]  Does not meet criteria     - [facility]   [phone] f: [phone]  Not taking outside admissions.     - [facility]   [phone] f: [phone]  Not taking outside admissions     -[facility] - [facility]   [phone]  Not taking outside admissions.     - [facility]  [phone] f: [phone]   Per Fany in admissions, more appropriate for TCU. [place] does not have any beds available until mid-next week.    - [facility]  [phone] f: [phone]  Per admissions, pt not appropriate for ARU.     Private pay costs discussed: Not applicable     Additional Information:  RODNEY followed up on the following referrals.   RODNEY called Laura to provide update. Laura was disappointed, however acknowledged that pt has to go to a TCU to get better. Laura stated she will be speaking with her dad about this. Laura stated pt does not have [facility] insurance and she has provided her niece with the insurance cards. Per Laura, Yeyo should be bringing this down today around 11. RODNEY will plan to meet with Yeyo to obtain insurance card and then provide appropriate TCU list. RODNEY will follow up with Laura as well after  this.     Addend 1200: RODNEY met with pt at bedside, Yeyo has not arrived yet. SW will follow up this afternoon.    Addend 1400: Yeyo provided SW w/ updated insurance cards. RODNEY emailed information to financial counseling so they can clarify what insurance patient has.    Addend 1445: Per financial counseling, pt has [facility] MEDICARE not [facility] medicare, so this will have to be the payor source.   RODNEY discussed this with Laura, Laura expressed understanding. RODNEY discussed [employer] Medicare in-network facilities with Laura. Laura requested SW send in-network referrals to [facility] and [facility]. RODNEY sent.       MELISSA Mirza, SANJAY  6D Adult Acute Care RODNEY  Ph:[phone]  Pager [phone]

## 2021-12-03 NOTE — PLAN OF CARE
Status: AMS and HTN crisis. Stroke vs seizure workup.  Vitals: VSS, RA.  Neuros: Slow, garbled speech. D/o to time consistently. RUE 3/5, AOE 4/5 strength. Denies N/T.   IV: PIV SL.  Labs/Electrolytes: WDL.   Resp/trach: LS diminished upon ascultation.  Diet: #7 diet with thins with supervision.    Bowel status: LBM 12/1. Attempted 2x to have BM on bedpan this AM.   : Voiding with incontinence.   Skin: No new deficits.   Pain: Denies.  Activity: Up to chair with PT, heavy 2 to pivot. Unable to get back to bed, required max assistance. Recommend lift.  Social: SO visited this afternoon.   Plan: ARU ready, SW following for placement.

## 2021-12-03 NOTE — PLAN OF CARE
Admitted for AMS, seizures, CVA. Hx right EDWARD ischemic stroke with residual left sided weakness. Neuros: intermittently d/o time, otherwise A&O x4. RUE 3/5, all other extremities 4/5. Slow garbles speech. VSS on RA. CCM NSR. Tylenol  for generalized pain. T/R q2h. Incontinent of urine, purwick in place. No BM overnight. Level 7 diet with 1:1 assistance. Up 2/lift. Plan discharge to TCU vs ARU when ready.

## 2021-12-03 NOTE — DISCHARGE SUMMARY
[facility]  NEUROLOGY DISCHARGE SUMMARY    Patient Name:  Khalida Redding  MRN:  [id number]      :  1950      Date of Admission:  2021  Date of Discharge:  2021  Admitting Physician:  Liv Marquez MD  Discharge Physician:  Omer Hemphill MD  Primary Care Provider:   Abdirahman Munoz  Discharge Disposition:   Discharged to rehabilitation facility    Admission Diagnoses:  Acute encephalopathy  Hypertensive emergency  Hyperlipidemia  Hx of right MCA ischemic stroke  Dementia  Type 2 Diabetes Mellitus     Discharge Diagnoses:    Acute encephalopathy  Subclinical seizures  Acute ischemic stroke, left frontal lobe  Hypertension    Brief History of Illness:   Khalida Redding is a 71 year old female with PMH of  R MCA territory ischemic stroke with residual left sided weakness, seizures (not on PTA AEDs) who presented to [facility] ED following a spell concerning for a seizure. She was noted to be confused and not following commands. Patient was loaded with Keppra 2 g IV and started on maintenance 1 g BID prior to transfer.    Hospital Course:  Upon arrival to [facility] patient was not speaking to providers. She was localizing to sternal rub and withdrawing in all extremities to noxious stimuli. She was placed on EEG and found to have subclinical seizures with right hemisphere onset, she was given Ativan and fosphenytoin load with subsiding of right hemispheric pattern on EEG. Patients mental status improved while on maintenance phenytoin and Keppra, therefore EEG was discontinued. When patients mental status improved to be able to follow commands, it was found that she had right upper extremity weakness that was not noted on admission. An MRI brain was completed that demonstrated an acute ischemic stroke in the left frontal lobe. She was started on dual antiplatelet with aspirin 81 mg and Plavix 75 mg, which should be continued for 90 days as  etiology of stroke thought to be intracranial atherosclerosis. After 90 days, patient should continue  mg indefinitely. She will also discharge with a Zio patch for 14 days of cardiac monitoring.     Discharge plan for seizures:  - Keppra 1,500 mg BID  - Dilantin 100 mg BID  - Follow up with Neurology after discharge from rehab    Discharge plan for stroke:  - Aspirin 81 mg + Plavix 75 mg for 90 days, followed by monotherapy with  mg daily  - Atorvastatin 40 mg daily, with LDL goal less than 70  - Lisinopril 20 mg daily, with long term goal normotension  - Blood glucose control per PCP, with Hgb A1c goal less than 7.0  - Zio patch    Pertinent Investigations:  CT SCAN OF THE HEAD WITHOUT CONTRAST   11/26/2021 4:07 PM   IMPRESSION:   1. Area of encephalomalacia in the anterior medial right frontal lobe  is stable since the prior CT examination.  2. Small vessel white matter ischemic changes and age-related atrophy  are again noted.  3. Shift of the interventricular septum to the left is again noted.  4. No acute intracranial hemorrhage or obvious acute intracranial  ischemic event is identified. CT can be insensitive for acute ischemic  events for up to 24 hours. Extension of the areas of chronic infarct  is difficult to exclude.    CT ANGIOGRAM OF THE HEAD AND NECK WITH CONTRAST  11/26/2021 4:17 PM   IMPRESSION:  HEAD CTA:  1.  Severe focal left MCA M2 inferior division stenosis.  2.  Multifocal left EDWARD A2 and A3 segment stenoses.  3.  Diminished arborization and small caliber of right EDWARD and M2  branches in the region of right frontal encephalomalacia.   NECK CTA:  1.  Normal neck CTA  for age.  2.  No significant stenosis as per NASCET criteria.    CT HEAD W/O CONTRAST 11/29/2021 12:59 PM  Impression:  1. No acute intracranial pathology.   2. Stable area of encephalomalacia in the anteromedial right frontal  lobe. Also multiple old bilateral basal ganglia and right thalamic  infarcts. Sequelae of  chronic small vessel ischemic disease.     MR BRAIN W/O CONTRAST 11/30/2021 5:07 PM  Impression:   1. Small foci of acute infarction in the high left frontal lobe  including involvement of the precentral gyrus.   2. Chronic right frontal encephalomalacia.     Summary of Day # 1 of VEEG Monitoring:  This is an abnormal EEG due to the presence of moderate to severe generalized slowing of the background activities. Findings are consistent with moderate to severe diffuse encephalopathy of which the etiology is non-specific.  Two subclinical electrographic seizures were recorded with right hemisphere onset.    Summary of Day # 2 of VEEG Monitoring:  This is an abnormal EEG due to the presence of moderate to severe generalized slowing with maximum slowing in the right hemisphere.  Prior to administration of Ativan/ fosphenytoin   at 20: 24 there was persistent cycling pattern (fast alpha activity that evolved to rhythmic delta slowing concentrated in the right hemisphere, maximal in right temporal region) suggesting right hemispheric no clinical sign seizures.  This persistent right hemispheric pattern subsided after the administration of antiepileptic drugs suggesting they may be EEG no clinical sign seizures.     Summary of Day # 3 of VEEG Monitoring:  This is an abnormal EEG due to the presence of moderate to severe generalized slowing, at times diffuse generalized delta slowing is rhythmic.   There were rare instances of right temporal lobe alpha discharges that evolved to a higher voltage rhythmic delta slowing maximally noted in the temporal chain.  This pattern was noted twice.  However it is very difficult to differentiate if these are poorly formed EEG seizures or part of the background rhythmic higher voltage delta slowing.  I suspect this is part of her encephalopathy. Overall her EEG seems to have improvement since seizure medications were given.    SUMMARY OF 4 DAYS OF VIDEO EEG MONITORING:  Abnormal due to  subclinical seizures in the right hemisphere on days 1 and 2 of video EEG monitoring that improved with the additional of anti-seizure medications. Over subsequent days of monitoring, pattern in the right hemisphere consisting of alpha activity followed by rhythmic higher voltage delta was difficult to differentiate as ictal from underlying encephalopathy. Overall, EEG improved on days 3 and 4 with definitive seizures and with subtle improvement in background activities. At end of monitoring, background is consistent with a moderate to severe encephalopathy.     Consultations:    Medicine- recommended lisinopril for blood pressure control.     Recommendations and Follow-up:  - Follow up with PCP in 2-3 weeks  - Follow up with neurology in 4-6 weeks    Discharge physical examination:   /78 (BP Location: Right arm, Patient Position: Supine)   Pulse 83   Temp 98.2  F (36.8  C) (Oral)   Resp 15   SpO2 96%   General: Lying in bed, NAD  HEENT: Normocephalic, atraumatic. Sclera anicteric. Moist mucus membranes.  Cardiac: Extremities appear well-perfused.  Chest: Non-labored, no accessory muscle use.  Abdomen: Soft, non-distended, non-tender.  Extremities: Warm, no edema, pedal pulses palpable.  Skin: Warm, dry. No jaundice.      Neuro:  Mental status: Awake, alert, attentive. Oriented to self, location, month/year. She follows simple commands. Naming and repetition are intact.  Cranial nerves: Blinks to threat bilaterally. Pupils 3 mm, equal, round, reactive. Conjugate gaze. EOMI. Facial sensation intact to light touch in V1-V3 regions. Facial movements symmetric. Tongue protrudes without deviation, uvula midline. No dysarthria.   Motor: Normal tone. All extremities moving spontaneously, RUE drifts - 4/5 strength in the RUE. RLE is 4/5 at hip flexor, 5/5 dorsi- and plantar- flexion. LLE dorsiflexion is 4/5 - remaind of LLE and LUE strength is 5/5.  Reflexes: 2+ biceps, brachioradialis, patellar  reflexes.  Sensory: Intact to light touch and vibration in the upper and lower extremities.  Coordination: FNF intact bilaterally without dysmetria.  Gait: Deferred.  Discharge Medications:  Current Discharge Medication List      START taking these medications    Details   aspirin (ASA) 81 MG chewable tablet Take 1 tablet (81 mg) by mouth daily Take 1 tablet daily through Feb 28.    Associated Diagnoses: Cerebrovascular accident (CVA) due to embolism of left anterior cerebral artery (H)      levETIRAcetam (KEPPRA) 750 MG tablet Take 2 tablets (1,500 mg) by mouth 2 times daily    Associated Diagnoses: Seizure (H)      lisinopril (ZESTRIL) 20 MG tablet Take 1 tablet (20 mg) by mouth daily    Associated Diagnoses: Cerebrovascular accident (CVA) due to embolism of left anterior cerebral artery (H)      melatonin 3 MG tablet Take 1 tablet (3 mg) by mouth At Bedtime    Associated Diagnoses: Seizure (H)      phenytoin (DILANTIN) 50 MG chewable tablet Take 2 tablets (100 mg) by mouth 2 times daily    Associated Diagnoses: Seizure (H)         CONTINUE these medications which have CHANGED    Details   atorvastatin (LIPITOR) 40 MG tablet Take 1 tablet (40 mg) by mouth daily    Associated Diagnoses: Cerebrovascular accident (CVA) due to embolism of left anterior cerebral artery (H)      clopidogrel (PLAVIX) 75 MG tablet Take 1 tablet (75 mg) by mouth daily    Associated Diagnoses: Cerebrovascular accident (CVA) due to embolism of left anterior cerebral artery (H)         CONTINUE these medications which have NOT CHANGED    Details   acetaminophen (TYLENOL) 500 MG tablet Take 1,000 mg by mouth every 6 hours as needed for mild pain      alcohol swab prep pads Use to swab area of injection/carlos manuel as directed.  Qty: 100 each, Refills: 3    Associated Diagnoses: Type 2 diabetes mellitus with diabetic peripheral angiopathy without gangrene, without long-term current use of insulin (H)      alendronate (FOSAMAX) 70 MG tablet Take 1  tablet (70 mg) by mouth every 7 days  Qty: 13 tablet, Refills: 3    Associated Diagnoses: Age-related osteoporosis without current pathological fracture      blood glucose (NO BRAND SPECIFIED) test strip Use to test blood sugar once daily. To accompany: Blood Glucose Monitor Brands: per insurance.  Qty: 100 strip, Refills: 6    Associated Diagnoses: Type 2 diabetes mellitus with diabetic peripheral angiopathy without gangrene, without long-term current use of insulin (H)      blood glucose monitoring (NO BRAND SPECIFIED) meter device kit Use to test blood sugar once daily. Preferred blood glucose meter OR supplies to accompany: Blood Glucose Monitor Brands: per insurance.  Qty: 1 kit, Refills: 0    Associated Diagnoses: Type 2 diabetes mellitus with diabetic peripheral angiopathy without gangrene, without long-term current use of insulin (H)      Cyanocobalamin (VITAMIN B 12 PO) Take 1,000 mcg by mouth daily      gabapentin (NEURONTIN) 300 MG capsule Take 1 capsule (300 mg) by mouth At Bedtime  Qty: 90 capsule, Refills: 1    Associated Diagnoses: Dementia without behavioral disturbance, unspecified dementia type (H)      glipiZIDE (GLUCOTROL XL) 2.5 MG 24 hr tablet Take 1 tablet (2.5 mg) by mouth daily  Qty: 90 tablet, Refills: 1    Associated Diagnoses: Type 2 diabetes mellitus with diabetic peripheral angiopathy without gangrene, without long-term current use of insulin (H)      meloxicam (MOBIC) 7.5 MG tablet Take 1 tablet (7.5 mg) by mouth daily  Qty: 90 tablet, Refills: 3    Comments: Requesting 1 year supply  Associated Diagnoses: Injury of left clavicle, initial encounter; Closed displaced fracture of left clavicle, unspecified part of clavicle, initial encounter      memantine (NAMENDA) 10 MG tablet Take 1 tablet (10 mg) by mouth daily  Qty: 90 tablet, Refills: 3    Comments: Requesting 1 year supply  Associated Diagnoses: Dementia without behavioral disturbance, unspecified dementia type (H)      metFORMIN  (GLUCOPHAGE-XR) 500 MG 24 hr tablet Take 1 tablet (500 mg) by mouth 2 times daily (with meals)  Qty: 180 tablet, Refills: 1    Associated Diagnoses: Type 2 diabetes mellitus with diabetic peripheral angiopathy without gangrene, without long-term current use of insulin (H)      omeprazole (PRILOSEC) 40 MG DR capsule TAKE 1 CAPSULE BY MOUTH ONCE DAILY TAKE  30  TO  60  MINUTES  BEFORE  A  MEAL  Qty: 90 capsule, Refills: 1    Associated Diagnoses: Abdominal pain, generalized; Chronic upset stomach      order for DME Equipment being ordered: glucometer and related supplies.  Qty: 1 Units, Refills: 0    Associated Diagnoses: Type 2 diabetes mellitus with diabetic peripheral angiopathy without gangrene, without long-term current use of insulin (H)      polyethylene glycol (MIRALAX) 17 GM/Dose powder Take 17 g (1 capful) by mouth 2 times daily  Qty: 1000 g, Refills: 1    Associated Diagnoses: Abdominal pain, generalized; Chronic upset stomach      thin (NO BRAND SPECIFIED) lancets Use with lanceting device. To accompany: Blood Glucose Monitor Brands: per insurance.  Qty: 100 each, Refills: 6    Associated Diagnoses: Type 2 diabetes mellitus with diabetic peripheral angiopathy without gangrene, without long-term current use of insulin (H)         STOP taking these medications       quinapril (ACCUPRIL) 5 MG tablet Comments:   Reason for Stopping:               Discharge follow up and instructions:     Neurology Adult Referral      Care Coordination Referral      General info for SNF    Length of Stay Estimate: Short Term Care: Estimated # of Days <30  Condition at Discharge: Stable  Level of care:skilled   Rehabilitation Potential: Good  Admission H&P remains valid and up-to-date: Yes  Recent Chemotherapy: N/A  Use Nursing Home Standing Orders: Yes     Mantoux instructions    Give two-step Mantoux (PPD) Per Facility Policy Yes     Follow Up and recommended labs and tests    Follow up with specialist, Neurology, in 4-6  weeks.  No follow up labs or test are needed.  Follow up with PCP when you ar discharged from rehab.     Reason for your hospital stay    You were in the hospital for altered mental status. You were placed on EEG and were found to have seizures. You were started on two anti-seizure medications that you should continue when you leave the hospital: Keppra 1,500 mg twice a day and Dilantin 100 mg twice a day. You should also follow up with neurology after you are discharged from rehab.    In addition, you were found to have a small stroke on MRI of your brain. This would explain the right upper extremity weakness you developed. You should continue taking Aspirin 81 mg and Plavix 75 mg daily for a total of 90 days. After the 90 days, continue with Aspirin 325 mg daily.     Activity - Up with nursing assistance     Full Code     Physical Therapy Adult Consult    Evaluate and treat as clinically indicated.    Reason:  hospital discharge     Occupational Therapy Adult Consult    Evaluate and treat as clinically indicated.    Reason:  hospital discharge     Speech Language Path Adult Consult    Evaluate and treat as clinically indicated.    Reason:  hospital discharge     Seizure precautions     Fall precautions     Diet    Follow this diet upon discharge: Orders Placed This Encounter      Easy to Chew Diet (level 7)       Patient seen and discussed with Dr. Hemphill.    Billie Molina MD  Neurology Resident, PGY-2

## 2021-12-03 NOTE — PROGRESS NOTES
[facility]  Neurology Progress Note    Patient Name:  Khalida Redding    MRN:  [id number]      :  1950  Date of Service:  2021  Primary care provider:  Abdirahman Munoz    Subjective:  No acute events overnight. Patient has no complaints this morning. She remains medically ready for discharge.    ASSESSMENT/PLAN:  Khalida Redding is a 70yo woman with PMH of DM2, HTN, HLD, SVT, previous R EDWARD territory ischemic stroke with residual left sided weakness, seizures (previously not on AED's), major neurocognitive disorder, who presented to [facility] ED following a spell of altered awareness concerning for a seizure.    She was found to be hypertensive at [facility] ED (SBP 230s). She was treated for seizure with 2g Keppra load and 1g BID maintenance, but she did not clear appropriately and was transferred to [facility] for further cares. She still remained quite encephalopathic with labile blood pressures, so broader workup was started. EEG was done that was significant for intermittent brief seizures, that have since improved with addition of maintainence fosphenytoin and keppra. Her mental status has greatly improved since seizures have been better controlled. Unfortunately develops new right upper extremity weakness while inpatient, found to have acute ischemic stroke in the right frontal lobe.     PT/OT recommending ARU. Patient is medically ready for discharge.     Acute encephalopathy, due to subclinical seizure and post-ictal state, improving  - Neurochecks q4 hours  - Continue levetiracetam to 1.5 g BID  - Continue phenytoin 100 mg BID  - Seizure precautions  - SLP following: minced and moist  - PT/OT: updated to recommend ARU, greatly appreciate SW assistance     Acute ischemic stroke, left frontal lobe, likely intracranial atherosclerosis vs emoblic  History of R EDWARD ischemic stroke, residual L-sided hemiparesis  - LDL : 93  - A1c : 5.4  -  Echocardiogram : no cardioembolic source identified  - Continuous cardiac monitoring  - Start DAPT x 3 months for intracranial atherosclerosis on CTA, followed by monotherapy with  mg  - Increased atorvastatin to 40 mg daily, LDL goal < 70  - Stroke education completed on 12/1  - Ziramón on discharge     Hypertensive emergency, resolved  Labile BP's  HLD  - SBP goal <180  - Lisinopril to 10 mg, consider increasing if needed  - PRN hydralazine and labetalol to maintain SBP < 180  - Atorvastatin 40 mg daily    Major neurocognitive disorder  - Hold PTA Namenda    Diabetes mellitus, type II  - Hold PTA metformin and glipizide while in hospital with lactic acidosis on arrival  - POC glucose  - Correctional dose insulin, low intensity  - Hypoglycemia protocol      Checklist:  FEN: easy to chew, thin liquids  LDA: PIV x2  PPx: lovenox   Code: FULL    Dispo: ARU, medically ready    Patient discussed with Attending Dr. Anderson.    Billie Molina MD  PGY-2 Neurology Resident    ______________________________  Physical Examination  Vitals: BP (!) 143/88 (BP Location: Right arm)   Pulse 87   Temp 98.5  F (36.9  C) (Oral)   Resp 14   SpO2 99%     General: laying in bed, alert, no acute distress  HEENT: Normocephalic, atraumatic, no epistaxis, no oral lesions, MMM  Resp: no increased work of breathing, on room air  Cardio: regular rate  Abdomen: soft, non-tender  Extremities: Warm and well perfused, peripheral pulses present   Skin: no rashes or lesions  Neuro:  Mental status: Alert, answers person, place and month accurately. Follows simple commands. Soft but fluent speech.   Cranial nerves: Eyes conjugate, Pupils 3 - 4 mm and reactive, EOMI intact and tracks finger, visual fields full, face symmetric at rest and with activation, hearing intact to normal conversation. No dysarthria. Shoulder shrug strong.    Motor: Tone normal. Able to move all extremities anti-gravity, however right upper and lower extremity  with drift. 5/5 in left upper and lower both proximally and distally. Right upper 4/5 at deltoid, elbow flexion and extension, 3/5  strength. 4/5 hip flexion on right, dorsi flexion 4/5, plantar flexion 5/5.  Reflexes: Normoreflexic and symmetric biceps, brachioradialis, patellar, and achilles. Toes down-going.  Sensory: intact and symmetric to light touch in upper and lower extremities  Coordination: FNF intact bilaterally without dysmetria  Gait: deferred due to need for assistance x2      IMAGING:  CT head (11/26/21):   IMPRESSION:   1. Area of encephalomalacia in the anterior medial right frontal lobe  is stable since the prior CT examination.  2. Small vessel white matter ischemic changes and age-related atrophy  are again noted.  3. Shift of the interventricular septum to the left is again noted.  4. No acute intracranial hemorrhage or obvious acute intracranial  ischemic event is identified. CT can be insensitive for acute ischemic  events for up to 24 hours. Extension of the areas of chronic infarct  is difficult to exclude.    CTA head/neck (11/26/21):  IMPRESSION:  HEAD CTA:  1.  Severe focal left MCA M2 inferior division stenosis.  2.  Multifocal left EDWARD A2 and A3 segment stenoses.  3.  Diminished arborization and small caliber of right EDWARD and M2  branches in the region of right frontal encephalomalacia.   NECK CTA:  1.  Normal neck CTA  for age.  2.  No significant stenosis as per NASCET criteria.    CT head (11/29/21):  Impression:  1. No acute intracranial pathology.   2. Stable area of encephalomalacia in the anteromedial right frontal  lobe. Also multiple old bilateral basal ganglia and right thalamic  infarcts. Sequelae of chronic small vessel ischemic disease.     MRI (11/30/21):  Impression:   1. Small foci of acute infarction in the high left frontal lobe  including involvement of the precentral gyrus.   2. Chronic right frontal encephalomalacia.       CURRENT MEDICATIONS:    aspirin  81 mg  Oral Daily     atorvastatin  40 mg Oral Daily     clopidogrel  75 mg Oral Daily     enoxaparin ANTICOAGULANT  40 mg Subcutaneous Q24H     insulin aspart  1-4 Units Subcutaneous Q4H     levETIRAcetam  1,500 mg Oral BID     lisinopril  10 mg Oral Daily     melatonin  3 mg Oral At Bedtime     phenytoin  100 mg Oral BID     sodium chloride (PF)  3 mL Intracatheter Q8H       PRN MEDICATIONS:  acetaminophen, glucose **OR** dextrose **OR** glucagon, hydrALAZINE, labetalol, lidocaine 4%, lidocaine (buffered or not buffered), ondansetron **OR** ondansetron, prochlorperazine **OR** prochlorperazine **OR** prochlorperazine, senna-docusate **OR** senna-docusate, sodium chloride (PF)      ALLERGIES:  Allergies   Allergen Reactions     Amoxicillin Hives and Rash     Pt reports she has taken PCN without problems     Ampicillin Rash

## 2021-12-03 NOTE — PROGRESS NOTES
CLINICAL NUTRITION SERVICES    Reviewed nutrition risk factors due to LOS. Pt is tolerating diet, eating well per nursing documentation. No nutrition issues identified at this time. RD will follow via rounds at this time, unless consulted.      Butch Snow RD, LD, [facility]  Neuro ICU  Pager: [phone]

## 2021-12-03 NOTE — PLAN OF CARE
Status: AMS and HTN crisis. Stroke vs seizure workup  Vitals: VSS on RA, HTN within parameters. On CCM  Neuros: Slow garbled speech. Disoriented to time. R side 3/5, L side 4/5  IV: PIV infusing NS @ 50mL/hr  Resp/trach: WNL  Diet: Level 7 diet with thins, needs assistance to eat. Good PO today  Bowel status: LBM 12/1  : Purewick in place for incontinence  Skin: No new deficits   Pain: Denied  Activity: Heavy assist of 2 and pivot or up with 2 and lift  Social:  here today and supportive  Plan: Stroke PLC completed. Waiting for placement

## 2021-12-04 ENCOUNTER — APPOINTMENT (OUTPATIENT)
Dept: SPEECH THERAPY | Facility: CLINIC | Age: 71
DRG: 100 | End: 2021-12-04
Attending: PSYCHIATRY & NEUROLOGY
Payer: COMMERCIAL

## 2021-12-04 LAB
GLUCOSE BLDC GLUCOMTR-MCNC: 116 MG/DL (ref 70–99)
GLUCOSE BLDC GLUCOMTR-MCNC: 136 MG/DL (ref 70–99)
GLUCOSE BLDC GLUCOMTR-MCNC: 137 MG/DL (ref 70–99)
GLUCOSE BLDC GLUCOMTR-MCNC: 167 MG/DL (ref 70–99)
GLUCOSE BLDC GLUCOMTR-MCNC: 170 MG/DL (ref 70–99)
GLUCOSE BLDC GLUCOMTR-MCNC: 232 MG/DL (ref 70–99)
GLUCOSE BLDC GLUCOMTR-MCNC: 269 MG/DL (ref 70–99)
SARS-COV-2 RNA RESP QL NAA+PROBE: NEGATIVE

## 2021-12-04 PROCEDURE — 120N000002 HC R&B MED SURG/OB UMMC

## 2021-12-04 PROCEDURE — 99232 SBSQ HOSP IP/OBS MODERATE 35: CPT | Mod: GC | Performed by: PSYCHIATRY & NEUROLOGY

## 2021-12-04 PROCEDURE — 92526 ORAL FUNCTION THERAPY: CPT | Mod: GN

## 2021-12-04 PROCEDURE — 250N000011 HC RX IP 250 OP 636: Performed by: STUDENT IN AN ORGANIZED HEALTH CARE EDUCATION/TRAINING PROGRAM

## 2021-12-04 PROCEDURE — 250N000013 HC RX MED GY IP 250 OP 250 PS 637: Performed by: STUDENT IN AN ORGANIZED HEALTH CARE EDUCATION/TRAINING PROGRAM

## 2021-12-04 PROCEDURE — U0003 INFECTIOUS AGENT DETECTION BY NUCLEIC ACID (DNA OR RNA); SEVERE ACUTE RESPIRATORY SYNDROME CORONAVIRUS 2 (SARS-COV-2) (CORONAVIRUS DISEASE [COVID-19]), AMPLIFIED PROBE TECHNIQUE, MAKING USE OF HIGH THROUGHPUT TECHNOLOGIES AS DESCRIBED BY CMS-2020-01-R: HCPCS | Performed by: STUDENT IN AN ORGANIZED HEALTH CARE EDUCATION/TRAINING PROGRAM

## 2021-12-04 RX ORDER — POLYETHYLENE GLYCOL 3350 17 G/17G
17 POWDER, FOR SOLUTION ORAL DAILY
Status: DISCONTINUED | OUTPATIENT
Start: 2021-12-04 | End: 2021-12-09 | Stop reason: HOSPADM

## 2021-12-04 RX ORDER — HYDRALAZINE HYDROCHLORIDE 10 MG/1
10 TABLET, FILM COATED ORAL EVERY 4 HOURS PRN
Status: DISCONTINUED | OUTPATIENT
Start: 2021-12-04 | End: 2021-12-09 | Stop reason: HOSPADM

## 2021-12-04 RX ADMIN — ENOXAPARIN SODIUM 40 MG: 40 INJECTION SUBCUTANEOUS at 12:00

## 2021-12-04 RX ADMIN — ASPIRIN 81 MG CHEWABLE TABLET 81 MG: 81 TABLET CHEWABLE at 09:02

## 2021-12-04 RX ADMIN — INSULIN ASPART 1 UNITS: 100 INJECTION, SOLUTION INTRAVENOUS; SUBCUTANEOUS at 12:14

## 2021-12-04 RX ADMIN — LISINOPRIL 10 MG: 10 TABLET ORAL at 09:02

## 2021-12-04 RX ADMIN — PHENYTOIN 100 MG: 50 TABLET, CHEWABLE ORAL at 20:31

## 2021-12-04 RX ADMIN — INSULIN ASPART 1 UNITS: 100 INJECTION, SOLUTION INTRAVENOUS; SUBCUTANEOUS at 16:10

## 2021-12-04 RX ADMIN — Medication 3 MG: at 22:36

## 2021-12-04 RX ADMIN — LEVETIRACETAM 1500 MG: 750 TABLET ORAL at 09:02

## 2021-12-04 RX ADMIN — ATORVASTATIN CALCIUM 40 MG: 40 TABLET, FILM COATED ORAL at 09:03

## 2021-12-04 RX ADMIN — PHENYTOIN 100 MG: 50 TABLET, CHEWABLE ORAL at 09:02

## 2021-12-04 RX ADMIN — CLOPIDOGREL BISULFATE 75 MG: 75 TABLET ORAL at 09:03

## 2021-12-04 RX ADMIN — LEVETIRACETAM 1500 MG: 750 TABLET ORAL at 20:31

## 2021-12-04 ASSESSMENT — ACTIVITIES OF DAILY LIVING (ADL)
ADLS_ACUITY_SCORE: 21

## 2021-12-04 ASSESSMENT — VISUAL ACUITY
OU: NORMAL ACUITY
OU: NORMAL ACUITY

## 2021-12-04 NOTE — PLAN OF CARE
Status: AMS and HTN crisis. Stroke vs seizure workup.  Vitals: VSS, RA.  Neuros: Slow speech. D/o to time consistently. RUE 3/5, AOE 4/5 strength. Denies N/T.   IV: PIV SL.  Labs/Electrolytes: WDL  Resp/trach: LS diminished upon ascultation.  Diet: #7 diet with thins with supervision.    Bowel status: LBM 12/1. Attempted BM on bedpan this AM.  : Voiding with incontinence. Purewick in place.  Skin: No new deficits.   Pain: Denies.  Activity: Up with A2, lift.  Social: SO visited this afternoon.   Plan: ARU ready, SW following for placement.

## 2021-12-04 NOTE — PLAN OF CARE
Vitals: VSS on RA, except HTN within parameters. CCM.  Neuros: Alert and oriented x3, disoriented to time. RUE 3/5, AOE 4/5. Slow, garbled speech.   IV: PIV SL'd  Resp/trach: WNL on RA  Diet: #7 diet with thins, fair intake, needs supervision.  Bowel status: Bs+x4, no BM this shift.  : Voiding, incontinent.  Pain: Denied.  Activity: Up with lift, turn/repo Q2h.   Social: No visitors this shift.   Plan: Continue to monitor and follow POC. ARU ready, SW following for placement

## 2021-12-04 NOTE — PLAN OF CARE
Vitals: VSS on RA, except HTN within parameters. CCM.  Neuros: Alert and oriented x3, disoriented to time. RUE 3/5, AOE 4/5. Slow, garbled speech.   IV: PIV SL'd  Resp/trach: WNL on RA  Diet: #7 diet with thins, fair intake, needs supervision.  Bowel status: Bs+x4, no BM this shift.  : Voiding, incontinent at times.  Pain: C/o pain to right thigh-no redness or swelling noted. MD notified and tylenol given. Denied pain following tylenol and repositioning.  Activity: Up with lift, turn/repo Q2h.   Social:  at bedside this shift and supportive.  Plan: Continue to monitor and follow POC. ARU ready, SW following for placement.

## 2021-12-04 NOTE — PROGRESS NOTES
[facility]  Neurology Progress Note    Patient Name:  Khalida Redding    MRN:  [id number]      :  1950  Date of Service:  2021  Primary care provider:  Abdirahman Munoz    Subjective:  No acute events overnight. Patient has no medical complaints this morning. She is reporting she wants to go home. We again discussed therapies recommendations for rehab upon discharge.     Patient remains medically ready for discharge.    ASSESSMENT/PLAN:  Khalida Redding is a 70yo woman with PMH of DM2, HTN, HLD, SVT, previous R EDWARD territory ischemic stroke with residual left sided weakness, seizures (previously not on AED's), major neurocognitive disorder, who presented to [facility] ED following a spell of altered awareness concerning for a seizure.    She was found to be hypertensive at [facility] ED (SBP 230s). She was treated for seizure with 2g Keppra load and 1g BID maintenance, but she did not clear appropriately and was transferred to [facility] for further cares. She still remained quite encephalopathic with labile blood pressures, so broader workup was started. EEG was done that was significant for intermittent brief seizures, that have since improved with addition of maintainence fosphenytoin and keppra. Her mental status has greatly improved since seizures have been better controlled. Unfortunately develops new right upper extremity weakness while inpatient, found to have acute ischemic stroke in the right frontal lobe.     PT/OT recommending ARU. Patient is medically ready for discharge.     Acute encephalopathy, due to subclinical seizure and post-ictal state, improving  - Neurochecks q4 hours  - Continue levetiracetam to 1.5 g BID  - Continue phenytoin 100 mg BID  - Seizure precautions  - SLP following: easy to chew with thin liquids  - PT/OT: updated to recommend ARU, greatly appreciate SW assistance     Acute ischemic stroke, left frontal lobe,  likely intracranial atherosclerosis vs emoblic  History of R EDWARD ischemic stroke, residual L-sided hemiparesis  - LDL : 93  - A1c : 5.4  - Echocardiogram : no cardioembolic source identified  - Continuous cardiac monitoring  - Start DAPT x 3 months for intracranial atherosclerosis on CTA, followed by monotherapy with  mg  - Increased atorvastatin to 40 mg daily, LDL goal < 70  - Stroke education completed on 12/1  - Ziopatch on discharge     Hypertensive emergency, resolved  Labile BP's  HLD  - SBP goal <180  - Lisinopril to 10 mg, consider increasing if needed  - PRN hydralazine and labetalol to maintain SBP < 180  - Atorvastatin 40 mg daily    Major neurocognitive disorder  - Hold PTA Namenda    Diabetes mellitus, type II  - Hold PTA metformin and glipizide while in hospital with lactic acidosis on arrival  - POC glucose  - Correctional dose insulin, low intensity  - Hypoglycemia protocol      Checklist:  FEN: easy to chew, thin liquids  LDA: PIV x2  PPx: lovenox   Code: FULL    Dispo: ARU, medically ready    Patient discussed with Attending Dr. Anderson.    Billie Molina MD  PGY-2 Neurology Resident    ______________________________  Physical Examination  Vitals: BP (!) 147/80 (BP Location: Right arm)   Pulse 87   Temp 97.5  F (36.4  C) (Axillary)   Resp 16   SpO2 97%     General: laying in bed, alert, no acute distress  HEENT: Normocephalic, atraumatic, no epistaxis, no oral lesions, MMM  Resp: no increased work of breathing, on room air  Cardio: regular rate  Abdomen: soft, non-tender  Extremities: Warm and well perfused, peripheral pulses present   Skin: no rashes or lesions  Neuro:  Mental status: Alert, answers person, place and month accurately. Follows simple commands. Soft but fluent speech.   Cranial nerves: Eyes conjugate, Pupils 3 - 4 mm and reactive, EOMI intact and tracks finger, visual fields full, face symmetric at rest and with activation, hearing intact to normal conversation. No  dysarthria. Shoulder shrug strong.    Motor: Tone normal. Able to move all extremities anti-gravity, however right upper and lower extremity with drift. 5/5 in left upper and lower both proximally and distally. Right upper 4/5 at deltoid, elbow flexion and extension, 3/5  strength. 4/5 hip flexion on right, dorsi flexion 4/5, plantar flexion 5/5.  Reflexes: Normoreflexic and symmetric biceps, brachioradialis, patellar, and achilles. Toes down-going.  Sensory: intact and symmetric to light touch in upper and lower extremities  Coordination: FNF intact bilaterally without dysmetria  Gait: deferred due to need for assistance x2      IMAGING:  CT head (11/26/21):   IMPRESSION:   1. Area of encephalomalacia in the anterior medial right frontal lobe  is stable since the prior CT examination.  2. Small vessel white matter ischemic changes and age-related atrophy  are again noted.  3. Shift of the interventricular septum to the left is again noted.  4. No acute intracranial hemorrhage or obvious acute intracranial  ischemic event is identified. CT can be insensitive for acute ischemic  events for up to 24 hours. Extension of the areas of chronic infarct  is difficult to exclude.    CTA head/neck (11/26/21):  IMPRESSION:  HEAD CTA:  1.  Severe focal left MCA M2 inferior division stenosis.  2.  Multifocal left EDWARD A2 and A3 segment stenoses.  3.  Diminished arborization and small caliber of right EDWARD and M2  branches in the region of right frontal encephalomalacia.   NECK CTA:  1.  Normal neck CTA  for age.  2.  No significant stenosis as per NASCET criteria.    CT head (11/29/21):  Impression:  1. No acute intracranial pathology.   2. Stable area of encephalomalacia in the anteromedial right frontal  lobe. Also multiple old bilateral basal ganglia and right thalamic  infarcts. Sequelae of chronic small vessel ischemic disease.     MRI (11/30/21):  Impression:   1. Small foci of acute infarction in the high left frontal  lobe  including involvement of the precentral gyrus.   2. Chronic right frontal encephalomalacia.       CURRENT MEDICATIONS:    aspirin  81 mg Oral Daily     atorvastatin  40 mg Oral Daily     clopidogrel  75 mg Oral Daily     enoxaparin ANTICOAGULANT  40 mg Subcutaneous Q24H     insulin aspart  1-4 Units Subcutaneous Q4H     levETIRAcetam  1,500 mg Oral BID     lisinopril  10 mg Oral Daily     melatonin  3 mg Oral At Bedtime     phenytoin  100 mg Oral BID     polyethylene glycol  17 g Oral Daily     sodium chloride (PF)  3 mL Intracatheter Q8H       PRN MEDICATIONS:  acetaminophen, glucose **OR** dextrose **OR** glucagon, hydrALAZINE, lidocaine 4%, lidocaine (buffered or not buffered), ondansetron **OR** ondansetron, prochlorperazine **OR** prochlorperazine **OR** prochlorperazine, senna-docusate **OR** senna-docusate, sodium chloride (PF)      ALLERGIES:  Allergies   Allergen Reactions     Amoxicillin Hives and Rash     Pt reports she has taken PCN without problems     Ampicillin Rash

## 2021-12-05 LAB
ANION GAP SERPL CALCULATED.3IONS-SCNC: 4 MMOL/L (ref 3–14)
BUN SERPL-MCNC: 19 MG/DL (ref 7–30)
CALCIUM SERPL-MCNC: 8.5 MG/DL (ref 8.5–10.1)
CHLORIDE BLD-SCNC: 109 MMOL/L (ref 94–109)
CO2 SERPL-SCNC: 28 MMOL/L (ref 20–32)
CREAT SERPL-MCNC: 0.54 MG/DL (ref 0.52–1.04)
GFR SERPL CREATININE-BSD FRML MDRD: >90 ML/MIN/1.73M2
GLUCOSE BLD-MCNC: 147 MG/DL (ref 70–99)
GLUCOSE BLDC GLUCOMTR-MCNC: 133 MG/DL (ref 70–99)
GLUCOSE BLDC GLUCOMTR-MCNC: 157 MG/DL (ref 70–99)
GLUCOSE BLDC GLUCOMTR-MCNC: 216 MG/DL (ref 70–99)
HOLD SPECIMEN: NORMAL
POTASSIUM BLD-SCNC: 3.8 MMOL/L (ref 3.4–5.3)
SODIUM SERPL-SCNC: 141 MMOL/L (ref 133–144)

## 2021-12-05 PROCEDURE — 250N000013 HC RX MED GY IP 250 OP 250 PS 637: Performed by: STUDENT IN AN ORGANIZED HEALTH CARE EDUCATION/TRAINING PROGRAM

## 2021-12-05 PROCEDURE — 99232 SBSQ HOSP IP/OBS MODERATE 35: CPT | Mod: GC | Performed by: PSYCHIATRY & NEUROLOGY

## 2021-12-05 PROCEDURE — 120N000002 HC R&B MED SURG/OB UMMC

## 2021-12-05 PROCEDURE — 36415 COLL VENOUS BLD VENIPUNCTURE: CPT | Performed by: PSYCHIATRY & NEUROLOGY

## 2021-12-05 PROCEDURE — 80048 BASIC METABOLIC PNL TOTAL CA: CPT | Performed by: PSYCHIATRY & NEUROLOGY

## 2021-12-05 PROCEDURE — 85049 AUTOMATED PLATELET COUNT: CPT | Performed by: STUDENT IN AN ORGANIZED HEALTH CARE EDUCATION/TRAINING PROGRAM

## 2021-12-05 PROCEDURE — 999N000128 HC STATISTIC PERIPHERAL IV START W/O US GUIDANCE

## 2021-12-05 PROCEDURE — 250N000011 HC RX IP 250 OP 636: Performed by: STUDENT IN AN ORGANIZED HEALTH CARE EDUCATION/TRAINING PROGRAM

## 2021-12-05 RX ORDER — LISINOPRIL 10 MG/1
10 TABLET ORAL ONCE
Status: COMPLETED | OUTPATIENT
Start: 2021-12-05 | End: 2021-12-05

## 2021-12-05 RX ORDER — LISINOPRIL 20 MG/1
20 TABLET ORAL DAILY
Status: DISCONTINUED | OUTPATIENT
Start: 2021-12-06 | End: 2021-12-09 | Stop reason: HOSPADM

## 2021-12-05 RX ADMIN — LEVETIRACETAM 1500 MG: 750 TABLET ORAL at 08:25

## 2021-12-05 RX ADMIN — PHENYTOIN 100 MG: 50 TABLET, CHEWABLE ORAL at 20:27

## 2021-12-05 RX ADMIN — LEVETIRACETAM 1500 MG: 750 TABLET ORAL at 20:27

## 2021-12-05 RX ADMIN — CLOPIDOGREL BISULFATE 75 MG: 75 TABLET ORAL at 08:26

## 2021-12-05 RX ADMIN — Medication 3 MG: at 20:27

## 2021-12-05 RX ADMIN — ASPIRIN 81 MG CHEWABLE TABLET 81 MG: 81 TABLET CHEWABLE at 08:25

## 2021-12-05 RX ADMIN — PHENYTOIN 100 MG: 50 TABLET, CHEWABLE ORAL at 08:25

## 2021-12-05 RX ADMIN — LISINOPRIL 10 MG: 10 TABLET ORAL at 08:26

## 2021-12-05 RX ADMIN — POLYETHYLENE GLYCOL 3350 17 G: 17 POWDER, FOR SOLUTION ORAL at 08:25

## 2021-12-05 RX ADMIN — LISINOPRIL 10 MG: 10 TABLET ORAL at 16:45

## 2021-12-05 RX ADMIN — ATORVASTATIN CALCIUM 40 MG: 40 TABLET, FILM COATED ORAL at 08:25

## 2021-12-05 RX ADMIN — ENOXAPARIN SODIUM 40 MG: 40 INJECTION SUBCUTANEOUS at 12:00

## 2021-12-05 ASSESSMENT — ACTIVITIES OF DAILY LIVING (ADL)
ADLS_ACUITY_SCORE: 20
ADLS_ACUITY_SCORE: 20
ADLS_ACUITY_SCORE: 21
ADLS_ACUITY_SCORE: 21
ADLS_ACUITY_SCORE: 20
ADLS_ACUITY_SCORE: 21
ADLS_ACUITY_SCORE: 20
ADLS_ACUITY_SCORE: 21
ADLS_ACUITY_SCORE: 20

## 2021-12-05 ASSESSMENT — VISUAL ACUITY
OU: NORMAL ACUITY
OU: NORMAL ACUITY

## 2021-12-05 NOTE — PROGRESS NOTES
[facility]  Neurology Progress Note    Patient Name:  Khalida Redding  MRN:  [id number]    :  1950  Date of Service:  2021  Date of Admission:  2021  Hospital Day: 10     Assessment & Plan    Khalida Redding is a 71yoF with PMH of R EDWARD territory infarction c/b seizure not on PTA AEDs, major neurocognitive d/o, T2DM, HTN, HLD, who was transferred from [facility] after presenting with acute encephalopathy and concern for seizure.    EEG on admission demonstrated intermittent seizure activity that have improved after initiation of levetiracetam and fosphenytoin. Brain MRI obtained during this hospitalization demonstrated acute ischemic stroke of the L frontal lobe.    #Acute encephalopathy, improving  #Subclinical seizures, resolved  -Neuro checks every 4 hours  -Levetiracetam 1.5g twice daily  -Phenytoin 100mg twice daily  -Seizure precautions  -EEG has been stopped  -PT/OT/SLP - recommend discharge to ARU    #Acute ischemic stroke of the left frontal lobe, etiology: intracranial atherosclerosis vs embolic  #History of R EDWARD territory stroke  #Residual L sided weakness  -LDL 93, Hgb A1c 5.4  -TTE 2021: LVEF 60-65%, no WMA, normal Atrai, negative bubble study.  -Cardiac montioring  -Asa 81mg and clopidogrel 75mg daily x 90 days, followed by asa 325mg daily thereafter  -Atorvastatin 40mg daily  -Stroke education  -Geopadon on discharge  -Secondary stroke prevention goals: LDL 40-70, Hgb A1c <7.0, BP goal normotension    #Hypertension  #HLD  -SBP goal <180  -Increase lisinopril to 20mg daily  -Atorvastatin 40mg daily    #Type II diabetes mellitus  -Hold PTA metformin and glipizide  -POC glucose  -Correctional dose insulin, low intensity  -Hypoglycemia protocol    #Major neurocognitive disorder  -Hold PTA Namenda    Lines/tubes/drains: PIVs.  FEN: Easy to chew diet with thin liquids, encourage PO intake.  Ppx: DVT - subcutaneous Lovenox; GI - not  indicated.  Code: Full.  Dispo: medically ready for discharge to ARU, placement pending.    The patient was seen and discussed with the neurology attending, Dr. Anderson.    Naida Sesay MD  Neurology PGY-3  12/05/2021 10:26 AM     Interval History/24-hour Events   No acute overnight events. She is ready to eat breakfast this morning. No new concerns.    She continues to have weakness on the right side, particularly the RUE.      Objective   Temp:  [97.3  F (36.3  C)-97.6  F (36.4  C)] 97.5  F (36.4  C)  Pulse:  [82-96] 95  Resp:  [16-18] 16  BP: (141-179)/(79-99) 149/86  SpO2:  [94 %-98 %] 95 %    No intake/output data recorded.    Physical Exam  General: Lying in bed, NAD  HEENT: Normocephalic, atraumatic. Sclera anicteric. Moist mucus membranes.  Cardiac: Extremities appear well-perfused.  Chest: Non-labored, no accessory muscle use.  Abdomen: Soft, non-distended, non-tender.  Extremities: Warm, no edema, pedal pulses palpable.  Skin: Warm, dry. No jaundice.     Neuro:  Mental status: Awake, alert, attentive. Oriented to self, location, month/year. She follows simple commands. Naming and repetition are intact.  Cranial nerves: Blinks to threat bilaterally. Pupils 2-3 mm, equal, round, reactive. Conjugate gaze. EOMI. Facial sensation intact to light touch in V1-V3 regions. Facial movements symmetric. Tongue protrudes without deviation, uvula midline. No dysarthria.   Motor: Normal tone. All extremities moving spontaneously, RUE drifts - 4/5 strength in the RUE. RLE is 4/5 at hip flexor, 5/5 dorsi- and plantar- flexion. LLE dorsiflexion is 3/5 - remaind of LLE and LUE strength is 5/5.    Reflexes: 2+ biceps, brachioradialis, patellar reflexes.  Sensory: Intact to light touch and vibration in the upper and lower extremities.  Coordination: FNF intact bilaterally without dysmetria.  Gait: Deferred.     Labs and Imaging   BMP  Recent Labs   Lab 12/05/21  0734 12/03/21  0651 12/02/21  0728 12/01/21  0645    139  142 144   POTASSIUM 3.8 3.5 3.6 3.7   CHLORIDE 109 107 107 112*   CO2 28 26 30 27   BUN 19 18 13 13   CR 0.54 0.56 0.57 0.48*   PINA 8.5 8.4* 8.2* 8.3*       CBC  Recent Labs   Lab 11/30/21  0712 11/29/21  0623   WBC 7.2 5.8   HGB 12.7 12.7    195     LIPIDS  Recent Labs   Lab Test 12/01/21  0645 11/30/21 2005   CHOL 146 163   HDL 33* 32*   LDL 93 96   TRIG 102 174*     IMAGING:   Personally reviewed. The radiologists interpretation is documented below.    Brain MRI 11/30/2021:  Impression:   1. Small foci of acute infarction in the high left frontal lobe including involvement of the precentral gyrus.   2. Chronic right frontal encephalomalacia.

## 2021-12-05 NOTE — PLAN OF CARE
Afebrile, VSS on RA.  Disoriented to time.  Slow speech.  RUE 3/5, AOE 4/5 strength.  Denies pain and nausea.  Lung sounds diminished.  Minced and moist diet.  1:1 supervision while eating.  Incontinent of urine, purewick in place with adequate urine output.  Up with lift and assist of 2.   working on placement.  Continue plan of care.

## 2021-12-05 NOTE — PROGRESS NOTES
Care Management Follow Up    Length of Stay (days): 9    Expected Discharge Date: 12/06/2021     Concerns to be Addressed: discharge planning     Patient plan of care discussed at interdisciplinary rounds: Yes    Anticipated Discharge Disposition: Transitional Care     Anticipated Discharge Services: None  Anticipated Discharge DME: None    Patient/family educated on Medicare website which has current facility and service quality ratings: yes  Education Provided on the Discharge Plan:    Patient/Family in Agreement with the Plan: yes    Referrals Placed by CM/SW:    Pending:  - [facility]   [phone] f: [phone]  SW left  requesting return call on referral.     - [facility] [phone] f: [phone]  SW left  requesting return call on referral.  Private pay costs discussed: Not applicable    Additional Information:  RODNEY reviewed EMR for discharge planning. SW followed up with pending referrals and awaits update from facilities. RODNEY will continue to follow for discharge planning.    Steven ECHOLS  Weekend RODNEY  Pager: [phone]  On Call Pager: [phone] 4pm - Midnight      ONESIMO Hong

## 2021-12-05 NOTE — PLAN OF CARE
Status: AMS and HTN crisis. Stroke vs seizure workup.  Vitals: VSS on RA; except hypertensive within parameters  Neuros: A&Ox4. Slow speech. RUE 3/5, AOE 4/5 strength. Denies N/T.   IV: PIV SL.  Labs/Electrolytes: WDL  Resp/trach: LS diminished upon ascultation.  Diet: #7 diet with thins with supervision.    Bowel status: LBM 12/1. PRN BM meds available  : Voiding with incontinence. Purewick in place.  Skin: No new deficits.   Pain: Denies.  Activity: Up with A2, lift. q2hr turns   Plan: ARU ready, SW following for placement

## 2021-12-06 ENCOUNTER — DOCUMENTATION ONLY (OUTPATIENT)
Dept: OTHER | Facility: CLINIC | Age: 71
End: 2021-12-06
Payer: MEDICAID

## 2021-12-06 ENCOUNTER — APPOINTMENT (OUTPATIENT)
Dept: OCCUPATIONAL THERAPY | Facility: CLINIC | Age: 71
DRG: 100 | End: 2021-12-06
Attending: PSYCHIATRY & NEUROLOGY
Payer: COMMERCIAL

## 2021-12-06 ENCOUNTER — APPOINTMENT (OUTPATIENT)
Dept: PHYSICAL THERAPY | Facility: CLINIC | Age: 71
DRG: 100 | End: 2021-12-06
Attending: PSYCHIATRY & NEUROLOGY
Payer: COMMERCIAL

## 2021-12-06 LAB
GLUCOSE BLDC GLUCOMTR-MCNC: 136 MG/DL (ref 70–99)
GLUCOSE BLDC GLUCOMTR-MCNC: 138 MG/DL (ref 70–99)
GLUCOSE BLDC GLUCOMTR-MCNC: 148 MG/DL (ref 70–99)
GLUCOSE BLDC GLUCOMTR-MCNC: 182 MG/DL (ref 70–99)
GLUCOSE BLDC GLUCOMTR-MCNC: 240 MG/DL (ref 70–99)
GLUCOSE BLDC GLUCOMTR-MCNC: 276 MG/DL (ref 70–99)
PLATELET # BLD AUTO: 259 10E3/UL (ref 150–450)

## 2021-12-06 PROCEDURE — 97535 SELF CARE MNGMENT TRAINING: CPT | Mod: GO

## 2021-12-06 PROCEDURE — 97530 THERAPEUTIC ACTIVITIES: CPT | Mod: GO

## 2021-12-06 PROCEDURE — 250N000013 HC RX MED GY IP 250 OP 250 PS 637: Performed by: STUDENT IN AN ORGANIZED HEALTH CARE EDUCATION/TRAINING PROGRAM

## 2021-12-06 PROCEDURE — 99233 SBSQ HOSP IP/OBS HIGH 50: CPT | Mod: GC | Performed by: STUDENT IN AN ORGANIZED HEALTH CARE EDUCATION/TRAINING PROGRAM

## 2021-12-06 PROCEDURE — 250N000012 HC RX MED GY IP 250 OP 636 PS 637: Performed by: STUDENT IN AN ORGANIZED HEALTH CARE EDUCATION/TRAINING PROGRAM

## 2021-12-06 PROCEDURE — 120N000002 HC R&B MED SURG/OB UMMC

## 2021-12-06 PROCEDURE — 250N000011 HC RX IP 250 OP 636: Performed by: STUDENT IN AN ORGANIZED HEALTH CARE EDUCATION/TRAINING PROGRAM

## 2021-12-06 PROCEDURE — 97530 THERAPEUTIC ACTIVITIES: CPT | Mod: GP

## 2021-12-06 RX ADMIN — PHENYTOIN 100 MG: 50 TABLET, CHEWABLE ORAL at 09:22

## 2021-12-06 RX ADMIN — PHENYTOIN 100 MG: 50 TABLET, CHEWABLE ORAL at 20:31

## 2021-12-06 RX ADMIN — LISINOPRIL 20 MG: 20 TABLET ORAL at 09:22

## 2021-12-06 RX ADMIN — LEVETIRACETAM 1500 MG: 750 TABLET ORAL at 09:23

## 2021-12-06 RX ADMIN — ASPIRIN 81 MG CHEWABLE TABLET 81 MG: 81 TABLET CHEWABLE at 09:41

## 2021-12-06 RX ADMIN — ACETAMINOPHEN 650 MG: 325 TABLET, FILM COATED ORAL at 20:30

## 2021-12-06 RX ADMIN — ENOXAPARIN SODIUM 40 MG: 40 INJECTION SUBCUTANEOUS at 11:00

## 2021-12-06 RX ADMIN — ATORVASTATIN CALCIUM 40 MG: 40 TABLET, FILM COATED ORAL at 09:23

## 2021-12-06 RX ADMIN — CLOPIDOGREL BISULFATE 75 MG: 75 TABLET ORAL at 09:23

## 2021-12-06 RX ADMIN — INSULIN ASPART 2 UNITS: 100 INJECTION, SOLUTION INTRAVENOUS; SUBCUTANEOUS at 14:57

## 2021-12-06 RX ADMIN — INSULIN ASPART 1 UNITS: 100 INJECTION, SOLUTION INTRAVENOUS; SUBCUTANEOUS at 18:47

## 2021-12-06 RX ADMIN — LEVETIRACETAM 1500 MG: 750 TABLET ORAL at 20:30

## 2021-12-06 RX ADMIN — Medication 3 MG: at 20:30

## 2021-12-06 ASSESSMENT — ACTIVITIES OF DAILY LIVING (ADL)
ADLS_ACUITY_SCORE: 24
ADLS_ACUITY_SCORE: 20
ADLS_ACUITY_SCORE: 24
ADLS_ACUITY_SCORE: 24
ADLS_ACUITY_SCORE: 22
ADLS_ACUITY_SCORE: 22
ADLS_ACUITY_SCORE: 24
ADLS_ACUITY_SCORE: 22
ADLS_ACUITY_SCORE: 24
ADLS_ACUITY_SCORE: 18
ADLS_ACUITY_SCORE: 20
ADLS_ACUITY_SCORE: 22
ADLS_ACUITY_SCORE: 22
ADLS_ACUITY_SCORE: 20
ADLS_ACUITY_SCORE: 22
ADLS_ACUITY_SCORE: 22
ADLS_ACUITY_SCORE: 24
ADLS_ACUITY_SCORE: 18
ADLS_ACUITY_SCORE: 22
ADLS_ACUITY_SCORE: 22
ADLS_ACUITY_SCORE: 24
ADLS_ACUITY_SCORE: 18
ADLS_ACUITY_SCORE: 24
ADLS_ACUITY_SCORE: 24

## 2021-12-06 ASSESSMENT — VISUAL ACUITY
OU: NORMAL ACUITY
OU: NORMAL ACUITY

## 2021-12-06 NOTE — PLAN OF CARE
Vitals: VSS on RA, except HTN within parameters. CCM.  Neuros: Alert and oriented x3, disoriented to time. RUE 3/5, AOE 4/5. Slow speech.   IV: PIV SL'd  Resp/trach: WNL on RA  Diet: #7 diet with thins, fair intake, needs supervision.  Bowel status: Bs+x4, small BM this shift. Refused prn bowel meds.  : Voiding, incontinent. Purewick in place.  Pain: Denied.  Activity: Up with lift, turn/repo Q2h. Declined to get into chair this evening.  Social: No visitors this shift.   Plan: Continue to monitor and follow POC. ARU ready, SW following for placement

## 2021-12-06 NOTE — PLAN OF CARE
Vitals: VSS on RA, except HTN within parameters. CCM.  Neuros: Alert and oriented x3, disoriented to time. RUE 3/5, AOE 4/5. Slow speech.   IV: PIV SL'd  Resp/trach: WNL on RA  Diet: #7 diet with thins, fair intake, needs supervision.  Bowel status: Bs+x4, last BM 12/5  : Voiding, incontinent-purewick in place.  Pain: Denied.  Activity: Up with lift, turn/repo Q2h.  Social: No visitors this shift.   Plan: Continue to monitor and follow POC. ARU ready, SW following for placement

## 2021-12-06 NOTE — PROGRESS NOTES
"Care Management Follow Up    Length of Stay (days): 10    Expected Discharge Date:  TBD - pending placement      Concerns to be Addressed: discharge planning     Patient plan of care discussed at interdisciplinary rounds: Yes     Anticipated Discharge Disposition: ARU      Anticipated Discharge Services: ARU  Anticipated Discharge DME: NA     Patient/family educated on Medicare website which has current facility and service quality ratings: yes  Education Provided on the Discharge Plan:  yes  Patient/Family in Agreement with the Plan: yes     Referrals Placed by CM/SW:   Pending:  - [facility]  Catrina Casanovaison [phone] f: [phone]  Reviewing referral, SW sent updated notes as requested.     Declined:  - [facility] *[id number]  Does not meet criteria     - [facility]   [phone] f: [phone]  Not taking outside admissions.      - [facility]   [phone] f: [phone]  Not taking outside admissions      -[facility] - [facility] Admissions   [phone]  Not taking outside admissions.      - [facility]  [phone] f: [phone]   Per Fany in admissions, more appropriate for TCU. [place] does not have any beds available until mid-next week.     - [facility]  [phone] f: [phone]  Per admissions, pt not appropriate for ARU.    - [facility]   [phone] f: [phone]  Declined due to dementia dx.         Private pay costs discussed: Not applicable     Additional Information:  RODNEY spoke with Marilyn Casanova. Per Jocelyne, due to pt having a dx of dementia in her chart the facility is requesting a \"cog screen.\" RODNEY informed Jocelyne this RODNEY has had multiple conversation with pt and she is A&O x3. Jocelyne still requesting this.  RODNEY paged OT with request.    Addend 1100: RODNEY faxed SLUMS score to Jocelyne.    Addend 1400: Per Jocelyne, facility is requesting that pt have a HCD prior to admittance. Facility is also stating pt will need to be eating " independently. RODNEY discussed with RN who will provide documentation regarding eating.   SW provided update to Laura.   RODNEY met with pt at bedside. SW provided HCD and education on document. Pt agreeable to completing and stated she would want Laura and Yeyo as her decision makers. RODNEY arranged notary for today at 330.    Addend 1530: SW was present for HCD notary. Pt signed HCD and RODNEY sent to [facility] to be notarized and placed in chart. Once HCD is notarized, RODNEY will send to [facility] [place].      MELISSA Mirza, LGSW  6D Adult Acute Care SW  Ph:[phone]  Pager [phone]

## 2021-12-06 NOTE — PROGRESS NOTES
[facility]  Neurology Progress Note    Patient Name:  Khalida Redding  MRN:  [id number]    :  1950  Date of Service:  2021  Date of Admission:  2021  Hospital Day: 11     Assessment & Plan    Khalida Redding is a 71yoF with PMH of R EDWARD territory infarction c/b seizure not on PTA AEDs, major neurocognitive d/o, T2DM, HTN, HLD, who was transferred from [facility] after presenting with acute encephalopathy and concern for seizure.    EEG on admission demonstrated intermittent seizure activity that have improved after initiation of levetiracetam and fosphenytoin. Brain MRI obtained during this hospitalization demonstrated acute ischemic stroke of the L frontal lobe.    #Acute encephalopathy, improving  #Subclinical seizures, resolved  -Neuro checks every 4 hours  -Levetiracetam 1.5g twice daily  -Phenytoin 100mg twice daily  -Re-check phenytoin level tomorrow  -Seizure precautions  -EEG has been stopped  -PT/OT/SLP - recommend discharge to ARU, SW working on placement, appreciate assistance     #Acute ischemic stroke of the left frontal lobe, etiology: intracranial atherosclerosis vs embolic  #History of R EDWARD territory stroke  #Residual L sided weakness  -LDL 93, Hgb A1c 5.4  -TTE 2021: LVEF 60-65%, no WMA, normal Atrai, negative bubble study.  -Cardiac montioring  -Asa 81mg and clopidogrel 75mg daily x 90 days, followed by asa 325mg daily thereafter  -Atorvastatin 40mg daily  -Stroke education  -Ziopatch on discharge  -Secondary stroke prevention goals: LDL 40-70, Hgb A1c <7.0, BP goal normotension    #Hypertension  #HLD  -SBP goal <180  -Lisinopril to 20mg daily  -Hydralazine PO prn for SBP > 180  -Atorvastatin 40mg daily    #Type II diabetes mellitus  -Hold PTA metformin and glipizide  -POC glucose  -Correctional dose insulin, low intensity  -Hypoglycemia protocol    #Major neurocognitive disorder  -Hold PTA Namenda    Lines/tubes/drains:  PIVs.  FEN: Easy to chew diet with thin liquids, encourage PO intake.  Ppx: DVT - subcutaneous Lovenox; GI - not indicated.  Code: Full.  Dispo: medically ready for discharge to ARU, placement pending.    The patient was seen and discussed with the neurology attending, Dr. Anderson.    Billie Molina MD  Neurology PGY-2     Interval History/24-hour Events   No acute overnight events. Patient asking to go home. Discussed recommendations for rehab.      Objective   Temp:  [96.4  F (35.8  C)-99  F (37.2  C)] 97.5  F (36.4  C)  Pulse:  [] 85  Resp:  [14-16] 14  BP: (124-158)/() 124/78  SpO2:  [94 %-97 %] 96 %    I/O last 3 completed shifts:  In: -   Out: 400 [Urine:400]    Physical Exam  General: Lying in bed, NAD  HEENT: Normocephalic, atraumatic. Sclera anicteric. Moist mucus membranes.  Cardiac: Extremities appear well-perfused.  Chest: Non-labored, no accessory muscle use.  Abdomen: Soft, non-distended, non-tender.  Extremities: Warm, no edema, pedal pulses palpable.  Skin: Warm, dry. No jaundice.     Neuro:  Mental status: Awake, alert, attentive. Oriented to self, location, month/year. She follows simple commands. Naming and repetition are intact.  Cranial nerves: Blinks to threat bilaterally. Pupils 2-3 mm, equal, round, reactive. Conjugate gaze. EOMI. Facial sensation intact to light touch in V1-V3 regions. Facial movements symmetric. Tongue protrudes without deviation, uvula midline. No dysarthria.   Motor: Normal tone. All extremities moving spontaneously, RUE drifts - 4/5 strength in the RUE. RLE is 4/5 at hip flexor, 5/5 dorsi- and plantar- flexion. LLE dorsiflexion is 3/5 - remaind of LLE and LUE strength is 5/5.    Reflexes: 2+ biceps, brachioradialis, patellar reflexes.  Sensory: Intact to light touch and vibration in the upper and lower extremities.  Coordination: FNF intact bilaterally without dysmetria.  Gait: Deferred.     Labs and Imaging   BMP  Recent Labs   Lab 12/05/21  1565  12/03/21  0651 12/02/21  0728 12/01/21  0645    139 142 144   POTASSIUM 3.8 3.5 3.6 3.7   CHLORIDE 109 107 107 112*   CO2 28 26 30 27   BUN 19 18 13 13   CR 0.54 0.56 0.57 0.48*   PINA 8.5 8.4* 8.2* 8.3*       CBC  Recent Labs   Lab 11/30/21  0712   WBC 7.2   HGB 12.7        LIPIDS  Recent Labs   Lab Test 12/01/21  0645 11/30/21 2005   CHOL 146 163   HDL 33* 32*   LDL 93 96   TRIG 102 174*     IMAGING:   Personally reviewed. The radiologists interpretation is documented below.    Brain MRI 11/30/2021:  Impression:   1. Small foci of acute infarction in the high left frontal lobe including involvement of the precentral gyrus.   2. Chronic right frontal encephalomalacia.

## 2021-12-07 ENCOUNTER — APPOINTMENT (OUTPATIENT)
Dept: SPEECH THERAPY | Facility: CLINIC | Age: 71
DRG: 100 | End: 2021-12-07
Attending: PSYCHIATRY & NEUROLOGY
Payer: COMMERCIAL

## 2021-12-07 ENCOUNTER — APPOINTMENT (OUTPATIENT)
Dept: OCCUPATIONAL THERAPY | Facility: CLINIC | Age: 71
DRG: 100 | End: 2021-12-07
Attending: PSYCHIATRY & NEUROLOGY
Payer: COMMERCIAL

## 2021-12-07 ENCOUNTER — APPOINTMENT (OUTPATIENT)
Dept: PHYSICAL THERAPY | Facility: CLINIC | Age: 71
DRG: 100 | End: 2021-12-07
Attending: PSYCHIATRY & NEUROLOGY
Payer: COMMERCIAL

## 2021-12-07 LAB
ALBUMIN SERPL-MCNC: 3.1 G/DL (ref 3.4–5)
ALP SERPL-CCNC: 114 U/L (ref 40–150)
ALT SERPL W P-5'-P-CCNC: 35 U/L (ref 0–50)
ANION GAP SERPL CALCULATED.3IONS-SCNC: 8 MMOL/L (ref 3–14)
AST SERPL W P-5'-P-CCNC: 26 U/L (ref 0–45)
BILIRUB SERPL-MCNC: 0.5 MG/DL (ref 0.2–1.3)
BUN SERPL-MCNC: 17 MG/DL (ref 7–30)
CALCIUM SERPL-MCNC: 8.7 MG/DL (ref 8.5–10.1)
CHLORIDE BLD-SCNC: 106 MMOL/L (ref 94–109)
CO2 SERPL-SCNC: 25 MMOL/L (ref 20–32)
CREAT SERPL-MCNC: 0.46 MG/DL (ref 0.52–1.04)
ERYTHROCYTE [DISTWIDTH] IN BLOOD BY AUTOMATED COUNT: 13.8 % (ref 10–15)
GFR SERPL CREATININE-BSD FRML MDRD: >90 ML/MIN/1.73M2
GLUCOSE BLD-MCNC: 189 MG/DL (ref 70–99)
GLUCOSE BLDC GLUCOMTR-MCNC: 145 MG/DL (ref 70–99)
GLUCOSE BLDC GLUCOMTR-MCNC: 157 MG/DL (ref 70–99)
GLUCOSE BLDC GLUCOMTR-MCNC: 160 MG/DL (ref 70–99)
GLUCOSE BLDC GLUCOMTR-MCNC: 202 MG/DL (ref 70–99)
GLUCOSE BLDC GLUCOMTR-MCNC: 247 MG/DL (ref 70–99)
HCT VFR BLD AUTO: 43.9 % (ref 35–47)
HGB BLD-MCNC: 14.4 G/DL (ref 11.7–15.7)
MCH RBC QN AUTO: 30.3 PG (ref 26.5–33)
MCHC RBC AUTO-ENTMCNC: 32.8 G/DL (ref 31.5–36.5)
MCV RBC AUTO: 92 FL (ref 78–100)
PHENYTOIN SERPL-MCNC: 21.2 MG/L
PLATELET # BLD AUTO: 293 10E3/UL (ref 150–450)
POTASSIUM BLD-SCNC: 4.3 MMOL/L (ref 3.4–5.3)
PROT SERPL-MCNC: 6.6 G/DL (ref 6.8–8.8)
RBC # BLD AUTO: 4.75 10E6/UL (ref 3.8–5.2)
SODIUM SERPL-SCNC: 139 MMOL/L (ref 133–144)
WBC # BLD AUTO: 10.2 10E3/UL (ref 4–11)

## 2021-12-07 PROCEDURE — 120N000002 HC R&B MED SURG/OB UMMC

## 2021-12-07 PROCEDURE — 250N000013 HC RX MED GY IP 250 OP 250 PS 637: Performed by: STUDENT IN AN ORGANIZED HEALTH CARE EDUCATION/TRAINING PROGRAM

## 2021-12-07 PROCEDURE — 85027 COMPLETE CBC AUTOMATED: CPT | Performed by: STUDENT IN AN ORGANIZED HEALTH CARE EDUCATION/TRAINING PROGRAM

## 2021-12-07 PROCEDURE — 80185 ASSAY OF PHENYTOIN TOTAL: CPT | Performed by: STUDENT IN AN ORGANIZED HEALTH CARE EDUCATION/TRAINING PROGRAM

## 2021-12-07 PROCEDURE — 36415 COLL VENOUS BLD VENIPUNCTURE: CPT | Performed by: STUDENT IN AN ORGANIZED HEALTH CARE EDUCATION/TRAINING PROGRAM

## 2021-12-07 PROCEDURE — 99233 SBSQ HOSP IP/OBS HIGH 50: CPT | Mod: GC | Performed by: STUDENT IN AN ORGANIZED HEALTH CARE EDUCATION/TRAINING PROGRAM

## 2021-12-07 PROCEDURE — 92526 ORAL FUNCTION THERAPY: CPT | Mod: GN | Performed by: SPEECH-LANGUAGE PATHOLOGIST

## 2021-12-07 PROCEDURE — 97535 SELF CARE MNGMENT TRAINING: CPT | Mod: GO

## 2021-12-07 PROCEDURE — 80053 COMPREHEN METABOLIC PANEL: CPT | Performed by: STUDENT IN AN ORGANIZED HEALTH CARE EDUCATION/TRAINING PROGRAM

## 2021-12-07 PROCEDURE — 250N000011 HC RX IP 250 OP 636: Performed by: STUDENT IN AN ORGANIZED HEALTH CARE EDUCATION/TRAINING PROGRAM

## 2021-12-07 RX ORDER — BISACODYL 10 MG
10 SUPPOSITORY, RECTAL RECTAL DAILY PRN
Status: DISCONTINUED | OUTPATIENT
Start: 2021-12-07 | End: 2021-12-09 | Stop reason: HOSPADM

## 2021-12-07 RX ORDER — MAGNESIUM CARB/ALUMINUM HYDROX 105-160MG
296 TABLET,CHEWABLE ORAL ONCE
Status: COMPLETED | OUTPATIENT
Start: 2021-12-07 | End: 2021-12-07

## 2021-12-07 RX ADMIN — INSULIN ASPART 2 UNITS: 100 INJECTION, SOLUTION INTRAVENOUS; SUBCUTANEOUS at 12:47

## 2021-12-07 RX ADMIN — ENOXAPARIN SODIUM 40 MG: 40 INJECTION SUBCUTANEOUS at 11:25

## 2021-12-07 RX ADMIN — PHENYTOIN 100 MG: 50 TABLET, CHEWABLE ORAL at 08:38

## 2021-12-07 RX ADMIN — ASPIRIN 81 MG CHEWABLE TABLET 81 MG: 81 TABLET CHEWABLE at 08:37

## 2021-12-07 RX ADMIN — PHENYTOIN 100 MG: 50 TABLET, CHEWABLE ORAL at 21:30

## 2021-12-07 RX ADMIN — CLOPIDOGREL BISULFATE 75 MG: 75 TABLET ORAL at 08:37

## 2021-12-07 RX ADMIN — MAGNESIUM CITRATE 296 ML: 1.75 LIQUID ORAL at 06:53

## 2021-12-07 RX ADMIN — POLYETHYLENE GLYCOL 3350 17 G: 17 POWDER, FOR SOLUTION ORAL at 08:38

## 2021-12-07 RX ADMIN — Medication 3 MG: at 21:30

## 2021-12-07 RX ADMIN — ATORVASTATIN CALCIUM 40 MG: 40 TABLET, FILM COATED ORAL at 08:37

## 2021-12-07 RX ADMIN — INSULIN ASPART 1 UNITS: 100 INJECTION, SOLUTION INTRAVENOUS; SUBCUTANEOUS at 08:35

## 2021-12-07 RX ADMIN — LEVETIRACETAM 1500 MG: 750 TABLET ORAL at 21:30

## 2021-12-07 RX ADMIN — LEVETIRACETAM 1500 MG: 750 TABLET ORAL at 08:37

## 2021-12-07 RX ADMIN — LISINOPRIL 20 MG: 20 TABLET ORAL at 08:37

## 2021-12-07 RX ADMIN — INSULIN ASPART 1 UNITS: 100 INJECTION, SOLUTION INTRAVENOUS; SUBCUTANEOUS at 17:13

## 2021-12-07 ASSESSMENT — ACTIVITIES OF DAILY LIVING (ADL)
ADLS_ACUITY_SCORE: 22
ADLS_ACUITY_SCORE: 18
ADLS_ACUITY_SCORE: 22
ADLS_ACUITY_SCORE: 18
ADLS_ACUITY_SCORE: 22
ADLS_ACUITY_SCORE: 18
ADLS_ACUITY_SCORE: 22
ADLS_ACUITY_SCORE: 18
ADLS_ACUITY_SCORE: 22
ADLS_ACUITY_SCORE: 22
ADLS_ACUITY_SCORE: 18
ADLS_ACUITY_SCORE: 18
ADLS_ACUITY_SCORE: 22

## 2021-12-07 NOTE — PLAN OF CARE
Status: AMS and HTN crisis. Stroke vs seizure workup.  Vitals: VSS, RA. CCM.  Neuros: Slow speech, clearer. D/o to exact date consistently. RUE 3/5, all other extremities 4/5 strength. Denies N/T.   IV: PIV SL.   Labs/Electrolytes: BG checks before meals and at bedtime.   Resp/trach: LS CTA.   Diet: #7 diet with thin liquids. Requires assistance with feeding when using silverware. Requires set up.   Bowel status: LBM 12/5. Attempted x2 today with bedpan and commode.   : Voiding with intermittent incontinence.   Skin: Blanchable redness on coccyx.   Pain: Denies.  Activity: Up with heavy 2, GB to pivot. Requiring lift at times d/t fatiguing easily.   Social: Daughter called unit for update.   Plan: ARU recommended at discharge.

## 2021-12-07 NOTE — PROGRESS NOTES
[facility]  Neurology Progress Note    Patient Name:  Khalida Redding  MRN:  [id number]    :  1950  Date of Service:  2021  Date of Admission:  2021  Hospital Day: 12     Assessment & Plan    Khalida Redding is a 71yoF with PMH of R EDWARD territory infarction c/b seizure not on PTA AEDs, major neurocognitive d/o, T2DM, HTN, HLD, who was transferred from [facility] after presenting with acute encephalopathy and concern for seizure.    EEG on admission demonstrated intermittent seizure activity that have improved after initiation of levetiracetam and fosphenytoin. Brain MRI obtained during this hospitalization demonstrated acute ischemic stroke of the L frontal lobe.    Patient is medically stable for discharge, currently waiting on placement.     #Acute encephalopathy, resolved  #Subclinical seizures, resolved  -Neuro checks every 4 hours  -Levetiracetam 1.5g twice daily  -Phenytoin 100mg twice daily  -Seizure precautions  -EEG has been stopped  -PT/OT/SLP - recommend discharge to ARU, SW working on placement, appreciate assistance   -Follow up with neurology in     #Acute ischemic stroke of the left frontal lobe, etiology: intracranial atherosclerosis vs embolic  #History of R EDWARD territory stroke  #Residual L sided weakness  -LDL 93, Hgb A1c 5.4  -TTE 2021: LVEF 60-65%, no WMA, normal Atria, negative bubble study.  -Cardiac montioring  -Asa 81mg and clopidogrel 75mg daily x 90 days, followed by asa 325mg daily thereafter  -Atorvastatin 40mg daily  -Stroke education  -[place]iop[place] on discharge  -Secondary stroke prevention goals: LDL 40-70, Hgb A1c <7.0, BP goal normotension    #Hypertension  #HLD  -SBP goal <180  -Lisinopril to 20mg daily  -Hydralazine PO prn for SBP > 180  -Atorvastatin 40mg daily    #Type II diabetes mellitus  -Hold PTA metformin and glipizide  -POC glucose  -Correctional dose insulin, low intensity  -Hypoglycemia  protocol    #Major neurocognitive disorder  -Hold PTA Namenda    Lines/tubes/drains: PIVs.  FEN: Easy to chew diet with thin liquids, encourage PO intake.  Ppx: DVT - subcutaneous Lovenox; GI - not indicated.  Code: Full.  Dispo: medically ready for discharge to ARU, placement pending.    The patient was seen and discussed with the neurology attending, Dr. Hemphill.    Billie Molina MD  Neurology PGY-2     Interval History/24-hour Events   No acute events overnight. Patient notes feeling constipated and requesting suppository.      Objective   Temp:  [97.5  F (36.4  C)-98.6  F (37  C)] 97.5  F (36.4  C)  Pulse:  [] 91  Resp:  [16-18] 17  BP: (112-170)/() 143/73  SpO2:  [93 %-100 %] 100 %    No intake/output data recorded.    Physical Exam  General: Lying in bed, NAD  HEENT: Normocephalic, atraumatic. Sclera anicteric. Moist mucus membranes.  Cardiac: Extremities appear well-perfused.  Chest: Non-labored, no accessory muscle use.  Abdomen: Soft, non-distended, non-tender.  Extremities: Warm, no edema, pedal pulses palpable.  Skin: Warm, dry. No jaundice.     Neuro:  Mental status: Awake, alert, attentive. Oriented to self, location, month/year. She follows simple commands. Naming and repetition are intact.  Cranial nerves: Blinks to threat bilaterally. Pupils 3 mm, equal, round, reactive. Conjugate gaze. EOMI. Facial sensation intact to light touch in V1-V3 regions. Facial movements symmetric. Tongue protrudes without deviation, uvula midline. No dysarthria.   Motor: Normal tone. All extremities moving spontaneously, RUE drifts - 4/5 strength in the RUE. RLE is 4/5 at hip flexor, 5/5 dorsi- and plantar- flexion. LLE dorsiflexion is 3/5 - remaind of LLE and LUE strength is 5/5.    Reflexes: 2+ biceps, brachioradialis, patellar reflexes.  Sensory: Intact to light touch and vibration in the upper and lower extremities.  Coordination: FNF intact bilaterally without dysmetria.  Gait: Deferred.     Labs and  Imaging   BMP  Recent Labs   Lab 12/07/21  0732 12/05/21  0734 12/03/21  0651 12/02/21  0728    141 139 142   POTASSIUM 4.3 3.8 3.5 3.6   CHLORIDE 106 109 107 107   CO2 25 28 26 30   BUN 17 19 18 13   CR 0.46* 0.54 0.56 0.57   PINA 8.7 8.5 8.4* 8.2*       CBC  Recent Labs   Lab 12/07/21  0732 12/05/21  0734   WBC 10.2  --    HGB 14.4  --     259     LIPIDS  Recent Labs   Lab Test 12/01/21  0645 11/30/21  2005   CHOL 146 163   HDL 33* 32*   LDL 93 96   TRIG 102 174*     IMAGING:   Personally reviewed. The radiologists interpretation is documented below.    Brain MRI 11/30/2021:  Impression:   1. Small foci of acute infarction in the high left frontal lobe including involvement of the precentral gyrus.   2. Chronic right frontal encephalomalacia.

## 2021-12-07 NOTE — PROGRESS NOTES
"Care Management Follow Up    Length of Stay (days): 11    Expected Discharge Date:  TBD - pending placement      Concerns to be Addressed: discharge planning     Patient plan of care discussed at interdisciplinary rounds: Yes     Anticipated Discharge Disposition: ARU      Anticipated Discharge Services: ARU  Anticipated Discharge DME: NA     Patient/family educated on Medicare website which has current facility and service quality ratings: yes  Education Provided on the Discharge Plan:  yes  Patient/Family in Agreement with the Plan: yes     Referrals Placed by CM/SW:   Pending:  - [facility]  Jocelyne, Liaison [phone] f: [phone]  Reviewing referral, RODNEY sent updated notes as requested.     Declined:  - [facility] *[id number]  Does not meet criteria     - [facility]   [phone] f: [phone]  Not taking outside admissions.      - [facility]   [phone] f: [phone]  Not taking outside admissions      -[facility] ARU - [facility] Admissions   [phone]  Not taking outside admissions.      - [facility]  [phone] f: [phone]   Per Fany in admissions, more appropriate for TCU. [place] does not have any beds available until mid-next week.     - [facility]  [phone] f: [phone]  Per admissions, pt not appropriate for ARU.     - [facility]   [phone] f: [phone]  Declined due to dementia dx.         Private pay costs discussed: Not applicable     Additional Information:  RODNEY faxed [facility] care directive that was completed yesterday. RODNEY then sent secure email to Jocelyne updating her this was sent and requesting update on status of acceptance.    Addend 900: RODNEY received update that [facility] \"looks good\" however stated pt cannot be a 1:1 feed for admittance. RODNEY sent updated RN note from yesterday regarding how pt is eating for review.    Addend 1300: RODNEY sent secure email to Jocelyne requesting update. Per Jocelyne, she is awaiting to hear from " facility.    MELISSA Mirza, [facility]  6D Adult Acute Care   Ph:[phone]  Pager [phone]

## 2021-12-07 NOTE — PLAN OF CARE
Status: Pt admitted for AMS and HTN crisis. Stroke vs seizure workup.   Vitals: VSS on RA ex HTN within parameters. CCM.   Neuros: Alert/lethargic. Disoriented to date consistently, knows it is December, but not what day or year. Slow, clear speech. RUE 3/5, AOE 4/5. Denies N/T.   IV: PIV SL.   Labs/Electrolytes: BG checks with meals and before bed.  Resp/trach: LS clear. Denies SOB.  Diet: Level 7 with thins and 1:1 supervision. Poor PO intake.  Bowel status: BS+. Multiple small/smear BMs on commode and bedpan today. Pt having hard, formed stools.  : Voids with incontinence. Refuses Purewick at times.  Skin: Blanchable redness on coccyx, barrier cream applied.  Pain: C/o pain around rectum d/t hard stools. Offloading with pillows utilized, effective.  Activity: Heavy assist of two, GB, pivot or assist of two and lift. Up to commode.   Social:  at bedside.   Plan: ARU vs TCU at discharge. Continue to monitor and follow plan of care.  Updates this shift: Pt worked with PT, OT, and Speech.

## 2021-12-07 NOTE — PLAN OF CARE
Admitted for AMS, seizures, CVA. Hx right EDWARD ischemic stroke with residual left sided weakness. Neuros: d/o time. RUE 3/5, all other extremities 4/5. Slow garbles speech. VSS on RA. CCM NSR. Tylenol  for generalized pain. T/R q2h. Incontinent of urine, refuses purwick. Multiple small BM overnight, patient reports rectal pain, fecal burden observed at anus, MD notified for further BM medications. Level 7 diet with 1:1 assistance. Up 2/lift. Plan discharge to TCU vs ARU when ready.

## 2021-12-08 ENCOUNTER — APPOINTMENT (OUTPATIENT)
Dept: PHYSICAL THERAPY | Facility: CLINIC | Age: 71
DRG: 100 | End: 2021-12-08
Attending: PSYCHIATRY & NEUROLOGY
Payer: COMMERCIAL

## 2021-12-08 ENCOUNTER — APPOINTMENT (OUTPATIENT)
Dept: SPEECH THERAPY | Facility: CLINIC | Age: 71
DRG: 100 | End: 2021-12-08
Attending: PSYCHIATRY & NEUROLOGY
Payer: COMMERCIAL

## 2021-12-08 LAB
GLUCOSE BLDC GLUCOMTR-MCNC: 135 MG/DL (ref 70–99)
GLUCOSE BLDC GLUCOMTR-MCNC: 181 MG/DL (ref 70–99)
GLUCOSE BLDC GLUCOMTR-MCNC: 185 MG/DL (ref 70–99)
GLUCOSE BLDC GLUCOMTR-MCNC: 208 MG/DL (ref 70–99)

## 2021-12-08 PROCEDURE — 120N000002 HC R&B MED SURG/OB UMMC

## 2021-12-08 PROCEDURE — 250N000011 HC RX IP 250 OP 636: Performed by: STUDENT IN AN ORGANIZED HEALTH CARE EDUCATION/TRAINING PROGRAM

## 2021-12-08 PROCEDURE — 92526 ORAL FUNCTION THERAPY: CPT | Mod: GN | Performed by: SPEECH-LANGUAGE PATHOLOGIST

## 2021-12-08 PROCEDURE — 250N000013 HC RX MED GY IP 250 OP 250 PS 637: Performed by: STUDENT IN AN ORGANIZED HEALTH CARE EDUCATION/TRAINING PROGRAM

## 2021-12-08 PROCEDURE — 97112 NEUROMUSCULAR REEDUCATION: CPT | Mod: GP

## 2021-12-08 PROCEDURE — 99233 SBSQ HOSP IP/OBS HIGH 50: CPT | Mod: GC | Performed by: STUDENT IN AN ORGANIZED HEALTH CARE EDUCATION/TRAINING PROGRAM

## 2021-12-08 PROCEDURE — 999N000128 HC STATISTIC PERIPHERAL IV START W/O US GUIDANCE

## 2021-12-08 PROCEDURE — 97530 THERAPEUTIC ACTIVITIES: CPT | Mod: GP

## 2021-12-08 RX ADMIN — CLOPIDOGREL BISULFATE 75 MG: 75 TABLET ORAL at 09:32

## 2021-12-08 RX ADMIN — ASPIRIN 81 MG CHEWABLE TABLET 81 MG: 81 TABLET CHEWABLE at 09:33

## 2021-12-08 RX ADMIN — ENOXAPARIN SODIUM 40 MG: 40 INJECTION SUBCUTANEOUS at 11:47

## 2021-12-08 RX ADMIN — LISINOPRIL 20 MG: 20 TABLET ORAL at 09:33

## 2021-12-08 RX ADMIN — INSULIN ASPART 1 UNITS: 100 INJECTION, SOLUTION INTRAVENOUS; SUBCUTANEOUS at 11:47

## 2021-12-08 RX ADMIN — LEVETIRACETAM 1500 MG: 750 TABLET ORAL at 19:50

## 2021-12-08 RX ADMIN — Medication 3 MG: at 21:30

## 2021-12-08 RX ADMIN — POLYETHYLENE GLYCOL 3350 17 G: 17 POWDER, FOR SOLUTION ORAL at 08:55

## 2021-12-08 RX ADMIN — LEVETIRACETAM 1500 MG: 750 TABLET ORAL at 09:32

## 2021-12-08 RX ADMIN — ATORVASTATIN CALCIUM 40 MG: 40 TABLET, FILM COATED ORAL at 09:32

## 2021-12-08 RX ADMIN — INSULIN ASPART 1 UNITS: 100 INJECTION, SOLUTION INTRAVENOUS; SUBCUTANEOUS at 17:18

## 2021-12-08 RX ADMIN — PHENYTOIN 100 MG: 50 TABLET, CHEWABLE ORAL at 19:50

## 2021-12-08 RX ADMIN — PHENYTOIN 100 MG: 50 TABLET, CHEWABLE ORAL at 09:32

## 2021-12-08 ASSESSMENT — ACTIVITIES OF DAILY LIVING (ADL)
ADLS_ACUITY_SCORE: 22
ADLS_ACUITY_SCORE: 22
ADLS_ACUITY_SCORE: 19
ADLS_ACUITY_SCORE: 22
ADLS_ACUITY_SCORE: 22
ADLS_ACUITY_SCORE: 19
ADLS_ACUITY_SCORE: 22
ADLS_ACUITY_SCORE: 19
ADLS_ACUITY_SCORE: 22
ADLS_ACUITY_SCORE: 22
ADLS_ACUITY_SCORE: 19
ADLS_ACUITY_SCORE: 19
ADLS_ACUITY_SCORE: 22
ADLS_ACUITY_SCORE: 19

## 2021-12-08 NOTE — PLAN OF CARE
Status: AMS and HTN crisis. Stroke vs seizure workup.  Vitals: VSS, RA. CCM.  Neuros: Slow speech. D/o to exact date consistently. Intermittent lethargy. RUE 3/5, all other extremities 4/5 strength. Denies N/T.   IV: PIV SL.   Labs/Electrolytes: BG checks before meals and at bedtime.    Resp/trach: LS CTA.   Diet: #7 diet with thin liquids. Requires assistance with feeding when using silverware. Requires set up.   Bowel status: BM on bedside commode this evening.   : Voiding with intermittent incontinence. Declining purewick intermittently.  Skin: Blanchable redness on coccyx.   Pain: Denies.  Activity: Up with heavy 2, GB to pivot. Requiring lift at times d/t fatiguing easily. Requires strong encouragement to make position changes.    Social:  visited this evening and assisted feeding patient.  Plan: SW following for discharge planning.

## 2021-12-08 NOTE — PLAN OF CARE
Status: Admitted with AMS and HTN crisis and stroke/seizure work up.   Vitals: intermittently tachycardic, otherwise VSS  Neuros: Withdrawn/lethargic. Disoriented to date, Slow speech, RUE 3/5, others 4/5. Denies N/T  IV: PIV SL  Resp/trach: RA  Diet: Level 7 with thins  Bowel status: incontinent  : incontinent, refusing purewick at times  Skin: No new deficits  Pain: Denied pain  Activity: heavy assist of 2 w/GB or lift  Plan: Pending discharge to TCU

## 2021-12-08 NOTE — PROGRESS NOTES
Care Management Follow Up    Length of Stay (days): 12    Expected Discharge Date: 12/09/2021     Concerns to be Addressed: discharge planning     Patient plan of care discussed at interdisciplinary rounds: Yes    Anticipated Discharge Disposition: Transitional Care     Anticipated Discharge Services: None  Anticipated Discharge DME: None    Patient/family educated on Medicare website which has current facility and service quality ratings: yes  Education Provided on the Discharge Plan:  yes  Patient/Family in Agreement with the Plan: yes    Referrals Placed by CM/SW:    Pending:  - [facility]  JocelyneCatrinaison [phone] f: [phone]  Reviewing referral, SW sent updated notes as requested.     Declined:  - [facility] *[id number]  Does not meet criteria     - [facility]   [phone] f: [phone]  Not taking outside admissions.      - [facility]   [phone] f: [phone]  Not taking outside admissions      -[facility] ARU - [facility] Admissions   [phone]  Not taking outside admissions.      - [facility]  [phone] f: [phone]   Per Fany in admissions, more appropriate for TCU. [place] does not have any beds available until mid-next week.     - [facility]  [phone] f: [phone]  Per admissions, pt not appropriate for ARU.     - [facility]   [phone] f: [phone]  Declined due to dementia dx.     Private pay costs discussed: Not applicable    Additional Information:  RODNEY called Laura (daughter) to get TCU choices since ARUs declined. Laura had questions regarding pt's therapy activity and RODNEY explained that pt needs a lot of assistance at this time and per PT and OT, would benefit from TCU to get stronger. Laura requested that RODNEY email the [facility] TCU list. RODNEY emailed list to Laura and will send new referrals when new choices are picked.       MELISSA Benavidez, [facility]  ICU   Ph: [phone]  Pager: [phone]

## 2021-12-08 NOTE — PROVIDER NOTIFICATION
"The following page was sent to the team: \"6210-1 Glycenfer: FYI- new pronator drift noted on RUE, not previously charted. Liss [id number]\" Team notified writer that they are aware. Will continue to monitor.  "

## 2021-12-08 NOTE — PROGRESS NOTES
[facility]  Neurology Progress Note    Patient Name:  Khalida Redding  MRN:  [id number]    :  1950  Date of Service:  2021  Date of Admission:  2021  Hospital Day: 13     Assessment & Plan    Khalida Redding is a 71yoF with PMH of R EDWARD territory infarction c/b seizure not on PTA AEDs, major neurocognitive d/o, T2DM, HTN, HLD, who was transferred from [facility] after presenting with acute encephalopathy and concern for seizure.    EEG on admission demonstrated intermittent seizure activity that have improved after initiation of levetiracetam and fosphenytoin. Brain MRI obtained during this hospitalization demonstrated acute ischemic stroke of the L frontal lobe.    Patient is medically stable for discharge, currently waiting on placement.     #Acute encephalopathy, resolved  #Subclinical seizures, resolved  -Neuro checks every 4 hours  -Levetiracetam 1.5g twice daily  -Phenytoin 100mg twice daily  -Seizure precautions  -EEG has been stopped  -PT/OT/SLP - recommend discharge to ARU, RODNEY working on placement, appreciate assistance   -Follow up with neurology in 4-6 weeks after discharge    #Acute ischemic stroke of the left frontal lobe, etiology: intracranial atherosclerosis vs embolic  #History of R EDWARD territory stroke  #Residual L sided weakness  -LDL 93, Hgb A1c 5.4  -TTE 2021: LVEF 60-65%, no WMA, normal Atria, negative bubble study.  -Cardiac montioring  -Asa 81mg and clopidogrel 75mg daily x 90 days, followed by asa 325mg daily thereafter  -Atorvastatin 40mg daily  -Stroke education  -[place] on discharge  -Secondary stroke prevention goals: LDL 40-70, Hgb A1c <7.0, BP goal normotension    #Hypertension  #HLD  -SBP goal <180  -Lisinopril to 20mg daily  -Hydralazine PO prn for SBP > 180  -Atorvastatin 40mg daily    #Type II diabetes mellitus  -Hold PTA metformin and glipizide  -POC glucose  -Correctional dose insulin, low  intensity  -Hypoglycemia protocol    #Major neurocognitive disorder  -Hold PTA Namenda    Lines/tubes/drains: PIVs.  FEN: Easy to chew diet with thin liquids, encourage PO intake.  Ppx: DVT - subcutaneous Lovenox; GI - not indicated.  Code: Full.  Dispo: medically ready for discharge to ARU, placement pending.    The patient was seen and discussed with the neurology attending, Dr. Hemphill.    Billie Molina MD  Neurology PGY-2     Interval History/24-hour Events   No acute events overnight. Patient reiterates that she wants to go home.      Objective   Temp:  [97.5  F (36.4  C)-98.6  F (37  C)] 98.2  F (36.8  C)  Pulse:  [82-95] 82  Resp:  [16-18] 18  BP: (111-147)/(66-77) 111/66  SpO2:  [92 %-100 %] 98 %    No intake/output data recorded.    Physical Exam  General: Lying in bed, NAD  HEENT: Normocephalic, atraumatic. Sclera anicteric. Moist mucus membranes.  Cardiac: Extremities appear well-perfused.  Chest: Non-labored, no accessory muscle use.  Abdomen: Soft, non-distended, non-tender.  Extremities: Warm, no edema, pedal pulses palpable.  Skin: Warm, dry. No jaundice.     Neuro:  Mental status: Awake, alert, attentive. Oriented to self, location, month/year. She follows simple commands. Naming and repetition are intact.  Cranial nerves: Blinks to threat bilaterally. Pupils 3 mm, equal, round, reactive. Conjugate gaze. EOMI. Facial sensation intact to light touch in V1-V3 regions. Facial movements symmetric. Tongue protrudes without deviation, uvula midline. No dysarthria.   Motor: Normal tone. All extremities moving spontaneously, RUE drifts - 4/5 strength in the RUE. RLE is 4/5 at hip flexor, 5/5 dorsi- and plantar- flexion. LLE dorsiflexion is 4/5 - remaind of LLE and LUE strength is 5/5.  Reflexes: 2+ biceps, brachioradialis, patellar reflexes.  Sensory: Intact to light touch and vibration in the upper and lower extremities.  Coordination: FNF intact bilaterally without dysmetria.  Gait: Deferred.     Labs  and Imaging   BMP  Recent Labs   Lab 12/07/21  0732 12/05/21  0734 12/03/21  0651 12/02/21  0728    141 139 142   POTASSIUM 4.3 3.8 3.5 3.6   CHLORIDE 106 109 107 107   CO2 25 28 26 30   BUN 17 19 18 13   CR 0.46* 0.54 0.56 0.57   PINA 8.7 8.5 8.4* 8.2*       CBC  Recent Labs   Lab 12/07/21  0732 12/05/21  0734   WBC 10.2  --    HGB 14.4  --     259     LIPIDS  Recent Labs   Lab Test 12/01/21  0645 11/30/21  2005   CHOL 146 163   HDL 33* 32*   LDL 93 96   TRIG 102 174*     IMAGING:   Personally reviewed. The radiologists interpretation is documented below.    Brain MRI 11/30/2021:  Impression:   1. Small foci of acute infarction in the high left frontal lobe including involvement of the precentral gyrus.   2. Chronic right frontal encephalomalacia.

## 2021-12-08 NOTE — PLAN OF CARE
Status: Pt admitted for AMS and HTN crisis. Stroke vs seizure workup.   Vitals: VSS on RA. CCM.   Neuros: Alert/lethargic. Disoriented to date consistently, knows it is December, but not what day or year. Slow, clear speech. LUE 5/5 RUE 3/5, BLE 4/5. Denies N/T. RUE drift, team aware.  IV: PIV SL.   Labs/Electrolytes: BG checks with meals and before bed.  Resp/trach: LS clear. Denies SOB.  Diet: Level 7 with thins. Improved PO intake. Pt needs help setting up tray, but is able to eat independently with supervision.  Bowel status: BS+. Multiple small BMs on commode and bedpan.   : Voids with incontinence. Refuses Purewick at times.  Skin: Blanchable redness on coccyx, barrier cream applied.  Pain: Pt denies.  Activity: Heavy assist of two, GB, pivot, or assist of two and lift. Up to commode.   Social:  at bedside.   Plan: ARU vs TCU at discharge. Continue to monitor and follow plan of care.  Updates this shift: Pt worked with PT and Speech.

## 2021-12-08 NOTE — PROGRESS NOTES
Care Management Follow Up    Length of Stay (days): 12    Expected Discharge Date:  TBD - pending placement      Concerns to be Addressed: discharge planning     Patient plan of care discussed at interdisciplinary rounds: Yes     Anticipated Discharge Disposition: ARU      Anticipated Discharge Services: ARU  Anticipated Discharge DME: NA     Patient/family educated on Medicare website which has current facility and service quality ratings: yes  Education Provided on the Discharge Plan:  yes  Patient/Family in Agreement with the Plan: yes     Referrals Placed by CM/SW:   Pending:  - [facility]  Jocelyne, Catrinaison [phone] f: [phone]  Reviewing referral, SW sent updated notes as requested.     Declined:  - [facility] *[id number]  Does not meet criteria     - [facility]   [phone] f: [phone]  Not taking outside admissions.      - [facility]   [phone] f: [phone]  Not taking outside admissions      -[facility] ARU - [facility] Admissions   [phone]  Not taking outside admissions.      - [facility]  [phone] f: [phone]   Per Afny in admissions, more appropriate for TCU. [place] does not have any beds available until mid-next week.     - [facility]  [phone] f: [phone]  Per admissions, pt not appropriate for ARU.     - [facility]   [phone] f: [phone]  Declined due to dementia dx.         Private pay costs discussed: Not applicable     Additional Information:  RODNEY sent secure email to Jocelyne requesting follow-up on status of referral.    RODNEY received email from Jocelyne stating they are concerned that pt requires 1:1 with feeding. Jocelyne stated that it is not clear in the notes how pt is being fed. RODNEY read RN notes to Jocelyne and noted that when SW was doing HCD w/ pt she was eating her sandwich independently. RODNEY discussed with RN who will document this. RODNEY inquired if there is a different facility that can accommodate pt's needs to  Joc[facility]ne.    Addend 1530: RODNEY requested Jocelyne send updated RN note from today to facility to review.    MELISSA Mirza, [facility]  6D Adult Acute Care   Ph:[phone]  Pager [phone]

## 2021-12-09 ENCOUNTER — APPOINTMENT (OUTPATIENT)
Dept: PHYSICAL THERAPY | Facility: CLINIC | Age: 71
DRG: 100 | End: 2021-12-09
Attending: PSYCHIATRY & NEUROLOGY
Payer: COMMERCIAL

## 2021-12-09 ENCOUNTER — TELEPHONE (OUTPATIENT)
Dept: NEUROLOGY | Facility: CLINIC | Age: 71
End: 2021-12-09

## 2021-12-09 ENCOUNTER — APPOINTMENT (OUTPATIENT)
Dept: CARDIOLOGY | Facility: CLINIC | Age: 71
DRG: 100 | End: 2021-12-09
Attending: COUNSELOR
Payer: COMMERCIAL

## 2021-12-09 VITALS
HEART RATE: 84 BPM | TEMPERATURE: 98.1 F | DIASTOLIC BLOOD PRESSURE: 83 MMHG | OXYGEN SATURATION: 94 % | SYSTOLIC BLOOD PRESSURE: 122 MMHG | RESPIRATION RATE: 15 BRPM

## 2021-12-09 LAB
GLUCOSE BLDC GLUCOMTR-MCNC: 161 MG/DL (ref 70–99)
GLUCOSE BLDC GLUCOMTR-MCNC: 305 MG/DL (ref 70–99)

## 2021-12-09 PROCEDURE — 93246 EXT ECG>7D<15D RECORDING: CPT

## 2021-12-09 PROCEDURE — 250N000013 HC RX MED GY IP 250 OP 250 PS 637: Performed by: STUDENT IN AN ORGANIZED HEALTH CARE EDUCATION/TRAINING PROGRAM

## 2021-12-09 PROCEDURE — 250N000011 HC RX IP 250 OP 636: Performed by: STUDENT IN AN ORGANIZED HEALTH CARE EDUCATION/TRAINING PROGRAM

## 2021-12-09 PROCEDURE — 97530 THERAPEUTIC ACTIVITIES: CPT | Mod: GP

## 2021-12-09 PROCEDURE — 93248 EXT ECG>7D<15D REV&INTERPJ: CPT | Performed by: INTERNAL MEDICINE

## 2021-12-09 PROCEDURE — 99238 HOSP IP/OBS DSCHRG MGMT 30/<: CPT | Mod: GC | Performed by: STUDENT IN AN ORGANIZED HEALTH CARE EDUCATION/TRAINING PROGRAM

## 2021-12-09 RX ORDER — CLOPIDOGREL BISULFATE 75 MG/1
75 TABLET ORAL DAILY
DISCHARGE
Start: 2021-12-10 | End: 2022-01-27

## 2021-12-09 RX ORDER — ATORVASTATIN CALCIUM 40 MG/1
40 TABLET, FILM COATED ORAL DAILY
DISCHARGE
Start: 2021-12-09 | End: 2022-01-27

## 2021-12-09 RX ORDER — LANOLIN ALCOHOL/MO/W.PET/CERES
3 CREAM (GRAM) TOPICAL AT BEDTIME
DISCHARGE
Start: 2021-12-09 | End: 2022-03-10

## 2021-12-09 RX ORDER — LISINOPRIL 20 MG/1
20 TABLET ORAL DAILY
DISCHARGE
Start: 2021-12-09 | End: 2022-01-27

## 2021-12-09 RX ORDER — PHENYTOIN 50 MG/1
100 TABLET, CHEWABLE ORAL 2 TIMES DAILY
DISCHARGE
Start: 2021-12-09 | End: 2022-01-27

## 2021-12-09 RX ORDER — ASPIRIN 81 MG/1
81 TABLET, CHEWABLE ORAL DAILY
DISCHARGE
Start: 2021-12-09 | End: 2022-01-27

## 2021-12-09 RX ORDER — LEVETIRACETAM 750 MG/1
1500 TABLET ORAL 2 TIMES DAILY
DISCHARGE
Start: 2021-12-09 | End: 2022-01-27

## 2021-12-09 RX ADMIN — CLOPIDOGREL BISULFATE 75 MG: 75 TABLET ORAL at 08:48

## 2021-12-09 RX ADMIN — ENOXAPARIN SODIUM 40 MG: 40 INJECTION SUBCUTANEOUS at 10:49

## 2021-12-09 RX ADMIN — LISINOPRIL 20 MG: 20 TABLET ORAL at 08:48

## 2021-12-09 RX ADMIN — LEVETIRACETAM 1500 MG: 750 TABLET ORAL at 08:48

## 2021-12-09 RX ADMIN — PHENYTOIN 100 MG: 50 TABLET, CHEWABLE ORAL at 08:48

## 2021-12-09 RX ADMIN — ATORVASTATIN CALCIUM 40 MG: 40 TABLET, FILM COATED ORAL at 08:48

## 2021-12-09 RX ADMIN — INSULIN ASPART 1 UNITS: 100 INJECTION, SOLUTION INTRAVENOUS; SUBCUTANEOUS at 08:50

## 2021-12-09 RX ADMIN — INSULIN ASPART 2 UNITS: 100 INJECTION, SOLUTION INTRAVENOUS; SUBCUTANEOUS at 12:11

## 2021-12-09 RX ADMIN — ASPIRIN 81 MG CHEWABLE TABLET 81 MG: 81 TABLET CHEWABLE at 08:47

## 2021-12-09 ASSESSMENT — ACTIVITIES OF DAILY LIVING (ADL)
ADLS_ACUITY_SCORE: 19

## 2021-12-09 NOTE — PLAN OF CARE
Occupational Therapy Discharge Summary    Reason for therapy discharge:    Discharged to transitional care facility.    Progress towards therapy goal(s). See goals on Care Plan in [facility] electronic health record for goal details.  Goals not met.  Barriers to achieving goals:   discharge from facility.    Therapy recommendation(s):    Continued therapy is recommended.  Rationale/Recommendations:  Patient will benefit from ongoing therapies to promote increased ADL/Mobility independence and safety.

## 2021-12-09 NOTE — PLAN OF CARE
Physical Therapy Discharge Summary    Reason for therapy discharge:    Discharged to transitional care facility.    Progress towards therapy goal(s). See goals on Care Plan in [facility] electronic health record for goal details.  Goals not met.  Barriers to achieving goals:   discharge from facility.    Therapy recommendation(s):    Continued therapy is recommended.  Rationale/Recommendations:  Recommend continued skilled therapies in TCU to progress mobility toward baseline.

## 2021-12-09 NOTE — PLAN OF CARE
Status: Admitted with AMS and HTN crisis and stroke/seizure work up.   Vitals: intermittently tachycardic, otherwise VSS  Neuros: pleasant, alert overnight. Disoriented to time, Slow speech; LUE 5/5, RUE 3/5, BLE 4/5. Denies N/T  IV: PIV SL  Resp/trach: RA  Diet: Level 7 with thins  Bowel status: incontinent  : incontinent, utilized purewick overnight   Skin: No new deficits  Pain: Denied pain  Activity: heavy assist of 2 w/GB or lift  Plan: Pending discharge to TCU

## 2021-12-09 NOTE — TELEPHONE ENCOUNTER
[facility] Call Center    Phone Message    May a detailed message be left on voicemail: yes     Reason for Call: Erika Wise @ [facility] need clarification on aspirin 81 mg and plavix 75mg. Please advise? [phone]      Action Taken: Message routed to:  [facility] ([facility]    Travel Screening: Not Applicable

## 2021-12-09 NOTE — PLAN OF CARE
Status: admitted with acute encephalopathy, subclinical seizures, and acute ischemic stroke of L frontal lobe- medically ready for discharge.  Vitals: VSS on RA. CCM.   Neuros: Alert/Lethargic. Consistently disoriented to date (knows December, but not year). Slow, clear speech. LUE 5/5, RUE 3/5, BLE 4/5. R drift. Denies N/T.  IV: PIV SL.  Labs/Electrolytes: BG checks with meals and HS.  Resp/trach: LS clear, denies SOB.  Diet: Level 7 with thins, improved intake. Requires tray set up, but eating independently with supervision.  Bowel status: small BM via bedpan this shift, BS+.  : voiding with incontinence, purewick in place.  Skin: blanchable redness on coccyx, barrier cream applied.  Pain: denies  Activity: heavy A2 GB/pivot or A2/lift.  Plan: discharge pending, awaiting TCU placement.

## 2021-12-09 NOTE — PLAN OF CARE
Status: Patient admitted on 11/26 with AMS, MRI revealed Small foci of acute infarction in the high left frontal lobe  including involvement of the precentral gyrus.   Vitals: VSS  Neuros: Slow to respond at times, oriented x4, RUE 3-4/5 ataxic, BLE 4/5  IV: PIV removed  Labs/Electrolytes: WNL, Last , 2units of Novolog given  Resp/trach: Lung sounds diminished throughout  Diet: #7  Bowel status: Last BM today  : Voiding spontaneously   Skin: Intact  Pain: Denies  Activity: Up with heavy A2 transfer, up in recliner for two hours today  Social:  at bedside, supportive  Plan: Phone report given to nurse Germain  at 1400 at [facility]

## 2021-12-09 NOTE — PROGRESS NOTES
Care Management Discharge Note    Discharge Date: 12/09/2021    Discharge Disposition: [facility] - [facility] [place]  [address] [facility] [address]  Ph: [phone] f: [phone]  tapan Casanova [phone]    Discharge Services: TCU    Discharge DME: NA    Discharge Transportation: [facility] w/c transportation [phone] arranged for today at 1300. RODNEY discussed with RN, pt can transport via w/c.     Private pay costs discussed: transportation costs - RODNEY discussed potential transportation costs with Laura.    PAS Confirmation Code: [id number]  Patient/family educated on Medicare website which has current facility and service quality ratings: Yes    Education Provided on the Discharge Plan:  Yes  Persons Notified of Discharge Plans: Pt, pt's spouse Yeyo, pt's daughter Laura, charge RN, bedside RN, primary medical team, and TCU admissions.  Patient/Family in Agreement with the Plan: yes    Handoff Referral Completed: Yes    Additional Information:  RODNEY received secure email that pt has been accepted to [facility] [place] [facility] for today. RODNEY confirmed with Jocelyne that [facility] auth is being waived at this time. Jocelyne requested SW arrange a 1300 transportation and orders be sent to her by 1100. RODNEY called Laura, ariane in agreement with plan. RODNEY provided phone number and address to TCU.     Addend 1000: Per bedside RN, zijesustch to be placed prior to discharge and wanted to ensure that TCU will accept with ziopatch. RODNEY attempted to call Jocelyne, ALYX left. RODNEY also sent secure email regarding this.   RODNEY faxed orders.     Addend 1030: RODNEY sent updated orders w/ ziopatch instructions.    Hui Dooley, MELISSA, LGSW  6D Adult Acute Care RODNEY  Ph:[phone]  Pager [phone]

## 2021-12-10 ENCOUNTER — PATIENT OUTREACH (OUTPATIENT)
Dept: CARE COORDINATION | Facility: CLINIC | Age: 71
End: 2021-12-10
Payer: MEDICAID

## 2021-12-10 ASSESSMENT — ACTIVITIES OF DAILY LIVING (ADL): DEPENDENT_IADLS:: CLEANING;COOKING;LAUNDRY;TRANSPORTATION

## 2021-12-10 NOTE — PROGRESS NOTES
Clinic Care Coordination Contact  Care Coordination Transition Communication    Referral Source: IP Handoff    Clinical Data: Patient was hospitalized at [facility] from 11/26/2021 to 12/9/2021 with diagnosis ofAcute encephalopathy  Hypertensive emergency  Hyperlipidemia  Hx of right MCA ischemic stroke  Dementia  Type 2 Diabetes Mellitus     Transition to Facility:              Facility Name: [facility] [address] [address]  Ph: [phone] f: [phone]  tapan Casanova [phone]             Plan: RN/SW Care Coordinator will await notification from facility staff informing RN/SW Care Coordinator of patient's discharge plans/needs. RN/SW Care Coordinator will review chart and outreach to facility staff every 4 weeks and as needed.     Yasmin ESPINOSA, RN, PHN, CCM  Primary Clinic Care Coordination    [facility]  Primary Care Clinics  [email or web address]   Office: [phone]  Employed by [facility]

## 2021-12-21 ENCOUNTER — TELEPHONE (OUTPATIENT)
Dept: FAMILY MEDICINE | Facility: OTHER | Age: 71
End: 2021-12-21
Payer: MEDICAID

## 2021-12-21 NOTE — TELEPHONE ENCOUNTER
Daughter called said she wanted to speak with Abdirahman.  She was talking so fasting I didn't get name or word in. She said that her mom has been in a nursing home in [place].  Fell out of bed and was on the floor for 30 mins.  Has a hx of stroke and they are not feeding her.  States that they are neglecting her and she is staying in her brief for long periods of time.  She just wants to take her home and they tell her that if she does they will call the  on her.  Starting telling her that she could call the Ombudsman if she was concerned with long term call and the call was disconnected.     Tried calling [phone] Laura and no answer. That's what came up on call id.    Will route to provider as FYI and to triage pool to try again later.    Azam Lilly, BSN, RN, PHN  [facility] ~ Registered Nurse  Clinic Triage ~ [place] [facility] [place]  December 21, 2021

## 2021-12-23 NOTE — TELEPHONE ENCOUNTER
Tried calling again, no answer.  Will close encounter at this time.  Azam Lilly, BSN, RN, PHN  [facility] ~ Registered Nurse  Clinic Triage ~ [place] [facility] [place]  December 23, 2021

## 2021-12-26 DIAGNOSIS — R10.84 ABDOMINAL PAIN, GENERALIZED: ICD-10-CM

## 2021-12-26 DIAGNOSIS — K30 CHRONIC UPSET STOMACH: ICD-10-CM

## 2021-12-26 DIAGNOSIS — F03.90 DEMENTIA WITHOUT BEHAVIORAL DISTURBANCE, UNSPECIFIED DEMENTIA TYPE: ICD-10-CM

## 2021-12-27 RX ORDER — OMEPRAZOLE 40 MG/1
40 CAPSULE, DELAYED RELEASE ORAL AT BEDTIME
Qty: 90 CAPSULE | Refills: 1 | Status: SHIPPED | OUTPATIENT
Start: 2021-12-27 | End: 2022-01-01

## 2021-12-27 RX ORDER — GABAPENTIN 300 MG/1
CAPSULE ORAL
Qty: 90 CAPSULE | Refills: 3 | Status: SHIPPED | OUTPATIENT
Start: 2021-12-27 | End: 2022-01-01

## 2022-01-01 ENCOUNTER — TELEPHONE (OUTPATIENT)
Dept: FAMILY MEDICINE | Facility: OTHER | Age: 72
End: 2022-01-01

## 2022-01-01 ENCOUNTER — MEDICAL CORRESPONDENCE (OUTPATIENT)
Dept: HEALTH INFORMATION MANAGEMENT | Facility: CLINIC | Age: 72
End: 2022-01-01

## 2022-01-01 ENCOUNTER — APPOINTMENT (OUTPATIENT)
Dept: PHYSICAL THERAPY | Facility: CLINIC | Age: 72
End: 2022-01-01
Payer: COMMERCIAL

## 2022-01-01 ENCOUNTER — NURSE TRIAGE (OUTPATIENT)
Dept: NURSING | Facility: CLINIC | Age: 72
End: 2022-01-01
Payer: COMMERCIAL

## 2022-01-01 ENCOUNTER — APPOINTMENT (OUTPATIENT)
Dept: GENERAL RADIOLOGY | Facility: CLINIC | Age: 72
End: 2022-01-01
Attending: FAMILY MEDICINE
Payer: COMMERCIAL

## 2022-01-01 ENCOUNTER — TELEPHONE (OUTPATIENT)
Dept: NEUROLOGY | Facility: CLINIC | Age: 72
End: 2022-01-01

## 2022-01-01 ENCOUNTER — APPOINTMENT (OUTPATIENT)
Dept: CT IMAGING | Facility: CLINIC | Age: 72
End: 2022-01-01
Attending: EMERGENCY MEDICINE
Payer: COMMERCIAL

## 2022-01-01 ENCOUNTER — HOSPITAL ENCOUNTER (EMERGENCY)
Facility: CLINIC | Age: 72
Discharge: HOME | End: 2022-07-19
Attending: EMERGENCY MEDICINE | Admitting: EMERGENCY MEDICINE
Payer: COMMERCIAL

## 2022-01-01 ENCOUNTER — PATIENT OUTREACH (OUTPATIENT)
Dept: FAMILY MEDICINE | Facility: CLINIC | Age: 72
End: 2022-01-01
Attending: PHYSICIAN ASSISTANT

## 2022-01-01 ENCOUNTER — NURSE TRIAGE (OUTPATIENT)
Dept: FAMILY MEDICINE | Facility: OTHER | Age: 72
End: 2022-01-01

## 2022-01-01 ENCOUNTER — APPOINTMENT (OUTPATIENT)
Dept: CT IMAGING | Facility: CLINIC | Age: 72
End: 2022-01-01
Attending: PEDIATRICS
Payer: COMMERCIAL

## 2022-01-01 ENCOUNTER — PATIENT OUTREACH (OUTPATIENT)
Dept: CARE COORDINATION | Facility: CLINIC | Age: 72
End: 2022-01-01

## 2022-01-01 ENCOUNTER — OFFICE VISIT (OUTPATIENT)
Dept: FAMILY MEDICINE | Facility: OTHER | Age: 72
End: 2022-01-01
Payer: COMMERCIAL

## 2022-01-01 ENCOUNTER — MEDICAL CORRESPONDENCE (OUTPATIENT)
Dept: HEALTH INFORMATION MANAGEMENT | Facility: CLINIC | Age: 72
End: 2022-01-01
Payer: COMMERCIAL

## 2022-01-01 ENCOUNTER — MEDICAL CORRESPONDENCE (OUTPATIENT)
Dept: FAMILY MEDICINE | Facility: OTHER | Age: 72
End: 2022-01-01

## 2022-01-01 ENCOUNTER — TELEPHONE (OUTPATIENT)
Dept: FAMILY MEDICINE | Facility: CLINIC | Age: 72
End: 2022-01-01

## 2022-01-01 ENCOUNTER — TELEPHONE (OUTPATIENT)
Dept: FAMILY MEDICINE | Facility: OTHER | Age: 72
End: 2022-01-01
Payer: COMMERCIAL

## 2022-01-01 ENCOUNTER — TELEPHONE (OUTPATIENT)
Dept: NURSING | Facility: CLINIC | Age: 72
End: 2022-01-01
Payer: COMMERCIAL

## 2022-01-01 ENCOUNTER — HOSPITAL ENCOUNTER (EMERGENCY)
Facility: CLINIC | Age: 72
Discharge: HOME | End: 2022-06-11
Attending: FAMILY MEDICINE | Admitting: FAMILY MEDICINE
Payer: COMMERCIAL

## 2022-01-01 ENCOUNTER — OFFICE VISIT (OUTPATIENT)
Dept: NEUROLOGY | Facility: CLINIC | Age: 72
End: 2022-01-01
Payer: COMMERCIAL

## 2022-01-01 ENCOUNTER — DOCUMENTATION ONLY (OUTPATIENT)
Dept: OTHER | Facility: CLINIC | Age: 72
End: 2022-01-01

## 2022-01-01 ENCOUNTER — PATIENT OUTREACH (OUTPATIENT)
Dept: CARE COORDINATION | Facility: CLINIC | Age: 72
End: 2022-01-01
Payer: COMMERCIAL

## 2022-01-01 ENCOUNTER — APPOINTMENT (OUTPATIENT)
Dept: CT IMAGING | Facility: CLINIC | Age: 72
End: 2022-01-01
Attending: FAMILY MEDICINE
Payer: COMMERCIAL

## 2022-01-01 ENCOUNTER — APPOINTMENT (OUTPATIENT)
Dept: GENERAL RADIOLOGY | Facility: CLINIC | Age: 72
End: 2022-01-01
Attending: PEDIATRICS
Payer: COMMERCIAL

## 2022-01-01 ENCOUNTER — ANCILLARY PROCEDURE (OUTPATIENT)
Dept: NEUROLOGY | Facility: CLINIC | Age: 72
End: 2022-01-01
Attending: PSYCHIATRY & NEUROLOGY
Payer: COMMERCIAL

## 2022-01-01 ENCOUNTER — HOSPITAL ENCOUNTER (EMERGENCY)
Facility: CLINIC | Age: 72
Discharge: HOME | End: 2022-10-09
Payer: COMMERCIAL

## 2022-01-01 ENCOUNTER — NURSE TRIAGE (OUTPATIENT)
Dept: NURSING | Facility: CLINIC | Age: 72
End: 2022-01-01

## 2022-01-01 ENCOUNTER — HOSPITAL ENCOUNTER (OUTPATIENT)
Facility: CLINIC | Age: 72
Setting detail: OBSERVATION
Discharge: HOME | End: 2022-08-29
Attending: EMERGENCY MEDICINE | Admitting: INTERNAL MEDICINE
Payer: COMMERCIAL

## 2022-01-01 ENCOUNTER — ALLIED HEALTH/NURSE VISIT (OUTPATIENT)
Dept: FAMILY MEDICINE | Facility: OTHER | Age: 72
End: 2022-01-01
Payer: COMMERCIAL

## 2022-01-01 ENCOUNTER — HOSPITAL ENCOUNTER (EMERGENCY)
Facility: CLINIC | Age: 72
Discharge: HOME | End: 2022-05-22
Attending: FAMILY MEDICINE | Admitting: FAMILY MEDICINE
Payer: COMMERCIAL

## 2022-01-01 VITALS
DIASTOLIC BLOOD PRESSURE: 70 MMHG | WEIGHT: 139 LBS | SYSTOLIC BLOOD PRESSURE: 110 MMHG | TEMPERATURE: 96.8 F | BODY MASS INDEX: 23.13 KG/M2 | HEART RATE: 80 BPM

## 2022-01-01 VITALS
TEMPERATURE: 97.6 F | OXYGEN SATURATION: 99 % | RESPIRATION RATE: 14 BRPM | BODY MASS INDEX: 23.13 KG/M2 | DIASTOLIC BLOOD PRESSURE: 97 MMHG | HEART RATE: 79 BPM | SYSTOLIC BLOOD PRESSURE: 161 MMHG | WEIGHT: 139 LBS

## 2022-01-01 VITALS
HEART RATE: 84 BPM | BODY MASS INDEX: 24.21 KG/M2 | TEMPERATURE: 97.5 F | WEIGHT: 145.5 LBS | RESPIRATION RATE: 18 BRPM | OXYGEN SATURATION: 96 % | DIASTOLIC BLOOD PRESSURE: 70 MMHG | SYSTOLIC BLOOD PRESSURE: 124 MMHG

## 2022-01-01 VITALS
SYSTOLIC BLOOD PRESSURE: 136 MMHG | HEART RATE: 78 BPM | DIASTOLIC BLOOD PRESSURE: 74 MMHG | WEIGHT: 139.8 LBS | OXYGEN SATURATION: 100 % | TEMPERATURE: 97.2 F | RESPIRATION RATE: 20 BRPM | BODY MASS INDEX: 23.29 KG/M2 | HEIGHT: 65 IN

## 2022-01-01 VITALS
HEART RATE: 88 BPM | TEMPERATURE: 98.3 F | BODY MASS INDEX: 24.13 KG/M2 | DIASTOLIC BLOOD PRESSURE: 72 MMHG | WEIGHT: 145 LBS | SYSTOLIC BLOOD PRESSURE: 126 MMHG | OXYGEN SATURATION: 91 % | RESPIRATION RATE: 16 BRPM

## 2022-01-01 VITALS
DIASTOLIC BLOOD PRESSURE: 83 MMHG | TEMPERATURE: 97.5 F | BODY MASS INDEX: 23.13 KG/M2 | SYSTOLIC BLOOD PRESSURE: 128 MMHG | HEIGHT: 65 IN | HEART RATE: 76 BPM

## 2022-01-01 VITALS
OXYGEN SATURATION: 94 % | HEART RATE: 93 BPM | TEMPERATURE: 98 F | DIASTOLIC BLOOD PRESSURE: 88 MMHG | RESPIRATION RATE: 18 BRPM | BODY MASS INDEX: 24.13 KG/M2 | WEIGHT: 145 LBS | SYSTOLIC BLOOD PRESSURE: 137 MMHG

## 2022-01-01 VITALS
WEIGHT: 147.71 LBS | BODY MASS INDEX: 24.61 KG/M2 | HEART RATE: 88 BPM | OXYGEN SATURATION: 94 % | DIASTOLIC BLOOD PRESSURE: 68 MMHG | TEMPERATURE: 97.8 F | HEIGHT: 65 IN | SYSTOLIC BLOOD PRESSURE: 143 MMHG | RESPIRATION RATE: 16 BRPM

## 2022-01-01 VITALS
DIASTOLIC BLOOD PRESSURE: 99 MMHG | HEART RATE: 80 BPM | RESPIRATION RATE: 20 BRPM | WEIGHT: 141 LBS | BODY MASS INDEX: 23.49 KG/M2 | HEIGHT: 65 IN | TEMPERATURE: 98 F | OXYGEN SATURATION: 100 % | SYSTOLIC BLOOD PRESSURE: 163 MMHG

## 2022-01-01 VITALS
RESPIRATION RATE: 16 BRPM | TEMPERATURE: 98.1 F | OXYGEN SATURATION: 99 % | BODY MASS INDEX: 23.13 KG/M2 | SYSTOLIC BLOOD PRESSURE: 181 MMHG | WEIGHT: 139 LBS | DIASTOLIC BLOOD PRESSURE: 95 MMHG | HEART RATE: 82 BPM

## 2022-01-01 VITALS — DIASTOLIC BLOOD PRESSURE: 80 MMHG | HEART RATE: 76 BPM | SYSTOLIC BLOOD PRESSURE: 133 MMHG

## 2022-01-01 DIAGNOSIS — R29.6 RECURRENT FALLS: ICD-10-CM

## 2022-01-01 DIAGNOSIS — I63.422 CEREBROVASCULAR ACCIDENT (CVA) DUE TO EMBOLISM OF LEFT ANTERIOR CEREBRAL ARTERY (H): ICD-10-CM

## 2022-01-01 DIAGNOSIS — I47.10 SUPRAVENTRICULAR TACHYCARDIA (H): ICD-10-CM

## 2022-01-01 DIAGNOSIS — G81.94 LEFT HEMIPARESIS (H): ICD-10-CM
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
## 2022-01-01 DIAGNOSIS — I10 BENIGN ESSENTIAL HYPERTENSION: ICD-10-CM

## 2022-01-01 DIAGNOSIS — Z86.73 HISTORY OF MULTIPLE CEREBROVASCULAR ACCIDENTS (CVAS): Primary | ICD-10-CM

## 2022-01-01 DIAGNOSIS — Z72.0 TOBACCO ABUSE DISORDER: ICD-10-CM

## 2022-01-01 DIAGNOSIS — M79.662 PAIN IN BOTH LOWER LEGS: ICD-10-CM

## 2022-01-01 DIAGNOSIS — F03.91 DEMENTIA WITH BEHAVIORAL DISTURBANCE, UNSPECIFIED DEMENTIA TYPE: ICD-10-CM

## 2022-01-01 DIAGNOSIS — Z86.73 HISTORY OF MULTIPLE CEREBROVASCULAR ACCIDENTS (CVAS): ICD-10-CM

## 2022-01-01 DIAGNOSIS — K30 CHRONIC UPSET STOMACH: ICD-10-CM

## 2022-01-01 DIAGNOSIS — R56.9 SEIZURE (H): ICD-10-CM

## 2022-01-01 DIAGNOSIS — F03.90 DEMENTIA WITHOUT BEHAVIORAL DISTURBANCE (H): ICD-10-CM

## 2022-01-01 DIAGNOSIS — G40.909 SEIZURE DISORDER (H): ICD-10-CM

## 2022-01-01 DIAGNOSIS — I10 HYPERTENSION GOAL BP (BLOOD PRESSURE) < 140/90: ICD-10-CM

## 2022-01-01 DIAGNOSIS — F03.90 DEMENTIA WITHOUT BEHAVIORAL DISTURBANCE, UNSPECIFIED DEMENTIA TYPE: ICD-10-CM

## 2022-01-01 DIAGNOSIS — E11.51 TYPE 2 DIABETES MELLITUS WITH DIABETIC PERIPHERAL ANGIOPATHY WITHOUT GANGRENE, WITHOUT LONG-TERM CURRENT USE OF INSULIN (H): ICD-10-CM

## 2022-01-01 DIAGNOSIS — R26.81 GAIT INSTABILITY: ICD-10-CM

## 2022-01-01 DIAGNOSIS — M81.0 AGE-RELATED OSTEOPOROSIS WITHOUT CURRENT PATHOLOGICAL FRACTURE: ICD-10-CM

## 2022-01-01 DIAGNOSIS — I95.2 HYPOTENSION DUE TO MEDICATION: ICD-10-CM

## 2022-01-01 DIAGNOSIS — S69.92XA HAND INJURY, LEFT, INITIAL ENCOUNTER: ICD-10-CM

## 2022-01-01 DIAGNOSIS — Z11.52 ENCOUNTER FOR SCREENING LABORATORY TESTING FOR SEVERE ACUTE RESPIRATORY SYNDROME CORONAVIRUS 2 (SARS-COV-2): ICD-10-CM

## 2022-01-01 DIAGNOSIS — D69.1 PLATELET FUNCTION DEFECT (H): ICD-10-CM

## 2022-01-01 DIAGNOSIS — I95.9 TRANSIENT HYPOTENSION: ICD-10-CM

## 2022-01-01 DIAGNOSIS — I63.422 CEREBROVASCULAR ACCIDENT (CVA) DUE TO EMBOLISM OF LEFT ANTERIOR CEREBRAL ARTERY (H): Primary | ICD-10-CM

## 2022-01-01 DIAGNOSIS — M62.81 GENERALIZED MUSCLE WEAKNESS: ICD-10-CM

## 2022-01-01 DIAGNOSIS — J43.8 OTHER EMPHYSEMA (H): ICD-10-CM

## 2022-01-01 DIAGNOSIS — R56.9 CONVULSIONS, UNSPECIFIED CONVULSION TYPE (H): ICD-10-CM

## 2022-01-01 DIAGNOSIS — R55 SYNCOPE, CARDIOGENIC: Primary | ICD-10-CM

## 2022-01-01 DIAGNOSIS — M79.661 PAIN IN BOTH LOWER LEGS: ICD-10-CM

## 2022-01-01 DIAGNOSIS — S01.111A EYEBROW LACERATION, RIGHT, INITIAL ENCOUNTER: ICD-10-CM

## 2022-01-01 DIAGNOSIS — Z86.73 HISTORY OF CVA (CEREBROVASCULAR ACCIDENT): ICD-10-CM

## 2022-01-01 DIAGNOSIS — Z48.02 ENCOUNTER FOR REMOVAL OF SUTURES: Primary | ICD-10-CM

## 2022-01-01 DIAGNOSIS — J06.9 UPPER RESPIRATORY TRACT INFECTION, UNSPECIFIED TYPE: ICD-10-CM

## 2022-01-01 DIAGNOSIS — E11.59 TYPE 2 DIABETES MELLITUS WITH OTHER CIRCULATORY COMPLICATION, WITHOUT LONG-TERM CURRENT USE OF INSULIN (H): ICD-10-CM

## 2022-01-01 DIAGNOSIS — M79.662 PAIN IN BOTH LOWER LEGS: Primary | ICD-10-CM

## 2022-01-01 DIAGNOSIS — F03.90 DEMENTIA WITHOUT BEHAVIORAL DISTURBANCE, UNSPECIFIED DEMENTIA TYPE: Primary | ICD-10-CM

## 2022-01-01 DIAGNOSIS — I63.9 ACUTE CVA (CEREBROVASCULAR ACCIDENT) (H): ICD-10-CM

## 2022-01-01 DIAGNOSIS — S63.611A SPRAIN OF LEFT INDEX FINGER, UNSPECIFIED SITE OF DIGIT, INITIAL ENCOUNTER: ICD-10-CM

## 2022-01-01 DIAGNOSIS — M79.661 PAIN IN BOTH LOWER LEGS: Primary | ICD-10-CM

## 2022-01-01 DIAGNOSIS — G40.909 SEIZURE DISORDER (H): Primary | ICD-10-CM

## 2022-01-01 DIAGNOSIS — I10 HYPERTENSION GOAL BP (BLOOD PRESSURE) < 140/90: Primary | ICD-10-CM

## 2022-01-01 DIAGNOSIS — G93.40 ENCEPHALOPATHY: ICD-10-CM

## 2022-01-01 DIAGNOSIS — R40.0 SOMNOLENCE: ICD-10-CM

## 2022-01-01 DIAGNOSIS — R10.84 ABDOMINAL PAIN, GENERALIZED: ICD-10-CM

## 2022-01-01 DIAGNOSIS — R55 SYNCOPE, UNSPECIFIED SYNCOPE TYPE: ICD-10-CM

## 2022-01-01 LAB
ALBUMIN SERPL-MCNC: 2.8 G/DL (ref 3.4–5)
ALBUMIN SERPL-MCNC: 3.4 G/DL (ref 3.4–5)
ALBUMIN UR-MCNC: NEGATIVE MG/DL
ALBUMIN UR-MCNC: NEGATIVE MG/DL
ALP SERPL-CCNC: 123 U/L (ref 40–150)
ALP SERPL-CCNC: 129 U/L (ref 40–150)
ALT SERPL W P-5'-P-CCNC: 30 U/L (ref 0–50)
ALT SERPL W P-5'-P-CCNC: 37 U/L (ref 0–50)
ANION GAP SERPL CALCULATED.3IONS-SCNC: 4 MMOL/L (ref 3–14)
ANION GAP SERPL CALCULATED.3IONS-SCNC: 7 MMOL/L (ref 3–14)
APPEARANCE UR: CLEAR
APPEARANCE UR: CLEAR
APTT PPP: 20 SECONDS (ref 22–38)
AST SERPL W P-5'-P-CCNC: 20 U/L (ref 0–45)
AST SERPL W P-5'-P-CCNC: 26 U/L (ref 0–45)
BACTERIA #/AREA URNS HPF: ABNORMAL /HPF
BACTERIA #/AREA URNS HPF: ABNORMAL /HPF
BASOPHILS # BLD AUTO: 0 10E3/UL (ref 0–0.2)
BASOPHILS # BLD AUTO: 0.1 10E3/UL (ref 0–0.2)
BASOPHILS # BLD AUTO: 0.1 10E3/UL (ref 0–0.2)
BASOPHILS NFR BLD AUTO: 1 %
BILIRUB SERPL-MCNC: 0.2 MG/DL (ref 0.2–1.3)
BILIRUB SERPL-MCNC: 0.2 MG/DL (ref 0.2–1.3)
BILIRUB UR QL STRIP: NEGATIVE
BILIRUB UR QL STRIP: NEGATIVE
BUN SERPL-MCNC: 11 MG/DL (ref 7–30)
BUN SERPL-MCNC: 13 MG/DL (ref 7–30)
BUN SERPL-MCNC: 14 MG/DL (ref 7–30)
BUN SERPL-MCNC: 22 MG/DL (ref 7–30)
CA-I BLD-MCNC: 4.8 MG/DL (ref 4.4–5.2)
CALCIUM SERPL-MCNC: 7.3 MG/DL (ref 8.5–10.1)
CALCIUM SERPL-MCNC: 7.6 MG/DL (ref 8.5–10.1)
CALCIUM SERPL-MCNC: 8.3 MG/DL (ref 8.5–10.1)
CALCIUM SERPL-MCNC: 8.5 MG/DL (ref 8.5–10.1)
CHLORIDE BLD-SCNC: 103 MMOL/L (ref 94–109)
CHLORIDE BLD-SCNC: 105 MMOL/L (ref 94–109)
CHLORIDE BLD-SCNC: 106 MMOL/L (ref 94–109)
CHLORIDE BLD-SCNC: 108 MMOL/L (ref 94–109)
CO2 SERPL-SCNC: 25 MMOL/L (ref 20–32)
CO2 SERPL-SCNC: 27 MMOL/L (ref 20–32)
CO2 SERPL-SCNC: 29 MMOL/L (ref 20–32)
CO2 SERPL-SCNC: 31 MMOL/L (ref 20–32)
COLOR UR AUTO: YELLOW
COLOR UR AUTO: YELLOW
CREAT SERPL-MCNC: 0.47 MG/DL (ref 0.52–1.04)
CREAT SERPL-MCNC: 0.57 MG/DL (ref 0.52–1.04)
CREAT SERPL-MCNC: 0.68 MG/DL (ref 0.52–1.04)
CREAT SERPL-MCNC: 0.73 MG/DL (ref 0.52–1.04)
D DIMER PPP FEU-MCNC: 0.79 UG/ML FEU (ref 0–0.5)
EOSINOPHIL # BLD AUTO: 0.1 10E3/UL (ref 0–0.7)
EOSINOPHIL # BLD AUTO: 0.1 10E3/UL (ref 0–0.7)
EOSINOPHIL # BLD AUTO: 0.2 10E3/UL (ref 0–0.7)
EOSINOPHIL NFR BLD AUTO: 3 %
ERYTHROCYTE [DISTWIDTH] IN BLOOD BY AUTOMATED COUNT: 12.6 % (ref 10–15)
ERYTHROCYTE [DISTWIDTH] IN BLOOD BY AUTOMATED COUNT: 13.7 % (ref 10–15)
ERYTHROCYTE [DISTWIDTH] IN BLOOD BY AUTOMATED COUNT: 13.8 % (ref 10–15)
ERYTHROCYTE [DISTWIDTH] IN BLOOD BY AUTOMATED COUNT: 15.4 % (ref 10–15)
FLUAV RNA SPEC QL NAA+PROBE: NEGATIVE
FLUBV RNA RESP QL NAA+PROBE: NEGATIVE
GFR SERPL CREATININE-BSD FRML MDRD: 86 ML/MIN/1.73M2
GFR SERPL CREATININE-BSD FRML MDRD: >90 ML/MIN/1.73M2
GLUCOSE BLD-MCNC: 104 MG/DL (ref 70–99)
GLUCOSE BLD-MCNC: 116 MG/DL (ref 70–99)
GLUCOSE BLD-MCNC: 132 MG/DL (ref 70–99)
GLUCOSE BLD-MCNC: 74 MG/DL (ref 70–99)
GLUCOSE BLDC GLUCOMTR-MCNC: 104 MG/DL (ref 70–99)
GLUCOSE BLDC GLUCOMTR-MCNC: 108 MG/DL (ref 70–99)
GLUCOSE BLDC GLUCOMTR-MCNC: 116 MG/DL (ref 70–99)
GLUCOSE BLDC GLUCOMTR-MCNC: 125 MG/DL (ref 70–99)
GLUCOSE BLDC GLUCOMTR-MCNC: 137 MG/DL (ref 70–99)
GLUCOSE BLDC GLUCOMTR-MCNC: 151 MG/DL (ref 70–99)
GLUCOSE BLDC GLUCOMTR-MCNC: 164 MG/DL (ref 70–99)
GLUCOSE BLDC GLUCOMTR-MCNC: 184 MG/DL (ref 70–99)
GLUCOSE UR STRIP-MCNC: NEGATIVE MG/DL
GLUCOSE UR STRIP-MCNC: NEGATIVE MG/DL
HBA1C MFR BLD: 6.1 % (ref 0–5.6)
HCT VFR BLD AUTO: 35.3 % (ref 35–47)
HCT VFR BLD AUTO: 38.8 % (ref 35–47)
HCT VFR BLD AUTO: 40.6 % (ref 35–47)
HCT VFR BLD AUTO: 41.6 % (ref 35–47)
HGB BLD-MCNC: 11.2 G/DL (ref 11.7–15.7)
HGB BLD-MCNC: 12.2 G/DL (ref 11.7–15.7)
HGB BLD-MCNC: 12.3 G/DL (ref 11.7–15.7)
HGB BLD-MCNC: 12.9 G/DL (ref 11.7–15.7)
HGB BLD-MCNC: 13.3 G/DL (ref 11.7–15.7)
HGB UR QL STRIP: NEGATIVE
HGB UR QL STRIP: NEGATIVE
HOLD SPECIMEN: NORMAL
IMM GRANULOCYTES # BLD: 0 10E3/UL
IMM GRANULOCYTES # BLD: 0 10E3/UL
IMM GRANULOCYTES # BLD: 0.1 10E3/UL
IMM GRANULOCYTES NFR BLD: 0 %
IMM GRANULOCYTES NFR BLD: 0 %
IMM GRANULOCYTES NFR BLD: 1 %
INR PPP: 1.11 (ref 0.85–1.15)
KETONES UR STRIP-MCNC: NEGATIVE MG/DL
KETONES UR STRIP-MCNC: NEGATIVE MG/DL
LEUKOCYTE ESTERASE UR QL STRIP: NEGATIVE
LEUKOCYTE ESTERASE UR QL STRIP: NEGATIVE
LEVETIRACETAM SERPL-MCNC: 37.2 ΜG/ML (ref 10–40)
LEVETIRACETAM SERPL-MCNC: 39 ΜG/ML (ref 10–40)
LYMPHOCYTES # BLD AUTO: 1.5 10E3/UL (ref 0.8–5.3)
LYMPHOCYTES # BLD AUTO: 2 10E3/UL (ref 0.8–5.3)
LYMPHOCYTES # BLD AUTO: 2.3 10E3/UL (ref 0.8–5.3)
LYMPHOCYTES NFR BLD AUTO: 30 %
LYMPHOCYTES NFR BLD AUTO: 33 %
LYMPHOCYTES NFR BLD AUTO: 41 %
MCH RBC QN AUTO: 29.3 PG (ref 26.5–33)
MCH RBC QN AUTO: 29.3 PG (ref 26.5–33)
MCH RBC QN AUTO: 29.7 PG (ref 26.5–33)
MCH RBC QN AUTO: 30.5 PG (ref 26.5–33)
MCHC RBC AUTO-ENTMCNC: 30 G/DL (ref 31.5–36.5)
MCHC RBC AUTO-ENTMCNC: 31.7 G/DL (ref 31.5–36.5)
MCHC RBC AUTO-ENTMCNC: 31.7 G/DL (ref 31.5–36.5)
MCHC RBC AUTO-ENTMCNC: 32 G/DL (ref 31.5–36.5)
MCV RBC AUTO: 92 FL (ref 78–100)
MCV RBC AUTO: 94 FL (ref 78–100)
MCV RBC AUTO: 96 FL (ref 78–100)
MCV RBC AUTO: 98 FL (ref 78–100)
MONOCYTES # BLD AUTO: 0.4 10E3/UL (ref 0–1.3)
MONOCYTES # BLD AUTO: 0.5 10E3/UL (ref 0–1.3)
MONOCYTES # BLD AUTO: 0.6 10E3/UL (ref 0–1.3)
MONOCYTES NFR BLD AUTO: 10 %
MONOCYTES NFR BLD AUTO: 9 %
MONOCYTES NFR BLD AUTO: 9 %
MUCOUS THREADS #/AREA URNS LPF: PRESENT /LPF
MUCOUS THREADS #/AREA URNS LPF: PRESENT /LPF
NEUTROPHILS # BLD AUTO: 2.2 10E3/UL (ref 1.6–8.3)
NEUTROPHILS # BLD AUTO: 2.8 10E3/UL (ref 1.6–8.3)
NEUTROPHILS # BLD AUTO: 3.7 10E3/UL (ref 1.6–8.3)
NEUTROPHILS NFR BLD AUTO: 45 %
NEUTROPHILS NFR BLD AUTO: 54 %
NEUTROPHILS NFR BLD AUTO: 56 %
NITRATE UR QL: NEGATIVE
NITRATE UR QL: NEGATIVE
NRBC # BLD AUTO: 0 10E3/UL
NRBC BLD AUTO-RTO: 0 /100
PH UR STRIP: 6.5 [PH] (ref 5–7)
PH UR STRIP: 7.5 [PH] (ref 5–7)
PHENYTOIN SERPL-MCNC: 17.7 UG/ML
PHENYTOIN SERPL-MCNC: 23.7 UG/ML
PLATELET # BLD AUTO: 167 10E3/UL (ref 150–450)
PLATELET # BLD AUTO: 189 10E3/UL (ref 150–450)
PLATELET # BLD AUTO: 221 10E3/UL (ref 150–450)
PLATELET # BLD AUTO: 67 10E3/UL (ref 150–450)
POTASSIUM BLD-SCNC: 4.1 MMOL/L (ref 3.4–5.3)
POTASSIUM BLD-SCNC: 4.4 MMOL/L (ref 3.4–5.3)
PROT SERPL-MCNC: 6 G/DL (ref 6.8–8.8)
PROT SERPL-MCNC: 7.2 G/DL (ref 6.8–8.8)
RBC # BLD AUTO: 3.77 10E6/UL (ref 3.8–5.2)
RBC # BLD AUTO: 4.03 10E6/UL (ref 3.8–5.2)
RBC # BLD AUTO: 4.16 10E6/UL (ref 3.8–5.2)
RBC # BLD AUTO: 4.54 10E6/UL (ref 3.8–5.2)
RBC URINE: 1 /HPF
RBC URINE: 1 /HPF
SARS-COV-2 RNA RESP QL NAA+PROBE: NEGATIVE
SODIUM SERPL-SCNC: 136 MMOL/L (ref 133–144)
SODIUM SERPL-SCNC: 136 MMOL/L (ref 133–144)
SODIUM SERPL-SCNC: 140 MMOL/L (ref 133–144)
SODIUM SERPL-SCNC: 141 MMOL/L (ref 133–144)
SP GR UR STRIP: 1.01 (ref 1–1.03)
SP GR UR STRIP: 1.01 (ref 1–1.03)
SQUAMOUS EPITHELIAL: 1 /HPF
SQUAMOUS EPITHELIAL: 7 /HPF
TROPONIN I SERPL HS-MCNC: 3 NG/L
TROPONIN I SERPL HS-MCNC: 4 NG/L
TROPONIN I SERPL HS-MCNC: 4 NG/L
TROPONIN I SERPL HS-MCNC: 5 NG/L
UROBILINOGEN UR STRIP-MCNC: NORMAL MG/DL
UROBILINOGEN UR STRIP-MCNC: NORMAL MG/DL
WBC # BLD AUTO: 4.8 10E3/UL (ref 4–11)
WBC # BLD AUTO: 5 10E3/UL (ref 4–11)
WBC # BLD AUTO: 5.8 10E3/UL (ref 4–11)
WBC # BLD AUTO: 6.9 10E3/UL (ref 4–11)
WBC URINE: 1 /HPF
WBC URINE: 1 /HPF

## 2022-01-01 PROCEDURE — 80185 ASSAY OF PHENYTOIN TOTAL: CPT | Performed by: PSYCHIATRY & NEUROLOGY

## 2022-01-01 PROCEDURE — 80048 BASIC METABOLIC PNL TOTAL CA: CPT | Performed by: EMERGENCY MEDICINE

## 2022-01-01 PROCEDURE — 36415 COLL VENOUS BLD VENIPUNCTURE: CPT | Performed by: EMERGENCY MEDICINE

## 2022-01-01 PROCEDURE — 96361 HYDRATE IV INFUSION ADD-ON: CPT | Performed by: EMERGENCY MEDICINE

## 2022-01-01 PROCEDURE — 81003 URINALYSIS AUTO W/O SCOPE: CPT | Performed by: EMERGENCY MEDICINE

## 2022-01-01 PROCEDURE — 250N000011 HC RX IP 250 OP 636: Performed by: EMERGENCY MEDICINE

## 2022-01-01 PROCEDURE — 70496 CT ANGIOGRAPHY HEAD: CPT

## 2022-01-01 PROCEDURE — 12011 RPR F/E/E/N/L/M 2.5 CM/<: CPT | Performed by: FAMILY MEDICINE

## 2022-01-01 PROCEDURE — 84484 ASSAY OF TROPONIN QUANT: CPT | Performed by: EMERGENCY MEDICINE

## 2022-01-01 PROCEDURE — 87636 SARSCOV2 & INF A&B AMP PRB: CPT | Performed by: EMERGENCY MEDICINE

## 2022-01-01 PROCEDURE — 70450 CT HEAD/BRAIN W/O DYE: CPT

## 2022-01-01 PROCEDURE — G0378 HOSPITAL OBSERVATION PER HR: HCPCS

## 2022-01-01 PROCEDURE — 80185 ASSAY OF PHENYTOIN TOTAL: CPT | Performed by: PEDIATRICS

## 2022-01-01 PROCEDURE — 72125 CT NECK SPINE W/O DYE: CPT

## 2022-01-01 PROCEDURE — 99285 EMERGENCY DEPT VISIT HI MDM: CPT | Mod: CS | Performed by: EMERGENCY MEDICINE

## 2022-01-01 PROCEDURE — 258N000003 HC RX IP 258 OP 636: Performed by: EMERGENCY MEDICINE

## 2022-01-01 PROCEDURE — 93010 ELECTROCARDIOGRAM REPORT: CPT | Performed by: EMERGENCY MEDICINE

## 2022-01-01 PROCEDURE — 85018 HEMOGLOBIN: CPT | Performed by: PEDIATRICS

## 2022-01-01 PROCEDURE — 99214 OFFICE O/P EST MOD 30 MIN: CPT | Performed by: PHYSICIAN ASSISTANT

## 2022-01-01 PROCEDURE — 85025 COMPLETE CBC W/AUTO DIFF WBC: CPT | Performed by: EMERGENCY MEDICINE

## 2022-01-01 PROCEDURE — 250N000009 HC RX 250: Performed by: PEDIATRICS

## 2022-01-01 PROCEDURE — 99495 TRANSJ CARE MGMT MOD F2F 14D: CPT | Performed by: PHYSICIAN ASSISTANT

## 2022-01-01 PROCEDURE — 82962 GLUCOSE BLOOD TEST: CPT

## 2022-01-01 PROCEDURE — 83036 HEMOGLOBIN GLYCOSYLATED A1C: CPT | Performed by: PEDIATRICS

## 2022-01-01 PROCEDURE — 82330 ASSAY OF CALCIUM: CPT | Performed by: PEDIATRICS

## 2022-01-01 PROCEDURE — 85027 COMPLETE CBC AUTOMATED: CPT | Performed by: INTERNAL MEDICINE

## 2022-01-01 PROCEDURE — C9803 HOPD COVID-19 SPEC COLLECT: HCPCS | Performed by: EMERGENCY MEDICINE

## 2022-01-01 PROCEDURE — 99283 EMERGENCY DEPT VISIT LOW MDM: CPT

## 2022-01-01 PROCEDURE — 99223 1ST HOSP IP/OBS HIGH 75: CPT | Mod: AI | Performed by: INTERNAL MEDICINE

## 2022-01-01 PROCEDURE — 36415 COLL VENOUS BLD VENIPUNCTURE: CPT | Performed by: FAMILY MEDICINE

## 2022-01-01 PROCEDURE — 80177 DRUG SCRN QUAN LEVETIRACETAM: CPT | Performed by: PSYCHIATRY & NEUROLOGY

## 2022-01-01 PROCEDURE — 93005 ELECTROCARDIOGRAM TRACING: CPT | Performed by: EMERGENCY MEDICINE

## 2022-01-01 PROCEDURE — 70498 CT ANGIOGRAPHY NECK: CPT

## 2022-01-01 PROCEDURE — 99284 EMERGENCY DEPT VISIT MOD MDM: CPT | Mod: 25 | Performed by: EMERGENCY MEDICINE

## 2022-01-01 PROCEDURE — 250N000011 HC RX IP 250 OP 636: Performed by: PEDIATRICS

## 2022-01-01 PROCEDURE — 80053 COMPREHEN METABOLIC PANEL: CPT | Performed by: INTERNAL MEDICINE

## 2022-01-01 PROCEDURE — 96360 HYDRATION IV INFUSION INIT: CPT | Performed by: EMERGENCY MEDICINE

## 2022-01-01 PROCEDURE — 250N000013 HC RX MED GY IP 250 OP 250 PS 637: Performed by: INTERNAL MEDICINE

## 2022-01-01 PROCEDURE — 99215 OFFICE O/P EST HI 40 MIN: CPT | Performed by: PHYSICIAN ASSISTANT

## 2022-01-01 PROCEDURE — 99285 EMERGENCY DEPT VISIT HI MDM: CPT | Mod: CS,25 | Performed by: EMERGENCY MEDICINE

## 2022-01-01 PROCEDURE — 99282 EMERGENCY DEPT VISIT SF MDM: CPT | Performed by: FAMILY MEDICINE

## 2022-01-01 PROCEDURE — 250N000013 HC RX MED GY IP 250 OP 250 PS 637: Performed by: PEDIATRICS

## 2022-01-01 PROCEDURE — 80053 COMPREHEN METABOLIC PANEL: CPT | Performed by: EMERGENCY MEDICINE

## 2022-01-01 PROCEDURE — 96372 THER/PROPH/DIAG INJ SC/IM: CPT | Mod: 59 | Performed by: PEDIATRICS

## 2022-01-01 PROCEDURE — 99284 EMERGENCY DEPT VISIT MOD MDM: CPT | Performed by: EMERGENCY MEDICINE

## 2022-01-01 PROCEDURE — 84484 ASSAY OF TROPONIN QUANT: CPT | Performed by: INTERNAL MEDICINE

## 2022-01-01 PROCEDURE — 999N000147 HC STATISTIC PT IP EVAL DEFER: Performed by: PHYSICAL THERAPIST

## 2022-01-01 PROCEDURE — 80048 BASIC METABOLIC PNL TOTAL CA: CPT | Performed by: FAMILY MEDICINE

## 2022-01-01 PROCEDURE — 85379 FIBRIN DEGRADATION QUANT: CPT | Performed by: PEDIATRICS

## 2022-01-01 PROCEDURE — 250N000009 HC RX 250: Performed by: EMERGENCY MEDICINE

## 2022-01-01 PROCEDURE — 99239 HOSP IP/OBS DSCHRG MGMT >30: CPT | Performed by: PEDIATRICS

## 2022-01-01 PROCEDURE — 99285 EMERGENCY DEPT VISIT HI MDM: CPT | Mod: 25 | Performed by: FAMILY MEDICINE

## 2022-01-01 PROCEDURE — 85004 AUTOMATED DIFF WBC COUNT: CPT | Performed by: EMERGENCY MEDICINE

## 2022-01-01 PROCEDURE — 36415 COLL VENOUS BLD VENIPUNCTURE: CPT | Performed by: PEDIATRICS

## 2022-01-01 PROCEDURE — 71275 CT ANGIOGRAPHY CHEST: CPT

## 2022-01-01 PROCEDURE — 99207 PR FOOT EXAM NO CHARGE: CPT | Mod: 25 | Performed by: PHYSICIAN ASSISTANT

## 2022-01-01 PROCEDURE — 250N000009 HC RX 250: Performed by: FAMILY MEDICINE

## 2022-01-01 PROCEDURE — 73140 X-RAY EXAM OF FINGER(S): CPT | Mod: LT

## 2022-01-01 PROCEDURE — 71045 X-RAY EXAM CHEST 1 VIEW: CPT

## 2022-01-01 PROCEDURE — 84484 ASSAY OF TROPONIN QUANT: CPT | Performed by: FAMILY MEDICINE

## 2022-01-01 PROCEDURE — 85730 THROMBOPLASTIN TIME PARTIAL: CPT | Performed by: EMERGENCY MEDICINE

## 2022-01-01 PROCEDURE — 81001 URINALYSIS AUTO W/SCOPE: CPT | Performed by: EMERGENCY MEDICINE

## 2022-01-01 PROCEDURE — 80177 DRUG SCRN QUAN LEVETIRACETAM: CPT | Performed by: PEDIATRICS

## 2022-01-01 PROCEDURE — 85025 COMPLETE CBC W/AUTO DIFF WBC: CPT | Performed by: FAMILY MEDICINE

## 2022-01-01 PROCEDURE — 120N000001 HC R&B MED SURG/OB

## 2022-01-01 PROCEDURE — 99207 PR NO CHARGE NURSE ONLY: CPT

## 2022-01-01 PROCEDURE — 99284 EMERGENCY DEPT VISIT MOD MDM: CPT | Mod: 25 | Performed by: FAMILY MEDICINE

## 2022-01-01 PROCEDURE — 85610 PROTHROMBIN TIME: CPT | Performed by: EMERGENCY MEDICINE

## 2022-01-01 PROCEDURE — 36415 COLL VENOUS BLD VENIPUNCTURE: CPT | Performed by: INTERNAL MEDICINE

## 2022-01-01 PROCEDURE — 97161 PT EVAL LOW COMPLEX 20 MIN: CPT | Mod: GP | Performed by: PHYSICAL THERAPIST

## 2022-01-01 PROCEDURE — 99207 PR NOT IN PERSON INPATIENT CONSULT STATISTICAL MARKER: CPT | Performed by: INTERNAL MEDICINE

## 2022-01-01 RX ORDER — MEMANTINE HYDROCHLORIDE 10 MG/1
10 TABLET ORAL DAILY
Qty: 90 TABLET | Refills: 0 | Status: SHIPPED | OUTPATIENT
Start: 2022-01-01 | End: 2022-01-01

## 2022-01-01 RX ORDER — BUPIVACAINE HYDROCHLORIDE 5 MG/ML
10 INJECTION, SOLUTION EPIDURAL; INTRACAUDAL ONCE
Status: COMPLETED | OUTPATIENT
Start: 2022-01-01 | End: 2022-01-01

## 2022-01-01 RX ORDER — LIDOCAINE 40 MG/G
CREAM TOPICAL
Status: DISCONTINUED | OUTPATIENT
Start: 2022-01-01 | End: 2022-01-01 | Stop reason: HOSPADM

## 2022-01-01 RX ORDER — GABAPENTIN 300 MG/1
CAPSULE ORAL
Qty: 15 CAPSULE | Refills: 0 | Status: SHIPPED | OUTPATIENT
Start: 2022-01-01 | End: 2023-01-01

## 2022-01-01 RX ORDER — SODIUM CHLORIDE 9 MG/ML
INJECTION, SOLUTION INTRAVENOUS CONTINUOUS
Status: DISCONTINUED | OUTPATIENT
Start: 2022-01-01 | End: 2022-01-01 | Stop reason: HOSPADM

## 2022-01-01 RX ORDER — METFORMIN HCL 500 MG
500 TABLET, EXTENDED RELEASE 24 HR ORAL
Qty: 90 TABLET | Refills: 1 | Status: SHIPPED | OUTPATIENT
Start: 2022-01-01

## 2022-01-01 RX ORDER — LISINOPRIL 20 MG/1
20 TABLET ORAL DAILY
Qty: 90 TABLET | Refills: 0 | Status: SHIPPED | OUTPATIENT
Start: 2022-01-01 | End: 2022-01-01

## 2022-01-01 RX ORDER — MEMANTINE HYDROCHLORIDE 5 MG/1
10 TABLET ORAL DAILY
Status: DISCONTINUED | OUTPATIENT
Start: 2022-01-01 | End: 2022-01-01 | Stop reason: HOSPADM

## 2022-01-01 RX ORDER — PHENYTOIN 50 MG/1
50 TABLET, CHEWABLE ORAL 2 TIMES DAILY
Status: DISCONTINUED | OUTPATIENT
Start: 2022-01-01 | End: 2022-01-01

## 2022-01-01 RX ORDER — PHENYTOIN 50 MG/1
100 TABLET, CHEWABLE ORAL 2 TIMES DAILY
Qty: 60 TABLET | Refills: 3 | Status: SHIPPED | OUTPATIENT
Start: 2022-01-01 | End: 2022-01-01

## 2022-01-01 RX ORDER — CLOPIDOGREL BISULFATE 75 MG/1
TABLET ORAL
Qty: 90 TABLET | Refills: 0 | Status: ON HOLD | OUTPATIENT
Start: 2022-01-01 | End: 2023-01-01

## 2022-01-01 RX ORDER — ATORVASTATIN CALCIUM 40 MG/1
TABLET, FILM COATED ORAL
Qty: 90 TABLET | Refills: 0 | Status: SHIPPED | OUTPATIENT
Start: 2022-01-01 | End: 2023-01-01

## 2022-01-01 RX ORDER — ATORVASTATIN CALCIUM 40 MG/1
TABLET, FILM COATED ORAL
Qty: 30 TABLET | Refills: 0 | Status: SHIPPED | OUTPATIENT
Start: 2022-01-01 | End: 2022-01-01

## 2022-01-01 RX ORDER — CLOPIDOGREL BISULFATE 75 MG/1
TABLET ORAL
Qty: 90 TABLET | Refills: 0 | Status: SHIPPED | OUTPATIENT
Start: 2022-01-01 | End: 2022-01-01

## 2022-01-01 RX ORDER — PHENYTOIN SODIUM 100 MG/1
100 CAPSULE, EXTENDED RELEASE ORAL 2 TIMES DAILY
Status: DISCONTINUED | OUTPATIENT
Start: 2022-01-01 | End: 2022-01-01 | Stop reason: HOSPADM

## 2022-01-01 RX ORDER — ESCITALOPRAM OXALATE 5 MG/1
5 TABLET ORAL DAILY
Status: DISCONTINUED | OUTPATIENT
Start: 2022-01-01 | End: 2022-01-01 | Stop reason: HOSPADM

## 2022-01-01 RX ORDER — GABAPENTIN 300 MG/1
300 CAPSULE ORAL AT BEDTIME
Status: DISCONTINUED | OUTPATIENT
Start: 2022-01-01 | End: 2022-01-01 | Stop reason: HOSPADM

## 2022-01-01 RX ORDER — ACETAMINOPHEN 325 MG/1
650 TABLET ORAL EVERY 4 HOURS PRN
Status: DISCONTINUED | OUTPATIENT
Start: 2022-01-01 | End: 2022-01-01 | Stop reason: HOSPADM

## 2022-01-01 RX ORDER — MEMANTINE HYDROCHLORIDE 10 MG/1
TABLET ORAL
Qty: 90 TABLET | Refills: 1 | Status: SHIPPED | OUTPATIENT
Start: 2022-01-01 | End: 2023-01-01

## 2022-01-01 RX ORDER — LEVETIRACETAM 500 MG/1
1000 TABLET ORAL 2 TIMES DAILY
Status: DISCONTINUED | OUTPATIENT
Start: 2022-01-01 | End: 2022-01-01 | Stop reason: HOSPADM

## 2022-01-01 RX ORDER — LISINOPRIL 20 MG/1
TABLET ORAL
Qty: 30 TABLET | Refills: 0 | Status: ON HOLD | OUTPATIENT
Start: 2022-01-01 | End: 2023-01-01

## 2022-01-01 RX ORDER — MELOXICAM 7.5 MG/1
7.5 TABLET ORAL DAILY
Qty: 90 TABLET | Refills: 0 | Status: SHIPPED | OUTPATIENT
Start: 2022-01-01 | End: 2022-01-01

## 2022-01-01 RX ORDER — ESCITALOPRAM OXALATE 5 MG/1
TABLET ORAL
Qty: 30 TABLET | Refills: 0 | Status: SHIPPED | OUTPATIENT
Start: 2022-01-01 | End: 2022-01-01

## 2022-01-01 RX ORDER — ONDANSETRON 4 MG/1
4 TABLET, ORALLY DISINTEGRATING ORAL EVERY 6 HOURS PRN
Status: DISCONTINUED | OUTPATIENT
Start: 2022-01-01 | End: 2022-01-01 | Stop reason: HOSPADM

## 2022-01-01 RX ORDER — PHENYTOIN 50 MG/1
TABLET, CHEWABLE ORAL
Qty: 120 TABLET | Refills: 0 | Status: SHIPPED | OUTPATIENT
Start: 2022-01-01 | End: 2022-01-01

## 2022-01-01 RX ORDER — DEXTROSE MONOHYDRATE 25 G/50ML
25-50 INJECTION, SOLUTION INTRAVENOUS
Status: DISCONTINUED | OUTPATIENT
Start: 2022-01-01 | End: 2022-01-01 | Stop reason: HOSPADM

## 2022-01-01 RX ORDER — PHENYTOIN 50 MG/1
TABLET, CHEWABLE ORAL
Qty: 60 TABLET | Refills: 0 | Status: SHIPPED | OUTPATIENT
Start: 2022-01-01 | End: 2022-01-01

## 2022-01-01 RX ORDER — CLOPIDOGREL BISULFATE 75 MG/1
75 TABLET ORAL DAILY
Status: DISCONTINUED | OUTPATIENT
Start: 2022-01-01 | End: 2022-01-01 | Stop reason: HOSPADM

## 2022-01-01 RX ORDER — PHENYTOIN 50 MG/1
100 TABLET, CHEWABLE ORAL 2 TIMES DAILY
Qty: 360 TABLET | Refills: 3 | Status: SHIPPED | OUTPATIENT
Start: 2022-01-01

## 2022-01-01 RX ORDER — ATORVASTATIN CALCIUM 40 MG/1
40 TABLET, FILM COATED ORAL DAILY
Qty: 90 TABLET | Refills: 0 | Status: SHIPPED | OUTPATIENT
Start: 2022-01-01 | End: 2022-01-01

## 2022-01-01 RX ORDER — ATORVASTATIN CALCIUM 40 MG/1
40 TABLET, FILM COATED ORAL DAILY
Status: DISCONTINUED | OUTPATIENT
Start: 2022-01-01 | End: 2022-01-01 | Stop reason: HOSPADM

## 2022-01-01 RX ORDER — ACETAMINOPHEN 650 MG/1
650 SUPPOSITORY RECTAL EVERY 4 HOURS PRN
Status: DISCONTINUED | OUTPATIENT
Start: 2022-01-01 | End: 2022-01-01 | Stop reason: HOSPADM

## 2022-01-01 RX ORDER — IOPAMIDOL 755 MG/ML
500 INJECTION, SOLUTION INTRAVASCULAR ONCE
Status: COMPLETED | OUTPATIENT
Start: 2022-01-01 | End: 2022-01-01

## 2022-01-01 RX ORDER — LISINOPRIL 10 MG/1
20 TABLET ORAL DAILY
Status: DISCONTINUED | OUTPATIENT
Start: 2022-01-01 | End: 2022-01-01 | Stop reason: CLARIF

## 2022-01-01 RX ORDER — GLIPIZIDE 2.5 MG/1
TABLET, EXTENDED RELEASE ORAL
Qty: 90 TABLET | Refills: 3 | Status: SHIPPED | OUTPATIENT
Start: 2022-01-01 | End: 2022-01-01

## 2022-01-01 RX ORDER — NICOTINE POLACRILEX 4 MG
15-30 LOZENGE BUCCAL
Status: DISCONTINUED | OUTPATIENT
Start: 2022-01-01 | End: 2022-01-01 | Stop reason: HOSPADM

## 2022-01-01 RX ORDER — ONDANSETRON 2 MG/ML
4 INJECTION INTRAMUSCULAR; INTRAVENOUS EVERY 6 HOURS PRN
Status: DISCONTINUED | OUTPATIENT
Start: 2022-01-01 | End: 2022-01-01 | Stop reason: HOSPADM

## 2022-01-01 RX ORDER — CLOPIDOGREL BISULFATE 75 MG/1
75 TABLET ORAL DAILY
Qty: 90 TABLET | Refills: 0 | Status: ON HOLD | OUTPATIENT
Start: 2022-01-01 | End: 2022-01-01

## 2022-01-01 RX ORDER — ASPIRIN 81 MG/1
81 TABLET, CHEWABLE ORAL DAILY
Qty: 100 TABLET | Refills: 3 | Status: ON HOLD | COMMUNITY
Start: 2022-01-01 | End: 2023-01-01

## 2022-01-01 RX ORDER — PANTOPRAZOLE SODIUM 20 MG/1
40 TABLET, DELAYED RELEASE ORAL 2 TIMES DAILY
Status: DISCONTINUED | OUTPATIENT
Start: 2022-01-01 | End: 2022-01-01 | Stop reason: HOSPADM

## 2022-01-01 RX ORDER — ASPIRIN 81 MG/1
81 TABLET, CHEWABLE ORAL DAILY
Status: DISCONTINUED | OUTPATIENT
Start: 2022-01-01 | End: 2022-01-01 | Stop reason: HOSPADM

## 2022-01-01 RX ORDER — PHENYTOIN 50 MG/1
100 TABLET, CHEWABLE ORAL 2 TIMES DAILY
Qty: 60 TABLET | Refills: 2 | Status: SHIPPED | OUTPATIENT
Start: 2022-01-01 | End: 2022-01-01

## 2022-01-01 RX ORDER — ESCITALOPRAM OXALATE 5 MG/1
5 TABLET ORAL DAILY
Qty: 30 TABLET | Refills: 1 | Status: SHIPPED | OUTPATIENT
Start: 2022-01-01 | End: 2022-01-01

## 2022-01-01 RX ORDER — ESCITALOPRAM OXALATE 5 MG/1
5 TABLET ORAL DAILY
Qty: 90 TABLET | Refills: 3 | Status: SHIPPED | OUTPATIENT
Start: 2022-01-01

## 2022-01-01 RX ORDER — HYDRALAZINE HYDROCHLORIDE 10 MG/1
TABLET, FILM COATED ORAL
Qty: 30 TABLET | Refills: 1 | Status: SHIPPED | OUTPATIENT
Start: 2022-01-01

## 2022-01-01 RX ORDER — MELOXICAM 7.5 MG/1
TABLET ORAL
Qty: 100 TABLET | Refills: 2 | Status: ON HOLD | OUTPATIENT
Start: 2022-01-01 | End: 2023-01-01

## 2022-01-01 RX ORDER — OMEPRAZOLE 40 MG/1
CAPSULE, DELAYED RELEASE ORAL
Qty: 100 CAPSULE | Refills: 2 | Status: SHIPPED | OUTPATIENT
Start: 2022-01-01

## 2022-01-01 RX ORDER — ENOXAPARIN SODIUM 100 MG/ML
40 INJECTION SUBCUTANEOUS EVERY 24 HOURS
Status: DISCONTINUED | OUTPATIENT
Start: 2022-01-01 | End: 2022-01-01 | Stop reason: HOSPADM

## 2022-01-01 RX ADMIN — PANTOPRAZOLE SODIUM 40 MG: 20 TABLET, DELAYED RELEASE ORAL at 20:22

## 2022-01-01 RX ADMIN — ENOXAPARIN SODIUM 40 MG: 40 INJECTION SUBCUTANEOUS at 16:16

## 2022-01-01 RX ADMIN — ASPIRIN 81 MG CHEWABLE TABLET 81 MG: 81 TABLET CHEWABLE at 09:44

## 2022-01-01 RX ADMIN — SODIUM CHLORIDE 100 ML: 9 INJECTION, SOLUTION INTRAVENOUS at 18:04

## 2022-01-01 RX ADMIN — IOPAMIDOL 53 ML: 755 INJECTION, SOLUTION INTRAVENOUS at 18:01

## 2022-01-01 RX ADMIN — CLOPIDOGREL BISULFATE 75 MG: 75 TABLET ORAL at 08:50

## 2022-01-01 RX ADMIN — MEMANTINE 10 MG: 5 TABLET ORAL at 08:50

## 2022-01-01 RX ADMIN — LEVETIRACETAM 1000 MG: 500 TABLET, FILM COATED ORAL at 00:45

## 2022-01-01 RX ADMIN — SODIUM CHLORIDE 1000 ML: 9 INJECTION, SOLUTION INTRAVENOUS at 12:19

## 2022-01-01 RX ADMIN — IOPAMIDOL 70 ML: 755 INJECTION, SOLUTION INTRAVENOUS at 18:04

## 2022-01-01 RX ADMIN — SODIUM CHLORIDE 71 ML: 9 INJECTION, SOLUTION INTRAVENOUS at 18:01

## 2022-01-01 RX ADMIN — ESCITALOPRAM 5 MG: 5 TABLET, FILM COATED ORAL at 08:51

## 2022-01-01 RX ADMIN — SODIUM CHLORIDE: 9 INJECTION, SOLUTION INTRAVENOUS at 12:36

## 2022-01-01 RX ADMIN — PANTOPRAZOLE SODIUM 40 MG: 20 TABLET, DELAYED RELEASE ORAL at 00:45

## 2022-01-01 RX ADMIN — PANTOPRAZOLE SODIUM 40 MG: 20 TABLET, DELAYED RELEASE ORAL at 09:44

## 2022-01-01 RX ADMIN — LEVETIRACETAM 1000 MG: 500 TABLET, FILM COATED ORAL at 08:50

## 2022-01-01 RX ADMIN — LEVETIRACETAM 1000 MG: 500 TABLET, FILM COATED ORAL at 09:44

## 2022-01-01 RX ADMIN — PANTOPRAZOLE SODIUM 40 MG: 20 TABLET, DELAYED RELEASE ORAL at 08:50

## 2022-01-01 RX ADMIN — ESCITALOPRAM 5 MG: 5 TABLET, FILM COATED ORAL at 09:44

## 2022-01-01 RX ADMIN — ASPIRIN 81 MG CHEWABLE TABLET 81 MG: 81 TABLET CHEWABLE at 08:50

## 2022-01-01 RX ADMIN — MEMANTINE 10 MG: 5 TABLET ORAL at 09:44

## 2022-01-01 RX ADMIN — PHENYTOIN SODIUM 100 MG: 100 CAPSULE, EXTENDED RELEASE ORAL at 08:50

## 2022-01-01 RX ADMIN — CLOPIDOGREL BISULFATE 75 MG: 75 TABLET ORAL at 09:44

## 2022-01-01 RX ADMIN — GABAPENTIN 300 MG: 300 CAPSULE ORAL at 20:22

## 2022-01-01 RX ADMIN — ATORVASTATIN CALCIUM 40 MG: 40 TABLET, FILM COATED ORAL at 09:45

## 2022-01-01 RX ADMIN — LEVETIRACETAM 1000 MG: 500 TABLET, FILM COATED ORAL at 20:22

## 2022-01-01 RX ADMIN — PHENYTOIN SODIUM 100 MG: 100 CAPSULE, EXTENDED RELEASE ORAL at 20:22

## 2022-01-01 RX ADMIN — BUPIVACAINE HYDROCHLORIDE 50 MG: 5 INJECTION, SOLUTION EPIDURAL; INTRACAUDAL; PERINEURAL at 05:36

## 2022-01-01 RX ADMIN — ATORVASTATIN CALCIUM 40 MG: 40 TABLET, FILM COATED ORAL at 08:50

## 2022-01-01 RX ADMIN — GABAPENTIN 300 MG: 300 CAPSULE ORAL at 00:45

## 2022-01-01 ASSESSMENT — ACTIVITIES OF DAILY LIVING (ADL)
DRESS: 1-->ASSISTANCE (EQUIPMENT/PERSON) NEEDED
CHANGE_IN_FUNCTIONAL_STATUS_SINCE_ONSET_OF_CURRENT_ILLNESS/INJURY: NO
ADLS_ACUITY_SCORE: 43
ADLS_ACUITY_SCORE: 47
ADLS_ACUITY_SCORE: 43
EQUIPMENT_CURRENTLY_USED_AT_HOME: WHEELCHAIR, MANUAL
TOILETING_ASSISTANCE: TOILETING DIFFICULTY, ASSISTANCE 1 PERSON
HEARING_DIFFICULTY_OR_DEAF: NO
ADLS_ACUITY_SCORE: 37
DRESS: 1-->ASSISTANCE (EQUIPMENT/PERSON) NEEDED (NOT DEVELOPMENTALLY APPROPRIATE)
BATHING: 1-->ASSISTANCE NEEDED
ADLS_ACUITY_SCORE: 43
WALKING_OR_CLIMBING_STAIRS_DIFFICULTY: YES
TOILETING: 1-->ASSISTANCE (EQUIPMENT/PERSON) NEEDED
ADLS_ACUITY_SCORE: 43
ADLS_ACUITY_SCORE: 47
TOILETING_MANAGEMENT: BRIEF
TOILETING_ISSUES: YES
WEAR_GLASSES_OR_BLIND: NO
DRESSING/BATHING: DRESSING DIFFICULTY, ASSISTANCE 1 PERSON
CONCENTRATING,_REMEMBERING_OR_MAKING_DECISIONS_DIFFICULTY: YES
ADLS_ACUITY_SCORE: 35
ADLS_ACUITY_SCORE: 41
ADLS_ACUITY_SCORE: 35
TOILETING: 1-->ASSISTANCE (EQUIPMENT/PERSON) NEEDED (NOT DEVELOPMENTALLY APPROPRIATE)
ADLS_ACUITY_SCORE: 43
DRESSING/BATHING_MANAGEMENT: FAMILY
ADLS_ACUITY_SCORE: 43
DIFFICULTY_COMMUNICATING: NO
TRANSFERRING: 1-->ASSISTANCE (EQUIPMENT/PERSON) NEEDED
ADLS_ACUITY_SCORE: 43
TRANSFERRING: 1-->ASSISTANCE (EQUIPMENT/PERSON) NEEDED (NOT DEVELOPMENTALLY APPROPRIATE)
ADLS_ACUITY_SCORE: 43
ADLS_ACUITY_SCORE: 43
NUMBER_OF_TIMES_PATIENT_HAS_FALLEN_WITHIN_LAST_SIX_MONTHS: 1
DOING_ERRANDS_INDEPENDENTLY_DIFFICULTY: YES
FALL_HISTORY_WITHIN_LAST_SIX_MONTHS: YES
ADLS_ACUITY_SCORE: 43
ADLS_ACUITY_SCORE: 43
DIFFICULTY_EATING/SWALLOWING: NO
DRESSING/BATHING_DIFFICULTY: YES
ADLS_ACUITY_SCORE: 43

## 2022-01-01 ASSESSMENT — PATIENT HEALTH QUESTIONNAIRE - PHQ9
SUM OF ALL RESPONSES TO PHQ QUESTIONS 1-9: 11
SUM OF ALL RESPONSES TO PHQ QUESTIONS 1-9: 11
10. IF YOU CHECKED OFF ANY PROBLEMS, HOW DIFFICULT HAVE THESE PROBLEMS MADE IT FOR YOU TO DO YOUR WORK, TAKE CARE OF THINGS AT HOME, OR GET ALONG WITH OTHER PEOPLE: SOMEWHAT DIFFICULT

## 2022-01-01 ASSESSMENT — PAIN SCALES - GENERAL
PAINLEVEL: NO PAIN (0)
PAINLEVEL: MODERATE PAIN (4)

## 2022-01-01 ASSESSMENT — ENCOUNTER SYMPTOMS: FATIGUE: 1

## 2022-01-04 ENCOUNTER — PATIENT OUTREACH (OUTPATIENT)
Dept: CARE COORDINATION | Facility: CLINIC | Age: 72
End: 2022-01-04
Payer: MEDICAID

## 2022-01-04 NOTE — PROGRESS NOTES
Clinic Care Coordination Contact    Situation: Patient chart reviewed by care coordinator.    Background: Patient was hospitalized at [facility] from 11/26/2021 to 12/9/2021 with diagnosis of Acute encephalopathy,Hypertensive emergency,Hyperlipidemia  Hx of right MCA ischemic stroke, Dementia,Type 2 Diabetes Mellitus   Pt was discharged to [facility] TCU.    Assessment: Called and spoke with SW at TCU. States pt is still rehabbing and no plans for discharge.   does state they have been working with the daughter on her concerns at this time.     Plan/Recommendations: RNCC will continue to monitor for discharge disposition.     Yasmin ESPINOSA, RN, PHN, CCM  Primary Clinic Care Coordination    [facility]  Primary Care Clinics  [email or web address]   Office: [phone]  Employed by [facility]

## 2022-01-19 ENCOUNTER — OFFICE VISIT (OUTPATIENT)
Dept: NEUROLOGY | Facility: CLINIC | Age: 72
End: 2022-01-19
Payer: MEDICAID

## 2022-01-19 ENCOUNTER — TELEPHONE (OUTPATIENT)
Dept: FAMILY MEDICINE | Facility: OTHER | Age: 72
End: 2022-01-19
Payer: MEDICAID

## 2022-01-19 VITALS
HEART RATE: 96 BPM | DIASTOLIC BLOOD PRESSURE: 76 MMHG | TEMPERATURE: 96.9 F | WEIGHT: 138 LBS | BODY MASS INDEX: 24.45 KG/M2 | SYSTOLIC BLOOD PRESSURE: 123 MMHG | HEIGHT: 63 IN

## 2022-01-19 DIAGNOSIS — R56.9 SEIZURE (H): ICD-10-CM

## 2022-01-19 DIAGNOSIS — G40.219 PARTIAL EPILEPSY WITH IMPAIRMENT OF CONSCIOUSNESS, WITH INTRACTABLE EPILEPSY (H): Primary | ICD-10-CM

## 2022-01-19 LAB — PHENYTOIN SERPL-MCNC: 17.7 MG/L

## 2022-01-19 PROCEDURE — 80185 ASSAY OF PHENYTOIN TOTAL: CPT | Performed by: PSYCHIATRY & NEUROLOGY

## 2022-01-19 PROCEDURE — 80186 ASSAY OF PHENYTOIN FREE: CPT | Performed by: PSYCHIATRY & NEUROLOGY

## 2022-01-19 PROCEDURE — 80177 DRUG SCRN QUAN LEVETIRACETAM: CPT | Performed by: PSYCHIATRY & NEUROLOGY

## 2022-01-19 ASSESSMENT — MIFFLIN-ST. JEOR: SCORE: 1100.09

## 2022-01-19 NOTE — LETTER
"  RE: Khalida Redding  : 1950   MRN: [id number]      Dear Colleague,    Thank you for referring your patient, Khalida Redding, to the [facility] EPILEPSY CARE at [facility]. Please see a copy of my visit note below.    [facility]  Epilepsy Clinic:  NEW PATIENT EVALUATION         Service Date: 2022    HISTORY:  Ms. Khalida Redding is a 71-year-old, right-handed woman with focal epilepsy, recurrent syncope, history of bilateral frontal lobe cerebral infarctions and dementia, who was referred for epilepsy consultation.  The patient was not able to provide useful medical history.  She was accompanied to the visit today by her  and her daughter, who provided detailed history.  I reviewed extensive medical records on the [facility] chart.    Ictal semiology-history:   The patient has a long history of attacks with falling and loss of consciousness, which were variably diagnosed as epileptic seizures versus syncope.  The patient's  and her daughter provided history suggesting that she had 2 types of falls with unconsciousness.    Events consistent with generalized tonic-clonic seizures began approximately in .  Her family members never noted any sort of change in behavior or evidence that she had any aura or warning before these.  She might be supine, seated or standing at onset.  When standing, she would have a sudden fall.  Her family members would find her down on the floor with increased muscle tone, usually with trunk and limbs extended.  She did not have anything that they would term shaking or jerking, but she often seemed to be \"trembling all over.\" This would last about a minute.  They usually found that she had urinary incontinence and occasionally they found that she had bitten her tongue.  She also has had confusion and prolonged lethargy afterwards.  She probably had more than 50 of these events in total, the most recent of " which occurred in 10/2021.      They noted that she was on levetiracetam between  and , during which time she had very few of these events, but they recurred when she was taken off levetiracetam in .  In 2021, she was started on levetiracetam and phenytoin, after which none of these events occurred.    A second type of fall with unconsciousness was always preceded by lightheadedness and nausea, and usually okay occurred during micturition or defecation.  She would fall if not assisted and was always diffusely limp while unresponsive.  These usually would last less than 30 seconds and there was no postictal state.  She occasionally had urinary incontinence, but never had tongue biting with these.  These probably began approximately in  and occurred many times over the years.  She did not have any of these in the last 6 months or so.    The patient and her family members did not describe any other paroxysmal phenomena either subjective or observed.     Epilepsy-seizure predispositions:   The patient has no family history of epilepsy or seizures.      The patient has had multiple cerebral infarctions involving both frontal lobes, right more than left.  The first stroke probably occurred before the first grand mal seizure, although this is not entirely clear from the history.  I do not have access to records of evaluations in [place] that were performed at the time of the first stroke.    She has no history of gestational or  injury, febrile convulsions, developmental delay, meningitis, encephalitis, severe head injury, or other epileptic predispositions.  Several of her falls have caused bumps on the head with scalp hematomas, but brain imaging at these times did not show intracranial hemorrhage or other new findings.    Laboratory evaluations:   The patient has had multiple brain MRI scans that have shown extensive right frontal encephalomalacia and patchy left frontal  encephalomalacia, consistent with history of cerebral infarctions.  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      She had video EEG monitoring at [facility] on 11/27 to 11/30/2021, which showed generalized delta theta slowing, bihemispheric independent slowing and subclinical electrographic seizures over the right hemisphere on days 1 and 2 (treated with fosphenytoin and levetiracetam).    The patient had evaluation of syncope by a [facility] clinic, which included a loop recording that did not show significant arrhythmias during an episode of falling with unconsciousness in 2017, according to the notes.  She was considered more likely to have vasovagal or other causes of syncope than to be having significant arrhythmias.  Presumably, supraventricular tachycardia was asymptomatic.    Epilepsy therapeutics:   The patient was treated with levetiracetam in [id number].  Apparently, this was discontinued by a physician who thought that she had exclusively syncope as a cause of falls and unconsciousness.  Levetiracetam was restarted and phenytoin was added in 11/2021.  Her family members do not think that she is having adverse effects with this.  The patient was not in steady state on current dosing at the time of available blood levels.    Other history:   The patient began to have difficulty with recalling recent and remote events approximately 10 years ago, according to her family members.  She later became unable to perform complex and then moderately simple activities of daily living.  She was diagnosed with dementia in 2017.  The family members think that treatment with memantine did not improve her condition.  Currently, she needs care in essentially all activities of daily living, although she is able to feed herself and to swallow without difficulty.   She is non-ambulatory and living in a nursing home.    PAST MEDICAL-SURGICAL HISTORY:    1.  Focal epilepsy, with secondarily generalized tonic-clonic seizures.  2.  Chronic neck bifrontal cerebral infarctions.  3.  History of recurrent syncope, probably due to neurocardiogenic or hypovolemic causes for diabetes mellitus.  4.  Systemic hypertension.  5.  History of supraventricular tachycardia, possibly asymptomatic.  6.  Osteoarthritis.      FAMILY HISTORY:  There is no family history of seizures, epilepsy or other chronic neurological conditions.  A sister had meningitis in childhood, but apparently did not develop epilepsy.    PERSONAL AND SOCIAL HISTORY:  The patient has been living with her  in [place] for approximately 4 years.  She currently is living in a nursing home, but her  would like to take her home with him.  She currently does not use alcohol or tobacco, but had a long history of tobacco use earlier in life.    REVIEW OF SYSTEMS:  The patient herself had no complaints.  She endorsed having thoughts about dying, but not about suicide.      MEDICATIONS:  Levetiracetam 1500 mg b.i.d., phenytoin 100 mg b.i.d. (using 50 mg chewable tablets), gabapentin 300 mg at bedtime (for leg pain), memantine 10 mg daily, metformin, and other medications as per the electronic medical record.     PHYSICAL EXAMINATION:    On physical examination the patient appeared well nourished and in no acute distress.  Pulse was 96/min, and /76, without significant orthostatic changes; respirations 15/min.  Skull was normocephalic and atraumatic.  Neck was supple, without signs of meningeal irritation.      On examination, the patient was a continuously alert woman, sitting in a wheelchair in apparent comfort.  Speech was fluent, but demonstrated simplified content.  She was able to state her full name, but she was incorrect as to her year of birth and her 's year of birth.  She was disoriented to date  and place.  She demonstrated mild facial apraxias and severe somatic apraxias.  She scored 12/30 on the Folstein mini mental status examination.   Spontaneous gaze appeared to be full and conjugate, but was difficult to test due to apraxia.  Pupils were round and reactive to light.  Her face moved bilaterally, but there was flattening of the left nasolabial fold.  Muscle tone and masses were normal throughout.  She demonstrated 4/5 strength on the right and 3/5 strength on the left.  During spontaneous movements, but did not or could not cooperate with specific muscle strength testing.  She appeared to feel light touch throughout.  She did not perform coordination testing.  She was unable to stand from the wheelchair.  Deep tendon reflexes were mildly hyperactive on the left and normal on the right, except that Achilles tendon jerks could not be obtained.  She had bilaterally upgoing toes.    IMPRESSION:    The patient likely has focal epilepsy due to bilateral frontal lobe infarctions.  She probably has had falls due both to generalized tonic-clonic seizures and to syncope.  With her current anti-seizure medications, she does not appear to be having clinically evident seizures at this time.  She also has had attention to hydration, and has not had recent syncope as best her family could describe.  I think that in the long-term, she may be able to be managed with levetiracetam monotherapy, but given the number of electrographic seizures she had on video EEG monitoring 2 months ago, I would reassess this question at about 6 months.  I would suggest performing an outpatient prolonged electroencephalogram before considering discontinuation of phenytoin as findings of significant epileptiform discharges might alter this decision.  We agreed to check antiepileptic blood levels today.    The patient probably has multi-infarct dementia.  Her family members were concerned about having her see a stroke specialist and her  primary care provider, in particular.  She might also benefit by evaluation with dementia specialist, but her family members were more interested in completing the other evaluations first.  They will arrange these evaluations with her primary care provider, Abdirahman Munoz PA-C.      I told the patient and her family members that it certainly would be difficult for them to take her home.  They would need to arrange significant support, which would require a great deal of planning.  They will discuss this further with Mr. Munoz.    PLAN:    1.  Check total and free phenytoin and levetiracetam levels today.  2.  Continue the current doses of levetiracetam and phenytoin, which apparently are being prescribed by nursing home medical staff.  These could be prescribed at this clinic if they would like for her to follow here.  3.  The family will consider a return visit to this clinic in about 6 months to discuss electroencephalography and any changes in anti-seizure medication.  I spent 65 minutes in this patient care, more than 50% of which consisted of counseling and coordinating care.     Antonio Machado M.D.   Professor of Neurology

## 2022-01-19 NOTE — TELEPHONE ENCOUNTER
"Patient's daughter calling in regards to her mom.     States she is in nursing home at [facility] in [place].    Daughter wants to get her mom out of this facility ASAP.    When daughter threatens to take her out of there, they state they will call the  on her.     Patient cannot walk. She will look into getting her mom and dad into a \"assisted living\" facility.    Daughter is looking to have help on getting her out of there and get assistance to help at home.    Patient wants out of this facility and calls her  frequently stating this.     Daughter states she is being \"neglected\" and is left in the bathroom when patient states she is done.     The facility is stating that the patient needs a note or order to have patient released.     Patient see's Neurology at the [facility], patient's daughter wants this noted.     If anything is needed from daughter or patient's  give them a call.    Call daughter anytime after 3:20 pm in the afternoon. She does not get service in her work place. Call [phone]    Would like a covering provider to review but if this needs to wait until PCP returns to clinic that is ok. Would like to start getting things in order at home to have her mom there until she can figure out how to get her into assisted living with her dad.     Routing to PCP-    DARY West/[place] [facility]  January 19, 2022    "

## 2022-01-19 NOTE — TELEPHONE ENCOUNTER
I would recommend that this waits until [facility] is back.  But if this can't wait they will need a visit with another provider to review this entire case.  They should also reach out to her neurologist as I would assume she is in the current living facility due to recent CVA so they may have better thoughts on what is safest for her in regards to her living situation.     Route back to [facility] if they are waiting for his return.     KISHAN PatinoC

## 2022-01-20 LAB
LEVETIRACETAM (KEPPRA): 33.4 UG/ML (ref 6–46)
PHENYTOIN (DILATIN) FREE: 1.1 UG/ML

## 2022-01-20 NOTE — PROGRESS NOTES
"  [facility]  Epilepsy Clinic:  NEW PATIENT EVALUATION         Service Date: 01/19/2022    HISTORY:  Ms. Khalida Redding is a 71-year-old, right-handed woman with focal epilepsy, recurrent syncope, history of bilateral frontal lobe cerebral infarctions and dementia, who was referred for epilepsy consultation.  The patient was not able to provide useful medical history.  She was accompanied to the visit today by her  and her daughter, who provided detailed history.  I reviewed extensive medical records on the [facility] chart.    Ictal semiology-history:   The patient has a long history of attacks with falling and loss of consciousness, which were variably diagnosed as epileptic seizures versus syncope.  The patient's  and her daughter provided history suggesting that she had 2 types of falls with unconsciousness.    Events consistent with generalized tonic-clonic seizures began approximately in 2007.  Her family members never noted any sort of change in behavior or evidence that she had any aura or warning before these.  She might be supine, seated or standing at onset.  When standing, she would have a sudden fall.  Her family members would find her down on the floor with increased muscle tone, usually with trunk and limbs extended.  She did not have anything that they would term shaking or jerking, but she often seemed to be \"trembling all over.\" This would last about a minute.  They usually found that she had urinary incontinence and occasionally they found that she had bitten her tongue.  She also has had confusion and prolonged lethargy afterwards.  She probably had more than 50 of these events in total, the most recent of which occurred in 10/2021.      They noted that she was on levetiracetam between 2012 and 2019, during which time she had very few of these events, but they recurred when she was taken off levetiracetam in 2019.  In 11/2021, she was started on levetiracetam and phenytoin, " after which none of these events occurred.    A second type of fall with unconsciousness was always preceded by lightheadedness and nausea, and usually okay occurred during micturition or defecation.  She would fall if not assisted and was always diffusely limp while unresponsive.  These usually would last less than 30 seconds and there was no postictal state.  She occasionally had urinary incontinence, but never had tongue biting with these.  These probably began approximately in  and occurred many times over the years.  She did not have any of these in the last 6 months or so.    The patient and her family members did not describe any other paroxysmal phenomena either subjective or observed.     Epilepsy-seizure predispositions:   The patient has no family history of epilepsy or seizures.      The patient has had multiple cerebral infarctions involving both frontal lobes, right more than left.  The first stroke probably occurred before the first grand mal seizure, although this is not entirely clear from the history.  I do not have access to records of evaluations in [place] that were performed at the time of the first stroke.    She has no history of gestational or  injury, febrile convulsions, developmental delay, meningitis, encephalitis, severe head injury, or other epileptic predispositions.  Several of her falls have caused bumps on the head with scalp hematomas, but brain imaging at these times did not show intracranial hemorrhage or other new findings.    Laboratory evaluations:   The patient has had multiple brain MRI scans that have shown extensive right frontal encephalomalacia and patchy left frontal encephalomalacia, consistent with history of cerebral infarctions.  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      She had  video EEG monitoring at [facility] on 11/27 to 11/30/2021, which showed generalized delta theta slowing, bihemispheric independent slowing and subclinical electrographic seizures over the right hemisphere on days 1 and 2 (treated with fosphenytoin and levetiracetam).    The patient had evaluation of syncope by a [facility] clinic, which included a loop recording that did not show significant arrhythmias during an episode of falling with unconsciousness in 2017, according to the notes.  She was considered more likely to have vasovagal or other causes of syncope than to be having significant arrhythmias.  Presumably, supraventricular tachycardia was asymptomatic.    Epilepsy therapeutics:   The patient was treated with levetiracetam in 2933-1096.  Apparently, this was discontinued by a physician who thought that she had exclusively syncope as a cause of falls and unconsciousness.  Levetiracetam was restarted and phenytoin was added in 11/2021.  Her family members do not think that she is having adverse effects with this.  The patient was not in steady state on current dosing at the time of available blood levels.    Other history:   The patient began to have difficulty with recalling recent and remote events approximately 10 years ago, according to her family members.  She later became unable to perform complex and then moderately simple activities of daily living.  She was diagnosed with dementia in 2017.  The family members think that treatment with memantine did not improve her condition.  Currently, she needs care in essentially all activities of daily living, although she is able to feed herself and to swallow without difficulty.  She is non-ambulatory and living in a nursing home.    PAST MEDICAL-SURGICAL HISTORY:    1.  Focal epilepsy, with secondarily generalized tonic-clonic seizures.  2.  Chronic neck bifrontal cerebral infarctions.  3.  History of recurrent syncope, probably due to neurocardiogenic or  hypovolemic causes for diabetes mellitus.  4.  Systemic hypertension.  5.  History of supraventricular tachycardia, possibly asymptomatic.  6.  Osteoarthritis.      FAMILY HISTORY:  There is no family history of seizures, epilepsy or other chronic neurological conditions.  A sister had meningitis in childhood, but apparently did not develop epilepsy.    PERSONAL AND SOCIAL HISTORY:  The patient has been living with her  in [place] for approximately 4 years.  She currently is living in a nursing home, but her  would like to take her home with him.  She currently does not use alcohol or tobacco, but had a long history of tobacco use earlier in life.    REVIEW OF SYSTEMS:  The patient herself had no complaints.  She endorsed having thoughts about dying, but not about suicide.      MEDICATIONS:  Levetiracetam 1500 mg b.i.d., phenytoin 100 mg b.i.d. (using 50 mg chewable tablets), gabapentin 300 mg at bedtime (for leg pain), memantine 10 mg daily, metformin, and other medications as per the electronic medical record.     PHYSICAL EXAMINATION:    On physical examination the patient appeared well nourished and in no acute distress.  Pulse was 96/min, and /76, without significant orthostatic changes; respirations 15/min.  Skull was normocephalic and atraumatic.  Neck was supple, without signs of meningeal irritation.      On examination, the patient was a continuously alert woman, sitting in a wheelchair in apparent comfort.  Speech was fluent, but demonstrated simplified content.  She was able to state her full name, but she was incorrect as to her year of birth and her 's year of birth.  She was disoriented to date and place.  She demonstrated mild facial apraxias and severe somatic apraxias.  She scored 12/30 on the Folstein mini mental status examination.   Spontaneous gaze appeared to be full and conjugate, but was difficult to test due to apraxia.  Pupils were round and reactive to light.   Her face moved bilaterally, but there was flattening of the left nasolabial fold.  Muscle tone and masses were normal throughout.  She demonstrated 4/5 strength on the right and 3/5 strength on the left.  During spontaneous movements, but did not or could not cooperate with specific muscle strength testing.  She appeared to feel light touch throughout.  She did not perform coordination testing.  She was unable to stand from the wheelchair.  Deep tendon reflexes were mildly hyperactive on the left and normal on the right, except that Achilles tendon jerks could not be obtained.  She had bilaterally upgoing toes.    IMPRESSION:    The patient likely has focal epilepsy due to bilateral frontal lobe infarctions.  She probably has had falls due both to generalized tonic-clonic seizures and to syncope.  With her current anti-seizure medications, she does not appear to be having clinically evident seizures at this time.  She also has had attention to hydration, and has not had recent syncope as best her family could describe.  I think that in the long-term, she may be able to be managed with levetiracetam monotherapy, but given the number of electrographic seizures she had on video EEG monitoring 2 months ago, I would reassess this question at about 6 months.  I would suggest performing an outpatient prolonged electroencephalogram before considering discontinuation of phenytoin as findings of significant epileptiform discharges might alter this decision.  We agreed to check antiepileptic blood levels today.    The patient probably has multi-infarct dementia.  Her family members were concerned about having her see a stroke specialist and her primary care provider, in particular.  She might also benefit by evaluation with dementia specialist, but her family members were more interested in completing the other evaluations first.  They will arrange these evaluations with her primary care provider, Abdirahman Munoz PA-C.      I  told the patient and her family members that it certainly would be difficult for them to take her home.  They would need to arrange significant support, which would require a great deal of planning.  They will discuss this further with Mr. Munoz.    PLAN:    1.  Check total and free phenytoin and levetiracetam levels today.  2.  Continue the current doses of levetiracetam and phenytoin, which apparently are being prescribed by nursing home medical staff.  These could be prescribed at this clinic if they would like for her to follow here.  3.  The family will consider a return visit to this clinic in about 6 months to discuss electroencephalography and any changes in anti-seizure medication.  I spent 65 minutes in this patient care, more than 50% of which consisted of counseling and coordinating care.       Antonio Machado M.D.   Professor of Neurology       D: 2022   T: 2022   MT: SATHISH    Name:     SHAI CAMPBELL  MRN:      [id number]        Account:      [id number]   :      1950           Service Date: 2022       Document: [id number]

## 2022-01-26 ENCOUNTER — PATIENT OUTREACH (OUTPATIENT)
Dept: CARE COORDINATION | Facility: CLINIC | Age: 72
End: 2022-01-26
Payer: MEDICAID

## 2022-01-26 DIAGNOSIS — Z71.89 COUNSELING AND COORDINATION OF CARE: Primary | ICD-10-CM

## 2022-01-26 NOTE — PROGRESS NOTES
Clinic Care Coordination Contact    Situation: Patient chart reviewed by care coordinator.    Background: Patient was hospitalized at [facility] from 11/26/2021 to 12/9/2021 with diagnosis of Acute encephalopathy,Hypertensive emergency,Hyperlipidemia  Hx of right MCA ischemic stroke, Dementia,Type 2 Diabetes Mellitus   Pt was discharged to [facility] TCU.    Assessment: left a message for SW to return my call.    Plan/Recommendations: will wait for return call.         Yasmin ESPINOSA, RN, PHN, [facility]  Primary Clinic Care Coordination    [facility]  Primary Care Clinics  [email or web address]   Office: [phone]  Employed by [facility]

## 2022-01-27 ENCOUNTER — TELEPHONE (OUTPATIENT)
Dept: FAMILY MEDICINE | Facility: OTHER | Age: 72
End: 2022-01-27
Payer: MEDICAID

## 2022-01-27 DIAGNOSIS — I63.422 CEREBROVASCULAR ACCIDENT (CVA) DUE TO EMBOLISM OF LEFT ANTERIOR CEREBRAL ARTERY (H): ICD-10-CM

## 2022-01-27 DIAGNOSIS — R56.9 SEIZURE (H): ICD-10-CM

## 2022-01-27 RX ORDER — LISINOPRIL 20 MG/1
20 TABLET ORAL DAILY
Qty: 30 TABLET | Refills: 0 | Status: SHIPPED | OUTPATIENT
Start: 2022-01-27 | End: 2022-02-25

## 2022-01-27 RX ORDER — ASPIRIN 81 MG/1
81 TABLET, CHEWABLE ORAL DAILY
Qty: 100 TABLET | Refills: 3 | Status: ON HOLD | OUTPATIENT
Start: 2022-01-27 | End: 2022-01-01

## 2022-01-27 RX ORDER — ATORVASTATIN CALCIUM 40 MG/1
40 TABLET, FILM COATED ORAL DAILY
Qty: 30 TABLET | Refills: 0 | Status: SHIPPED | OUTPATIENT
Start: 2022-01-27 | End: 2022-03-17

## 2022-01-27 RX ORDER — PHENYTOIN 50 MG/1
100 TABLET, CHEWABLE ORAL 2 TIMES DAILY
Qty: 60 TABLET | Refills: 0 | Status: SHIPPED | OUTPATIENT
Start: 2022-01-27 | End: 2022-03-03

## 2022-01-27 RX ORDER — LEVETIRACETAM 750 MG/1
1500 TABLET ORAL 2 TIMES DAILY
Qty: 120 TABLET | Refills: 0 | Status: SHIPPED | OUTPATIENT
Start: 2022-01-27 | End: 2022-03-17

## 2022-01-27 RX ORDER — CLOPIDOGREL BISULFATE 75 MG/1
75 TABLET ORAL DAILY
Qty: 30 TABLET | Refills: 0 | Status: SHIPPED | OUTPATIENT
Start: 2022-01-27 | End: 2022-02-25

## 2022-01-27 NOTE — TELEPHONE ENCOUNTER
Body suspicions for 30 days sent to the pharmacy for her.  She needs to have a face-to-face follow-up appointment with me prior to any further refills.  Electronically signed:    Abdirahman Shrestha PA-C

## 2022-01-27 NOTE — TELEPHONE ENCOUNTER
"Received call from patient's daughter requesting us to send in her \"stroke\" and \"seizure medications\" to the [facility] in [place]. Unable to recall names of medications.     Pt is currently in TCU at [facility] in [place]. Daughter is planning on taking her home today, because they are not providing adequate care. Patient \"just\" left the facility on her wheelchair. Daughter is going to \"go find her\".     Patient was discharge from hospital 12/9 post Right MCA Ischemic stroke.  discharge med's per documents were:  Discharge plan for seizures:  - Keppra 1,500 mg BID  - Dilantin 100 mg BID  - Follow up with Neurology after discharge from rehab     Discharge plan for stroke:  - Aspirin 81 mg + Plavix 75 mg for 90 days, followed by monotherapy with  mg daily  - Atorvastatin 40 mg daily, with LDL goal less than 70  - Lisinopril 20 mg daily, with long term goal normotension  - Blood glucose control per PCP, with Hgb A1c goal less than 7.0  - Zio patch    Meds pended, Ok to send?   "

## 2022-01-27 NOTE — TELEPHONE ENCOUNTER
Contacted daughter to let her know the refill was sent into the pharmacy and that patient will need an office visit prior to next refill. She said she will call back this afternoon to schedule.

## 2022-02-01 ENCOUNTER — PATIENT OUTREACH (OUTPATIENT)
Dept: CARE COORDINATION | Facility: CLINIC | Age: 72
End: 2022-02-01
Payer: COMMERCIAL

## 2022-02-01 NOTE — PROGRESS NOTES
Clinic Care Coordination Contact  Care Team Conversations    Received a call back from [facility] , [facility] patient left AMA on January 28 with family  and daughter.      RNCC will have CHW out reach to patient to introduce care coordination for possible enrollment.      Yasmin ESPINOSA, RN, PHN, CCM  Primary Clinic Care Coordination    [facility]  Primary Care Clinics  [email or web address]   Office: [phone]  Employed by [facility]

## 2022-02-01 NOTE — PROGRESS NOTES
Clinic Care Coordination Contact  Community Health Worker Initial Outreach            Patient accepts CC: No, The patient declined care coordination . Patient will be sent Care Coordination introduction letter for future reference.     The [facility] Community Health Worker spoke with the patient today at the request of the PCP to discuss possible Clinic Care Coordination enrollment. The service was described to the patient and immediate needs were discussed. The patient declined enrollment at this time. The PCP is encouraged to refer in the future if the patient's needs change.    The patient stated that her  takes good care of her and her daughter comes over everyday to help with things. The patient stated that she has no questions or concerns at this time. She is scheduled with her provider for this week for a follow up.     Cary Pierre  Community Health Worker   [facility]  Care Coordination  Arlene Barrett Rogers, [place], [facility]  [email or web address]   Office: [phone]

## 2022-02-04 ENCOUNTER — OFFICE VISIT (OUTPATIENT)
Dept: FAMILY MEDICINE | Facility: OTHER | Age: 72
End: 2022-02-04
Payer: COMMERCIAL

## 2022-02-04 ENCOUNTER — ANCILLARY PROCEDURE (OUTPATIENT)
Dept: GENERAL RADIOLOGY | Facility: OTHER | Age: 72
End: 2022-02-04
Attending: PHYSICIAN ASSISTANT
Payer: COMMERCIAL

## 2022-02-04 VITALS
RESPIRATION RATE: 20 BRPM | OXYGEN SATURATION: 96 % | SYSTOLIC BLOOD PRESSURE: 124 MMHG | DIASTOLIC BLOOD PRESSURE: 70 MMHG | TEMPERATURE: 97.5 F | HEART RATE: 89 BPM

## 2022-02-04 DIAGNOSIS — I63.422 CEREBROVASCULAR ACCIDENT (CVA) DUE TO EMBOLISM OF LEFT ANTERIOR CEREBRAL ARTERY (H): ICD-10-CM

## 2022-02-04 DIAGNOSIS — G81.94 LEFT HEMIPARESIS (H): ICD-10-CM

## 2022-02-04 DIAGNOSIS — R56.9 CONVULSIONS, UNSPECIFIED CONVULSION TYPE (H): ICD-10-CM

## 2022-02-04 DIAGNOSIS — S00.03XA CONTUSION OF FACE, SCALP AND NECK, INITIAL ENCOUNTER: Primary | ICD-10-CM

## 2022-02-04 DIAGNOSIS — E11.51 TYPE 2 DIABETES MELLITUS WITH DIABETIC PERIPHERAL ANGIOPATHY WITHOUT GANGRENE, WITHOUT LONG-TERM CURRENT USE OF INSULIN (H): ICD-10-CM

## 2022-02-04 DIAGNOSIS — S10.93XA CONTUSION OF FACE, SCALP AND NECK, INITIAL ENCOUNTER: Primary | ICD-10-CM

## 2022-02-04 DIAGNOSIS — M81.0 AGE-RELATED OSTEOPOROSIS WITHOUT CURRENT PATHOLOGICAL FRACTURE: ICD-10-CM

## 2022-02-04 DIAGNOSIS — S00.83XA CONTUSION OF FACE, SCALP AND NECK, INITIAL ENCOUNTER: Primary | ICD-10-CM

## 2022-02-04 DIAGNOSIS — I10 HYPERTENSION GOAL BP (BLOOD PRESSURE) < 140/90: ICD-10-CM

## 2022-02-04 DIAGNOSIS — I47.10 SUPRAVENTRICULAR TACHYCARDIA (H): ICD-10-CM

## 2022-02-04 DIAGNOSIS — S00.83XA CONTUSION OF FACE, SCALP AND NECK, INITIAL ENCOUNTER: ICD-10-CM

## 2022-02-04 DIAGNOSIS — S10.93XA CONTUSION OF FACE, SCALP AND NECK, INITIAL ENCOUNTER: ICD-10-CM

## 2022-02-04 DIAGNOSIS — S00.03XA CONTUSION OF FACE, SCALP AND NECK, INITIAL ENCOUNTER: ICD-10-CM

## 2022-02-04 DIAGNOSIS — F03.90 DEMENTIA WITHOUT BEHAVIORAL DISTURBANCE, UNSPECIFIED DEMENTIA TYPE: ICD-10-CM

## 2022-02-04 LAB
ALBUMIN SERPL-MCNC: 3.3 G/DL (ref 3.4–5)
ALP SERPL-CCNC: 119 U/L (ref 40–150)
ALT SERPL W P-5'-P-CCNC: 24 U/L (ref 0–50)
ANION GAP SERPL CALCULATED.3IONS-SCNC: 4 MMOL/L (ref 3–14)
AST SERPL W P-5'-P-CCNC: 14 U/L (ref 0–45)
BASOPHILS # BLD AUTO: 0.1 10E3/UL (ref 0–0.2)
BASOPHILS NFR BLD AUTO: 1 %
BILIRUB SERPL-MCNC: 0.3 MG/DL (ref 0.2–1.3)
BUN SERPL-MCNC: 8 MG/DL (ref 7–30)
CALCIUM SERPL-MCNC: 9.2 MG/DL (ref 8.5–10.1)
CHLORIDE BLD-SCNC: 103 MMOL/L (ref 94–109)
CO2 SERPL-SCNC: 30 MMOL/L (ref 20–32)
CREAT SERPL-MCNC: 0.5 MG/DL (ref 0.52–1.04)
EOSINOPHIL # BLD AUTO: 0.1 10E3/UL (ref 0–0.7)
EOSINOPHIL NFR BLD AUTO: 2 %
ERYTHROCYTE [DISTWIDTH] IN BLOOD BY AUTOMATED COUNT: 14.1 % (ref 10–15)
GFR SERPL CREATININE-BSD FRML MDRD: >90 ML/MIN/1.73M2
GLUCOSE BLD-MCNC: 133 MG/DL (ref 70–99)
HCT VFR BLD AUTO: 39.9 % (ref 35–47)
HGB BLD-MCNC: 12.8 G/DL (ref 11.7–15.7)
INR PPP: 0.98 (ref 0.85–1.15)
LYMPHOCYTES # BLD AUTO: 1.6 10E3/UL (ref 0.8–5.3)
LYMPHOCYTES NFR BLD AUTO: 25 %
MCH RBC QN AUTO: 30 PG (ref 26.5–33)
MCHC RBC AUTO-ENTMCNC: 32.1 G/DL (ref 31.5–36.5)
MCV RBC AUTO: 94 FL (ref 78–100)
MONOCYTES # BLD AUTO: 0.6 10E3/UL (ref 0–1.3)
MONOCYTES NFR BLD AUTO: 9 %
NEUTROPHILS # BLD AUTO: 4.3 10E3/UL (ref 1.6–8.3)
NEUTROPHILS NFR BLD AUTO: 64 %
PLATELET # BLD AUTO: 286 10E3/UL (ref 150–450)
POTASSIUM BLD-SCNC: 4 MMOL/L (ref 3.4–5.3)
PROT SERPL-MCNC: 6.9 G/DL (ref 6.8–8.8)
RBC # BLD AUTO: 4.26 10E6/UL (ref 3.8–5.2)
SODIUM SERPL-SCNC: 137 MMOL/L (ref 133–144)
WBC # BLD AUTO: 6.7 10E3/UL (ref 4–11)

## 2022-02-04 PROCEDURE — 99495 TRANSJ CARE MGMT MOD F2F 14D: CPT | Performed by: PHYSICIAN ASSISTANT

## 2022-02-04 PROCEDURE — 80053 COMPREHEN METABOLIC PANEL: CPT | Performed by: PHYSICIAN ASSISTANT

## 2022-02-04 PROCEDURE — 70150 X-RAY EXAM OF FACIAL BONES: CPT | Performed by: RADIOLOGY

## 2022-02-04 PROCEDURE — 85025 COMPLETE CBC W/AUTO DIFF WBC: CPT | Performed by: PHYSICIAN ASSISTANT

## 2022-02-04 PROCEDURE — 36415 COLL VENOUS BLD VENIPUNCTURE: CPT | Performed by: PHYSICIAN ASSISTANT

## 2022-02-04 PROCEDURE — 85610 PROTHROMBIN TIME: CPT | Performed by: PHYSICIAN ASSISTANT

## 2022-02-04 RX ORDER — ALENDRONATE SODIUM 70 MG/1
70 TABLET ORAL
Qty: 13 TABLET | Refills: 3 | Status: SHIPPED | OUTPATIENT
Start: 2022-02-04 | End: 2022-04-22

## 2022-02-04 ASSESSMENT — PAIN SCALES - GENERAL: PAINLEVEL: MILD PAIN (2)

## 2022-02-04 NOTE — PROGRESS NOTES
Assessment & Plan     Contusion of face, scalp and neck, initial encounter - left upper eyebrow  Patient try to get up independently this morning and fell and hit her head on the left side just above the eye laterally.  She has evidence of a hematoma in this region and bruising is present.  We remove the Band-Aid and no bleeding is present she declined any further application of a Band-Aid.  Advised that she not manipulate this wound in any way shape or form.  She presents today with her  and her daughter in conversation around this and the below.  - XR Facial Bones 1/2 Views; Future  - CBC with platelets and differential; Future  - INR; Future  - Comprehensive metabolic panel (BMP + Alb, Alk Phos, ALT, AST, Total. Bili, TP); Future    Age-related osteoporosis without current pathological fracture  Refill medication requested.  Follow-up in 1 year.  - alendronate (FOSAMAX) 70 MG tablet; Take 1 tablet (70 mg) by mouth every 7 days Tuesdays    Left hemiparesis (H)  Dementia without behavioral disturbance, unspecified dementia type (H)  Type 2 diabetes mellitus with diabetic peripheral angiopathy without gangrene, without long-term current use of insulin (H)  Supraventricular tachycardia (H)  Cerebrovascular accident (CVA) due to embolism of left anterior cerebral artery (H)  Convulsions, unspecified convulsion type (H)  Hypertension goal BP (blood pressure) < 140/90  Fairly stable now that she is at home.  She was sent to a nursing home in [place] and did not have a good experience there.  From the description of the care there was suboptimal.  She is much safer at home from what I can tell.  We do take into consideration the fact that she fell today and strongly recommend very careful assessment of the home situation.  - CBC with platelets and differential; Future  - INR; Future  - Comprehensive metabolic panel (BMP + Alb, Alk Phos, ALT, AST, Total. Bili, TP); Future     Follow-up in 1 month advised    No  follow-ups on file.    Abdirahman Shrestha PA-C  [facility] [place] RAMIREZ Gaxiola is a 71 year old who presents for the following health issues  accompanied by her daughter.    History of Present Illness       She eats 2-3 servings of fruits and vegetables daily.She consumes 4 sweetened beverage(s) daily.She exercises with enough effort to increase her heart rate 9 or less minutes per day.    She is taking medications regularly.         Hospital Follow-up Visit:    Hospital/Nursing Home/IP Rehab Facility: [facility]  Date of Admission: 12/09/21  Date of Discharge: 01/28/22  Reason(s) for Admission: stroke      Was your hospitalization related to COVID-19? No   Problems taking medications regularly:  None  Medication changes since discharge: None  Problems adhering to non-medication therapy:  None    Summary of hospitalization:  [facility] discharge summary reviewed  Diagnostic Tests/Treatments reviewed.  Follow up needed: Continued cares for memory deficit.  Other Healthcare Providers Involved in Patient s Care:         None  Update since discharge: improved. Post Discharge Medication Reconciliation: discharge medications reconciled, continue medications without change.  Plan of care communicated with patient and family            Review of Systems   Constitutional, HEENT, cardiovascular, pulmonary, GI, , musculoskeletal, neuro, skin, endocrine and psych systems are negative, except as otherwise noted.      Objective    /70   Pulse 89   Temp 97.5  F (36.4  C) (Temporal)   Resp 20   SpO2 96%   There is no height or weight on file to calculate BMI.  Physical Exam   GENERAL: healthy, alert and no distress  NECK: no adenopathy, no asymmetry, masses, or scars and thyroid normal to palpation  RESP: lungs clear to auscultation - no rales, rhonchi or wheezes  CV: regular rate and rhythm, normal S1 S2, no S3 or S4, no murmur, click or rub, no peripheral edema and  peripheral pulses strong  ABDOMEN: soft, nontender, no hepatosplenomegaly, no masses and bowel sounds normal  MS: Extremities within normal limits though she does find it much easier to be confined to a wheelchair currently throughout transport through the clinic.  SKIN: Bruising noted to the left forehead above the left eye laterally.  Hematoma present.  No bleeding currently.  She is advised that her bruising will probably continue for the next 4 to 6 weeks.  She may need to have the hematoma for much longer than that.  NEURO: weakness of right arm and leg noted, sensory exam grossly normal and mentation intact  PSYCH: mentation appears normal, affect normal/bright    Results for orders placed or performed in visit on 02/04/22 (from the past 24 hour(s))   CBC with platelets and differential    Narrative    The following orders were created for panel order CBC with platelets and differential.  Procedure                               Abnormality         Status                     ---------                               -----------         ------                     CBC with platelets and d...[[id number]]                      In process                   Please view results for these tests on the individual orders.     X-ray: negative for concerning pathology to my independent review today.  It will be overread by radiology.

## 2022-02-24 DIAGNOSIS — E11.51 TYPE 2 DIABETES MELLITUS WITH DIABETIC PERIPHERAL ANGIOPATHY WITHOUT GANGRENE, WITHOUT LONG-TERM CURRENT USE OF INSULIN (H): ICD-10-CM

## 2022-02-25 DIAGNOSIS — I63.422 CEREBROVASCULAR ACCIDENT (CVA) DUE TO EMBOLISM OF LEFT ANTERIOR CEREBRAL ARTERY (H): ICD-10-CM

## 2022-02-25 RX ORDER — LISINOPRIL 20 MG/1
TABLET ORAL
Qty: 30 TABLET | Refills: 0 | Status: ON HOLD | OUTPATIENT
Start: 2022-02-25 | End: 2022-04-08

## 2022-02-25 RX ORDER — GLIPIZIDE 2.5 MG/1
TABLET, EXTENDED RELEASE ORAL
Qty: 90 TABLET | Refills: 0 | Status: ON HOLD | OUTPATIENT
Start: 2022-02-25 | End: 2022-04-08

## 2022-02-25 RX ORDER — CLOPIDOGREL BISULFATE 75 MG/1
TABLET ORAL
Qty: 30 TABLET | Refills: 0 | Status: ON HOLD | OUTPATIENT
Start: 2022-02-25 | End: 2022-04-08

## 2022-03-01 DIAGNOSIS — R56.9 SEIZURE (H): ICD-10-CM

## 2022-03-03 RX ORDER — PHENYTOIN 50 MG/1
TABLET, CHEWABLE ORAL
Qty: 60 TABLET | Refills: 0 | Status: ON HOLD | OUTPATIENT
Start: 2022-03-03 | End: 2022-04-08

## 2022-03-10 ENCOUNTER — OFFICE VISIT (OUTPATIENT)
Dept: FAMILY MEDICINE | Facility: OTHER | Age: 72
End: 2022-03-10
Payer: COMMERCIAL

## 2022-03-10 VITALS
TEMPERATURE: 98.6 F | WEIGHT: 129.2 LBS | BODY MASS INDEX: 22.89 KG/M2 | RESPIRATION RATE: 20 BRPM | SYSTOLIC BLOOD PRESSURE: 132 MMHG | HEART RATE: 80 BPM | DIASTOLIC BLOOD PRESSURE: 78 MMHG | OXYGEN SATURATION: 93 %

## 2022-03-10 DIAGNOSIS — R56.9 CONVULSIONS, UNSPECIFIED CONVULSION TYPE (H): ICD-10-CM

## 2022-03-10 DIAGNOSIS — E11.59 TYPE 2 DIABETES MELLITUS WITH OTHER CIRCULATORY COMPLICATION, WITHOUT LONG-TERM CURRENT USE OF INSULIN (H): ICD-10-CM

## 2022-03-10 DIAGNOSIS — R56.9 SEIZURE (H): ICD-10-CM

## 2022-03-10 DIAGNOSIS — E78.5 HYPERLIPIDEMIA LDL GOAL <100: ICD-10-CM

## 2022-03-10 DIAGNOSIS — M79.662 PAIN IN BOTH LOWER LEGS: ICD-10-CM

## 2022-03-10 DIAGNOSIS — G81.94 LEFT HEMIPARESIS (H): ICD-10-CM

## 2022-03-10 DIAGNOSIS — I10 HYPERTENSION GOAL BP (BLOOD PRESSURE) < 140/90: ICD-10-CM

## 2022-03-10 DIAGNOSIS — Z12.31 VISIT FOR SCREENING MAMMOGRAM: ICD-10-CM

## 2022-03-10 DIAGNOSIS — I63.422 CEREBROVASCULAR ACCIDENT (CVA) DUE TO EMBOLISM OF LEFT ANTERIOR CEREBRAL ARTERY (H): Primary | ICD-10-CM

## 2022-03-10 DIAGNOSIS — M79.661 PAIN IN BOTH LOWER LEGS: ICD-10-CM

## 2022-03-10 LAB
ALBUMIN SERPL-MCNC: 3.2 G/DL (ref 3.4–5)
ALP SERPL-CCNC: 108 U/L (ref 40–150)
ALT SERPL W P-5'-P-CCNC: 12 U/L (ref 0–50)
ANION GAP SERPL CALCULATED.3IONS-SCNC: 2 MMOL/L (ref 3–14)
AST SERPL W P-5'-P-CCNC: 9 U/L (ref 0–45)
BILIRUB SERPL-MCNC: 0.4 MG/DL (ref 0.2–1.3)
BUN SERPL-MCNC: 18 MG/DL (ref 7–30)
CALCIUM SERPL-MCNC: 8 MG/DL (ref 8.5–10.1)
CHLORIDE BLD-SCNC: 108 MMOL/L (ref 94–109)
CO2 SERPL-SCNC: 31 MMOL/L (ref 20–32)
CREAT SERPL-MCNC: 0.56 MG/DL (ref 0.52–1.04)
ERYTHROCYTE [DISTWIDTH] IN BLOOD BY AUTOMATED COUNT: 13.6 % (ref 10–15)
GFR SERPL CREATININE-BSD FRML MDRD: >90 ML/MIN/1.73M2
GLUCOSE BLD-MCNC: 129 MG/DL (ref 70–99)
HBA1C MFR BLD: 5.5 % (ref 0–5.6)
HCT VFR BLD AUTO: 40.5 % (ref 35–47)
HGB BLD-MCNC: 12.8 G/DL (ref 11.7–15.7)
LDLC SERPL CALC-MCNC: 80 MG/DL
MCH RBC QN AUTO: 30 PG (ref 26.5–33)
MCHC RBC AUTO-ENTMCNC: 31.6 G/DL (ref 31.5–36.5)
MCV RBC AUTO: 95 FL (ref 78–100)
PHENYTOIN SERPL-MCNC: 1.6 MG/L
PLATELET # BLD AUTO: 279 10E3/UL (ref 150–450)
POTASSIUM BLD-SCNC: 3.5 MMOL/L (ref 3.4–5.3)
PROT SERPL-MCNC: 6.6 G/DL (ref 6.8–8.8)
RBC # BLD AUTO: 4.26 10E6/UL (ref 3.8–5.2)
SODIUM SERPL-SCNC: 141 MMOL/L (ref 133–144)
WBC # BLD AUTO: 6 10E3/UL (ref 4–11)

## 2022-03-10 PROCEDURE — 83721 ASSAY OF BLOOD LIPOPROTEIN: CPT | Performed by: PHYSICIAN ASSISTANT

## 2022-03-10 PROCEDURE — 99214 OFFICE O/P EST MOD 30 MIN: CPT | Mod: 25 | Performed by: PHYSICIAN ASSISTANT

## 2022-03-10 PROCEDURE — 80177 DRUG SCRN QUAN LEVETIRACETAM: CPT | Mod: 90 | Performed by: PHYSICIAN ASSISTANT

## 2022-03-10 PROCEDURE — 83036 HEMOGLOBIN GLYCOSYLATED A1C: CPT | Performed by: PHYSICIAN ASSISTANT

## 2022-03-10 PROCEDURE — 85027 COMPLETE CBC AUTOMATED: CPT | Performed by: PHYSICIAN ASSISTANT

## 2022-03-10 PROCEDURE — G0008 ADMIN INFLUENZA VIRUS VAC: HCPCS | Performed by: PHYSICIAN ASSISTANT

## 2022-03-10 PROCEDURE — 36415 COLL VENOUS BLD VENIPUNCTURE: CPT | Performed by: PHYSICIAN ASSISTANT

## 2022-03-10 PROCEDURE — 80053 COMPREHEN METABOLIC PANEL: CPT | Performed by: PHYSICIAN ASSISTANT

## 2022-03-10 PROCEDURE — 80185 ASSAY OF PHENYTOIN TOTAL: CPT | Performed by: PHYSICIAN ASSISTANT

## 2022-03-10 PROCEDURE — 90662 IIV NO PRSV INCREASED AG IM: CPT | Performed by: PHYSICIAN ASSISTANT

## 2022-03-10 PROCEDURE — 99000 SPECIMEN HANDLING OFFICE-LAB: CPT | Performed by: PHYSICIAN ASSISTANT

## 2022-03-10 ASSESSMENT — PAIN SCALES - GENERAL: PAINLEVEL: NO PAIN (0)

## 2022-03-10 NOTE — PROGRESS NOTES
Assessment & Plan     Cerebrovascular accident (CVA) due to embolism of left anterior cerebral artery (H)  Convulsions, unspecified convulsion type (H)  Left hemiparesis (H)  Pain in both lower legs  Seizure (H)  Patient here today to discuss safety in her home situation related to CVA etc.  Further evaluation of the home with care coordination and getting services arranged for the [facility] is recommended today.  - Primary Care - Care Coordination Referral; Future  - Phenytoin level; Future  - Keppra (Levetiracetam) Level; Future  - Phenytoin level  - Keppra (Levetiracetam) Level    Type 2 diabetes mellitus with other circulatory complication, without long-term current use of insulin (H)  Control is in question at this point in time.  Labs pending.  Base changes to medication regimen as needed.  - OPTOMETRY REFERRAL; Future  - Primary Care - Care Coordination Referral; Future  - CBC with platelets; Future  - Comprehensive metabolic panel (BMP + Alb, Alk Phos, ALT, AST, Total. Bili, TP); Future  - LDL cholesterol direct; Future  - Hemoglobin A1c; Future  - CBC with platelets  - Comprehensive metabolic panel (BMP + Alb, Alk Phos, ALT, AST, Total. Bili, TP)  - LDL cholesterol direct  - Hemoglobin A1c    Hyperlipidemia LDL goal <100  - Primary Care - Care Coordination Referral; Future  - CBC with platelets; Future  - Comprehensive metabolic panel (BMP + Alb, Alk Phos, ALT, AST, Total. Bili, TP); Future  - LDL cholesterol direct; Future  - Hemoglobin A1c; Future  - CBC with platelets  - Comprehensive metabolic panel (BMP + Alb, Alk Phos, ALT, AST, Total. Bili, TP)  - LDL cholesterol direct  - Hemoglobin A1c    Hypertension goal BP (blood pressure) < 140/90  Good control this at this point in time.  Labs due.  Follow-up in 6 months.  - Primary Care - Care Coordination Referral; Future  - CBC with platelets; Future  - Comprehensive metabolic panel (BMP + Alb, Alk Phos, ALT, AST, Total. Bili, TP); Future  - LDL cholesterol  direct; Future  - Hemoglobin A1c; Future  - CBC with platelets  - Comprehensive metabolic panel (BMP + Alb, Alk Phos, ALT, AST, Total. Bili, TP)  - LDL cholesterol direct  - Hemoglobin A1c    Visit for screening mammogram  - MA SCREENING DIGITAL BILAT - Future  (s+30); Future  - Primary Care - Care Coordination Referral; Future      Regular exercise  No follow-ups on file.    BRIAN [facility] PRADEEP Gaxiola is a 71 year old who presents for the following health issues  accompanied by her spouse and daughter.    History of Present Illness       Reason for visit:  Check up  Symptom onset:  More than a month  Symptoms include:  Week  Symptom intensity:  Severe  Symptom progression:  Improving  Had these symptoms before:  No  What makes it worse:  No  What makes it better:  No    She eats 0-1 servings of fruits and vegetables daily.She consumes 4 sweetened beverage(s) daily.She exercises with enough effort to increase her heart rate 9 or less minutes per day.  She exercises with enough effort to increase her heart rate 3 or less days per week.   She is taking medications regularly.       Review of Systems   Constitutional, HEENT, cardiovascular, pulmonary, GI, , musculoskeletal, neuro, skin, endocrine and psych systems are negative, except as otherwise noted.      Objective    /78   Pulse 80   Temp 98.6  F (37  C) (Temporal)   Resp 20   Wt 58.6 kg (129 lb 3.2 oz)   SpO2 93%   BMI 22.89 kg/m    Body mass index is 22.89 kg/m .  Physical Exam   GENERAL: healthy, alert and no distress  NECK: no adenopathy, no asymmetry, masses, or scars and thyroid normal to palpation  RESP: lungs clear to auscultation - no rales, rhonchi or wheezes  CV: regular rate and rhythm, normal S1 S2, no S3 or S4, no murmur, click or rub, no peripheral edema and peripheral pulses strong  ABDOMEN: soft, nontender, no hepatosplenomegaly, no masses and bowel sounds normal  MS: no gross  musculoskeletal defects noted, no edema  SKIN: no suspicious lesions or rashes to exposed visible skin today.  The bruising noted to the left cheek where she fell and hit her head is improving nicely.  Follow-up as needed.  NEURO: Normal strength and tone, mentation intact and speech normal  PSYCH: mentation appears normal, affect normal/bright    No results found for this or any previous visit (from the past 24 hour(s)).

## 2022-03-11 ENCOUNTER — PATIENT OUTREACH (OUTPATIENT)
Dept: CARE COORDINATION | Facility: CLINIC | Age: 72
End: 2022-03-11
Payer: COMMERCIAL

## 2022-03-11 NOTE — PROGRESS NOTES
Clinic Care Coordination Contact  Community Health Worker Initial Outreach        The CHW spoke to the patient and the patient asked to talk to her . The CHW asked the  would kind of help they are looking for and he asked the CHW to call his daughter Laura because she knows what's going on and what kind of help they need. They asked if the CHW could call her Monday after 330pm. The CHW agreed to this plan.     Cary Pierre  Community Health Worker   [facility]  Care Coordination  Arlene Barrett Rogers, [place], [facility]  [email or web address]   Office: [phone]

## 2022-03-14 LAB — LEVETIRACETAM SERPL-MCNC: 46 UG/ML

## 2022-03-16 DIAGNOSIS — R56.9 SEIZURE (H): ICD-10-CM

## 2022-03-17 DIAGNOSIS — I63.422 CEREBROVASCULAR ACCIDENT (CVA) DUE TO EMBOLISM OF LEFT ANTERIOR CEREBRAL ARTERY (H): ICD-10-CM

## 2022-03-17 RX ORDER — LEVETIRACETAM 750 MG/1
TABLET ORAL
Qty: 120 TABLET | Refills: 0 | Status: ON HOLD | OUTPATIENT
Start: 2022-03-17 | End: 2022-04-08

## 2022-03-17 RX ORDER — ATORVASTATIN CALCIUM 40 MG/1
TABLET, FILM COATED ORAL
Qty: 30 TABLET | Refills: 0 | Status: SHIPPED | OUTPATIENT
Start: 2022-03-17 | End: 2022-04-26

## 2022-03-18 ENCOUNTER — APPOINTMENT (OUTPATIENT)
Dept: CT IMAGING | Facility: CLINIC | Age: 72
DRG: 064 | End: 2022-03-18
Attending: FAMILY MEDICINE
Payer: COMMERCIAL

## 2022-03-18 ENCOUNTER — HOSPITAL ENCOUNTER (INPATIENT)
Facility: CLINIC | Age: 72
LOS: 1 days | Discharge: OTHER INSTITUTION | DRG: 064 | End: 2022-03-19
Attending: FAMILY MEDICINE | Admitting: FAMILY MEDICINE
Payer: COMMERCIAL

## 2022-03-18 ENCOUNTER — APPOINTMENT (OUTPATIENT)
Dept: CARDIOLOGY | Facility: CLINIC | Age: 72
DRG: 064 | End: 2022-03-18
Attending: FAMILY MEDICINE
Payer: COMMERCIAL

## 2022-03-18 ENCOUNTER — APPOINTMENT (OUTPATIENT)
Dept: MRI IMAGING | Facility: CLINIC | Age: 72
DRG: 064 | End: 2022-03-18
Attending: FAMILY MEDICINE
Payer: COMMERCIAL

## 2022-03-18 ENCOUNTER — NURSE TRIAGE (OUTPATIENT)
Dept: FAMILY MEDICINE | Facility: OTHER | Age: 72
End: 2022-03-18
Payer: COMMERCIAL

## 2022-03-18 DIAGNOSIS — R51.9 FACIAL PAIN: ICD-10-CM

## 2022-03-18 DIAGNOSIS — Z11.52 ENCOUNTER FOR SCREENING LABORATORY TESTING FOR SEVERE ACUTE RESPIRATORY SYNDROME CORONAVIRUS 2 (SARS-COV-2): ICD-10-CM

## 2022-03-18 DIAGNOSIS — G45.9 TIA (TRANSIENT ISCHEMIC ATTACK): ICD-10-CM

## 2022-03-18 PROBLEM — I63.422 CEREBROVASCULAR ACCIDENT (CVA) DUE TO EMBOLISM OF LEFT ANTERIOR CEREBRAL ARTERY (H): Status: ACTIVE | Noted: 2021-12-09

## 2022-03-18 PROBLEM — G40.909 SEIZURE DISORDER (H): Status: ACTIVE | Noted: 2021-11-26

## 2022-03-18 PROBLEM — Z72.0 TOBACCO ABUSE DISORDER: Status: ACTIVE | Noted: 2018-09-20

## 2022-03-18 PROBLEM — Z87.898 HISTORY OF SEIZURE: Status: ACTIVE | Noted: 2022-03-18

## 2022-03-18 PROBLEM — Z87.898 HISTORY OF SEIZURE: Status: RESOLVED | Noted: 2022-03-18 | Resolved: 2022-03-18

## 2022-03-18 PROBLEM — Z86.79 HISTORY OF PAROXYSMAL SUPRAVENTRICULAR TACHYCARDIA: Status: ACTIVE | Noted: 2022-03-18

## 2022-03-18 LAB
ANION GAP SERPL CALCULATED.3IONS-SCNC: 19 MMOL/L (ref 3–14)
APTT PPP: 24 SECONDS (ref 22–38)
BASOPHILS # BLD AUTO: 0 10E3/UL (ref 0–0.2)
BASOPHILS NFR BLD AUTO: 1 %
BUN SERPL-MCNC: 15 MG/DL (ref 7–30)
CALCIUM SERPL-MCNC: 7.9 MG/DL (ref 8.5–10.1)
CHLORIDE BLD-SCNC: 101 MMOL/L (ref 94–109)
CO2 SERPL-SCNC: 23 MMOL/L (ref 20–32)
CREAT SERPL-MCNC: 0.58 MG/DL (ref 0.52–1.04)
EOSINOPHIL # BLD AUTO: 0.1 10E3/UL (ref 0–0.7)
EOSINOPHIL NFR BLD AUTO: 1 %
ERYTHROCYTE [DISTWIDTH] IN BLOOD BY AUTOMATED COUNT: 13.3 % (ref 10–15)
FASTING STATUS PATIENT QL REPORTED: NO
GFR SERPL CREATININE-BSD FRML MDRD: >90 ML/MIN/1.73M2
GLUCOSE BLD-MCNC: 118 MG/DL (ref 70–99)
GLUCOSE BLD-MCNC: 209 MG/DL (ref 70–99)
GLUCOSE BLDC GLUCOMTR-MCNC: 122 MG/DL (ref 70–99)
HCT VFR BLD AUTO: 45.8 % (ref 35–47)
HGB BLD-MCNC: 14.2 G/DL (ref 11.7–15.7)
IMM GRANULOCYTES # BLD: 0 10E3/UL
IMM GRANULOCYTES NFR BLD: 0 %
INR PPP: 0.98 (ref 0.85–1.15)
LVEF ECHO: NORMAL
LYMPHOCYTES # BLD AUTO: 2.3 10E3/UL (ref 0.8–5.3)
LYMPHOCYTES NFR BLD AUTO: 47 %
MAGNESIUM SERPL-MCNC: 2.1 MG/DL (ref 1.6–2.3)
MCH RBC QN AUTO: 30.5 PG (ref 26.5–33)
MCHC RBC AUTO-ENTMCNC: 31 G/DL (ref 31.5–36.5)
MCV RBC AUTO: 99 FL (ref 78–100)
MONOCYTES # BLD AUTO: 0.3 10E3/UL (ref 0–1.3)
MONOCYTES NFR BLD AUTO: 6 %
NEUTROPHILS # BLD AUTO: 2.3 10E3/UL (ref 1.6–8.3)
NEUTROPHILS NFR BLD AUTO: 45 %
NRBC # BLD AUTO: 0 10E3/UL
NRBC BLD AUTO-RTO: 0 /100
PHOSPHATE SERPL-MCNC: 2.4 MG/DL (ref 2.5–4.5)
PLATELET # BLD AUTO: 87 10E3/UL (ref 150–450)
POTASSIUM BLD-SCNC: 4 MMOL/L (ref 3.4–5.3)
RBC # BLD AUTO: 4.65 10E6/UL (ref 3.8–5.2)
SARS-COV-2 RNA RESP QL NAA+PROBE: NEGATIVE
SODIUM SERPL-SCNC: 143 MMOL/L (ref 133–144)
TROPONIN I SERPL HS-MCNC: 458 NG/L
TROPONIN I SERPL HS-MCNC: 5 NG/L
WBC # BLD AUTO: 5 10E3/UL (ref 4–11)

## 2022-03-18 PROCEDURE — 93325 DOPPLER ECHO COLOR FLOW MAPG: CPT | Mod: 26 | Performed by: INTERNAL MEDICINE

## 2022-03-18 PROCEDURE — 84484 ASSAY OF TROPONIN QUANT: CPT | Performed by: FAMILY MEDICINE

## 2022-03-18 PROCEDURE — 250N000011 HC RX IP 250 OP 636: Performed by: FAMILY MEDICINE

## 2022-03-18 PROCEDURE — 84100 ASSAY OF PHOSPHORUS: CPT | Performed by: FAMILY MEDICINE

## 2022-03-18 PROCEDURE — 99207 PR CDG-CODE CATEGORY CHANGED: CPT | Performed by: FAMILY MEDICINE

## 2022-03-18 PROCEDURE — 80048 BASIC METABOLIC PNL TOTAL CA: CPT | Performed by: FAMILY MEDICINE

## 2022-03-18 PROCEDURE — 250N000013 HC RX MED GY IP 250 OP 250 PS 637: Performed by: FAMILY MEDICINE

## 2022-03-18 PROCEDURE — 250N000009 HC RX 250: Performed by: FAMILY MEDICINE

## 2022-03-18 PROCEDURE — 96375 TX/PRO/DX INJ NEW DRUG ADDON: CPT | Performed by: FAMILY MEDICINE

## 2022-03-18 PROCEDURE — 99285 EMERGENCY DEPT VISIT HI MDM: CPT | Mod: 25 | Performed by: FAMILY MEDICINE

## 2022-03-18 PROCEDURE — 99285 EMERGENCY DEPT VISIT HI MDM: CPT | Performed by: FAMILY MEDICINE

## 2022-03-18 PROCEDURE — 36415 COLL VENOUS BLD VENIPUNCTURE: CPT | Performed by: FAMILY MEDICINE

## 2022-03-18 PROCEDURE — 82947 ASSAY GLUCOSE BLOOD QUANT: CPT | Performed by: FAMILY MEDICINE

## 2022-03-18 PROCEDURE — 93321 DOPPLER ECHO F-UP/LMTD STD: CPT | Mod: 26 | Performed by: INTERNAL MEDICINE

## 2022-03-18 PROCEDURE — 87635 SARS-COV-2 COVID-19 AMP PRB: CPT | Performed by: FAMILY MEDICINE

## 2022-03-18 PROCEDURE — 96365 THER/PROPH/DIAG IV INF INIT: CPT | Mod: 59 | Performed by: FAMILY MEDICINE

## 2022-03-18 PROCEDURE — C9803 HOPD COVID-19 SPEC COLLECT: HCPCS | Performed by: FAMILY MEDICINE

## 2022-03-18 PROCEDURE — 93325 DOPPLER ECHO COLOR FLOW MAPG: CPT

## 2022-03-18 PROCEDURE — 70450 CT HEAD/BRAIN W/O DYE: CPT

## 2022-03-18 PROCEDURE — 85730 THROMBOPLASTIN TIME PARTIAL: CPT | Performed by: FAMILY MEDICINE

## 2022-03-18 PROCEDURE — 93308 TTE F-UP OR LMTD: CPT | Mod: 26 | Performed by: INTERNAL MEDICINE

## 2022-03-18 PROCEDURE — 120N000001 HC R&B MED SURG/OB

## 2022-03-18 PROCEDURE — G0378 HOSPITAL OBSERVATION PER HR: HCPCS

## 2022-03-18 PROCEDURE — 70496 CT ANGIOGRAPHY HEAD: CPT

## 2022-03-18 PROCEDURE — 85025 COMPLETE CBC W/AUTO DIFF WBC: CPT | Performed by: FAMILY MEDICINE

## 2022-03-18 PROCEDURE — 96376 TX/PRO/DX INJ SAME DRUG ADON: CPT | Performed by: FAMILY MEDICINE

## 2022-03-18 PROCEDURE — A9585 GADOBUTROL INJECTION: HCPCS | Performed by: FAMILY MEDICINE

## 2022-03-18 PROCEDURE — 85610 PROTHROMBIN TIME: CPT | Performed by: FAMILY MEDICINE

## 2022-03-18 PROCEDURE — 70553 MRI BRAIN STEM W/O & W/DYE: CPT

## 2022-03-18 PROCEDURE — 99223 1ST HOSP IP/OBS HIGH 75: CPT | Mod: AI | Performed by: FAMILY MEDICINE

## 2022-03-18 PROCEDURE — 255N000002 HC RX 255 OP 636: Performed by: FAMILY MEDICINE

## 2022-03-18 PROCEDURE — 93005 ELECTROCARDIOGRAM TRACING: CPT | Performed by: FAMILY MEDICINE

## 2022-03-18 PROCEDURE — 83735 ASSAY OF MAGNESIUM: CPT | Performed by: FAMILY MEDICINE

## 2022-03-18 RX ORDER — ATORVASTATIN CALCIUM 40 MG/1
40 TABLET, FILM COATED ORAL DAILY
Status: DISCONTINUED | OUTPATIENT
Start: 2022-03-19 | End: 2022-03-19 | Stop reason: HOSPADM

## 2022-03-18 RX ORDER — FENTANYL CITRATE 50 UG/ML
30 INJECTION, SOLUTION INTRAMUSCULAR; INTRAVENOUS ONCE
Status: COMPLETED | OUTPATIENT
Start: 2022-03-18 | End: 2022-03-18

## 2022-03-18 RX ORDER — IOPAMIDOL 755 MG/ML
500 INJECTION, SOLUTION INTRAVASCULAR ONCE
Status: COMPLETED | OUTPATIENT
Start: 2022-03-18 | End: 2022-03-18

## 2022-03-18 RX ORDER — PHENYTOIN 125 MG/5ML
100 SUSPENSION ORAL 2 TIMES DAILY
Status: DISCONTINUED | OUTPATIENT
Start: 2022-03-18 | End: 2022-03-19

## 2022-03-18 RX ORDER — LISINOPRIL 10 MG/1
20 TABLET ORAL DAILY
Status: DISCONTINUED | OUTPATIENT
Start: 2022-03-19 | End: 2022-03-19

## 2022-03-18 RX ORDER — MAGNESIUM SULFATE 1 G/100ML
1 INJECTION INTRAVENOUS ONCE
Status: COMPLETED | OUTPATIENT
Start: 2022-03-18 | End: 2022-03-18

## 2022-03-18 RX ORDER — HYDRALAZINE HYDROCHLORIDE 20 MG/ML
10 INJECTION INTRAMUSCULAR; INTRAVENOUS ONCE
Status: COMPLETED | OUTPATIENT
Start: 2022-03-18 | End: 2022-03-18

## 2022-03-18 RX ORDER — GADOBUTROL 604.72 MG/ML
7.5 INJECTION INTRAVENOUS ONCE
Status: COMPLETED | OUTPATIENT
Start: 2022-03-18 | End: 2022-03-18

## 2022-03-18 RX ORDER — LIDOCAINE 40 MG/G
CREAM TOPICAL
Status: DISCONTINUED | OUTPATIENT
Start: 2022-03-18 | End: 2022-03-19

## 2022-03-18 RX ORDER — FENTANYL CITRATE 50 UG/ML
25 INJECTION, SOLUTION INTRAMUSCULAR; INTRAVENOUS ONCE
Status: COMPLETED | OUTPATIENT
Start: 2022-03-18 | End: 2022-03-18

## 2022-03-18 RX ORDER — LABETALOL HYDROCHLORIDE 5 MG/ML
20 INJECTION, SOLUTION INTRAVENOUS ONCE
Status: COMPLETED | OUTPATIENT
Start: 2022-03-18 | End: 2022-03-18

## 2022-03-18 RX ORDER — LEVETIRACETAM 500 MG/1
1500 TABLET ORAL 2 TIMES DAILY
Status: DISCONTINUED | OUTPATIENT
Start: 2022-03-18 | End: 2022-03-19

## 2022-03-18 RX ORDER — ACETAMINOPHEN 650 MG/1
650 SUPPOSITORY RECTAL EVERY 4 HOURS PRN
Status: DISCONTINUED | OUTPATIENT
Start: 2022-03-18 | End: 2022-03-19 | Stop reason: HOSPADM

## 2022-03-18 RX ORDER — PANTOPRAZOLE SODIUM 40 MG/1
40 TABLET, DELAYED RELEASE ORAL AT BEDTIME
Status: DISCONTINUED | OUTPATIENT
Start: 2022-03-18 | End: 2022-03-19

## 2022-03-18 RX ORDER — GABAPENTIN 300 MG/1
300 CAPSULE ORAL AT BEDTIME
Status: DISCONTINUED | OUTPATIENT
Start: 2022-03-18 | End: 2022-03-19 | Stop reason: HOSPADM

## 2022-03-18 RX ORDER — ASPIRIN 81 MG/1
81 TABLET, CHEWABLE ORAL DAILY
Status: DISCONTINUED | OUTPATIENT
Start: 2022-03-19 | End: 2022-03-19 | Stop reason: HOSPADM

## 2022-03-18 RX ORDER — LABETALOL HYDROCHLORIDE 5 MG/ML
10 INJECTION, SOLUTION INTRAVENOUS ONCE
Status: COMPLETED | OUTPATIENT
Start: 2022-03-18 | End: 2022-03-18

## 2022-03-18 RX ORDER — HYDRALAZINE HYDROCHLORIDE 20 MG/ML
10-20 INJECTION INTRAMUSCULAR; INTRAVENOUS
Status: DISCONTINUED | OUTPATIENT
Start: 2022-03-18 | End: 2022-03-19

## 2022-03-18 RX ORDER — ONDANSETRON 4 MG/1
4 TABLET, ORALLY DISINTEGRATING ORAL EVERY 6 HOURS PRN
Status: DISCONTINUED | OUTPATIENT
Start: 2022-03-18 | End: 2022-03-19 | Stop reason: HOSPADM

## 2022-03-18 RX ORDER — LABETALOL HYDROCHLORIDE 5 MG/ML
10-20 INJECTION, SOLUTION INTRAVENOUS EVERY 10 MIN PRN
Status: DISCONTINUED | OUTPATIENT
Start: 2022-03-18 | End: 2022-03-19

## 2022-03-18 RX ORDER — ACETAMINOPHEN 500 MG
1000 TABLET ORAL ONCE
Status: COMPLETED | OUTPATIENT
Start: 2022-03-18 | End: 2022-03-18

## 2022-03-18 RX ORDER — CLOPIDOGREL BISULFATE 75 MG/1
75 TABLET ORAL DAILY
Status: DISCONTINUED | OUTPATIENT
Start: 2022-03-19 | End: 2022-03-19 | Stop reason: HOSPADM

## 2022-03-18 RX ORDER — QUETIAPINE FUMARATE 25 MG/1
25 TABLET, FILM COATED ORAL EVERY 6 HOURS PRN
Status: DISCONTINUED | OUTPATIENT
Start: 2022-03-18 | End: 2022-03-19

## 2022-03-18 RX ORDER — LORAZEPAM 2 MG/ML
1 INJECTION INTRAMUSCULAR ONCE
Status: COMPLETED | OUTPATIENT
Start: 2022-03-18 | End: 2022-03-18

## 2022-03-18 RX ORDER — MEMANTINE HYDROCHLORIDE 5 MG/1
10 TABLET ORAL AT BEDTIME
Status: DISCONTINUED | OUTPATIENT
Start: 2022-03-18 | End: 2022-03-19

## 2022-03-18 RX ORDER — ONDANSETRON 2 MG/ML
4 INJECTION INTRAMUSCULAR; INTRAVENOUS EVERY 6 HOURS PRN
Status: DISCONTINUED | OUTPATIENT
Start: 2022-03-18 | End: 2022-03-19 | Stop reason: HOSPADM

## 2022-03-18 RX ADMIN — LABETALOL HYDROCHLORIDE 20 MG: 5 INJECTION, SOLUTION INTRAVENOUS at 17:17

## 2022-03-18 RX ADMIN — FENTANYL CITRATE 25 MCG: 50 INJECTION INTRAMUSCULAR; INTRAVENOUS at 19:11

## 2022-03-18 RX ADMIN — PHENYTOIN 100 MG: 125 SUSPENSION ORAL at 23:02

## 2022-03-18 RX ADMIN — IOPAMIDOL 80 ML: 755 INJECTION, SOLUTION INTRAVENOUS at 12:51

## 2022-03-18 RX ADMIN — FENTANYL CITRATE 30 MCG: 50 INJECTION INTRAMUSCULAR; INTRAVENOUS at 17:23

## 2022-03-18 RX ADMIN — GADOBUTROL 7.5 ML: 604.72 INJECTION INTRAVENOUS at 20:08

## 2022-03-18 RX ADMIN — SODIUM CHLORIDE 100 ML: 9 INJECTION, SOLUTION INTRAVENOUS at 12:51

## 2022-03-18 RX ADMIN — MEMANTINE 10 MG: 5 TABLET ORAL at 23:02

## 2022-03-18 RX ADMIN — PANTOPRAZOLE SODIUM 40 MG: 40 TABLET, DELAYED RELEASE ORAL at 23:02

## 2022-03-18 RX ADMIN — MAGNESIUM SULFATE HEPTAHYDRATE 1 G: 1 INJECTION, SOLUTION INTRAVENOUS at 16:22

## 2022-03-18 RX ADMIN — ACETAMINOPHEN 1000 MG: 500 TABLET, FILM COATED ORAL at 14:49

## 2022-03-18 RX ADMIN — HYDRALAZINE HYDROCHLORIDE 10 MG: 20 INJECTION INTRAMUSCULAR; INTRAVENOUS at 19:11

## 2022-03-18 RX ADMIN — GABAPENTIN 300 MG: 300 CAPSULE ORAL at 23:02

## 2022-03-18 RX ADMIN — LORAZEPAM 1 MG: 2 INJECTION INTRAMUSCULAR; INTRAVENOUS at 14:51

## 2022-03-18 RX ADMIN — LABETALOL HYDROCHLORIDE 10 MG: 5 INJECTION, SOLUTION INTRAVENOUS at 16:16

## 2022-03-18 RX ADMIN — LEVETIRACETAM 1500 MG: 500 TABLET, FILM COATED ORAL at 23:02

## 2022-03-18 ASSESSMENT — VISUAL ACUITY
OU: NORMAL ACUITY

## 2022-03-18 NOTE — ED NOTES
Pt brought back form MRI without study. Pt unable to hold still. Placed back on cart-complaining of headache-tylenol given prior to MRI. Appears sleepy at this time

## 2022-03-18 NOTE — CONSULTS
"    [facility]    Stroke Telephone Note    I was called by Franky Richardson on 03/18/22 regarding patient Khalida Redding. The patient is a 71 year old female with past medical hx of epilepsy and stroke presents to ED with acute onset L facial droop and slurred speech which started at 9 AM and rapidly improved.     Stroke Code Data (for stroke code without tele)  Stroke code activated 03/18/22   1238   Stroke provider first response  03/18/22   1238            Last known normal 03/18/22   0914        Time of discovery   (or onset of symptoms) 03/18/22   0915   Head CT read by Stroke Neuro Dr/Provider 03/18/22   1252   Was stroke code de-escalated? Yes 03/18/22 1330          Imaging Findings   Ct head neg for acute abnormalities  CTA neg for LVO , stenosis or other vascular abnormalities      Thrombolytic Treatment   Not given due to minor/isolated/quickly resolving symptoms.    Endovascular Treatment  Not initiated due to absence of proximal vessel occlusion    Impression  TIA or Minor stroke    Recommendations   - Use orderset: \"Ischemic Stroke Routine Admission\" or \"Ischemic Stroke No Thrombolytics/No Thrombectomy ICU Admission\"  - Neurochecks and Vital Signs every 4 hours   - Permissive HTN; goal SBP < 220 mmHg  - Daily aspirin 81 mg for secondary stroke prevention  - Plavix (clopidogrel) 75 mg PO Daily  - Statin: PTA statin  - MRI Brain with and without contrast  - TTE (with Bubble Study if age 60 yrs or less)  - Telemetry, EKG  - Bedside Glucose Monitoring  - A1c, Lipid Panel, Troponin x 3  - PT/OT/SLP  - Stroke Education  - Euthermia, Euglycemia    My recommendations are based on the information provided over the phone by Khalida Redding's in-person providers. They are not intended to replace the clinical judgment of her in-person providers. I was not requested to personally see or examine the patient at this time.    Chantale Serna MD  Vascular Neurology  To page " "me or covering stroke neurology team member, click here: [facility]   Choose \"On Call\" tab at top, then search dropdown box for \"Neurology Adult\", select location, press Enter, then look for stroke/neuro ICU/telestroke.           "

## 2022-03-18 NOTE — TELEPHONE ENCOUNTER
SITUATION:   Laura, the patients daughter is calling with concerns.      Per Laura the patient is not acting right. They have been checking blood pressure 188/121 HR- was not obtained.   Headache and feels like eyeballs are going to pop.   During conversation patient took BP was 178/127 and HR 85.   Patient can see, the patient rates her head pain at a 10. Per daughter the patient has been having intermittent slurring and drooling. Daughter states the patient doesn't look right.      BACKGROUND:   Back in November the patient has a history of a CVA.     PLAN:   Advised bringing her to the ER. If symptoms worsen will call 911.        FRANCISCA Cobb, RN, PHN  [facility]  March 18, 2022     Reason for Disposition    Loss of speech or garbled speech and new onset    Additional Information    Negative: Difficult to awaken or acting confused (e.g., disoriented, slurred speech)    Negative: Weakness of the face, arm or leg on one side of the body and new onset    Negative: Numbness of the face, arm or leg on one side of the body and new onset    Protocols used: HEADACHE-A-[place]          Reason for Disposition    Unable to walk without falling    Additional Information    Negative: Difficult to awaken or acting confused (e.g., disoriented, slurred speech)    Negative: Weakness of the face, arm or leg on one side of the body and new onset    Negative: Numbness of the face, arm or leg on one side of the body and new onset    Negative: Loss of speech or garbled speech and new onset    Negative: Passed out (i.e., fainted, collapsed and was not responding)    Negative: Sounds like a life-threatening emergency to the triager    Negative: Followed a head injury within last 3 days    Negative: Traumatic Brain Injury (TBI) is suspected    Negative: Sinus pain of forehead and yellow or green nasal discharge    Negative: Pregnant    Protocols used: HEADACHE-A-[place]

## 2022-03-18 NOTE — ED TRIAGE NOTES
Pt presents with sudden slurred speech and left sided facial droop at 0930. Pt had stroke in November. Pt is baseline with extremity movement.from stroke. Pt stood well with assist. Pt alert

## 2022-03-18 NOTE — H&P
[facility]    History and Physical - Hospitalist Service       Date of Admission:  3/18/2022    Assessment & Plan      Khalida Redding is a 71 year old female with past medical history of multiple CVAs, seizure disorder, dementia, type 2 diabetes, hyperlipidemia, hypertension who developed sudden onset left-sided facial droop and slurred speech with associated headache at 0930 this morning which was noted by her family.  By the time they brought her in for evaluation to the emergency room symptoms had already significantly improved and only a slight left-sided facial droop was noted by the ED provider.  Code stroke was called and CT and CTA showed no acute finding.  Discussed with stroke neurology and they recommended MRI, echocardiogram, therapy assessment, and observation status.  Echocardiogram has already been completed without significant findings an MRI was completed this evening with results pending.  Patient is already on aspirin and Plavix dual therapy and stroke neurology is recommending continuation with the same plan and further rediscussion following MRI results.  Patient will be registered to observation status, continue on telemetry.  Will perform lipid panel, serial troponins, glucose monitoring, telemetry, and therapy evaluation as recommended.  Neurochecks every 4 hours, allow permissive hypertension and continue with aspirin and Plavix dual therapy as well as patient's home statin for now.  Possible discharge in 1 to 2 days if neurological symptoms remain resolved and work-up is unremarkable with safe disposition plan in place    Principal Problem:    TIA (transient ischemic attack) vs CVA    Assessment: Characterized by left sided facial droop and slurred speech which lasted for approximately 3 hours before full resolution    Plan: Continue with aspirin and Plavix, statin therapy, and work-up as outlined above.  Will discuss with stroke neurology team following  MRI results return.    Active Problems:    Type 2 diabetes mellitus with circulatory disorder, without long-term current use of insulin (H)    Assessment: Patient normally on Metformin and glipizide XL.  Hemoglobin A1c is actually quite low with her 5.5 and patient has poor oral intake due to level of sedation at this time.  She has not been able to complete swallow evaluation due to her level of sedation    Plan: We will hold Metformin and glipizide at this time, monitor blood sugars serially with consideration of sliding scale NovoLog if significant hyperglycemia develops      Hyperlipidemia LDL goal <100    Assessment: Normally on atorvastatin with most recent LDL at goal    Plan: Continue home regimen and repeat lipid panel tomorrow morning for reassessment.      Hypertension goal BP (blood pressure) < 140/90    Assessment: Patient normally is on lisinopril 20 mg daily.  Blood pressures are currently significantly elevated, however allowing permissive hypertension due to concern for possible CVA    Plan: We will continue to allow permissive hypertension and only treat if systolic is above 220 or diastolic is above 140.  We will plan to restart home lisinopril tomorrow if persistent hypertension continues.      History of multiple cerebrovascular accidents (CVAs)    Assessment: Previous history, most recent in November 2021    Plan: Continue with aspirin and Plavix and plan as outlined above      Tobacco abuse disorder    Assessment: Previous history    Plan: No acute intervention needed      Dementia (H)    Assessment: Noted however patient currently is oriented to person, place, and that she is here for evaluation of a stroke.  Is on Namenda at baseline and does need help with daily cares according to previous records    Plan: Continue with Namenda, continue supportive care.  Will discuss further with family tomorrow and present      Left hemiparesis (H)    Assessment: Secondary to previous CVA    Plan: We will  have PT and OT evaluation tomorrow for reassessment      Seizure disorder (H)    Assessment: Patient is on Keppra and Dilantin secondary to this.  Is followed by neurology on an outpatient basis and there has been discussion of outpatient EEG monitoring to further evaluate effectiveness of current regimen    Plan: No acute intervention during this hospital stay, follow-up with outpatient neurology for further evaluation as previously recommended      History of paroxysmal supraventricular tachycardia    Assessment: Previous history noted    Plan: We will continue with telemetry monitoring during this hospitalization and plan for Zio patch at time of discharge       Diet: NPO for Medical/Clinical Reasons Except for: No Exceptions    DVT Prophylaxis: Pneumatic Compression Devices  Gloria Catheter: Not present  Central Lines: None  Cardiac Monitoring: None  Code Status:   Full code    Clinically Significant Risk Factors Present on Admission            # Anion Gap Metabolic Acidosis: AG = 19 mmol/L (Ref range: 3 - 14 mmol/L) on admission, will monitor and treat as appropriate    # Platelet Defect: home medication list includes an antiplatelet medication       Disposition Plan   Expected Discharge:  1-2 days   Anticipated discharge location:  Awaiting care coordination huddle  Delays:  need for MRI, therapy evaluations        The patient's care was discussed with the Patient and ED provider.    Gricel Lantigua MD  Hospitalist Service  [facility]  Securely message with the [employer] Web Console (learn more here)  Text page via [employer] Paging/Directory         ______________________________________________________________________    Chief Complaint   Sudden onset of left sided facial droop, headache and slurred speech at 0930    History is obtained from the patient and ED provider    History of Present Illness   Khalida GOMES Alexxbk is a 71 year old female with past medical history of  multiple strokes, seizure disorder, hypertension, diabetes who had sudden onset of left-sided facial drooping associated with slurred speech and headache began at 930 this morning.  The symptoms did not rapidly resolve the family brought her into the emergency room for reassessment.  On the drive the patient started having lessening of the slurred speech and on assessment by the ED provider the slurred speech was resolved and only a mild facial droop remained.  NIH score on initial evaluation was 1.  Patient underwent CT and CTA without significant finding with reevaluation following CT showing that the left facial droop had completely resolved.  Following discussion with stroke neurology patient is being registered to observation for TIA work-up as outlined above    Review of Systems    CONSTITUTIONAL: NEGATIVE for fever, chills, change in weight  INTEGUMENTARY/SKIN: NEGATIVE for worrisome rashes, moles or lesions  EYES: NEGATIVE for vision changes or irritation  ENT/MOUTH: NEGATIVE for ear, mouth and throat problems  RESP: NEGATIVE for significant cough or SOB  CV: NEGATIVE for chest pain, palpitations or peripheral edema  GI: NEGATIVE for nausea, abdominal pain, heartburn, or change in bowel habits  : NEGATIVE for frequency, dysuria, or hematuria  MUSCULOSKELETAL: NEGATIVE for significant arthralgias or myalgia  NEURO: Positive for slurred speech and left-sided facial droopbut no weakness of the arm or leg.  Positive for headache that began at the same time as the other symptoms.  All symptoms are now resolved  HEME: NEGATIVE for bleeding problems  PSYCHIATRIC: NEGATIVE for changes in mood or affect    Past Medical History    I have reviewed this patient's medical history and updated it with pertinent information if needed.   Past Medical History:   Diagnosis Date     Atrial tachycardia (H)     nonsustained, detected on Ziopatch     Convulsions, unspecified convulsion type (H) 9/20/2018     CVA (cerebral  vascular accident) (H)      Dementia (H) 2018     Diabetes (H)      Falls      History of CVA (cerebrovascular accident) 2018     Hyperlipidemia LDL goal <100 2018     Hypertension goal BP (blood pressure) < 140/90 2018     Osteoarthritis 2018     Pain in both lower legs 2018     Seizures (H)      Smoking      Syncope      Tobacco abuse disorder 2018     Tubular adenoma of colon 2018     Type 2 diabetes mellitus with circulatory disorder, without long-term current use of insulin (H) 2018       Past Surgical History   I have reviewed this patient's surgical history and updated it with pertinent information if needed.  Past Surgical History:   Procedure Laterality Date     COLONOSCOPY N/A 2018    Procedure: Colonoscopy, Polypectomy by Biopsy;  Surgeon: Arcadio Mcmullen DO;  Location:  GI     COLONOSCOPY N/A 2020    Procedure: Colonoscopy with possible biopsy and/or polypectomy;  Surgeon: Fabián Hines MD;  Location:  GI     HIP SURGERY Left        Social History   I have reviewed this patient's social history and updated it with pertinent information if needed.  Social History     Tobacco Use     Smoking status: Former Smoker     Packs/day: 1.00     Types: Cigarettes     Quit date: 2021     Years since quittin.3     Smokeless tobacco: Never Used     Tobacco comment: QUIT   Vaping Use     Vaping Use: Never used   Substance Use Topics     Alcohol use: No     Drug use: No       Family History   There is no family history of seizures, epilepsy or chronic neurological conditions    Prior to Admission Medications   Prior to Admission Medications   Prescriptions Last Dose Informant Patient Reported? Taking?   Cyanocobalamin (VITAMIN B 12 PO) 3/18/2022 at 0800 Daughter Yes Yes   Sig: Take 1,000 mcg by mouth daily   acetaminophen (TYLENOL) 500 MG tablet 3/18/2022 at 1400 Daughter Yes Yes   Sig: Take 1,000 mg by mouth every 6 hours as  needed for mild pain   alcohol swab prep pads  Daughter No No   Sig: Use to swab area of injection/carlos manuel as directed.   alendronate (FOSAMAX) 70 MG tablet Past Week at Unknown time  No Yes   Sig: Take 1 tablet (70 mg) by mouth every 7 days Tuesdays   aspirin (ASA) 81 MG chewable tablet 3/18/2022 at 0700  No Yes   Sig: Take 1 tablet (81 mg) by mouth daily Take 1 tablet daily through Feb 28.   atorvastatin (LIPITOR) 40 MG tablet 3/18/2022 at 0700  No Yes   Sig: Take 1 tablet by mouth once daily   blood glucose (NO BRAND SPECIFIED) test strip  Daughter No No   Sig: Use to test blood sugar once daily. To accompany: Blood Glucose Monitor Brands: per insurance.   blood glucose monitoring (NO BRAND SPECIFIED) meter device kit  Daughter No No   Sig: Use to test blood sugar once daily. Preferred blood glucose meter OR supplies to accompany: Blood Glucose Monitor Brands: per insurance.   clopidogrel (PLAVIX) 75 MG tablet 3/18/2022 at 0800  No Yes   Sig: Take 1 tablet by mouth once daily   gabapentin (NEURONTIN) 300 MG capsule 3/17/2022 at 2000  No Yes   Sig: TAKE 1 CAPSULE BY MOUTH AT  BEDTIME   glipiZIDE (GLUCOTROL XL) 2.5 MG 24 hr tablet 3/18/2022 at 0700  No Yes   Sig: TAKE 1 TABLET BY MOUTH  DAILY   levETIRAcetam (KEPPRA) 750 MG tablet 3/18/2022 at 0800  No Yes   Sig: Take 2 tablets by mouth twice daily   lisinopril (ZESTRIL) 20 MG tablet 3/18/2022 at 0700  No Yes   Sig: Take 1 tablet by mouth once daily   meloxicam (MOBIC) 7.5 MG tablet 3/17/2022 at 2000 Daughter No Yes   Sig: Take 1 tablet (7.5 mg) by mouth daily   memantine (NAMENDA) 10 MG tablet 3/17/2022 at 2000 Daughter No Yes   Sig: Take 1 tablet (10 mg) by mouth daily   metFORMIN (GLUCOPHAGE-XR) 500 MG 24 hr tablet 3/18/2022 at 0700 Daughter No Yes   Sig: Take 1 tablet (500 mg) by mouth 2 times daily (with meals)   omeprazole (PRILOSEC) 40 MG DR capsule 3/17/2022 at 2000  No Yes   Sig: Take 1 capsule (40 mg) by mouth At Bedtime   order for DME  Daughter No No    Sig: Equipment being ordered: glucometer and related supplies.   phenytoin (DILANTIN) 50 MG chewable tablet 3/18/2022 at 0800  No Yes   Sig: CHEW AND SWALLOW 2 TABLETS BY MOUTH TWICE DAILY   polyethylene glycol (MIRALAX) 17 GM/Dose powder Unknown at Unknown time Daughter No Yes   Sig: Take 17 g (1 capful) by mouth 2 times daily   Patient taking differently: Take 1 capful by mouth daily as needed for constipation    thin (NO BRAND SPECIFIED) lancets  Daughter No No   Sig: Use with lanceting device. To accompany: Blood Glucose Monitor Brands: per insurance.      Facility-Administered Medications: None     Allergies   Allergies   Allergen Reactions     Amoxicillin Hives and Rash     Pt reports she has taken PCN without problems     Ampicillin Rash       Physical Exam   Vital Signs:     BP: (!) 140/77 Pulse: 81   Resp: 16 SpO2: 97 %      Weight: 129 lbs 0 oz    Constitutional: Patient is sleeping deeply on arrival into the room but does awaken to voice and is able to answer simple questions, falls asleep easily.  Likely secondary to recent medications given to allow patient to tolerate MRI as this is different than patient was earlier in the day  Respiratory: No increased work of breathing, good air exchange, clear to auscultation bilaterally, no crackles or wheezing  Cardiovascular: Regular rate and rhythm without murmur  GI: Bowel sounds present, abdomen soft and nondistended  Skin: no redness, warmth, or swelling and no rashes  Musculoskeletal: Patient is moving all extremities spontaneously and attempts to complete neurological exam but due to level of sleepiness currently exam is not consistent; no lower extremity pitting edema present  Neurologic: Awake, alert, oriented to person, place, not time but is overall oriented to situation.  She is very sleepy and falls asleep in between questions as well as during parts of the exam making the exam limited however there is no obvious focal neurological deficit on  evaluation this evening    Data   Data reviewed today: I reviewed all medications, new labs and imaging results over the last 24 hours. .    Recent Labs   Lab 03/18/22  1440 03/18/22  1405 03/18/22  1403 03/18/22  1342   WBC  --  5.0  --   --    HGB  --  14.2  --   --    MCV  --  99  --   --    PLT  --  87*  --   --    INR 0.98  --   --   --    NA  --   --  143  --    POTASSIUM  --   --  4.0  --    CHLORIDE  --   --  101  --    CO2  --   --  23  --    BUN  --   --  15  --    CR  --   --  0.58  --    ANIONGAP  --   --  19*  --    PINA  --   --  7.9*  --    GLC  --   --  118* 122*     Recent Results (from the past 24 hour(s))   CT Head w/o Contrast    Narrative    CT HEAD WITHOUT CONTRAST 3/18/2022 12:48 PM    INDICATION: Code Stroke to evaluate for potential thrombolysis and  thrombectomy.    TECHNIQUE: CT scan of the head without contrast. Dose reduction  techniques were used.  CONTRAST: None.  COMPARISON: 11/30/2021 brain MRI.    FINDINGS: No intracranial hemorrhage, extraaxial collection, mass  effect or CT evidence of acute infarct.  Unchanged encephalomalacia in  the anterior superior right frontal lobe with a background severe  burden chronic small vessel ischemic change. Expected prominence of  the right frontal horn, with an overall unchanged ventricular size and  configuration.  Background mild to moderate diffuse parenchymal volume  loss. Calcification of the distal internal carotid arteries  bilaterally. Osseous structures are intact. Unremarkable orbits.  Paranasal sinuses are free of significant disease. Clear mastoid air  cells.      Impression    IMPRESSION:  1. No acute intracranial abnormality.  2. Unchanged chronic encephalomalacia in the right frontal lobe with a  severe burden chronic small vessel ischemic change and a mild to  moderate diffuse parenchymal volume loss.     JUS KEYS MD         SYSTEM ID:  [id number]   CTA Head Neck with Contrast    Narrative    CTA  HEAD AND NECK WITH CONTRAST  3/18/2022 1:25 PM    INDICATION: Code stroke to evaluate for potential thrombolysis and  thrombectomy.      TECHNIQUE: Head and neck CT angiogram with IV contrast. CT images of  the head and neck vessels obtained during the arterial phase of  intravenous contrast administration. Axial helical 2D reconstructed  images and multiplanar 3D MIP reconstructed images of the head and  neck vessels were performed on a separate workstation. Dose reduction  techniques were used.    CONTRAST: 70 [id number]    COMPARISON: 11/30/2021 brain MRI.     FINDINGS:  HEAD CTA: Bilateral distal internal carotid arteries are patent.  Bilateral MCA are patent. Diffusely small caliber of the right EDWARD  presumably relates to a chronic encephalomalacia in the right frontal  lobe seen immediately adjacent. Left EDWARD is patent. Distal vertebral  arteries and basilar artery are patent. Fetal origin right PCA. Mild  narrowing at the left P1-P2 junction. No aneurysm. No high flow  vascular malformation.    NECK CTA: Three-vessel aortic arch. Bilateral common, internal, and  proximal external carotid arteries are patent. Vertebral arteries are  balanced, without significant stenosis. Mild emphysematous change at  the bilateral lung apices. Osteopenia with multilevel degenerative  change in the cervical spine. Remainder negative.      Impression    IMPRESSION:  HEAD CTA:  1.  No high-grade intracranial stenosis. Mild stenosis at the left  P1-P2 junction.  2.  Diffusely diminished caliber of the distal right EDWARD presumably  relates to the adjacent encephalomalacia in the right frontal lobe.  3.  No aneurysm and no high flow vascular malformation.    NECK CTA:  1.  No high-grade stenosis of the neck vessels by NASCET criteria.    Findings were discussed with Dr. DENISSE MCKEON via telephone  at 1430 hours on 3/18/2022.    JUS KEYS MD         SYSTEM ID:  [id number]   Echo Limited   Result Value    LVEF  55-60%    Narrative     [id number]  Formerly [facility]  [id number]  [id number]^LINDA^DENISSE^WALT     [facility]  Echocardiography Laboratory  [address] [place] [address]     Name: SHAI CAMPBELL  MRN: [id number]  : 1950  Study Date: 2022 01:06 PM  Age: 71 yrs  Gender: Female  Patient Location: [facility]  Reason For Study: TIA  History: hyperlipidemia, htn, cva, syncope  Ordering Physician: DENISSE MCKEON  Referring Physician: Abdirahman Cervantes  Performed By: Laura Santos     BSA: 1.6 m2  Height: 65 in  Weight: 129 lb  HR: 99  BP: 160/127 mmHg  ______________________________________________________________________________  Procedure  Limited Portable Echo Adult. Technically difficult study. Patient unable to  stay still or lay on side, confused from stroke, so limited images.  ______________________________________________________________________________  Interpretation Summary     Technically difficult imaging ( patient unable to cooperate well with exam)  A cardiac source of embolus was not identified.  Sinus rhythm was noted.  The left ventricle is normal in structure, function and size.  The visual ejection fraction is 55-60%.  Grossly Doppler interrogation does not demonstrate signficant stenosis or  insufficinecy involving cardiac valves.     No chagne since 2021  ______________________________________________________________________________  Left Ventricle  The left ventricle is normal in structure, function and size. The visual  ejection fraction is 55-60%. No regional wall motion abnormalities noted.     Right Ventricle  The right ventricle is normal in structure, function and size. There is no  mass or thrombus in the right ventricle.     Atria  Normal left atrial size. Right atrial size is normal. Intact atrial septum.  Spontaneous contrast in left atrial appendage.     Mitral Valve  The mitral valve leaflets appear normal. There is no evidence of stenosis,  fluttering, or  prolapse. There is no mitral regurgitation noted. There is no  mitral valve stenosis.     Tricuspid Valve  Normal tricuspid valve. No tricuspid regurgitation. There is no tricuspid  stenosis.     Aortic Valve  The aortic valve is trileaflet. No aortic regurgitation is present. No aortic  stenosis is present.     Pulmonic Valve  Normal pulmonic valve. There is no pulmonic valvular regurgitation. There is  no pulmonic valvular stenosis.     Vessels  The aortic root is normal size. Normal size ascending aorta. The inferior vena  cava is normal.     Pericardium  The pericardium appears normal. There is no pleural effusion.     Rhythm  Sinus rhythm was noted.  ______________________________________________________________________________  MMode/2D Measurements & Calculations  IVSd: 0.80 cm     LVIDd: 3.9 cm  LVIDs: 3.2 cm  LVPWd: 0.80 cm  FS: 17.9 %  LV mass(C)d: 89.7 grams  LV mass(C)dI: 54.6 grams/m2  Ao root diam: 3.3 cm  LA dimension: 2.2 cm  LA/Ao: 0.67  RWT: 0.41     Doppler Measurements & Calculations  Ao V2 max: 97.1 cm/sec  Ao max P.0 mmHg  PA V2 max: 89.6 cm/sec  PA max PG: 3.2 mmHg     ______________________________________________________________________________  Report approved by: Dr. Nicho Feliciano 2022 03:10 PM

## 2022-03-18 NOTE — ED PROVIDER NOTES
History     Chief Complaint   Patient presents with     Stroke Symptoms     Code stroke called. Headache      HPI  Khalida Redding is a 71 year old female who presents to the emergency department with concerns of a new stroke.  Patient around 930 this morning was noticed to have new onset slurred speech and a left facial droop.  Patient recently had a stroke back in November.  That left her with generalized weakness, trouble walking but no speech or facial symptoms.  Patient was doing fine till 930 when the symptoms started.  She has since been complaining of a headache that is kind of going down into the neck.  The daughter who brought the patient here thought she seemed a little bit more confused also.  The daughter admits that since being in the car and ride arriving here, her speech has improved and she seems closer back to her baseline now.  Patient denies any chest pain or palpitations.  Denies any nausea any vomiting recently.    Allergies:  Allergies   Allergen Reactions     Amoxicillin Hives and Rash     Pt reports she has taken PCN without problems     Ampicillin Rash       Problem List:    Patient Active Problem List    Diagnosis Date Noted     Cerebrovascular accident (CVA) due to embolism of left anterior cerebral artery (H) 12/09/2021     Priority: Medium     Seizure (H) 11/26/2021     Priority: Medium     History of colonic polyps 07/13/2020     Priority: Medium     Abdominal pain, generalized 06/16/2020     Priority: Medium     Slow transit constipation 06/16/2020     Priority: Medium     Chronic upset stomach 06/16/2020     Priority: Medium     Left hemiparesis (H) 01/13/2020     Priority: Medium     Age-related osteoporosis without current pathological fracture 11/25/2019     Priority: Medium     Lumbar radiculopathy 11/18/2019     Priority: Medium     Closed displaced fracture of left clavicle, unspecified part of clavicle, initial encounter 11/18/2019     Priority: Medium     Injury of left  clavicle, initial encounter 11/18/2019     Priority: Medium     Syncope, unspecified syncope type 12/17/2018     Priority: Medium     Added automatically from request for surgery [id number]       Tubular adenoma of colon 11/29/2018     Priority: Medium     Supraventricular tachycardia (H) 10/22/2018     Priority: Medium     Gait instability 10/22/2018     Priority: Medium     Type 2 diabetes mellitus with circulatory disorder, without long-term current use of insulin (H) 09/20/2018     Priority: Medium     Hyperlipidemia LDL goal <100 09/20/2018     Priority: Medium     Hypertension goal BP (blood pressure) < 140/90 09/20/2018     Priority: Medium     History of CVA (cerebrovascular accident) 09/20/2018     Priority: Medium     Convulsions, unspecified convulsion type (H) 09/20/2018     Priority: Medium     Osteoarthritis 09/20/2018     Priority: Medium     Tobacco abuse disorder 09/20/2018     Priority: Medium     Pain in both lower legs 09/20/2018     Priority: Medium     Dementia (H) 09/20/2018     Priority: Medium        Past Medical History:    Past Medical History:   Diagnosis Date     Atrial tachycardia (H)      Convulsions, unspecified convulsion type (H) 9/20/2018     CVA (cerebral vascular accident) (H)      Dementia (H) 9/20/2018     Diabetes (H)      Falls      History of CVA (cerebrovascular accident) 9/20/2018     Hyperlipidemia LDL goal <100 9/20/2018     Hypertension goal BP (blood pressure) < 140/90 9/20/2018     Osteoarthritis 9/20/2018     Pain in both lower legs 9/20/2018     Seizures (H)      Smoking      Syncope      Tobacco abuse disorder 9/20/2018     Tubular adenoma of colon 11/29/2018     Type 2 diabetes mellitus with circulatory disorder, without long-term current use of insulin (H) 9/20/2018       Past Surgical History:    Past Surgical History:   Procedure Laterality Date     COLONOSCOPY N/A 11/28/2018    Procedure: Colonoscopy, Polypectomy by Biopsy;  Surgeon: Arcadio Mcmullen,  DO;  Location: [facility]     COLONOSCOPY N/A 2020    Procedure: Colonoscopy with possible biopsy and/or polypectomy;  Surgeon: Fabián Hines MD;  Location: [facility]     HIP SURGERY Left        Family History:    No family history on file.    Social History:  Marital Status:   [2]  Social History     Tobacco Use     Smoking status: Former Smoker     Packs/day: 1.00     Types: Cigarettes     Quit date: 2021     Years since quittin.3     Smokeless tobacco: Never Used     Tobacco comment: QUIT   Vaping Use     Vaping Use: Never used   Substance Use Topics     Alcohol use: No     Drug use: No        Medications:    acetaminophen (TYLENOL) 500 MG tablet  alcohol swab prep pads  alendronate (FOSAMAX) 70 MG tablet  aspirin (ASA) 81 MG chewable tablet  atorvastatin (LIPITOR) 40 MG tablet  blood glucose (NO BRAND SPECIFIED) test strip  blood glucose monitoring (NO BRAND SPECIFIED) meter device kit  clopidogrel (PLAVIX) 75 MG tablet  Cyanocobalamin (VITAMIN B 12 PO)  gabapentin (NEURONTIN) 300 MG capsule  glipiZIDE (GLUCOTROL XL) 2.5 MG 24 hr tablet  levETIRAcetam (KEPPRA) 750 MG tablet  lisinopril (ZESTRIL) 20 MG tablet  meloxicam (MOBIC) 7.5 MG tablet  memantine (NAMENDA) 10 MG tablet  metFORMIN (GLUCOPHAGE-XR) 500 MG 24 hr tablet  omeprazole (PRILOSEC) 40 MG DR capsule  order for DME  phenytoin (DILANTIN) 50 MG chewable tablet  polyethylene glycol (MIRALAX) 17 GM/Dose powder  thin (NO BRAND SPECIFIED) lancets          Review of Systems   All other systems reviewed and are negative.      Physical Exam   BP: (!) 160/127  Pulse: 89  Resp: 16  Weight: 58.5 kg (129 lb)  SpO2: 98 %      Physical Exam  Vitals and nursing note reviewed.   Constitutional:       General: She is not in acute distress.     Appearance: She is well-developed. She is not diaphoretic.   HENT:      Head: Normocephalic and atraumatic.      Nose: Nose normal.      Mouth/Throat:      Pharynx: No oropharyngeal exudate.   Eyes:       Conjunctiva/sclera: Conjunctivae normal.   Cardiovascular:      Rate and Rhythm: Normal rate and regular rhythm.      Heart sounds: Normal heart sounds. No murmur heard.    No friction rub.   Pulmonary:      Effort: Pulmonary effort is normal. No respiratory distress.      Breath sounds: Normal breath sounds. No stridor. No wheezing or rales.   Abdominal:      General: Bowel sounds are normal. There is no distension.      Palpations: Abdomen is soft. There is no mass.      Tenderness: There is no abdominal tenderness. There is no guarding.   Musculoskeletal:         General: No tenderness. Normal range of motion.      Cervical back: Normal range of motion and neck supple.   Skin:     General: Skin is warm and dry.      Capillary Refill: Capillary refill takes less than 2 seconds.      Findings: No erythema.   Neurological:      Mental Status: She is alert and oriented to person, place, and time.      Comments: National [employer] Stroke Scale  Exam Interval: Baseline   Score   Level of consciousness: (0)   Alert, keenly responsive   LOC questions: (0)   Answers both questions correctly   LOC commands: (0)   Performs both tasks correctly   Best gaze: (0)   Normal   Visual: (0)   No visual loss   Facial palsy: (1)   Minor paralysis (flat nasolabial fold, smile asymmetry)   Motor arm (left): (0)   No drift   Motor arm (right): (0)   No drift   Motor leg (left): (0)   No drift   Motor leg (right): (0)   No drift   Limb ataxia: (0)   Absent   Sensory: (0)   Normal- no sensory loss   Best language: (0)   Normal- no aphasia   Dysarthria: (0)   Normal   Extinction and inattention: (0)   No abnormality      Total Score:  1     Psychiatric:         Judgment: Judgment normal.         ED Course                 Procedures      Results for orders placed or performed during the hospital encounter of 03/18/22 (from the past 24 hour(s))   CT Head w/o Contrast    Narrative    CT HEAD WITHOUT CONTRAST 3/18/2022 12:48  PM    INDICATION: Code Stroke to evaluate for potential thrombolysis and  thrombectomy.    TECHNIQUE: CT scan of the head without contrast. Dose reduction  techniques were used.  CONTRAST: None.  COMPARISON: 11/30/2021 brain MRI.    FINDINGS: No intracranial hemorrhage, extraaxial collection, mass  effect or CT evidence of acute infarct.  Unchanged encephalomalacia in  the anterior superior right frontal lobe with a background severe  burden chronic small vessel ischemic change. Expected prominence of  the right frontal horn, with an overall unchanged ventricular size and  configuration.  Background mild to moderate diffuse parenchymal volume  loss. Calcification of the distal internal carotid arteries  bilaterally. Osseous structures are intact. Unremarkable orbits.  Paranasal sinuses are free of significant disease. Clear mastoid air  cells.      Impression    IMPRESSION:  1. No acute intracranial abnormality.  2. Unchanged chronic encephalomalacia in the right frontal lobe with a  severe burden chronic small vessel ischemic change and a mild to  moderate diffuse parenchymal volume loss.     JUS KEYS MD         SYSTEM ID:  [id number]   Glucose by meter   Result Value Ref Range    GLUCOSE BY METER POCT 122 (H) 70 - 99 mg/dL   Basic metabolic panel   Result Value Ref Range    Sodium 143 133 - 144 mmol/L    Potassium 4.0 3.4 - 5.3 mmol/L    Chloride 101 94 - 109 mmol/L    Carbon Dioxide (CO2)      Anion Gap      Urea Nitrogen      Creatinine      Calcium      Glucose      GFR Estimate     CBC with Platelets & Differential    Narrative    The following orders were created for panel order CBC with Platelets & Differential.  Procedure                               Abnormality         Status                     ---------                               -----------         ------                     CBC with platelets and d...[[id number]]  Abnormal            Final result                 Please view results for these  tests on the individual orders.   CBC with platelets and differential   Result Value Ref Range    WBC Count 5.0 4.0 - 11.0 10e3/uL    RBC Count 4.65 3.80 - 5.20 10e6/uL    Hemoglobin 14.2 11.7 - 15.7 g/dL    Hematocrit 45.8 35.0 - 47.0 %    MCV 99 78 - 100 fL    MCH 30.5 26.5 - 33.0 pg    MCHC 31.0 (L) 31.5 - 36.5 g/dL    RDW 13.3 10.0 - 15.0 %    Platelet Count 87 (L) 150 - 450 10e3/uL    % Neutrophils 45 %    % Lymphocytes 47 %    % Monocytes 6 %    % Eosinophils 1 %    % Basophils 1 %    % Immature Granulocytes 0 %    NRBCs per 100 WBC 0 <1 /100    Absolute Neutrophils 2.3 1.6 - 8.3 10e3/uL    Absolute Lymphocytes 2.3 0.8 - 5.3 10e3/uL    Absolute Monocytes 0.3 0.0 - 1.3 10e3/uL    Absolute Eosinophils 0.1 0.0 - 0.7 10e3/uL    Absolute Basophils 0.0 0.0 - 0.2 10e3/uL    Absolute Immature Granulocytes 0.0 <=0.4 10e3/uL    Absolute NRBCs 0.0 10e3/uL       Medications   LORazepam (ATIVAN) injection 1 mg (has no administration in time range)   acetaminophen (TYLENOL) tablet 1,000 mg (has no administration in time range)   iopamidol (ISOVUE-370) solution 500 mL (80 mLs Intravenous Given 3/18/22 1251)   sodium chloride 0.9 % bag 100mL for CT scan flush use (100 mLs Intravenous Given 3/18/22 1251)       Labs are reviewed and were unremarkable.  CT scan of the head and CT angiogram did not show any acute findings.  Stroke neurology was consulted right away on this patient.  They reviewed the films and did not recommend any further changes to her medications as she is already on maximal aspirin and Plavix therapy.  They think with the symptoms rapidly improving, the symptoms are likely more of a TIA.  They would recommend admission to the hospital for observation overnight to make sure the symptoms do not return or change.  Patient does have a history of seizures but her symptoms do not seem like her previous seizure disorder.  Stroke neurology recommends just continuing to monitor would recommend getting an MRI of her  brain.  Would recommend getting an echocardiogram today and would recommend an outpatient 30-day event monitor.  I discussed the case with her hospitalist, Dr. Lantigua, she feels that the patient is appropriate to stay here and will admit the patient.  We will get the MRI and echocardiogram down here before she goes up to the floor.  Unfortunately we were able to get the MRI here pretty quickly but patient would not sit still in the scanner despite giving her a milligram of Ativan.  So the MRI had to be canceled.  I contacted her hospitalist service and since we do not have MRI available over the weekend they do not feel comfortable taking the patient up to the floor until she can get the MRI.  I do have an opening at the end of her shift and so we can try again later on this evening.  Patient was complaining more of a headache and the thought was that the headache was why she would not sit still.  Family also thought if we can get her headache better than she would sit still more.  Patient's blood pressures have been creeping up and I think that is likely the cause of her headache.  We have tried some IV magnesium and fentanyl and 10 mg of labetalol which did not help her blood pressure headache.  Patient was then given  20mg of labetalol and now her blood pressure is a lot better and she is laying very comfortably and her headache is better.  We will try again at the end of the MRI schedule to try and get this brain MRI.  If she still unable to sit still, we may have to look at transfer for the patient so that she can get a sedated MRI.  Unfortunately patient will need to be signed out at change of shift to Dr. Gonzalez to follow-up on this MRI.  If they are able to get it, patient should be able to be admitted here, otherwise what to look at transfer.    Assessments & Plan (with Medical Decision Making)  TIA     I have reviewed the nursing notes.    I have reviewed the findings, diagnosis, plan and need for  follow up with the patient.              3/18/2022   [facility] EMERGENCY DEPT     Franky Richardson MD  03/18/22 [id number]       Franky Richardson MD  03/18/22 [id number]

## 2022-03-18 NOTE — TELEPHONE ENCOUNTER
SITUATION:   Laura, the patients daughter is calling with concerns.     Per Laura the patient is not acting right. They have been checking blood pressure 188/121 HR- was not obtained.   Headache and feels like eyeballs are going to pop.   During conversation patient took BP was 178/127 and HR 85.   Patient can see, the patient rates her head pain at a 10. Per daughter the patient has been having intermittent slurring and drooling. Daughter states the patient doesn't look right.     BACKGROUND:   Back in November the patient has a history of a CVA.     PLAN:   Advised bringing her to the ER. If symptoms worsen will call 911.        FRANCISCA Cobb, RN, PHN  [facility] [facility]  March 18, 2022    Reason for Disposition    Loss of speech or garbled speech and new onset    Additional Information    Negative: Difficult to awaken or acting confused (e.g., disoriented, slurred speech)    Negative: Weakness of the face, arm or leg on one side of the body and new onset    Negative: Numbness of the face, arm or leg on one side of the body and new onset    Protocols used: HEADACHE-A-OH

## 2022-03-18 NOTE — ED NOTES
"Complaining of headache-stating \"I want to go home\". Informed daughter no longer here. Pt aware she is to be admitted but continues to state \"I want to go home\"  "

## 2022-03-19 ENCOUNTER — APPOINTMENT (OUTPATIENT)
Dept: CT IMAGING | Facility: CLINIC | Age: 72
DRG: 064 | End: 2022-03-19
Attending: HOSPITALIST
Payer: COMMERCIAL

## 2022-03-19 ENCOUNTER — APPOINTMENT (OUTPATIENT)
Dept: GENERAL RADIOLOGY | Facility: CLINIC | Age: 72
DRG: 064 | End: 2022-03-19
Attending: FAMILY MEDICINE
Payer: COMMERCIAL

## 2022-03-19 ENCOUNTER — APPOINTMENT (OUTPATIENT)
Dept: GENERAL RADIOLOGY | Facility: CLINIC | Age: 72
DRG: 064 | End: 2022-03-19
Attending: NURSE ANESTHETIST, CERTIFIED REGISTERED
Payer: COMMERCIAL

## 2022-03-19 ENCOUNTER — APPOINTMENT (OUTPATIENT)
Dept: CT IMAGING | Facility: CLINIC | Age: 72
DRG: 064 | End: 2022-03-19
Attending: FAMILY MEDICINE
Payer: COMMERCIAL

## 2022-03-19 ENCOUNTER — ANESTHESIA (OUTPATIENT)
Dept: INTENSIVE CARE | Facility: CLINIC | Age: 72
DRG: 064 | End: 2022-03-19
Payer: COMMERCIAL

## 2022-03-19 ENCOUNTER — HOSPITAL ENCOUNTER (INPATIENT)
Facility: CLINIC | Age: 72
LOS: 20 days | Discharge: HOME HEALTH | DRG: 064 | End: 2022-04-08
Attending: PEDIATRICS | Admitting: HOSPITALIST
Payer: COMMERCIAL

## 2022-03-19 ENCOUNTER — ANESTHESIA EVENT (OUTPATIENT)
Dept: INTENSIVE CARE | Facility: CLINIC | Age: 72
DRG: 064 | End: 2022-03-19
Payer: COMMERCIAL

## 2022-03-19 VITALS
RESPIRATION RATE: 12 BRPM | BODY MASS INDEX: 21.13 KG/M2 | TEMPERATURE: 98.1 F | DIASTOLIC BLOOD PRESSURE: 57 MMHG | WEIGHT: 131.5 LBS | SYSTOLIC BLOOD PRESSURE: 103 MMHG | HEART RATE: 86 BPM | OXYGEN SATURATION: 94 % | HEIGHT: 66 IN

## 2022-03-19 DIAGNOSIS — R39.198 DIFFICULTY URINATING: ICD-10-CM

## 2022-03-19 DIAGNOSIS — E11.51 TYPE 2 DIABETES MELLITUS WITH DIABETIC PERIPHERAL ANGIOPATHY WITHOUT GANGRENE, WITHOUT LONG-TERM CURRENT USE OF INSULIN (H): ICD-10-CM

## 2022-03-19 DIAGNOSIS — I63.9 ACUTE CVA (CEREBROVASCULAR ACCIDENT) (H): Primary | ICD-10-CM

## 2022-03-19 DIAGNOSIS — G81.94 LEFT HEMIPARESIS (H): ICD-10-CM

## 2022-03-19 DIAGNOSIS — Z86.79 HISTORY OF PAROXYSMAL SUPRAVENTRICULAR TACHYCARDIA: ICD-10-CM

## 2022-03-19 DIAGNOSIS — F03.90 DEMENTIA WITHOUT BEHAVIORAL DISTURBANCE, UNSPECIFIED DEMENTIA TYPE: ICD-10-CM

## 2022-03-19 DIAGNOSIS — G40.909 SEIZURE DISORDER (H): ICD-10-CM

## 2022-03-19 DIAGNOSIS — R56.9 SEIZURE (H): ICD-10-CM

## 2022-03-19 DIAGNOSIS — I63.422 CEREBROVASCULAR ACCIDENT (CVA) DUE TO EMBOLISM OF LEFT ANTERIOR CEREBRAL ARTERY (H): ICD-10-CM

## 2022-03-19 DIAGNOSIS — S49.92XA INJURY OF LEFT CLAVICLE, INITIAL ENCOUNTER: ICD-10-CM

## 2022-03-19 DIAGNOSIS — S42.002A CLOSED DISPLACED FRACTURE OF LEFT CLAVICLE, UNSPECIFIED PART OF CLAVICLE, INITIAL ENCOUNTER: ICD-10-CM

## 2022-03-19 PROBLEM — R65.10 SIRS (SYSTEMIC INFLAMMATORY RESPONSE SYNDROME) (H): Status: ACTIVE | Noted: 2022-03-19

## 2022-03-19 PROBLEM — R40.0 SOMNOLENCE: Status: ACTIVE | Noted: 2022-03-19

## 2022-03-19 PROBLEM — G45.9 TIA (TRANSIENT ISCHEMIC ATTACK): Status: RESOLVED | Noted: 2022-03-18 | Resolved: 2022-03-19

## 2022-03-19 PROBLEM — I95.9 TRANSIENT HYPOTENSION: Status: ACTIVE | Noted: 2022-03-19

## 2022-03-19 LAB
ALBUMIN UR-MCNC: NEGATIVE MG/DL
ANION GAP SERPL CALCULATED.3IONS-SCNC: 7 MMOL/L (ref 3–14)
APPEARANCE UR: ABNORMAL
BILIRUB UR QL STRIP: NEGATIVE
BUN SERPL-MCNC: 12 MG/DL (ref 7–30)
CALCIUM SERPL-MCNC: 8 MG/DL (ref 8.5–10.1)
CHLORIDE BLD-SCNC: 103 MMOL/L (ref 94–109)
CHOLEST SERPL-MCNC: 223 MG/DL
CO2 SERPL-SCNC: 26 MMOL/L (ref 20–32)
COLOR UR AUTO: YELLOW
CREAT SERPL-MCNC: 0.54 MG/DL (ref 0.52–1.04)
ERYTHROCYTE [DISTWIDTH] IN BLOOD BY AUTOMATED COUNT: 13.1 % (ref 10–15)
ERYTHROCYTE [DISTWIDTH] IN BLOOD BY AUTOMATED COUNT: 13.2 % (ref 10–15)
GFR SERPL CREATININE-BSD FRML MDRD: >90 ML/MIN/1.73M2
GLUCOSE BLD-MCNC: 196 MG/DL (ref 70–99)
GLUCOSE BLDC GLUCOMTR-MCNC: 122 MG/DL (ref 70–99)
GLUCOSE BLDC GLUCOMTR-MCNC: 146 MG/DL (ref 70–99)
GLUCOSE BLDC GLUCOMTR-MCNC: 147 MG/DL (ref 70–99)
GLUCOSE BLDC GLUCOMTR-MCNC: 157 MG/DL (ref 70–99)
GLUCOSE BLDC GLUCOMTR-MCNC: 168 MG/DL (ref 70–99)
GLUCOSE UR STRIP-MCNC: 150 MG/DL
HCT VFR BLD AUTO: 40.1 % (ref 35–47)
HCT VFR BLD AUTO: 43.7 % (ref 35–47)
HDLC SERPL-MCNC: 84 MG/DL
HGB BLD-MCNC: 13.2 G/DL (ref 11.7–15.7)
HGB BLD-MCNC: 14.4 G/DL (ref 11.7–15.7)
HGB UR QL STRIP: ABNORMAL
HOLD SPECIMEN: NORMAL
KETONES UR STRIP-MCNC: NEGATIVE MG/DL
LACTATE SERPL-SCNC: 2 MMOL/L (ref 0.7–2)
LACTATE SERPL-SCNC: 2.9 MMOL/L (ref 0.7–2)
LACTATE SERPL-SCNC: 4.6 MMOL/L (ref 0.7–2)
LDLC SERPL CALC-MCNC: 113 MG/DL
LEUKOCYTE ESTERASE UR QL STRIP: NEGATIVE
MAGNESIUM SERPL-MCNC: 1.9 MG/DL (ref 1.6–2.3)
MAGNESIUM SERPL-MCNC: 2.3 MG/DL (ref 1.6–2.3)
MCH RBC QN AUTO: 30.6 PG (ref 26.5–33)
MCH RBC QN AUTO: 30.8 PG (ref 26.5–33)
MCHC RBC AUTO-ENTMCNC: 32.9 G/DL (ref 31.5–36.5)
MCHC RBC AUTO-ENTMCNC: 33 G/DL (ref 31.5–36.5)
MCV RBC AUTO: 93 FL (ref 78–100)
MCV RBC AUTO: 94 FL (ref 78–100)
NITRATE UR QL: NEGATIVE
NONHDLC SERPL-MCNC: 139 MG/DL
PH UR STRIP: 6 [PH] (ref 5–7)
PHENYTOIN SERPL-MCNC: 3 MG/L
PHOSPHATE SERPL-MCNC: 3 MG/DL (ref 2.5–4.5)
PLATELET # BLD AUTO: 309 10E3/UL (ref 150–450)
PLATELET # BLD AUTO: 335 10E3/UL (ref 150–450)
POTASSIUM BLD-SCNC: 3.9 MMOL/L (ref 3.4–5.3)
PROCALCITONIN SERPL-MCNC: <0.05 NG/ML
PROCALCITONIN SERPL-MCNC: <0.05 NG/ML
RBC # BLD AUTO: 4.32 10E6/UL (ref 3.8–5.2)
RBC # BLD AUTO: 4.67 10E6/UL (ref 3.8–5.2)
RBC URINE: 1 /HPF
SODIUM SERPL-SCNC: 136 MMOL/L (ref 133–144)
SP GR UR STRIP: 1.03 (ref 1–1.03)
SQUAMOUS EPITHELIAL: <1 /HPF
TRIGL SERPL-MCNC: 129 MG/DL
TROPONIN I SERPL HS-MCNC: 393 NG/L
TROPONIN I SERPL HS-MCNC: 502 NG/L
TROPONIN I SERPL HS-MCNC: 529 NG/L
TROPONIN I SERPL HS-MCNC: 640 NG/L
UROBILINOGEN UR STRIP-MCNC: NORMAL MG/DL
WBC # BLD AUTO: 10 10E3/UL (ref 4–11)
WBC # BLD AUTO: 13.1 10E3/UL (ref 4–11)
WBC URINE: 1 /HPF

## 2022-03-19 PROCEDURE — 258N000003 HC RX IP 258 OP 636: Performed by: FAMILY MEDICINE

## 2022-03-19 PROCEDURE — 85027 COMPLETE CBC AUTOMATED: CPT | Performed by: FAMILY MEDICINE

## 2022-03-19 PROCEDURE — 71045 X-RAY EXAM CHEST 1 VIEW: CPT

## 2022-03-19 PROCEDURE — 84100 ASSAY OF PHOSPHORUS: CPT | Performed by: FAMILY MEDICINE

## 2022-03-19 PROCEDURE — 370N000003 HC ANESTHESIA WARD SERVICE

## 2022-03-19 PROCEDURE — 70450 CT HEAD/BRAIN W/O DYE: CPT

## 2022-03-19 PROCEDURE — 36415 COLL VENOUS BLD VENIPUNCTURE: CPT | Performed by: HOSPITALIST

## 2022-03-19 PROCEDURE — 99223 1ST HOSP IP/OBS HIGH 75: CPT | Mod: AI | Performed by: HOSPITALIST

## 2022-03-19 PROCEDURE — 85027 COMPLETE CBC AUTOMATED: CPT | Performed by: HOSPITALIST

## 2022-03-19 PROCEDURE — 250N000013 HC RX MED GY IP 250 OP 250 PS 637: Performed by: FAMILY MEDICINE

## 2022-03-19 PROCEDURE — 80177 DRUG SCRN QUAN LEVETIRACETAM: CPT | Performed by: FAMILY MEDICINE

## 2022-03-19 PROCEDURE — 84484 ASSAY OF TROPONIN QUANT: CPT | Performed by: FAMILY MEDICINE

## 2022-03-19 PROCEDURE — 87040 BLOOD CULTURE FOR BACTERIA: CPT | Performed by: FAMILY MEDICINE

## 2022-03-19 PROCEDURE — 83735 ASSAY OF MAGNESIUM: CPT | Performed by: FAMILY MEDICINE

## 2022-03-19 PROCEDURE — 120N000003 HC R&B IMCU UMMC

## 2022-03-19 PROCEDURE — 74177 CT ABD & PELVIS W/CONTRAST: CPT

## 2022-03-19 PROCEDURE — 80185 ASSAY OF PHENYTOIN TOTAL: CPT | Performed by: FAMILY MEDICINE

## 2022-03-19 PROCEDURE — 80048 BASIC METABOLIC PNL TOTAL CA: CPT | Performed by: FAMILY MEDICINE

## 2022-03-19 PROCEDURE — 81001 URINALYSIS AUTO W/SCOPE: CPT | Performed by: FAMILY MEDICINE

## 2022-03-19 PROCEDURE — 258N000003 HC RX IP 258 OP 636: Performed by: HOSPITALIST

## 2022-03-19 PROCEDURE — 250N000011 HC RX IP 250 OP 636: Performed by: FAMILY MEDICINE

## 2022-03-19 PROCEDURE — 99292 CRITICAL CARE ADDL 30 MIN: CPT | Performed by: FAMILY MEDICINE

## 2022-03-19 PROCEDURE — 250N000009 HC RX 250: Performed by: FAMILY MEDICINE

## 2022-03-19 PROCEDURE — 36415 COLL VENOUS BLD VENIPUNCTURE: CPT | Performed by: FAMILY MEDICINE

## 2022-03-19 PROCEDURE — 83605 ASSAY OF LACTIC ACID: CPT | Performed by: FAMILY MEDICINE

## 2022-03-19 PROCEDURE — 999N000065 XR CHEST PORT 1 VIEW

## 2022-03-19 PROCEDURE — 84145 PROCALCITONIN (PCT): CPT | Performed by: FAMILY MEDICINE

## 2022-03-19 PROCEDURE — 80061 LIPID PANEL: CPT | Performed by: FAMILY MEDICINE

## 2022-03-19 PROCEDURE — 99291 CRITICAL CARE FIRST HOUR: CPT | Performed by: FAMILY MEDICINE

## 2022-03-19 RX ORDER — FENTANYL CITRATE 50 UG/ML
50 INJECTION, SOLUTION INTRAMUSCULAR; INTRAVENOUS EVERY 5 MIN PRN
Status: CANCELLED | OUTPATIENT
Start: 2022-03-19

## 2022-03-19 RX ORDER — PHENYTOIN 50 MG/1
100 TABLET, CHEWABLE ORAL 2 TIMES DAILY
Status: DISCONTINUED | OUTPATIENT
Start: 2022-03-20 | End: 2022-03-20

## 2022-03-19 RX ORDER — LANOLIN ALCOHOL/MO/W.PET/CERES
3 CREAM (GRAM) TOPICAL
Status: DISCONTINUED | OUTPATIENT
Start: 2022-03-19 | End: 2022-03-19

## 2022-03-19 RX ORDER — MEMANTINE HYDROCHLORIDE 10 MG/1
10 TABLET ORAL DAILY
Status: DISCONTINUED | OUTPATIENT
Start: 2022-03-20 | End: 2022-04-08 | Stop reason: HOSPADM

## 2022-03-19 RX ORDER — NALOXONE HYDROCHLORIDE 0.4 MG/ML
0.4 INJECTION, SOLUTION INTRAMUSCULAR; INTRAVENOUS; SUBCUTANEOUS
Status: DISCONTINUED | OUTPATIENT
Start: 2022-03-19 | End: 2022-03-19 | Stop reason: HOSPADM

## 2022-03-19 RX ORDER — ASPIRIN 81 MG/1
81 TABLET, CHEWABLE ORAL DAILY
Status: DISCONTINUED | OUTPATIENT
Start: 2022-03-20 | End: 2022-04-08 | Stop reason: HOSPADM

## 2022-03-19 RX ORDER — NALOXONE HYDROCHLORIDE 0.4 MG/ML
0.2 INJECTION, SOLUTION INTRAMUSCULAR; INTRAVENOUS; SUBCUTANEOUS
Status: DISCONTINUED | OUTPATIENT
Start: 2022-03-19 | End: 2022-03-19 | Stop reason: HOSPADM

## 2022-03-19 RX ORDER — MAGNESIUM SULFATE HEPTAHYDRATE 40 MG/ML
2 INJECTION, SOLUTION INTRAVENOUS ONCE
Status: COMPLETED | OUTPATIENT
Start: 2022-03-19 | End: 2022-03-19

## 2022-03-19 RX ORDER — GABAPENTIN 300 MG/1
300 CAPSULE ORAL AT BEDTIME
Status: DISCONTINUED | OUTPATIENT
Start: 2022-03-20 | End: 2022-04-08 | Stop reason: HOSPADM

## 2022-03-19 RX ORDER — ALBUTEROL SULFATE 0.83 MG/ML
2.5 SOLUTION RESPIRATORY (INHALATION) EVERY 4 HOURS PRN
Status: CANCELLED | OUTPATIENT
Start: 2022-03-19

## 2022-03-19 RX ORDER — LIDOCAINE 40 MG/G
CREAM TOPICAL
Status: CANCELLED | OUTPATIENT
Start: 2022-03-19 | End: 2022-03-22

## 2022-03-19 RX ORDER — ATORVASTATIN CALCIUM 40 MG/1
40 TABLET, FILM COATED ORAL DAILY
Status: CANCELLED | OUTPATIENT
Start: 2022-03-20

## 2022-03-19 RX ORDER — HEPARIN SODIUM 10000 [USP'U]/100ML
0-5000 INJECTION, SOLUTION INTRAVENOUS CONTINUOUS
Status: DISCONTINUED | OUTPATIENT
Start: 2022-03-19 | End: 2022-03-19

## 2022-03-19 RX ORDER — MIDAZOLAM HCL IN 0.9 % NACL/PF 1 MG/ML
1-8 PLASTIC BAG, INJECTION (ML) INTRAVENOUS CONTINUOUS
Status: DISCONTINUED | OUTPATIENT
Start: 2022-03-19 | End: 2022-03-19

## 2022-03-19 RX ORDER — FENTANYL CITRATE 50 UG/ML
25-50 INJECTION, SOLUTION INTRAMUSCULAR; INTRAVENOUS
Status: DISCONTINUED | OUTPATIENT
Start: 2022-03-19 | End: 2022-03-19

## 2022-03-19 RX ORDER — LIDOCAINE 40 MG/G
CREAM TOPICAL
Status: DISCONTINUED | OUTPATIENT
Start: 2022-03-19 | End: 2022-03-19 | Stop reason: HOSPADM

## 2022-03-19 RX ORDER — MEPERIDINE HYDROCHLORIDE 25 MG/ML
12.5 INJECTION INTRAMUSCULAR; INTRAVENOUS; SUBCUTANEOUS
Status: CANCELLED | OUTPATIENT
Start: 2022-03-19

## 2022-03-19 RX ORDER — NOREPINEPHRINE BITARTRATE 0.02 MG/ML
.01-.6 INJECTION, SOLUTION INTRAVENOUS CONTINUOUS
Status: DISCONTINUED | OUTPATIENT
Start: 2022-03-19 | End: 2022-03-19 | Stop reason: HOSPADM

## 2022-03-19 RX ORDER — ONDANSETRON 4 MG/1
4 TABLET, ORALLY DISINTEGRATING ORAL EVERY 30 MIN PRN
Status: CANCELLED | OUTPATIENT
Start: 2022-03-19

## 2022-03-19 RX ORDER — DEXTROSE MONOHYDRATE 25 G/50ML
25-50 INJECTION, SOLUTION INTRAVENOUS
Status: CANCELLED | OUTPATIENT
Start: 2022-03-19

## 2022-03-19 RX ORDER — ACETAMINOPHEN 650 MG/1
650 SUPPOSITORY RECTAL EVERY 4 HOURS PRN
Status: CANCELLED | OUTPATIENT
Start: 2022-03-19

## 2022-03-19 RX ORDER — NALOXONE HYDROCHLORIDE 0.4 MG/ML
0.2 INJECTION, SOLUTION INTRAMUSCULAR; INTRAVENOUS; SUBCUTANEOUS
Status: CANCELLED | OUTPATIENT
Start: 2022-03-19

## 2022-03-19 RX ORDER — NALOXONE HYDROCHLORIDE 0.4 MG/ML
0.4 INJECTION, SOLUTION INTRAMUSCULAR; INTRAVENOUS; SUBCUTANEOUS
Status: CANCELLED | OUTPATIENT
Start: 2022-03-19

## 2022-03-19 RX ORDER — PANTOPRAZOLE SODIUM 40 MG/1
40 TABLET, DELAYED RELEASE ORAL
Status: DISCONTINUED | OUTPATIENT
Start: 2022-03-20 | End: 2022-04-08 | Stop reason: HOSPADM

## 2022-03-19 RX ORDER — LIDOCAINE 40 MG/G
CREAM TOPICAL
Status: DISCONTINUED | OUTPATIENT
Start: 2022-03-19 | End: 2022-04-08 | Stop reason: HOSPADM

## 2022-03-19 RX ORDER — FENTANYL CITRATE 50 UG/ML
50 INJECTION, SOLUTION INTRAMUSCULAR; INTRAVENOUS
Status: CANCELLED | OUTPATIENT
Start: 2022-03-19

## 2022-03-19 RX ORDER — LANOLIN ALCOHOL/MO/W.PET/CERES
6 CREAM (GRAM) TOPICAL
Status: DISCONTINUED | OUTPATIENT
Start: 2022-03-19 | End: 2022-04-08 | Stop reason: HOSPADM

## 2022-03-19 RX ORDER — CLOPIDOGREL BISULFATE 75 MG/1
75 TABLET ORAL DAILY
Status: CANCELLED | OUTPATIENT
Start: 2022-03-20

## 2022-03-19 RX ORDER — DEXTROSE MONOHYDRATE 25 G/50ML
25-50 INJECTION, SOLUTION INTRAVENOUS
Status: DISCONTINUED | OUTPATIENT
Start: 2022-03-19 | End: 2022-03-19

## 2022-03-19 RX ORDER — METOPROLOL TARTRATE 1 MG/ML
1-2 INJECTION, SOLUTION INTRAVENOUS EVERY 5 MIN PRN
Status: CANCELLED | OUTPATIENT
Start: 2022-03-19

## 2022-03-19 RX ORDER — HYDROMORPHONE HCL IN WATER/PF 6 MG/30 ML
0.4 PATIENT CONTROLLED ANALGESIA SYRINGE INTRAVENOUS EVERY 5 MIN PRN
Status: CANCELLED | OUTPATIENT
Start: 2022-03-19

## 2022-03-19 RX ORDER — LANOLIN ALCOHOL/MO/W.PET/CERES
1000 CREAM (GRAM) TOPICAL DAILY
Status: DISCONTINUED | OUTPATIENT
Start: 2022-03-20 | End: 2022-04-08 | Stop reason: HOSPADM

## 2022-03-19 RX ORDER — SODIUM CHLORIDE 9 MG/ML
INJECTION, SOLUTION INTRAVENOUS CONTINUOUS
Status: CANCELLED | OUTPATIENT
Start: 2022-03-19

## 2022-03-19 RX ORDER — ACETAMINOPHEN 325 MG/1
975 TABLET ORAL EVERY 8 HOURS PRN
Status: DISCONTINUED | OUTPATIENT
Start: 2022-03-19 | End: 2022-04-08 | Stop reason: HOSPADM

## 2022-03-19 RX ORDER — SODIUM CHLORIDE 9 MG/ML
INJECTION, SOLUTION INTRAVENOUS CONTINUOUS
Status: DISCONTINUED | OUTPATIENT
Start: 2022-03-19 | End: 2022-03-19 | Stop reason: HOSPADM

## 2022-03-19 RX ORDER — CLOPIDOGREL BISULFATE 75 MG/1
75 TABLET ORAL DAILY
Status: DISCONTINUED | OUTPATIENT
Start: 2022-03-20 | End: 2022-04-08 | Stop reason: HOSPADM

## 2022-03-19 RX ORDER — ASPIRIN 81 MG/1
81 TABLET, CHEWABLE ORAL DAILY
Status: CANCELLED | OUTPATIENT
Start: 2022-03-20

## 2022-03-19 RX ORDER — ONDANSETRON 2 MG/ML
4 INJECTION INTRAMUSCULAR; INTRAVENOUS EVERY 6 HOURS PRN
Status: CANCELLED | OUTPATIENT
Start: 2022-03-19

## 2022-03-19 RX ORDER — OXYCODONE HYDROCHLORIDE 5 MG/1
5 TABLET ORAL EVERY 4 HOURS PRN
Status: CANCELLED | OUTPATIENT
Start: 2022-03-19

## 2022-03-19 RX ORDER — ONDANSETRON 4 MG/1
4 TABLET, ORALLY DISINTEGRATING ORAL EVERY 6 HOURS PRN
Status: CANCELLED | OUTPATIENT
Start: 2022-03-19

## 2022-03-19 RX ORDER — NICOTINE POLACRILEX 4 MG
15-30 LOZENGE BUCCAL
Status: DISCONTINUED | OUTPATIENT
Start: 2022-03-19 | End: 2022-03-19

## 2022-03-19 RX ORDER — HYDRALAZINE HYDROCHLORIDE 20 MG/ML
2.5-5 INJECTION INTRAMUSCULAR; INTRAVENOUS EVERY 10 MIN PRN
Status: CANCELLED | OUTPATIENT
Start: 2022-03-19

## 2022-03-19 RX ORDER — POLYETHYLENE GLYCOL 3350 17 G/17G
17 POWDER, FOR SOLUTION ORAL DAILY
Status: DISCONTINUED | OUTPATIENT
Start: 2022-03-20 | End: 2022-04-08 | Stop reason: HOSPADM

## 2022-03-19 RX ORDER — LIDOCAINE 40 MG/G
CREAM TOPICAL
Status: CANCELLED | OUTPATIENT
Start: 2022-03-19

## 2022-03-19 RX ORDER — DEXTROSE MONOHYDRATE 25 G/50ML
25-50 INJECTION, SOLUTION INTRAVENOUS
Status: DISCONTINUED | OUTPATIENT
Start: 2022-03-19 | End: 2022-03-19 | Stop reason: HOSPADM

## 2022-03-19 RX ORDER — ONDANSETRON 2 MG/ML
4 INJECTION INTRAMUSCULAR; INTRAVENOUS EVERY 6 HOURS PRN
Status: DISCONTINUED | OUTPATIENT
Start: 2022-03-19 | End: 2022-04-08 | Stop reason: HOSPADM

## 2022-03-19 RX ORDER — SODIUM CHLORIDE, SODIUM LACTATE, POTASSIUM CHLORIDE, CALCIUM CHLORIDE 600; 310; 30; 20 MG/100ML; MG/100ML; MG/100ML; MG/100ML
INJECTION, SOLUTION INTRAVENOUS CONTINUOUS
Status: CANCELLED | OUTPATIENT
Start: 2022-03-19

## 2022-03-19 RX ORDER — NOREPINEPHRINE BITARTRATE 0.02 MG/ML
.01-.6 INJECTION, SOLUTION INTRAVENOUS CONTINUOUS
Status: DISCONTINUED | OUTPATIENT
Start: 2022-03-19 | End: 2022-03-19

## 2022-03-19 RX ORDER — ONDANSETRON 4 MG/1
4 TABLET, ORALLY DISINTEGRATING ORAL EVERY 6 HOURS PRN
Status: DISCONTINUED | OUTPATIENT
Start: 2022-03-19 | End: 2022-04-08 | Stop reason: HOSPADM

## 2022-03-19 RX ORDER — ONDANSETRON 2 MG/ML
4 INJECTION INTRAMUSCULAR; INTRAVENOUS EVERY 30 MIN PRN
Status: CANCELLED | OUTPATIENT
Start: 2022-03-19

## 2022-03-19 RX ORDER — IOPAMIDOL 755 MG/ML
500 INJECTION, SOLUTION INTRAVASCULAR ONCE
Status: COMPLETED | OUTPATIENT
Start: 2022-03-19 | End: 2022-03-19

## 2022-03-19 RX ORDER — ATORVASTATIN CALCIUM 20 MG/1
40 TABLET, FILM COATED ORAL DAILY
Status: DISCONTINUED | OUTPATIENT
Start: 2022-03-20 | End: 2022-04-08 | Stop reason: HOSPADM

## 2022-03-19 RX ORDER — NICOTINE POLACRILEX 4 MG
15-30 LOZENGE BUCCAL
Status: CANCELLED | OUTPATIENT
Start: 2022-03-19

## 2022-03-19 RX ORDER — NICOTINE POLACRILEX 4 MG
15-30 LOZENGE BUCCAL
Status: DISCONTINUED | OUTPATIENT
Start: 2022-03-19 | End: 2022-03-19 | Stop reason: HOSPADM

## 2022-03-19 RX ADMIN — LEVETIRACETAM 1000 MG: 100 INJECTION, SOLUTION INTRAVENOUS at 14:21

## 2022-03-19 RX ADMIN — IOPAMIDOL 65 ML: 755 INJECTION, SOLUTION INTRAVENOUS at 02:04

## 2022-03-19 RX ADMIN — SODIUM CHLORIDE 1000 ML: 9 INJECTION, SOLUTION INTRAVENOUS at 12:30

## 2022-03-19 RX ADMIN — SODIUM CHLORIDE: 9 INJECTION, SOLUTION INTRAVENOUS at 02:31

## 2022-03-19 RX ADMIN — LEVETIRACETAM 1500 MG: 500 TABLET, FILM COATED ORAL at 09:40

## 2022-03-19 RX ADMIN — SODIUM CHLORIDE 100 MG PE: 9 INJECTION, SOLUTION INTRAVENOUS at 15:25

## 2022-03-19 RX ADMIN — PHENYTOIN 100 MG: 125 SUSPENSION ORAL at 09:28

## 2022-03-19 RX ADMIN — CLOPIDOGREL BISULFATE 75 MG: 75 TABLET ORAL at 09:37

## 2022-03-19 RX ADMIN — ASPIRIN 81 MG 81 MG: 81 TABLET ORAL at 09:38

## 2022-03-19 RX ADMIN — MAGNESIUM SULFATE HEPTAHYDRATE 2 G: 40 INJECTION, SOLUTION INTRAVENOUS at 09:34

## 2022-03-19 RX ADMIN — SODIUM CHLORIDE 100 MG PE: 9 INJECTION, SOLUTION INTRAVENOUS at 20:15

## 2022-03-19 RX ADMIN — TAZOBACTAM SODIUM AND PIPERACILLIN SODIUM 4.5 G: 500; 4 INJECTION, SOLUTION INTRAVENOUS at 14:43

## 2022-03-19 RX ADMIN — SODIUM CHLORIDE 500 ML: 9 INJECTION, SOLUTION INTRAVENOUS at 13:54

## 2022-03-19 RX ADMIN — TAZOBACTAM SODIUM AND PIPERACILLIN SODIUM 4.5 G: 500; 4 INJECTION, SOLUTION INTRAVENOUS at 19:30

## 2022-03-19 RX ADMIN — ATORVASTATIN CALCIUM 40 MG: 40 TABLET, FILM COATED ORAL at 09:38

## 2022-03-19 RX ADMIN — SODIUM CHLORIDE 1000 ML: 9 INJECTION, SOLUTION INTRAVENOUS at 01:27

## 2022-03-19 RX ADMIN — LEVETIRACETAM 1500 MG: 100 INJECTION, SOLUTION INTRAVENOUS at 21:00

## 2022-03-19 RX ADMIN — SODIUM CHLORIDE 60 ML: 9 INJECTION, SOLUTION INTRAVENOUS at 02:03

## 2022-03-19 RX ADMIN — VANCOMYCIN HYDROCHLORIDE 1500 MG: 1 INJECTION, POWDER, LYOPHILIZED, FOR SOLUTION INTRAVENOUS at 09:31

## 2022-03-19 RX ADMIN — TAZOBACTAM SODIUM AND PIPERACILLIN SODIUM 4.5 G: 500; 4 INJECTION, SOLUTION INTRAVENOUS at 08:49

## 2022-03-19 RX ADMIN — SODIUM PHOSPHATE, MONOBASIC, MONOHYDRATE 9 MMOL: 276; 142 INJECTION, SOLUTION INTRAVENOUS at 02:46

## 2022-03-19 RX ADMIN — SODIUM CHLORIDE: 9 INJECTION, SOLUTION INTRAVENOUS at 18:24

## 2022-03-19 ASSESSMENT — ACTIVITIES OF DAILY LIVING (ADL)
ADLS_ACUITY_SCORE: 18
TOILETING_ISSUES: YES
ADLS_ACUITY_SCORE: 18
EQUIPMENT_CURRENTLY_USED_AT_HOME: CANE, STRAIGHT
ADLS_ACUITY_SCORE: 19
ADLS_ACUITY_SCORE: 17
BATHING: 1-->ASSISTANCE NEEDED
ADLS_ACUITY_SCORE: 21
ADLS_ACUITY_SCORE: 11
ADLS_ACUITY_SCORE: 19
ADLS_ACUITY_SCORE: 18
ADLS_ACUITY_SCORE: 18
DRESSING/BATHING: BATHING DIFFICULTY, ASSISTANCE 1 PERSON
ADLS_ACUITY_SCORE: 9
TOILETING_MANAGEMENT: INCONTIENT
CONCENTRATING,_REMEMBERING_OR_MAKING_DECISIONS_DIFFICULTY: NO
ADLS_ACUITY_SCORE: 19
TOILETING_ASSISTANCE: TOILETING DIFFICULTY, ASSISTANCE 1 PERSON
DIFFICULTY_EATING/SWALLOWING: NO
ADLS_ACUITY_SCORE: 19
ADLS_ACUITY_SCORE: 21
ADLS_ACUITY_SCORE: 20
ADLS_ACUITY_SCORE: 19
DRESSING/BATHING_DIFFICULTY: YES
CHANGE_IN_FUNCTIONAL_STATUS_SINCE_ONSET_OF_CURRENT_ILLNESS/INJURY: NO
ADLS_ACUITY_SCORE: 11
ADLS_ACUITY_SCORE: 18
ADLS_ACUITY_SCORE: 18
ADLS_ACUITY_SCORE: 19
WEAR_GLASSES_OR_BLIND: NO
ADLS_ACUITY_SCORE: 21

## 2022-03-19 NOTE — PHARMACY-VANCOMYCIN DOSING SERVICE
"Pharmacy Vancomycin Initial Note  Date of Service 2022  Patient's  1950  71 year old, female    Indication: Sepsis    Current estimated CrCl = Estimated Creatinine Clearance: 87.3 mL/min (based on SCr of 0.54 mg/dL).    Creatinine for last 3 days  3/18/2022:  2:03 PM Creatinine 0.58 mg/dL  3/19/2022:  5:50 AM Creatinine 0.54 mg/dL    Recent Vancomycin Level(s) for last 3 days  No results found for requested labs within last 72 hours.      Vancomycin IV Administrations (past 72 hours)      No vancomycin orders with administrations in past 72 hours.                Nephrotoxins and other renal medications (From now, onward)    Start     Dose/Rate Route Frequency Ordered Stop    22 0900  [Held by provider]  lisinopril (ZESTRIL) tablet 20 mg        (Held by provider since Sat 3/19/2022 at 0140 by Antonio Elliott MD.Hold Reason: Abnormal Electrolytes.)    20 mg Oral DAILY 22 0830  vancomycin (VANCOCIN) 1,500 mg in sodium chloride 0.9 % 250 mL intermittent infusion         1,500 mg  over 90 Minutes Intravenous EVERY 18 HOURS 22 0712      22 0800  piperacillin-tazobactam (ZOSYN) intermittent infusion 4.5 g        Note to Pharmacy: For [facility], [facility] and [facility]: For Zosyn-naive patients, use the \"Zosyn initial dose + extended infusion\" order panel.    4.5 g  200 mL/hr over 30 Minutes Intravenous EVERY 6 HOURS 22 0703            Contrast Orders - past 72 hours (72h ago, onward)            Start     Dose/Rate Route Frequency Ordered Stop    22 0230  iopamidol (ISOVUE-370) solution 500 mL         500 mL Intravenous ONCE 22 0200 22 0204    22  gadobutrol (GADAVIST) injection 7.5 mL         7.5 mL Intravenous ONCE 22 1250  iopamidol (ISOVUE-370) solution 500 mL         500 mL Intravenous ONCE 22 1249 22 1251          [employer] Prediction of Planned Initial Vancomycin Regimen  Loading dose: " N/A  Regimen: 1500 mg IV every 18 hours.  Start time: 08:30 on 03/19/2022  Exposure target: AUC24 (range)400-600 mg/L.hr   AUC24,ss: 556 mg/L.hr  Probability of AUC24 > 400: 83 %  Ctrough,ss: 14.9 mg/L  Probability of Ctrough,ss > 20: 27 %  Probability of nephrotoxicity (Lodise GABRIELLE 2009): 10 %          Plan:  1. Start vancomycin  1500 mg IV q18h.   2. Vancomycin monitoring method: AUC  3. Vancomycin therapeutic monitoring goal: 400-600 mg*h/L  4. Pharmacy will check vancomycin levels as appropriate in 1-3 Days.    5. Serum creatinine levels will be ordered daily for the first week of therapy and at least twice weekly for subsequent weeks.      Michael Pabon RPH

## 2022-03-19 NOTE — PROGRESS NOTES
[facility]    Medicine Progress Note - Hospitalist Service    Date of Admission:  3/18/2022    Assessment & Plan         Patient is a 71-year-old female with past medical history of multiple CVAs, seizure disorder, dementia, noninsulin-dependent type 2 diabetes, hyperlipidemia, hypertension who was hospitalized on 3/18/2022 with sudden onset left-sided facial weakness and slurred speech with MRI confirming acute versus subacute stroke in the right aspect of the spleen of the corpus callosum consistent with patient's clinical symptoms.  She is been admitted for ongoing management of acute stroke.      Hospital course has been complicated overnight by an episode of unresponsiveness associated with profound hypotension into the 60s systolic with blood pressures improving following 1 L fluid resuscitation but patient remained very somnolent and cognitively different than prior to this episode.  CT scan of the head revealed only the known acute stroke, CT of the chest/abdomen/pelvis was overall unremarkable for acute findings.  Patient continued to be somnolent in the hours afterwards and this morning has now spiked a fever of 101.2 with associated elevated lactic acid and patient has been pancultured and initiated on broad-spectrum antibiotics with Zosyn and vancomycin for suspected sepsis of unclear etiology.  In discussion with family patient does have atypical seizures at baseline which were diagnosed following her last stroke in November 2021 in which patient will not have the external classic features of a seizure but will have an episode of unresponsiveness followed by prolonged postictal phase, however these episodes are not frequent and are not complicated by fever.    Principal Problem:    Acute CVA (cerebrovascular accident) - right splenium of corpus callosum    Assessment: Patient continued to have mild left facial droop prior to acute worsening.  Is now incredibly somnolent  and unable to reassess neurological evaluation.  Patient is on aspirin and Plavix with P2 Y 12 testing currently in process.      Plan: Continue with dual therapy aspirin and Plavix, atorvastatin, telemetry.  Given patient's level of somnolence, fever, concern for developing sepsis we will hold off on PT, OT, and speech therapy evaluation today until patient is more clinically stable.  Will reevaluate neurological exam following resolution of somnolence.    Active Problems:    SIRS (systemic inflammatory response syndrome) - fever, leukocytosis, sinus tachycardia, elevated lactic acid    Assessment: Developing in the past 7 hours since episode of unresponsiveness and hypotension.  No obvious infectious source at this time, however concern for infection is increased given clinical picture.    Plan: Continue with blood cultures x2, urine culture, sputum culture, repeat chest x-ray to evaluate for aspiration pneumonia versus pneumonitis.  Procalcitonin has been added onto the labs performed this morning and patient will have a repeat lactic acid later this morning.  Continue with Zosyn and vancomycin as ordered this morning      Transient hypotension    Assessment: Occurring suddenly overnight and of unclear etiology, but could have been related to recurrent stroke, seizure activity, infectious etiology.  Blood pressures are now once more hypertensive in the 160s to 170s systolic    Plan: Monitor for ongoing resolution, continue with IV fluids at 125 ml/hr but avoid further fluid bolusing given recent stroke and current hypertension      Somnolence    Assessment: Profound, limiting neurological evaluation.  Patient does make very briefly to voice and to touch but only says 1-3 words before falling back asleep    Plan: Proceed with ongoing infectious work-up as above, ongoing neuro checks, monitoring for resolution      Seizure disorder (H)    Assessment: Patient has known seizure disorder and used to have classic  "tonic-clonic seizure activity but following his most recent stroke was noted to have seizure activity that is \"all in her brain and no outward signs\" according to the family    Plan: Continue with Keppra and Depakote as per home regimen, however as clinical picture becomes more clear will need to discuss further with epilepsy specialist to see if titration of home regimen is needed      Type 2 diabetes mellitus with circulatory disorder, without long-term current use of insulin (H)    Assessment: Present at baseline and patient normally is on Metformin and glipizide, hemoglobin A1c 5.5 this admission    Plan: We will continue to hold both Metformin and glipizide given patient's current cognitive status and poor oral intake.  Continue to monitor blood sugars       Hyperlipidemia LDL goal <100    Assessment: on atorvastatin    Plan: continue home regimen, recheck LDL      Hypertension goal BP (blood pressure) < 140/90    Assessment: normally on lisinopril.  Held to allow permissive hypertension at time of admission with original plan to start today if persistent hypertension continued.  Patient had episode of hypotension which responded to IV fluids overnight.      Plan: continue to hold lisinopril, monitor blood pressures closely.  Only treat with prn IV medication if severe hypertension (>220 systolic) is present.      History of multiple cerebrovascular accidents (CVAs)    Assessment: patient has had at least 2 CVA in the past, most recent in November 2021    Plan: continue treatment of acute stroke as above      Tobacco abuse disorder    Assessment: ongoing    Plan: will need to encourage cessation going forward      Dementia (H)    Assessment: present at baseline, patient on Namenda.  Short term memory most impaired per family    Plan: continue Namenda and supportive cares.       Left hemiparesis (H)    Assessment: following previous stroke but had been improving per family.  Patient was able to walk with a " walker recently and just needed help with changing directions, transitioning positions.    Plan: will have physical therapy and occupational therapy evaluate patient once she is more awake and able to participate in therapy      History of paroxysmal supraventricular tachycardia    Assessment: previous history, no cardiac arrhythmias noted on telemetry so far    Plan: continue continuous cardiac monitoring       Diet: NPO for Medical/Clinical Reasons Except for: No Exceptions    DVT Prophylaxis: Pneumatic Compression Devices  Gloria Catheter: Not present  Central Lines: None  Cardiac Monitoring: ACTIVE order. Indication: Chest pain/ ACS rule out (24 hours)  Code Status: Full Code      Disposition Plan   Expected Discharge:  3-5 days   Anticipated discharge location:  Awaiting care coordination huddle  Delays:   episode of hypotension        The patient's care was discussed with the Patient and Patient's Family.    Gricel Lantigua MD  Hospitalist Service  [facility]  Securely message with the Vocera Web Console (learn more here)  Text page via [employer] Paging/Directory         Clinically Significant Risk Factors Present on Admission               # Platelet Defect: home medication list includes an antiplatelet medication       ______________________________________________________________________    Interval History   Since evaluation this morning patient has ongoing fever, blood pressure still hypertensive, patient still sleepy but awakens to voice and answers 1-2 word answers that are understandable but slurred.  Still unable to get reliable neuro evaluation     Data reviewed today: I reviewed all medications, new labs and imaging results over the last 24 hours.    Physical Exam   Vital Signs: Temp: (!) 100.8  F (38.2  C) Temp src: Rectal BP: (!) 173/90 Pulse: (!) 123   Resp: 22 SpO2: 96 % O2 Device: None (Room air)    Weight: 131 lbs 8 oz  Constitutional: sleepy but awakens  to voice, no acute distress, oriented to person and that she is in a hospital but not which one, not oriented to situation or time.  Falls asleep very easily  Respiratory: No increased work of breathing, good air exchange, clear to auscultation bilaterally, no crackles or wheezing  Cardiovascular: sinus tachycardia   GI: bowel sounds present, abdomen soft and non-tender, non-distended  Skin: no redness, warmth, or swelling  Musculoskeletal: patient currently unable to follow commands consistent to get adequate evaluation.  Some spontaneous movement of the right upper extremity noted but not reproducible when asked.    Neurologic: sleepy, oriented to person and hospital but not which one, not oriented to situation or time.  Speech slurred but unclear if this is related to recent stroke or current level of sleepiness.  Unable to perform adequate extremity evaluation due to inability of patient to follow commands.      Data   Recent Labs   Lab  03/19/22  0731 03/19/22  0550 03/19/22  0121 03/19/22  0106 03/18/22  2231 03/18/22  1440 03/18/22  1405 03/18/22  1403   WBC   --  13.1*  --  10.0  --   --  5.0  --    HGB   --  14.4  --  13.2  --   --  14.2  --    MCV   --  94  --  93  --   --  99  --    PLT   --  309  --  335  --   --  87*  --    INR   --   --   --   --   --  0.98  --   --    NA   --  136  --   --   --   --   --  143   POTASSIUM   --  3.9  --   --   --   --   --  4.0   CHLORIDE   --  103  --   --   --   --   --  101   CO2   --  26  --   --   --   --   --  23   BUN   --  12  --   --   --   --   --  15   CR   --  0.54  --   --   --   --   --  0.58   ANIONGAP   --  7  --   --   --   --   --  19*   PINA   --  8.0*  --   --   --   --   --  7.9*   GLC  168* 196*   < >  --    < >  --   --  118*    < > = values in this interval not displayed.

## 2022-03-19 NOTE — PLAN OF CARE
Problem: Plan of Care - These are the overarching goals to be used throughout the patient stay.    Goal: Plan of Care Review/Shift Note  Description: The Plan of Care Review/Shift note should be completed every shift.  The Outcome Evaluation is a brief statement about your assessment that the patient is improving, declining, or no change.  This information will be displayed automatically on your shift note.  Outcome: Ongoing, Not Progressing   Goal Outcome Evaluation:           Patient continues to be somnolent. Simple yes no questions appear to be answered appropriately Inconsistent in command following. Dr. Lantigua to bedside this AM for sepsis workup.   Vitals андрей stable in their permissive hypertension, no further hypotension.   LS are clear  UOP is adequate.  Small formed stool.  She is incontinent.

## 2022-03-19 NOTE — ANESTHESIA PREPROCEDURE EVALUATION
Anesthesia Pre-Procedure Evaluation    Patient: Khalida Redding   MRN: [id number] : 1950        Procedure :           Past Medical History:   Diagnosis Date     Atrial tachycardia (H)     nonsustained, detected on Ziopatch     Convulsions, unspecified convulsion type (H) 2018     CVA (cerebral vascular accident) (H)      Dementia (H) 2018     Diabetes (H)      Falls      History of CVA (cerebrovascular accident) 2018     Hyperlipidemia LDL goal <100 2018     Hypertension goal BP (blood pressure) < 140/90 2018     Osteoarthritis 2018     Pain in both lower legs 2018     Seizures (H)      Smoking      Syncope      Tobacco abuse disorder 2018     Tubular adenoma of colon 2018     Type 2 diabetes mellitus with circulatory disorder, without long-term current use of insulin (H) 2018      Past Surgical History:   Procedure Laterality Date     COLONOSCOPY N/A 2018    Procedure: Colonoscopy, Polypectomy by Biopsy;  Surgeon: Arcadio Mcmullen DO;  Location:  GI     COLONOSCOPY N/A 2020    Procedure: Colonoscopy with possible biopsy and/or polypectomy;  Surgeon: Fabián Hines MD;  Location:  GI     HIP SURGERY Left       Allergies   Allergen Reactions     Amoxicillin Hives and Rash     Pt reports she has taken PCN without problems     Ampicillin Rash      Social History     Tobacco Use     Smoking status: Former Smoker     Packs/day: 1.00     Types: Cigarettes     Quit date: 2021     Years since quittin.3     Smokeless tobacco: Never Used     Tobacco comment: QUIT   Substance Use Topics     Alcohol use: No      Wt Readings from Last 1 Encounters:   22 59.6 kg (131 lb 8 oz)              OUTSIDE LABS:  CBC:   Lab Results   Component Value Date    WBC 13.1 (H) 2022    WBC 10.0 2022    HGB 14.4 2022    HGB 13.2 2022    HCT 43.7 2022    HCT 40.1 2022     2022      03/19/2022     BMP:   Lab Results   Component Value Date     03/19/2022     03/18/2022    POTASSIUM 3.9 03/19/2022    POTASSIUM 4.0 03/18/2022    CHLORIDE 103 03/19/2022    CHLORIDE 101 03/18/2022    CO2 26 03/19/2022    CO2 23 03/18/2022    BUN 12 03/19/2022    BUN 15 03/18/2022    CR 0.54 03/19/2022    CR 0.58 03/18/2022     (H) 03/19/2022     (H) 03/19/2022     COAGS:   Lab Results   Component Value Date    PTT 24 03/18/2022    INR 0.98 03/18/2022     POC:   Lab Results   Component Value Date     (H) 08/24/2020     HEPATIC:   Lab Results   Component Value Date    ALBUMIN 3.2 (L) 03/10/2022    PROTTOTAL 6.6 (L) 03/10/2022    ALT 12 03/10/2022    AST 9 03/10/2022    ALKPHOS 108 03/10/2022    BILITOTAL 0.4 03/10/2022    SARAH 16 11/26/2021     OTHER:   Lab Results   Component Value Date    LACT 2.0 03/19/2022    A1C 5.5 03/10/2022    PINA 8.0 (L) 03/19/2022    PHOS 3.0 03/19/2022    MAG 2.3 03/19/2022    TSH 2.32 11/26/2021    T4 0.99 01/13/2020       Anesthesia Plan    ASA Status:  4, emergent    - Procedure: Procedure only, no anesthetic delivered      Anesthesia Type: Central Line placement and arterial line placement.              Consents    Anesthesia Plan(s) and associated risks, benefits, and realistic alternatives discussed. Questions answered and patient/representative(s) expressed understanding.    - Discussed:     - Discussed with:  Spouse         Postoperative Care            Comments:    Other Comments: Pt is having stroke decreased level of consciousness.  She is also suffering hypotension that will need pressors as well as CVP monitoring.  At this stage the Tele ICU is requesting a central line and arterial line.  I spoke with Dr Lantigua about PICC vs central line and CVP may be key to monitor for this pt as how to treat her hypotension.  We agreed the risks are outweighed by the benefits and I will proceed carefully.  Discussed this plan with her family and they  agree.            CONCEPCION Cruz CRNA

## 2022-03-19 NOTE — PROGRESS NOTES
"Sepsis Evaluation Progress Note    I was called to see Khalida Redding due to abnormal vital signs triggering the Sepsis SIRS screening alert. She is not known to have an infection. Patient did have CT scan of the head as well as chest/abdomen/pelvis after episode of unresponsiveness and hypotension at approximately 0100 overnight.  Blood pressures responded to 1L fluid bolus and are now elevated again, patient has been very sleepy since episode.      Physical Exam   Vital Signs:  Temp: (!) 101.2  F (38.4  C) Temp src: Axillary BP: (!) 171/82 Pulse: 117   Resp: 16 SpO2: 93 % O2 Device: None (Room air)       General: acutely ill appearing  Mental Status: altered level of consciousness based on significant sleepiness compared to yesterday - patient barely able to open eyes, oriented to person and hospical but not which one, not oriented to time or situation. Falls asleep rapidly and often mid-sentence.  Speech slurred.    Remainder of physical exam is significant for sinus tachycardia, decreased breath sounds diffusely, cool feet but capillary refill less than 3 seconds and good DP and PT pulses.  Abdomen soft and non-tender.      Data   Lactic Acid   Date Value Ref Range Status   03/19/2022 4.6 (HH) 0.7 - 2.0 mmol/L Final   11/27/2021 2.0 0.7 - 2.0 mmol/L Final       Assessment & Plan   Khalida Redding meets SIRS criteria but does NOT have a known infection.  Concern for Sepsis is present: SIRS criteria met but no obvious source of infection at this time.    Anti-infectives (From now, onward)    Start     Dose/Rate Route Frequency Ordered Stop    03/19/22 0830  vancomycin (VANCOCIN) 1,500 mg in sodium chloride 0.9 % 250 mL intermittent infusion         1,500 mg  over 90 Minutes Intravenous EVERY 18 HOURS 03/19/22 0712      03/19/22 0800  piperacillin-tazobactam (ZOSYN) intermittent infusion 4.5 g        Note to Pharmacy: For [facility], [facility] and [facility]: For Zosyn-naive patients, use the \"Zosyn initial dose + extended " "infusion\" order panel.    4.5 g  200 mL/hr over 30 Minutes Intravenous EVERY 6 HOURS 03/19/22 0703          Antibiotics above will be started once blood cultures completed.     Disposition: The patient will remain on the current unit. We will continue to monitor this patient closely.  Given HTN and recent stroke as well as recent bolus, will not give further bolus but continue with IV fluids at 125 ml/hr.  Will perform blood culture x2, UA reflex to UC, sputum culture, chest x-ray, procalcitonin now.  Will obtain Lactic acid again at 0900 for reassessment.  Given inability to obtain reliable IV access, discussed PICC line placement with family and they give consent to proceed.    Of note, in discussion with family they report that patient has seizure activity that does not have any physical shaking present - just seems unresponsive and takes a long time to recover.  Discussed that the above episode that occurred at approximately 0100 could have been an atypical seizure, however given ongoing elevated lactic acid and new fever will plan to proceed with treatment and work up as above and family is in agreement.      Gricel Lantigua MD    "

## 2022-03-19 NOTE — PROGRESS NOTES
[facility]  Transfer Triage Note     Date of call: 3/19/2022  Time of call: 1830     Reason for Transfer:Further diagnostic work up, management, and consultation for specialized care  Diagnosis: Acute CVA, Recurrent hypotension secondary to suspected sepsis of unknown source     Outside Records: Available  Additional records requested to be faxed to [phone].     Stability of Patient: Patient is at risk for instability, however patient requires urgent transfer and does not meet ICU criteria      Expected Time of Arrival for Transfer: 0-8 hours     Recommendations for Management and Stabilization: Given     Additional Comments  Khalida is a 72 yo F with a complex past medical history including multiple CVAs, seizure disorder, dementia with poor short term memory, HTN, HLD, and T2DM who was admitted on 3/18/2022 to [facility] with a new CVA managed with assistance of neurology who developed recurrent hypotensive episodes and fever on 3/19/2022 concerning for sepsis.  Hypotension was noted to be associated with increased left sided weakness and somnolence prompting call to neurology for re-evaluation as well as ongoing overall evaluation from primary team.  She was treated with IV fluid boluses (3L in total) and broad spectrum antibiotics (vanc and pip/tazo) after blood cultures were obtained.  Neurology suspected worsened deficits were secondary to hypotension in the setting of stroke and recommended additional infectious workup as well as transfer for higher level of care.  She was loaded with keppra and also recommended for EEG monitoring.  A central line and arterial line were placed given recurrent hypotension in anticipation of need for pressors but blood pressures stabilized with MAPs above goal of 65.  Arterial line was removed and she was recommended for transfer and accepted to medicine, [facility] bed with telemetry and planned neurology consultation.    Accepted to [facility] bed  since art line is not needed and patient is now hemodynamically stable, will require telemetry but no isolation needs at present.      Adriana Mckenzie MD  Internal Medicine

## 2022-03-19 NOTE — ED NOTES
ED Nursing criteria listed below was addressed during verbal handoff:     Abnormal vitals: Yes  Abnormal results: Yes  Med Reconciliation completed: Yes  Meds given in ED: Yes  Any Overdue Meds: No  Core Measures: N/A  Isolation: No  Special needs: No  Skin assessment: Yes    Observation Patient  Education provided: Yes

## 2022-03-19 NOTE — ANESTHESIA POSTPROCEDURE EVALUATION
Patient: Khalida Redding    Procedure: * No procedures listed *       Anesthesia Type:  No value filed.    Note:  Disposition: ICU            ICU Sign Out: Anesthesiologist/ICU physician sign out WAS performed   Postop Pain Control: Uneventful            Sign Out: Well controlled pain   PONV: No   Neuro/Psych: Uneventful            Sign Out: Acceptable/Baseline neuro status   Airway/Respiratory: Uneventful            Sign Out: Acceptable/Baseline resp. status   CV/Hemodynamics: Uneventful            Sign Out: Acceptable CV status   Other NRE: NONE   DID A NON-ROUTINE EVENT OCCUR? No    Event details/Postop Comments:  Pt is to be transferred to another facility.  Her lines are secure and no complications.  I will follow up with the pt if needed.           Last vitals:  Vitals Value Taken Time   BP     Temp     Pulse 92 03/19/22 1819   Resp     SpO2 95 % 03/19/22 1819   Vitals shown include unvalidated device data.    Electronically Signed By: CONCEPCION Cruz CRNA  March 19, 2022  6:20 PM

## 2022-03-19 NOTE — PROGRESS NOTES
Patient re-evaluated.  Is still sleepy on entry into the room however patient awakens easily to voice.  She is able to answer questions in 1-4 word phrases and falls asleep easily but improved from early.  Oriented to self, that she is at a hospital but not which one, that she had a stroke.  Neuro evaluation notes no spontaneous movement of the left arm and leg.  When asked, patient is unable to move the fingers or toes of the left side.  She can move her left leg at the hip but weaker than the right.  Unable to lift the left arm at all.  Significantly different exam than in the ED or noted in neuro checks last night prior to episode of hypotension (had been 4/5 strength of the left arm but 5/5 of the left leg).      Fever resolved without intervention.  Last blood pressure was 133/68.  No antihypertensive medications have been given since 1900 last evening.  Patient was able to wake up enough to swallow her medications this morning.      Assessment:  Change in neurological evaluation following episode of hypotension last night - possible worsening of stroke effect in context of hypoperfusion.  Called stroke neurology to rediscuss if further imaging or testing is needed.  Awaiting call back.  Continue ASA and Plavix, frequent blood pressure monitoring, continue IV fluids at 125 ml/hr and Zosyn and Vanco for antibiotics while infectious work up is in process.  Awaiting PICC line placement.      Electronically Signed:  Gricel Lantigua MD

## 2022-03-19 NOTE — ANESTHESIA CARE TRANSFER NOTE
Patient: Khalida Redding    Procedure: * No procedures listed *       Diagnosis: * No pre-op diagnosis entered *  Diagnosis Additional Information: No value filed.    Anesthesia Type:   No value filed.     Note:    Oropharynx: oropharynx clear of all foreign objects and spontaneously breathing  Level of Consciousness: drowsy  Oxygen Supplementation: nasal cannula    Independent Airway: airway patency satisfactory and stable  Dentition: dentition unchanged  Vital Signs Stable: post-procedure vital signs reviewed and stable  Report to RN Given: handoff report given  Patient transferred to: ICU  Comments: Report to the RN and care turned back over to her.  ICU Handoff: Call for PAUSE to initiate/utilize ICU HANDOFF, Identified Patient, Identified Responsible Provider, Reviewed the Pertinent Medical History, Discussed Surgical Course, Reviewed Intra-OP Anesthesia Management and Issues during Anesthesia, Set Expectations for Post Procedure Period and Allowed Opportunity for Questions and Acknowledgement of Understanding      Vitals:  Vitals Value Taken Time   BP     Temp     Pulse 105 03/19/22 1818   Resp     SpO2 96 % 03/19/22 1818   Vitals shown include unvalidated device data.    Electronically Signed By: CONCEPCION Cruz CRNA  March 19, 2022  6:19 PM

## 2022-03-19 NOTE — PROGRESS NOTES
S- Transfer to ICU room 215 from med/surg rm 265 for close monitoring as intermediate overflow.    B- TIA vs CVA    A- Brief systems assessment: pt hypotensive, BP 60s/40s.  IV access infiltrated and new access gained.  NS bolus initiated, slow improvement noted in BP.  HR 90s.  O2 sats 89% on RA, O2 applied via NC.  Pt somnolent, occasional moaning and grimacing with stimulation.     R- Transfer to ICU per physician orders. Continue to monitor pt and update physician as needed.      Code status: Full Code  Skin: fragile, bruised scattered  Fall Risk: Yes- Department fall risk interventions implemented.  Isolation and Signage: None  Medication drips upon transfer: NS bolus  Blue Bin checked and medications transfer out with patientYes

## 2022-03-19 NOTE — PROVIDER NOTIFICATION
Dr. Antonio Elliott was informed of troponin 458, critical lab value.  Pt is on telemetry SR/ST .

## 2022-03-19 NOTE — PROGRESS NOTES
Notified by the RN that patient is minimally responsive and not following commands. BP is 75/45, HR 79, pulse ox 95%, glucose 157.     Pt examined via telehealth. Pt noted to be lethargic and minimally responsive to verbal commands. Patient was admitted for L facial droop and slurred speech and was found to have a stroke. During the day time, patient has been receiving anti-hypertensives including lisinopril, labetaolol, and hydralazine. Pt also received fentanyl, ativan earlier today.    Pt was started on NS bolus. CT head ordered stat. Will maintain NPO. Daughter, Laura, updated on current condition. (-[id number]). AMS change due to hypoperfusion from hypotension +/- opioids and sedatives. EEG ordered.     Also of note, troponin was elevated > 500. Monitor for now.

## 2022-03-19 NOTE — ANESTHESIA PROCEDURE NOTES
Central Line/PA Catheter Placement    Pre-Procedure   Staff -        CRNA: Andriy Nagy APRN CRNA       Performed By: CRNA       Location: ICU       Pre-Anesthestic Checklist: patient identified, risks and benefits discussed, informed consent, monitors and equipment checked, pre-op evaluation and at physician/surgeon's request  Timeout:       Correct Patient: Yes        Correct Procedure: Yes        Correct Site: Yes        Correct Position: Yes        Correct Laterality: N/A   Line Placement:   This line was placed Pre Induction starting at 3/19/2022 4:11 PM and ending at 3/19/2022 4:35 PM    Procedure   Procedure: central line, new line and emergent       Laterality: right       Insertion Site: internal jugular.       Patient Position: supine  Sterile Prep        All elements of maximal sterile barrier technique followed       Patient Prep/Sterile Barriers: draped, patient draped, hand hygiene, gloves , hat , mask , draped, gown, sterile gel and probe cover       Skin prep: Chloraprep  Insertion/Injection        Local skin infiltrated with 1 mL of 1% lidocaine.        Technique: ultrasound guided and Seldinger Technique        1. Ultrasound was used to evaluate the access site.       2. Vein evaluated via ultrasound for patency/adequacy.       3. Using real-time ultrasound the needle/catheter was observed entering the artery/vein.       5. The visualized structures were anatomically normal.       6. There were no apparent abnormal pathologic findings.       Type: CVC       Catheter Length: 20       Number of Lumens: triple lumen  Narrative         Secured by: suture       Tegaderm and Biopatch dressing used.       Complications: None apparent,        blood aspirated from all lumens,        All lumens flushed: Yes       Verification method: X-ray and Placement to be verified post-op   Comments:  Pt tolerated well.  No complications.  Placed easily with ultrasound however pt had to be placed in very steep  Trendelenburg as her internal jugular was very small and hyper compressible.  After dressings applied the care was turned over to the RN.

## 2022-03-19 NOTE — PLAN OF CARE
S-(situation): PT and OT orders received.     B-(background): Khalida Redding is a 71 year old female with past medical history of multiple CVAs, seizure disorder, dementia, type 2 diabetes, hyperlipidemia, hypertension who developed sudden onset left-sided facial droop and slurred speech with associated headache at 0930 this morning which was noted by her family.  By the time they brought her in for evaluation to the emergency room symptoms had already significantly improved and only a slight left-sided facial droop was noted by the ED provider.  Code stroke was called and CT and CTA showed no acute finding.  Discussed with stroke neurology and they recommended MRI, echocardiogram, therapy assessment, and observation status.  Echocardiogram has already been completed without significant findings an MRI was completed this evening with results pending.  Patient is already on aspirin and Plavix dual therapy and stroke neurology is recommending continuation with the same plan and further rediscussion following MRI results.     A-(assessment): Per rounding and discussion with interdisciplinary team, patient not currently appropriate for therapy evaluations, pt with somnolence and awaiting further sepsis workup.     R-(recommendations): Will hold PT and OT evaluations until deemed medically appropriate.     Thank you for the referral,    Jazmin Davidson, DAVID, OTR/L  [facility] - Occupational Therapy    Phone: [phone]  Email: [email or web address]

## 2022-03-19 NOTE — LETTER
[facility] [address] [facility] BANK  [address] [facility] [address] [address]  [phone]    FACSIMILE TRANSMITTAL SHEET    TO: Marilyn Casanova       NOTES/COMMENTS: Can you review for [facility]? She is medically ready. Thank you!    MELISSA Tristan, LGSW  7A/5C Medicine   Ph: [phone]  Pager: [phone]                                        IF YOU DID NOT RECEIVE THE CORRECT NUMBER OF PAGES OR THE FAX DID NOT COME THROUGH CLEARLY, PLEASE CALL THE SENDER     CONFIDENTIALITY STATEMENT: Confidential information that may accompany this transmission contains protected health information under state and federal law and is legally privileged. This information is intended only for the use of the individual or entity named above and may be used only for carrying out treatment, payment or other healthcare operations. The recipient or person responsible for delivering this information is prohibited by law from disclosing this information without proper authorization to any other party, unless required to do so by law or regulation. If you are not the intended recipient, you are hereby notified that any review, dissemination, distribution, or copying of this message is strictly prohibited. If you have received this communication in error, please destroy the materials and contact us immediately by calling the number listed above. No response indicates that the information was received by the appropriate authorized party

## 2022-03-19 NOTE — LETTER
[facility] [address] [address]  [phone]    FACSIMILE TRANSMITTAL SHEET    Thank you for reviewing for swing bed admission!    MELISSA Tristan, SW  7A/5C Medicine   Ph: [phone]  Pager: [phone]                                        IF YOU DID NOT RECEIVE THE CORRECT NUMBER OF PAGES OR THE FAX DID NOT COME THROUGH CLEARLY, PLEASE CALL THE SENDER     CONFIDENTIALITY STATEMENT: Confidential information that may accompany this transmission contains protected health information under state and federal law and is legally privileged. This information is intended only for the use of the individual or entity named above and may be used only for carrying out treatment, payment or other healthcare operations. The recipient or person responsible for delivering this information is prohibited by law from disclosing this information without proper authorization to any other party, unless required to do so by law or regulation. If you are not the intended recipient, you are hereby notified that any review, dissemination, distribution, or copying of this message is strictly prohibited. If you have received this communication in error, please destroy the materials and contact us immediately by calling the number listed above. No response indicates that the information was received by the appropriate authorized party

## 2022-03-19 NOTE — PROGRESS NOTES
"[facility]    Stroke Telephone Note    I was called by Gricel Lantigua on 03/19/22 regarding patient Khalida Redding. The patient is a 71 year old female with hx of epilepsy on LEV 1500 BID and  BID initially presented to hospital for L sided facial droop and slurred speech that quickly resolved and was found to have acute stroke in R splenium of corpus callosum.     Overnight there was a rapid called on her for low BP and depressed LOC. CT head was negative. Today per primary provider exam pt was somnolent in the morning and when re examined was noted to have more pronounced L sided weakness in the setting of low blood pressure. Pt appear to be somnolent as well.     Given low blood pressure and episode of unresponsiveness differential includes worsening of prior stroke deficits in setting of low BP or seizure or NCSE.     I agree with infectious work up and broad antibiotic coverage.  Load with keppra 1000 mg once and increase maintenance dose to 2000 mg BID  It will be reasonable to transfer pt for cEEG monitoring.   Primary provider will stabilize pt and then request a transfer.        My recommendations are based on the information provided over the phone by Khalida Redding's in-person providers. They are not intended to replace the clinical judgment of her in-person providers. I was not requested to personally see or examine the patient at this time.    Chantale Serna MD  Vascular Neurology  To page me or covering stroke neurology team member, click here: [facility]COM   Choose \"On Call\" tab at top, then search dropdown box for \"Neurology Adult\", select location, press Enter, then look for stroke/neuro ICU/telestroke.           "

## 2022-03-19 NOTE — PROGRESS NOTES
DATE:  3/19/2022   TIME OF RECEIPT FROM LAB:  0911  LAB TEST:  Lactic  LAB VALUE:  2.9  RESULTS GIVEN WITH READ-BACK TO (PROVIDER):  Dr. Gricel Lantigua  TIME LAB VALUE REPORTED TO PROVIDER:   0912

## 2022-03-19 NOTE — SIGNIFICANT EVENT
Significant Event Note    Time of event: 12:41 PM March 19, 2022    Description of event:  1220  Patient was found to be less responsive with blood pressure showing 60s/40.  Patient evaluated and found only responsive to sternal rub.  IV fluid bolus 1L NS was ordered - when nursing going to administer, she discovered another IV had gone bad.  IV x2 placed and bolus started.  Patient moved to intermediate overflow status in the ICU for further evaluation and assessment.  Discussed events with Dr. eSrna, stroke neurologist, and he recommends 1000 mg IV Keppra dose x1 given now in addition to continuing home regimen of Keppra 1500 mg BID and Dilantin 100 mg BID.  Continue with broad spectrum antibiotics and infectious worse up however recommend transfer to higher level of care for neurology and EEG monitoring following transfer.      1450  Patient had transient improvement in blood pressures following fluid bolus above but now trending downward again with MAPS less than 65.  Additional 500 mL NS bolus and IV Keppra (which is given quickly in 400 mL of fluid) for another 900 mL also given over the past hour for total of 30 mg/kg bolus complete in the past 2 hours, with patient also receiving 1L earlier in the day.  Blood pressures trending downward once more and in the 60-68 range.  PICC stat is still many hours away from being able to come.  Discussed with Tele-ICU provider, Dr. Russ, and Levophed would be vasopressor of choice if hypotension continues.  Repeat 500 mL bolus to allow hopeful transient improvement of blood pressures.  Given inability to perform PICC quickly in patient at high risk of needing vasopressor support in the next few hours, will place central line.  Will also allow CVP monitoring and help evaluation hemodynamic status and further fluid resuscitation.  Art line will also be placed to monitor blood pressure more closely and allow titration of pressors.  Did discuss again with Dr. Lara and goal  blood pressures is still MAP above 65 at this time - no higher parameter is needed due to acute stroke.  Family updated and in agreement.      1735  Art line and central line in place.  Blood pressures were increased during time of placement but are now trending downward with art line showing MAPs in the 62-68 range.  Will start levophed once central line placement confirmed if MAPs are consistently less than 65.  Call placed to help facilitate transfer to higher level of care.      1830  Levophed not yet started - blood pressures increased greatly following Gloria placement while patient was very agitated.  Of note, patient was using both right and left arm with good strength to push/hit nursing staff away.  Blood pressures continue to be elevated with MAPs in the 70-90 range currently, however ongoing continuous nursing stimulation (blood sugar monitoring, repositioning, etc).  Discussed transfer with Dr. Mckenzie from [facility] but at this time difficulty to know if patient is anticipated to need ICU vs IMC level care.  Will complete needed nursing intervention now and then allow patient rest for 30-60 minutes and monitor blood pressure closely and re-discuss with Dr. Mckenzie at that time.      1940 - patient's blood pressure on art line and peripheral monitoring have been very stable with MAPs in the 70-80s even while patient is resting comfortably.  Last bolus was around 1500.  Will plan for transfer this evening to IMC floor at [facility] with neurology consultation following transfer.  Art line will be removed prior to transfer.  Family aware and in agreement.      Discussed with: Dr. Lara, stroke neurology from [facility],  Dr. Russ, Tele-ICU provider available, and Dr. Mckenzie who has accepted patient in transfer     115 minutes spent in critical care management of this acutely decompensating patient and achievement of stabilization and transfer.      Gricel Lantigua MD

## 2022-03-19 NOTE — PROVIDER NOTIFICATION
Dr. Lantigua made aware of lactic 4.6, , WBC 13.1.  Telemetry shows ST.  Doctor will meet bedside nurse and charge nurse in pt's room.

## 2022-03-19 NOTE — ANESTHESIA PROCEDURE NOTES
Arterial Line Procedure Note    Pre-Procedure   Staff -        CRNA: Andriy Nagy APRN CRNA       Performed By: CRNA       Location: ICU       Pre-Anesthestic Checklist: patient identified, risks and benefits discussed, informed consent, monitors and equipment checked and pre-op evaluation  Timeout:       Correct Patient: Yes        Correct Procedure: Yes        Correct Site: Yes        Correct Position: Yes   Line Placement:   This line was placed Pre Induction starting at 3/19/2022 4:47 PM and ending at 3/19/2022 5:02 PM  Procedure   Procedure: arterial line and new line       Laterality: right       Insertion Site: radial.  Sterile Prep        All elements of maximal sterile barrier technique followed       Patient Prep/Sterile Barriers: hand hygiene, sterile gloves, hat, mask, draped, sterile gown, patient draped, draped       Skin prep: Chloraprep  Insertion/Injection        Technique: ultrasound guided and Seldinger Technique        1. Ultrasound was used to evaluate the access site.       2. Artery evaluated via ultrasound for patency/adequacy.       3. Using real-time ultrasound the needle/catheter was observed entering the artery/vein.       5. The visualized structures were anatomically normal.       6. There were no apparent abnormal pathologic findings.       Catheter size: 20 Ga, 15cm.  Narrative         Secured by: suture       Tegaderm and Biopatch dressing used.       Complications: None apparent,        Arterial waveform: Yes        IBP within 10% of NIBP: Yes   Comments:  Pt tolerated the procedure well.  No complications.  Pressure setup connected and care turned over to RN

## 2022-03-19 NOTE — SIGNIFICANT EVENT
Significant Event Note    Time of event: 10:31 PM March 18, 2022    Description of event:  MRI shows acute to subacute stroke in the right aspect of the splenium of the corpus callosum as well as evolving subacute to chronic infarcts in the posterior left frontal lobe.  Discussed with Dr. Serna, stroke neurologist, who recommends continuing with the additional work-up as recommended earlier in the day but will also add a P2 Y 12 lab draw to evaluate for appropriate response to Plavix therapy and will perform a CT scan of the chest/abdomen/pelvis with contrast to evaluate for any signs of malignant etiology that would make patient more hypercoagulable.  At time of discharge he would now recommend a 30-day event monitor for increased likelihood of capture of a rare intermittent arrhythmia that could be contributing to her symptoms.    Discussed with patient and daughter.      Will proceed with plan as above.    Gricel Lantigua MD

## 2022-03-19 NOTE — PROVIDER NOTIFICATION
Patient remains somnolent. Does answer some questions and follows a few commands.    HR is 121 BP is 151/66 Just so you're aware. Also lab was wondering if there needs to be a platelet redrawn. The AM sample was sent in the tube system which changes things in the tube. The results prior were > 300 while the ED sample was 86. Lab speculates poor sample in ED resulting in 86 level. Do you want platelet study redrawn?    No need to redraw per MD.

## 2022-03-19 NOTE — PROGRESS NOTES
S-(situation): Patient arrives to room 265 via cart from ED    B-(background): Patient arrived to ED with left facial droop and slurred speech with concerns of stroke. Patient very hypertensive in ED.    A-(assessment): Patient is sleepy, but oriented to time, place,month and year. Left facial droop noted, okayed by Dr. Lantigua to proceed with bedside swallow, she passed. Left arm and leg weaker than right. Patient is inconsistent in her following, appears irritated with writer Patient asking to go home, for , and daughter, and requesting her phone.    R-(recommendations): Orders reviewed with patient. . Will monitor patient per MD orders.     Inpatient nursing criteria listed below were met:    Health care directives status obtained and documented: There is one present, but will need to be validated with family  SCD's Documented: Yes  Skin issues/needs documented:NA  Isolation addressed and Signage used: NA  Fall Prevention: Care plan updated Yes Education given and documented Yes  Care Plan initiated and Co-Morbidities added: Yes  Education Assessment documented:Yes  Admission Education Documented: Yes  If present CAUTI/CLABI Education done: NA  New medication patient education completed and documented (Possible Side Effects of Common Medications handout): No, no new medications at this time.3  Allergies Reviewed: Yes  Admission Medication Reconciliation completed: Yes  Home medications if not able to send immediately home with family stored here: NA  Reminder note placed in discharge instructions regarding home meds: NA  Individualized care needs/preferences addressed and charted: Yes

## 2022-03-20 ENCOUNTER — APPOINTMENT (OUTPATIENT)
Dept: OCCUPATIONAL THERAPY | Facility: CLINIC | Age: 72
DRG: 064 | End: 2022-03-20
Attending: PEDIATRICS
Payer: COMMERCIAL

## 2022-03-20 ENCOUNTER — APPOINTMENT (OUTPATIENT)
Dept: PHYSICAL THERAPY | Facility: CLINIC | Age: 72
DRG: 064 | End: 2022-03-20
Attending: PEDIATRICS
Payer: COMMERCIAL

## 2022-03-20 ENCOUNTER — APPOINTMENT (OUTPATIENT)
Dept: SPEECH THERAPY | Facility: CLINIC | Age: 72
DRG: 064 | End: 2022-03-20
Attending: PEDIATRICS
Payer: COMMERCIAL

## 2022-03-20 LAB
ANION GAP SERPL CALCULATED.3IONS-SCNC: 4 MMOL/L (ref 3–14)
BUN SERPL-MCNC: 9 MG/DL (ref 7–30)
CALCIUM SERPL-MCNC: 7.3 MG/DL (ref 8.5–10.1)
CHLORIDE BLD-SCNC: 115 MMOL/L (ref 94–109)
CO2 SERPL-SCNC: 26 MMOL/L (ref 20–32)
CREAT SERPL-MCNC: 0.53 MG/DL (ref 0.52–1.04)
ERYTHROCYTE [DISTWIDTH] IN BLOOD BY AUTOMATED COUNT: 13.6 % (ref 10–15)
GFR SERPL CREATININE-BSD FRML MDRD: >90 ML/MIN/1.73M2
GLUCOSE BLD-MCNC: 106 MG/DL (ref 70–99)
GLUCOSE BLDC GLUCOMTR-MCNC: 113 MG/DL (ref 70–99)
GLUCOSE BLDC GLUCOMTR-MCNC: 242 MG/DL (ref 70–99)
GLUCOSE BLDC GLUCOMTR-MCNC: 99 MG/DL (ref 70–99)
HCT VFR BLD AUTO: 33.5 % (ref 35–47)
HGB BLD-MCNC: 10.5 G/DL (ref 11.7–15.7)
HOLD SPECIMEN: NORMAL
HOLD SPECIMEN: NORMAL
MCH RBC QN AUTO: 29.7 PG (ref 26.5–33)
MCHC RBC AUTO-ENTMCNC: 31.3 G/DL (ref 31.5–36.5)
MCV RBC AUTO: 95 FL (ref 78–100)
PA ADP BLD-ACNC: 170 PRU
PLATELET # BLD AUTO: 218 10E3/UL (ref 150–450)
POTASSIUM BLD-SCNC: 3.3 MMOL/L (ref 3.4–5.3)
RBC # BLD AUTO: 3.54 10E6/UL (ref 3.8–5.2)
SODIUM SERPL-SCNC: 145 MMOL/L (ref 133–144)
WBC # BLD AUTO: 5.8 10E3/UL (ref 4–11)

## 2022-03-20 PROCEDURE — 36415 COLL VENOUS BLD VENIPUNCTURE: CPT

## 2022-03-20 PROCEDURE — 85576 BLOOD PLATELET AGGREGATION: CPT

## 2022-03-20 PROCEDURE — 82310 ASSAY OF CALCIUM: CPT

## 2022-03-20 PROCEDURE — 92610 EVALUATE SWALLOWING FUNCTION: CPT | Mod: GN

## 2022-03-20 PROCEDURE — 99221 1ST HOSP IP/OBS SF/LOW 40: CPT | Mod: GC | Performed by: PSYCHIATRY & NEUROLOGY

## 2022-03-20 PROCEDURE — 250N000013 HC RX MED GY IP 250 OP 250 PS 637

## 2022-03-20 PROCEDURE — 250N000013 HC RX MED GY IP 250 OP 250 PS 637: Performed by: HOSPITALIST

## 2022-03-20 PROCEDURE — 97530 THERAPEUTIC ACTIVITIES: CPT | Mod: GP | Performed by: PHYSICAL THERAPIST

## 2022-03-20 PROCEDURE — 97167 OT EVAL HIGH COMPLEX 60 MIN: CPT | Mod: GO | Performed by: OCCUPATIONAL THERAPIST

## 2022-03-20 PROCEDURE — 97535 SELF CARE MNGMENT TRAINING: CPT | Mod: GO | Performed by: OCCUPATIONAL THERAPIST

## 2022-03-20 PROCEDURE — 92526 ORAL FUNCTION THERAPY: CPT | Mod: GN

## 2022-03-20 PROCEDURE — 97116 GAIT TRAINING THERAPY: CPT | Mod: GP | Performed by: PHYSICAL THERAPIST

## 2022-03-20 PROCEDURE — 258N000003 HC RX IP 258 OP 636

## 2022-03-20 PROCEDURE — 120N000003 HC R&B IMCU UMMC

## 2022-03-20 PROCEDURE — 85027 COMPLETE CBC AUTOMATED: CPT

## 2022-03-20 PROCEDURE — 97161 PT EVAL LOW COMPLEX 20 MIN: CPT | Mod: GP | Performed by: PHYSICAL THERAPIST

## 2022-03-20 PROCEDURE — 99233 SBSQ HOSP IP/OBS HIGH 50: CPT | Mod: GC | Performed by: INTERNAL MEDICINE

## 2022-03-20 RX ORDER — PHENYTOIN 50 MG/1
100 TABLET, CHEWABLE ORAL 2 TIMES DAILY
Status: DISCONTINUED | OUTPATIENT
Start: 2022-03-20 | End: 2022-04-08 | Stop reason: HOSPADM

## 2022-03-20 RX ORDER — LEVETIRACETAM 500 MG/1
1000 TABLET ORAL 2 TIMES DAILY
Status: DISCONTINUED | OUTPATIENT
Start: 2022-03-20 | End: 2022-04-08 | Stop reason: HOSPADM

## 2022-03-20 RX ORDER — PHENYTOIN 50 MG/1
200 TABLET, CHEWABLE ORAL 2 TIMES DAILY
Status: DISCONTINUED | OUTPATIENT
Start: 2022-03-20 | End: 2022-03-20

## 2022-03-20 RX ADMIN — GABAPENTIN 300 MG: 300 CAPSULE ORAL at 00:19

## 2022-03-20 RX ADMIN — MEMANTINE 10 MG: 10 TABLET ORAL at 08:01

## 2022-03-20 RX ADMIN — PHENYTOIN 100 MG: 50 TABLET, CHEWABLE ORAL at 08:01

## 2022-03-20 RX ADMIN — CYANOCOBALAMIN TAB 500 MCG 1000 MCG: 500 TAB at 08:01

## 2022-03-20 RX ADMIN — LEVETIRACETAM 750 MG: 250 TABLET, FILM COATED ORAL at 00:19

## 2022-03-20 RX ADMIN — ASPIRIN 81 MG CHEWABLE TABLET 81 MG: 81 TABLET CHEWABLE at 07:00

## 2022-03-20 RX ADMIN — ATORVASTATIN CALCIUM 40 MG: 40 TABLET, FILM COATED ORAL at 08:00

## 2022-03-20 RX ADMIN — ACETAMINOPHEN 975 MG: 325 TABLET ORAL at 16:39

## 2022-03-20 RX ADMIN — LEVETIRACETAM 750 MG: 250 TABLET, FILM COATED ORAL at 07:59

## 2022-03-20 RX ADMIN — PHENYTOIN 100 MG: 50 TABLET, CHEWABLE ORAL at 20:07

## 2022-03-20 RX ADMIN — ACETAMINOPHEN 975 MG: 325 TABLET ORAL at 08:29

## 2022-03-20 RX ADMIN — PANTOPRAZOLE SODIUM 40 MG: 40 TABLET, DELAYED RELEASE ORAL at 07:00

## 2022-03-20 RX ADMIN — GABAPENTIN 300 MG: 300 CAPSULE ORAL at 23:45

## 2022-03-20 RX ADMIN — CLOPIDOGREL BISULFATE 75 MG: 75 TABLET ORAL at 08:01

## 2022-03-20 RX ADMIN — SODIUM CHLORIDE, POTASSIUM CHLORIDE, SODIUM LACTATE AND CALCIUM CHLORIDE 1000 ML: 600; 310; 30; 20 INJECTION, SOLUTION INTRAVENOUS at 01:42

## 2022-03-20 RX ADMIN — PHENYTOIN 100 MG: 50 TABLET, CHEWABLE ORAL at 00:50

## 2022-03-20 RX ADMIN — LEVETIRACETAM 1000 MG: 500 TABLET, FILM COATED ORAL at 20:07

## 2022-03-20 RX ADMIN — ACETAMINOPHEN 975 MG: 325 TABLET ORAL at 00:19

## 2022-03-20 ASSESSMENT — ACTIVITIES OF DAILY LIVING (ADL)
TOILETING: 0-->INDEPENDENT
DRESSING/BATHING_DIFFICULTY: YES
WALKING_OR_CLIMBING_STAIRS_DIFFICULTY: YES
ADLS_ACUITY_SCORE: 15
HEARING_DIFFICULTY_OR_DEAF: NO
DRESSING/BATHING_MANAGEMENT: SHOWER CHAIR
ADLS_ACUITY_SCORE: 20
ADLS_ACUITY_SCORE: 18
ADLS_ACUITY_SCORE: 15
ADLS_ACUITY_SCORE: 17
ADLS_ACUITY_SCORE: 15
ADLS_ACUITY_SCORE: 15
ADLS_ACUITY_SCORE: 16
ADLS_ACUITY_SCORE: 20
TRANSFERRING: 1-->ASSISTANCE (EQUIPMENT/PERSON) NEEDED
ADLS_ACUITY_SCORE: 15
ADLS_ACUITY_SCORE: 20
ADLS_ACUITY_SCORE: 21
DRESS: 0-->INDEPENDENT
EQUIPMENT_CURRENTLY_USED_AT_HOME: GRAB BAR, TOILET;GRAB BAR, TUB/SHOWER
ADLS_ACUITY_SCORE: 20
TOILETING_ASSISTANCE: TOILETING DIFFICULTY, ASSISTANCE 1 PERSON
NUMBER_OF_TIMES_PATIENT_HAS_FALLEN_WITHIN_LAST_SIX_MONTHS: 2
DRESSING/BATHING: BATHING DIFFICULTY, ASSISTANCE 1 PERSON
DIFFICULTY_EATING/SWALLOWING: NO
TOILETING_MANAGEMENT: INCONTIENCE
CHANGE_IN_FUNCTIONAL_STATUS_SINCE_ONSET_OF_CURRENT_ILLNESS/INJURY: NO
VISION_MANAGEMENT: GLASSES
ADLS_ACUITY_SCORE: 17
ADLS_ACUITY_SCORE: 16
TOILETING: 0-->INDEPENDENT
ADLS_ACUITY_SCORE: 20
DRESS: 0-->INDEPENDENT
ADLS_ACUITY_SCORE: 17
WALKING_OR_CLIMBING_STAIRS: AMBULATION DIFFICULTY, ASSISTANCE 1 PERSON
DIFFICULTY_COMMUNICATING: NO
ADLS_ACUITY_SCORE: 15
WEAR_GLASSES_OR_BLIND: YES
TOILETING_ISSUES: YES
TRANSFERRING: 1-->ASSISTANCE (EQUIPMENT/PERSON) NEEDED (NOT DEVELOPMENTALLY APPROPRIATE)
CONCENTRATING,_REMEMBERING_OR_MAKING_DECISIONS_DIFFICULTY: YES
BATHING: 0-->INDEPENDENT
ADLS_ACUITY_SCORE: 15
ADLS_ACUITY_SCORE: 21
FALL_HISTORY_WITHIN_LAST_SIX_MONTHS: YES
ADLS_ACUITY_SCORE: 17
ADLS_ACUITY_SCORE: 16
DOING_ERRANDS_INDEPENDENTLY_DIFFICULTY: YES
ADLS_ACUITY_SCORE: 21
ADLS_ACUITY_SCORE: 15

## 2022-03-20 NOTE — PLAN OF CARE
Neuro: A&Ox4.   Cardiac: NSR. VSS.   Respiratory: Sating 96 on RA.  GI/: Adequate urine output. Gloria. No BM  Diet/appetite: NPO diet. Speech therapy in morning  Activity:  Bedrest. PT following  Pain: At acceptable level on current regimen.   Skin: Generalized Bruising.  LDA's: Right internal jugular 3 lumen. Gloria    Plan: Continue with POC. Notify primary team with changes.

## 2022-03-20 NOTE — PROGRESS NOTES
[facility]    Progress Note - Medicine Service, MAROON TEAM 1       Date of Admission:  3/19/2022    Assessment & Plan            Khalida Redding is a 71 year old female admitted on 3/19/2022. She has a history of T2DM, HTN, HLD, dementia, epilespy, and recent CVA (11/2021) and is admitted for acute CVA and hypotension likely secondary to polypharmacy.     Changes today  - Central and arterial line removed  - Pt working with PT and OT, recommended TCU  - SLP- pt okay for regular diet  - Neurology evaluated the pt, seen bedside appreciate recs    #Acute CVA of R Splenium of Corpus Callosum   #H/O CVA with Residual Left Sided Hemiparesis   She was noted to have new onset confusion, slurred speech, and left facial droop around 09:30 on 3/18/22. She recently had a stroke in November that left her with residual left sided weakness. MRI at OSH showed small acute to subacute infarct involving the right aspect of the splenium of the corpus callosum. Neurology was consulted and recommended no thrombolytics due to rapid improvement of her symptoms. TTE was unremarkable with EF of 55-60%.stroke symptoms resolved, suspect that symptom recrudescence was due to hypotension and decreased cerebral perfusion in the setting of polypharmacy    - Cardiac Telemetry in place   - Continue DAPT with ASA 81mg and Clopidogrel 75mg daily for 90 days, then she will transition to  mg, po/day and will discontinue clopidogrel per neurology recommendation  - Continue Neuro Checks QShift   - Continue PTA Statin ()  - Per Neurology - OK for normotension. Avoid hypotension.  - PT/OT/SLP Consulted recommended TCU, working with the pt  - Neurology Consulted. Appreciate further recommendations of management.   Stroke work up completed as detailed below  Aspirin 81 mg and plavix 75 mg x 90 days then aspirin 325 mg daily thereafter. Although stroke mechanism is favored  to be ESUS but pt  is found to have bilateral distal PCA stenosis which may contribute to her stroke based on location. It was decided to continue DAPT for 90 days  Heart monitoring at discharge  Continue  mg BID and Keppra 2000 mg BID  Follow up with general neurology as outpatient  - Discharge on Ziopatch (ordered for you)  - General neurology referral placed (ordered for you)  No further stroke work up needed. Stroke neurology will sign off and please reach out to us with questions or concerns     #SIRS Criteria (fever 100.8F, leukocytosis, tachycardia, lactic acidosis)   #Transient Hypotension, Likely Iatrogenic Secondary to Medications   #Somnolence, Resolved   She had an episode of hypotension with SBP 60's. Per chart review, patient had received labetolol (30mg), hydralazine (10mg), fentanyl (55mcg), lorazepam (1mg), gabapentin (300mg), levetiracetam (1500mg), phenytoin (100mg) in the evening prior to the episode of hypotension and somnolence. Her blood pressure normalized with fluid resuscitation. She had a CT C/A/P without any concerning signs for infection. SIRS criteria likely met due to iatrogenic hypotension. Her somnolence is also likely related to polypharmacy.  No identifiable source of infection on physical exam, UA, or imaging. No indication to continue antibiotics at this time.   - Lactic Acid Resolved on Admission   - Follow up blood cultures   - Monitor for signs/symptoms of infection   - HOLD antihypertensives for now   - Avoid polypharmacy  - Discontinue indwelling Gloria      #Elevated Troponin, Suspect Type II MI, Improved   Troponin peaked at 640 without ACS symptoms which is peaked. This likely represents a type II MI - demand ischemia in the setting of hypotension.   - No further troponin checks     #Seizure Disorder   She has a known seizure disorder. She was transferred for cEEG monitoring, however this is not able to be started overnight. She was loaded with 1,000mg of Keppra prior to transfer.   -  Continue PTA Levetiracetam and Phenytoin   - Neurology Consulted. Appreciate recommendations on whether EEG is still indicated.  - Continue  mg BID and Keppra increased to 2000 mg BID per neuro  - No need for EEG per neuro    #T2DM - Well-Controlled   Last A1c 5.5%.   - Diet Controlled.   - Monitor blood glucose. Start sliding scale insulin if AM BG > 250     #HLD   - Continue PTA Atorvastatin      #HTN  - Hold PTA antihypertensives in the setting of medication-induced hypotension     #Dementia   Patient appears at her mental status baseline on examination.   - Delirium and Fall Precautions in Place             Diet: Regular Diet Adult    DVT Prophylaxis: Low Risk/Ambulatory with no VTE prophylaxis indicated  Gloria Catheter: Not present  Fluids: None  Central Lines: None  Cardiac Monitoring: ACTIVE order. Indication: Stroke, acute (48 hours)  Code Status: Full Code      Disposition Plan   Expected Discharge: 2-3 days  Anticipated discharge location:  Awaiting care coordination huddle       The patient's care was discussed with the Attending Physician, Dr. Ford.    Jess Elizondo MD  Medicine Service, [address]  Securely message with the Vocera Web Console (learn more here)  Text page via [facility] Paging/Directory   Please see signed in provider for up to date coverage information      ______________________________________________________________________    Interval History   NAEON. Pt was walking with a walker on the floor during my initial evaluation. Pt states she is feeling much better and happy to be able to move all her extremities and work with PT and OT. Pt denies chest pain, SOB, urinary or bowel complaints, no lateralizing symptoms.       Data reviewed today: I reviewed all medications, new labs and imaging results over the last 24 hours. I personally reviewed the EKG tracing showing Sinus rythm.    Physical Exam   Vital Signs:  Temp: 97.6  F (36.4  C) Temp src: Oral BP: (!) 142/76 Pulse: 87   Resp: 16 SpO2: 97 % O2 Device: None (Room air)    Weight: 133 lbs 6.05 oz  General Appearance: Not in distress, lying comfortably on her bed  Respiratory: good air entry bilateral, no added breath sounds  Cardiovascular: S1, S2 well heard, no murmur or gallop   GI: Non tender, non distended, no hepatosplenomegally  Skin: warm to touch  CNS: A &Ox3, CNII- CNXII grossly intact, no focal neurological deficit    Data   Recent Labs   Lab 03/20/22  0443 03/19/22  1844 03/19/22  1437 03/19/22  0731 03/19/22  0550 03/19/22  0121 03/19/22  0106 03/18/22  2231 03/18/22  1440 03/18/22  1405 03/18/22  1403   WBC 5.8  --   --   --  13.1*  --  10.0  --   --    < >  --    HGB 10.5*  --   --   --  14.4  --  13.2  --   --    < >  --    MCV 95  --   --   --  94  --  93  --   --    < >  --      --   --   --  309  --  335  --   --    < >  --    INR  --   --   --   --   --   --   --   --  0.98  --   --    *  --   --   --  136  --   --   --   --   --  143   POTASSIUM 3.3*  --   --   --  3.9  --   --   --   --   --  4.0   CHLORIDE 115*  --   --   --  103  --   --   --   --   --  101   CO2 26  --   --   --  26  --   --   --   --   --  23   BUN 9  --   --   --  12  --   --   --   --   --  15   CR 0.53  --   --   --  0.54  --   --   --   --   --  0.58   ANIONGAP 4  --   --   --  7  --   --   --   --   --  19*   PINA 7.3*  --   --   --  8.0*  --   --   --   --   --  7.9*   * 147* 146*   < > 196*   < >  --    < >  --   --  118*    < > = values in this interval not displayed.     Recent Results (from the past 24 hour(s))   XR Chest Port 1 View    Narrative    EXAM: XR CHEST PORT 1 VIEW  LOCATION: [facility]  DATE/TIME: 3/19/2022 5:24 PM    INDICATION: Central line placements.  COMPARISON: Chest x-ray 03/19/2022.      Impression    IMPRESSION: Right neck central venous line tip at the mid SVC. Stable cardiac silhouette. Stable  bilateral interstitial prominence.

## 2022-03-20 NOTE — PROGRESS NOTES
"   03/20/22 0900   Quick Adds   Type of Visit Initial PT Evaluation   Living Environment   People in Home significant other   Current Living Arrangements apartment   Home Accessibility stairs to enter home   Number of Stairs, Main Entrance 1   Transportation Anticipated family or friend will provide   Living Environment Comments Pt lives with SO in an apartment. Pt reports her daughter Laura comes over everyday around 330pm (after work) to assist with grocery shopping, driving, cleaning.    Self-Care   Usual Activity Tolerance moderate   Current Activity Tolerance fair   Regular Exercise Yes   Activity/Exercise Type walking  (\"around the block\")   Equipment Currently Used at Home cane, straight;shower chair;walker, rolling;walker, standard;wheelchair, manual;grab bar, toilet;grab bar, tub/shower   Fall history within last six months yes   Activity/Exercise/Self-Care Comment When asked if patient has had a fall in the past 6 months, pt declines. Pt later states she got her black eye from falling one month ago. Also states she tripped and fell \"a few months ago\". Pt states her SO helps her with supine<>sitting, LBD and \"carrying her around\". Pt also states she has a GB, states her daughter assists her with walking with a GB. Prior to admission, pt states she was using the SPC in the apartment, wheelchair for longer distance mobility.    General Information   Onset of Illness/Injury or Date of Surgery 03/19/22   Referring Physician Sailaja Sales MD   Patient/Family Therapy Goals Statement (PT) go home   Pertinent History of Current Problem (include personal factors and/or comorbidities that impact the POC) Pt is a 71 year old female admitted on 3/19/2022. She has a history of T2DM, HTN, HLD, dementia, epilespy, and recent CVA (11/2021) and is admitted for acute CVA and hypotension likely secondary to polypharmacy.    Existing Precautions/Restrictions fall   Cognition   Affect/Mental Status (Cognition) confused " "  Orientation Status (Cognition) oriented to;person   Cognitive Status Comments Pt with condraticting statements during subjective evaluation. Also reports shes been in the hospital bed for \"days\" resulting in weakness. Confused where both hospital beds in pt's room came from when leaving the bathroom in hospital room, unable to locate door to hallway.    Posture    Posture Protracted shoulders;Forward head position   Range of Motion (ROM)   ROM Comment BLE ROM WFL, decreased ankle df bilaterally 2/2 tight heelcords   Strength (Manual Muscle Testing)   Strength Comments BLE strength > 3/5, pt able to complete SLR bilaterally. LEs fatigue quickly with ambulation short distances, poor muslce endurance   Bed Mobility   Comment, (Bed Mobility) supine>sitting EOB with min A at trunk   Transfers   Comment, (Transfers) Sit>stand with min A from EOB, mod A from high back chair after ambulation.    Gait/Stairs (Locomotion)   Comment, (Gait/Stairs) Pt ambulated 10 ft with 4WW, min A. Pt ambualtes with decreased step length, 4WW pushed ahead of her, downward gaze, min A.    Balance   Balance Comments Good sitting balance, fair static standing balance, requires FWW and CGA. Fair/poor dynamic balance, requires 4WW and min A 2/2 unsteadiness.    Sensory Examination   Sensory Perception patient reports no sensory changes   Coordination   Coordination Comments Impaired, increased difficulty with alteranting stepping pattern with turning adter ambulation.   Clinical Impression   Criteria for Skilled Therapeutic Intervention Yes, treatment indicated   PT Diagnosis (PT) impaired functional mobility   Influenced by the following impairments decreased LE strength/endurance, impaired balance, decreased safety awareness, decreased activity tolerance, impared coordination.    Functional limitations due to impairments difficulty with bed mobility, transfers, ambulation, stairs.    Clinical Presentation (PT Evaluation Complexity) " "Evolving/Changing   Clinical Presentation Rationale medical stauts, level of impairments   Clinical Decision Making (Complexity) moderate complexity   Planned Therapy Interventions (PT) balance training;bed mobility training;wheelchair management/propulsion training;transfer training;strengthening;gait training;home exercise program;neuromuscular re-education;patient/family education   Risk & Benefits of therapy have been explained evaluation/treatment results reviewed;care plan/treatment goals reviewed;current/potential barriers reviewed;risks/benefits reviewed;participants included;patient   PT Discharge Planning   PT Discharge Recommendation (DC Rec) Acute Rehab Center-Motivated patient will benefit from intensive, interdisciplinary therapy.  Anticipate will be able to tolerate 3 hours of therapy per day;Transitional Care Facility;home with home care physical therapy   PT Rationale for DC Rec Pt is below baseline independence with mobility. Prior to admission, pt with mod I ambulating with a SPC a few blocks, independent with transfers. Currently requires min-mod A for transfers, ambulating 40 ft with 4WW, min A, nearly falling due to inability to coordinate picking up alternating feet to turn to chair. Pt states \"ill be better at home\" and that her  will help her. Pt declines discharge to rehab stating \"you can't pay me to return to rehab\". Pt's daughter involved with assisting pt/SO at home, pt's daughter Laura works until 330pm but able to assist pt daily (per patient report after 330pm). Laura also drives pt/pt's SO. If patient discharges to home, pt would need 24/7 assist, home PT/OT. Pt and caregiver (likely SO as pt's daughter unable to provide 24/7 care) will need to demonstrate safety with transfers sit<>stand, EOB<>wheelchair, one step to enter home.    PT Brief overview of current status Min A for bed mobility supine<>sitting. Sit>stand and EOB<>BSC with min-mod A, likely assist of another " for pericares. Do not recommend ambulation in/out of bathroom at this time 2/2 poor muslce endurance, difficulty completing turning activites, high fall risk.    Total Evaluation Time   Total Evaluation Time (Minutes) 6

## 2022-03-20 NOTE — PROGRESS NOTES
03/20/22 1100   General Information   Onset of Illness/Injury or Date of Surgery 03/19/22   Referring Physician Sailaja Sales MD   Pertinent History of Current Problem Pt is a 71 year old female a history of T2DM, HTN, HLD, dementia, seizure disorder, and recent stroke in November of '21 with residual left sided deficits who was found to have acute R splenium of corpus callosum infarct. Hospital course complicated by severe hypotension requiring pressors and worsening of neurological status and encephalopathy. Pt was transferred for reasons of severe hypotension, altered mental status and need for EEG to rule out status as cause of encephalopathy. Today pt is alert oriented and is following commands. No clear focal neurological deficits. She is back to baseline. Most likely encephalopathy related to severe hypotension due to polypharmacy causing recrudescence. Given suspicion of seizure Keppra was increased. Bedside swallow evaluation completed per MD orders.    Past History of Dysphagia Pt was seen by our services from 11/29/21 to 12/2/2022. At time of discharge pt was tolerating Easy to Chew solids and Thin liquid diet.    Pain Assessment   Patient Currently in Pain No   Type of Evaluation   Type of Evaluation Swallow Evaluation   Oral Motor   Oral Musculature generally intact   Structural Abnormalities none present   Mucosal Quality good   Dentition (Oral Motor)   Dentition (Oral Motor) edentulous   Comment, Dentition (Oral Motor) Has dentures but not present in the hospital with her   Facial Symmetry (Oral Motor)   Facial Symmetry (Oral Motor) WNL   Lip Function (Oral Motor)   Lip Range of Motion (Oral Motor) WNL   Tongue Function (Oral Motor)   Tongue ROM (Oral Motor) WNL   Cough/Swallow/Gag Reflex (Oral Motor)   Volitional Throat Clear/Cough (Oral Motor) WNL   Volitional Swallow (Oral Motor) WNL   Vocal Quality/Secretion Management (Oral Motor)   Vocal Quality (Oral Motor) WNL   Secretion Management (Oral  Motor) WNL   General Swallowing Observations   Current Diet/Method of Nutritional Intake (General Swallowing Observations, NIS) NPO   Respiratory Support (General Swallowing Observations) none   Swallowing Evaluation Clinical swallow evaluation   Clinical Swallow Evaluation   Feeding Assistance no assistance needed   Additional evaluation(s) completed today No   Clinical Swallow Evaluation Textures Trialed thin liquids;solid foods   Clinical Swallow Eval: Thin Liquid Texture Trial   Mode of Presentation, Thin Liquids cup;straw;self-fed   Volume of Liquid or Food Presented 8 oz   Oral Phase of Swallow WFL   Pharyngeal Phase of Swallow intact   Diagnostic Statement Adequate oral phase with no overt s/s aspiration    Clinical Swallow Evaluation: Solid Food Texture Trial   Mode of Presentation self-fed   Volume Presented 1 piece of toast   Oral Phase WFL   Pharyngeal Phase intact   Diagnostic Statement Adequate oral phase with no overt s/s aspiration    Esophageal Phase of Swallow   Patient reports or presents with symptoms of esophageal dysphagia No   Swallowing Recommendations   Diet Consistency Recommendations regular diet;thin liquids (level 0)   Supervision Level for Intake patient independent   Mode of Delivery Recommendations bolus size, small;slow rate of intake   Swallowing Maneuver Recommendations alternate food and liquid intake   Monitoring/Assistance Required (Eating/Swallowing) stop eating activities when fatigue is present;monitor for cough or change in vocal quality with intake   Recommended Feeding/Eating Techniques (Swallow Eval) maintain upright sitting position for eating   Medication Administration Recommendations, Swallowing (SLP) Okay for whole with thin liquid as tolerated   Instrumental Assessment Recommendations instrumental evaluation not recommended at this time   Clinical Impression   Criteria for Skilled Therapeutic Interventions Met (SLP Eval) No problems identified which require skilled  intervention   SLP Diagnosis Functional oropharyngeal swallowing mechanism   Risks & Benefits of therapy have been explained evaluation/treatment results reviewed;care plan/treatment goals reviewed;risks/benefits reviewed;current/potential barriers reviewed;participants voiced agreement with care plan;participants included;patient   Clinical Impression Comments Bedside swallow evaluation completed per MD orders. Pt was edentulous, reports she has dentures at home but not here with her, otherwise oral mechanism exam was unremarkable. Pt presents with functional oropharyngeal swallowing mechanism and regular/thin liquid diet is recommended. Pt was assessed with regular solids and thin liquids. Pt demonstrated functional and complete mastication despite edentulous, adequate bolus control, no oral residuals, was able to clear bolus with single swallow, no report of sticking sensation in throat, no change in vocal quality following PO trials, and no overt s/s aspiration noted. Recommend regular solids/thin liquids. Meds okay to whole in thin liquid. Pt should be fully alert and upright with all PO and alternate solids and liquids. Pt appears to be at baseline oropharyngeal swallow function with no ongoing speech therapy is warranted. SLP will complete orders. Please re-consult should pt have a change in status or if concerns arise.    SLP Discharge Planning   SLP Discharge Recommendation   (Defer to PT/OT)   SLP Rationale for DC Rec Functional swallow, no ongoing speech therapy warranted   SLP Brief overview of current status  Recommend regular solids/thin liquids. Meds okay to whole in thin liquid. Pt should be fully alert and upright with all PO and alternate solids and liquids. Pt appears to be at baseline oropharyngeal swallow function with no ongoing speech therapy is warranted. SLP will complete orders. Please re-consult should pt have a change in status or if concerns arise.     Total Evaluation Time   Total  Evaluation Time (Minutes) 20   SLP Goals   Therapy Frequency (SLP Eval) one time eval and treatment only

## 2022-03-20 NOTE — PLAN OF CARE
"Goal Outcome Evaluation:               Outcome Evaluation: Pt somnolent with limited motion of left-sided extremities much of the day until this evening when pt had incontinent BM and Gloria catheter placed after incontinent cares.  Pt roused and attempted to hit staff with equal strength bilaterally and attempting to pull newly inserted catheter out, stating \"I can pee, I don't want that, take it out\".  Reorientation, distraction and explanation of cares provided, pt calmed down and became cooperative with cares.  Family supportive and helpful at bedside.  Pt disoriented to situation, but able to state she came to the hospital \"because I had a stroke\".  Pt now resting in bed.  Art line in place and accessed, central line accessed with IV fluids and transduced for CVP monitoring.  CVP readings 0-1.  BP remains with MAP >65 so have not needed to start levophed gtt.  Maintenance fluids infusing.      "

## 2022-03-20 NOTE — H&P
[facility]    History and Physical - Medicine Service, MAROON TEAM        Date of Admission:  3/19/2022    Assessment & Plan      Khalida Redding is a 71 year old female admitted on 3/19/2022. She has a history of T2DM, HTN, HLD, dementia, epilespy, and recent CVA (11/2021) and is admitted for acute CVA and hypotension likely secondary to polypharmacy.      #Acute CVA of R Splenium of Corpus Callosum   #H/O CVA with Residual Left Sided Hemiparesis   She was noted to have new onset confusion, slurred speech, and left facial droop around 09:30 on 3/18/22. She recently had a stroke in November that left her with residual left sided weakness. MRI at OSH showed small acute to subacute infarct involving the right aspect of the splenium of the corpus callosum. Neurology was consulted and recommended no thrombolytics due to rapid improvement of her symptoms. TTE was unremarkable. Her stroke symptoms are nearly resolved on admission, suspect that symptom recrudescence was due to hypotension and decreased cerebral perfusion.   - Cardiac Telemetry in place   - Continue DAPT with ASA 81mg and Clopidogrel 75mg daily   - Continue Neuro Checks QShift   - Continue PTA Statin ()  - Per Neurology - OK for normotension. Avoid hypotension.  - PT/OT/SLP Consulted   - Neurology Consulted. Appreciate further recommendations of management.     #SIRS Criteria (fever 100.8F, leukocytosis, tachycardia, lactic acidosis)   #Transient Hypotension, Likely Iatrogenic Secondary to Medications   #Somnolence, Resolved   She had an episode of hypotension with SBP 60's. Per chart review, patient had received labetolol (30mg), hydralazine (10mg), fentanyl (55mcg), lorazepam (1mg), gabapentin (300mg), levetiracetam (1500mg), phenytoin (100mg) in the evening prior to the episode of hypotension and somnolence. Her blood pressure normalized with fluid resuscitation. She had a CT C/A/P without any  concerning signs for infection. SIRS criteria likely met due to iatrogenic hypotension. Her somnolence is also likely related to polypharmacy.  No identifiable source of infection on physical exam, UA, or imaging. No indication to continue antibiotics at this time.   - Lactic Acid Resolved on Admission   - Follow up blood cultures   - Monitor for signs/symptoms of infection   - HOLD antihypertensives for now   - Avoid polypharmacy  - Discontinue indwelling Gloria     #Elevated Troponin, Suspect Type II MI, Improved   Troponin peaked at 640 without ACS symptoms. This likely represents a type II MI - demand ischemia in the setting of hypotension.   - No further troponin checks    #Seizure Disorder   She has a known seizure disorder. She was transferred for cEEG monitoring, however this is not able to be started overnight. She was loaded with 1,000mg of Keppra prior to transfer.   - Continue PTA Levetiracetam and Phenytoin   - Neurology Consulted. Appreciate recommendations on whether EEG is still indicated.    #T2DM - Well-Controlled   Last A1c 5.5%.   - Diet Controlled.   - Monitor blood glucose. Start sliding scale insulin if AM BG > 250    #HLD   - Continue PTA Atorvastatin     #HTN  - Hold PTA antihypertensives in the setting of medication-induced hypotension    #Dementia   Patient appears at her mental status baseline on examination.   - Delirium and Fall Precautions in Place       Diet: NPO for Medical/Clinical Reasons Except for: Meds, Ice Chips    DVT Prophylaxis: DAPT   Gloria Catheter: PRESENT, indication: Strict 1-2 Hour I&O  Fluids: S/P 3.5L Fluid Resuscitation at OSH   Central Lines: PRESENT  CVC TRIPLE LUMEN Right Internal jugular Non - tunneled-Site Assessment: WDL  Cardiac Monitoring: ACTIVE order. Indication: Stroke, acute (48 hours)  Code Status: Full Code      Clinically Significant Risk Factors Present on Admission               # Platelet Defect: home medication list includes an antiplatelet  medication       Disposition Plan   Expected Length of Stay: > or = 2 midnights   Anticipated discharge location:  Awaiting care coordination huddle     The patient's care was discussed with the Attending Physician, Dr. Koenig .    Sailaja Sales MD  Medicine Service, [facility] TEAM   [facility]  Securely message with the Vocera Web Console (learn more here)  Text page via [facility] Paging/Directory   Please see signed in provider for up to date coverage information    ______________________________________________________________________    Chief Complaint   Somnolence     History is obtained from the patient and electronic health record    History of Present Illness   Khalida Redding is a 71 year old female who was directly admitted from [facility] due to concern for new onset somnolence and stroke symptom recrudescence. Neurology recommended transfer due to concern for seizure or nonconvulsive status epilepticus as differentials for the underlying cause of her somnolence. Neurology at the time of the transfer had recommended transfer for cEEG monitoring. Upon arrival, Khalida is alert and interactive. Her main complaint is that she is bruised from multiple needle sticks. She reports that her left sided weakness is improved and she no longer has slurred speech. She denies any fevers, chills, headache, cough, congestion, chest pain, shortness of breath, abdominal pain, diarrhea, constipation, dysuria, skin changes, joint pains, or wounds.     She initially presented to [facility] on the AM of 3/18/22 and was noted to have new onset confusion, slurred speech, and left facial droop that began around 09:30. Neurology was consulted and did not recommend thrombolytic treatment as her symptoms had begun resolving by the time she arrived at the ED. Neurology recommended further workup such as CTA Head/Neck, MRI Brain,  TTE, and labs such as A1c, lipid panel, and troponin. Workup was remarkable for MRI showing acute/subacute infarct in the right aspect of the splenium of the corpus callosum. Permissive HTN was recommended for SBP < 220 and DBP < 140. Per chart review, a rapid response was called due to hypotension and somnolence with difficult arousing the patient. She was found to be febrile to 100.8F, tachycardic, and hypotensive with SBP in 70's. She was fluid resuscitated with > 3L and her BP responded. After this episode, it was noted that her somnolence persisted despite being normotensive and her stroke symptoms were present again. At this time, transfer to [facility] was recommended. Per review of the MAR, she was given multiple antihypertensives prior to the episode - labetolol, hydralazine - as well as psychotropic medications - fentanyl, lorazepam, gabapentin, levetiracetam, and phenytoin.     Review of Systems    The 10 point Review of Systems is negative other than noted in the HPI or here.     Past Medical History    I have reviewed this patient's medical history and updated it with pertinent information if needed.   Past Medical History:   Diagnosis Date     Atrial tachycardia (H)     nonsustained, detected on Ziopatch     Convulsions, unspecified convulsion type (H) 9/20/2018     CVA (cerebral vascular accident) (H)      Dementia (H) 9/20/2018     Diabetes (H)      Falls      History of CVA (cerebrovascular accident) 9/20/2018     Hyperlipidemia LDL goal <100 9/20/2018     Hypertension goal BP (blood pressure) < 140/90 9/20/2018     Osteoarthritis 9/20/2018     Pain in both lower legs 9/20/2018     Seizures (H)      Smoking      Syncope      Tobacco abuse disorder 9/20/2018     Tubular adenoma of colon 11/29/2018     Type 2 diabetes mellitus with circulatory disorder, without long-term current use of insulin (H) 9/20/2018        Past Surgical History   I have reviewed this patient's surgical history and updated it with  pertinent information if needed.  Past Surgical History:   Procedure Laterality Date     COLONOSCOPY N/A 11/28/2018    Procedure: Colonoscopy, Polypectomy by Biopsy;  Surgeon: Arcadio Mcmullen DO;  Location:  GI     COLONOSCOPY N/A 8/24/2020    Procedure: Colonoscopy with possible biopsy and/or polypectomy;  Surgeon: Fabián Hines MD;  Location:  GI     HIP SURGERY Left      Social History   I have reviewed this patient's social history and updated it with pertinent information if needed. Khalida Redding  reports that she quit smoking about 3 months ago. Her smoking use included cigarettes. She smoked 1.00 pack per day. She has never used smokeless tobacco. She reports that she does not drink alcohol and does not use drugs.    Family History     Unable to obtain due to patient's dementia.     Prior to Admission Medications   Prior to Admission Medications   Prescriptions Last Dose Informant Patient Reported? Taking?   Cyanocobalamin (VITAMIN B 12 PO)  Daughter Yes No   Sig: Take 1,000 mcg by mouth daily   acetaminophen (TYLENOL) 500 MG tablet  Daughter Yes No   Sig: Take 1,000 mg by mouth every 6 hours as needed for mild pain   alcohol swab prep pads  Daughter No No   Sig: Use to swab area of injection/carlos manuel as directed.   alendronate (FOSAMAX) 70 MG tablet   No No   Sig: Take 1 tablet (70 mg) by mouth every 7 days Tuesdays   aspirin (ASA) 81 MG chewable tablet   No No   Sig: Take 1 tablet (81 mg) by mouth daily Take 1 tablet daily through Feb 28.   atorvastatin (LIPITOR) 40 MG tablet   No No   Sig: Take 1 tablet by mouth once daily   blood glucose (NO BRAND SPECIFIED) test strip  Daughter No No   Sig: Use to test blood sugar once daily. To accompany: Blood Glucose Monitor Brands: per insurance.   blood glucose monitoring (NO BRAND SPECIFIED) meter device kit  Daughter No No   Sig: Use to test blood sugar once daily. Preferred blood glucose meter OR supplies to accompany: Blood Glucose  Monitor Brands: per insurance.   clopidogrel (PLAVIX) 75 MG tablet   No No   Sig: Take 1 tablet by mouth once daily   gabapentin (NEURONTIN) 300 MG capsule   No No   Sig: TAKE 1 CAPSULE BY MOUTH AT  BEDTIME   glipiZIDE (GLUCOTROL XL) 2.5 MG 24 hr tablet   No No   Sig: TAKE 1 TABLET BY MOUTH  DAILY   levETIRAcetam (KEPPRA) 750 MG tablet   No No   Sig: Take 2 tablets by mouth twice daily   lisinopril (ZESTRIL) 20 MG tablet   No No   Sig: Take 1 tablet by mouth once daily   meloxicam (MOBIC) 7.5 MG tablet  Daughter No No   Sig: Take 1 tablet (7.5 mg) by mouth daily   memantine (NAMENDA) 10 MG tablet  Daughter No No   Sig: Take 1 tablet (10 mg) by mouth daily   metFORMIN (GLUCOPHAGE-XR) 500 MG 24 hr tablet  Daughter No No   Sig: Take 1 tablet (500 mg) by mouth 2 times daily (with meals)   omeprazole (PRILOSEC) 40 MG DR capsule   No No   Sig: Take 1 capsule (40 mg) by mouth At Bedtime   order for DME  Daughter No No   Sig: Equipment being ordered: glucometer and related supplies.   phenytoin (DILANTIN) 50 MG chewable tablet   No No   Sig: CHEW AND SWALLOW 2 TABLETS BY MOUTH TWICE DAILY   polyethylene glycol (MIRALAX) 17 GM/Dose powder  Daughter No No   Sig: Take 17 g (1 capful) by mouth 2 times daily   Patient taking differently: Take 1 capful by mouth daily as needed for constipation    thin (NO BRAND SPECIFIED) lancets  Daughter No No   Sig: Use with lanceting device. To accompany: Blood Glucose Monitor Brands: per insurance.      Facility-Administered Medications: None     Allergies   Allergies   Allergen Reactions     Amoxicillin Hives and Rash     Pt reports she has taken PCN without problems     Ampicillin Rash       Physical Exam   Vital Signs: Temp: 98.2  F (36.8  C) Temp src: Oral BP: 138/74 Pulse: 93   Resp: 14 SpO2: 98 % O2 Device: None (Room air)    Weight: 133 lbs 6.05 oz    Physical Exam  Vitals and nursing note reviewed.   Constitutional:       General: She is not in acute distress.     Appearance: She is  not ill-appearing.   HENT:      Head: Normocephalic.        Nose: No congestion.      Mouth/Throat:      Mouth: Mucous membranes are moist.      Pharynx: No oropharyngeal exudate.   Eyes:      Extraocular Movements: Extraocular movements intact.      Conjunctiva/sclera: Conjunctivae normal.      Pupils: Pupils are equal, round, and reactive to light.   Cardiovascular:      Rate and Rhythm: Normal rate and regular rhythm.      Heart sounds: No murmur heard.  Pulmonary:      Effort: Pulmonary effort is normal.      Breath sounds: Normal breath sounds. No rhonchi or rales.   Abdominal:      General: Bowel sounds are normal. There is no distension.      Palpations: Abdomen is soft.      Tenderness: There is no abdominal tenderness.   Musculoskeletal:      Right lower leg: No edema.      Left lower leg: No edema.   Skin:     General: Skin is warm and dry.      Findings: Bruising present.   Neurological:      Mental Status: She is alert. Mental status is at baseline.      GCS: GCS eye subscore is 4. GCS verbal subscore is 5. GCS motor subscore is 6.      Cranial Nerves: Cranial nerves are intact.      Sensory: Sensation is intact.      Motor: Weakness present.      Comments: Minimal left-sided facial asymmetry.   4/5 muscle weakness LLE with hip flexors/extensors and knee flexors/extensors.    Psychiatric:         Mood and Affect: Mood normal.         Data   Data reviewed today: I reviewed all medications, new labs and imaging results over the last 24 hours. I personally reviewed the brain MRI image(s) showing acute/subacute infarct.    Recent Labs   Lab 03/19/22  1844 03/19/22  1437 03/19/22  1200 03/19/22  0731 03/19/22  0550 03/19/22  0121 03/19/22  0106 03/18/22  2231 03/18/22  1440 03/18/22  1405 03/18/22  1403   WBC  --   --   --   --  13.1*  --  10.0  --   --  5.0  --    HGB  --   --   --   --  14.4  --  13.2  --   --  14.2  --    MCV  --   --   --   --  94  --  93  --   --  99  --    PLT  --   --   --   --  309   --  335  --   --  87*  --    INR  --   --   --   --   --   --   --   --  0.98  --   --    NA  --   --   --   --  136  --   --   --   --   --  143   POTASSIUM  --   --   --   --  3.9  --   --   --   --   --  4.0   CHLORIDE  --   --   --   --  103  --   --   --   --   --  101   CO2  --   --   --   --  26  --   --   --   --   --  23   BUN  --   --   --   --  12  --   --   --   --   --  15   CR  --   --   --   --  0.54  --   --   --   --   --  0.58   ANIONGAP  --   --   --   --  7  --   --   --   --   --  19*   PINA  --   --   --   --  8.0*  --   --   --   --   --  7.9*   * 146* 122*   < > 196*   < >  --    < >  --   --  118*    < > = values in this interval not displayed.     Recent Results (from the past 24 hour(s))   XR Chest Port 1 View    Narrative    EXAM: XR CHEST PORT 1 VIEW  LOCATION: [facility]  DATE/TIME: 3/19/2022 8:11 AM    INDICATION: recent CVA, sudden change in mental status with worsening lactic acid and hypotension; passed bedside swallow but assess for aspiration pneumonia vs other etiology  COMPARISON: 11/28/2021      Impression    IMPRESSION: No focal consolidation or pleural effusion. Stable diffuse interstitial thickening. Normal heart size. Wireless pacemaker.   XR Chest Port 1 View    Narrative    EXAM: XR CHEST PORT 1 VIEW  LOCATION: [facility]  DATE/TIME: 3/19/2022 5:24 PM    INDICATION: Central line placements.  COMPARISON: Chest x-ray 03/19/2022.      Impression    IMPRESSION: Right neck central venous line tip at the mid SVC. Stable cardiac silhouette. Stable bilateral interstitial prominence.

## 2022-03-20 NOTE — PROGRESS NOTES
03/20/22 1303   Quick Adds   Type of Visit Initial Occupational Therapy Evaluation   Living Environment   People in Home spouse   Current Living Arrangements apartment   Transportation Anticipated family or friend will provide   Living Environment Comments 1 JASS, all needs met on main floor, walk in shower   Self-Care   Usual Activity Tolerance moderate   Current Activity Tolerance poor   Regular Exercise No   Equipment Currently Used at Home grab bar, toilet;grab bar, tub/shower  (walker, cane, shower chair, w/c)   Fall history within last six months yes   Number of times patient has fallen within last six months 2  (pt states she tripped)   Activity/Exercise/Self-Care Comment PLOF info gathered from pt who is a questionable historian. Pt is normally I in BADLs, although pt states  will occasionally help her with bathing and transfers.  does all the cooking, most of the cleaning, and sets up pt's medications. Pt's family provide rides. Uses cane, walker, or w/c for mobility.    General Information   Referring Physician Sailaja Sales MD   Patient/Family Therapy Goal Statement (OT) Return to PLOF   Additional Occupational Profile Info/Pertinent History of Current Problem 71 year old female admitted on 3/19/2022. She has a history of T2DM, HTN, HLD, dementia, epilespy, and recent CVA (11/2021) and is admitted for acute CVA and hypotension likely secondary to polypharmacy.    Existing Precautions/Restrictions fall   Cognitive Status Examination   Cognitive Status Comments Baseline dementia, AMS upon admit, pt reports resolved. See results from [facility] in daily flowsheet (12/30)   Visual Perception   Visual Acuity Wears bifocals   Sensory   Sensory Quick Adds No deficits were identified   Range of Motion Comprehensive   Comment, General Range of Motion WFL B UEs   Strength Comprehensive (MMT)   Comment, General Manual Muscle Testing (MMT) Assessment RUE WFL.  LUE 4/5 grossly (WFL L hand)   Coordination    Gross Motor Coordination No concerns   Fine Motor Coordination Mild impairments   Coordination Comments No concerns   Activities of Daily Living   BADL Assessment/Intervention bathing;lower body dressing;grooming;toileting   Bathing Assessment/Intervention   [place] Level (Bathing) maximum assist (25% patient effort)   Lower Body Dressing Assessment/Training   [place] Level (Lower Body Dressing) maximum assist (25% patient effort)   Grooming Assessment/Training   [place] Level (Grooming) minimum assist (75% patient effort)   Toileting   [place] Level (Toileting) moderate assist (50% patient effort)   Clinical Impression   Criteria for Skilled Therapeutic Interventions Met (OT) Yes, treatment indicated   OT Diagnosis Decreased I in ADLs due to deconditioning   Assessment of Occupational Performance 5 or more Performance Deficits   Identified Performance Deficits dressing, bathing, toileting, g/h, functional endurance, cognition   Clinical Decision Making Complexity (OT) high complexity   Risk & Benefits of therapy have been explained patient   Total Evaluation Time (Minutes)   Total Evaluation Time (Minutes) 10   OT Goals   Therapy Frequency (OT) 6 times/wk   OT Predicated Duration/Target Date for Goal Attainment 04/02/22   OT Goals Hygiene/Grooming;Lower Body Dressing;Lower Body Bathing;Toilet Transfer/Toileting;Cognition;OT Goal 1   OT: Hygiene/Grooming independent;while standing   OT: Lower Body Dressing Modified independent   OT: Lower Body Bathing Modified independent   OT: Toilet Transfer/Toileting Modified independent   OT: Cognitive Patient/caregiver will verbalize understanding of cognitive assessment results/recommendations as needed for safe discharge planning  (12/30 on [facility])   OT: Goal 1 Be mod I for shower transfer

## 2022-03-20 NOTE — PLAN OF CARE
Admission    3/19/22 2300  -----------------------------------------------------------  Reason for admission:  Primary team notified of pt arrival.  Admitted from: [place]  Via: stretcher  Accompanied by: Transport  Belongings: valuables sent home with family  Admission Profile: complete  Teaching: orientation to unit and call light- call light within reach, call don't fall, use of console, meal times, when to call for the RN, and enforced importance of safety   Access: PIV, RT internal jugular CENTRAL LINE  Telemetry:Placed on pt  Ht./Wt.: complete  Code Status verified on armband: yes  2 RN Skin Assessment Completed By: Jazmin COPELAND  Med Rec completed: no  Bed surface reassessed with algorithm and charted: YES  New bed surface ordered: no  Suction/Ambu bag/Flowmeter at bedside: yes  Is patient having diarrhea upon admission- if YES fill out testing algorithm : no    C. Diff Testing Algorithm (MUST be marked YES)   1. 3 or more loose stools in 24 hrs. [] Yes [x] No       Additional symptoms:(At least ONE must be marked yes)   1. Abdominal pain/discomfort [] Yes [x] No   2. Fever at least 38C (100.4 F) [] Yes [x] No   3. Elevated WBC(>11,000) [x] Yes [] No       Exclusion Criteria:  (MUST be marked YES)   1. Off laxatives for at least 48 hrs. [x] Yes [] No       Pt status:    /74 (BP Location: Left arm)   Pulse 93   Temp 98.2  F (36.8  C) (Oral)   Resp 14

## 2022-03-20 NOTE — PROGRESS NOTES
S-(situation):  Transfer to [facility] Via [facility].    B-(background): Stroke    A-(assessment): Pt is lethargic but arouses easily. Attempted to self transfer to cart. Left sided weakness and left facial droop present. Speech clear. Vitals stable upon transfer.     R-(recommendations): Transfer to [place] per physician orders. Report called to DARY Tony. Family member notified or present daughter Laura and spouse Yeyo. Continue to monitor pt and update physician as needed.      Code status: Full Code  Skin: Bruising  Fall Risk: Yes- Department fall risk interventions implemented.  Isolation: None  Medication drips upon transfer: None  Influenza status verified at discharge: N/A  Belongings gathered and given to No belongings present. Family took home earlier today.

## 2022-03-20 NOTE — PLAN OF CARE
Pt A&O X3-4, infrequently off to situation, answers questions/follows commands appropriately but makes occasional odd statements. VS: afebrile, HR sinus rhythm 80-90's, BP's 140's/90's (pt restless this AM), O2 sats > 90% on RA. LS clear/diminished, BS+ X4, baseline CMS/neuros intact. Pt reported moderate headache pain, received PRN Tylenol X1 with minimal relief. Upon initial assessment this AM, pt had self-removed right-sided CVC X3, was partially sutured in place but line was entirely out. This RN and Cher Burton RN removed line, cleaned site, and applied sterile dressing. PIV in L arm patent & SL. Gloria removed. Pt gets up with A+2 to BSC, voiding urine adequately, passing gas, BM X1. Has been up with PT today. Has been NPO with ice + meds, seen by speech today, diet advance to Regular diet. Has call light within reach, able to make needs known, will continue to monitor per POC.

## 2022-03-20 NOTE — PROGRESS NOTES
[facility]    Stroke Progress Note    Interval EventsPt transferred from OSH, where she was found to have a right sided acute infarct of the splenium of the right corpus callosum.  Gloria in place.  Medicine primary.     HPI Summary    This is a 71 year old woman transferred from OSH on 3/19 with a history of T2DM, HTN, HLD, dementia, seizure disorder, and recent stroke in November of '21 with residual left sided deficits who was admitted to OSH with hypotension likely secondary to polypharmacy as well as acute stroke on MRI.  Of note, she was given multiple antihypertensive medications prior to this episode of confusion, labetolol, hydralazine, as well as some sedating medications including fentanyl, Ativan, gabapentin, Keppra, and Phenytoin.     When she presented to OSH she was noted to have a chief complaint of new onset confusion, slurred speech, and left facial droop around 0930 on 3/18.  MRI showed small acute to subacute infarct of right splenium of the corpus callosum.  Neurology was consulted at the time and recommended to not push thrombolytics due to the pt having rapid improvement of symptoms.  Stroke work up was initiated, resulted below.     She was also found to be febrile (Tmax 100.8), tachycardic, and hypotensive with SBP in the 70's at OSH.  She was fluid resuscitated with ~3L of IVF which improved pressures.  However, her somnolence persisted despite this and stroke symptoms were still present, and transfer to [facility] was initiated.     Stroke Evaluation Summarized    MRI/Head CT MRI Brain 3/18  IMPRESSION:  1. New small acute to subacute infarct involving the right aspect of the splenium of the corpus callosum.  2. Evolving subacute/subacute to chronic infarcts involving the high  posterior left frontal lobe, as described.  3. Unchanged large area of chronic encephalomalacia in the right frontal lobe, as well as other unchanged chronic findings, as  described in the body of the report.   Intracranial Vasculature HEAD CTA:  1.  No high-grade intracranial stenosis. Mild stenosis at the left P1-P2 junction.  2.  Diffusely diminished caliber of the distal right EDWARD presumably relates to the adjacent encephalomalacia in the right frontal lobe.  3.  No aneurysm and no high flow vascular malformation.   Cervical Vasculature NECK CTA:  1.  No high-grade stenosis of the neck vessels by NASCET criteria.     Echocardiogram Interpretation Summary   Technically difficult imaging ( patient unable to cooperate well with exam)  A cardiac source of embolus was not identified.  Sinus rhythm was noted.  The left ventricle is normal in structure, function and size.  The visual ejection fraction is 55-60%.  Grossly Doppler interrogation does not demonstrate signficant stenosis or insufficinecy involving cardiac valves.   EKG/Telemetry Sinus rhythm   Other Testing Not Applicable     LDL  3/19/2022: 113 mg/dL   A1C  3/10/2022: 5.5 %   Troponin 3/19/2022: 393 ng/L ()       Impression     #Acute ischemic stroke of the right splenium of the corpus callosum due to undetermined etiology   Pt was noted to have new onset confusion, slurred speech, and left facial droop at presentation to OSH.  She has a history of right MCA stroke with residual left sided deficits.  Suspect recrudescence due to hypotension due to polypharmacy has some accountability to the episode of the current hospitalization.  However, she was found to have diffusion restriction in the right splenium of the corpus callosum indicating acute to subacute stroke. Stroke work up is completed.  EEG is unnecessary as pt is at her baseline.  She does have distal PCA stenosis so atherosclerotic etiology cannot be excluded.  Although this is less likely, we will still recommend dual anti platelet therapy for 90 days and then aspirin monotherapy thereafter.  Stroke etiology is still most likely ESUS, and there fore it is  appropriate to discharge on leadless cardiac monitoring.         Plan  - Neurochecks Q 4 hours  - Avoid hypotonic IV fluids  - Daily aspirin 81 mg  - Plavix 75mg daily x 90 days  - Statin: atorvastatin 40mg QHS  - Discharge on Ziopatch (ordered for you)  - General neurology referral placed (ordered for you)  - TTE resulted above  - Bedside Glucose Monitoring  -   - A1C 5.5  - PT/OT/SLP  - Stroke Education  - Depression Screen  - Euthermia, Euglycemia  - No need for EEG as pt is at her baseline.     Patient Follow-up    - final recommendation pending work-up    No further stroke evaluation is recommended, so we will sign off. Please contact us with any additional questions.    The patient was discussed with Stroke attending, Dr. Daphne Sky,   Neurology Resident, PGY-1  ASCOM *[id number] (stroke neurology)  ______________________________________________________    Clinically Significant Risk Factors Present on Admission         # Hypernatremia: Na = 145 mmol/L (Ref range: 133 - 144 mmol/L) on admission, will monitor as appropriate      # Platelet Defect: home medication list includes an antiplatelet medication          Medications   Scheduled Meds    aspirin  81 mg Oral Daily     atorvastatin  40 mg Oral Daily     clopidogrel  75 mg Oral Daily     cyanocobalamin  1,000 mcg Oral Daily     gabapentin  300 mg Oral At Bedtime     lactated ringers  1,000 mL Intravenous Once     levETIRAcetam  750 mg Oral BID     memantine  10 mg Oral Daily     pantoprazole  40 mg Oral QAM AC     phenytoin  100 mg Oral BID     polyethylene glycol  17 g Oral Daily     sodium chloride (PF)  3 mL Intracatheter Q8H       Infusion Meds    - MEDICATION INSTRUCTIONS -         PRN Meds  acetaminophen, lidocaine 4%, lidocaine (buffered or not buffered), melatonin, ondansetron **OR** ondansetron, - MEDICATION INSTRUCTIONS -, sodium chloride (PF), traZODone       PHYSICAL EXAMINATION  Temp:  [96.3  F (35.7  C)-101.2  F (38.4  C)] 98   F (36.7  C)  Pulse:  [] 103  Resp:  [5-32] 16  BP: ()/(27-96) 164/96  MAP:  [76 mmHg-102 mmHg] 76 mmHg  Arterial Line BP: (104-157)/(55-98) 107/58  SpO2:  [89 %-100 %] 96 %      Neurologic  Mental Status:  alert, oriented x 3, follows commands, speech clear and fluent, naming and repetition normal  Cranial Nerves:  visual fields intact, PERRL, EOMI with normal smooth pursuit, facial sensation intact and symmetric, facial movements symmetric, hearing not formally tested but intact to conversation, no dysarthria, shoulder shrug strong bilaterally, tongue protrusion midline  Motor:  able to move all limbs spontaneously, strength 5/5 throughout upper and lower extremities  Reflexes:  toes down-going  Sensory:  light touch sensation intact and symmetric throughout upper and lower extremities, no extinction on double simultaneous stimulation   Coordination:  normal finger-to-nose and heel-to-shin bilaterally without dysmetria  Station/Gait:  ambulated with PT with walker    Stroke Scales    NIHSS  1a. Level of Consciousness 0-->Alert, keenly responsive   1b. LOC Questions 0-->Answers both questions correctly   1c. LOC Commands 0-->Performs both tasks correctly   2.   Best Gaze 0-->Normal   3.   Visual 0-->No visual loss   4.   Facial Palsy 0-->Normal symmetrical movements   5a. Motor Arm, Left 0-->No drift, limb holds 90 (or 45) degrees for full 10 secs   5b. Motor Arm, Right 0-->No drift, limb holds 90 (or 45) degrees for full 10 secs   6a. Motor Leg, Left 0-->No drift, leg holds 30 degree position for full 5 secs   6b. Motor Leg, right 0-->No drift, leg holds 30 degree position for full 5 secs   7.   Limb Ataxia 0-->Absent   8.   Sensory 0-->Normal, no sensory loss   9.   Best Language 0-->No aphasia, normal   10. Dysarthria 0-->Normal   11. Extinction and Inattention  0-->No abnormality   Total 0 (03/20/22 0945)       Modified Cristiana Score (Pre-morbid)    -  2    Imaging  I personally reviewed all imaging;  relevant findings per HPI.     Lab Results Data   CBC  Recent Labs   Lab 03/20/22  0443 03/19/22  0550 03/19/22  0106   WBC 5.8 13.1* 10.0   RBC 3.54* 4.67 4.32   HGB 10.5* 14.4 13.2   HCT 33.5* 43.7 40.1    309 335     Basic Metabolic Panel    Recent Labs   Lab 03/20/22  0443 03/19/22  1844 03/19/22  1437 03/19/22  0731 03/19/22  0550 03/18/22  2231 03/18/22  1403   *  --   --   --  136  --  143   POTASSIUM 3.3*  --   --   --  3.9  --  4.0   CHLORIDE 115*  --   --   --  103  --  101   CO2 26  --   --   --  26  --  23   BUN 9  --   --   --  12  --  15   CR 0.53  --   --   --  0.54  --  0.58   * 147* 146*   < > 196*   < > 118*   PINA 7.3*  --   --   --  8.0*  --  7.9*    < > = values in this interval not displayed.     Liver Panel  No results for input(s): PROTTOTAL, ALBUMIN, BILITOTAL, ALKPHOS, AST, ALT, BILIDIRECT in the last 168 hours.  INR    Recent Labs   Lab Test 03/18/22  1440 02/04/22  1130 11/26/21  1547   INR 0.98 0.98 1.00      Lipid Profile    Recent Labs   Lab Test 03/19/22  0550 03/10/22  1030 12/01/21  0645 11/30/21 2005   CHOL 223*  --  146 163   HDL 84  --  33* 32*   * 80 93 96   TRIG 129  --  102 174*     A1C    Recent Labs   Lab Test 03/10/22  1030 11/27/21  0818 04/19/21  1451   A1C 5.5 5.4 6.1*     Troponin I  No results for input(s): TROPONIN, GHTROP in the last 168 hours.

## 2022-03-20 NOTE — DISCHARGE SUMMARY
[facility]  Hospitalist Discharge Summary      Date of Admission:  3/18/2022  Date of Discharge:  3/19/2022  Discharging Provider: Gricel Lantigua MD  Discharge Service: Hospitalist Service    Discharge Diagnoses   Principal Problem:    Acute CVA (cerebrovascular accident) - right splenium of corpus callosum  Active Problems:    SIRS (systemic inflammatory response syndrome) - fever, leukocytosis, sinus tachycardia, elevated lactic acid    Transient hypotension    Somnolence    Type 2 diabetes mellitus with circulatory disorder, without long-term current use of insulin (H)    Hyperlipidemia LDL goal <100    Hypertension goal BP (blood pressure) < 140/90    History of multiple cerebrovascular accidents (CVAs)    Tobacco abuse disorder    Dementia (H)    Left hemiparesis (H)    Seizure disorder (H)    History of paroxysmal supraventricular tachycardia    Follow-ups Needed After Discharge   Follow up to be determined following hospital course completion at [facility]    Unresulted Labs Ordered in the Past 30 Days of this Admission     Date and Time Order Name Status Description    3/19/2022  6:12 PM Procalcitonin In process     3/19/2022  9:43 AM Keppra (Levetiracetam) Level In process     3/19/2022  7:03 AM Blood Culture Hand, Left In process     3/19/2022  7:03 AM Blood Culture Arm, Left In process       These results will be followed up by accepting provider    Discharge Disposition   Transferred to [facility]  Condition at discharge: Stable    Hospital Course     Patient is a 71-year-old female with past medical history of multiple CVAs, seizure disorder, dementia, noninsulin-dependent type 2 diabetes, hyperlipidemia, hypertension who was hospitalized on 3/18/2022 with sudden onset left-sided facial weakness and slurred speech with MRI confirming acute versus subacute stroke in the right corpus callosum consistent with patient's clinical symptoms.  She was  been admitted for ongoing management of acute stroke.      Hospital course has been complicated overnight by an episode of unresponsiveness associated with profound hypotension into the 60s systolic with blood pressures improving following 1 L fluid resuscitation but patient remained very somnolent and cognitively different than prior to this episode.  CT scan of the head revealed only the known acute stroke, CT of the chest/abdomen/pelvis was overall unremarkable for acute findings.  Patient continued to be somnolent and severe hours later spiked a fever of 101.2 with associated elevated lactic acid and patient has been pancultured and initiated on broad-spectrum antibiotics with Zosyn and vancomycin for suspected sepsis of unclear etiology.  This afternoon patient had another episode of severe hypotension into the 60/40, however this one was persistent and required repeat fluid boluses to keep MAP above 65.  Central line placed in anticipation of Levophed initiation, however patient's blood pressures have now appeared to stabilize with MAPs in the 70-80s.  Stroke neurologist on call, Dr. Lara, has recommended transfer to higher level of care to the medical service for ongoing management of suspected sepsis but neurology consultation and consideration of EEG monitoring for evaluation of seizures given clinic course.  Patient has been accepted in transfer by Dr. Mckenzie, hospitalist on call from [facility], and will discharge via EMS there this evening when bed available.      Principal Problem:    Acute CVA (cerebrovascular accident) - right splenium of corpus callosum    Assessment: Patient continued to have mild left facial droop prior to acute worsening of blood pressures but concern for worsening left arm and leg strength following episodes of hypotension.  Patient is on aspirin and Plavix (doses given this morning) with P2 Y 12 testing currently in process.      Plan: Transfer to [facility] for ongoing management,  "neurology involvement following transfer    Active Problems:    SIRS (systemic inflammatory response syndrome) - fever, leukocytosis, sinus tachycardia, elevated lactic acid, persistent hypotension    Assessment: Concerning for rapidly developing sepsis but source unclear.  Blood pressures have now been stabilized following multiple fluid boluses and vasopressor support has not been needed.  Patient pancultured and started on vancomycin and Zosyn the morning of transfer    Plan: Continue with vancomycin and Zosyn as well as IV fluids at 125 an hour at time of transfer, ongoing management per excepting providers recommendations      Transient hypotension followed by recurrent more persistent hypotension    Assessment: As above, now appearing to have stabilized following over 3 L fluid resuscitation and ongoing IV fluids at 125 an hour    Plan: continue with IV fluids at 125 ml/hr at time of transfer      Somnolence    Assessment: Noted following episodes of hypotension.  Does seem to be improving slightly at time of transfer as blood pressures have stabilized    Plan: Reevaluation and ongoing management following transfer as per accepting provider      Seizure disorder (H)    Assessment: Patient has known seizure disorder and used to have classic tonic-clonic seizure activity but following his most recent stroke was noted to have seizure activity that is \"all in her brain and no outward signs\" according to the family.  Patient has been continued on her home Keppra and Depakote and has also received an additional Keppra 1000 mg IV dose on day of transfer.  Depakote and Keppra levels are currently pending    Plan: Ongoing management as per accepting provider and neurology recommendations      Type 2 diabetes mellitus with circulatory disorder, without long-term current use of insulin (H)    Assessment: Present at baseline and patient normally is on Metformin and glipizide, hemoglobin A1c 5.5 this admission and " medications have been held during the stay    Plan: Continue to hold oral regimen, transfer n.p.o. status      Hyperlipidemia LDL goal <100    Assessment: on atorvastatin    Plan: Anticipate continuation following transfer      Hypertension goal BP (blood pressure) < 140/90    Assessment: normally on lisinopril.  Held to allow permissive hypertension at time of admission, now complicated by persistent hypotension as above    Plan: continue to hold lisinopril at time of transfer      History of multiple cerebrovascular accidents (CVAs)    Assessment: patient has had at least 2 CVA in the past, most recent in November 2021    Plan: continue treatment of acute stroke as above      Tobacco abuse disorder    Assessment: ongoing    Plan: will need to encourage cessation going forward      Dementia (H)    Assessment: present at baseline, patient on Namenda.  Short term memory most impaired per family    Plan: continue Namenda and supportive cares.       Left hemiparesis (H)    Assessment: following previous stroke but had been improving per family.  Patient was able to walk with a walker recently and just needed help with changing directions, transitioning positions.    Plan: We will need reevaluation as patient is clinically appropriate  Transfer      History of paroxysmal supraventricular tachycardia    Assessment: previous history, no cardiac arrhythmias noted on telemetry so far    Plan: continue continuous cardiac monitoring on EMS transfer.      Consultations This Hospital Stay   SPEECH LANGUAGE PATH ADULT IP CONSULT  PHARMACY IP CONSULT  PHARMACY IP CONSULT  PHARMACY IP CONSULT  PHYSICAL THERAPY ADULT IP CONSULT  OCCUPATIONAL THERAPY ADULT IP CONSULT  CARE MANAGEMENT / SOCIAL WORK IP CONSULT  PHARMACY IP CONSULT  PHARMACY IP CONSULT  PHARMACY TO DOSE VANCO  VASCULAR ACCESS ADULT IP CONSULT  CARE MANAGEMENT / SOCIAL WORK IP CONSULT  OCCUPATIONAL THERAPY ADULT IP CONSULT  PHARMACY IP CONSULT  PHARMACY IP  CONSULT  PHARMACY IP CONSULT  PHARMACY TO DOSE VANCO  PHYSICAL THERAPY ADULT IP CONSULT  SPEECH LANGUAGE PATH ADULT IP CONSULT    Code Status   Full Code    Time Spent on this Encounter   I, Gricel Lantigua MD, personally saw the patient today and spent greater than 30 minutes discharging this patient.       Gricel Lantigua MD  [facility]  [address] [place] [address]  Phone: [phone]  ______________________________________________________________________    Physical Exam   Vital Signs: Temp: 98.1  F (36.7  C) Temp src: Oral BP: 120/69 Pulse: 90   Resp: 12 SpO2: 94 % O2 Device: Nasal cannula Oxygen Delivery: 1 LPM  Weight: 131 lbs 8 oz  Constitutional: Patient is still somnolent on entry into the exam but awakens to voice and is able to talk more clearly in several word phrases to short sentences  Respiratory: No increased work of breathing, decreased breath sounds but clear bilaterally  Cardiovascular: Regular rate and rhythm  GI: Bowel sounds present, abdomen soft and nondistended, nontender  Musculoskeletal: Patient is able to have some spontaneous movement of the left arm and left leg but is still weaker than the right  Neurologic: Awakens to voice, oriented to person, the fact she is in the hospital and that she had a stroke but not time or specific location.  Weakness continues in the left face, left arm, and left leg but improved from earlier today with patient having easier movement of her arm and leg against gravity but still significant weakness in her toes and fingers, decreased in left facial droop and speech is more clear       Primary Care Physician   Abdirahman Shrestha    Discharge Orders   No discharge procedures on file.    Significant Results and Procedures   Results for orders placed or performed during the hospital encounter of 03/18/22   CT Head w/o Contrast    Narrative    CT HEAD WITHOUT CONTRAST 3/18/2022 12:48 PM    INDICATION:  Code Stroke to evaluate for potential thrombolysis and  thrombectomy.    TECHNIQUE: CT scan of the head without contrast. Dose reduction  techniques were used.  CONTRAST: None.  COMPARISON: 11/30/2021 brain MRI.    FINDINGS: No intracranial hemorrhage, extraaxial collection, mass  effect or CT evidence of acute infarct.  Unchanged encephalomalacia in  the anterior superior right frontal lobe with a background severe  burden chronic small vessel ischemic change. Expected prominence of  the right frontal horn, with an overall unchanged ventricular size and  configuration.  Background mild to moderate diffuse parenchymal volume  loss. Calcification of the distal internal carotid arteries  bilaterally. Osseous structures are intact. Unremarkable orbits.  Paranasal sinuses are free of significant disease. Clear mastoid air  cells.      Impression    IMPRESSION:  1. No acute intracranial abnormality.  2. Unchanged chronic encephalomalacia in the right frontal lobe with a  severe burden chronic small vessel ischemic change and a mild to  moderate diffuse parenchymal volume loss.     JUS KEYS MD         SYSTEM ID:  [id number]   CTA Head Neck with Contrast    Narrative    CTA  HEAD AND NECK WITH CONTRAST 3/18/2022 1:25 PM    INDICATION: Code stroke to evaluate for potential thrombolysis and  thrombectomy.      TECHNIQUE: Head and neck CT angiogram with IV contrast. CT images of  the head and neck vessels obtained during the arterial phase of  intravenous contrast administration. Axial helical 2D reconstructed  images and multiplanar 3D MIP reconstructed images of the head and  neck vessels were performed on a separate workstation. Dose reduction  techniques were used.    CONTRAST: 70 Isovue-370    COMPARISON: 11/30/2021 brain MRI.     FINDINGS:  HEAD CTA: Bilateral distal internal carotid arteries are patent.  Bilateral MCA are patent. Diffusely small caliber of the right EDWARD  presumably relates to a chronic  encephalomalacia in the right frontal  lobe seen immediately adjacent. Left EDWARD is patent. Distal vertebral  arteries and basilar artery are patent. Fetal origin right PCA. Mild  narrowing at the left P1-P2 junction. No aneurysm. No high flow  vascular malformation.    NECK CTA: Three-vessel aortic arch. Bilateral common, internal, and  proximal external carotid arteries are patent. Vertebral arteries are  balanced, without significant stenosis. Mild emphysematous change at  the bilateral lung apices. Osteopenia with multilevel degenerative  change in the cervical spine. Remainder negative.      Impression    IMPRESSION:  HEAD CTA:  1.  No high-grade intracranial stenosis. Mild stenosis at the left  P1-P2 junction.  2.  Diffusely diminished caliber of the distal right EDWARD presumably  relates to the adjacent encephalomalacia in the right frontal lobe.  3.  No aneurysm and no high flow vascular malformation.    NECK CTA:  1.  No high-grade stenosis of the neck vessels by NASCET criteria.    Findings were discussed with Dr. DENISSE MCKEON via telephone  at 1430 hours on 3/18/2022.    JUS KEYS MD         SYSTEM ID:  [id number]   MR Brain w/o & w Contrast    Narrative    MRI BRAIN WITHOUT AND WITH CONTRAST  3/18/2022 8:17 PM     HISTORY:  Slurred speech, left facial droop. Evaluate for stroke.     TECHNIQUE:  Multiplanar, multisequence MRI of the brain without and  with 7.5mL Gadavist     COMPARISON: CT head and CT head and neck of same day. MRI head  11/30/2021 and MRI head 8/23/2017.     FINDINGS: New small area of restricted diffusion with corresponding T2  FLAIR hyperintense signal involving the right aspect of the splenium  of the corpus callosum (series 6 image 65) concerning for recent  infarct. No associated acute hemorrhage or significant mass effect. No  other areas of abnormal intracranial restricted diffusion are  identified.    The ventricles are stable in size and configuration. There  is  unchanged mild ex vacuo dilatation of the right lateral ventricle.  Multiple small foci of T2 FLAIR hyperintense signal with corresponding  patchy and punctate parenchymal enhancement involving the high  posterior paramedian left frontal lobe (series 13 images 25-27)  probably related to the subacute/subacute to chronic infarcts in that  location documented on brain MRI of 11/30/2021. Unchanged moderate to  large area of gliosis and encephalomalacia involving the right frontal  lobe. Unchanged small chronic right basal ganglia infarcts. Unchanged  moderate to severely extensive T2 FLAIR hyperintense signal in the  cerebral white matter and basal ganglia regions and also involving the  ronaldo and cerebellar white matter to a lesser degree, nonspecific. This  presumably represents sequela of advanced chronic small vessel  ischemic disease. There is no acute intracranial hemorrhage. There is  no mass effect/herniation. No extra-axial fluid collection. Mild to  moderate generalized brain parenchymal volume loss.    Orbits appear within normal limits. The calvarium, skull base, and  midface otherwise appear unremarkable.      Impression    IMPRESSION:  1. New small acute to subacute infarct involving the right aspect of  the splenium of the corpus callosum.  2. Evolving subacute/subacute to chronic infarcts involving the high  posterior left frontal lobe, as described.  3. Unchanged large area of chronic encephalomalacia in the right  frontal lobe, as well as other unchanged chronic findings, as  described in the body of the report.    RENNY PETTY MD         SYSTEM ID:  [id number]   CT Chest/Abdomen/Pelvis w Contrast    Narrative    EXAM: CT CHEST/ABDOMEN/PELVIS W CONTRAST  LOCATION: [facility]  DATE/TIME: 3/19/2022 2:01 AM    INDICATION: Decreased mental status. Pain. History of CVA, concern for malignancy.  COMPARISON: CT abdomen from 06/01/2021.  TECHNIQUE: CT scan of the chest,  abdomen, and pelvis was performed following injection of IV contrast. Multiplanar reformats were obtained. Dose reduction techniques were used.   CONTRAST: 65mL, Isovue 370    FINDINGS:   LUNGS AND PLEURA: Mild centrilobular emphysema. No airspace consolidation. Subsegmental dependent basilar atelectasis. No suspicious nodules or masses. No pleural effusion.    MEDIASTINUM/AXILLAE: Heart size is normal. No pericardial effusion. Multivessel coronary artery disease. A single mildly enlarged lower subcarinal lymph node measures 13 mm short axis on image 68 of series 3.    CORONARY ARTERY CALCIFICATION: Severe.    HEPATOBILIARY: Layering density in the gallbladder suggestive of sludge or vicarious excretion of contrast. No liver lesions.    PANCREAS: Normal.    SPLEEN: Normal.    ADRENAL GLANDS: Mild nodular thickening of the left adrenal gland.    KIDNEYS/BLADDER: Ventrally rotated left kidney. No solid renal mass. No hydronephrosis. Bladder contains excreted contrast. No bladder mass.    BOWEL: No bowel obstruction or free air. Distal colonic diverticulosis. No focal bowel wall thickening.    LYMPH NODES: Normal.    VASCULATURE: Bilobed aneurysmal dilatation of the infrarenal abdominal aorta measures up to 4.1 x 3.9 cm proximally and measures up to 3 cm distally. There is significant eccentric mural thrombus formation.    PELVIC ORGANS: Normal.    MUSCULOSKELETAL: Osteopenia. No suspicious osseous lesions. Partially imaged left hip arthroplasty. Chronic appearing deformity of the left proximal humerus.      Impression    IMPRESSION:  1.  A single mildly enlarged mediastinal lymph node of indeterminate significance. No other adenopathy in the chest, abdomen, or pelvis.    2.  Centrilobular emphysema.    3.  Severe coronary artery disease.    4.  4.1 cm abdominal aortic aneurysm, previously measuring up to 3.8 cm.    5.  Colonic diverticulosis.   CT Head w/o contrast*    Narrative    EXAM: CT HEAD W/O  CONTRAST  LOCATION: [facility]  DATE/TIME: 3/19/2022 1:35 AM    INDICATION: Recent strokes demonstrated by MRI, now patient unresponsive and lethargic.    COMPARISON: MRI same day, CT same day.    TECHNIQUE: Routine CT head without IV contrast. Multiplanar reformats. Dose reduction techniques were used.    FINDINGS:  INTRACRANIAL CONTENTS: Small focus of low-attenuation change within the right aspect of the splenium of the corpus callosum corresponds to the area of recently demonstrated recent infarction by MRI. No evidence of hemorrhagic transformation. No acute   intracranial hemorrhage elsewhere. Moderate to large area of encephalomalacia in the parasagittal anterior right frontal lobe with associated compensatory dilatation of the left lateral ventricle. Underlying moderate presumed chronic small vessel   ischemic changes and mild to moderate generalized volume loss. No CT evidence of additional recent infarct. No intracranial mass effect. No hydrocephalus.    VISUALIZED ORBITS/SINUSES/MASTOIDS: No intraorbital abnormality. No paranasal sinus mucosal disease. No middle ear or mastoid effusion.    BONES/SOFT TISSUES: No acute abnormality.      Impression    IMPRESSION:  1.  Tiny focus of low-attenuation change within the right aspect of the splenium of the corpus callosum corresponds to recently demonstrated infarct. No mass effect or evidence of hemorrhagic transformation.  2.  No acute intracranial hemorrhage, additional recent infarct or hydrocephalus.  3.  Chronic changes as above.     XR Chest Port 1 View    Narrative    EXAM: XR CHEST PORT 1 VIEW  LOCATION: [facility]  DATE/TIME: 3/19/2022 8:11 AM    INDICATION: recent CVA, sudden change in mental status with worsening lactic acid and hypotension; passed bedside swallow but assess for aspiration pneumonia vs other etiology  COMPARISON: 11/28/2021      Impression    IMPRESSION: No focal  consolidation or pleural effusion. Stable diffuse interstitial thickening. Normal heart size. Wireless pacemaker.   XR Chest Port 1 View    Narrative    EXAM: XR CHEST PORT 1 VIEW  LOCATION: [facility]  DATE/TIME: 3/19/2022 5:24 PM    INDICATION: Central line placements.  COMPARISON: Chest x-ray 2022.      Impression    IMPRESSION: Right neck central venous line tip at the mid SVC. Stable cardiac silhouette. Stable bilateral interstitial prominence.   Echo Limited     Value    LVEF  55-60%    Narrative    [id number]  [id number]  [id number]  [id number]^LINDA^DENISSE^WALT     [facility]  Echocardiography Laboratory  [address] [place] [address]     Name: SHAI CAMPBELL  MRN: [id number]  : 1950  Study Date: 2022 01:06 PM  Age: 71 yrs  Gender: Female  Patient Location: [facility]  Reason For Study: TIA  History: hyperlipidemia, htn, cva, syncope  Ordering Physician: DENISSE MCKEON  Referring Physician: Abdirahman Cervantes  Performed By: Laura Santos     BSA: 1.6 m2  Height: 65 in  Weight: 129 lb  HR: 99  BP: 160/127 mmHg  ______________________________________________________________________________  Procedure  Limited Portable Echo Adult. Technically difficult study. Patient unable to  stay still or lay on side, confused from stroke, so limited images.  ______________________________________________________________________________  Interpretation Summary     Technically difficult imaging ( patient unable to cooperate well with exam)  A cardiac source of embolus was not identified.  Sinus rhythm was noted.  The left ventricle is normal in structure, function and size.  The visual ejection fraction is 55-60%.  Grossly Doppler interrogation does not demonstrate signficant stenosis or  insufficinecy involving cardiac valves.     No chagne since 2021  ______________________________________________________________________________  Left  Ventricle  The left ventricle is normal in structure, function and size. The visual  ejection fraction is 55-60%. No regional wall motion abnormalities noted.     Right Ventricle  The right ventricle is normal in structure, function and size. There is no  mass or thrombus in the right ventricle.     Atria  Normal left atrial size. Right atrial size is normal. Intact atrial septum.  Spontaneous contrast in left atrial appendage.     Mitral Valve  The mitral valve leaflets appear normal. There is no evidence of stenosis,  fluttering, or prolapse. There is no mitral regurgitation noted. There is no  mitral valve stenosis.     Tricuspid Valve  Normal tricuspid valve. No tricuspid regurgitation. There is no tricuspid  stenosis.     Aortic Valve  The aortic valve is trileaflet. No aortic regurgitation is present. No aortic  stenosis is present.     Pulmonic Valve  Normal pulmonic valve. There is no pulmonic valvular regurgitation. There is  no pulmonic valvular stenosis.     Vessels  The aortic root is normal size. Normal size ascending aorta. The inferior vena  cava is normal.     Pericardium  The pericardium appears normal. There is no pleural effusion.     Rhythm  Sinus rhythm was noted.  ______________________________________________________________________________  MMode/2D Measurements & Calculations  IVSd: 0.80 cm     LVIDd: 3.9 cm  LVIDs: 3.2 cm  LVPWd: 0.80 cm  FS: 17.9 %  LV mass(C)d: 89.7 grams  LV mass(C)dI: 54.6 grams/m2  Ao root diam: 3.3 cm  LA dimension: 2.2 cm  LA/Ao: 0.67  RWT: 0.41     Doppler Measurements & Calculations  Ao V2 max: 97.1 cm/sec  Ao max P.0 mmHg  PA V2 max: 89.6 cm/sec  PA max PG: 3.2 mmHg     ______________________________________________________________________________  Report approved by: Dr. Nicho Feliciano 2022 03:10 PM               Discharge Medications   Current Discharge Medication List      CONTINUE these medications which have NOT CHANGED    Details   acetaminophen  (TYLENOL) 500 MG tablet Take 1,000 mg by mouth every 6 hours as needed for mild pain      alendronate (FOSAMAX) 70 MG tablet Take 1 tablet (70 mg) by mouth every 7 days Tuesdays  Qty: 13 tablet, Refills: 3    Associated Diagnoses: Age-related osteoporosis without current pathological fracture      aspirin (ASA) 81 MG chewable tablet Take 1 tablet (81 mg) by mouth daily Take 1 tablet daily through Feb 28.  Qty: 100 tablet, Refills: 3    Associated Diagnoses: Cerebrovascular accident (CVA) due to embolism of left anterior cerebral artery (H)      atorvastatin (LIPITOR) 40 MG tablet Take 1 tablet by mouth once daily  Qty: 30 tablet, Refills: 0    Associated Diagnoses: Cerebrovascular accident (CVA) due to embolism of left anterior cerebral artery (H)      clopidogrel (PLAVIX) 75 MG tablet Take 1 tablet by mouth once daily  Qty: 30 tablet, Refills: 0    Associated Diagnoses: Cerebrovascular accident (CVA) due to embolism of left anterior cerebral artery (H)      Cyanocobalamin (VITAMIN B 12 PO) Take 1,000 mcg by mouth daily      gabapentin (NEURONTIN) 300 MG capsule TAKE 1 CAPSULE BY MOUTH AT  BEDTIME  Qty: 90 capsule, Refills: 3    Comments: Requesting 1 year supply  Associated Diagnoses: Dementia without behavioral disturbance, unspecified dementia type (H)      glipiZIDE (GLUCOTROL XL) 2.5 MG 24 hr tablet TAKE 1 TABLET BY MOUTH  DAILY  Qty: 90 tablet, Refills: 0    Comments: Requesting 1 year supply  Associated Diagnoses: Type 2 diabetes mellitus with diabetic peripheral angiopathy without gangrene, without long-term current use of insulin (H)      levETIRAcetam (KEPPRA) 750 MG tablet Take 2 tablets by mouth twice daily  Qty: 120 tablet, Refills: 0    Associated Diagnoses: Seizure (H)      lisinopril (ZESTRIL) 20 MG tablet Take 1 tablet by mouth once daily  Qty: 30 tablet, Refills: 0    Associated Diagnoses: Cerebrovascular accident (CVA) due to embolism of left anterior cerebral artery (H)      meloxicam (MOBIC) 7.5  MG tablet Take 1 tablet (7.5 mg) by mouth daily  Qty: 90 tablet, Refills: 3    Comments: Requesting 1 year supply  Associated Diagnoses: Injury of left clavicle, initial encounter; Closed displaced fracture of left clavicle, unspecified part of clavicle, initial encounter      memantine (NAMENDA) 10 MG tablet Take 1 tablet (10 mg) by mouth daily  Qty: 90 tablet, Refills: 3    Comments: Requesting 1 year supply  Associated Diagnoses: Dementia without behavioral disturbance, unspecified dementia type (H)      metFORMIN (GLUCOPHAGE-XR) 500 MG 24 hr tablet Take 1 tablet (500 mg) by mouth 2 times daily (with meals)  Qty: 180 tablet, Refills: 1    Associated Diagnoses: Type 2 diabetes mellitus with diabetic peripheral angiopathy without gangrene, without long-term current use of insulin (H)      omeprazole (PRILOSEC) 40 MG DR capsule Take 1 capsule (40 mg) by mouth At Bedtime  Qty: 90 capsule, Refills: 1    Comments: Requesting 1 year supply  Associated Diagnoses: Abdominal pain, generalized; Chronic upset stomach      phenytoin (DILANTIN) 50 MG chewable tablet CHEW AND SWALLOW 2 TABLETS BY MOUTH TWICE DAILY  Qty: 60 tablet, Refills: 0    Associated Diagnoses: Seizure (H)      polyethylene glycol (MIRALAX) 17 GM/Dose powder Take 17 g (1 capful) by mouth 2 times daily  Qty: 1000 g, Refills: 1    Associated Diagnoses: Abdominal pain, generalized; Chronic upset stomach      alcohol swab prep pads Use to swab area of injection/carlos manuel as directed.  Qty: 100 each, Refills: 3    Associated Diagnoses: Type 2 diabetes mellitus with diabetic peripheral angiopathy without gangrene, without long-term current use of insulin (H)      blood glucose (NO BRAND SPECIFIED) test strip Use to test blood sugar once daily. To accompany: Blood Glucose Monitor Brands: per insurance.  Qty: 100 strip, Refills: 6    Associated Diagnoses: Type 2 diabetes mellitus with diabetic peripheral angiopathy without gangrene, without long-term current use of  insulin (H)      blood glucose monitoring (NO BRAND SPECIFIED) meter device kit Use to test blood sugar once daily. Preferred blood glucose meter OR supplies to accompany: Blood Glucose Monitor Brands: per insurance.  Qty: 1 kit, Refills: 0    Associated Diagnoses: Type 2 diabetes mellitus with diabetic peripheral angiopathy without gangrene, without long-term current use of insulin (H)      order for DME Equipment being ordered: glucometer and related supplies.  Qty: 1 Units, Refills: 0    Associated Diagnoses: Type 2 diabetes mellitus with diabetic peripheral angiopathy without gangrene, without long-term current use of insulin (H)      thin (NO BRAND SPECIFIED) lancets Use with lanceting device. To accompany: Blood Glucose Monitor Brands: per insurance.  Qty: 100 each, Refills: 6    Associated Diagnoses: Type 2 diabetes mellitus with diabetic peripheral angiopathy without gangrene, without long-term current use of insulin (H)           Allergies   Allergies   Allergen Reactions     Amoxicillin Hives and Rash     Pt reports she has taken PCN without problems     Ampicillin Rash

## 2022-03-21 ENCOUNTER — APPOINTMENT (OUTPATIENT)
Dept: PHYSICAL THERAPY | Facility: CLINIC | Age: 72
DRG: 064 | End: 2022-03-21
Attending: PEDIATRICS
Payer: COMMERCIAL

## 2022-03-21 LAB
ALBUMIN SERPL-MCNC: 3.2 G/DL (ref 3.4–5)
ALP SERPL-CCNC: 80 U/L (ref 40–150)
ALT SERPL W P-5'-P-CCNC: 13 U/L (ref 0–50)
ANION GAP SERPL CALCULATED.3IONS-SCNC: 7 MMOL/L (ref 3–14)
AST SERPL W P-5'-P-CCNC: 12 U/L (ref 0–45)
BILIRUB SERPL-MCNC: 0.4 MG/DL (ref 0.2–1.3)
BUN SERPL-MCNC: 11 MG/DL (ref 7–30)
CALCIUM SERPL-MCNC: 8.4 MG/DL (ref 8.5–10.1)
CHLORIDE BLD-SCNC: 111 MMOL/L (ref 94–109)
CO2 SERPL-SCNC: 24 MMOL/L (ref 20–32)
CREAT SERPL-MCNC: 0.53 MG/DL (ref 0.52–1.04)
ERYTHROCYTE [DISTWIDTH] IN BLOOD BY AUTOMATED COUNT: 13.4 % (ref 10–15)
GFR SERPL CREATININE-BSD FRML MDRD: >90 ML/MIN/1.73M2
GLUCOSE BLD-MCNC: 106 MG/DL (ref 70–99)
GLUCOSE BLDC GLUCOMTR-MCNC: 134 MG/DL (ref 70–99)
GLUCOSE BLDC GLUCOMTR-MCNC: 154 MG/DL (ref 70–99)
GLUCOSE BLDC GLUCOMTR-MCNC: 186 MG/DL (ref 70–99)
HCT VFR BLD AUTO: 40.6 % (ref 35–47)
HGB BLD-MCNC: 12.6 G/DL (ref 11.7–15.7)
LEVETIRACETAM SERPL-MCNC: 27 UG/ML
MCH RBC QN AUTO: 29.9 PG (ref 26.5–33)
MCHC RBC AUTO-ENTMCNC: 31 G/DL (ref 31.5–36.5)
MCV RBC AUTO: 96 FL (ref 78–100)
PLATELET # BLD AUTO: 243 10E3/UL (ref 150–450)
POTASSIUM BLD-SCNC: 3.2 MMOL/L (ref 3.4–5.3)
POTASSIUM BLD-SCNC: 3.7 MMOL/L (ref 3.4–5.3)
PROT SERPL-MCNC: 6.8 G/DL (ref 6.8–8.8)
RBC # BLD AUTO: 4.21 10E6/UL (ref 3.8–5.2)
SODIUM SERPL-SCNC: 142 MMOL/L (ref 133–144)
WBC # BLD AUTO: 5 10E3/UL (ref 4–11)

## 2022-03-21 PROCEDURE — 36415 COLL VENOUS BLD VENIPUNCTURE: CPT

## 2022-03-21 PROCEDURE — 250N000013 HC RX MED GY IP 250 OP 250 PS 637: Performed by: HOSPITALIST

## 2022-03-21 PROCEDURE — 250N000013 HC RX MED GY IP 250 OP 250 PS 637

## 2022-03-21 PROCEDURE — 36415 COLL VENOUS BLD VENIPUNCTURE: CPT | Performed by: STUDENT IN AN ORGANIZED HEALTH CARE EDUCATION/TRAINING PROGRAM

## 2022-03-21 PROCEDURE — 120N000003 HC R&B IMCU UMMC

## 2022-03-21 PROCEDURE — 80053 COMPREHEN METABOLIC PANEL: CPT

## 2022-03-21 PROCEDURE — 99232 SBSQ HOSP IP/OBS MODERATE 35: CPT | Mod: GC | Performed by: INTERNAL MEDICINE

## 2022-03-21 PROCEDURE — 97530 THERAPEUTIC ACTIVITIES: CPT | Mod: GP

## 2022-03-21 PROCEDURE — 84132 ASSAY OF SERUM POTASSIUM: CPT | Performed by: STUDENT IN AN ORGANIZED HEALTH CARE EDUCATION/TRAINING PROGRAM

## 2022-03-21 PROCEDURE — 85027 COMPLETE CBC AUTOMATED: CPT

## 2022-03-21 RX ORDER — LISINOPRIL 10 MG/1
20 TABLET ORAL DAILY
Status: DISCONTINUED | OUTPATIENT
Start: 2022-03-21 | End: 2022-04-08 | Stop reason: HOSPADM

## 2022-03-21 RX ORDER — POTASSIUM CHLORIDE 750 MG/1
40 TABLET, EXTENDED RELEASE ORAL ONCE
Status: COMPLETED | OUTPATIENT
Start: 2022-03-21 | End: 2022-03-21

## 2022-03-21 RX ADMIN — ATORVASTATIN CALCIUM 40 MG: 40 TABLET, FILM COATED ORAL at 08:50

## 2022-03-21 RX ADMIN — CLOPIDOGREL BISULFATE 75 MG: 75 TABLET ORAL at 08:50

## 2022-03-21 RX ADMIN — PANTOPRAZOLE SODIUM 40 MG: 40 TABLET, DELAYED RELEASE ORAL at 08:51

## 2022-03-21 RX ADMIN — PHENYTOIN 100 MG: 50 TABLET, CHEWABLE ORAL at 08:51

## 2022-03-21 RX ADMIN — CYANOCOBALAMIN TAB 500 MCG 1000 MCG: 500 TAB at 08:51

## 2022-03-21 RX ADMIN — ACETAMINOPHEN 975 MG: 325 TABLET ORAL at 08:49

## 2022-03-21 RX ADMIN — LEVETIRACETAM 1000 MG: 500 TABLET, FILM COATED ORAL at 19:39

## 2022-03-21 RX ADMIN — GABAPENTIN 300 MG: 300 CAPSULE ORAL at 19:40

## 2022-03-21 RX ADMIN — PHENYTOIN 100 MG: 50 TABLET, CHEWABLE ORAL at 19:39

## 2022-03-21 RX ADMIN — ASPIRIN 81 MG CHEWABLE TABLET 81 MG: 81 TABLET CHEWABLE at 08:50

## 2022-03-21 RX ADMIN — MEMANTINE 10 MG: 10 TABLET ORAL at 08:50

## 2022-03-21 RX ADMIN — LISINOPRIL 20 MG: 20 TABLET ORAL at 08:50

## 2022-03-21 RX ADMIN — LEVETIRACETAM 1000 MG: 500 TABLET, FILM COATED ORAL at 08:51

## 2022-03-21 RX ADMIN — POTASSIUM CHLORIDE 40 MEQ: 750 TABLET, EXTENDED RELEASE ORAL at 08:54

## 2022-03-21 ASSESSMENT — ACTIVITIES OF DAILY LIVING (ADL)
ADLS_ACUITY_SCORE: 17
ADLS_ACUITY_SCORE: 16
ADLS_ACUITY_SCORE: 17
ADLS_ACUITY_SCORE: 16
ADLS_ACUITY_SCORE: 17
ADLS_ACUITY_SCORE: 17
ADLS_ACUITY_SCORE: 16
ADLS_ACUITY_SCORE: 21
ADLS_ACUITY_SCORE: 16
ADLS_ACUITY_SCORE: 17
ADLS_ACUITY_SCORE: 17
ADLS_ACUITY_SCORE: 16
ADLS_ACUITY_SCORE: 17
ADLS_ACUITY_SCORE: 17
ADLS_ACUITY_SCORE: 16
ADLS_ACUITY_SCORE: 16

## 2022-03-21 ASSESSMENT — VISUAL ACUITY: OU: NORMAL ACUITY

## 2022-03-21 NOTE — PLAN OF CARE
Goal Outcome Evaluation:    Plan of Care Reviewed With: spouse, patient     Overall Patient Progress: improving    Outcome Evaluation: ARU Recs- Referral to [facility]

## 2022-03-21 NOTE — PLAN OF CARE
Goal Outcome Evaluation:    Plan of Care Reviewed With: patient, daughter     Confusion: Neuro: A&Ox2-3, intermittently confused off to situation, plan, place. Argumentative, threatening to leave, threw phone when upset at daughter. Tried wheeling self to door, once in wheelchair after walking in hallway.     Cardiac: SR-ST. VSS. Afebrile   Respiratory: Sating mid 90's on RA.  GI/: Adequate urine output. No bm  Diet/appetite: Tolerating regular diet. Eating well.  Activity:  Assist of 1-2 unsteady on feet, up in [place]inas with PT and walker  Pain: Denies  Skin: Bruising noted on arms and face, history of drooping of left face none noted, bruised under left eye.   LDA's:PIV in lower left arm    Plan: MD here to discuss with patient, pt confused, upset and threatening to leave.Sign made to remind patient where she is and this is helping but very upset that she has to stay in the hospital,  continue with POC. Notify primary team with changes.

## 2022-03-21 NOTE — PROGRESS NOTES
"CLINICAL NUTRITION SERVICES - ASSESSMENT NOTE     Nutrition Prescription    RECOMMENDATIONS FOR MDs/PROVIDERS TO ORDER:  None at this time     Malnutrition Status:    Patient does not meet two of the established criteria necessary for diagnosing malnutrition    Recommendations already ordered by Registered Dietitian (RD):  Send Glucerna ONS, PRN only if desired or requested by RN [220 Kcals, 10 gm protein, 26 gm CHO per carton]    Future/Additional Recommendations:  Monitor intake trends for consistency, currently good intake recorded  Monitor wt trends for accuracy, variable  Monitor need for scheduled ONS if PO intake low or missing meals   Monitor labs, glycemic control     REASON FOR ASSESSMENT  Khalida Redding is a/an 71 year old female assessed by the dietitian for Nutrition Risk Monitoring (MST 2 for weight loss and decreased appetite)    CLINICAL HISTORY  Hx T2DM, HTN, HLD, dementia; epilepsy, and recent CVA (11/2021), admitted to [facility] for acute CVA.      NUTRITION HISTORY  Patient reports having a good appetite at baseline. Denies having any nutrition-related concerns. Reports a UBW of ~126-129 lbs PTA. Unsure if reported hx is reliable given hx dementia but will monitor intake trends and weight trends.     CURRENT NUTRITION ORDERS  Diet: Regular, room-service with assistance   Intake/Tolerance: 100% intake so far per food record flowsheet. Frequently eating peanut butter and jelly sandwiches. RN endorses good intake if food is sent for her. Was placed on room service assistance to ensure stable meal orders.    LABS    Reviewed, monitor lytes, renal labs, glucose trends    MEDICATIONS    B12 1000 mcg/day    Keppra    Phenytoin     Miralax    ANTHROPOMETRICS  Height: 167.6 cm (5' 6\")  Admit wt 133 kg (60.5 lbs) 3/19 --Monitor trends for accuracy   Most Recent Weight: 64 kg (141 lb 1.5 oz)  IBW: 59.1 kg  108%IBW   BMI: Normal BMI    Weight History:   Weight trends vary, 130-140 lbs per below? Wt up 8 lbs " from admit, monitor trends and fluid status for accuracy.  Wt Readings from Last 15 Encounters:   03/21/22 64 kg (141 lb 1.5 oz)   03/18/22 59.6 kg (131 lb 8 oz)   03/10/22 58.6 kg (129 lb 3.2 oz)   01/19/22 62.6 kg (138 lb)   11/26/21 59 kg (130 lb)   08/25/21 58.5 kg (129 lb)   06/14/21 61.9 kg (136 lb 6.4 oz)   06/01/21 65.8 kg (145 lb)   05/07/21 65 kg (143 lb 6.4 oz)   04/19/21 64.9 kg (143 lb)   07/13/20 65.3 kg (144 lb)   06/16/20 65.3 kg (144 lb)   01/13/20 63.6 kg (140 lb 3.2 oz)   11/18/19 63.5 kg (140 lb 1.6 oz)   05/28/19 62.2 kg (137 lb 1.6 oz)       Dosing Weight: 60.5 kg [admit wt, lowest wt this admit]     ASSESSED NUTRITION NEEDS  Estimated Energy Needs: 7550-5191 kcals/day (25 - 30 kcals/kg)  Justification: Maintenance  Estimated Protein Needs: 73-90 grams protein/day (1.2 - 1.5 grams of pro/kg)  Justification: Preservation of lean body mass, increased risk for sarcopenia in advanced age  Estimated Fluid Needs: 6950-5683 mL/day (1 mL/kcal)   Justification: Maintenance    PHYSICAL FINDINGS  See malnutrition section below.    MALNUTRITION  % Intake: No decreased intake noted  % Weight Loss: None noted - Monitor trends for accuracy   Subcutaneous Fat Loss: None observed  Muscle Loss: None observed with consideration of age  Fluid Accumulation/Edema: None noted  Malnutrition Diagnosis: Patient does not meet two of the established criteria necessary for diagnosing malnutrition    NUTRITION DIAGNOSIS  Predicted inadequate nutrient intake (energy/protein) related to AMS with hx dementia and new CVA, potential for decline to PO intake during hospital stay as evidenced by AMS, room-service assistance started to ensure frequent meal consistency, MST score of 2.       INTERVENTIONS  Implementation  Nutrition Education: Not appropriate at this time due to patient condition   Collaboration with other nutrition professionals - Agree with RS assistance  Collaboration with other providers - Bedside RN  Medical  food supplement therapy - Will place PRN supplement order    Goals  Patient to consume % of nutritionally adequate meal trays TID, or the equivalent with supplements/snacks.     Monitoring/Evaluation  Progress toward goals will be monitored and evaluated per protocol.  Chidi Herrmann RDN, LD, CNSC  6B RD pager: [phone] [Preferred]  6B work-room RD phone: *[phone]

## 2022-03-21 NOTE — PROGRESS NOTES
Care Management Follow Up    Length of Stay (days): 2    Expected Discharge Date: 03/21/2022     Concerns to be Addressed:       Patient plan of care discussed at interdisciplinary rounds: Yes    Anticipated Discharge Disposition:       Anticipated Discharge Services:    Anticipated Discharge DME:      Patient/family educated on Medicare website which has current facility and service quality ratings:    Education Provided on the Discharge Plan:    Patient/Family in Agreement with the Plan:      Referrals Placed by CM/SW:    Private pay costs discussed: Not applicable    Additional Information:  SW went and talked with Pt about going to an ARU. She was adamant about not going. SW then called  about her wanting to come home but hospital recommending an ARU.  stated that he would like her to come home d/t a former incident with a TCU stay.  also requested an update from the MD about a couple medical conditions that he and family had questions about.     SW paged MD team about family wanting to take her home and a f/u call request. Med Team called back and stated that d/t Pt's fall risk and inability to walk independently, they do not want her to go home. RODNEY asked that MD Team f/u with  and daughter to inform them.     MD called SW after conversation with family. SW made referral over to [facility], who will review and get back to SW tomorrow. RODNEY will continue to work on discharge planning with the family and Pt.     CARTER Taylor, W  Acute Care Float   PH#: [phone]  Pager#: [phone]  [facility]

## 2022-03-21 NOTE — PLAN OF CARE
Neuro: A&Ox4. Neuro checks WNL. Follows commands and answers questions coherently. Sometimes requires repetition with information.    Cardiac: SR, 80s. BP within parameters. Other VSS.   Respiratory: Sating >97% on RA.  GI/: Adequate urine output. BM X1.  Diet/appetite: Tolerating regular diet. Required encouragement to order dinner.  Activity:  Assist of 1-2, up to chair and bedside commode.  Pain: Headaches managed with Tylenol.    Skin: No new deficits noted.  LDA's: L PIV    Plan: Continue with POC. Notify primary team with changes.

## 2022-03-21 NOTE — CONSULTS
Care Management Initial Consult    General Information  Assessment completed with: Patient, Spouse or significant other, Other,  (, Pt and chart review. )  Type of CM/SW Visit: Initial Assessment    Primary Care Provider verified and updated as needed: Yes   Readmission within the last 30 days: no previous admission in last 30 days         Advance Care Planning:            Communication Assessment  Patient's communication style: spoken language (English or Bilingual)    Hearing Difficulty or Deaf: no   Wear Glasses or Blind: yes    Cognitive  Cognitive/Neuro/Behavioral: WDL  Level of Consciousness: alert  Arousal Level: arouses to pain, opens eyes spontaneously  Orientation: disoriented to, time, situation (intermittently off to all plan of cares)  Mood/Behavior: calm, cooperative  Best Language: 0 - No aphasia  Speech: clear, spontaneous, logical    Living Environment:   People in home: spouse   (Yeyo and Pt, Khalida)  Current living Arrangements: house      Able to return to prior arrangements:         Family/Social Support:  Care provided by: spouse/significant other, child(edy)  Provides care for: no one, unable/limited ability to care for self  Marital Status:   , Children, Other (specify)          Description of Support System: Supportive, Involved         Current Resources:   Patient receiving home care services:       Community Resources:    Equipment currently used at home: grab bar, toilet, grab bar, tub/shower  Supplies currently used at home:      Employment/Financial:  Employment Status: retired        Financial Concerns: No concerns identified   Referral to Financial Worker: No       Lifestyle & Psychosocial Needs:  Social Determinants of Health     Tobacco Use: Medium Risk     Smoking Tobacco Use: Former Smoker     Smokeless Tobacco Use: Never Used   Alcohol Use: Not on file   Financial Resource Strain: Not on file   Food Insecurity: Not on file   Transportation Needs: Not on  file   Physical Activity: Not on file   Stress: Not on file   Social Connections: Not on file   Intimate Partner Violence: Not on file   Depression: Not at risk     PHQ-2 Score: 1   Housing Stability: Not on file       Functional Status:  Prior to admission patient needed assistance:              Mental Health Status:  Mental Health Status: No Current Concerns       Chemical Dependency Status:  Chemical Dependency Status: No Current Concerns             Values/Beliefs:  Spiritual, Cultural Beliefs, Pentecostal Practices, Values that affect care: yes  Description of Beliefs that Will Affect Care:  (Pentecostal)    Cultural/Pentecostal Practices Patient Routinely Participates In: ceremony, meditation, prayer       Additional Information:  SW went and talked with Pt about going to an ARU. She was adamant about not going. SW then called  about her wanting to come home but hospital recommending an ARU.  stated that he would like her to come home d/t a former incident with a TCU stay.  also requested an update from the MD about a couple medical conditions that he and family had questions about.      SW paged MD team about family wanting to take her home and a f/u call request. Med Team called back and stated that d/t Pt's fall risk and inability to walk independently, they do not want her to go home. SW asked that MD Team f/u with  and daughter to inform them.      MD called SW after conversation with family. SW made referral over to [facility], who will review and get back to SW tomorrow. RODNEY will continue to work on discharge planning with the family and Pt.     Cher Quick, SANJANAW, W  Acute Care Float   PH#: [phone]  Pager#: [phone]  [facility]

## 2022-03-21 NOTE — PROVIDER NOTIFICATION
Dr. Calabrese notified that patient was awoken for PT and walked in hallway, came back to room in wheel and refused to get into bed and was trying to leave and wheel herself down the hallway. Called daughter Laura and pt threw phone across room and threatened to leave, started wheeling self toward room door. Daughter upset; reassured and daughter okay if meds are needed. Md's called and Md's here x 2. talking with patient.

## 2022-03-22 ENCOUNTER — APPOINTMENT (OUTPATIENT)
Dept: CT IMAGING | Facility: CLINIC | Age: 72
DRG: 064 | End: 2022-03-22
Attending: PEDIATRICS
Payer: COMMERCIAL

## 2022-03-22 ENCOUNTER — APPOINTMENT (OUTPATIENT)
Dept: OCCUPATIONAL THERAPY | Facility: CLINIC | Age: 72
DRG: 064 | End: 2022-03-22
Attending: PEDIATRICS
Payer: COMMERCIAL

## 2022-03-22 LAB
ALBUMIN SERPL-MCNC: 3.1 G/DL (ref 3.4–5)
ALP SERPL-CCNC: 80 U/L (ref 40–150)
ALT SERPL W P-5'-P-CCNC: 11 U/L (ref 0–50)
ANION GAP SERPL CALCULATED.3IONS-SCNC: 5 MMOL/L (ref 3–14)
AST SERPL W P-5'-P-CCNC: 10 U/L (ref 0–45)
BILIRUB SERPL-MCNC: 0.4 MG/DL (ref 0.2–1.3)
BUN SERPL-MCNC: 12 MG/DL (ref 7–30)
CALCIUM SERPL-MCNC: 8.2 MG/DL (ref 8.5–10.1)
CHLORIDE BLD-SCNC: 110 MMOL/L (ref 94–109)
CO2 SERPL-SCNC: 28 MMOL/L (ref 20–32)
CREAT SERPL-MCNC: 0.62 MG/DL (ref 0.52–1.04)
ERYTHROCYTE [DISTWIDTH] IN BLOOD BY AUTOMATED COUNT: 13.3 % (ref 10–15)
GFR SERPL CREATININE-BSD FRML MDRD: >90 ML/MIN/1.73M2
GLUCOSE BLD-MCNC: 136 MG/DL (ref 70–99)
HCT VFR BLD AUTO: 38.9 % (ref 35–47)
HGB BLD-MCNC: 12.2 G/DL (ref 11.7–15.7)
MCH RBC QN AUTO: 30.2 PG (ref 26.5–33)
MCHC RBC AUTO-ENTMCNC: 31.4 G/DL (ref 31.5–36.5)
MCV RBC AUTO: 96 FL (ref 78–100)
PLATELET # BLD AUTO: 251 10E3/UL (ref 150–450)
POTASSIUM BLD-SCNC: 3.8 MMOL/L (ref 3.4–5.3)
PROT SERPL-MCNC: 6.3 G/DL (ref 6.8–8.8)
RBC # BLD AUTO: 4.04 10E6/UL (ref 3.8–5.2)
SODIUM SERPL-SCNC: 143 MMOL/L (ref 133–144)
WBC # BLD AUTO: 5.5 10E3/UL (ref 4–11)

## 2022-03-22 PROCEDURE — 70450 CT HEAD/BRAIN W/O DYE: CPT | Mod: 26 | Performed by: RADIOLOGY

## 2022-03-22 PROCEDURE — 120N000003 HC R&B IMCU UMMC

## 2022-03-22 PROCEDURE — 85027 COMPLETE CBC AUTOMATED: CPT

## 2022-03-22 PROCEDURE — 250N000013 HC RX MED GY IP 250 OP 250 PS 637

## 2022-03-22 PROCEDURE — 70450 CT HEAD/BRAIN W/O DYE: CPT

## 2022-03-22 PROCEDURE — 99233 SBSQ HOSP IP/OBS HIGH 50: CPT | Mod: GC | Performed by: STUDENT IN AN ORGANIZED HEALTH CARE EDUCATION/TRAINING PROGRAM

## 2022-03-22 PROCEDURE — 36415 COLL VENOUS BLD VENIPUNCTURE: CPT

## 2022-03-22 PROCEDURE — 97535 SELF CARE MNGMENT TRAINING: CPT | Mod: GO | Performed by: OCCUPATIONAL THERAPIST

## 2022-03-22 PROCEDURE — 80053 COMPREHEN METABOLIC PANEL: CPT

## 2022-03-22 PROCEDURE — 250N000013 HC RX MED GY IP 250 OP 250 PS 637: Performed by: HOSPITALIST

## 2022-03-22 RX ORDER — OLANZAPINE 2.5 MG/1
2.5 TABLET, FILM COATED ORAL
Status: DISCONTINUED | OUTPATIENT
Start: 2022-03-22 | End: 2022-04-08 | Stop reason: HOSPADM

## 2022-03-22 RX ADMIN — ASPIRIN 81 MG CHEWABLE TABLET 81 MG: 81 TABLET CHEWABLE at 08:41

## 2022-03-22 RX ADMIN — MEMANTINE 10 MG: 10 TABLET ORAL at 08:41

## 2022-03-22 RX ADMIN — LISINOPRIL 20 MG: 20 TABLET ORAL at 08:41

## 2022-03-22 RX ADMIN — PHENYTOIN 100 MG: 50 TABLET, CHEWABLE ORAL at 08:41

## 2022-03-22 RX ADMIN — GABAPENTIN 300 MG: 300 CAPSULE ORAL at 19:56

## 2022-03-22 RX ADMIN — CLOPIDOGREL BISULFATE 75 MG: 75 TABLET ORAL at 08:41

## 2022-03-22 RX ADMIN — LEVETIRACETAM 1000 MG: 500 TABLET, FILM COATED ORAL at 19:56

## 2022-03-22 RX ADMIN — LEVETIRACETAM 1000 MG: 500 TABLET, FILM COATED ORAL at 08:41

## 2022-03-22 RX ADMIN — CYANOCOBALAMIN TAB 500 MCG 1000 MCG: 500 TAB at 08:41

## 2022-03-22 RX ADMIN — POLYETHYLENE GLYCOL 3350 17 G: 17 POWDER, FOR SOLUTION ORAL at 08:44

## 2022-03-22 RX ADMIN — PANTOPRAZOLE SODIUM 40 MG: 40 TABLET, DELAYED RELEASE ORAL at 08:41

## 2022-03-22 RX ADMIN — PHENYTOIN 100 MG: 50 TABLET, CHEWABLE ORAL at 19:56

## 2022-03-22 RX ADMIN — ATORVASTATIN CALCIUM 40 MG: 40 TABLET, FILM COATED ORAL at 08:41

## 2022-03-22 ASSESSMENT — ACTIVITIES OF DAILY LIVING (ADL)
ADLS_ACUITY_SCORE: 16
ADLS_ACUITY_SCORE: 22
ADLS_ACUITY_SCORE: 16
ADLS_ACUITY_SCORE: 16
ADLS_ACUITY_SCORE: 22
ADLS_ACUITY_SCORE: 18
ADLS_ACUITY_SCORE: 22
ADLS_ACUITY_SCORE: 18
ADLS_ACUITY_SCORE: 22
ADLS_ACUITY_SCORE: 18
ADLS_ACUITY_SCORE: 16
ADLS_ACUITY_SCORE: 22
ADLS_ACUITY_SCORE: 18
ADLS_ACUITY_SCORE: 16
ADLS_ACUITY_SCORE: 22
ADLS_ACUITY_SCORE: 22

## 2022-03-22 ASSESSMENT — VISUAL ACUITY
OU: BASELINE;NORMAL ACUITY
OU: BASELINE;NORMAL ACUITY

## 2022-03-22 NOTE — PROGRESS NOTES
Refusing telemetry. Was asleep - woke up angry and confused. Did not realize she was in the hospital.

## 2022-03-22 NOTE — PROGRESS NOTES
Care Management Follow Up    [facility] referral denied d/t Pt not being appropriate cognitively for ARU placement (Pt scored 12/30 on MOCHA- Severe Dementia). [facility] making  rec for community TCU.    SW will work with Family tomorrow to f/u with TCU referrals with family.        Cher Quick, SANJANAW, W  Acute Care Float   PH#: [phone]  Pager#: [phone]  [facility]

## 2022-03-22 NOTE — PROGRESS NOTES
[facility]    Progress Note - Medicine Service, [facility] TEAM 1       Date of Admission:  3/19/2022    Assessment & Plan        Khalida Redding is a 71 year old female admitted on 3/19/2022. She has a history of T2DM, HTN, HLD, dementia, epilespy, and recent CVA (11/2021) and is admitted for acute CVA and hypotension likely secondary to polypharmacy.       Changes today  - Pt holdable if threatening to leave as she needs 24/7 supervision and is non-decisional with regards to dispo, high risk for fall and bleed as she is on double antiplatelet   - Daughter and  concerned with nightly agitation, requested medication to assist with insomnia and disorientation leading to distress: low-dose olanzapine PRN ordered  - Goal for discharge to ARU when bed available, medically ready    #Acute CVA of R splenium of corpus callosum   #H/O CVA with residual left sided hemiparesis   She was noted to have new onset confusion, slurred speech, and left facial droop around 09:30 on 3/18/22. She recently had a stroke in November that left her with residual left sided weakness. MRI at OSH showed small acute to subacute infarct involving the right aspect of the splenium of the corpus callosum. Neurology was consulted and recommended no thrombolytics due to rapid improvement of her symptoms. TTE was unremarkable with EF of 55-60%.stroke symptoms resolved, suspect that symptom recrudescence was due to hypotension and decreased cerebral perfusion in the setting of polypharmacy  - Cardiac Telemetry dc'd given patient agitation with presence and >48h w/o concerning rhythm  - Continue Neuro Checks QShift   - Continue PTA Statin ()  - Per Neurology - OK for normotension. Avoid hypotension.  - PT/OT/SLP Consulted recommended ARU, working with the pt  - Neurology Consulted. Appreciate further recommendations of management.    - Continue DAPT for 90 days, ASA 324mg thereafter   -  Continue  mg BID and Keppra 2000 mg BID   - Follow up with general neurology as outpatient   - Discharge on opatch (ordered)   - No further stroke work up needed.      #SIRS Criteria (fever 100.8F, leukocytosis, tachycardia, lactic acidosis)   #Transient Hypotension, Likely Iatrogenic Secondary to Medications   #Somnolence, Resolved   She had an episode of hypotension with SBP 60's. Per chart review, patient had received labetolol (30mg), hydralazine (10mg), fentanyl (55mcg), lorazepam (1mg), gabapentin (300mg), levetiracetam (1500mg), phenytoin (100mg) in the evening prior to the episode of hypotension and somnolence. Her blood pressure normalized with fluid resuscitation. She had a CT C/A/P without any concerning signs for infection. SIRS criteria likely met due to iatrogenic hypotension. Her somnolence is also likely related to polypharmacy.  No identifiable source of infection on physical exam, UA, or imaging. No indication to continue antibiotics at this time.   - Lactic Acid Resolved on Admission   - Follow up blood cultures   - Monitor for signs/symptoms of infection   - HOLD antihypertensives for now   - Avoid polypharmacy  - Discontinue indwelling Gloria      #Elevated troponin, suspect type II MI, Improved   Troponin peaked at 640 without ACS symptoms which is peaked. This likely represents a type II MI - demand ischemia in the setting of hypotension.   - No further troponin checks     #Seizure disorder   She has a known seizure disorder. She was transferred for cEEG monitoring, however this is not able to be started overnight. She was loaded with 1,000mg of Keppra prior to transfer.   - Continue PTA Levetiracetam and Phenytoin   - Neurology Consulted. Appreciate recommendations on whether EEG is still indicated.  - Continue  mg BID and Keppra increased to 2000 mg BID per neuro  - No need for EEG per neuro    #T2DM - well-controlled with diet  Last A1c 5.5%.   - Diet Controlled.   - Monitor  "blood glucose. Start sliding scale insulin if AM BG > 250     #HLD   - Continue PTA Atorvastatin      #HTN  - Hold PTA antihypertensives in the setting of medication-induced hypotension     #Dementia, neurocognitive impairment, severe  Patient appears at her mental status baseline on examination, not consentable given inability to articulate or appreciate present post-CVA concerns and mobility issues, risks of discharge home without rehab, and alternatives to rehabilitation.   - Delirium and Fall Precautions in Place  - Given concerns for agitation and disorientation leading to significant distress, olanzapine at bedtime PRN order placed      # Acute rehabilitation, post-CVA  - Pt refusing, likely given the similarity in name associated with rehab at a TCU (which she has done before and had a poor experience with) vs ARU, discussed at length with pt's daughter and  who are in agreement with acute rehab  - Pt holdable if threatening to leave as she needs 24/7 supervision, high risk for fall and bleed as she is on double antiplatelet and not able to work       Diet: Regular Diet Adult  Room Service  Snacks/Supplements Adult: Glucerna; With Meals    DVT Prophylaxis: Low Risk/Ambulatory with no VTE prophylaxis indicated  Gloria Catheter: Not present  Fluids: PO ad donte  Central Lines: None  Cardiac Monitoring: None  Code Status: Full Code      Disposition Plan   Expected Discharge: 2-3 days  Anticipated discharge location: inpatient rehabilitation facility         The patient's care was discussed with the Attending Physician, Dr. Dash Maldonado.    Sergo \"NING\" MD Guanakito, PhD  PGY-2 Internal Medcine  [phone] [text page]    Medicine Service, [facility] TEAM [address] [place] [address]  Securely message with the Vocera Web Console (learn more here)  Text page via [employer] Paging/Directory   Please see signed in provider for up to date coverage " "information  ______________________________________________________________________    Interval History   NAEON. Pt working with PT and OT. Does get somewhat agitated and alternately confused, especially at night. She is unable to articulate what the present concerns for her health are, nor what the expected course would be if she were to participate in therapy or to not participate and return home. She cannot identify concerns with mobility elaborated upon by PT and OT. She agrees, in general, with a place where she \"wouldn't get poked\" but could \"work on getting stronger\" so that she might go home in the future. No F, NVD, SOB, CP.     Data reviewed today: I reviewed all medications, new labs and imaging results over the last 24 hours. I personally reviewed the EKG tracing showing Sinus rythm.    Physical Exam   Vital Signs: Temp: 97.9  F (36.6  C) Temp src: Oral BP: 139/69 Pulse: 88   Resp: 18 SpO2: 94 % O2 Device: None (Room air)    Weight: 133 lbs 6.05 oz  General Appearance: Not in distress, lying comfortably on her bed  Respiratory: good air entry bilateral, no added breath sounds  Cardiovascular: S1, S2 well heard, no murmur or gallop   GI: Non tender, non distended, no hepatosplenomegally  Skin: warm to touch  CNS: A &Ox3, CNII- CNXII grossly intact, no focal neurological deficit    Data   Recent Labs   Lab 03/22/22  0439 03/21/22  1701 03/21/22  1347 03/21/22  1119 03/21/22  0725 03/21/22  0458 03/20/22  1533 03/20/22  0443 03/18/22  2231 03/18/22  1440   WBC 5.5  --   --   --   --  5.0  --  5.8   < >  --    HGB 12.2  --   --   --   --  12.6  --  10.5*   < >  --    MCV 96  --   --   --   --  96  --  95   < >  --      --   --   --   --  243  --  218   < >  --    INR  --   --   --   --   --   --   --   --   --  0.98     --   --   --   --  142  --  145*   < >  --    POTASSIUM 3.8  --  3.7  --   --  3.2*  --  3.3*   < >  --    CHLORIDE 110*  --   --   --   --  111*  --  115*   < >  --    CO2 28  " --   --   --   --  24  --  26   < >  --    BUN 12  --   --   --   --  11  --  9   < >  --    CR 0.62  --   --   --   --  0.53  --  0.53   < >  --    ANIONGAP 5  --   --   --   --  7  --  4   < >  --    PINA 8.2*  --   --   --   --  8.4*  --  7.3*   < >  --    * 154*  --  186*   < > 106*   < > 106*   < >  --    ALBUMIN 3.1*  --   --   --   --  3.2*  --   --   --   --    PROTTOTAL 6.3*  --   --   --   --  6.8  --   --   --   --    BILITOTAL 0.4  --   --   --   --  0.4  --   --   --   --    ALKPHOS 80  --   --   --   --  80  --   --   --   --    ALT 11  --   --   --   --  13  --   --   --   --    AST 10  --   --   --   --  12  --   --   --   --     < > = values in this interval not displayed.     No results found for this or any previous visit (from the past 24 hour(s)).

## 2022-03-22 NOTE — PROGRESS NOTES
Patient shift status: Intermittently confused but pleasant. Hemodynamically stable. Ate well for dinner. Up to bathroom with gait belt and walker. Unpredictable in movements - rushed behavior with walking. No complaint of discomfort. IV infiltrated - refused to let me replace. MD aware. Sleeps/naps frequently.  Skin assessment: Bruising noted on arms - fragile skin. Bruising under left eye.  Plan: Continue to closely monitor.  For vital signs and complete assessments, please see documentation flowsheets.

## 2022-03-22 NOTE — PLAN OF CARE
Neuro: A&Ox3-forgetful, tangental at times. See flowsheet for Neuro assessment.   Cardiac: HR regular. VSS.   Respiratory: Sating >92% on RA.  GI/: Adequate urine output. No BM.  Diet/appetite: Tolerating Regular diet. Eating well  Activity:  Assist of 1 with gait belt and walker, up to chair and in halls.  Pain: At acceptable level on current regimen.   Skin: No new deficits noted.  LDA's: PIV-SL.    Plan: No acute issues. Pts mentation is clearer/less agitated today. Tele Dc'd. Transfer orders placed. Continue with POC. Notify primary team with changes.

## 2022-03-22 NOTE — PROGRESS NOTES
Patient shift status: Intermittently confused and angry with her family. Spouse and daughter are concerned because this is not how she talks to them. Daughter plans to call tomorrow and talk with team - possibly find a medication to help calm patient.  Hemodynamically stable. Ate well for meals. No complaint of discomfort.   Skin assessment: Bruising noted on arms - fragile skin. Bruising under left eye.  Plan: Continue to closely monitor.  For vital signs and complete assessments, please see documentation flowsheets.

## 2022-03-22 NOTE — PROGRESS NOTES
No major events occurred overnight. Pt. Slept through most of the night. Still refusing telemetry.

## 2022-03-23 ENCOUNTER — APPOINTMENT (OUTPATIENT)
Dept: OCCUPATIONAL THERAPY | Facility: CLINIC | Age: 72
DRG: 064 | End: 2022-03-23
Attending: PEDIATRICS
Payer: COMMERCIAL

## 2022-03-23 LAB
GLUCOSE BLDC GLUCOMTR-MCNC: 132 MG/DL (ref 70–99)
GLUCOSE BLDC GLUCOMTR-MCNC: 139 MG/DL (ref 70–99)

## 2022-03-23 PROCEDURE — 97535 SELF CARE MNGMENT TRAINING: CPT | Mod: GO

## 2022-03-23 PROCEDURE — 250N000013 HC RX MED GY IP 250 OP 250 PS 637

## 2022-03-23 PROCEDURE — 250N000013 HC RX MED GY IP 250 OP 250 PS 637: Performed by: HOSPITALIST

## 2022-03-23 PROCEDURE — 99233 SBSQ HOSP IP/OBS HIGH 50: CPT | Mod: GC | Performed by: STUDENT IN AN ORGANIZED HEALTH CARE EDUCATION/TRAINING PROGRAM

## 2022-03-23 PROCEDURE — 120N000011 HC R&B TRANSPLANT UMMC

## 2022-03-23 RX ADMIN — CYANOCOBALAMIN TAB 500 MCG 1000 MCG: 500 TAB at 09:24

## 2022-03-23 RX ADMIN — LEVETIRACETAM 1000 MG: 500 TABLET, FILM COATED ORAL at 20:56

## 2022-03-23 RX ADMIN — CLOPIDOGREL BISULFATE 75 MG: 75 TABLET ORAL at 09:23

## 2022-03-23 RX ADMIN — LEVETIRACETAM 1000 MG: 500 TABLET, FILM COATED ORAL at 09:24

## 2022-03-23 RX ADMIN — LISINOPRIL 20 MG: 20 TABLET ORAL at 09:23

## 2022-03-23 RX ADMIN — POLYETHYLENE GLYCOL 3350 17 G: 17 POWDER, FOR SOLUTION ORAL at 09:24

## 2022-03-23 RX ADMIN — MEMANTINE 10 MG: 10 TABLET ORAL at 09:23

## 2022-03-23 RX ADMIN — GABAPENTIN 300 MG: 300 CAPSULE ORAL at 20:56

## 2022-03-23 RX ADMIN — ASPIRIN 81 MG CHEWABLE TABLET 81 MG: 81 TABLET CHEWABLE at 09:23

## 2022-03-23 RX ADMIN — PHENYTOIN 100 MG: 50 TABLET, CHEWABLE ORAL at 20:56

## 2022-03-23 RX ADMIN — PHENYTOIN 100 MG: 50 TABLET, CHEWABLE ORAL at 09:23

## 2022-03-23 RX ADMIN — PANTOPRAZOLE SODIUM 40 MG: 40 TABLET, DELAYED RELEASE ORAL at 09:23

## 2022-03-23 RX ADMIN — ATORVASTATIN CALCIUM 40 MG: 40 TABLET, FILM COATED ORAL at 09:23

## 2022-03-23 ASSESSMENT — ACTIVITIES OF DAILY LIVING (ADL)
ADLS_ACUITY_SCORE: 23
ADLS_ACUITY_SCORE: 21
ADLS_ACUITY_SCORE: 22
ADLS_ACUITY_SCORE: 21
ADLS_ACUITY_SCORE: 23
ADLS_ACUITY_SCORE: 23
ADLS_ACUITY_SCORE: 21
ADLS_ACUITY_SCORE: 23
ADLS_ACUITY_SCORE: 21
ADLS_ACUITY_SCORE: 23
ADLS_ACUITY_SCORE: 21
ADLS_ACUITY_SCORE: 21
ADLS_ACUITY_SCORE: 23
ADLS_ACUITY_SCORE: 22
ADLS_ACUITY_SCORE: 22
ADLS_ACUITY_SCORE: 23
ADLS_ACUITY_SCORE: 22
ADLS_ACUITY_SCORE: 21

## 2022-03-23 NOTE — PROGRESS NOTES
Dr. Calabrese notified Family  (Laura ([phone]) wants talk about discharge planning. Please address!    Method of communication: Text paged      MD response: pending

## 2022-03-23 NOTE — PROGRESS NOTES
Pt report given to Agustin FOOTE. Pt transferred to unit 7A   Allergic shiners. Pupils equal, round and reactive to light, Extra-ocular movement intact.

## 2022-03-23 NOTE — PROGRESS NOTES
Patient fell while getting up and walking to use the bathroom. Has been directed to use the call light for needs. Intermittent confusion. Continues to have unpredictable movements with the walker and rushed behavior with walking. Has own socks on with the hospital slippers over but take the hospital socks off that have the plastic tread. Bed alarm on. Patient was falling when I was coming into the room, but unwitnessed fall. Did not complain of any discomfort and neuro exam remain unchanged. MD called and assess patient. CT ordered and completed.

## 2022-03-23 NOTE — PLAN OF CARE
"/80 (BP Location: Right arm)   Pulse 84   Temp 97.9  F (36.6  C) (Oral)   Resp 18   Ht 1.676 m (5' 6\")   Wt 60.5 kg (133 lb 6.1 oz)   SpO2 93%   BMI 21.53 kg/m      Neuro: Pt alert and oriented x3. Pupils round and reactive to light bilaterally.   Respiratory: Pt zena sob,  Cardio: zena chest pain, slight lightheadedness upon standing out of bed. Denize dizziness      GI: Abd soft, zena n/v   : Frequent urination noted   Skin: intact with no open area noted.     BS: 132 x1  Mobility: Up with assist x1, walker and gait belt  Pain: Pt zena pain   Plan of care: Discussed with patient, MD and daughter Laura.                           "

## 2022-03-23 NOTE — PROGRESS NOTES
[facility]    Progress Note - Medicine Service, [facility] TEAM 1       Date of Admission:  3/19/2022    Assessment & Plan        Khalida Redding is a 71 year old female admitted on 3/19/2022. She has a history of T2DM, HTN, HLD, dementia, epilespy, and recent CVA (11/2021) and is admitted for acute CVA and hypotension likely secondary to polypharmacy.       Changes today  - Fell overnight with no lingering injury or concerning findings on CT head   - Goal for discharge to TCU once discussion with family re: location of TCU; family would not accept dispo to previous TCU in [place]    #Acute CVA of R splenium of corpus callosum   #Prior CVA with residual left sided hemiparesis   She was noted to have new onset confusion, slurred speech, and left facial droop around 09:30 on 3/18/22. She recently had a stroke in November that left her with residual left sided weakness. MRI at OSH showed small acute to subacute infarct involving the right aspect of the splenium of the corpus callosum. Neurology was consulted and recommended no thrombolytics due to rapid improvement of her symptoms. TTE was unremarkable with EF of 55-60%.stroke symptoms resolved, suspect that symptom recrudescence was due to hypotension and decreased cerebral perfusion in the setting of polypharmacy.  - Cardiac Telemetry dc'd given patient agitation with presence and >48h w/o concerning rhythm  - Continue Neuro checks qshift   - Continue PTA statin ()  - Per Neurology - OK for normotension. Avoid hypotension.  - PT/OT/SLP Consulted recommended ARU/therapy, working with pt  - Neurology Consulted. Appreciate further recommendations of management.    - Continue DAPT for 90 days, ASA 324mg thereafter   - Continue  mg BID and Keppra 2000 mg BID   - Follow up with general neurology as outpatient   - Discharge on Ziopatch (ordered)   - No further stroke work up needed.      #SIRS criteria (fever  100.8F, leukocytosis, tachycardia, lactic acidosis), resolved  #Transient hypotension, likely iatrogenic secondary to medications   #Somnolence, resolved   She had an episode of hypotension with SBP 60's. Per chart review, patient had received labetolol (30mg), hydralazine (10mg), fentanyl (55mcg), lorazepam (1mg), gabapentin (300mg), levetiracetam (1500mg), phenytoin (100mg) in the evening prior to the episode of hypotension and somnolence. Her blood pressure normalized with fluid resuscitation. She had a CT C/A/P without any concerning signs for infection. SIRS criteria likely met due to iatrogenic hypotension. Her somnolence is also likely related to polypharmacy.  No identifiable source of infection on physical exam, UA, or imaging. No indication to continue antibiotics at this time.   - Lactic Acid Resolved on Admission   - Follow up blood cultures   - Monitor for signs/symptoms of infection   - cautious resumption of antihypertensives  - Avoid polypharmacy  - Discontinued Gloria      # Elevated troponin, suspect type II MI, Improved   Troponin peaked at 640 without ACS symptoms which is peaked. This likely represents a type II MI - demand ischemia in the setting of hypotension.   - No further troponin checks     # Seizure disorder   She has a known seizure disorder. She was transferred for cEEG monitoring, however this is not able to be started overnight. She was loaded with 1,000mg of Keppra prior to transfer.   - Continue PTA Levetiracetam and Phenytoin   - Neurology Consulted. Appreciate recommendations on whether EEG is still indicated.  - Continue  mg BID and Keppra increased to 2000 mg BID per neuro  - No need for EEG per neuro    # T2DM - well-controlled with diet  Last A1c 5.5%.   - Diet Controlled.   - Monitor blood glucose with M/Th labs, given stability of sugars while inpatient    -- Start sliding scale insulin if AM BG > 250     # HLD   - Continue PTA Atorvastatin      # HTN  - Hold PTA  "antihypertensives in the setting of medication-induced hypotension     # Dementia, neurocognitive impairment, severe  Patient appears at her mental status baseline on examination, not consentable given inability to articulate or appreciate present post-CVA concerns and mobility issues, risks of discharge home without rehab, and alternatives to rehabilitation.   - Delirium and Fall Precautions in Place  - Given concerns for agitation and disorientation leading to significant distress, olanzapine at bedtime PRN order placed     # Acute rehabilitation, post-CVA  - Pt refusing, likely given the similarity in name associated with rehab at a TCU (which she has done before and had a poor experience with) vs ARU, discussed at length with pt's daughter and  who are in agreement with acute rehab  - Pt holdable if threatening to leave as she needs 24/7 supervision, high risk for fall and bleed as she is on double antiplatelet and not able to work       Diet: Regular Diet Adult  Room Service  Snacks/Supplements Adult: Glucerna; With Meals    DVT Prophylaxis: Low Risk/Ambulatory with no VTE prophylaxis indicated  Gloria Catheter: Not present  Fluids: PO ad donte  Central Lines: None  Cardiac Monitoring: None  Code Status: Full Code      Disposition Plan   Expected Discharge: 2-3 days  Anticipated discharge location: nursing home         The patient's care was discussed with the Attending Physician, Dr. Dash Maldonado.    Sergo \"BJ\" MD Guanakito, PhD  PGY-2 Internal Medicine  [phone] [text page]    Medicine Service, [facility] TEAM 1  [facility]  Securely message with the Vocera Web Console (learn more here)  Text page via [email or web address] Paging/Directory   Please see signed in provider for up to date coverage information  ______________________________________________________________________    Interval History   Fell overnight and did have headstrike. CT head unrevealing of acute " concerning findings. Pt did not have any lingering injuries or concerns. Spoke with Laura today, who is amenable to have her mother discharge to a TCU as long as it is closer to home and not the [facility] facility she was at previously where the patient developed bed sores.  No F, NVD, SOB, CP.     Data reviewed today: I reviewed all medications, new labs and imaging results over the last 24 hours. I personally reviewed the EKG tracing showing Sinus rythm.    Physical Exam   Vital Signs: Temp: 97.4  F (36.3  C) Temp src: Oral BP: (!) 121/92 Pulse: 94   Resp: 18 SpO2: 91 % O2 Device: None (Room air)    Weight: 133 lbs 6.05 oz  General Appearance: Not in distress, lying comfortably on her bed  Respiratory: good air entry bilateral, no added breath sounds  Cardiovascular: S1, S2 well heard, no murmur or gallop   GI: Non tender, non distended, no hepatosplenomegally  Skin: warm to touch  CNS: A &Ox3, CNII- CNXII grossly intact, no focal neurological deficit    Data   Recent Labs   Lab 03/23/22  0343 03/22/22  0439 03/21/22  1701 03/21/22  1347 03/21/22  0725 03/21/22  0458 03/20/22  1533 03/20/22  0443 03/18/22  2231 03/18/22  1440   WBC  --  5.5  --   --   --  5.0  --  5.8   < >  --    HGB  --  12.2  --   --   --  12.6  --  10.5*   < >  --    MCV  --  96  --   --   --  96  --  95   < >  --    PLT  --  251  --   --   --  243  --  218   < >  --    INR  --   --   --   --   --   --   --   --   --  0.98   NA  --  143  --   --   --  142  --  145*   < >  --    POTASSIUM  --  3.8  --  3.7  --  3.2*  --  3.3*   < >  --    CHLORIDE  --  110*  --   --   --  111*  --  115*   < >  --    CO2  --  28  --   --   --  24  --  26   < >  --    BUN  --  12  --   --   --  11  --  9   < >  --    CR  --  0.62  --   --   --  0.53  --  0.53   < >  --    ANIONGAP  --  5  --   --   --  7  --  4   < >  --    PINA  --  8.2*  --   --   --  8.4*  --  7.3*   < >  --    * 136* 154*  --    < > 106*   < > 106*   < >  --    ALBUMIN  --  3.1*  --   --    --  3.2*  --   --   --   --    PROTTOTAL  --  6.3*  --   --   --  6.8  --   --   --   --    BILITOTAL  --  0.4  --   --   --  0.4  --   --   --   --    ALKPHOS  --  80  --   --   --  80  --   --   --   --    ALT  --  11  --   --   --  13  --   --   --   --    AST  --  10  --   --   --  12  --   --   --   --     < > = values in this interval not displayed.     Recent Results (from the past 24 hour(s))   CT Head w/o Contrast    Narrative    EXAM: CT HEAD W/O CONTRAST  3/22/2022 10:48 PM     HISTORY:  Cerebral hemorrhage suspected       COMPARISON:  3/19/2022    TECHNIQUE: Helical CT of the head without IV contrast.  Coronal and  sagittal reformatted images were created, archived and reviewed at the  workstation for further assessment.    FINDINGS:    No intracranial hemorrhage, mass effect, or midline shift. Right  greater than left bifrontal encephalo-gliosis, stable. Evolving focal  infarct in the right splenium of corpus callosum. No ventriculomegaly.  Clear basal cisterns. Periventricular and subcortical white matter  hypoattenuation is nonspecific but likely represent chronic small  vessel ischemic disease in a patient this age.    Atraumatic calvarium, skull base. Clear paranasal sinuses and mastoid  air cells. Intact globes and intraocular structures. Visualized  nasopharyngeal mucosal and upper cervical spinal structures are within  normal limits.      Impression    IMPRESSION:   1. No acute intracranial pathology, specifically no intracranial  hemorrhage.  2. Evolving right splenium infarct.  3. Unchanged encephalomalacia within the right frontal lobe. Moderate  Leukoaraiosis.     I have personally reviewed the examination and initial interpretation  and I agree with the findings.    LADARIUS NOVAK MD         SYSTEM ID:  [id number]

## 2022-03-23 NOTE — PROGRESS NOTES
Brief cross cover note    S- Pt fell while walking to the restroom and hit her head and buttocks, complaining of pain on the back of her head and buttocks, states she did not hit any other part of her body  O- Pt vitals stable  Chest- good air entry b/l, no M or gallop  ANISH- normal active and passive range of motion in upper and lower extremity  Neuro exam- A &OX4, CNII-XII grossly intact, power 5/5 in all extremities, no focal neurologic deficit    A- Khalida Redding is a 71 year old female admitted on 3/19/2022. She has a history of T2DM, HTN, HLD, dementia, epilespy, and recent CVA (11/2021) and is admitted for acute CVA and hypotension likely secondary to polypharmacy on double antiplatelet therapy, high risk for bleeding in her head    Plan- CT head, non contrast to r/o intracranial bleeding in the setting of double antiplatelet  -- Less concern for pelvic fracture in the setting of normal active and passive range of motion on the hip and knee joint b/l  -- Pt educated to use call light and wait for nursing staff to assist her in any mobility as she needs 24/7 assist for movement per PT eval  -- Bed alarm in place and falls risk prevention discussed with the nursing staff. Pt does not need 1:1 sitter at this time, if concern for confusion and pt refuses to use call light, low threshold to get 1:1 sitter

## 2022-03-23 NOTE — PROGRESS NOTES
Care Management Follow Up    Length of Stay (days): 4    Expected Discharge Date: 03/25/2022     Concerns to be Addressed: discharge planning   Patient plan of care discussed at interdisciplinary rounds: Yes    Anticipated Discharge Disposition: Transitional Care TBD     Anticipated Discharge Services: IMM, PAS, Transportation TBD  Anticipated Discharge DME: None    Patient/family educated on Medicare website which has current facility and service quality ratings: will when able to reach family  Education Provided on the Discharge Plan: yes   Patient/Family in Agreement with the Plan: yes    Referrals Placed by CM/SW: Internal Clinic Care Coordination, Post Acute Facilities  Private pay costs discussed: TBD    Additional Information:  SW attempted to call patient's spouse Yeyo ([phone]), her daughter Laura ([phone]), and her daughter Courtney ([phone]). No one answered. SW was able to leave a message on Yeyo's phone. The other 2 did not have mailboxes.     Per MOCA score (12/30), patient is not decisional and SW will need to go through family for TCU choices.     MELISSA Tristan, PAPOSW  7A/5C Medicine   Ph: [phone]  Pager: [phone]

## 2022-03-24 ENCOUNTER — APPOINTMENT (OUTPATIENT)
Dept: PHYSICAL THERAPY | Facility: CLINIC | Age: 72
DRG: 064 | End: 2022-03-24
Attending: PEDIATRICS
Payer: COMMERCIAL

## 2022-03-24 ENCOUNTER — APPOINTMENT (OUTPATIENT)
Dept: OCCUPATIONAL THERAPY | Facility: CLINIC | Age: 72
DRG: 064 | End: 2022-03-24
Attending: PEDIATRICS
Payer: COMMERCIAL

## 2022-03-24 LAB
ALBUMIN SERPL-MCNC: 2.9 G/DL (ref 3.4–5)
ALP SERPL-CCNC: 82 U/L (ref 40–150)
ALT SERPL W P-5'-P-CCNC: 12 U/L (ref 0–50)
ANION GAP SERPL CALCULATED.3IONS-SCNC: 6 MMOL/L (ref 3–14)
AST SERPL W P-5'-P-CCNC: 13 U/L (ref 0–45)
BACTERIA BLD CULT: NO GROWTH
BACTERIA BLD CULT: NO GROWTH
BILIRUB SERPL-MCNC: 0.4 MG/DL (ref 0.2–1.3)
BUN SERPL-MCNC: 10 MG/DL (ref 7–30)
CALCIUM SERPL-MCNC: 8.6 MG/DL (ref 8.5–10.1)
CHLORIDE BLD-SCNC: 108 MMOL/L (ref 94–109)
CO2 SERPL-SCNC: 27 MMOL/L (ref 20–32)
CREAT SERPL-MCNC: 0.58 MG/DL (ref 0.52–1.04)
ERYTHROCYTE [DISTWIDTH] IN BLOOD BY AUTOMATED COUNT: 13.3 % (ref 10–15)
GFR SERPL CREATININE-BSD FRML MDRD: >90 ML/MIN/1.73M2
GLUCOSE BLD-MCNC: 146 MG/DL (ref 70–99)
GLUCOSE BLDC GLUCOMTR-MCNC: 154 MG/DL (ref 70–99)
HCT VFR BLD AUTO: 36.1 % (ref 35–47)
HGB BLD-MCNC: 11.2 G/DL (ref 11.7–15.7)
MCH RBC QN AUTO: 29.9 PG (ref 26.5–33)
MCHC RBC AUTO-ENTMCNC: 31 G/DL (ref 31.5–36.5)
MCV RBC AUTO: 96 FL (ref 78–100)
PLATELET # BLD AUTO: 266 10E3/UL (ref 150–450)
POTASSIUM BLD-SCNC: 3.5 MMOL/L (ref 3.4–5.3)
PROT SERPL-MCNC: 6 G/DL (ref 6.8–8.8)
RBC # BLD AUTO: 3.75 10E6/UL (ref 3.8–5.2)
SODIUM SERPL-SCNC: 141 MMOL/L (ref 133–144)
WBC # BLD AUTO: 5.4 10E3/UL (ref 4–11)

## 2022-03-24 PROCEDURE — 250N000013 HC RX MED GY IP 250 OP 250 PS 637

## 2022-03-24 PROCEDURE — 85027 COMPLETE CBC AUTOMATED: CPT | Performed by: STUDENT IN AN ORGANIZED HEALTH CARE EDUCATION/TRAINING PROGRAM

## 2022-03-24 PROCEDURE — 97530 THERAPEUTIC ACTIVITIES: CPT | Mod: GP | Performed by: REHABILITATION PRACTITIONER

## 2022-03-24 PROCEDURE — 250N000013 HC RX MED GY IP 250 OP 250 PS 637: Performed by: HOSPITALIST

## 2022-03-24 PROCEDURE — 120N000011 HC R&B TRANSPLANT UMMC

## 2022-03-24 PROCEDURE — 99233 SBSQ HOSP IP/OBS HIGH 50: CPT | Mod: GC | Performed by: STUDENT IN AN ORGANIZED HEALTH CARE EDUCATION/TRAINING PROGRAM

## 2022-03-24 PROCEDURE — 80053 COMPREHEN METABOLIC PANEL: CPT | Performed by: STUDENT IN AN ORGANIZED HEALTH CARE EDUCATION/TRAINING PROGRAM

## 2022-03-24 PROCEDURE — 36415 COLL VENOUS BLD VENIPUNCTURE: CPT | Performed by: STUDENT IN AN ORGANIZED HEALTH CARE EDUCATION/TRAINING PROGRAM

## 2022-03-24 PROCEDURE — 97535 SELF CARE MNGMENT TRAINING: CPT | Mod: GO

## 2022-03-24 PROCEDURE — 97530 THERAPEUTIC ACTIVITIES: CPT | Mod: GO

## 2022-03-24 PROCEDURE — 97110 THERAPEUTIC EXERCISES: CPT | Mod: GP | Performed by: REHABILITATION PRACTITIONER

## 2022-03-24 RX ADMIN — ASPIRIN 81 MG CHEWABLE TABLET 81 MG: 81 TABLET CHEWABLE at 08:12

## 2022-03-24 RX ADMIN — LEVETIRACETAM 1000 MG: 500 TABLET, FILM COATED ORAL at 08:12

## 2022-03-24 RX ADMIN — PHENYTOIN 100 MG: 50 TABLET, CHEWABLE ORAL at 08:13

## 2022-03-24 RX ADMIN — ATORVASTATIN CALCIUM 40 MG: 40 TABLET, FILM COATED ORAL at 08:13

## 2022-03-24 RX ADMIN — GABAPENTIN 300 MG: 300 CAPSULE ORAL at 20:30

## 2022-03-24 RX ADMIN — MEMANTINE 10 MG: 10 TABLET ORAL at 08:13

## 2022-03-24 RX ADMIN — LEVETIRACETAM 1000 MG: 500 TABLET, FILM COATED ORAL at 20:30

## 2022-03-24 RX ADMIN — CLOPIDOGREL BISULFATE 75 MG: 75 TABLET ORAL at 08:12

## 2022-03-24 RX ADMIN — PHENYTOIN 100 MG: 50 TABLET, CHEWABLE ORAL at 20:30

## 2022-03-24 RX ADMIN — PANTOPRAZOLE SODIUM 40 MG: 40 TABLET, DELAYED RELEASE ORAL at 08:12

## 2022-03-24 RX ADMIN — CYANOCOBALAMIN TAB 500 MCG 1000 MCG: 500 TAB at 08:13

## 2022-03-24 RX ADMIN — LISINOPRIL 20 MG: 20 TABLET ORAL at 08:13

## 2022-03-24 ASSESSMENT — ACTIVITIES OF DAILY LIVING (ADL)
ADLS_ACUITY_SCORE: 21

## 2022-03-24 NOTE — PLAN OF CARE
"2300 - 0730    /68 (BP Location: Right arm)   Pulse 82   Temp 98.2  F (36.8  C) (Oral)   Resp 16   Ht 1.676 m (5' 6\")   Wt 60.5 kg (133 lb 6.1 oz)   SpO2 93%   BMI 21.53 kg/m      VS: stable on RA - disoriented to time, situation, and place  BG: n/a  Labs: pending  Pain/Nausea/PRN: denied pain and nausea  Diet: Regular  LDA: no PIV - MD aware  GI: no BM overnight  : incontinent x 1, voided in commode  Skin: bruised, blanchable redness on sacrum/coccyx  Mobility: Assist x 2 with GB & walker - pt unable to make it to bathroom without legs giving out on her - see provider notification for details  Plan: continue to monitor        "

## 2022-03-24 NOTE — PROGRESS NOTES
Brief cross cover note     S- Pt had assited fall while walking to the restroom on gait belt assisted by two nurses, her legs gave away and she lowered herself to the floor. Pt denies any pain  O- Pt vitals stable  Chest- good air entry b/l, no M or gallop  ANISH- normal active and passive range of motion in upper and lower extremity  Neuro exam- A &OX4, CNII-XII grossly intact, power 5/5 in all extremities, no focal neurologic deficit     A- Khalida Redding is a 71 year old female admitted on 3/19/2022. She has a history of T2DM, HTN, HLD, dementia, epilespy, and recent CVA (11/2021) and is admitted for acute CVA and hypotension likely secondary to polypharmacy examined for witnessed and assisted fall     Plan--- Less concern for pelvic fracture in the setting of normal active and passive range of motion on the hip and knee joint b/l  -- Bed alarm in place and falls risk prevention discussed with the nursing staff.  -- Pt educated to use call light and wait for nursing staff to assist her in any mobility as she needs 24/7 assist for movement per PT pashaal

## 2022-03-24 NOTE — PROGRESS NOTES
[facility]    Progress Note - Medicine Service, [facility] TEAM 1       Date of Admission:  3/19/2022    Assessment & Plan        Khalida Redding is a 71 year old female admitted on 3/19/2022. She has a history of T2DM, HTN, HLD, dementia, epilespy, and recent CVA (11/2021) and is admitted for acute CVA and hypotension likely secondary to polypharmacy.       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 CVA 11/2021 with residual left sided weakness. MRI at OSH: small acute to subacute infarct of the right aspect of the splenium of the corpus callosum. Neurology consulted, recommended no thrombolytics due to rapid improvement of her symptoms. TTE unremarkable, EF 55-60%. Stroke symptoms resolved, suspect that symptom recrudescence at OSH was due to hypotension and decreased cerebral perfusion in the setting of polypharmacy (see below).  - Cardiac Telemetry dc'd given patient agitation with presence and >48h w/o concerning rhythm  - Continue Neuro checks qshift   - Continue PTA statin ()  - Per Neurology - OK for normotension. Avoid hypotension.  - PT/OT/SLP Consulted recommended ARU/therapy, goal for TCU placement   - Neurology Consulted. Appreciate further recommendations of management.    - Continue DAPT for 90 days, ASA 324mg thereafter   - Continue  mg BID and Keppra 2000 mg BID   - Follow up with general neurology as outpatient   - Discharge on Ziopatch (ordered)   - No further stroke work up needed      #SIRS criteria (fever 100.8F, leukocytosis, tachycardia, lactic acidosis); resolved  #Transient hypotension, likely iatrogenic secondary to medications;  "resolved  #Somnolence, resolved   She had an episode of hypotension with SBP 60's. Per chart review, patient had received labetolol (30mg), hydralazine (10mg), fentanyl (55mcg), lorazepam (1mg), gabapentin (300mg), levetiracetam (1500mg), phenytoin (100mg) in the evening prior to the episode of hypotension and somnolence. BP normalized w/ fluid resuscitation. CT C/A/P without any concerning signs for infection. SIRS criteria likely met due to iatrogenic hypotension. Her somnolence is also likely related to polypharmacy.  No identifiable source of infection on physical exam, UA, or imaging. No indication to continue antibiotics at this time.   - Monitor for signs/symptoms of infection   - Avoid polypharmacy     # Seizure disorder   She has a known seizure disorder. She was transferred for cEEG monitoring, however this is not able to be started overnight. She was loaded with 1,000mg of Keppra prior to transfer. No need for EEG per Neuro.  - Continue  mg BID and Keppra increased to 2000 mg BID per neuro     # Post-CVA rehabilitation  # Dementia, neurocognitive impairment, severe  Patient appears at her mental status baseline on examination, not consentable given inability to articulate or appreciate present post-CVA concerns and mobility issues, risks of discharge home without rehab, and alternatives to rehabilitation.   - Pt refusing \"rehab\" but amenable to \"therapy\" likely given the similarity in name associated with rehab at a TCU (which she has done before and had a poor experience with)   - Not appropriate for ARU per [facility] given dementia  - Pt holdable if threatening to leave as she needs 24/7 supervision, high risk for fall and bleed as she is on double antiplatelet and not able to work  - Delirium and Fall Precautions in Place  - Given concerns for agitation and disorientation leading to significant distress, olanzapine at bedtime PRN order placed     =====================  Chronic / Stable " "Conditions  =====================  Data   # HLD  - Continue PTA Atorvastatin   # HTN - cautious resumptions of PTA antihypertensives in the setting of medication-induced hypotension  # Elevated troponin, suspect type II MI; resolved   Troponin peaked at 640 without ACS symptoms which is peaked. This likely represents a type II MI - demand ischemia in the setting of hypotension.   - No further troponin checks    # T2DM - well-controlled with diet  Last A1c 5.5%.   - Diet Controlled.   - Monitor blood glucose with M/Th labs, given stability of sugars while inpatient    -- Start sliding scale insulin if BG > 250        Diet: Regular Diet Adult  Room Service  Snacks/Supplements Adult: Glucerna; With Meals    DVT Prophylaxis: Low Risk/Ambulatory with no VTE prophylaxis indicated  Gloria Catheter: Not present  Fluids: PO ad donte  Central Lines: None  Cardiac Monitoring: None  Code Status: Full Code      Disposition Plan   Expected Discharge: when able to find TCU  Anticipated discharge location: nursing home  / TCU for rehab     The patient's care was discussed with the Attending Physician, Dr. Dash Maldonado , patient, and patient's daughter Laura.    Sergo \"BJ\" MD Guanakito, PhD  PGY-2 Internal Medicine  [phone] [text page]    Medicine Service, [facility] TEAM [address] [place] [address]  Securely message with the Vocera Web Console (learn more here)  Text page via [employer] Paging/Directory   Please see signed in provider for up to date coverage information  ______________________________________________________________________    Interval History   Fell overnight while up with nursing assist, did not strike her head, more of a slump. Today feeling well albeit \"bored\". Asks when she can go home. Willing to engage in therapy but dislikes \"rehab\". No F, NVD, SOB, CP.     Data reviewed today: I reviewed all medications, new labs and imaging results over the last 24 hours. I personally reviewed " the EKG tracing showing Sinus rythm .    Physical Exam   Vital Signs: Temp: 98.2  F (36.8  C) Temp src: Oral BP: 124/68 Pulse: 82   Resp: 16 SpO2: 93 % O2 Device: None (Room air)    Weight: 133 lbs 6.05 oz  General Appearance: Not in distress, lying comfortably on her bed  Respiratory: good air entry bilateral, no added breath sounds  Cardiovascular: S1, S2 well heard, no murmur or gallop   GI: Non tender, non distended, no hepatosplenomegally  Skin: warm to touch  CNS: A &Ox3, CNII- CNXII grossly intact, no focal neurological deficit    Data   Recent Labs   Lab 03/24/22  0508 03/23/22  1615 03/23/22  0343 03/22/22  0439 03/21/22  1701 03/21/22  1347 03/21/22  0725 03/21/22  0458 03/18/22  2231 03/18/22  1440   WBC 5.4  --   --  5.5  --   --   --  5.0   < >  --    HGB 11.2*  --   --  12.2  --   --   --  12.6   < >  --    MCV 96  --   --  96  --   --   --  96   < >  --      --   --  251  --   --   --  243   < >  --    INR  --   --   --   --   --   --   --   --   --  0.98     --   --  143  --   --   --  142   < >  --    POTASSIUM 3.5  --   --  3.8  --  3.7  --  3.2*   < >  --    CHLORIDE 108  --   --  110*  --   --   --  111*   < >  --    CO2 27  --   --  28  --   --   --  24   < >  --    BUN 10  --   --  12  --   --   --  11   < >  --    CR 0.58  --   --  0.62  --   --   --  0.53   < >  --    ANIONGAP 6  --   --  5  --   --   --  7   < >  --    PINA 8.6  --   --  8.2*  --   --   --  8.4*   < >  --    * 132* 139* 136*   < >  --    < > 106*   < >  --    ALBUMIN 2.9*  --   --  3.1*  --   --   --  3.2*   < >  --    PROTTOTAL 6.0*  --   --  6.3*  --   --   --  6.8   < >  --    BILITOTAL 0.4  --   --  0.4  --   --   --  0.4   < >  --    ALKPHOS 82  --   --  80  --   --   --  80   < >  --    ALT 12  --   --  11  --   --   --  13   < >  --    AST 13  --   --  10  --   --   --  12   < >  --     < > = values in this interval not displayed.     No results found for this or any previous visit (from the past 24  hour(s)).

## 2022-03-24 NOTE — PROGRESS NOTES
Pt is now admitted.     Inpatient CC team to enter new order upon discharge. SW consult 3/21/22.    Order canceled in WQ.     ONESIMO Mendez   Social Work Clinic Care Coordinator   [facility]

## 2022-03-24 NOTE — PROGRESS NOTES
Pt cooperative with nursing care. Pt zena any pain. Pt up with assist x1, walker and gait belt. Bilateral lower extremities weakness when returning from bathroom. Frequent void. Pt calls appropriately and makes needs know. Bed alarm ON. Seizure precautions maintained.     Continue with current care and monitor.

## 2022-03-24 NOTE — PLAN OF CARE
Afebrile, VSS on RA.  Disoriented to time, place and situation.  Denies pain and nausea.  Tolerating regular diet, appetite fair.  Voiding.  No BM this shift.   Up with assist of 2, GB and walker to the bathroom and up to chair.  Blanchable redness on coccyx.  No PIV.  Continue plan of care.

## 2022-03-24 NOTE — PROGRESS NOTES
Care Management Follow Up    Length of Stay (days): 5    Expected Discharge Date: 03/25/2022     Concerns to be Addressed: discharge planning     Patient plan of care discussed at interdisciplinary rounds: Yes    Anticipated Discharge Disposition: Transitional Care     Anticipated Discharge Services: IMM, PAS, Transportation TBD  Anticipated Discharge DME: None    Patient/family educated on Medicare website which has current facility and service quality ratings: yes  Education Provided on the Discharge Plan: yes  Patient/Family in Agreement with the Plan: yes    Referrals Placed by CM/SW: Internal Clinic Care Coordination, Post Acute Facilities  Private pay costs discussed: TBD    Additional Information:  Laura, Daughter ([phone]): SW spoke with Laura to discuss TCU options as patient is ready for discharge. Laura is okay with referrals in the [place], [place], [place], and [place]. Laura does not want [facility].     SW sent the following referrals:     [facility] ([phone]): Referral sent via [facility].     [facility] [place] ([phone]): Referral sent via [facility].     [facility] ([phone]):    [facility] (Marilyn Casanova Liaison [phone]): Referral sent via Epic.     [facility] in [place] ([phone]): Referral sent via Epic.     [facility] in [place] ([phone]): Referral sent via Epic.     [facility] in [place] ([phone]): Referral sent via Epic.     [facility] (Marilyn Casanova Liaison [phone]): Referral sent via Epic.     [facility] ([phone]):   Referral sent via Epic.     MELISSA Tristan, SANJAY  7A/5C Medicine   Ph: [phone]  Pager: [phone]

## 2022-03-24 NOTE — PROVIDER NOTIFICATION
"Paged Xavier intern, \"7A, 3297-1, CLEM MADRID 1  Pt had an assisted fall in the bathroom. She didn't hit her head, her legs gave out when 2 RNs were walking her to the toilet. Neuros are at baseline.  Soo [phone]\"    Assisted fall documented in pt's flow sheets    Provider will come up to assess pt.  "

## 2022-03-25 ENCOUNTER — APPOINTMENT (OUTPATIENT)
Dept: OCCUPATIONAL THERAPY | Facility: CLINIC | Age: 72
DRG: 064 | End: 2022-03-25
Attending: PEDIATRICS
Payer: COMMERCIAL

## 2022-03-25 ENCOUNTER — APPOINTMENT (OUTPATIENT)
Dept: PHYSICAL THERAPY | Facility: CLINIC | Age: 72
DRG: 064 | End: 2022-03-25
Attending: PEDIATRICS
Payer: COMMERCIAL

## 2022-03-25 PROCEDURE — 97530 THERAPEUTIC ACTIVITIES: CPT | Mod: GP

## 2022-03-25 PROCEDURE — 250N000013 HC RX MED GY IP 250 OP 250 PS 637

## 2022-03-25 PROCEDURE — 97535 SELF CARE MNGMENT TRAINING: CPT | Mod: GO

## 2022-03-25 PROCEDURE — 120N000011 HC R&B TRANSPLANT UMMC

## 2022-03-25 PROCEDURE — 97530 THERAPEUTIC ACTIVITIES: CPT | Mod: GO

## 2022-03-25 PROCEDURE — 99233 SBSQ HOSP IP/OBS HIGH 50: CPT | Mod: GC | Performed by: STUDENT IN AN ORGANIZED HEALTH CARE EDUCATION/TRAINING PROGRAM

## 2022-03-25 PROCEDURE — 250N000013 HC RX MED GY IP 250 OP 250 PS 637: Performed by: HOSPITALIST

## 2022-03-25 RX ADMIN — ASPIRIN 81 MG CHEWABLE TABLET 81 MG: 81 TABLET CHEWABLE at 09:09

## 2022-03-25 RX ADMIN — LISINOPRIL 20 MG: 20 TABLET ORAL at 09:09

## 2022-03-25 RX ADMIN — LEVETIRACETAM 1000 MG: 500 TABLET, FILM COATED ORAL at 09:09

## 2022-03-25 RX ADMIN — PHENYTOIN 100 MG: 50 TABLET, CHEWABLE ORAL at 20:50

## 2022-03-25 RX ADMIN — CLOPIDOGREL BISULFATE 75 MG: 75 TABLET ORAL at 09:10

## 2022-03-25 RX ADMIN — LEVETIRACETAM 1000 MG: 500 TABLET, FILM COATED ORAL at 20:50

## 2022-03-25 RX ADMIN — MEMANTINE 10 MG: 10 TABLET ORAL at 09:11

## 2022-03-25 RX ADMIN — ATORVASTATIN CALCIUM 40 MG: 40 TABLET, FILM COATED ORAL at 09:09

## 2022-03-25 RX ADMIN — CYANOCOBALAMIN TAB 500 MCG 1000 MCG: 500 TAB at 09:10

## 2022-03-25 RX ADMIN — PANTOPRAZOLE SODIUM 40 MG: 40 TABLET, DELAYED RELEASE ORAL at 09:10

## 2022-03-25 RX ADMIN — GABAPENTIN 300 MG: 300 CAPSULE ORAL at 20:50

## 2022-03-25 RX ADMIN — PHENYTOIN 100 MG: 50 TABLET, CHEWABLE ORAL at 09:10

## 2022-03-25 ASSESSMENT — ACTIVITIES OF DAILY LIVING (ADL)
ADLS_ACUITY_SCORE: 22

## 2022-03-25 NOTE — PROGRESS NOTES
Care Management Follow Up    Length of Stay (days): 6    Expected Discharge Date: TBD, need accepting facility     Concerns to be Addressed: discharge planning     Patient plan of care discussed at interdisciplinary rounds: Yes     Anticipated Discharge Disposition: Transitional Care     Anticipated Discharge Services: IMM, PAS, Transportation TBD  Anticipated Discharge DME: None     Patient/family educated on Medicare website which has current facility and service quality ratings: yes  Education Provided on the Discharge Plan: yes  Patient/Family in Agreement with the Plan: yes     Referrals Placed by CM/SW: Internal Clinic Care Coordination, Post Acute Facilities  Private pay costs discussed: TBD    Active Referrals:  [facility]   [facility] [place]  [facility] [place]     Denied Referrals:  [facility] in [place]  [facility] [place]  [facility] [place]   [facility]      Additional Information:  [facility] ([phone]): SW left a message with admissions requesting a call back.      [facility] ([phone]): SW left a message with admissions requesting a call back.     [facility] ([phone]): SW left a message with admissions requesting a call back.     [facility] [place] (Marilyn Casanova Liaison [phone]): Sent email requesting update.      [facility] [place] ([phone]): SW heard back from admissions and they cannot take her as they do not take her insurance.      [facility] [place] ([phone]): SW heard back from admissions and they cannot take her as they do not take her insurance.      [facility] [place] ([phone]): SW left a message with admissions requesting a call back. SW heard back from admissions that they do not take her insurance.      [facility] [place] (Marilyn Casanova Liaison [phone]): Sent email  requesting update.      [facility] ([phone]): SW left a message with admissions requesting a call back. SW heard back from admissions and they have no beds for the foreseeable future.     MELISSA Tristan, [facility]  7A/5C Medicine   Ph: [phone]  Pager: [phone]

## 2022-03-25 NOTE — PLAN OF CARE
/79 (BP Location: Right arm)   Pulse 86   Temp 97.8  F (36.6  C) (Oral)   Resp 16   SpO2 92%      Neuro: Pt confused at times. Needs reorientation and reminder.   Respiratory: Pt zena sob with no respiratory distress noted.   Cardio: Pt zena cp, c/o dizziness with moderate activity. VSS  GI: LBM 3/25. Pt zena n/v. Abs soft with normoactive BS  : Frequent urination/incontinent at times   Skin: Blanchable coccyx.    BS: As needed   Mobility: Pt up with assist x2, walker, gait belt. Pt noted to be weak with moderate activity. Pt is high fall risk. Bedside commode. Frequent rounds.   Pain: Zena any pain   Plan of care: Discussed with patient and MD. Will continue with current care and monitor.

## 2022-03-25 NOTE — PLAN OF CARE
"BP (!) 146/71 (BP Location: Right arm)   Pulse 81   Temp 97.8  F (36.6  C) (Oral)   Resp 18   Ht 1.676 m (5' 6\")   Wt 60.5 kg (133 lb 6.1 oz)   SpO2 95%   BMI 21.53 kg/m      Shift: 0266-2815  VS: stable on RA, afebrile  Cardiac: WDL  Neuro: disoriented to time , place, situation   BG: none   Respiratory: non labored breathing, clear lung sounds   Pain/Nausea/PRN: denies nausea and pain   Diet: Regular   LDA: No IV access   GI/: voids not saving, no bm   Skin: no new findings   Mobility: L sided weakness, assist x2 g/b and walker   Plan: Family called in the evening requested to be part of teams visit with the patient in the morning, will tell oncoming nurse     Will give report to oncoming nurse. Pt left in stable condition, care relinquished at this time.                         "

## 2022-03-25 NOTE — PROGRESS NOTES
Note entered in error.    MELISSA Tristan, LGSW  7A/5C Medicine   Ph: [phone]  Pager: [phone]

## 2022-03-25 NOTE — PROGRESS NOTES
[facility]    Progress Note - Medicine Service, [facility] TEAM 1       Date of Admission:  3/19/2022    Assessment & Plan          Khalida Redding is a 71 year old female admitted on 3/19/2022. She has a history of T2DM, HTN, HLD, dementia, epilespy, and recent CVA (11/2021) and is admitted for acute CVA and hypotension likely secondary to polypharmacy.       Changes today  - Goal for discharge to TCU, declined by ARU given severe dementia  - Medically ready for discharge to TCU   - Family updated at bedside  - Appreciate ongoing assistance from PT/OT   - Maximize day/night variability to promote normal sleep cycle     #Acute CVA of R splenium of corpus callosum   #Prior CVA with residual left sided hemiparesis   New onset confusion, slurred speech, and left facial droop around 09:30 on 3/18/22. CVA 11/2021 with residual left sided weakness. MRI at OSH: small acute to subacute infarct of the right aspect of the splenium of the corpus callosum. Neurology consulted, recommended no thrombolytics due to rapid improvement of her symptoms. TTE unremarkable, EF 55-60%. Stroke symptoms resolved, suspect that symptom recrudescence at OSH was due to hypotension and decreased cerebral perfusion in the setting of polypharmacy (see below).  - Cardiac Telemetry dc'd given patient agitation with presence and >48h w/o concerning rhythm  - Continue Neuro checks qshift   - Continue PTA statin ()  - Per Neurology - OK for normotension. Avoid hypotension.  - PT/OT/SLP Consulted recommended ARU/therapy, goal for TCU placement   - Neurology Consulted. Appreciate further recommendations of management.               - Continue DAPT for 90 days, ASA 324mg thereafter              - Continue  mg BID and Keppra 2000 mg BID              - Follow up with general neurology as outpatient              - Discharge on Ziopatch (ordered)              - No further stroke work up needed  "     #SIRS criteria (fever 100.8F, leukocytosis, tachycardia, lactic acidosis); resolved  #Transient hypotension, likely iatrogenic secondary to medications; resolved  #Somnolence, resolved   She had an episode of hypotension with SBP 60's. Per chart review, patient had received labetolol (30mg), hydralazine (10mg), fentanyl (55mcg), lorazepam (1mg), gabapentin (300mg), levetiracetam (1500mg), phenytoin (100mg) in the evening prior to the episode of hypotension and somnolence. BP normalized w/ fluid resuscitation. CT C/A/P without any concerning signs for infection. SIRS criteria likely met due to iatrogenic hypotension. Her somnolence is also likely related to polypharmacy.  No identifiable source of infection on physical exam, UA, or imaging. No indication to continue antibiotics at this time.   - Monitor for signs/symptoms of infection   - Avoid polypharmacy     # Seizure disorder   She has a known seizure disorder. She was transferred for cEEG monitoring, however this is not able to be started overnight. She was loaded with 1,000mg of Keppra prior to transfer. No need for EEG per Neuro.  - Continue  mg BID and Keppra increased to 2000 mg BID per neuro      # Post-CVA rehabilitation  # Dementia, neurocognitive impairment, severe  Patient appears at her mental status baseline on examination, not consentable given inability to articulate or appreciate present post-CVA concerns and mobility issues, risks of discharge home without rehab, and alternatives to rehabilitation.   - Pt refusing \"rehab\" but amenable to \"therapy\" likely given the similarity in name associated with rehab at a TCU (which she has done before and had a poor experience with)   - Not appropriate for ARU per [facility] given dementia  - Pt holdable if threatening to leave as she needs 24/7 supervision, high risk for fall and bleed as she is on double antiplatelet and not able to work  - Delirium and Fall Precautions in Place  - Given concerns " for agitation and disorientation leading to significant distress, olanzapine at bedtime PRN order placed     =====================  Chronic / Stable Conditions  =====================  # HLD  - Continue PTA Atorvastatin   # HTN - cautious resumptions of PTA antihypertensives in the setting of medication-induced hypotension  # Elevated troponin, suspect type II MI; resolved   Troponin peaked at 640 without ACS symptoms which is peaked. This likely represents a type II MI - demand ischemia in the setting of hypotension.   - No further troponin checks     # T2DM - well-controlled with diet  Last A1c 5.5%.   - Diet Controlled.   - Monitor blood glucose with M/Th labs, given stability of sugars while inpatient               -- Start sliding scale insulin if BG > 250       Diet: Regular Diet Adult  Room Service  Snacks/Supplements Adult: Glucerna; With Meals    DVT Prophylaxis: DAPT  Gloria Catheter: Not present  Fluids: None   Central Lines: None  Cardiac Monitoring: None  Code Status: Full Code      Disposition Plan   Expected Discharge: 03/28/2022     Anticipated discharge location: nursing home    Delays:     Placement - TCU            The patient's care was discussed with the Attending Physician, Dr. Maldonado.    Sailaja Sales MD  Medicine Service, [facility] TEAM 1  [facility]  Securely message with the Vocera Web Console (learn more here)  Text page via [facility] Paging/Directory   Please see signed in provider for up to date coverage information      Clinically Significant Risk Factors Present on Admission                    ______________________________________________________________________    Interval History   No acute events overnight. Khalida reports that she is feeling OK today. She is tired from working with therapy this AM. Eating well. Family updated at bedside.     Data reviewed today: I reviewed all medications, new labs and imaging results over the last 24 hours. I  personally reviewed no images or EKG's today.    Physical Exam   Vital Signs: Temp: 97.4  F (36.3  C) Temp src: Oral BP: 129/65 Pulse: 91   Resp: 16 SpO2: 92 % O2 Device: None (Room air)    Weight: 129 lbs 10.09 oz  General Appearance: Comfortable in bed, no acute distress   Respiratory: nonlabored breathing, no tachypnea   Cardiovascular: no cyanosis present   GI: nondistended   Skin: no new ecchymosis or lesions     Data   Recent Labs   Lab 03/24/22  2217 03/24/22  0508 03/23/22  1615 03/23/22  0343 03/22/22  0439 03/21/22  1701 03/21/22  1347 03/21/22  0725 03/21/22  0458   WBC  --  5.4  --   --  5.5  --   --   --  5.0   HGB  --  11.2*  --   --  12.2  --   --   --  12.6   MCV  --  96  --   --  96  --   --   --  96   PLT  --  266  --   --  251  --   --   --  243   NA  --  141  --   --  143  --   --   --  142   POTASSIUM  --  3.5  --   --  3.8  --  3.7  --  3.2*   CHLORIDE  --  108  --   --  110*  --   --   --  111*   CO2  --  27  --   --  28  --   --   --  24   BUN  --  10  --   --  12  --   --   --  11   CR  --  0.58  --   --  0.62  --   --   --  0.53   ANIONGAP  --  6  --   --  5  --   --   --  7   PINA  --  8.6  --   --  8.2*  --   --   --  8.4*   * 146* 132*   < > 136*   < >  --    < > 106*   ALBUMIN  --  2.9*  --   --  3.1*  --   --   --  3.2*   PROTTOTAL  --  6.0*  --   --  6.3*  --   --   --  6.8   BILITOTAL  --  0.4  --   --  0.4  --   --   --  0.4   ALKPHOS  --  82  --   --  80  --   --   --  80   ALT  --  12  --   --  11  --   --   --  13   AST  --  13  --   --  10  --   --   --  12    < > = values in this interval not displayed.     No results found for this or any previous visit (from the past 24 hour(s)).  Medications       aspirin  81 mg Oral Daily     atorvastatin  40 mg Oral Daily     clopidogrel  75 mg Oral Daily     cyanocobalamin  1,000 mcg Oral Daily     gabapentin  300 mg Oral At Bedtime     levETIRAcetam  1,000 mg Oral BID     lisinopril  20 mg Oral Daily     memantine  10 mg Oral Daily      pantoprazole  40 mg Oral QAM AC     phenytoin  100 mg Oral BID     polyethylene glycol  17 g Oral Daily     sodium chloride (PF)  3 mL Intracatheter Q8H

## 2022-03-26 PROCEDURE — 120N000011 HC R&B TRANSPLANT UMMC

## 2022-03-26 PROCEDURE — 250N000013 HC RX MED GY IP 250 OP 250 PS 637

## 2022-03-26 PROCEDURE — 250N000013 HC RX MED GY IP 250 OP 250 PS 637: Performed by: HOSPITALIST

## 2022-03-26 PROCEDURE — 99233 SBSQ HOSP IP/OBS HIGH 50: CPT | Mod: GC | Performed by: STUDENT IN AN ORGANIZED HEALTH CARE EDUCATION/TRAINING PROGRAM

## 2022-03-26 RX ADMIN — ACETAMINOPHEN 975 MG: 325 TABLET ORAL at 07:45

## 2022-03-26 RX ADMIN — PANTOPRAZOLE SODIUM 40 MG: 40 TABLET, DELAYED RELEASE ORAL at 07:47

## 2022-03-26 RX ADMIN — LISINOPRIL 20 MG: 20 TABLET ORAL at 07:47

## 2022-03-26 RX ADMIN — ATORVASTATIN CALCIUM 40 MG: 40 TABLET, FILM COATED ORAL at 07:47

## 2022-03-26 RX ADMIN — CYANOCOBALAMIN TAB 500 MCG 1000 MCG: 500 TAB at 07:47

## 2022-03-26 RX ADMIN — LEVETIRACETAM 1000 MG: 500 TABLET, FILM COATED ORAL at 07:46

## 2022-03-26 RX ADMIN — PHENYTOIN 100 MG: 50 TABLET, CHEWABLE ORAL at 07:47

## 2022-03-26 RX ADMIN — GABAPENTIN 300 MG: 300 CAPSULE ORAL at 20:56

## 2022-03-26 RX ADMIN — ASPIRIN 81 MG CHEWABLE TABLET 81 MG: 81 TABLET CHEWABLE at 07:46

## 2022-03-26 RX ADMIN — MEMANTINE 10 MG: 10 TABLET ORAL at 07:47

## 2022-03-26 RX ADMIN — CLOPIDOGREL BISULFATE 75 MG: 75 TABLET ORAL at 07:47

## 2022-03-26 RX ADMIN — PHENYTOIN 100 MG: 50 TABLET, CHEWABLE ORAL at 20:56

## 2022-03-26 RX ADMIN — LEVETIRACETAM 1000 MG: 500 TABLET, FILM COATED ORAL at 20:56

## 2022-03-26 ASSESSMENT — ACTIVITIES OF DAILY LIVING (ADL)
ADLS_ACUITY_SCORE: 23
ADLS_ACUITY_SCORE: 23
ADLS_ACUITY_SCORE: 22
ADLS_ACUITY_SCORE: 23
ADLS_ACUITY_SCORE: 21
ADLS_ACUITY_SCORE: 27
ADLS_ACUITY_SCORE: 21
ADLS_ACUITY_SCORE: 23
ADLS_ACUITY_SCORE: 23
ADLS_ACUITY_SCORE: 21
ADLS_ACUITY_SCORE: 22
ADLS_ACUITY_SCORE: 21
ADLS_ACUITY_SCORE: 23
ADLS_ACUITY_SCORE: 22
ADLS_ACUITY_SCORE: 23
ADLS_ACUITY_SCORE: 23
ADLS_ACUITY_SCORE: 27
ADLS_ACUITY_SCORE: 21
ADLS_ACUITY_SCORE: 21
ADLS_ACUITY_SCORE: 23

## 2022-03-26 NOTE — PROGRESS NOTES
Fall Documentation Note    Subjective:  Writer was paged at 23:24 after patient had a fall event. Per RN, patient was with [facility] staff, when she slid from the edge of the bed to the floor. Patient denies any pain or discomfort, or bleeding.      Objective:  Vitals signs stable    HEENT: Atruamtic  Neuro/Psych: Alert, oriented to self and location, not year (1922) (baseline per RN)  Abdomen: Soft, nontender  Skin: No visible signs of trauma, bleeding, or ecchymosis on head or hip regions      Assessment & Plan:  Patient appears to have had a low-impact fall on her buttocks and hip. Patient appears to be at baseline from a neurological standpoint, and is alert and answers questions appropriately. At this point, given no clinical changes, additional workup and management is not indicated at this time. However, if new changes to mental status or exam are present, would need to consider additional workup.

## 2022-03-26 NOTE — PROGRESS NOTES
[facility]    Progress Note - Medicine Service, [facility] TEAM 1       Date of Admission:  3/19/2022    Assessment & Plan        Khalida Redding is a 71 year old female admitted on 3/19/2022. She has a history of T2DM, HTN, HLD, dementia, epilespy, and recent CVA (11/2021) and is admitted for acute CVA and hypotension likely secondary to polypharmacy.       Changes today  - Goal for discharge to TCU, declined by ARU given severe dementia  - Medically ready for discharge to TCU   - Slumped to floor yesterday without injury, 3rd fall this hospitalization  - Checking for orthostasis given recent falls  - Appreciate ongoing assistance from PT/OT      #Acute CVA of R splenium of corpus callosum   #Prior CVA with residual left sided hemiparesis   New onset confusion, slurred speech, and left facial droop around 09:30 on 3/18/22. CVA 11/2021 with residual left sided weakness. MRI at OSH: small acute to subacute infarct of the right aspect of the splenium of the corpus callosum. Neurology consulted, recommended no thrombolytics due to rapid improvement of her symptoms. TTE unremarkable, EF 55-60%. Stroke symptoms resolved, suspect that symptom recrudescence at OSH was due to hypotension and decreased cerebral perfusion in the setting of polypharmacy (see below).  - Cardiac Telemetry dc'd given patient agitation with presence and >48h w/o concerning rhythm  - Continue PTA statin ()  - Per Neurology - OK for normotension. Avoid hypotension.  - PT/OT/SLP Consulted recommended ARU/therapy, goal for TCU placement   - Neurology Consulted. Appreciate further recommendations of management.               - Continue DAPT for 90 days, ASA 324mg thereafter              - Continue  mg BID and Keppra 2000 mg BID              - Follow up with general neurology as outpatient              - Discharge on Ziopatch (ordered)              - No further stroke work up needed      #  "Post-CVA rehabilitation  # Dementia, neurocognitive impairment, severe  Patient appears at her mental status baseline on examination, not consentable given inability to articulate or appreciate present post-CVA concerns and mobility issues, risks of discharge home without rehab, and alternatives to rehabilitation.   - Pt refusing \"rehab\" but amenable to \"therapy\" likely given the similarity in name associated with rehab at a TCU (which she has done before and had a poor experience with)   - Not appropriate for ARU per [facility] given dementia  - Pt holdable if threatening to leave as she needs 24/7 supervision, high risk for fall and bleed as she is on double antiplatelet and not able to work  - Delirium and Fall Precautions in Place  - Given concerns for agitation and disorientation leading to significant distress, olanzapine at bedtime PRN order placed, not used as yet     # Seizure disorder   She has a known seizure disorder. She was transferred for cEEG monitoring, however this is not able to be started overnight. She was loaded with 1,000mg of Keppra prior to transfer. No need for EEG per Neuro.  - Continue  mg BID and Keppra increased to 2000 mg BID per neuro         =====================  Chronic / Stable Conditions  =====================  Data   # HLD  - Continue PTA Atorvastatin   # HTN - cautious resumptions of PTA antihypertensives in the setting of medication-induced hypotension  # Elevated troponin, suspect type II MI; resolved   Troponin peaked at 640 without ACS symptoms which is peaked. This likely represents a type II MI - demand ischemia in the setting of hypotension.   - No further troponin checks     # T2DM - well-controlled with diet  Last A1c 5.5%.   - Diet Controlled.   - Monitor blood glucose with M/Th labs, given stability of sugars while inpatient               -- Start sliding scale insulin if BG > 250    #SIRS criteria (fever 100.8F, leukocytosis, tachycardia, lactic acidosis); " "resolved  #Transient hypotension, likely iatrogenic secondary to medications; resolved  #Somnolence, resolved   She had an episode of hypotension with SBP 60's. Per chart review, patient had received labetolol (30mg), hydralazine (10mg), fentanyl (55mcg), lorazepam (1mg), gabapentin (300mg), levetiracetam (1500mg), phenytoin (100mg) in the evening prior to the episode of hypotension and somnolence. BP normalized w/ fluid resuscitation. CT C/A/P without any concerning signs for infection. SIRS criteria likely met due to iatrogenic hypotension. Her somnolence is also likely related to polypharmacy.  No identifiable source of infection on physical exam, UA, or imaging. No indication to continue antibiotics at this time.   - Monitor for signs/symptoms of infection   - Avoid polypharmacy     Diet: Regular Diet Adult  Room Service  Snacks/Supplements Adult: Glucerna; With Meals    DVT Prophylaxis: DAPT  Gloria Catheter: Not present  Fluids: None   Central Lines: None  Cardiac Monitoring: None  Code Status: Full Code      Disposition Plan   Expected Discharge: 03/28/2022     Anticipated discharge location: nursing home    Delays:     Placement - TCU            The patient's care was discussed with the Attending Physician, Dr. Maldonado.    Sergo \"BJ\" MD [place], PhD  PGY-2 Internal Medicine  [phone] [text page]    Medicine Service, [facility] TEAM [address] [place] [address]  Securely message with the Vocera Web Console (learn more here)  Text page via [facility] Paging/Directory   Please see signed in provider for up to date coverage information      Clinically Significant Risk Factors Present on Admission     Severe neurocognitive impairment              ______________________________________________________________________    Interval History   Patient slumped from edge of bed to floor overnight. No injury sustained.      Data reviewed today: I reviewed all medications, new labs and imaging " results over the last 24 hours. I personally reviewed no images or EKG's today.    Physical Exam   Vital Signs: Temp: 97.8  F (36.6  C) Temp src: Oral BP: 122/70 Pulse: 81   Resp: 18 SpO2: 96 % O2 Device: None (Room air)    Weight: 129 lbs 10.09 oz  General Appearance: Comfortable in bed, no acute distress   Respiratory: nonlabored breathing, no tachypnea   Cardiovascular: no cyanosis present   GI: nondistended   Skin: no new ecchymosis or lesions     Data   Recent Labs   Lab 03/24/22  2217 03/24/22  0508 03/23/22  1615 03/23/22  0343 03/22/22  0439 03/21/22  1701 03/21/22  1347 03/21/22  0725 03/21/22  0458   WBC  --  5.4  --   --  5.5  --   --   --  5.0   HGB  --  11.2*  --   --  12.2  --   --   --  12.6   MCV  --  96  --   --  96  --   --   --  96   PLT  --  266  --   --  251  --   --   --  243   NA  --  141  --   --  143  --   --   --  142   POTASSIUM  --  3.5  --   --  3.8  --  3.7  --  3.2*   CHLORIDE  --  108  --   --  110*  --   --   --  111*   CO2  --  27  --   --  28  --   --   --  24   BUN  --  10  --   --  12  --   --   --  11   CR  --  0.58  --   --  0.62  --   --   --  0.53   ANIONGAP  --  6  --   --  5  --   --   --  7   PINA  --  8.6  --   --  8.2*  --   --   --  8.4*   * 146* 132*   < > 136*   < >  --    < > 106*   ALBUMIN  --  2.9*  --   --  3.1*  --   --   --  3.2*   PROTTOTAL  --  6.0*  --   --  6.3*  --   --   --  6.8   BILITOTAL  --  0.4  --   --  0.4  --   --   --  0.4   ALKPHOS  --  82  --   --  80  --   --   --  80   ALT  --  12  --   --  11  --   --   --  13   AST  --  13  --   --  10  --   --   --  12    < > = values in this interval not displayed.     No results found for this or any previous visit (from the past 24 hour(s)).  Medications       aspirin  81 mg Oral Daily     atorvastatin  40 mg Oral Daily     clopidogrel  75 mg Oral Daily     cyanocobalamin  1,000 mcg Oral Daily     gabapentin  300 mg Oral At Bedtime     levETIRAcetam  1,000 mg Oral BID     lisinopril  20 mg Oral  Daily     memantine  10 mg Oral Daily     pantoprazole  40 mg Oral QAM AC     phenytoin  100 mg Oral BID     polyethylene glycol  17 g Oral Daily     sodium chloride (PF)  3 mL Intracatheter Q8H

## 2022-03-26 NOTE — PLAN OF CARE
"/70 (BP Location: Right arm)   Pulse 81   Temp 97.8  F (36.6  C) (Oral)   Resp 18   Ht 1.676 m (5' 6\")   Wt 58.8 kg (129 lb 10.1 oz)   SpO2 96%   BMI 20.92 kg/m      Shift: 8962-0615  VS: stable on RA, afebrile  Cardiac: WDL  Neuro: Disoriented to time, place, and situation   BG: none  Respiratory: non labored breathing, clear lung sounds   Pain/Nausea/PRN: denies nausea and pain   Diet: Regular   LDA: no iv access   GI/: voiding not saving, 1 bm   Skin: no new findings   Mobility: assist x2, g/b , walker   Plan: Continue plan of care    Will give report to oncoming nurse. Pt left in stable condition, care relinquished at this time.                         "

## 2022-03-26 NOTE — PROVIDER NOTIFICATION
"Fall Note:     At 2315 I was called into the room by NST. Patient was on the floor on her bottom with her back against the edge of the bed. Patient stated \"I got up from the bed to the commode by myself.\" NST stated that they helped patient onto the floor safely when seeing they were slipping from the edge of the bed to the floor. Patient denied pain or any discomfort. Assessed patient no new injuries noted. Notified Omar Casillas MD, came to bedside and assessed patient. No new orders were given.   "

## 2022-03-27 PROCEDURE — 99233 SBSQ HOSP IP/OBS HIGH 50: CPT | Mod: GC | Performed by: STUDENT IN AN ORGANIZED HEALTH CARE EDUCATION/TRAINING PROGRAM

## 2022-03-27 PROCEDURE — 250N000013 HC RX MED GY IP 250 OP 250 PS 637

## 2022-03-27 PROCEDURE — 250N000013 HC RX MED GY IP 250 OP 250 PS 637: Performed by: HOSPITALIST

## 2022-03-27 PROCEDURE — 120N000011 HC R&B TRANSPLANT UMMC

## 2022-03-27 RX ADMIN — LEVETIRACETAM 1000 MG: 500 TABLET, FILM COATED ORAL at 08:17

## 2022-03-27 RX ADMIN — ATORVASTATIN CALCIUM 40 MG: 40 TABLET, FILM COATED ORAL at 08:17

## 2022-03-27 RX ADMIN — GABAPENTIN 300 MG: 300 CAPSULE ORAL at 20:51

## 2022-03-27 RX ADMIN — CYANOCOBALAMIN TAB 500 MCG 1000 MCG: 500 TAB at 08:18

## 2022-03-27 RX ADMIN — PHENYTOIN 100 MG: 50 TABLET, CHEWABLE ORAL at 20:51

## 2022-03-27 RX ADMIN — CLOPIDOGREL BISULFATE 75 MG: 75 TABLET ORAL at 08:18

## 2022-03-27 RX ADMIN — PHENYTOIN 100 MG: 50 TABLET, CHEWABLE ORAL at 08:28

## 2022-03-27 RX ADMIN — ASPIRIN 81 MG CHEWABLE TABLET 81 MG: 81 TABLET CHEWABLE at 08:18

## 2022-03-27 RX ADMIN — PANTOPRAZOLE SODIUM 40 MG: 40 TABLET, DELAYED RELEASE ORAL at 08:18

## 2022-03-27 RX ADMIN — LEVETIRACETAM 1000 MG: 500 TABLET, FILM COATED ORAL at 20:51

## 2022-03-27 RX ADMIN — LISINOPRIL 20 MG: 20 TABLET ORAL at 08:18

## 2022-03-27 RX ADMIN — MEMANTINE 10 MG: 10 TABLET ORAL at 08:28

## 2022-03-27 RX ADMIN — ACETAMINOPHEN 975 MG: 325 TABLET ORAL at 08:15

## 2022-03-27 ASSESSMENT — ACTIVITIES OF DAILY LIVING (ADL)
ADLS_ACUITY_SCORE: 27

## 2022-03-27 NOTE — PLAN OF CARE
"/89 (BP Location: Right arm)   Pulse 81   Temp 98  F (36.7  C) (Oral)   Resp 18   Ht 1.676 m (5' 6\")   Wt 58.8 kg (129 lb 10.1 oz)   SpO2 94%   BMI 20.92 kg/m      Shift: [id number]  VS: stable  on RA, afebrile  Cardiac: WDL   Neuro: Disoriented to time, place, and situation. Sitter at bedside   BG: None   Respiratory: non labored breathing, clear lung sounds   Pain/Nausea/PRN: denies pain and nausea   Diet: Regular   LDA: no IV access, team okayed   GI/: 1 bm, voiding   Skin: no new findings   Mobility: assist x2 g/b and walker   Plan: Consider keeping attendant.Patient insisted on walking to the bathroom instead of using the bedside commode this evening. Stated \"I don't want to use the commode, I don't care if I fall.\" Patient previously has felt weak and about fall on the way back from the bathroom to the bed.     Will give report to oncoming nurse. Pt left in stable condition, care relinquished at this time.                         "

## 2022-03-27 NOTE — PROGRESS NOTES
Care given [id number]    Pt alert and oriented x3. Pt c/o HA, tylenol was given this morning with good result. Pt denies blurry vision/dizzness at rest. Extraocular motor mov't intact. Pt gets up with assist x1-2 with gait belt to commode/chair. Pt has been on chair for meals. Unable to tolerate sitting more than 60 min on a chair. Pt can able to makes needs know but forgetful.     Plan of care discussed with patient and MD.     /64  Pulse 77   Temp 98.7  F (37.1  C) (Oral)   Resp 18   SpO2 95% on RA

## 2022-03-27 NOTE — PROGRESS NOTES
/66  Pulse 76   Temp 98.9  F (37.2  C) (Oral)   Resp 18   SpO2 95%     Neuro: Pt alert and oriented x3 (self, place, situation).   Respiratory: Pt zena sob with no respiratory distress noted.     Cardio: Pt denies dizziness at rest. Orthostatic BP taken this AM.    GI: Abd soft, denies n/v and   : Pt incontinent at times   Skin: intact     Mobility: Pt is weak. Gets up to pivot transfer to commode with assist x1-2.   Pain: C/o HA earlier this AM. Tylenol was given with good result.   Plan of care: Continue with 1:1 and monitor.

## 2022-03-27 NOTE — PROGRESS NOTES
[facility]    Progress Note - Medicine Service, [facility] TEAM 1       Date of Admission:  3/19/2022    Assessment & Plan        Khalida Redding is a 71 year old female admitted on 3/19/2022. She has a history of T2DM, HTN, HLD, dementia, epilespy, and recent CVA (11/2021) and is admitted for acute CVA and hypotension likely secondary to polypharmacy.       Changes today:  - Goal for discharge to TCU, declined by ARU given severe dementia  - 1:1 sitter at bedside given pt's impulsiveness     #Acute CVA of R splenium of corpus callosum   #Prior CVA with residual left sided hemiparesis   New onset confusion, slurred speech, and left facial droop around 09:30 on 3/18/22. CVA 11/2021 with residual left sided weakness. MRI at OSH: small acute to subacute infarct of the right aspect of the splenium of the corpus callosum. Neurology consulted, recommended no thrombolytics due to rapid improvement of her symptoms. TTE unremarkable, EF 55-60%. Stroke symptoms resolved, suspect that symptom recrudescence at OSH was due to hypotension and decreased cerebral perfusion in the setting of polypharmacy (see below).  - Cardiac Telemetry dc'd given patient agitation with presence and >48h w/o concerning rhythm  - Continue PTA statin ()  - Per Neurology - OK for normotension. Avoid hypotension.  - PT/OT/SLP Consulted recommended ARU/therapy, goal for TCU placement   - Neurology Consulted. Appreciate further recommendations of management.               - Continue DAPT for 90 days, ASA 324mg thereafter              - Continue  mg BID and Keppra 2000 mg BID              - Follow up with general neurology as outpatient              - Discharge on [place] (ordered)              - No further stroke work up needed      # Post-CVA rehabilitation  # Dementia, neurocognitive impairment, severe  Patient appears at her mental status baseline on examination, not consentable given  "inability to articulate or appreciate present post-CVA concerns and mobility issues, risks of discharge home without rehab, and alternatives to rehabilitation.   - Pt refusing \"rehab\" but amenable to \"therapy\" likely given the similarity in name associated with rehab at a TCU (which she has done before and had a poor experience with)   - Not appropriate for ARU per [facility] given dementia  - Pt holdable if threatening to leave as she needs 24/7 supervision, high risk for fall and bleed as she is on double antiplatelet and not able to work  - Delirium and Fall Precautions in Place  - Given concerns for agitation and disorientation leading to significant distress, olanzapine at bedtime PRN order placed, not used as yet     # Seizure disorder   She has a known seizure disorder. She was transferred for cEEG monitoring, however this is not able to be started overnight. She was loaded with 1,000mg of Keppra prior to transfer. No need for EEG per Neuro.  - Continue  mg BID and Keppra increased to 2000 mg BID per neuro       =====================  Chronic / Stable Conditions  =====================  Data   # HLD  - Continue PTA Atorvastatin   # HTN - cautious resumptions of PTA antihypertensives in the setting of medication-induced hypotension  # Elevated troponin, suspect type II MI; resolved   Troponin peaked at 640 without ACS symptoms which is peaked. This likely represents a type II MI - demand ischemia in the setting of hypotension.   - No further troponin checks     # T2DM - well-controlled with diet  Last A1c 5.5%.   - Diet Controlled.   - Monitor blood glucose with M/Th labs, given stability of sugars while inpatient               -- Start sliding scale insulin if BG > 250    #SIRS criteria (fever 100.8F, leukocytosis, tachycardia, lactic acidosis); resolved  #Transient hypotension, likely iatrogenic secondary to medications; resolved  #Somnolence, resolved   She had an episode of hypotension with SBP 60's. Per " "chart review, patient had received labetolol (30mg), hydralazine (10mg), fentanyl (55mcg), lorazepam (1mg), gabapentin (300mg), levetiracetam (1500mg), phenytoin (100mg) in the evening prior to the episode of hypotension and somnolence. BP normalized w/ fluid resuscitation. CT C/A/P without any concerning signs for infection. SIRS criteria likely met due to iatrogenic hypotension. Her somnolence is also likely related to polypharmacy.  No identifiable source of infection on physical exam, UA, or imaging. No indication to continue antibiotics at this time.   - Monitor for signs/symptoms of infection   - Avoid polypharmacy     Diet: Regular Diet Adult  Room Service  Snacks/Supplements Adult: Glucerna; With Meals    DVT Prophylaxis: DAPT  Gloria Catheter: Not present  Fluids: No supplemental   Central Lines: None  Cardiac Monitoring: None  Code Status: Full Code      Disposition Plan   Expected Discharge: 03/28/2022     Anticipated discharge location: nursing home    Delays:     Placement - TCU          The patient's care was discussed with the Attending Physician, Dr. Maldonado.    Sergo \"BJ\" MD Guanakito, PhD  PGY-2 Internal Medicine  [phone] [text page]    Medicine Service, [facility] TEAM [address] [place] [address]  Securely message with the Vocera Web Console (learn more here)  Text page via [facility] Paging/Directory   Please see signed in provider for up to date coverage information      Clinically Significant Risk Factors Present on Admission     Severe neurocognitive impairment              ______________________________________________________________________    Interval History   Laying in bed with 1:1 maintaining observation after her string of falls this past week. She has not fallen in the past 24 hours. She is looking forward to family visiting later today. She has no complaints, aside from her desire to return home. She is moderately accepting of the idea she needs to get " stronger before she can safely return home. No NVD, SOB, CP. + Weakness, generalized.       Data reviewed today: I reviewed all medications, new labs and imaging results over the last 24 hours. I personally reviewed no images or EKG's today.    Physical Exam   Vital Signs: Temp: 98.7  F (37.1  C) Temp src: Oral BP: 106/64 Pulse: 77   Resp: 18 SpO2: 95 % O2 Device: None (Room air)    Weight: 129 lbs 10.09 oz  General Appearance: Comfortable in bed, no acute distress   Respiratory: nonlabored breathing, no tachypnea   Cardiovascular: no cyanosis present   GI: nondistended   Skin: no new ecchymosis or lesions     Data   Recent Labs   Lab 03/24/22  2217 03/24/22  0508 03/23/22  1615 03/23/22  0343 03/22/22  0439 03/21/22  1701 03/21/22  1347 03/21/22  0725 03/21/22  0458   WBC  --  5.4  --   --  5.5  --   --   --  5.0   HGB  --  11.2*  --   --  12.2  --   --   --  12.6   MCV  --  96  --   --  96  --   --   --  96   PLT  --  266  --   --  251  --   --   --  243   NA  --  141  --   --  143  --   --   --  142   POTASSIUM  --  3.5  --   --  3.8  --  3.7  --  3.2*   CHLORIDE  --  108  --   --  110*  --   --   --  111*   CO2  --  27  --   --  28  --   --   --  24   BUN  --  10  --   --  12  --   --   --  11   CR  --  0.58  --   --  0.62  --   --   --  0.53   ANIONGAP  --  6  --   --  5  --   --   --  7   PINA  --  8.6  --   --  8.2*  --   --   --  8.4*   * 146* 132*   < > 136*   < >  --    < > 106*   ALBUMIN  --  2.9*  --   --  3.1*  --   --   --  3.2*   PROTTOTAL  --  6.0*  --   --  6.3*  --   --   --  6.8   BILITOTAL  --  0.4  --   --  0.4  --   --   --  0.4   ALKPHOS  --  82  --   --  80  --   --   --  80   ALT  --  12  --   --  11  --   --   --  13   AST  --  13  --   --  10  --   --   --  12    < > = values in this interval not displayed.     No results found for this or any previous visit (from the past 24 hour(s)).  Medications       aspirin  81 mg Oral Daily     atorvastatin  40 mg Oral Daily     clopidogrel  75  mg Oral Daily     cyanocobalamin  1,000 mcg Oral Daily     gabapentin  300 mg Oral At Bedtime     levETIRAcetam  1,000 mg Oral BID     lisinopril  20 mg Oral Daily     memantine  10 mg Oral Daily     pantoprazole  40 mg Oral QAM AC     phenytoin  100 mg Oral BID     polyethylene glycol  17 g Oral Daily     sodium chloride (PF)  3 mL Intracatheter Q8H

## 2022-03-28 ENCOUNTER — APPOINTMENT (OUTPATIENT)
Dept: PHYSICAL THERAPY | Facility: CLINIC | Age: 72
DRG: 064 | End: 2022-03-28
Attending: PEDIATRICS
Payer: COMMERCIAL

## 2022-03-28 ENCOUNTER — APPOINTMENT (OUTPATIENT)
Dept: OCCUPATIONAL THERAPY | Facility: CLINIC | Age: 72
DRG: 064 | End: 2022-03-28
Attending: PEDIATRICS
Payer: COMMERCIAL

## 2022-03-28 LAB
ALBUMIN SERPL-MCNC: 3 G/DL (ref 3.4–5)
ALP SERPL-CCNC: 93 U/L (ref 40–150)
ALT SERPL W P-5'-P-CCNC: 14 U/L (ref 0–50)
ANION GAP SERPL CALCULATED.3IONS-SCNC: 4 MMOL/L (ref 3–14)
AST SERPL W P-5'-P-CCNC: 10 U/L (ref 0–45)
BILIRUB SERPL-MCNC: 0.2 MG/DL (ref 0.2–1.3)
BUN SERPL-MCNC: 10 MG/DL (ref 7–30)
CALCIUM SERPL-MCNC: 8 MG/DL (ref 8.5–10.1)
CHLORIDE BLD-SCNC: 107 MMOL/L (ref 94–109)
CO2 SERPL-SCNC: 31 MMOL/L (ref 20–32)
CREAT SERPL-MCNC: 0.64 MG/DL (ref 0.52–1.04)
ERYTHROCYTE [DISTWIDTH] IN BLOOD BY AUTOMATED COUNT: 13.1 % (ref 10–15)
GFR SERPL CREATININE-BSD FRML MDRD: >90 ML/MIN/1.73M2
GLUCOSE BLD-MCNC: 130 MG/DL (ref 70–99)
HCT VFR BLD AUTO: 39.4 % (ref 35–47)
HGB BLD-MCNC: 12.2 G/DL (ref 11.7–15.7)
MCH RBC QN AUTO: 30 PG (ref 26.5–33)
MCHC RBC AUTO-ENTMCNC: 31 G/DL (ref 31.5–36.5)
MCV RBC AUTO: 97 FL (ref 78–100)
PLATELET # BLD AUTO: 273 10E3/UL (ref 150–450)
POTASSIUM BLD-SCNC: 3.8 MMOL/L (ref 3.4–5.3)
PROT SERPL-MCNC: 6.3 G/DL (ref 6.8–8.8)
RBC # BLD AUTO: 4.07 10E6/UL (ref 3.8–5.2)
SODIUM SERPL-SCNC: 142 MMOL/L (ref 133–144)
WBC # BLD AUTO: 4.9 10E3/UL (ref 4–11)

## 2022-03-28 PROCEDURE — 97530 THERAPEUTIC ACTIVITIES: CPT | Mod: GP | Performed by: PHYSICAL THERAPIST

## 2022-03-28 PROCEDURE — 97116 GAIT TRAINING THERAPY: CPT | Mod: GP | Performed by: PHYSICAL THERAPIST

## 2022-03-28 PROCEDURE — 250N000013 HC RX MED GY IP 250 OP 250 PS 637: Performed by: HOSPITALIST

## 2022-03-28 PROCEDURE — 80053 COMPREHEN METABOLIC PANEL: CPT | Performed by: STUDENT IN AN ORGANIZED HEALTH CARE EDUCATION/TRAINING PROGRAM

## 2022-03-28 PROCEDURE — 120N000011 HC R&B TRANSPLANT UMMC

## 2022-03-28 PROCEDURE — 97535 SELF CARE MNGMENT TRAINING: CPT | Mod: GO

## 2022-03-28 PROCEDURE — 85027 COMPLETE CBC AUTOMATED: CPT | Performed by: STUDENT IN AN ORGANIZED HEALTH CARE EDUCATION/TRAINING PROGRAM

## 2022-03-28 PROCEDURE — 250N000013 HC RX MED GY IP 250 OP 250 PS 637

## 2022-03-28 PROCEDURE — 97530 THERAPEUTIC ACTIVITIES: CPT | Mod: GO

## 2022-03-28 PROCEDURE — 99232 SBSQ HOSP IP/OBS MODERATE 35: CPT | Mod: GC | Performed by: STUDENT IN AN ORGANIZED HEALTH CARE EDUCATION/TRAINING PROGRAM

## 2022-03-28 PROCEDURE — 36415 COLL VENOUS BLD VENIPUNCTURE: CPT | Performed by: STUDENT IN AN ORGANIZED HEALTH CARE EDUCATION/TRAINING PROGRAM

## 2022-03-28 RX ADMIN — LISINOPRIL 20 MG: 20 TABLET ORAL at 09:32

## 2022-03-28 RX ADMIN — MEMANTINE 10 MG: 10 TABLET ORAL at 09:33

## 2022-03-28 RX ADMIN — PHENYTOIN 100 MG: 50 TABLET, CHEWABLE ORAL at 09:33

## 2022-03-28 RX ADMIN — ACETAMINOPHEN 975 MG: 325 TABLET ORAL at 00:51

## 2022-03-28 RX ADMIN — PHENYTOIN 100 MG: 50 TABLET, CHEWABLE ORAL at 20:29

## 2022-03-28 RX ADMIN — ATORVASTATIN CALCIUM 40 MG: 40 TABLET, FILM COATED ORAL at 09:32

## 2022-03-28 RX ADMIN — CLOPIDOGREL BISULFATE 75 MG: 75 TABLET ORAL at 09:32

## 2022-03-28 RX ADMIN — PANTOPRAZOLE SODIUM 40 MG: 40 TABLET, DELAYED RELEASE ORAL at 09:33

## 2022-03-28 RX ADMIN — CYANOCOBALAMIN TAB 500 MCG 1000 MCG: 500 TAB at 09:32

## 2022-03-28 RX ADMIN — LEVETIRACETAM 1000 MG: 500 TABLET, FILM COATED ORAL at 09:37

## 2022-03-28 RX ADMIN — GABAPENTIN 300 MG: 300 CAPSULE ORAL at 20:30

## 2022-03-28 RX ADMIN — LEVETIRACETAM 1000 MG: 500 TABLET, FILM COATED ORAL at 20:30

## 2022-03-28 RX ADMIN — ASPIRIN 81 MG CHEWABLE TABLET 81 MG: 81 TABLET CHEWABLE at 09:31

## 2022-03-28 ASSESSMENT — ACTIVITIES OF DAILY LIVING (ADL)
ADLS_ACUITY_SCORE: 27
ADLS_ACUITY_SCORE: 23
ADLS_ACUITY_SCORE: 27
ADLS_ACUITY_SCORE: 27
ADLS_ACUITY_SCORE: 23
ADLS_ACUITY_SCORE: 27
ADLS_ACUITY_SCORE: 27
ADLS_ACUITY_SCORE: 23
ADLS_ACUITY_SCORE: 27
ADLS_ACUITY_SCORE: 23
ADLS_ACUITY_SCORE: 27
ADLS_ACUITY_SCORE: 23
ADLS_ACUITY_SCORE: 23
ADLS_ACUITY_SCORE: 27
ADLS_ACUITY_SCORE: 23

## 2022-03-28 NOTE — PLAN OF CARE
"/69 (BP Location: Right arm)   Pulse 80   Temp 97.6  F (36.4  C) (Oral)   Resp 17   Ht 1.676 m (5' 6\")   Wt 58.8 kg (129 lb 10.1 oz)   SpO2 96%   BMI 20.92 kg/m      Shift: 2913-0089  VS: stable on RA, afebrile  Cardiac: WDL  Neuro: Disoriented to time, place, situation   BG: none   Respiratory: non labored breathing, clear lung sounds   Pain/Nausea/PRN: denies nausea, c/o headache gave tylenol x1   Diet: Regulaer   LDA: no IV access, team okayed   GI/: voiding adequately, no bm  Skin: no new findings   Mobility: Assist x2 g/b, walker   Plan: Continue plan of care, waiting for TCU placement    Will give report to oncoming nurse. Pt left in stable condition, care relinquished at this time.                       "

## 2022-03-28 NOTE — PROGRESS NOTES
Care Management Follow Up    Length of Stay (days): 9    Expected Discharge Date: TBD, need accepting facility     Concerns to be Addressed: discharge planning     Patient plan of care discussed at interdisciplinary rounds: Yes     Anticipated Discharge Disposition: Transitional Care     Anticipated Discharge Services: IMM, PAS, Transportation TBD  Anticipated Discharge DME: None     Patient/family educated on Medicare website which has current facility and service quality ratings: yes  Education Provided on the Discharge Plan: yes  Patient/Family in Agreement with the Plan: yes     Referrals Placed by CM/SW: Internal Clinic Care Coordination, Post Acute Facilities  Private pay costs discussed: TBD     Active Referrals:  [facility]   [facility]   [facility]     Denied Referrals:  [facility] in [place]  [facility] in [place]  [facility] in [facility]   [facility]  [facility]  [facility]   [facility] to let her return there     Additional Information:  [facility] ([phone]): SW left received a message from admissions that they have no private rooms available.     [facility] ([phone]): SW spoke with admissions and they do not have a private room, which is needed, as patient is not vaccinated.      [facility] ([phone]): SW spoke with Cris in admissions and they will review her referral. SW heard back from Cris and they are not contracted with her insurance.      [facility] (Marilyn Casanova Liaison [phone]): Sent email requesting update. SW heard back from [facility] that they cannot accept patient due to lack of staffing.     [facility]  (Marilyn Casanova Liaison [phone]): Sent email requesting update. SW also asked liaison to try other facilities ([facility], [facility]). SW was informed that [facility] have no beds available. She will check with [facility]. SW heard that [facility] her and [facility] could reassess next week.     Laura, Daughter ([phone]): RODNEY spoke with Laura and provided an update on discharge planning. Laura is okay with RODNEY trying the following:     [facility] [place] ([phone]): Referral sent via [facility].     [facility] [place] ([phone]): Referral sent via [facility].     [facility] ([phone]): Referral sent via Epic.      MELISSA Tristan, SANJAY  7A/5C Medicine   Ph: [phone]  Pager: [phone]

## 2022-03-28 NOTE — PROGRESS NOTES
[facility]    Progress Note - Medicine Service, [facility] TEAM 1       Date of Admission:  3/19/2022    Assessment & Plan        Khalida Redding is a 71 year old female admitted on 3/19/2022. She has a history of T2DM, HTN, HLD, dementia, epilespy, and recent CVA (11/2021) and is admitted for acute CVA and hypotension likely secondary to polypharmacy.       Changes today:  - Goal for discharge to TCU, appreciate SW assistance with applications; declined by ARU given severe dementia  - 1:1 sitter at bedside given pt's impulsiveness  - Working with PT/OT while inpatient     #Acute CVA of R splenium of corpus callosum   #Prior CVA with residual left sided hemiparesis   New onset confusion, slurred speech, and left facial droop around 09:30 on 3/18/22. CVA 11/2021 with residual left sided weakness. MRI at OSH: small acute to subacute infarct of the right aspect of the splenium of the corpus callosum. Neurology consulted, recommended no thrombolytics due to rapid improvement of her symptoms. TTE unremarkable, EF 55-60%. Stroke symptoms resolved, suspect that symptom recrudescence at OSH was due to hypotension and decreased cerebral perfusion in the setting of polypharmacy (see below).  - Cardiac Telemetry dc'd given patient agitation with presence and >48h w/o concerning rhythm  - Continue PTA statin ()  - Per Neurology - OK for normotension. Avoid hypotension.  - PT/OT/SLP Consulted recommended ARU/therapy, goal for TCU placement   - Neurology Consulted. Appreciate further recommendations of management.               - Continue DAPT for 90 days, ASA 324mg thereafter              - Continue  mg BID and Keppra 2000 mg BID              - Follow up with general neurology as outpatient              - Discharge on [place] (ordered)              - No further stroke work up needed      # Post-CVA rehabilitation  # Dementia, neurocognitive impairment,  "severe  Patient appears at her mental status baseline on examination, not consentable given inability to articulate or appreciate present post-CVA concerns and mobility issues, risks of discharge home without rehab, and alternatives to rehabilitation.   - Pt refusing \"rehab\" but amenable to \"therapy\" likely given the similarity in name associated with rehab at a TCU (which she has done before and had a poor experience with)   - Not appropriate for ARU per [facility] given dementia  - Pt holdable if threatening to leave as she needs 24/7 supervision, high risk for fall and bleed as she is on double antiplatelet and not able to work  - Delirium and Fall Precautions in Place  - Given concerns for agitation and disorientation leading to significant distress, olanzapine at bedtime PRN order placed, not used as yet     # Seizure disorder   She has a known seizure disorder. She was transferred for cEEG monitoring, however this is not able to be started overnight. She was loaded with 1,000mg of Keppra prior to transfer. No need for EEG per Neuro.  - Continue  mg BID and Keppra increased to 2000 mg BID per neuro       =====================  Chronic / Stable Conditions  =====================  Data   # HLD  - Continue PTA Atorvastatin   # HTN - cautious resumptions of PTA antihypertensives in the setting of medication-induced hypotension  # Elevated troponin, suspect type II MI; resolved   Troponin peaked at 640 without ACS symptoms which is peaked. This likely represents a type II MI - demand ischemia in the setting of hypotension.   - No further troponin checks     # T2DM - well-controlled with diet  Last A1c 5.5%.   - Diet Controlled.   - Monitor blood glucose with M/Th labs, given stability of sugars while inpatient               -- Start sliding scale insulin if BG > 250    #SIRS criteria (fever 100.8F, leukocytosis, tachycardia, lactic acidosis); resolved  #Transient hypotension, likely iatrogenic secondary to " "medications; resolved  #Somnolence, resolved   She had an episode of hypotension with SBP 60's. Per chart review, patient had received labetolol (30mg), hydralazine (10mg), fentanyl (55mcg), lorazepam (1mg), gabapentin (300mg), levetiracetam (1500mg), phenytoin (100mg) in the evening prior to the episode of hypotension and somnolence. BP normalized w/ fluid resuscitation. CT C/A/P without any concerning signs for infection. SIRS criteria likely met due to iatrogenic hypotension. Her somnolence is also likely related to polypharmacy.  No identifiable source of infection on physical exam, UA, or imaging. No indication to continue antibiotics at this time.   - Monitor for signs/symptoms of infection   - Avoid polypharmacy     Diet: Regular Diet Adult  Room Service  Snacks/Supplements Adult: Glucerna; With Meals    DVT Prophylaxis: DAPT  Gloria Catheter: Not present  Fluids: No supplemental   Central Lines: None  Cardiac Monitoring: None  Code Status: Full Code      Disposition Plan   Expected Discharge: 03/30/2022     Anticipated discharge location: nursing home    Delays:     Placement - TCU          The patient's care was discussed with the Attending Physician, Dr. Maldonado.    Sergo \"BJ\" MD Guanakito, PhD  PGY-2 Internal Medicine  [phone] [text page]    Medicine Service, [facility] TEAM [address] [place] [address]  Securely message with the Vocera Web Console (learn more here)  Text page via [facility] Paging/Directory   Please see signed in provider for up to date coverage information      Clinically Significant Risk Factors Present on Admission     Severe neurocognitive impairment              ______________________________________________________________________    Interval History   Observed while working with OT, pt tired quickly with standing while using the support of a walker. Pleased she could brush her teeth. Pt's family did not visit yesterday, \"the less said about that, the " "better\" was her comment there. She wants to return home, but does express some hope she can participate in therapy to become stronger No NVD, SOB, CP. + Weakness, generalized.       Data reviewed today: I reviewed all medications, new labs and imaging results over the last 24 hours. I personally reviewed no images or EKG's today.    Physical Exam   Vital Signs: Temp: 97.8  F (36.6  C) Temp src: Oral BP: 106/86 Pulse: 78   Resp: 18 SpO2: 98 % O2 Device: None (Room air)    Weight: 129 lbs 10.09 oz  General Appearance: Comfortable in bed, no acute distress   Respiratory: nonlabored breathing, no tachypnea   Cardiovascular: no cyanosis present   GI: nondistended   Skin: no new ecchymosis or lesions     Data   Recent Labs   Lab 03/28/22  0600 03/24/22  2217 03/24/22  0508 03/23/22  0343 03/22/22  0439   WBC 4.9  --  5.4  --  5.5   HGB 12.2  --  11.2*  --  12.2   MCV 97  --  96  --  96     --  266  --  251     --  141  --  143   POTASSIUM 3.8  --  3.5  --  3.8   CHLORIDE 107  --  108  --  110*   CO2 31  --  27  --  28   BUN 10  --  10  --  12   CR 0.64  --  0.58  --  0.62   ANIONGAP 4  --  6  --  5   PINA 8.0*  --  8.6  --  8.2*   * 154* 146*   < > 136*   ALBUMIN 3.0*  --  2.9*  --  3.1*   PROTTOTAL 6.3*  --  6.0*  --  6.3*   BILITOTAL 0.2  --  0.4  --  0.4   ALKPHOS 93  --  82  --  80   ALT 14  --  12  --  11   AST 10  --  13  --  10    < > = values in this interval not displayed.     No results found for this or any previous visit (from the past 24 hour(s)).  Medications       aspirin  81 mg Oral Daily     atorvastatin  40 mg Oral Daily     clopidogrel  75 mg Oral Daily     cyanocobalamin  1,000 mcg Oral Daily     gabapentin  300 mg Oral At Bedtime     levETIRAcetam  1,000 mg Oral BID     lisinopril  20 mg Oral Daily     memantine  10 mg Oral Daily     pantoprazole  40 mg Oral QAM AC     phenytoin  100 mg Oral BID     polyethylene glycol  17 g Oral Daily     sodium chloride (PF)  3 mL Intracatheter " Q8H

## 2022-03-28 NOTE — PLAN OF CARE
NEURO: alert. Oriented to self only.   RESPIRATORY:  SpO2> 92% on RA.   CARDIO: VSS.   GI/: BS active. Voiding without difficulty. Tolerating PO intake.   SKIN: Intact.   ACTIVITY: Up with A1 walker and gait belt.   PAIN: Denied pain.   LINES: No PIV.   PLAN:   Awaiting TCU placement.

## 2022-03-29 ENCOUNTER — APPOINTMENT (OUTPATIENT)
Dept: OCCUPATIONAL THERAPY | Facility: CLINIC | Age: 72
DRG: 064 | End: 2022-03-29
Attending: PEDIATRICS
Payer: COMMERCIAL

## 2022-03-29 LAB — SARS-COV-2 RNA RESP QL NAA+PROBE: NEGATIVE

## 2022-03-29 PROCEDURE — 97110 THERAPEUTIC EXERCISES: CPT | Mod: GO

## 2022-03-29 PROCEDURE — 250N000013 HC RX MED GY IP 250 OP 250 PS 637: Performed by: HOSPITALIST

## 2022-03-29 PROCEDURE — 97535 SELF CARE MNGMENT TRAINING: CPT | Mod: GO

## 2022-03-29 PROCEDURE — 99232 SBSQ HOSP IP/OBS MODERATE 35: CPT | Mod: GC | Performed by: INTERNAL MEDICINE

## 2022-03-29 PROCEDURE — 250N000013 HC RX MED GY IP 250 OP 250 PS 637

## 2022-03-29 PROCEDURE — 120N000011 HC R&B TRANSPLANT UMMC

## 2022-03-29 PROCEDURE — U0003 INFECTIOUS AGENT DETECTION BY NUCLEIC ACID (DNA OR RNA); SEVERE ACUTE RESPIRATORY SYNDROME CORONAVIRUS 2 (SARS-COV-2) (CORONAVIRUS DISEASE [COVID-19]), AMPLIFIED PROBE TECHNIQUE, MAKING USE OF HIGH THROUGHPUT TECHNOLOGIES AS DESCRIBED BY CMS-2020-01-R: HCPCS | Performed by: STUDENT IN AN ORGANIZED HEALTH CARE EDUCATION/TRAINING PROGRAM

## 2022-03-29 RX ADMIN — ATORVASTATIN CALCIUM 40 MG: 40 TABLET, FILM COATED ORAL at 09:01

## 2022-03-29 RX ADMIN — MEMANTINE 10 MG: 10 TABLET ORAL at 09:06

## 2022-03-29 RX ADMIN — ASPIRIN 81 MG CHEWABLE TABLET 81 MG: 81 TABLET CHEWABLE at 09:01

## 2022-03-29 RX ADMIN — LEVETIRACETAM 1000 MG: 500 TABLET, FILM COATED ORAL at 20:03

## 2022-03-29 RX ADMIN — PHENYTOIN 100 MG: 50 TABLET, CHEWABLE ORAL at 09:06

## 2022-03-29 RX ADMIN — CLOPIDOGREL BISULFATE 75 MG: 75 TABLET ORAL at 09:01

## 2022-03-29 RX ADMIN — LEVETIRACETAM 1000 MG: 500 TABLET, FILM COATED ORAL at 09:01

## 2022-03-29 RX ADMIN — GABAPENTIN 300 MG: 300 CAPSULE ORAL at 20:05

## 2022-03-29 RX ADMIN — LISINOPRIL 20 MG: 20 TABLET ORAL at 09:01

## 2022-03-29 RX ADMIN — PHENYTOIN 100 MG: 50 TABLET, CHEWABLE ORAL at 19:59

## 2022-03-29 RX ADMIN — CYANOCOBALAMIN TAB 500 MCG 1000 MCG: 500 TAB at 09:00

## 2022-03-29 RX ADMIN — PANTOPRAZOLE SODIUM 40 MG: 40 TABLET, DELAYED RELEASE ORAL at 09:01

## 2022-03-29 ASSESSMENT — ACTIVITIES OF DAILY LIVING (ADL)
ADLS_ACUITY_SCORE: 23
ADLS_ACUITY_SCORE: 23
ADLS_ACUITY_SCORE: 21
ADLS_ACUITY_SCORE: 23
ADLS_ACUITY_SCORE: 21
ADLS_ACUITY_SCORE: 23
ADLS_ACUITY_SCORE: 21
ADLS_ACUITY_SCORE: 23
ADLS_ACUITY_SCORE: 23
ADLS_ACUITY_SCORE: 21
ADLS_ACUITY_SCORE: 23
ADLS_ACUITY_SCORE: 21
ADLS_ACUITY_SCORE: 23
ADLS_ACUITY_SCORE: 21
ADLS_ACUITY_SCORE: 23
ADLS_ACUITY_SCORE: 23
ADLS_ACUITY_SCORE: 21
ADLS_ACUITY_SCORE: 23
ADLS_ACUITY_SCORE: 21

## 2022-03-29 NOTE — PLAN OF CARE
"BP (!) 159/88 (BP Location: Right arm)   Pulse 80   Temp 97.3  F (36.3  C) (Oral)   Resp 16   Ht 1.676 m (5' 6\")   Wt 58.8 kg (129 lb 10.1 oz)   SpO2 95%   BMI 20.92 kg/m   BP elevated but not above parameters.OVSS on RA. Patient denies pain. Patient denies nausea. Urine Output - voided 200 cc. Bowel Function - No BM this shift. Nutrition - ate some jello. Activity - up to commode with 1-2 assist. Pt told the sitter that she needed to use the commode. Not sure if she would use the call light to call for help if she was alone in the room. Slept from [id number]. Covid swab sent.                         "

## 2022-03-29 NOTE — PROGRESS NOTES
"Time: 1900-  Reason for admission: Hx of CVA and falls.   /70 (BP Location: Right arm)   Pulse 70   Temp 97.7  F (36.5  C) (Oral)   Resp 18   Ht 1.676 m (5' 6\")   Wt 58.8 kg (129 lb 10.1 oz)   SpO2 96%   BMI 20.92 kg/m      Activity: A2. Generalized weakness. Seizure precautions. 1:1 sitter. Falls.   Neuros: Alert to self only. LUE/LLE 4/5. RUE/RLE 5/5. Denied N/T.   Cardiac: VSS. Denied chest pain.   Respiratory: LS clear. Denied SOB or cough.   GI/: Incontinent of bladder. Abdomen soft, non tender. +Bs +flatus.   Diet: regular and thin liquids. Takes pills fine.   Skin: intact.   Lines: No PIV.   Labs: reviewed no replacement.   New changes this shift/Plan: Notify MD with changes.   Will continue to monitor & follow POC.     "

## 2022-03-29 NOTE — PLAN OF CARE
Patient was admitted due to symptoms of stroke. Had multiple strokes in the past. History of Dementia.Left side 4/5. Currently on one-on-one attendant care.Alert and oriented x 3. Knows that she is in the hospital, not the name of the hospital,knows the month but not the date. Communicates clearly except forgetfulness. Fair oral intake. On Q shift Neuro vital signs, I/O.Patient hopes to be discharged to home. Plan:Continue care.

## 2022-03-29 NOTE — PROGRESS NOTES
CLINICAL NUTRITION SERVICES - REASSESSMENT NOTE     Nutrition Prescription    Malnutrition Status:    Moderate malnutrition in the context of acute on chronic illness    Recommendations already ordered by Registered Dietitian (RD):  Glucerna daily at lunch     Continue room service with assist    Future/Additional Recommendations:  Encourage intake of protein sources with meals     Monitor weight trends     EVALUATION OF THE PROGRESS TOWARD GOALS   Diet: Regular + Glucerna PRN with meals  Room service with assist    Intake: Mostly % of small meals.  Has been ordering between 4822-8676 kcal/day and ~50-60 grams protein/day.  Likely meeting majority of calorie needs and ~50-75% of protein needs. Has a sitter.   Ate nearly 100% of ham sandwich with cottage cheese for lunch today.  Patient feels she is eating well.      NEW FINDINGS   Weight: Patient down 1.7 kg (2.8%) within the last week.     Labs: Reviewed    Meds: Dilantin BID, Protonix, Vitamin B12 1000 mcg daily, Miralax    MALNUTRITION  % Intake: Decreased intake does not meet criteria  % Weight Loss: > 2% in 1 week (severe)  Subcutaneous Fat Loss: Facial region:  mild  Muscle Loss: Facial & jaw region:  mild  Fluid Accumulation/Edema: None noted  Malnutrition Diagnosis: Moderate malnutrition in the context of acute on chronic illness    Previous Goals   Patient to consume % of nutritionally adequate meal trays TID, or the equivalent with supplements/snacks.  Evaluation: Met    Previous Nutrition Diagnosis  Predicted inadequate nutrient intake (energy/protein)   Evaluation: No change    CURRENT NUTRITION DIAGNOSIS  Predicted inadequate nutrient intake (protein) related to acute illness/hospitalization     INTERVENTIONS  Implementation  Medical food supplement therapy - see above  Nutrition education for recommended modifications - education ongoing good intake of protein sources with meals, encouraged use of oral supplement    Goals  Patient to  consume % of nutritionally adequate meal trays TID, or the equivalent with supplements/snacks.    Monitoring/Evaluation  Progress toward goals will be monitored and evaluated per protocol.    Maryam Pires MS, RD, LD, CCTD  7A/Obs units, pager [phone]

## 2022-03-29 NOTE — PROGRESS NOTES
[facility] [place]    Progress Note - Medicine Service, [facility] TEAM 1       Date of Admission:  3/19/2022    Assessment & Plan        Khalida Redding is a 71 year old female admitted on 3/19/2022. She has a history of T2DM, HTN, HLD, dementia, epilespy, and recent CVA (11/2021) and is admitted for acute CVA and hypotension likely secondary to polypharmacy.       Changes today:  - Goal for discharge to TCU, appreciate SW assistance with applications; declined by ARU given severe dementia  - 1:1 sitter at bedside given pt's impulsiveness  - Working with PT/OT while inpatient     #Acute CVA of R splenium of corpus callosum   #Prior CVA with residual left sided hemiparesis   New onset confusion, slurred speech, and left facial droop around 09:30 on 3/18/22. CVA 11/2021 with residual left sided weakness. MRI at OSH: small acute to subacute infarct of the right aspect of the splenium of the corpus callosum. Neurology consulted, recommended no thrombolytics due to rapid improvement of her symptoms. TTE unremarkable, EF 55-60%. Stroke symptoms resolved, suspect that symptom recrudescence at OSH was due to hypotension and decreased cerebral perfusion in the setting of polypharmacy (see below).  - Cardiac Telemetry dc'd given patient agitation with presence and >48h w/o concerning rhythm  - Continue PTA statin ()  - Per Neurology - OK for normotension. Avoid hypotension.  - PT/OT/SLP Consulted recommended ARU/therapy, goal for TCU placement   - Neurology Consulted. Appreciate further recommendations of management.               - Continue DAPT for 90 days, ASA 324mg thereafter              - Continue  mg BID and Keppra 2000 mg BID              - Follow up with general neurology as outpatient              - Discharge on Ziopatch (ordered)              - No further stroke work up needed      # Post-CVA rehabilitation  # Dementia, neurocognitive impairment,  "severe  Patient appears at her mental status baseline on examination, not consentable given inability to articulate or appreciate present post-CVA concerns and mobility issues, risks of discharge home without rehab, and alternatives to rehabilitation.   - Pt refusing \"rehab\" but amenable to \"therapy\" likely given the similarity in name associated with rehab at a TCU (which she has done before and had a poor experience with)   - Not appropriate for ARU per [facility] given dementia  - Pt holdable if threatening to leave as she needs 24/7 supervision, high risk for fall and bleed as she is on double antiplatelet and not able to work  - Delirium and Fall Precautions in Place  - Given concerns for agitation and disorientation leading to significant distress, olanzapine at bedtime PRN order placed, not used as yet     # Seizure disorder   She has a known seizure disorder. She was transferred for cEEG monitoring, however this is not able to be started overnight. She was loaded with 1,000mg of Keppra prior to transfer. No need for EEG per Neuro.  - Continue  mg BID and Keppra increased to 2000 mg BID per neuro       =====================  Chronic / Stable Conditions  =====================  Data   # HLD  - Continue PTA Atorvastatin   # HTN - cautious resumptions of PTA antihypertensives in the setting of medication-induced hypotension  # Elevated troponin, suspect type II MI; resolved   Troponin peaked at 640 without ACS symptoms which is peaked. This likely represents a type II MI - demand ischemia in the setting of hypotension.   - No further troponin checks     # T2DM - well-controlled with diet  Last A1c 5.5%.   - Diet Controlled.   - Monitor blood glucose with M/Th labs, given stability of sugars while inpatient               -- Start sliding scale insulin if BG > 250    #SIRS criteria (fever 100.8F, leukocytosis, tachycardia, lactic acidosis); resolved  #Transient hypotension, likely iatrogenic secondary to " "medications; resolved  #Somnolence, resolved   She had an episode of hypotension with SBP 60's. Per chart review, patient had received labetolol (30mg), hydralazine (10mg), fentanyl (55mcg), lorazepam (1mg), gabapentin (300mg), levetiracetam (1500mg), phenytoin (100mg) in the evening prior to the episode of hypotension and somnolence. BP normalized w/ fluid resuscitation. CT C/A/P without any concerning signs for infection. SIRS criteria likely met due to iatrogenic hypotension. Her somnolence is also likely related to polypharmacy.  No identifiable source of infection on physical exam, UA, or imaging. No indication to continue antibiotics at this time.   - Monitor for signs/symptoms of infection   - Avoid polypharmacy     Diet: Regular Diet Adult  Room Service  Snacks/Supplements Adult: Glucerna; With Meals    DVT Prophylaxis: DAPT  Gloria Catheter: Not present  Fluids: No supplemental   Central Lines: None  Cardiac Monitoring: None  Code Status: Full Code      Disposition Plan   Expected Discharge: 03/29/2022     Anticipated discharge location: nursing home    Delays:     Placement - TCU          The patient's care was discussed with the Attending Physician, Dr. Hoffmann.    Sergo \"BJ\" MD Guanakito, PhD  PGY-2 Internal Medicine  [phone] [text page]    Medicine Service, [facility] TEAM [address] [place] [address]  Securely message with the Vocera Web Console (learn more here)  Text page via [facility] Paging/Directory   Please see signed in provider for up to date coverage information      Clinically Significant Risk Factors Present on Admission     Severe neurocognitive impairment              ______________________________________________________________________    Interval History   NAEO. Participating with PT/OT. Appetite is good. Wants to go home, but comfortable here. No NVD, SOB, CP. + Weakness, generalized.       Data reviewed today: I reviewed all medications, new labs and " imaging results over the last 24 hours. I personally reviewed no images or EKG's today.    Physical Exam   Vital Signs: Temp: 98.2  F (36.8  C) Temp src: Oral BP: 121/78 Pulse: 91   Resp: 16 SpO2: (!) 88 % O2 Device: None (Room air)    Weight: 129 lbs 10.09 oz  General Appearance: Comfortable in bed, no acute distress   Respiratory: nonlabored breathing, no tachypnea   Cardiovascular: no cyanosis present   GI: nondistended   Skin: no new ecchymosis or lesions     Data   Recent Labs   Lab 03/28/22  0600 03/24/22  2217 03/24/22  0508   WBC 4.9  --  5.4   HGB 12.2  --  11.2*   MCV 97  --  96     --  266     --  141   POTASSIUM 3.8  --  3.5   CHLORIDE 107  --  108   CO2 31  --  27   BUN 10  --  10   CR 0.64  --  0.58   ANIONGAP 4  --  6   PINA 8.0*  --  8.6   * 154* 146*   ALBUMIN 3.0*  --  2.9*   PROTTOTAL 6.3*  --  6.0*   BILITOTAL 0.2  --  0.4   ALKPHOS 93  --  82   ALT 14  --  12   AST 10  --  13     No results found for this or any previous visit (from the past 24 hour(s)).  Medications       aspirin  81 mg Oral Daily     atorvastatin  40 mg Oral Daily     clopidogrel  75 mg Oral Daily     cyanocobalamin  1,000 mcg Oral Daily     gabapentin  300 mg Oral At Bedtime     levETIRAcetam  1,000 mg Oral BID     lisinopril  20 mg Oral Daily     memantine  10 mg Oral Daily     pantoprazole  40 mg Oral QAM AC     phenytoin  100 mg Oral BID     polyethylene glycol  17 g Oral Daily

## 2022-03-29 NOTE — PROGRESS NOTES
Care Management Follow Up    Length of Stay (days): 10    Expected Discharge Date: TBD, need accepting facility     Concerns to be Addressed: discharge planning     Patient plan of care discussed at interdisciplinary rounds: Yes     Anticipated Discharge Disposition: Transitional Care     Anticipated Discharge Services: IMM, PAS, Transportation TBD  Anticipated Discharge DME: None     Patient/family educated on Medicare website which has current facility and service quality ratings: yes  Education Provided on the Discharge Plan: yes  Patient/Family in Agreement with the Plan: yes     Referrals Placed by CM/SW: Internal Clinic Care Coordination, Post Acute Facilities  Private pay costs discussed: TBD     Active Referrals:  [facility]   [facility]  Swing Beds    Denied Referrals:  [facility] in [place]  [facility] [place]  [facility]   [facility] [place]  [facility]  [facility] [place]   [facility] refuses to let her return there  [facility]      Additional Information:  [facility] [place] ([phone]): RODNEY called admissions and left a message requesting a call back.    [facility] ([phone]): Referral sent via woohoo mobile marketing.      [facility] ([phone]): RODNEY called admissions and left a message requesting a call back.      [facility] (Ph: [phone], Central Adm: [phone]): RODNEY spoke with admissions and they have no beds available.     With difficulty in finding TCU near where patient lives, RODNEY was directed by MD to explore the possibility of a swing bed.     [facility] ([phone]): RODNEY left a message  with Myrna in admissions requesting a call back about a possible swing bed opening.     [facility] ([phone]): They do not offer swing beds.     [facility] in [place] ([phone]): RODNEY spoke with Annie in swing bed admissions. They don't have any availability. She manages all of [facility] swing beds and will call SW if anything opens up.     [facility] ([phone]): They do not have swing beds.     [facility] ([phone]): They do not have swing begs.     [facility] in [place], [place] ([phone]): RODNEY left a message with Carmelina in admissions requesting a call back about a swing bed opening. RODNEY heard back from Carmelina. She would be willing to look at the referral and it can be faxed to [phone]. RODNEY faxed the referral.     MELISSA Tristan, LGSW  7A/5C Medicine   Ph: [phone]  Pager: [phone]

## 2022-03-30 ENCOUNTER — APPOINTMENT (OUTPATIENT)
Dept: PHYSICAL THERAPY | Facility: CLINIC | Age: 72
DRG: 064 | End: 2022-03-30
Attending: PEDIATRICS
Payer: COMMERCIAL

## 2022-03-30 ENCOUNTER — APPOINTMENT (OUTPATIENT)
Dept: OCCUPATIONAL THERAPY | Facility: CLINIC | Age: 72
DRG: 064 | End: 2022-03-30
Attending: PEDIATRICS
Payer: COMMERCIAL

## 2022-03-30 PROCEDURE — 120N000011 HC R&B TRANSPLANT UMMC

## 2022-03-30 PROCEDURE — 97530 THERAPEUTIC ACTIVITIES: CPT | Mod: GO

## 2022-03-30 PROCEDURE — 97535 SELF CARE MNGMENT TRAINING: CPT | Mod: GO

## 2022-03-30 PROCEDURE — 250N000013 HC RX MED GY IP 250 OP 250 PS 637: Performed by: HOSPITALIST

## 2022-03-30 PROCEDURE — 99232 SBSQ HOSP IP/OBS MODERATE 35: CPT | Mod: GC | Performed by: INTERNAL MEDICINE

## 2022-03-30 PROCEDURE — 97530 THERAPEUTIC ACTIVITIES: CPT | Mod: GP

## 2022-03-30 PROCEDURE — 250N000013 HC RX MED GY IP 250 OP 250 PS 637

## 2022-03-30 PROCEDURE — 97116 GAIT TRAINING THERAPY: CPT | Mod: GP

## 2022-03-30 RX ADMIN — PHENYTOIN 100 MG: 50 TABLET, CHEWABLE ORAL at 09:12

## 2022-03-30 RX ADMIN — LEVETIRACETAM 1000 MG: 500 TABLET, FILM COATED ORAL at 09:40

## 2022-03-30 RX ADMIN — ASPIRIN 81 MG CHEWABLE TABLET 81 MG: 81 TABLET CHEWABLE at 09:12

## 2022-03-30 RX ADMIN — LISINOPRIL 20 MG: 20 TABLET ORAL at 09:12

## 2022-03-30 RX ADMIN — GABAPENTIN 300 MG: 300 CAPSULE ORAL at 19:42

## 2022-03-30 RX ADMIN — LEVETIRACETAM 1000 MG: 500 TABLET, FILM COATED ORAL at 19:42

## 2022-03-30 RX ADMIN — PANTOPRAZOLE SODIUM 40 MG: 40 TABLET, DELAYED RELEASE ORAL at 09:11

## 2022-03-30 RX ADMIN — ATORVASTATIN CALCIUM 40 MG: 40 TABLET, FILM COATED ORAL at 09:12

## 2022-03-30 RX ADMIN — PHENYTOIN 100 MG: 50 TABLET, CHEWABLE ORAL at 19:41

## 2022-03-30 RX ADMIN — CYANOCOBALAMIN TAB 500 MCG 1000 MCG: 500 TAB at 09:12

## 2022-03-30 RX ADMIN — MEMANTINE 10 MG: 10 TABLET ORAL at 09:12

## 2022-03-30 RX ADMIN — CLOPIDOGREL BISULFATE 75 MG: 75 TABLET ORAL at 09:12

## 2022-03-30 ASSESSMENT — ACTIVITIES OF DAILY LIVING (ADL)
ADLS_ACUITY_SCORE: 21

## 2022-03-30 NOTE — PLAN OF CARE
"VS: /62 (BP Location: Right arm)   Pulse 81   Temp 97.9  F (36.6  C) (Oral)   Resp 16   Ht 1.676 m (5' 6\")   Wt 58.8 kg (129 lb 10.1 oz)   SpO2 98%   BMI 20.92 kg/m      Cares: 5125-1288    Current condition:Stable on RA   Neuro: WDL, Aox4, nueros intact.   Cardio: WDL   Respiratory: WDL   GI/: Voiding spontaneously, last BM 3/30  Skin: WDL  Diet:   Regular diet, fair appetite.   LDA:  no access.   Mobility:  UP with assist of one with walker and gait belt.   Pain: denies.   PRN medications: none given.   Plan of Care: Will continue to monitor and assess for any changes. Will trial off sitter   "

## 2022-03-30 NOTE — PROGRESS NOTES
[facility]    Progress Note - Medicine Service, [facility] TEAM 1       Date of Admission:  3/19/2022    Assessment & Plan        Khalida Redding is a 71 year old female admitted on 3/19/2022. She has a history of T2DM, HTN, HLD, dementia, epilespy, and recent CVA (11/2021) and is admitted for acute CVA and hypotension likely secondary to polypharmacy.       Changes today:  - Goal for discharge to TCU or Swing Bed, appreciate SW assistance with applications  - 1:1 sitter at bedside given pt's impulsiveness  - Working with PT/OT while inpatient     #Acute CVA of R splenium of corpus callosum   #Prior CVA with residual left sided hemiparesis   New onset confusion, slurred speech, and left facial droop around 09:30 on 3/18/22. CVA 11/2021 with residual left sided weakness. MRI at OSH: small acute to subacute infarct of the right aspect of the splenium of the corpus callosum. Neurology consulted, recommended no thrombolytics due to rapid improvement of her symptoms. TTE unremarkable, EF 55-60%. Stroke symptoms resolved, suspect that symptom recrudescence at OSH was due to hypotension and decreased cerebral perfusion in the setting of polypharmacy (see below).  - Cardiac Telemetry dc'd given patient agitation with presence and >48h w/o concerning rhythm  - Continue PTA statin ()  - Per Neurology - OK for normotension. Avoid hypotension.  - PT/OT/SLP Consulted recommended ARU/therapy, goal for TCU placement   - Neurology Consulted. Appreciate further recommendations of management.               - Continue DAPT for 90 days, ASA 324mg thereafter              - Continue  mg BID and Keppra 2000 mg BID              - Follow up with general neurology as outpatient              - Discharge on Ziopatch (ordered)              - No further stroke work up needed      # Post-CVA rehabilitation  # Dementia, neurocognitive impairment, severe  Patient appears at her mental  "status baseline on examination, not consentable given inability to articulate or appreciate present post-CVA concerns and mobility issues, risks of discharge home without rehab, and alternatives to rehabilitation.   - Pt refusing \"rehab\" but amenable to \"therapy\" likely given the similarity in name associated with rehab at a TCU (which she has done before and had a poor experience with)   - Not appropriate for ARU per [facility] given dementia  - Pt holdable if threatening to leave as she needs 24/7 supervision, high risk for fall and bleed as she is on double antiplatelet and not able to work  - Delirium and Fall Precautions in Place  - Given concerns for agitation and disorientation leading to significant distress, olanzapine at bedtime PRN order placed, not used as yet     # Seizure disorder   She has a known seizure disorder. She was transferred for cEEG monitoring, however this is not able to be started overnight. She was loaded with 1,000mg of Keppra prior to transfer. No need for EEG per Neuro.  - Continue phenytoin 100 mg BID and Keppra increased to 2000 mg BID per neuro       =====================  Chronic / Stable Conditions  =====================  Data   # HLD  - Continue PTA Atorvastatin   # HTN - cautious resumptions of PTA antihypertensives in the setting of medication-induced hypotension  # Elevated troponin, suspect type II MI; resolved   Troponin peaked at 640 without ACS symptoms which is peaked. This likely represents a type II MI - demand ischemia in the setting of hypotension.   - No further troponin checks     # T2DM - well-controlled with diet  Last A1c 5.5%.   - Diet Controlled.   - Monitor blood glucose with M/Th labs, given stability of sugars while inpatient               -- Start sliding scale insulin if BG > 250    #SIRS criteria (fever 100.8F, leukocytosis, tachycardia, lactic acidosis); resolved  #Transient hypotension, likely iatrogenic secondary to medications; resolved  #Somnolence, " "resolved   She had an episode of hypotension with SBP 60's. Per chart review, patient had received labetolol (30mg), hydralazine (10mg), fentanyl (55mcg), lorazepam (1mg), gabapentin (300mg), levetiracetam (1500mg), phenytoin (100mg) in the evening prior to the episode of hypotension and somnolence. BP normalized w/ fluid resuscitation. CT C/A/P without any concerning signs for infection. SIRS criteria likely met due to iatrogenic hypotension. Her somnolence is also likely related to polypharmacy.  No identifiable source of infection on physical exam, UA, or imaging. No indication to continue antibiotics at this time.   - Monitor for signs/symptoms of infection   - Avoid polypharmacy     Diet: Regular Diet Adult  Room Service  Snacks/Supplements Adult: Glucerna; With Meals    DVT Prophylaxis: DAPT  Gloria Catheter: Not present  Fluids: No supplemental   Central Lines: None  Cardiac Monitoring: None  Code Status: Full Code      Disposition Plan   Expected Discharge:      Anticipated discharge location: nursing home    Delays:     Placement - TCU          The patient's care was discussed with the Attending Physician, Dr. Hoffmann.    Sergo \"BJ\" MD Guanakito, PhD  PGY-2 Internal Medicine  [phone] [text page]    Medicine Service, [facility] TEAM [address] [place] [address]  Securely message with the Vocera Web Console (learn more here)  Text page via [facility] Paging/Directory   Please see signed in provider for up to date coverage information      Clinically Significant Risk Factors Present on Admission     Severe neurocognitive impairment              ______________________________________________________________________    Interval History   NAEO. She was able to participate with PT/OT yesterday, and was pleased she was able to walk as far as she did. Her strength is improving. Very pleasant today.   No NVD, SOB, CP.     Data reviewed today: I reviewed all medications, new labs and " imaging results over the last 24 hours. I personally reviewed no images or EKG's today.    Physical Exam   Vital Signs: Temp: 97.8  F (36.6  C) Temp src: Oral BP: 103/78 Pulse: 90   Resp: 16 SpO2: 90 % O2 Device: None (Room air)    Weight: 129 lbs 10.09 oz  General Appearance: Comfortable in bed, no acute distress   Respiratory: nonlabored breathing, no tachypnea   Cardiovascular: no cyanosis present   GI: nondistended   Skin: no new ecchymosis or lesions     Data   Recent Labs   Lab 03/28/22  0600 03/24/22  2217 03/24/22  0508   WBC 4.9  --  5.4   HGB 12.2  --  11.2*   MCV 97  --  96     --  266     --  141   POTASSIUM 3.8  --  3.5   CHLORIDE 107  --  108   CO2 31  --  27   BUN 10  --  10   CR 0.64  --  0.58   ANIONGAP 4  --  6   PINA 8.0*  --  8.6   * 154* 146*   ALBUMIN 3.0*  --  2.9*   PROTTOTAL 6.3*  --  6.0*   BILITOTAL 0.2  --  0.4   ALKPHOS 93  --  82   ALT 14  --  12   AST 10  --  13     No results found for this or any previous visit (from the past 24 hour(s)).  Medications       aspirin  81 mg Oral Daily     atorvastatin  40 mg Oral Daily     clopidogrel  75 mg Oral Daily     cyanocobalamin  1,000 mcg Oral Daily     gabapentin  300 mg Oral At Bedtime     levETIRAcetam  1,000 mg Oral BID     lisinopril  20 mg Oral Daily     memantine  10 mg Oral Daily     pantoprazole  40 mg Oral QAM AC     phenytoin  100 mg Oral BID     polyethylene glycol  17 g Oral Daily

## 2022-03-30 NOTE — PLAN OF CARE
"/78 (BP Location: Right arm)   Pulse 90   Temp 97.8  F (36.6  C) (Oral)   Resp 16   Ht 1.676 m (5' 6\")   Wt 58.8 kg (129 lb 10.1 oz)   SpO2 90%   BMI 20.92 kg/m      Shift: 6818-6574  Isolation Status: NA.  VS: AVSS on RA.  Lab: Unremarkable  BG: NA.  Neuro: A&O x3. Disoriented to time.  Behaviors: Calm, cooperative, and pleasant.  Respiratory: WDL.  Cardiac: WDL.  Pain/Nausea/PRN: No reported pain or nausea.  Diet: Regular diet.  LDA: NA.  Infusion(s): NA.  GI/: Incontinent of urine. Filled one brief. No BM overnight.  Skin: WDL.  Mobility: Up w/ Ao2 to bedside commode.  Plan: Expected Discharge: 03/29/2022. Anticipated discharge location: nursing home. Delays: Placement - TCU           "

## 2022-03-31 ENCOUNTER — APPOINTMENT (OUTPATIENT)
Dept: PHYSICAL THERAPY | Facility: CLINIC | Age: 72
DRG: 064 | End: 2022-03-31
Attending: PEDIATRICS
Payer: COMMERCIAL

## 2022-03-31 ENCOUNTER — APPOINTMENT (OUTPATIENT)
Dept: OCCUPATIONAL THERAPY | Facility: CLINIC | Age: 72
DRG: 064 | End: 2022-03-31
Attending: PEDIATRICS
Payer: COMMERCIAL

## 2022-03-31 LAB
ALBUMIN SERPL-MCNC: 2.9 G/DL (ref 3.4–5)
ALP SERPL-CCNC: 98 U/L (ref 40–150)
ALT SERPL W P-5'-P-CCNC: 16 U/L (ref 0–50)
ANION GAP SERPL CALCULATED.3IONS-SCNC: 5 MMOL/L (ref 3–14)
AST SERPL W P-5'-P-CCNC: 12 U/L (ref 0–45)
BILIRUB SERPL-MCNC: 0.4 MG/DL (ref 0.2–1.3)
BUN SERPL-MCNC: 11 MG/DL (ref 7–30)
CALCIUM SERPL-MCNC: 8.4 MG/DL (ref 8.5–10.1)
CHLORIDE BLD-SCNC: 107 MMOL/L (ref 94–109)
CO2 SERPL-SCNC: 29 MMOL/L (ref 20–32)
CREAT SERPL-MCNC: 0.74 MG/DL (ref 0.52–1.04)
ERYTHROCYTE [DISTWIDTH] IN BLOOD BY AUTOMATED COUNT: 12.9 % (ref 10–15)
GFR SERPL CREATININE-BSD FRML MDRD: 85 ML/MIN/1.73M2
GLUCOSE BLD-MCNC: 149 MG/DL (ref 70–99)
HCT VFR BLD AUTO: 38.8 % (ref 35–47)
HGB BLD-MCNC: 12.2 G/DL (ref 11.7–15.7)
MCH RBC QN AUTO: 30.1 PG (ref 26.5–33)
MCHC RBC AUTO-ENTMCNC: 31.4 G/DL (ref 31.5–36.5)
MCV RBC AUTO: 96 FL (ref 78–100)
PLATELET # BLD AUTO: 255 10E3/UL (ref 150–450)
POTASSIUM BLD-SCNC: 4.4 MMOL/L (ref 3.4–5.3)
PROT SERPL-MCNC: 6.3 G/DL (ref 6.8–8.8)
RBC # BLD AUTO: 4.05 10E6/UL (ref 3.8–5.2)
SODIUM SERPL-SCNC: 141 MMOL/L (ref 133–144)
WBC # BLD AUTO: 5.4 10E3/UL (ref 4–11)

## 2022-03-31 PROCEDURE — 97535 SELF CARE MNGMENT TRAINING: CPT | Mod: GO

## 2022-03-31 PROCEDURE — 97530 THERAPEUTIC ACTIVITIES: CPT | Mod: GO

## 2022-03-31 PROCEDURE — 120N000011 HC R&B TRANSPLANT UMMC

## 2022-03-31 PROCEDURE — 80053 COMPREHEN METABOLIC PANEL: CPT | Performed by: STUDENT IN AN ORGANIZED HEALTH CARE EDUCATION/TRAINING PROGRAM

## 2022-03-31 PROCEDURE — 85027 COMPLETE CBC AUTOMATED: CPT | Performed by: STUDENT IN AN ORGANIZED HEALTH CARE EDUCATION/TRAINING PROGRAM

## 2022-03-31 PROCEDURE — 250N000013 HC RX MED GY IP 250 OP 250 PS 637

## 2022-03-31 PROCEDURE — 97116 GAIT TRAINING THERAPY: CPT | Mod: GP

## 2022-03-31 PROCEDURE — 99232 SBSQ HOSP IP/OBS MODERATE 35: CPT | Mod: GC | Performed by: INTERNAL MEDICINE

## 2022-03-31 PROCEDURE — 97530 THERAPEUTIC ACTIVITIES: CPT | Mod: GP

## 2022-03-31 PROCEDURE — 250N000013 HC RX MED GY IP 250 OP 250 PS 637: Performed by: HOSPITALIST

## 2022-03-31 PROCEDURE — 36415 COLL VENOUS BLD VENIPUNCTURE: CPT | Performed by: STUDENT IN AN ORGANIZED HEALTH CARE EDUCATION/TRAINING PROGRAM

## 2022-03-31 RX ADMIN — PHENYTOIN 100 MG: 50 TABLET, CHEWABLE ORAL at 20:36

## 2022-03-31 RX ADMIN — ATORVASTATIN CALCIUM 40 MG: 40 TABLET, FILM COATED ORAL at 08:09

## 2022-03-31 RX ADMIN — ASPIRIN 81 MG CHEWABLE TABLET 81 MG: 81 TABLET CHEWABLE at 08:09

## 2022-03-31 RX ADMIN — LEVETIRACETAM 1000 MG: 500 TABLET, FILM COATED ORAL at 08:14

## 2022-03-31 RX ADMIN — MEMANTINE 10 MG: 10 TABLET ORAL at 08:09

## 2022-03-31 RX ADMIN — PANTOPRAZOLE SODIUM 40 MG: 40 TABLET, DELAYED RELEASE ORAL at 08:09

## 2022-03-31 RX ADMIN — GABAPENTIN 300 MG: 300 CAPSULE ORAL at 20:36

## 2022-03-31 RX ADMIN — CYANOCOBALAMIN TAB 500 MCG 1000 MCG: 500 TAB at 08:09

## 2022-03-31 RX ADMIN — PHENYTOIN 100 MG: 50 TABLET, CHEWABLE ORAL at 08:09

## 2022-03-31 RX ADMIN — CLOPIDOGREL BISULFATE 75 MG: 75 TABLET ORAL at 08:09

## 2022-03-31 RX ADMIN — LISINOPRIL 20 MG: 20 TABLET ORAL at 08:09

## 2022-03-31 RX ADMIN — LEVETIRACETAM 1000 MG: 500 TABLET, FILM COATED ORAL at 20:36

## 2022-03-31 ASSESSMENT — ACTIVITIES OF DAILY LIVING (ADL)
ADLS_ACUITY_SCORE: 21

## 2022-03-31 NOTE — PROGRESS NOTES
[facility]    Progress Note - Medicine Service, [facility] TEAM 1       Date of Admission:  3/19/2022    Assessment & Plan     Khalida Redding is a 71 year old female admitted on 3/19/2022. She has a history of T2DM, HTN, HLD, dementia, epilespy, and recent CVA (11/2021) and is admitted for acute CVA and hypotension likely secondary to polypharmacy.       Changes today:  - Goal for discharge to TCU or Swing Bed, appreciate SW assistance with applications  - Vitals two times daily   - 1:1 sitter at bedside given pt's impulsiveness  - Working with PT/OT while inpatient     #Acute CVA of R splenium of corpus callosum   #Prior CVA with residual left sided hemiparesis   New onset confusion, slurred speech, and left facial droop around 09:30 on 3/18/22. CVA 11/2021 with residual left sided weakness. MRI at OSH: small acute to subacute infarct of the right aspect of the splenium of the corpus callosum. Neurology consulted, recommended no thrombolytics due to rapid improvement of her symptoms. TTE unremarkable, EF 55-60%. Stroke symptoms resolved, suspect that symptom recrudescence at OSH was due to hypotension and decreased cerebral perfusion in the setting of polypharmacy (see below).  - Cardiac Telemetry dc'd given patient agitation with presence and >48h w/o concerning rhythm  - Continue PTA statin ()  - Per Neurology - OK for normotension. Avoid hypotension.  - PT/OT/SLP Consulted recommended ARU/therapy, goal for TCU placement   - Neurology Consulted. Appreciate further recommendations of management.               - Continue DAPT for 90 days, ASA 324mg thereafter              - Continue  mg BID and Keppra 2000 mg BID              - Follow up with general neurology as outpatient              - Discharge on Ziopatch (ordered)              - No further stroke work up needed      # Post-CVA rehabilitation  # Dementia, neurocognitive impairment,  "severe  Patient appears at her mental status baseline on examination, not consentable given inability to articulate or appreciate present post-CVA concerns and mobility issues, risks of discharge home without rehab, and alternatives to rehabilitation.   - Pt refusing \"rehab\" but amenable to \"therapy\" likely given the similarity in name associated with rehab at a TCU (which she has done before and had a poor experience with)   - Not appropriate for ARU per [facility] given dementia  - Pt holdable if threatening to leave as she needs 24/7 supervision, high risk for fall and bleed as she is on double antiplatelet   - Delirium and Fall Precautions in Place  - Given concerns for agitation and disorientation leading to significant distress, olanzapine at bedtime PRN order placed, not used as yet     # Seizure disorder   She has a known seizure disorder. She was transferred for cEEG monitoring, however this is not able to be started overnight. She was loaded with 1,000mg of Keppra prior to transfer. No need for EEG per Neuro.  - Continue phenytoin 100 mg BID and Keppra increased to 2000 mg BID per neuro         =====================  Chronic / Stable Conditions  =====================     Data []Expand by Default     # HLD  - Continue PTA Atorvastatin   # HTN - cautious resumptions of PTA antihypertensives in the setting of medication-induced hypotension  # Elevated troponin, suspect type II MI; resolved   Troponin peaked at 640 without ACS symptoms which is peaked. This likely represents a type II MI - demand ischemia in the setting of hypotension.   - No further troponin checks     # T2DM - well-controlled with diet  Last A1c 5.5%.   - Diet Controlled.   - Monitor blood glucose with M/Th labs, given stability of sugars while inpatient               -- Start sliding scale insulin if BG > 250     #SIRS criteria (fever 100.8F, leukocytosis, tachycardia, lactic acidosis); resolved  #Transient hypotension, likely iatrogenic " secondary to medications; resolved  #Somnolence, resolved   She had an episode of hypotension with SBP 60's. Per chart review, patient had received labetolol (30mg), hydralazine (10mg), fentanyl (55mcg), lorazepam (1mg), gabapentin (300mg), levetiracetam (1500mg), phenytoin (100mg) in the evening prior to the episode of hypotension and somnolence. BP normalized w/ fluid resuscitation. CT C/A/P without any concerning signs for infection. SIRS criteria likely met due to iatrogenic hypotension. Her somnolence is also likely related to polypharmacy.  No identifiable source of infection on physical exam, UA, or imaging. No indication to continue antibiotics at this time.   - Monitor for signs/symptoms of infection   - Avoid polypharmacy       Diet: Regular Diet Adult  Room Service  Snacks/Supplements Adult: Glucerna; With Meals    DVT Prophylaxis: DAPT   Gloria Catheter: Not present  Fluids: None  Central Lines: None  Cardiac Monitoring: None  Code Status: Full Code      Disposition Plan   Expected Discharge: Pending TCU acceptance    Anticipated discharge location: nursing home    Delays:     Placement - TCU            The patient's care was discussed with the Attending Physician, Dr. Dr. Hoffmann and daughter.    Sailaja Sales MD  Medicine Service, [facility] TEAM [address] [place] [address]  Securely message with the Vocera Web Console (learn more here)  Text page via [facility] Paging/Directory   Please see signed in provider for up to date coverage information      Clinically Significant Risk Factors Present on Admission                    ______________________________________________________________________    Interval History   Verona reports that she is feeling well today. She walked with PT yesterday and was able to ambulate short distances. She voices that she wants to return home. No chest pain or shortness of breath. She expresses that she wants to spend Easter with her family. She  denies any pain. She endorses good appetite. No difficulties with urination or diarrhea. She continues to work with therapy.     Data reviewed today: I reviewed all medications, new labs and imaging results over the last 24 hours. I personally reviewed no images or EKG's today.    Physical Exam   Vital Signs: Temp: 98.1  F (36.7  C) Temp src: Oral BP: 120/77 Pulse: 76   Resp: 16 SpO2: 92 % O2 Device: None (Room air)    Weight: 129 lbs 10.09 oz  General Appearance: Alert, comfortable in bed, no acute distress   Respiratory: CTAB  Cardiovascular: RRR   GI: BS+. Soft and nontender   Skin: no lesions noted   Other: Moves all four extremities      Data   Medications       aspirin  81 mg Oral Daily     atorvastatin  40 mg Oral Daily     clopidogrel  75 mg Oral Daily     cyanocobalamin  1,000 mcg Oral Daily     gabapentin  300 mg Oral At Bedtime     levETIRAcetam  1,000 mg Oral BID     lisinopril  20 mg Oral Daily     memantine  10 mg Oral Daily     pantoprazole  40 mg Oral QAM AC     phenytoin  100 mg Oral BID     polyethylene glycol  17 g Oral Daily

## 2022-03-31 NOTE — PROGRESS NOTES
Care Management Follow Up    Length of Stay (days): 12    Expected Discharge Date: TBD, need accepting facility     Concerns to be Addressed: discharge planning     Patient plan of care discussed at interdisciplinary rounds: Yes     Anticipated Discharge Disposition: Transitional Care     Anticipated Discharge Services: IMM, PAS, Transportation TBD  Anticipated Discharge DME: None     Patient/family educated on Medicare website which has current facility and service quality ratings: yes  Education Provided on the Discharge Plan: yes  Patient/Family in Agreement with the Plan: yes     Referrals Placed by CM/SW: Internal Clinic Care Coordination, Post Acute Facilities  Private pay costs discussed: TBD     Active Referrals:  [facility]   [facility]    Denied Referrals:  [facility] in [place]  [facility] in [place]  [facility] in [facility]   [facility] [place]  [facility]  [facility] [place]   [facility] refuses to let her return there  [facility]      Additional Information:  [facility] [place] ([phone]): RODNEY left a message with admissions requesting a call back regarding referral.      [facility] ([phone]): RODNEY called admissions and left a message with admissions requesting a call back.      [facility] [place] ([phone]): RODNEY called admissions and left a message with admissions requesting a call back. RODNEY received a VM back from [facility] ([phone]). RODNEY called them back and requested another call back. RODNEY called admissions again and resent the referral as they have lost it. RODNEY resent via Epic. They will review and call RODNEY  back.     [facility] in [place], [place] ([phone]): They continue to review the referral.     MELISSA Tristan, SW  7A/5C Medicine   Ph: [phone]  Pager: [phone]

## 2022-03-31 NOTE — PLAN OF CARE
"/76 (BP Location: Right arm)   Pulse 82   Temp 97.6  F (36.4  C) (Oral)   Resp 16   Ht 1.676 m (5' 6\")   Wt 58.8 kg (129 lb 10.1 oz)   SpO2 92%   BMI 20.92 kg/m      Shift: 2483-0518  VS: stable on RA, afebrile  Cardiac: WDL  Neuro: AOx4  BG: None  Respiratory: non labored breathing, clear lung sounds   Pain/Nausea/PRN: denies nausea and pain   Diet: Regular   LDA: No IV access   GI/: no bm, voids adequately   Skin: no new findings   Mobility: Assist x2, g/b and walker   Plan: Continue plan of care     Will give report to oncoming nurse. Pt left in stable condition, care relinquished at this time.                       "

## 2022-04-01 ENCOUNTER — APPOINTMENT (OUTPATIENT)
Dept: PHYSICAL THERAPY | Facility: CLINIC | Age: 72
DRG: 064 | End: 2022-04-01
Attending: PEDIATRICS
Payer: COMMERCIAL

## 2022-04-01 ENCOUNTER — APPOINTMENT (OUTPATIENT)
Dept: OCCUPATIONAL THERAPY | Facility: CLINIC | Age: 72
DRG: 064 | End: 2022-04-01
Attending: PEDIATRICS
Payer: COMMERCIAL

## 2022-04-01 PROCEDURE — 97530 THERAPEUTIC ACTIVITIES: CPT | Mod: GP

## 2022-04-01 PROCEDURE — 97116 GAIT TRAINING THERAPY: CPT | Mod: GP

## 2022-04-01 PROCEDURE — 120N000011 HC R&B TRANSPLANT UMMC

## 2022-04-01 PROCEDURE — 99232 SBSQ HOSP IP/OBS MODERATE 35: CPT | Mod: GC | Performed by: INTERNAL MEDICINE

## 2022-04-01 PROCEDURE — 250N000013 HC RX MED GY IP 250 OP 250 PS 637

## 2022-04-01 PROCEDURE — 97530 THERAPEUTIC ACTIVITIES: CPT | Mod: GO

## 2022-04-01 PROCEDURE — 97535 SELF CARE MNGMENT TRAINING: CPT | Mod: GO

## 2022-04-01 PROCEDURE — 250N000013 HC RX MED GY IP 250 OP 250 PS 637: Performed by: HOSPITALIST

## 2022-04-01 RX ADMIN — PANTOPRAZOLE SODIUM 40 MG: 40 TABLET, DELAYED RELEASE ORAL at 08:24

## 2022-04-01 RX ADMIN — ATORVASTATIN CALCIUM 40 MG: 40 TABLET, FILM COATED ORAL at 08:24

## 2022-04-01 RX ADMIN — ACETAMINOPHEN 975 MG: 325 TABLET ORAL at 00:06

## 2022-04-01 RX ADMIN — GABAPENTIN 300 MG: 300 CAPSULE ORAL at 20:48

## 2022-04-01 RX ADMIN — PHENYTOIN 100 MG: 50 TABLET, CHEWABLE ORAL at 20:49

## 2022-04-01 RX ADMIN — ASPIRIN 81 MG CHEWABLE TABLET 81 MG: 81 TABLET CHEWABLE at 08:24

## 2022-04-01 RX ADMIN — PHENYTOIN 100 MG: 50 TABLET, CHEWABLE ORAL at 08:24

## 2022-04-01 RX ADMIN — LISINOPRIL 20 MG: 20 TABLET ORAL at 08:27

## 2022-04-01 RX ADMIN — MEMANTINE 10 MG: 10 TABLET ORAL at 08:24

## 2022-04-01 RX ADMIN — LEVETIRACETAM 1000 MG: 500 TABLET, FILM COATED ORAL at 08:25

## 2022-04-01 RX ADMIN — CYANOCOBALAMIN TAB 500 MCG 1000 MCG: 500 TAB at 08:35

## 2022-04-01 RX ADMIN — LEVETIRACETAM 1000 MG: 500 TABLET, FILM COATED ORAL at 20:48

## 2022-04-01 RX ADMIN — CLOPIDOGREL BISULFATE 75 MG: 75 TABLET ORAL at 08:24

## 2022-04-01 ASSESSMENT — ACTIVITIES OF DAILY LIVING (ADL)
ADLS_ACUITY_SCORE: 21
ADLS_ACUITY_SCORE: 23
ADLS_ACUITY_SCORE: 21
ADLS_ACUITY_SCORE: 23
ADLS_ACUITY_SCORE: 21
ADLS_ACUITY_SCORE: 23
ADLS_ACUITY_SCORE: 21
ADLS_ACUITY_SCORE: 23
ADLS_ACUITY_SCORE: 23
ADLS_ACUITY_SCORE: 21
ADLS_ACUITY_SCORE: 21
ADLS_ACUITY_SCORE: 23
ADLS_ACUITY_SCORE: 23
ADLS_ACUITY_SCORE: 21
ADLS_ACUITY_SCORE: 23

## 2022-04-01 NOTE — PLAN OF CARE
"VS: /69 (BP Location: Left arm)   Pulse 80   Temp 98  F (36.7  C) (Oral)   Resp 18   Ht 1.676 m (5' 6\")   Wt 58.8 kg (129 lb 10.1 oz)   SpO2 96%   BMI 20.92 kg/m      Cares: 4295-0597    Current condition: Stable on RA  Neuro: Aox4, all other neuros intact  Cardio: WDL  Respiratory: WDL  GI/: Voiding spontaneously, Pt states last BM was 3/30 last charted was 3/27  Skin: WDL  Diet:   Regular good appetite  LDA:  No access  Mobility:  Up with assist of 1-2 with walker and GB  Plan of Care: Will continue to monitor and assess for any changes.     "

## 2022-04-01 NOTE — PLAN OF CARE
"/69 (BP Location: Left arm)   Pulse 80   Temp 98  F (36.7  C) (Oral)   Resp 18   Ht 1.676 m (5' 6\")   Wt 58.8 kg (129 lb 10.1 oz)   SpO2 96%   BMI 20.92 kg/m      Shift: 7808-5636  VS: stable on RA, afebrile  Cardiac: WDL  Neuro: Alert and oriented x4  BG: none  Respiratory: non labored breathing, clear lung sounds   Pain/Nausea/PRN: denies nausea, tylenol given x1 for back pain   Diet: Regular   LDA: no IV access  GI/: voiding adequately, no bm   Skin: no new findings   Mobility: assist x2 g/b, walker   Plan: Continue plan of care     Will give report to oncoming nurse. Pt left in stable condition, care relinquished at this time.                         "

## 2022-04-01 NOTE — PROGRESS NOTES
Care Management Follow Up    Length of Stay (days): 13    Expected Discharge Date: TBD, need accepting facility     Concerns to be Addressed: discharge planning     Patient plan of care discussed at interdisciplinary rounds: Yes     Anticipated Discharge Disposition: Transitional Care     Anticipated Discharge Services: IMM, PAS, Transportation TBD  Anticipated Discharge DME: None     Patient/family educated on Medicare website which has current facility and service quality ratings: yes  Education Provided on the Discharge Plan: yes  Patient/Family in Agreement with the Plan: yes     Referrals Placed by CM/SW: Internal Clinic Care Coordination, Post Acute Facilities  Private pay costs discussed: TBD     Active Referrals:  [facility]   [facility]    Denied Referrals:  [facility] in [place]  [facility] in [place]  [facility] in [facility]   [facility] [place]  [facility]  [facility] [place]   [facility] refuses to let her return there  [facility] [place]     Additional Information:  [facility] [place] ([phone]): RODNEY left a message for Jeremiah in admissions requesting a call back.     [facility] ([phone]): RODNEY spoke with Анна and they do not have beds at this time.      [facility] [place] ([phone]): SW spoke with admissions and they cannot accept her due to complexity and bed availability.      [facility] in [place], [place] ([phone]): RODNEY left a message with admissions requesting a call back. RODNEY spoke with Elizabeth and SW will need to speak with Carmelina ([phone]). RODNEY spoke with Carmelina who requested notes from 3/29 on and  PT/OT notes. SW faxed all to [phone]. They likely can admit next week if they accept her.     MELISSA Tristan, SW  7A/5C Medicine   Ph: [phone]  Pager: [phone]

## 2022-04-01 NOTE — PLAN OF CARE
Goal Outcome Evaluation:VSS A&Ox4, impulsive at times, bed alarm on for safety,pt. is up with assist of one with GB/W to bedside commode,denied pain and nausea,had fair appetite,voided adequately,no BM.LS clear,BS+PLAN: to discharge to TCU when bed available.Will continue to monitor.

## 2022-04-01 NOTE — PROGRESS NOTES
[facility]    Progress Note - Medicine Service, [facility] TEAM 1       Date of Admission:  3/19/2022    Assessment & Plan      Khalida Redding is a 71 year old female admitted on 3/19/2022. She has a history of T2DM, HTN, HLD, dementia, epilespy, and recent CVA (11/2021) and is admitted for acute CVA and hypotension likely secondary to polypharmacy.       Changes today:  - Goal for discharge to TCU or Swing Bed, appreciate SW assistance with applications  - Vitals two times daily. Labs twice weekly.   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 CVA 11/2021 with residual left sided weakness. MRI at OSH: small acute to subacute infarct of the right aspect of the splenium of the corpus callosum. Neurology consulted, recommended no thrombolytics due to rapid improvement of her symptoms. TTE unremarkable, EF 55-60%. Stroke symptoms resolved, suspect that symptom recrudescence at OSH was due to hypotension and decreased cerebral perfusion in the setting of polypharmacy (see below).  - Cardiac Telemetry dc'd given patient agitation with presence and >48h w/o concerning rhythm  - Continue PTA statin ()  - Per Neurology - OK for normotension. Avoid hypotension.  - PT/OT/SLP Consulted recommended ARU/therapy, goal for TCU placement   - Neurology Consulted. Appreciate further recommendations of management.               - Continue DAPT for 90 days, ASA 324mg thereafter              - Continue  mg BID and Keppra 2000 mg BID              - Follow up with general neurology as outpatient              - Discharge on Ziopatch (ordered)              - No further stroke work up needed      # Post-CVA rehabilitation  # Dementia, neurocognitive impairment,  "severe  Patient appears at her mental status baseline on examination, not consentable given inability to articulate or appreciate present post-CVA concerns and mobility issues, risks of discharge home without rehab, and alternatives to rehabilitation.   - Pt refusing \"rehab\" but amenable to \"therapy\" likely given the similarity in name associated with rehab at a TCU (which she has done before and had a poor experience with)   - Not appropriate for ARU per [facility] given dementia  - Pt holdable if threatening to leave as she needs 24/7 supervision, high risk for fall and bleed as she is on double antiplatelet   - Delirium and Fall Precautions in Place  - Given concerns for agitation and disorientation leading to significant distress, olanzapine at bedtime PRN order placed, not used as yet     # Seizure disorder   She has a known seizure disorder. She was transferred for cEEG monitoring, however this is not able to be started overnight. She was loaded with 1,000mg of Keppra prior to transfer. No need for EEG per Neuro.  - Continue phenytoin 100 mg BID and Keppra increased to 2000 mg BID per neuro         =====================  Chronic / Stable Conditions  =====================     Data []?Expand by Default     # HLD  - Continue PTA Atorvastatin   # HTN - cautious resumptions of PTA antihypertensives in the setting of medication-induced hypotension  # Elevated troponin, suspect type II MI; resolved   Troponin peaked at 640 without ACS symptoms which is peaked. This likely represents a type II MI - demand ischemia in the setting of hypotension.   - No further troponin checks     # T2DM - well-controlled with diet  Last A1c 5.5%.   - Diet Controlled.   - Monitor blood glucose with M/Th labs, given stability of sugars while inpatient               -- Start sliding scale insulin if BG > 250     #SIRS criteria (fever 100.8F, leukocytosis, tachycardia, lactic acidosis); resolved  #Transient hypotension, likely iatrogenic " secondary to medications; resolved  #Somnolence, resolved   She had an episode of hypotension with SBP 60's. Per chart review, patient had received labetolol (30mg), hydralazine (10mg), fentanyl (55mcg), lorazepam (1mg), gabapentin (300mg), levetiracetam (1500mg), phenytoin (100mg) in the evening prior to the episode of hypotension and somnolence. BP normalized w/ fluid resuscitation. CT C/A/P without any concerning signs for infection. SIRS criteria likely met due to iatrogenic hypotension. Her somnolence is also likely related to polypharmacy.  No identifiable source of infection on physical exam, UA, or imaging. No indication to continue antibiotics at this time.   - Monitor for signs/symptoms of infection   - Avoid polypharmacy     Diet: Regular Diet Adult  Room Service  Snacks/Supplements Adult: Glucerna; With Meals    DVT Prophylaxis: DAPT   Gloria Catheter: Not present  Fluids: None   Central Lines: None  Cardiac Monitoring: None  Code Status: Full Code      Disposition Plan   Expected Discharge: 04/04/2022     Anticipated discharge location: nursing home    Delays:     Placement - TCU            The patient's care was discussed with the Attending Physician, Dr. Hoffmann.    Sailaja Sales MD  Medicine Service, [facility] TEAM [address] [place] [address]  Securely message with the Vocera Web Console (learn more here)  Text page via [facility] Paging/Directory   Please see signed in provider for up to date coverage information      Clinically Significant Risk Factors Present on Admission                    ______________________________________________________________________    Interval History   Verona is pleasant this morning.  She denies any pain.  She explains that she has been working with physical therapy and that her legs do not tire out as quickly.  She is eating and drinking well.  No abdominal pain.  She is looking forward to when she will be able to go back home to be with  family.  She continues to work with therapy and has had some progression.     Data reviewed today: I reviewed all medications, new labs and imaging results over the last 24 hours. I personally reviewed no images or EKG's today.    Physical Exam   Vital Signs: Temp: 97.5  F (36.4  C) Temp src: Oral BP: 100/61 Pulse: 78   Resp: 16 SpO2: 96 % O2 Device: None (Room air)    Weight: 129 lbs 10.09 oz  General Appearance: Alert, comfortable in bed   Respiratory: CTAB, no tachypnea   Cardiovascular: RRR, no murmur   GI: BS+, soft, nontender   Skin: no rashes or lesions   Other: 4/5 strength in  LUE and LLE     Data   Recent Labs   Lab 03/31/22  0611 03/28/22  0600   WBC 5.4 4.9   HGB 12.2 12.2   MCV 96 97    273    142   POTASSIUM 4.4 3.8   CHLORIDE 107 107   CO2 29 31   BUN 11 10   CR 0.74 0.64   ANIONGAP 5 4   PINA 8.4* 8.0*   * 130*   ALBUMIN 2.9* 3.0*   PROTTOTAL 6.3* 6.3*   BILITOTAL 0.4 0.2   ALKPHOS 98 93   ALT 16 14   AST 12 10     Medications       aspirin  81 mg Oral Daily     atorvastatin  40 mg Oral Daily     clopidogrel  75 mg Oral Daily     cyanocobalamin  1,000 mcg Oral Daily     gabapentin  300 mg Oral At Bedtime     levETIRAcetam  1,000 mg Oral BID     lisinopril  20 mg Oral Daily     memantine  10 mg Oral Daily     pantoprazole  40 mg Oral QAM AC     phenytoin  100 mg Oral BID     polyethylene glycol  17 g Oral Daily

## 2022-04-02 PROCEDURE — 120N000011 HC R&B TRANSPLANT UMMC

## 2022-04-02 PROCEDURE — 250N000013 HC RX MED GY IP 250 OP 250 PS 637: Performed by: HOSPITALIST

## 2022-04-02 PROCEDURE — 99232 SBSQ HOSP IP/OBS MODERATE 35: CPT | Mod: GC | Performed by: INTERNAL MEDICINE

## 2022-04-02 PROCEDURE — 250N000013 HC RX MED GY IP 250 OP 250 PS 637

## 2022-04-02 RX ADMIN — MEMANTINE 10 MG: 10 TABLET ORAL at 08:04

## 2022-04-02 RX ADMIN — CYANOCOBALAMIN TAB 500 MCG 1000 MCG: 500 TAB at 08:03

## 2022-04-02 RX ADMIN — PANTOPRAZOLE SODIUM 40 MG: 40 TABLET, DELAYED RELEASE ORAL at 08:04

## 2022-04-02 RX ADMIN — LEVETIRACETAM 1000 MG: 500 TABLET, FILM COATED ORAL at 20:20

## 2022-04-02 RX ADMIN — GABAPENTIN 300 MG: 300 CAPSULE ORAL at 20:20

## 2022-04-02 RX ADMIN — ATORVASTATIN CALCIUM 40 MG: 40 TABLET, FILM COATED ORAL at 08:03

## 2022-04-02 RX ADMIN — PHENYTOIN 100 MG: 50 TABLET, CHEWABLE ORAL at 08:03

## 2022-04-02 RX ADMIN — ASPIRIN 81 MG CHEWABLE TABLET 81 MG: 81 TABLET CHEWABLE at 08:04

## 2022-04-02 RX ADMIN — LEVETIRACETAM 1000 MG: 500 TABLET, FILM COATED ORAL at 08:03

## 2022-04-02 RX ADMIN — LISINOPRIL 20 MG: 20 TABLET ORAL at 08:04

## 2022-04-02 RX ADMIN — PHENYTOIN 100 MG: 50 TABLET, CHEWABLE ORAL at 20:20

## 2022-04-02 RX ADMIN — CLOPIDOGREL BISULFATE 75 MG: 75 TABLET ORAL at 08:04

## 2022-04-02 ASSESSMENT — ACTIVITIES OF DAILY LIVING (ADL)
ADLS_ACUITY_SCORE: 21
ADLS_ACUITY_SCORE: 21
ADLS_ACUITY_SCORE: 19
ADLS_ACUITY_SCORE: 23
ADLS_ACUITY_SCORE: 19
ADLS_ACUITY_SCORE: 21
ADLS_ACUITY_SCORE: 23
ADLS_ACUITY_SCORE: 19
ADLS_ACUITY_SCORE: 21
ADLS_ACUITY_SCORE: 19
ADLS_ACUITY_SCORE: 21
ADLS_ACUITY_SCORE: 23
ADLS_ACUITY_SCORE: 21
ADLS_ACUITY_SCORE: 19
ADLS_ACUITY_SCORE: 21
ADLS_ACUITY_SCORE: 19
ADLS_ACUITY_SCORE: 19
ADLS_ACUITY_SCORE: 21
ADLS_ACUITY_SCORE: 23
ADLS_ACUITY_SCORE: 21
ADLS_ACUITY_SCORE: 19

## 2022-04-02 NOTE — PROGRESS NOTES
[facility]    Progress Note - Medicine Service, [facility] TEAM 1       Date of Admission:  3/19/2022    Assessment & Plan     Khalida Redding is a 71 year old female admitted on 3/19/2022. She has a history of T2DM, HTN, HLD, dementia, epilespy, and recent CVA (11/2021) and is admitted for acute CVA and hypotension likely secondary to polypharmacy.       Changes today:  - Goal for discharge to TCU or Swing Bed, appreciate SW assistance with applications  - Vitals two times daily. Labs twice weekly.   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 CVA 11/2021 with residual left sided weakness. MRI at OSH: small acute to subacute infarct of the right aspect of the splenium of the corpus callosum. Neurology consulted, recommended no thrombolytics due to rapid improvement of her symptoms. TTE unremarkable, EF 55-60%. Stroke symptoms resolved, suspect that symptom recrudescence at OSH was due to hypotension and decreased cerebral perfusion in the setting of polypharmacy (see below).  - Cardiac Telemetry dc'd given patient agitation with presence and >48h w/o concerning rhythm  - Continue PTA statin ()  - Per Neurology - OK for normotension. Avoid hypotension.  - PT/OT/SLP Consulted recommended ARU/therapy, goal for TCU placement   - Neurology Consulted. Appreciate further recommendations of management.           - Continue DAPT for 90 days (through 5/17), ASA 324mg thereafter          - Continue  mg BID and Keppra 2000 mg BID          - Follow up with general neurology as outpatient          - Discharge on Ziopatch (ordered)          - No further stroke work up needed      # Post-CVA rehabilitation  # Dementia, neurocognitive impairment, severe  Patient appears  "at her mental status baseline on examination, not consentable given inability to articulate or appreciate present post-CVA concerns and mobility issues, risks of discharge home without rehab, and alternatives to rehabilitation.   - Pt refusing \"rehab\" but amenable to \"therapy\" likely given the similarity in name associated with rehab at a TCU (which she has done before and had a poor experience with)   - Not appropriate for ARU per [facility] given dementia  - Patient is holdable if she attempts or threatens to leave   - Delirium and Fall Precautions in Place  - Given concerns for agitation and disorientation leading to significant distress, olanzapine at bedtime PRN order placed, not used as yet     # Seizure disorder   She has a known seizure disorder. She was transferred for cEEG monitoring, however this is not able to be started overnight. She was loaded with 1,000mg of Keppra prior to transfer. No need for EEG per Neuro.  - Continue phenytoin 100 mg BID and Keppra increased to 2000 mg BID per neuro      =====================  Chronic / Stable Conditions  =====================   # HLD  - Continue PTA Atorvastatin   # HTN - cautious resumptions of PTA antihypertensives in the setting of medication-induced hypotension  # Elevated troponin, suspect type II MI; resolved   Troponin peaked at 640 without ACS symptoms which is peaked. This likely represents a type II MI - demand ischemia in the setting of hypotension.   - No further troponin checks     # T2DM - well-controlled with diet  Last A1c 5.5%.   - Diet Controlled.   - Monitor blood glucose with M/Th labs, given stability of sugars while inpatient               -- Start sliding scale insulin if BG > 250     #SIRS criteria (fever 100.8F, leukocytosis, tachycardia, lactic acidosis); resolved  #Transient hypotension, likely iatrogenic secondary to medications; resolved  #Somnolence, resolved   She had an episode of hypotension with SBP 60's. Per chart review, patient " had received labetolol (30mg), hydralazine (10mg), fentanyl (55mcg), lorazepam (1mg), gabapentin (300mg), levetiracetam (1500mg), phenytoin (100mg) in the evening prior to the episode of hypotension and somnolence. BP normalized w/ fluid resuscitation. CT C/A/P without any concerning signs for infection. SIRS criteria likely met due to iatrogenic hypotension. Her somnolence is also likely related to polypharmacy.  No identifiable source of infection on physical exam, UA, or imaging. No indication to continue antibiotics at this time.   - Monitor for signs/symptoms of infection   - Avoid polypharmacy     Diet: Regular Diet Adult  Room Service  Snacks/Supplements Adult: Glucerna; With Meals    DVT Prophylaxis: DAPT  Gloria Catheter: Not present  Central Lines: None  Cardiac Monitoring: None  Code Status: Full Code      Disposition Plan   Expected Discharge: 04/04/2022     Anticipated discharge location: nursing home    Delays:     Placement - TCU            The patient's care was discussed with the Attending Physician, Dr. Hoffmann.    Sailaja Sales MD  Medicine Service, [facility] TEAM [address] [place] [address]  Securely message with the Vocera Web Console (learn more here)  Text page via [facility] Paging/Directory   Please see signed in provider for up to date coverage information      Clinically Significant Risk Factors Present on Admission                    ______________________________________________________________________    Interval History   Verona reports feeling well today.  She reports that she is tired and wants to take a nap, but otherwise is feeling well.  She had a bowel movement today that was normal.  She has no pain.  She continues to work with therapy and reports that it is going well.  She explains that her sister is coming to visit soon she is looking forward to her visit.  Per RN no acute overnight events.  She has been off one-to-one for over 24 hours now and doing  well.  She is pleasant and cooperative.    Data reviewed today: I reviewed all medications, new labs and imaging results over the last 24 hours. I personally reviewed no images or EKG's today.    Physical Exam   Vital Signs: Temp: 98.3  F (36.8  C) Temp src: Oral BP: 110/57 Pulse: 88   Resp: 18 SpO2: 95 % O2 Device: None (Room air)    Weight: 129 lbs 10.09 oz  General Appearance:  Alert, comfortable in bed   Respiratory: CTAB, no tachypnea   Cardiovascular: RRR, no murmur   GI: BS+, soft, nontender   Skin: no rashes or lesions   Other:  4/5 strength in  LUE and LLE, 5/5 strength in RLE and RUE     Data   Medications       aspirin  81 mg Oral Daily     atorvastatin  40 mg Oral Daily     clopidogrel  75 mg Oral Daily     cyanocobalamin  1,000 mcg Oral Daily     gabapentin  300 mg Oral At Bedtime     levETIRAcetam  1,000 mg Oral BID     lisinopril  20 mg Oral Daily     memantine  10 mg Oral Daily     pantoprazole  40 mg Oral QAM AC     phenytoin  100 mg Oral BID     polyethylene glycol  17 g Oral Daily

## 2022-04-02 NOTE — PLAN OF CARE
"Goal Outcome Evaluation: On Going      /57 (BP Location: Left arm)   Pulse 88   Temp 98.3  F (36.8  C) (Oral)   Resp 16   Ht 1.676 m (5' 6\")   Wt 58.8 kg (129 lb 10.1 oz)   SpO2 97%   BMI 20.92 kg/m       Patient is alert and oriented x3 (disoriented to time). She has been using call light appropriately all day. Bed and chair alarms still in use. Denies pain and nausea. Bedside commode with assist of one with GB and walker. No IV access. Patient condition has been stable all day.                 "

## 2022-04-02 NOTE — PLAN OF CARE
Pt is A/Ox3, forgetful. Not using call light. Bed alarm in place. Denies pain. No nausea. Using bedside commode with assist of 1. Pt's family was at bedside at supportive. No IV access

## 2022-04-02 NOTE — PLAN OF CARE
"Goal Outcome Evaluation:    Plan of Care Reviewed With: patient     Overall Patient Progress: improving    /73 (BP Location: Left arm)   Pulse 80   Temp 97.7  F (36.5  C) (Oral)   Resp 16   Ht 1.676 m (5' 6\")   Wt 58.8 kg (129 lb 10.1 oz)   SpO2 97%   BMI 20.92 kg/m        Pt in bed at this time alert and oriented watching TV. Is able to make all needs known. Skin warm and dry to touch. No acute skin issues noted at this time. Pt uses the bedside commode. Ambulates with a walker and gait belt. Bed alarm on for safety reasons since pt is very impulsive. Padded side rails for seizure precautions. Patient is very pleasant at this time and follows commands appropriately. No IV access. Vitally stable. Denies pain at this time. No acute distress noted. Will monitor and address any acute findings.            "

## 2022-04-03 PROCEDURE — 250N000013 HC RX MED GY IP 250 OP 250 PS 637

## 2022-04-03 PROCEDURE — 99232 SBSQ HOSP IP/OBS MODERATE 35: CPT | Mod: GC | Performed by: INTERNAL MEDICINE

## 2022-04-03 PROCEDURE — 250N000013 HC RX MED GY IP 250 OP 250 PS 637: Performed by: STUDENT IN AN ORGANIZED HEALTH CARE EDUCATION/TRAINING PROGRAM

## 2022-04-03 PROCEDURE — 250N000013 HC RX MED GY IP 250 OP 250 PS 637: Performed by: HOSPITALIST

## 2022-04-03 PROCEDURE — 120N000011 HC R&B TRANSPLANT UMMC

## 2022-04-03 RX ORDER — TAMSULOSIN HYDROCHLORIDE 0.4 MG/1
0.4 CAPSULE ORAL EVERY EVENING
Status: DISCONTINUED | OUTPATIENT
Start: 2022-04-03 | End: 2022-04-08 | Stop reason: HOSPADM

## 2022-04-03 RX ADMIN — LEVETIRACETAM 1000 MG: 500 TABLET, FILM COATED ORAL at 20:28

## 2022-04-03 RX ADMIN — CYANOCOBALAMIN TAB 500 MCG 1000 MCG: 500 TAB at 08:09

## 2022-04-03 RX ADMIN — LISINOPRIL 20 MG: 20 TABLET ORAL at 08:09

## 2022-04-03 RX ADMIN — PHENYTOIN 100 MG: 50 TABLET, CHEWABLE ORAL at 08:08

## 2022-04-03 RX ADMIN — LEVETIRACETAM 1000 MG: 500 TABLET, FILM COATED ORAL at 08:09

## 2022-04-03 RX ADMIN — ASPIRIN 81 MG CHEWABLE TABLET 81 MG: 81 TABLET CHEWABLE at 08:09

## 2022-04-03 RX ADMIN — PANTOPRAZOLE SODIUM 40 MG: 40 TABLET, DELAYED RELEASE ORAL at 08:09

## 2022-04-03 RX ADMIN — TAMSULOSIN HYDROCHLORIDE 0.4 MG: 0.4 CAPSULE ORAL at 23:20

## 2022-04-03 RX ADMIN — MEMANTINE 10 MG: 10 TABLET ORAL at 08:09

## 2022-04-03 RX ADMIN — CLOPIDOGREL BISULFATE 75 MG: 75 TABLET ORAL at 08:09

## 2022-04-03 RX ADMIN — PHENYTOIN 100 MG: 50 TABLET, CHEWABLE ORAL at 20:28

## 2022-04-03 RX ADMIN — GABAPENTIN 300 MG: 300 CAPSULE ORAL at 20:28

## 2022-04-03 RX ADMIN — ATORVASTATIN CALCIUM 40 MG: 40 TABLET, FILM COATED ORAL at 08:10

## 2022-04-03 ASSESSMENT — ACTIVITIES OF DAILY LIVING (ADL)
ADLS_ACUITY_SCORE: 21

## 2022-04-03 NOTE — PLAN OF CARE
"Goal Outcome Evaluation:    Plan of Care Reviewed With: patient       /82 (BP Location: Left arm)   Pulse 74   Temp 97.6  F (36.4  C) (Oral)   Resp 16   Ht 1.676 m (5' 6\")   Wt 58.8 kg (129 lb 10.1 oz)   SpO2 97%   BMI 20.92 kg/m       Patient is currently resting in bed. She has been using call light appropriately all day. Bed and chair alarms still in use. Denies pain and nausea. Bedside commode with assist of one with GB and walker. No IV access. She has been having urine urgency with low urine output. PVR at 363 and was team notified. They stated we should continue to watch and notify them if PVR is above 600. Patient condition has been stable all day. Handoff will be given to on coming RN      "

## 2022-04-03 NOTE — PROGRESS NOTES
[facility]    Progress Note - Medicine Service, [facility] TEAM 1       Date of Admission:  3/19/2022    Assessment & Plan     Khalida Redding is a 71 year old female admitted on 3/19/2022. She has a history of T2DM, HTN, HLD, dementia, epilespy, and recent CVA (11/2021) and is admitted for acute CVA and hypotension likely secondary to polypharmacy.       Changes today:  - Goal for discharge to TCU or Swing Bed, appreciate SW assistance with applications  - Vitals two times daily. Labs twice weekly.   - Remains off 1:1 sitter  - Working with PT/OT while inpatient  - Some question of urinary retention in afternoon, pt expressed her sincere desire to avoid straight cath or mejia; starting flomax 0.4mg     #Acute CVA of R splenium of corpus callosum   #Prior CVA with residual left sided hemiparesis   New onset confusion, slurred speech, and left facial droop around 09:30 on 3/18/22. CVA 11/2021 with residual left sided weakness. MRI at OSH: small acute to subacute infarct of the right aspect of the splenium of the corpus callosum. Neurology consulted, recommended no thrombolytics due to rapid improvement of her symptoms. TTE unremarkable, EF 55-60%. Stroke symptoms resolved, suspect that symptom recrudescence at OSH was due to hypotension and decreased cerebral perfusion in the setting of polypharmacy (see below).  - Cardiac Telemetry dc'd given patient agitation with presence and >48h w/o concerning rhythm  - Continue PTA statin ()  - Per Neurology - OK for normotension. Avoid hypotension.  - PT/OT/SLP Consulted recommended ARU/therapy, goal for TCU placement   - Neurology Consulted. Appreciate further recommendations of management.           - Continue DAPT for 90 days (through 5/17), ASA 324mg thereafter          - Continue  mg BID and Keppra 2000 mg BID          - Follow up with general neurology as outpatient          - Discharge on [facility]  "(ordered)          - No further stroke work up needed      # Post-CVA rehabilitation  # Dementia, neurocognitive impairment, severe  Patient appears at her mental status baseline on examination, not consentable given inability to articulate or appreciate present post-CVA concerns and mobility issues, risks of discharge home without rehab, and alternatives to rehabilitation.   - Pt refusing \"rehab\" but amenable to \"therapy\" likely given the similarity in name associated with rehab at a TCU (which she has done before and had a poor experience with)   - Not appropriate for ARU per [facility] given dementia  - Patient is holdable if she attempts or threatens to leave   - Delirium and Fall Precautions in Place  - Given concerns for agitation and disorientation leading to significant distress, olanzapine at bedtime PRN order placed, not used as yet     # Seizure disorder   She has a known seizure disorder. She was transferred for cEEG monitoring, however this is not able to be started overnight. She was loaded with 1,000mg of Keppra prior to transfer. No need for EEG per Neuro.  - Continue phenytoin 100 mg BID and Keppra increased to 2000 mg BID per neuro     # Urinary retention  Noted by nursing 04/03. Pt is adamant this is a non-issue but worth monitoring. Will attempt medical therapy.   - Tamsulosin 0.4mg QPM, monitor for low BP     =====================  Chronic / Stable Conditions  =====================   # HLD  - Continue PTA Atorvastatin   # HTN - cautious resumptions of PTA antihypertensives in the setting of medication-induced hypotension  # Elevated troponin, suspect type II MI; resolved   Troponin peaked at 640 without ACS symptoms which is peaked. This likely represents a type II MI - demand ischemia in the setting of hypotension.   - No further troponin checks     # T2DM - well-controlled with diet  Last A1c 5.5%.   - Diet Controlled.   - Monitor blood glucose with M/Th labs, given stability of sugars while " inpatient               -- Start sliding scale insulin if BG > 250     #SIRS criteria (fever 100.8F, leukocytosis, tachycardia, lactic acidosis); resolved  #Transient hypotension, likely iatrogenic secondary to medications; resolved  #Somnolence, resolved   She had an episode of hypotension with SBP 60's. Per chart review, patient had received labetolol (30mg), hydralazine (10mg), fentanyl (55mcg), lorazepam (1mg), gabapentin (300mg), levetiracetam (1500mg), phenytoin (100mg) in the evening prior to the episode of hypotension and somnolence. BP normalized w/ fluid resuscitation. CT C/A/P without any concerning signs for infection. SIRS criteria likely met due to iatrogenic hypotension. Her somnolence is also likely related to polypharmacy.  No identifiable source of infection on physical exam, UA, or imaging. No indication to continue antibiotics at this time.   - Monitor for signs/symptoms of infection   - Avoid polypharmacy     Diet: Regular Diet Adult  Room Service  Snacks/Supplements Adult: Glucerna; With Meals    DVT Prophylaxis: DAPT  Gloria Catheter: Not present  Central Lines: None  Cardiac Monitoring: None  Code Status: Full Code      Disposition Plan   Expected Discharge: 04/04/2022     Anticipated discharge location: nursing home    Delays:     Placement - TCU            The patient's care was discussed with the Attending Physician, Dr. Hoffmann.    Sergo Calabrese MD  Medicine Service, [facility] TEAM [address] [place] [address]  Securely message with the Vocera Web Console (learn more here)  Text page via [facility] Paging/Directory   Please see signed in provider for up to date coverage information      Clinically Significant Risk Factors Present on Admission                    ______________________________________________________________________    Interval History   Verona reports feeling well today, she is especially pleased that her son and his partner are visiting today. They  "brought her an additional plush toy cow to match her current overall-wearing stuffed cow, Gerhard. She is engaged, and enjoyed going outside today with family. She has been having more frequent and small volume urinations, but states she is \"71 years old and my bladder works fine\".  Per RN no acute overnight events.     Data reviewed today: I reviewed all medications, new labs and imaging results over the last 24 hours. I personally reviewed no images or EKG's today.    Physical Exam   Vital Signs: Temp: 97.9  F (36.6  C) Temp src: Oral BP: 119/69 Pulse: 83   Resp: 18 SpO2: 95 % O2 Device: None (Room air)    Weight: 129 lbs 3.03 oz  General Appearance:  Alert, comfortable in bed   Respiratory: CTAB, no tachypnea   Cardiovascular: RRR, no murmur   GI: BS+, soft, nontender   Skin: no rashes or lesions   Other:  4/5 strength in  LUE and LLE, 5/5 strength in RLE and RUE     Data   Medications       aspirin  81 mg Oral Daily     atorvastatin  40 mg Oral Daily     clopidogrel  75 mg Oral Daily     cyanocobalamin  1,000 mcg Oral Daily     gabapentin  300 mg Oral At Bedtime     levETIRAcetam  1,000 mg Oral BID     lisinopril  20 mg Oral Daily     memantine  10 mg Oral Daily     pantoprazole  40 mg Oral QAM AC     phenytoin  100 mg Oral BID     polyethylene glycol  17 g Oral Daily     "

## 2022-04-03 NOTE — PLAN OF CARE
Goal Outcome Evaluation:    Plan of Care Reviewed With: patient     Pt resting in bed at this time with eyes open. Easy to arouse. Skin warm and dry to touch. Took all PM medications without difficulties. Vitally stable. No acute pain or distress noted at this time. Will continue with current plan of care.

## 2022-04-04 ENCOUNTER — APPOINTMENT (OUTPATIENT)
Dept: PHYSICAL THERAPY | Facility: CLINIC | Age: 72
DRG: 064 | End: 2022-04-04
Attending: PEDIATRICS
Payer: COMMERCIAL

## 2022-04-04 ENCOUNTER — APPOINTMENT (OUTPATIENT)
Dept: OCCUPATIONAL THERAPY | Facility: CLINIC | Age: 72
DRG: 064 | End: 2022-04-04
Attending: PEDIATRICS
Payer: COMMERCIAL

## 2022-04-04 LAB
ALBUMIN SERPL-MCNC: 3.4 G/DL (ref 3.4–5)
ALP SERPL-CCNC: 113 U/L (ref 40–150)
ALT SERPL W P-5'-P-CCNC: 18 U/L (ref 0–50)
ANION GAP SERPL CALCULATED.3IONS-SCNC: 2 MMOL/L (ref 3–14)
AST SERPL W P-5'-P-CCNC: 11 U/L (ref 0–45)
BILIRUB SERPL-MCNC: 0.3 MG/DL (ref 0.2–1.3)
BUN SERPL-MCNC: 11 MG/DL (ref 7–30)
CALCIUM SERPL-MCNC: 8.8 MG/DL (ref 8.5–10.1)
CHLORIDE BLD-SCNC: 107 MMOL/L (ref 94–109)
CO2 SERPL-SCNC: 32 MMOL/L (ref 20–32)
CREAT SERPL-MCNC: 0.58 MG/DL (ref 0.52–1.04)
ERYTHROCYTE [DISTWIDTH] IN BLOOD BY AUTOMATED COUNT: 12.5 % (ref 10–15)
GFR SERPL CREATININE-BSD FRML MDRD: >90 ML/MIN/1.73M2
GLUCOSE BLD-MCNC: 163 MG/DL (ref 70–99)
GLUCOSE BLDC GLUCOMTR-MCNC: 213 MG/DL (ref 70–99)
HCT VFR BLD AUTO: 42.2 % (ref 35–47)
HGB BLD-MCNC: 13.4 G/DL (ref 11.7–15.7)
MCH RBC QN AUTO: 30.2 PG (ref 26.5–33)
MCHC RBC AUTO-ENTMCNC: 31.8 G/DL (ref 31.5–36.5)
MCV RBC AUTO: 95 FL (ref 78–100)
PLATELET # BLD AUTO: 268 10E3/UL (ref 150–450)
POTASSIUM BLD-SCNC: 4 MMOL/L (ref 3.4–5.3)
PROT SERPL-MCNC: 7.1 G/DL (ref 6.8–8.8)
RBC # BLD AUTO: 4.43 10E6/UL (ref 3.8–5.2)
SODIUM SERPL-SCNC: 141 MMOL/L (ref 133–144)
WBC # BLD AUTO: 4.8 10E3/UL (ref 4–11)

## 2022-04-04 PROCEDURE — 36415 COLL VENOUS BLD VENIPUNCTURE: CPT | Performed by: STUDENT IN AN ORGANIZED HEALTH CARE EDUCATION/TRAINING PROGRAM

## 2022-04-04 PROCEDURE — 97530 THERAPEUTIC ACTIVITIES: CPT | Mod: GP | Performed by: REHABILITATION PRACTITIONER

## 2022-04-04 PROCEDURE — 250N000013 HC RX MED GY IP 250 OP 250 PS 637

## 2022-04-04 PROCEDURE — 85027 COMPLETE CBC AUTOMATED: CPT | Performed by: STUDENT IN AN ORGANIZED HEALTH CARE EDUCATION/TRAINING PROGRAM

## 2022-04-04 PROCEDURE — 97530 THERAPEUTIC ACTIVITIES: CPT | Mod: GO

## 2022-04-04 PROCEDURE — 97116 GAIT TRAINING THERAPY: CPT | Mod: GP | Performed by: REHABILITATION PRACTITIONER

## 2022-04-04 PROCEDURE — 250N000013 HC RX MED GY IP 250 OP 250 PS 637: Performed by: STUDENT IN AN ORGANIZED HEALTH CARE EDUCATION/TRAINING PROGRAM

## 2022-04-04 PROCEDURE — 99232 SBSQ HOSP IP/OBS MODERATE 35: CPT | Mod: GC | Performed by: INTERNAL MEDICINE

## 2022-04-04 PROCEDURE — 80053 COMPREHEN METABOLIC PANEL: CPT | Performed by: STUDENT IN AN ORGANIZED HEALTH CARE EDUCATION/TRAINING PROGRAM

## 2022-04-04 PROCEDURE — 250N000013 HC RX MED GY IP 250 OP 250 PS 637: Performed by: HOSPITALIST

## 2022-04-04 PROCEDURE — 120N000005 HC R&B MS OVERFLOW UMMC

## 2022-04-04 RX ADMIN — PANTOPRAZOLE SODIUM 40 MG: 40 TABLET, DELAYED RELEASE ORAL at 08:53

## 2022-04-04 RX ADMIN — ASPIRIN 81 MG CHEWABLE TABLET 81 MG: 81 TABLET CHEWABLE at 08:51

## 2022-04-04 RX ADMIN — CLOPIDOGREL BISULFATE 75 MG: 75 TABLET ORAL at 08:51

## 2022-04-04 RX ADMIN — LEVETIRACETAM 1000 MG: 500 TABLET, FILM COATED ORAL at 19:15

## 2022-04-04 RX ADMIN — PHENYTOIN 100 MG: 50 TABLET, CHEWABLE ORAL at 19:13

## 2022-04-04 RX ADMIN — PHENYTOIN 100 MG: 50 TABLET, CHEWABLE ORAL at 08:52

## 2022-04-04 RX ADMIN — ACETAMINOPHEN 975 MG: 325 TABLET ORAL at 15:57

## 2022-04-04 RX ADMIN — LEVETIRACETAM 1000 MG: 500 TABLET, FILM COATED ORAL at 08:50

## 2022-04-04 RX ADMIN — GABAPENTIN 300 MG: 300 CAPSULE ORAL at 19:15

## 2022-04-04 RX ADMIN — MEMANTINE 10 MG: 10 TABLET ORAL at 08:50

## 2022-04-04 RX ADMIN — LISINOPRIL 20 MG: 20 TABLET ORAL at 08:51

## 2022-04-04 RX ADMIN — CYANOCOBALAMIN TAB 500 MCG 1000 MCG: 500 TAB at 08:51

## 2022-04-04 RX ADMIN — ATORVASTATIN CALCIUM 40 MG: 40 TABLET, FILM COATED ORAL at 08:51

## 2022-04-04 RX ADMIN — TAMSULOSIN HYDROCHLORIDE 0.4 MG: 0.4 CAPSULE ORAL at 19:15

## 2022-04-04 ASSESSMENT — ACTIVITIES OF DAILY LIVING (ADL)
ADLS_ACUITY_SCORE: 21
ADLS_ACUITY_SCORE: 21
ADLS_ACUITY_SCORE: 19
ADLS_ACUITY_SCORE: 21
ADLS_ACUITY_SCORE: 19
ADLS_ACUITY_SCORE: 21

## 2022-04-04 NOTE — PROGRESS NOTES
[facility]    Progress Note - Medicine Service, [facility] TEAM 1       Date of Admission:  3/19/2022    Assessment & Plan     Khalida Redding is a 71 year old female admitted on 3/19/2022. She has a history of T2DM, HTN, HLD, dementia, epilespy, and recent CVA (11/2021) and is admitted for acute CVA and hypotension likely secondary to polypharmacy.       Changes today:  - Goal for discharge to TCU or Swing Bed, appreciate SW assistance with applications  - Vitals two times daily. Labs twice weekly.   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 CVA 11/2021 with residual left sided weakness. MRI at OSH: small acute to subacute infarct of the right aspect of the splenium of the corpus callosum. Neurology consulted, recommended no thrombolytics due to rapid improvement of her symptoms. TTE unremarkable, EF 55-60%. Stroke symptoms resolved, suspect that symptom recrudescence at OSH was due to hypotension and decreased cerebral perfusion in the setting of polypharmacy (see below).  - Cardiac Telemetry dc'd previously given patient agitation with presence and >48h w/o concerning rhythm  - Continue PTA statin ()  - Per Neurology - OK for normotension. Avoid hypotension.  - PT/OT/SLP Consulted recommended ARU/therapy, goal for TCU placement   - Neurology Consulted. Appreciate further recommendations of management.           - Continue DAPT for 90 days (through 5/17), ASA 324mg thereafter          - Continue  mg BID and Keppra 2000 mg BID          - Follow up with general neurology as outpatient          - Discharge on Ziopatch (ordered)          - No further stroke work up needed      # Post-CVA rehabilitation  # Dementia, neurocognitive impairment,  "severe  Patient appears at her mental status baseline on examination, not consentable given inability to articulate or appreciate present post-CVA concerns and mobility issues, risks of discharge home without rehab, and alternatives to rehabilitation.   - Pt refusing \"rehab\" but amenable to \"therapy\" likely given the similarity in name associated with rehab at a TCU (which she has done before and had a poor experience with)   - Not appropriate for ARU per [facility] given dementia  - Patient is holdable if she attempts or threatens to leave   - Delirium and Fall Precautions in Place  - Given concerns for agitation and disorientation leading to significant distress, olanzapine at bedtime PRN order placed, not used as yet     # Seizure disorder   She has a known seizure disorder. She was transferred for cEEG monitoring, however this is not able to be started overnight. She was loaded with 1,000mg of Keppra prior to transfer. No need for EEG per Neuro.  - Continue phenytoin 100 mg BID and Keppra increased to 2000 mg BID per neuro     # Urinary retention  Noted by nursing 04/03. Pt is adamant this is a non-issue but worth monitoring. Will attempt medical therapy. Improved UOP 4/4.  - Tamsulosin 0.4mg QPM, monitor for low BP  - Would defer PV bladder scans unless pt having discomfort or distension     =====================  Chronic / Stable Conditions  =====================   # HLD  - Continue PTA Atorvastatin   # HTN - cautious resumptions of PTA antihypertensives in the setting of medication-induced hypotension  # Elevated troponin, suspect type II MI; resolved   Troponin peaked at 640 without ACS symptoms which is peaked. This likely represents a type II MI - demand ischemia in the setting of hypotension.   - No further troponin checks     # T2DM - well-controlled with diet  Last A1c 5.5%.   - Diet Controlled.   - Monitor blood glucose with M/Th labs, given stability of sugars while inpatient               -- Start " sliding scale insulin if BG > 250     #SIRS criteria (fever 100.8F, leukocytosis, tachycardia, lactic acidosis); resolved  #Transient hypotension, likely iatrogenic secondary to medications; resolved  #Somnolence, resolved   She had an episode of hypotension with SBP 60's. Per chart review, patient had received labetolol (30mg), hydralazine (10mg), fentanyl (55mcg), lorazepam (1mg), gabapentin (300mg), levetiracetam (1500mg), phenytoin (100mg) in the evening prior to the episode of hypotension and somnolence. BP normalized w/ fluid resuscitation. CT C/A/P without any concerning signs for infection. SIRS criteria likely met due to iatrogenic hypotension. Her somnolence is also likely related to polypharmacy.  No identifiable source of infection on physical exam, UA, or imaging. No indication to continue antibiotics at this time.   - Monitor for signs/symptoms of infection   - Avoid polypharmacy     Diet: Regular Diet Adult  Room Service  Snacks/Supplements Adult: Glucerna; With Meals    DVT Prophylaxis: DAPT  Gloria Catheter: Not present  Central Lines: None  Cardiac Monitoring: None  Code Status: Full Code      Disposition Plan   Expected Discharge: 04/04/2022     Anticipated discharge location: nursing home    Delays:     Placement - TCU  vs swing bed       The patient's care was discussed with the Attending Physician, Dr. Hoffmann.    Cal Francisco MD  Medicine Service, [facility] TEAM [address] [place] [address]  Securely message with the Vocera Web Console (learn more here)  Text page via [facility] Paging/Directory   Please see signed in provider for up to date coverage information      Clinically Significant Risk Factors Present on Admission                    ______________________________________________________________________    Interval History   NAEO, nursing notes reviewed. Pt sitting up in bed, appears comfortably and talking on the phone with family. States she wants to  "go home, and home only. Resumed talking on the phone noting \"If they want to get me riled up, just bring that up\". Denies any acute concerns or issues at this time. No other questions.     Data reviewed today: I reviewed all medications, new labs and imaging results over the last 24 hours. I personally reviewed no images or EKG's today.    Physical Exam   Vital Signs: Temp: 97.3  F (36.3  C) Temp src: Oral BP: 127/70 Pulse: 78   Resp: 16 SpO2: 97 % O2 Device: None (Room air)    Weight: 129 lbs 3.03 oz  General Appearance:  Alert, comfortable in bed, NAD, talking on phone  Respiratory: CTAB, no tachypnea   Cardiovascular: RRR, no murmur   GI: BS+, soft, nontender   Skin: no rashes or lesions   Other:  4/5 strength in  LUE and LLE, 5/5 strength in RLE and RUE     Data   Medications       aspirin  81 mg Oral Daily     atorvastatin  40 mg Oral Daily     clopidogrel  75 mg Oral Daily     cyanocobalamin  1,000 mcg Oral Daily     gabapentin  300 mg Oral At Bedtime     levETIRAcetam  1,000 mg Oral BID     lisinopril  20 mg Oral Daily     memantine  10 mg Oral Daily     pantoprazole  40 mg Oral QAM AC     phenytoin  100 mg Oral BID     polyethylene glycol  17 g Oral Daily     tamsulosin  0.4 mg Oral QPM     "

## 2022-04-04 NOTE — PROGRESS NOTES
Pt in bed at this time with eyes opened watching TV. Is able to make all needs known. Has had problems voiding on previous shift. Noted with frequency and very little output per the off going nurse. Bladder scan done and 417 of retention noted. Offer to straight cath pt as ordered but Pt refused stating that her bladder was fine. Tried to encourage pt to accept a st cath to prevent further complications and she got very upset and stated that she is 71 years old and her bladder has always worked fine. Will continue to monitor for bladder pain or discomfort. Charge nurse notified.

## 2022-04-04 NOTE — PROGRESS NOTES
Care Management Follow Up    Length of Stay (days): 16    Expected Discharge Date: 04/04/2022     Concerns to be Addressed: discharge planning   (Has ARU recs)  Patient plan of care discussed at interdisciplinary rounds: Yes    Anticipated Discharge Disposition: Transitional Care     Anticipated Discharge Services:  (IMM, PAS, Transportation TBD)  Anticipated Discharge DME: None    Patient/family educated on Medicare website which has current facility and service quality ratings: yes  Education Provided on the Discharge Plan:    Patient/Family in Agreement with the Plan: yes    Referrals Placed by CM/SW: Internal Clinic Care Coordination, Post Acute Facilities    Referrals have been made to the following facilities and their status is as follows:    [facility]   P: [phone]:   F: [phone]  - Writer left VM with SNF admissions.   - Writer left VM with SNF admissions.     [facility] in [place], [place]   P: [phone] - [place]  F: [phone]  - Writer left VM with SNF admissions.   - Writer spoke with Zarina, who is covering today. Zarina reports they cannot admit d/t to staffing, and requested updated notes from PT and MD d/t concerns with pt being more LTC appropriate than Swing bed. Writer faxed updated notes. SW to f/u with SNF tomorrow if they feel they can accept pt.    Denied Referrals:  [facility] in [place]  [facility] in [place]  [facility]   [facility] [place]  [facility] [place]  [facility] [place]   [facility]-patient's family refuses to let her return there  [facility]     Private pay costs discussed: Not applicable    Additional Information:  Writer spoke with Laura, daughter,  (P:  [phone]). Laura reports she would like to discuss pt returning home with HHC. Writer provided  Laura with education on what HHC is and does for pt/families. Laura reports she would provide some of the care/supervision, along with pt's spouse, Yeyo.  Laura reports she lives only 10 minutes away from pt and works 5 minutes away from pt and her daughter lives approx 3 minutes away from pt. Laura reports she thinks they could meet pt's needs in the home and how they are hoping to have pt return home over a TCU this time.    Case d/w MD. Per MD, pt requires 24/7 supervision, and pt was not doing well prior to admission when family was unable to provide it. It does not sound like family would be providing 24/7 level of supervision at this time either, which leaves there to be many safety concerns for pt. Writer will defer to the weekday team tomorrow if HHC can be safely pursued. Otherwise, SW to f/u with HHC or gather additional TCU options.   ________________    MELISSA Stewart, [facility]  ED/Observation   [facility]  Phone: [phone]  Pager: [phone]  Fax: [phone]    On-call pager, [phone], 4:00pm to midnight

## 2022-04-04 NOTE — PLAN OF CARE
[id number]  Status: Admitted for new onset of confusion, slurred speech, and left facial droop on 3/18. Hx: dementia, CVA, Seizure.    Vitals: VSS, RA  Neuros: Forgetful. Intact x4  IV: None  Labs/Electrolytes: WNL  Resp/trach: Denies SOB  Diet: Regular.   Bowel status: No BM this shift.   : frequency and hesitancy with voiding. Up to bathroom multiple times in shift.   Skin: Intact, generalized bruising.   Pain: c/o 4/10 neck pain radiating to the head causing headache. Given Tylenol, with relief per pt.   Activity: Ax1 and walker.   Plan: Awaiting placement at TCU.

## 2022-04-04 NOTE — PLAN OF CARE
Goal Outcome Evaluation:    Plan of Care Reviewed With: patient   Pt alert with some confusion to time sand situation. Is able to make all needs known. Assist x 1 with toilet needs. Skin warm and dry touch. Flomax administered this evening as ordered for retention. No acute distress noted at this time. Denies pain. Will continue to monitor and address any acute abnormal findings.

## 2022-04-04 NOTE — PLAN OF CARE
Problem: Plan of Care - These are the overarching goals to be used throughout the patient stay.    Goal: Plan of Care Review/Shift Note  Description: The Plan of Care Review/Shift note should be completed every shift.  The Outcome Evaluation is a brief statement about your assessment that the patient is improving, declining, or no change.  This information will be displayed automatically on your shift note.  Outcome: Ongoing, Progressing  Goal: Absence of Hospital-Acquired Illness or Injury  Intervention: Identify and Manage Fall Risk  Recent Flowsheet Documentation  Taken 4/4/2022 1226 by Sarah Colby RN  Safety Promotion/Fall Prevention:   activity supervised   assistive device/personal items within reach   bed alarm on   nonskid shoes/slippers when out of bed  Intervention: Prevent Skin Injury  Recent Flowsheet Documentation  Taken 4/4/2022 1226 by Sarah Colby RN  Body Position: position changed independently  Intervention: Prevent and Manage VTE (Venous Thromboembolism) Risk  Recent Flowsheet Documentation  Taken 4/4/2022 1226 by Sarah Colby RN  VTE Prevention/Management: SCDs (sequential compression devices) off  Activity Management: activity adjusted per tolerance   Goal Outcome Evaluation:  Patient transferred from unit 7A to unit 5C. Oriented x 4. Vss. No pain. No nausea. Ate mac and cheese and milk. No IV access. Urinated x 1. Assist x 1 to the bathroom. Walker was ordered.

## 2022-04-05 ENCOUNTER — APPOINTMENT (OUTPATIENT)
Dept: OCCUPATIONAL THERAPY | Facility: CLINIC | Age: 72
DRG: 064 | End: 2022-04-05
Attending: PEDIATRICS
Payer: COMMERCIAL

## 2022-04-05 LAB — SARS-COV-2 RNA RESP QL NAA+PROBE: NEGATIVE

## 2022-04-05 PROCEDURE — 250N000013 HC RX MED GY IP 250 OP 250 PS 637: Performed by: STUDENT IN AN ORGANIZED HEALTH CARE EDUCATION/TRAINING PROGRAM

## 2022-04-05 PROCEDURE — 99232 SBSQ HOSP IP/OBS MODERATE 35: CPT | Mod: GC | Performed by: STUDENT IN AN ORGANIZED HEALTH CARE EDUCATION/TRAINING PROGRAM

## 2022-04-05 PROCEDURE — U0003 INFECTIOUS AGENT DETECTION BY NUCLEIC ACID (DNA OR RNA); SEVERE ACUTE RESPIRATORY SYNDROME CORONAVIRUS 2 (SARS-COV-2) (CORONAVIRUS DISEASE [COVID-19]), AMPLIFIED PROBE TECHNIQUE, MAKING USE OF HIGH THROUGHPUT TECHNOLOGIES AS DESCRIBED BY CMS-2020-01-R: HCPCS | Performed by: STUDENT IN AN ORGANIZED HEALTH CARE EDUCATION/TRAINING PROGRAM

## 2022-04-05 PROCEDURE — 97530 THERAPEUTIC ACTIVITIES: CPT | Mod: GO

## 2022-04-05 PROCEDURE — 250N000013 HC RX MED GY IP 250 OP 250 PS 637

## 2022-04-05 PROCEDURE — 120N000005 HC R&B MS OVERFLOW UMMC

## 2022-04-05 PROCEDURE — 250N000013 HC RX MED GY IP 250 OP 250 PS 637: Performed by: HOSPITALIST

## 2022-04-05 PROCEDURE — 97535 SELF CARE MNGMENT TRAINING: CPT | Mod: GO

## 2022-04-05 RX ADMIN — PHENYTOIN 100 MG: 50 TABLET, CHEWABLE ORAL at 20:48

## 2022-04-05 RX ADMIN — LISINOPRIL 20 MG: 20 TABLET ORAL at 07:34

## 2022-04-05 RX ADMIN — ASPIRIN 81 MG CHEWABLE TABLET 81 MG: 81 TABLET CHEWABLE at 07:34

## 2022-04-05 RX ADMIN — PANTOPRAZOLE SODIUM 40 MG: 40 TABLET, DELAYED RELEASE ORAL at 07:34

## 2022-04-05 RX ADMIN — TAMSULOSIN HYDROCHLORIDE 0.4 MG: 0.4 CAPSULE ORAL at 20:48

## 2022-04-05 RX ADMIN — ACETAMINOPHEN 975 MG: 325 TABLET ORAL at 02:48

## 2022-04-05 RX ADMIN — MEMANTINE 10 MG: 10 TABLET ORAL at 07:35

## 2022-04-05 RX ADMIN — PHENYTOIN 100 MG: 50 TABLET, CHEWABLE ORAL at 07:34

## 2022-04-05 RX ADMIN — LEVETIRACETAM 1000 MG: 500 TABLET, FILM COATED ORAL at 07:45

## 2022-04-05 RX ADMIN — CYANOCOBALAMIN TAB 500 MCG 1000 MCG: 500 TAB at 07:34

## 2022-04-05 RX ADMIN — LEVETIRACETAM 1000 MG: 500 TABLET, FILM COATED ORAL at 20:48

## 2022-04-05 RX ADMIN — CLOPIDOGREL BISULFATE 75 MG: 75 TABLET ORAL at 07:34

## 2022-04-05 RX ADMIN — ATORVASTATIN CALCIUM 40 MG: 40 TABLET, FILM COATED ORAL at 07:35

## 2022-04-05 RX ADMIN — GABAPENTIN 300 MG: 300 CAPSULE ORAL at 20:48

## 2022-04-05 ASSESSMENT — ACTIVITIES OF DAILY LIVING (ADL)
ADLS_ACUITY_SCORE: 21
ADLS_ACUITY_SCORE: 19
ADLS_ACUITY_SCORE: 19
ADLS_ACUITY_SCORE: 21
ADLS_ACUITY_SCORE: 17
ADLS_ACUITY_SCORE: 21
ADLS_ACUITY_SCORE: 20
ADLS_ACUITY_SCORE: 21
ADLS_ACUITY_SCORE: 20
ADLS_ACUITY_SCORE: 19
ADLS_ACUITY_SCORE: 21
ADLS_ACUITY_SCORE: 21

## 2022-04-05 NOTE — PROGRESS NOTES
Care Management Follow Up    Length of Stay (days): 17    Expected Discharge Date: TBD, need accepting facility     Concerns to be Addressed: discharge planning     Patient plan of care discussed at interdisciplinary rounds: Yes     Anticipated Discharge Disposition: Transitional Care     Anticipated Discharge Services: IMM, PAS, Transportation TBD  Anticipated Discharge DME: None     Patient/family educated on Medicare website which has current facility and service quality ratings: yes  Education Provided on the Discharge Plan: yes  Patient/Family in Agreement with the Plan: yes     Referrals Placed by CM/SW: Internal Clinic Care Coordination, Post Acute Facilities  Private pay costs discussed: TBD    Active Referrals:  [facility]    Denied Referrals:  [facility] in [place]  [facility] in [place]  [facility]   [facility] [place]  [facility] [place]  [facility] [place]   [facility] refuses to let her return there  [facility]      Additional Information:  [facility] [place] ([phone]): SW left a message with admissions. With lack of response, SW will discontinue the referral.      [facility] in [place], [place] (Ph: [phone], Fax: [phone]): RODNEY left a message with Janneth requesting a call back.     If these last 2 facilities decline patient, patient will likely need to go home with 24/7 assist from family and home health RN/PT/OT.     MELISSA Tristan, PAPOSW  7A/5C Medicine   Ph: [phone]  Pager: [phone]

## 2022-04-05 NOTE — PROGRESS NOTES
[facility]    Progress Note - Medicine Service, [facility] TEAM 1       Date of Admission:  3/19/2022    Assessment & Plan     Khalida Redding is a 71 year old female admitted on 3/19/2022. She has a history of T2DM, HTN, HLD, dementia, epilespy, and recent CVA (11/2021) and is admitted for acute CVA and hypotension likely secondary to polypharmacy.       Changes today:  - Goal for discharge to TCU or Swing Bed, appreciate SW assistance with applications  - Vitals two times daily. Labs twice weekly.   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 CVA 11/2021 with residual left sided weakness. MRI at OSH: small acute to subacute infarct of the right aspect of the splenium of the corpus callosum. Neurology consulted, recommended no thrombolytics due to rapid improvement of her symptoms. TTE unremarkable, EF 55-60%. Stroke symptoms resolved, suspect that symptom recrudescence at OSH was due to hypotension and decreased cerebral perfusion in the setting of polypharmacy (see below).  - Cardiac Telemetry dc'd previously given patient agitation with presence and >48h w/o concerning rhythm  - Continue PTA statin ()  - Per Neurology - OK for normotension. Avoid hypotension.  - PT/OT/SLP Consulted recommended ARU/therapy, goal for TCU placement   - Neurology Consulted. Appreciate further recommendations of management.           - Continue DAPT for 90 days (through 5/17), ASA 324mg thereafter          - Continue  mg BID and Keppra 2000 mg BID          - Follow up with general neurology as outpatient          - Discharge on Ziopatch (ordered)          - No further stroke work up needed      # Post-CVA rehabilitation  # Dementia, neurocognitive impairment,  "severe  Patient appears at her mental status baseline on examination, not consentable given inability to articulate or appreciate present post-CVA concerns and mobility issues, risks of discharge home without rehab, and alternatives to rehabilitation.   - Pt refusing \"rehab\" but amenable to \"therapy\" likely given the similarity in name associated with rehab at a TCU (which she has done before and had a poor experience with)   - Not appropriate for ARU per [facility] given dementia  - Patient is holdable if she attempts or threatens to leave   - Delirium and Fall Precautions in Place  - Given concerns for agitation and disorientation leading to significant distress, olanzapine at bedtime PRN order placed, not used as yet     # Seizure disorder   She has a known seizure disorder. She was transferred for cEEG monitoring, however this is not able to be started overnight. She was loaded with 1,000mg of Keppra prior to transfer. No need for EEG per Neuro.  - Continue phenytoin 100 mg BID and Keppra increased to 2000 mg BID per neuro     # Urinary retention  Noted by nursing 04/03. Pt is adamant this is a non-issue but worth monitoring. Will attempt medical therapy. Improved UOP by 4/4, no further concerns.  - Tamsulosin 0.4mg QPM, monitor for low BP  - Would defer PV bladder scans unless pt having discomfort or distension     =====================  Chronic / Stable Conditions  =====================   # HLD  - Continue PTA Atorvastatin   # HTN - cautious resumptions of PTA antihypertensives in the setting of medication-induced hypotension  # Elevated troponin, suspect type II MI; resolved   Troponin peaked at 640 without ACS symptoms which is peaked. This likely represents a type II MI - demand ischemia in the setting of hypotension.   - No further troponin checks     # T2DM - well-controlled with diet  Last A1c 5.5%.   - Diet Controlled.   - Monitor blood glucose with M/Th labs, given stability of sugars while inpatient "               -- Start sliding scale insulin if BG > 250     #SIRS criteria (fever 100.8F, leukocytosis, tachycardia, lactic acidosis); resolved  #Transient hypotension, likely iatrogenic secondary to medications; resolved  #Somnolence, resolved   She had an episode of hypotension with SBP 60's. Per chart review, patient had received labetolol (30mg), hydralazine (10mg), fentanyl (55mcg), lorazepam (1mg), gabapentin (300mg), levetiracetam (1500mg), phenytoin (100mg) in the evening prior to the episode of hypotension and somnolence. BP normalized w/ fluid resuscitation. CT C/A/P without any concerning signs for infection. SIRS criteria likely met due to iatrogenic hypotension. Her somnolence is also likely related to polypharmacy.  No identifiable source of infection on physical exam, UA, or imaging. No indication to continue antibiotics at this time.   - Monitor for signs/symptoms of infection   - Avoid polypharmacy     Diet: Regular Diet Adult  Room Service  Snacks/Supplements Adult: Glucerna; With Meals    DVT Prophylaxis: DAPT  Gloria Catheter: Not present  Central Lines: None  Cardiac Monitoring: None  Code Status: Full Code      Disposition Plan   Expected Discharge:      Anticipated discharge location: nursing home    Delays:     Placement - TCU  vs swing bed       The patient's care was discussed with the Attending Physician, Dr. Hoffmann.    Cal Francisco MD  Medicine Service, [address]  Securely message with the Vocera Web Console (learn more here)  Text page via [facility] Paging/Directory   Please see signed in provider for up to date coverage information      Clinically Significant Risk Factors Present on Admission                    ______________________________________________________________________    Interval History   NAEO, nursing notes reviewed. Pt sitting up in bed, appears comfortable. Discussed plan for TCU, again very agitated  with this, but redirectable when discussing her family and crafts. She loves to make stuffed animals, sounds like largely cows, and has two very adorable stuffed cows with her (Gerhard, in coveralls, and the smaller one, Baby). We discussed her family, which she is immensely proud of. Denies any acute concerns or issues at this time. No other questions.     Data reviewed today: I reviewed all medications, new labs and imaging results over the last 24 hours. I personally reviewed no images or EKG's today.    Physical Exam   Vital Signs: Temp: 98.1  F (36.7  C) Temp src: Oral BP: 105/66 Pulse: 79   Resp: 18 SpO2: 96 % O2 Device: None (Room air)    Weight: 129 lbs 3.03 oz  General Appearance:  Alert, comfortable in bed, NAD, talking on phone  Respiratory: CTAB, no tachypnea   Cardiovascular: RRR, no murmur   GI: BS+, soft, nontender   Skin: no rashes or lesions   Other:  4/5 strength in  LUE and LLE, 5/5 strength in RLE and RUE     Data   Medications       aspirin  81 mg Oral Daily     atorvastatin  40 mg Oral Daily     clopidogrel  75 mg Oral Daily     cyanocobalamin  1,000 mcg Oral Daily     gabapentin  300 mg Oral At Bedtime     levETIRAcetam  1,000 mg Oral BID     lisinopril  20 mg Oral Daily     memantine  10 mg Oral Daily     pantoprazole  40 mg Oral QAM AC     phenytoin  100 mg Oral BID     polyethylene glycol  17 g Oral Daily     tamsulosin  0.4 mg Oral QPM

## 2022-04-05 NOTE — PLAN OF CARE
Admitted for acute CVA and hypotension likely secondary to polypharmacy. PMHx of T2DM, HTN, HLD, dementia, epilespy, and recent CVA (11/2021)     Neuro: AOx4, Calm and cooperative, Forgetful  Cardiac/Tele/VS: VSS, Afebrile, Not on Tele monitoring. (See flowsheets)   Respiratory: Room air, tolerates well. No SOB/FISHER  GI/: Voids without difficulty, Got up to the bathroom 3 times this shift.  Diet/Appetite: Regular diet  Skin: No deficits  LDAs: No IV access  Activity: Up standby assist with a walker. Bed alarm on for precautions. Pt has a history of seizures and recent CVA  Pain: Some back pain. Tylenol x1 given     Plan: Waiting on TCU placement. Working with social work

## 2022-04-05 NOTE — PROGRESS NOTES
Provided pt care x4hrs [id number]  p-pt states not pain or nausea. Offered ensure which was on her table. Stated it tastes better on ice-ice provided- up to BR with walker x 2-bed alarm on for std assist. Able to make needs know with clear speech and no weakness seen-pt lifted walker to from BR to rtn to bed.  b-hx of seizures and CVA.   A-pt self care with min assitance for falls precaution.  r-intermitant assessment on going with focus on possible s/s of neuro changes r/t seizure vs stroke. And w/ focuse on maintaining baseline self care w/ min assitance and guidance..

## 2022-04-05 NOTE — PLAN OF CARE
"/66 (BP Location: Left arm)   Pulse 79   Temp 98.1  F (36.7  C) (Oral)   Resp 18   Ht 1.676 m (5' 6\")   Wt 58.6 kg (129 lb 3 oz)   SpO2 96%   BMI 20.85 kg/m      AVSS on room air. Neruos intact. Denies nausea or pain. Voiding frequently - 1 small formed BM. Up SBA with walker - bed alarm on. Awaiting TCU placement. Continue with plan of care.     Problem: Plan of Care - These are the overarching goals to be used throughout the patient stay.    Goal: Plan of Care Review/Shift Note  Description: The Plan of Care Review/Shift note should be completed every shift.  The Outcome Evaluation is a brief statement about your assessment that the patient is improving, declining, or no change.  This information will be displayed automatically on your shift note.  Outcome: Ongoing, Progressing     Problem: Functional Ability Impaired (Stroke, Ischemic/Transient Ischemic Attack)  Goal: Optimal Functional Ability  Outcome: Ongoing, Progressing  Intervention: Optimize Functional Ability  Recent Flowsheet Documentation  Taken 4/5/2022 0800 by Kay Gonzales, RN  Activity Management: activity adjusted per tolerance  "

## 2022-04-06 ENCOUNTER — APPOINTMENT (OUTPATIENT)
Dept: OCCUPATIONAL THERAPY | Facility: CLINIC | Age: 72
DRG: 064 | End: 2022-04-06
Attending: PEDIATRICS
Payer: COMMERCIAL

## 2022-04-06 PROCEDURE — 250N000013 HC RX MED GY IP 250 OP 250 PS 637: Performed by: STUDENT IN AN ORGANIZED HEALTH CARE EDUCATION/TRAINING PROGRAM

## 2022-04-06 PROCEDURE — 97110 THERAPEUTIC EXERCISES: CPT | Mod: GP | Performed by: REHABILITATION PRACTITIONER

## 2022-04-06 PROCEDURE — 97110 THERAPEUTIC EXERCISES: CPT | Mod: GO

## 2022-04-06 PROCEDURE — 97535 SELF CARE MNGMENT TRAINING: CPT | Mod: GO

## 2022-04-06 PROCEDURE — 97530 THERAPEUTIC ACTIVITIES: CPT | Mod: GP | Performed by: REHABILITATION PRACTITIONER

## 2022-04-06 PROCEDURE — 250N000013 HC RX MED GY IP 250 OP 250 PS 637

## 2022-04-06 PROCEDURE — 120N000005 HC R&B MS OVERFLOW UMMC

## 2022-04-06 PROCEDURE — 250N000013 HC RX MED GY IP 250 OP 250 PS 637: Performed by: HOSPITALIST

## 2022-04-06 PROCEDURE — 97116 GAIT TRAINING THERAPY: CPT | Mod: GP | Performed by: REHABILITATION PRACTITIONER

## 2022-04-06 PROCEDURE — 99232 SBSQ HOSP IP/OBS MODERATE 35: CPT | Mod: GC | Performed by: STUDENT IN AN ORGANIZED HEALTH CARE EDUCATION/TRAINING PROGRAM

## 2022-04-06 RX ADMIN — LISINOPRIL 20 MG: 20 TABLET ORAL at 09:41

## 2022-04-06 RX ADMIN — LEVETIRACETAM 1000 MG: 500 TABLET, FILM COATED ORAL at 20:26

## 2022-04-06 RX ADMIN — ATORVASTATIN CALCIUM 40 MG: 40 TABLET, FILM COATED ORAL at 09:39

## 2022-04-06 RX ADMIN — TAMSULOSIN HYDROCHLORIDE 0.4 MG: 0.4 CAPSULE ORAL at 20:26

## 2022-04-06 RX ADMIN — ASPIRIN 81 MG CHEWABLE TABLET 81 MG: 81 TABLET CHEWABLE at 09:42

## 2022-04-06 RX ADMIN — CLOPIDOGREL BISULFATE 75 MG: 75 TABLET ORAL at 09:49

## 2022-04-06 RX ADMIN — PHENYTOIN 100 MG: 50 TABLET, CHEWABLE ORAL at 20:26

## 2022-04-06 RX ADMIN — PHENYTOIN 100 MG: 50 TABLET, CHEWABLE ORAL at 09:42

## 2022-04-06 RX ADMIN — PANTOPRAZOLE SODIUM 40 MG: 40 TABLET, DELAYED RELEASE ORAL at 09:42

## 2022-04-06 RX ADMIN — GABAPENTIN 300 MG: 300 CAPSULE ORAL at 20:26

## 2022-04-06 RX ADMIN — CYANOCOBALAMIN TAB 500 MCG 1000 MCG: 500 TAB at 09:40

## 2022-04-06 RX ADMIN — MEMANTINE 10 MG: 10 TABLET ORAL at 09:40

## 2022-04-06 RX ADMIN — LEVETIRACETAM 1000 MG: 500 TABLET, FILM COATED ORAL at 09:40

## 2022-04-06 RX ADMIN — ACETAMINOPHEN 975 MG: 325 TABLET ORAL at 05:40

## 2022-04-06 ASSESSMENT — ACTIVITIES OF DAILY LIVING (ADL)
ADLS_ACUITY_SCORE: 20
ADLS_ACUITY_SCORE: 19
ADLS_ACUITY_SCORE: 20
ADLS_ACUITY_SCORE: 19
ADLS_ACUITY_SCORE: 19
ADLS_ACUITY_SCORE: 20
ADLS_ACUITY_SCORE: 19
ADLS_ACUITY_SCORE: 20
ADLS_ACUITY_SCORE: 19
ADLS_ACUITY_SCORE: 20

## 2022-04-06 NOTE — PLAN OF CARE
"[id number]    /82 (BP Location: Left arm)   Pulse 60   Temp 97.8  F (36.6  C) (Oral)   Resp 16   Ht 1.676 m (5' 6\")   Wt 58.2 kg (128 lb 4.8 oz)   SpO2 95%   BMI 20.71 kg/m      AVSS. A&Ox3. SBA with walker. Neuros intact. Denied pain, N/V/D. Bed alarm on. Voiding spontaneously with good output, no BM. Waiting for the family is ready to provide care for pt. No IV access. Continue with POC.  "

## 2022-04-06 NOTE — PLAN OF CARE
[id number]    AVSS on RA. Pt disoriented to time, but otherwise oriented. Neuros intact, unchanged from previous. Denies pain, headache, N/V. Bed alarm on for safety, pt intermittently calling appropriately. Voiding spontaneously, 1 BM this shift. Pt anxious to discharge soon. Pt is to go home with family when family is ready to provide care for pt. Team is unable to get a hold of daughter regarding discharge. However, if family is ready for pt to be discharge after hours, discharge papers are done and pinned. Pt is to go home with zio patch as well. Per team, zio patched should already been ordered. No iv access.  Continue POC.

## 2022-04-06 NOTE — PROGRESS NOTES
[facility]    Progress Note - Medicine Service, [facility] TEAM 1       Date of Admission:  3/19/2022    Assessment & Plan     Khalida Redding is a 71 year old female admitted on 3/19/2022. She has a history of T2DM, HTN, HLD, dementia, epilespy, and recent CVA (11/2021) and is admitted for acute CVA and hypotension likely secondary to polypharmacy.       Changes today:  - Goal for discharge to Home   - Vitals two times daily. Labs twice weekly.   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 CVA 11/2021 with residual left sided weakness. MRI at OSH: small acute to subacute infarct of the right aspect of the splenium of the corpus callosum. Neurology consulted, recommended no thrombolytics due to rapid improvement of her symptoms. TTE unremarkable, EF 55-60%. Stroke symptoms resolved, suspect that symptom recrudescence at OSH was due to hypotension and decreased cerebral perfusion in the setting of polypharmacy (see below).  - Cardiac Telemetry dc'd previously given patient agitation with presence and >48h w/o concerning rhythm  - Continue PTA statin ()  - Per Neurology - OK for normotension. Avoid hypotension.  - PT/OT/SLP Consulted recommended ARU/therapy, goal for TCU placement   - Neurology Consulted. Appreciate further recommendations of management.           - Continue DAPT for 90 days (through 5/17), ASA 324mg thereafter          - Continue  mg BID and Keppra 2000 mg BID          - Follow up with general neurology as outpatient          - Discharge on Ziopatch (ordered)          - No further stroke work up needed      # Post-CVA rehabilitation  # Dementia, neurocognitive impairment, severe  Patient appears at her mental status baseline on  "examination, not consentable given inability to articulate or appreciate present post-CVA concerns and mobility issues, risks of discharge home without rehab, and alternatives to rehabilitation.   - Pt refusing \"rehab\" but amenable to \"therapy\" likely given the similarity in name associated with rehab at a TCU (which she has done before and had a poor experience with)   - Not appropriate for ARU per [facility] given dementia  - Patient is holdable if she attempts or threatens to leave   - Delirium and Fall Precautions in Place  - Given concerns for agitation and disorientation leading to significant distress, olanzapine at bedtime PRN order placed, not used as yet     # Seizure disorder   She has a known seizure disorder. She was transferred for cEEG monitoring, however this is not able to be started overnight. She was loaded with 1,000mg of Keppra prior to transfer. No need for EEG per Neuro.  - Continue phenytoin 100 mg BID and Keppra increased to 1000 mg BID per neuro     # Urinary retention  Noted by nursing 04/03. Pt is adamant this is a non-issue, but worth monitoring. Will attempt medical therapy. Improved UOP by 4/4, no further concerns.  - Tamsulosin 0.4mg QPM, monitor for low BP  - Would defer PV bladder scans unless pt having discomfort or distension    # Moderate Malnutrition  Moderate malnutrition in the context of acute on chronic illness based on dietician consult, appreciate recs.  Diet: Regular + Glucerna with meals + room service with assist  Intake: %    -ordering 2-3 meals per day per [facility], items ordered include oatmeal, mac and cheese, pears, pot roast, deli sandwich, milk, ice cream      =====================  Chronic / Stable Conditions  =====================   # HLD  - Continue PTA Atorvastatin   # HTN - cautious resumptions of PTA antihypertensives in the setting of medication-induced hypotension  # Elevated troponin, suspect type II MI; resolved   Troponin peaked at 640 without " ACS symptoms which is peaked. This likely represents a type II MI - demand ischemia in the setting of hypotension.   - No further troponin checks     # T2DM - well-controlled with diet  Last A1c 5.5%.   - Diet Controlled.   - Monitor blood glucose with M/Th labs, given stability of sugars while inpatient               -- Start sliding scale insulin if BG > 250     #SIRS criteria (fever 100.8F, leukocytosis, tachycardia, lactic acidosis); resolved  #Transient hypotension, likely iatrogenic secondary to medications; resolved  #Somnolence, resolved   She had an episode of hypotension with SBP 60's. Per chart review, patient had received labetolol (30mg), hydralazine (10mg), fentanyl (55mcg), lorazepam (1mg), gabapentin (300mg), levetiracetam (1500mg), phenytoin (100mg) in the evening prior to the episode of hypotension and somnolence. BP normalized w/ fluid resuscitation. CT C/A/P without any concerning signs for infection. SIRS criteria likely met due to iatrogenic hypotension. Her somnolence is also likely related to polypharmacy.  No identifiable source of infection on physical exam, UA, or imaging. No indication to continue antibiotics at this time.   - Monitor for signs/symptoms of infection   - Avoid polypharmacy     Diet: Regular Diet Adult  Room Service  Snacks/Supplements Adult: Glucerna; With Meals    DVT Prophylaxis: DAPT  Gloria Catheter: Not present  Central Lines: None  Cardiac Monitoring: None  Code Status: Full Code      Disposition Plan   Expected Discharge: 04/07/2022     Anticipated discharge location: Home with family     Delays:     Family readiness       The patient's care was discussed with the Attending Physician, Dr. Hoffmann.    Cal Francisco MD  Medicine Service, [facility] TEAM 1  [facility]  Securely message with the Vocera Web Console (learn more here)  Text page via VCharge Paging/Directory   Please see signed in provider for up to date  "coverage information      Clinically Significant Risk Factors Present on Admission                    ______________________________________________________________________    Interval History   NAEO, nursing notes reviewed. Laying in bed, NAD. Immediately states loudly, \"I want to go home\". Discussed SW discussion with her , informed her will have discussion with family (Daughter) to verify timing and readiness for discharge, which she was very agreeable to. Denies any acute concerns or issues at this time. No other questions.     Data reviewed today: I reviewed all medications, new labs and imaging results over the last 24 hours. I personally reviewed no images or EKG's today.    Physical Exam   Vital Signs: Temp: 97.3  F (36.3  C) Temp src: Oral BP: (!) 133/92 Pulse: 79   Resp: 16 SpO2: 100 % O2 Device: None (Room air)    Weight: 128 lbs 4.8 oz  General Appearance:  Alert, comfortable in bed, NAD, watching TV  Respiratory: CTAB, no tachypnea   Cardiovascular: RRR, no murmur   GI: BS+, soft, nontender   Skin: no rashes or lesions   Other:  4/5 strength in  LUE and LLE, 5/5 strength in RLE and RUE     Data   Medications       aspirin  81 mg Oral Daily     atorvastatin  40 mg Oral Daily     clopidogrel  75 mg Oral Daily     cyanocobalamin  1,000 mcg Oral Daily     gabapentin  300 mg Oral At Bedtime     levETIRAcetam  1,000 mg Oral BID     lisinopril  20 mg Oral Daily     memantine  10 mg Oral Daily     pantoprazole  40 mg Oral QAM AC     phenytoin  100 mg Oral BID     polyethylene glycol  17 g Oral Daily     tamsulosin  0.4 mg Oral QPM     "

## 2022-04-06 NOTE — PROGRESS NOTES
Care Management Follow Up    Length of Stay (days): 18    Expected Discharge Date: 04/07/2022     Concerns to be Addressed: discharge planning     Patient plan of care discussed at interdisciplinary rounds: Yes     Anticipated Discharge Disposition: Home with Home Care     Anticipated Discharge Services: IMM, Home Care  Anticipated Discharge DME: None     Patient/family educated on Medicare website which has current facility and service quality ratings: yes  Education Provided on the Discharge Plan: yes  Patient/Family in Agreement with the Plan: yes     Referrals Placed by CM/SW: Internal Clinic Care Coordination, Home Care  Private pay costs discussed: None    Active Referrals:  None    Denied Referrals:  [facility] in [place]  [facility] in [place]  [facility] in [facility]   [facility] [place]  [facility]  [facility] [place]   [facility] to let her return there  [facility]   [facility]   [facility]    Additional Information:  [facility] in [place], [place] (Ph: [phone], Fax: [phone]): SW received a message from [employer]. They feel patient's cognition will limit patient's ability to advance her mobility and ADL functioning. They feel patient needs LTC and SW can reach out to their LTC at [phone] if patient/family are in agreement.     Laura, Daughter ([phone]): SW attempted to call Laura but did not reach her and could not leave a VM.     Yeyo,  ([phone]): Discussed difficulties with finding TCU's and asked if SW could broaden search to the [facility]. Yeyo declined that and wants to bring her home with home care. SW asked if he  and family could provide her with 24/7 care and Yeyo said yes. RODNEY asked that Laura give SW to discuss details. Family will provide transport home.     5C RNCC: RODNEY reached out to RNCC covering patient's bed on 5C and updated on wish to return home with home care.     Xavier Peterson MD: Updated via page regarding discharge plans.     Addendum: Talked with Laura regarding SNF and denials. She is in agreement with home with home care. She would like to  patient on 4/8. They can pick her up between 12-1 on 4/8. RODNEY updated MD and RNCC.     MELISSA Tristan, LGSW  7A/5C Medicine   Ph: [phone]  Pager: [phone]

## 2022-04-06 NOTE — DISCHARGE SUMMARY
Medicine Discharge Summary  Khalida Redding MRN: [id number]  1950  Primary care provider: Abdirahman Munoz  ___________________________________          Date of Admission:  3/19/2022  Date of Discharge:  4/8/2022   Admitting Physician:  Winston Koenig MD  Discharge Physician:  Victor Hugo Mo MD  Discharging Service:  Internal Medicine, [place] [address]     Primary Provider: Abdirahman Munoz         Reason for Admission:   Admitted for monitoring/management of acute CVA of R Splenium of Corpus Callosum, hypotension.          Discharge Diagnosis:   Acute CVA of R Splenum of Corpus Callosum, POA  SIRS, resolved  Hx of seizure disorder  Dementia  DMII           Procedures & Significant Findings:   ---CTA Head/Neck, 3/18/22:  IMPRESSION:  HEAD CTA:  1.  No high-grade intracranial stenosis. Mild stenosis at the left  P1-P2 junction.  2.  Diffusely diminished caliber of the distal right EDWARD presumably  relates to the adjacent encephalomalacia in the right frontal lobe.  3.  No aneurysm and no high flow vascular malformation.     NECK CTA:  1.  No high-grade stenosis of the neck vessels by NASCET criteria.      ---MR Brain, 3/18/22:  IMPRESSION:  1. New small acute to subacute infarct involving the right aspect of  the splenium of the corpus callosum.  2. Evolving subacute/subacute to chronic infarcts involving the high  posterior left frontal lobe, as described.  3. Unchanged large area of chronic encephalomalacia in the right  frontal lobe, as well as other unchanged chronic findings, as  described in the body of the report.               Consultations:   Neurology  Social Work  PT/OT         Hospital Course by Problem:    #Acute CVA of R splenium of corpus callosum   #Prior CVA with residual left sided hemiparesis   # Post-CVA rehabilitation  # Dementia, neurocognitive impairment, severe  New onset confusion, slurred speech, and left facial droop around 09:30  on 3/18/22. CVA 11/2021 with residual left sided weakness. MRI at OSH: small acute to subacute infarct of the right aspect of the splenium of the corpus callosum. Neurology consulted, recommended no thrombolytics due to rapid improvement of her symptoms, and transfer to [place] for further evaluation. TTE unremarkable, EF 55-60%. Stroke symptoms resolved, suspect that symptom recrudescence at OSH was due to hypotension and decreased cerebral perfusion in the setting of polypharmacy. Plan for Ziopatch on discharge, follow up with neurology after discharge. Therapy recs for TCU given likely need for 24/7 monitoring/care given recurrent falls and concern for safety at home particularly given husbands upcoming shoulder surgery. Unable to place for TCU, ongoing discussions with family who felt further expansion of TCU search on larger geographical radius was not desired, but preferred to have patient return home in their care. Discharge to home with family with home cares.     # Seizure disorder   She has a known seizure disorder. She was transferred to [place] for cEEG monitoring, however this is not able to be started overnight following discharge, thus she was loaded with 1,000mg of Keppra prior to transfer, and ultimately no need for EEG per Neuro. Continue phenytoin 100 mg BID and Keppra increased to 1000 mg BID per neuro. Follow up with neuro as outpatient.    #SIRS criteria (fever 100.8F, leukocytosis, tachycardia, lactic acidosis)  #Transient hypotension  #Somnolence   She had an episode of hypotension with SBP 60's prior to transfer to [place]. Patient had received labetolol (30mg), hydralazine (10mg), fentanyl (55mcg), lorazepam (1mg), gabapentin (300mg), levetiracetam (1500mg), phenytoin (100mg) in the evening prior to the episode of hypotension and somnolence. BP normalized w/ fluid resuscitation. CT C/A/P without any concerning signs for infection. SIRS criteria likely met due to iatrogenic  "hypotension. Her somnolence is also likely related to polypharmacy.  No identifiable source of infection on physical exam, UA, or imaging. Remained stable during rest of admission without further concerns.     # Low UOP  Noted by nursing 04/03. Started tamsulosin, with notable improvement in UOP. Pt denied abd discomfort or pain. Will continue on discharge, follow up with PCP for duration.     =====================  Chronic / Stable Conditions  =====================   # HLD  - Continued PTA Atorvastatin   # HTN - cautious resumption of PTA antihypertensives in the setting of medication-induced hypotension, well tolerated, will discharge on stable regimen  # Elevated troponin, suspect type II MI; resolved   Troponin peaked at 640 without ACS symptoms which is peaked. This likely represents a type II MI - demand ischemia in the setting of transient hypotension.      # T2DM - well-controlled with diet  Last A1c 5.5%.        Physical Exam on day of Discharge:  Blood pressure (!) 133/92, pulse 79, temperature 97.3  F (36.3  C), temperature source Oral, resp. rate 16, height 1.676 m (5' 6\"), weight 58.2 kg (128 lb 4.8 oz), SpO2 100 %.  General Appearance:  Alert, comfortable in bed, NAD, luggage packed at bedside  Respiratory: CTAB, no tachypnea   Cardiovascular: RRR, no murmur   GI: BS+, soft, nontender   Skin: no rashes or lesions   Other:  4/5 strength in  LUE and LLE, 5/5 strength in RLE and RUE        Lines/Tubes:  None         Pending Results:   None         Discharge Medications:     Current Discharge Medication List      START taking these medications    Details   tamsulosin (FLOMAX) 0.4 MG capsule Take 1 capsule (0.4 mg) by mouth every evening  Qty: 30 capsule, Refills: 0    Associated Diagnoses: Difficulty urinating         CONTINUE these medications which have CHANGED    Details   levETIRAcetam (KEPPRA) 1000 MG tablet Take 1 tablet (1,000 mg) by mouth 2 times daily  Qty: 60 tablet, Refills: 0    Associated " Diagnoses: Seizure disorder (H)         CONTINUE these medications which have NOT CHANGED    Details   acetaminophen (TYLENOL) 500 MG tablet Take 1,000 mg by mouth every 6 hours as needed for mild pain      alcohol swab prep pads Use to swab area of injection/carlos manuel as directed.  Qty: 100 each, Refills: 3    Associated Diagnoses: Type 2 diabetes mellitus with diabetic peripheral angiopathy without gangrene, without long-term current use of insulin (H)      alendronate (FOSAMAX) 70 MG tablet Take 1 tablet (70 mg) by mouth every 7 days Tuesdays  Qty: 13 tablet, Refills: 3    Associated Diagnoses: Age-related osteoporosis without current pathological fracture      aspirin (ASA) 81 MG chewable tablet Take 1 tablet (81 mg) by mouth daily Take 1 tablet daily through Feb 28.  Qty: 100 tablet, Refills: 3    Associated Diagnoses: Cerebrovascular accident (CVA) due to embolism of left anterior cerebral artery (H)      atorvastatin (LIPITOR) 40 MG tablet Take 1 tablet by mouth once daily  Qty: 30 tablet, Refills: 0    Associated Diagnoses: Cerebrovascular accident (CVA) due to embolism of left anterior cerebral artery (H)      blood glucose (NO BRAND SPECIFIED) test strip Use to test blood sugar once daily. To accompany: Blood Glucose Monitor Brands: per insurance.  Qty: 100 strip, Refills: 6    Associated Diagnoses: Type 2 diabetes mellitus with diabetic peripheral angiopathy without gangrene, without long-term current use of insulin (H)      blood glucose monitoring (NO BRAND SPECIFIED) meter device kit Use to test blood sugar once daily. Preferred blood glucose meter OR supplies to accompany: Blood Glucose Monitor Brands: per insurance.  Qty: 1 kit, Refills: 0    Associated Diagnoses: Type 2 diabetes mellitus with diabetic peripheral angiopathy without gangrene, without long-term current use of insulin (H)      clopidogrel (PLAVIX) 75 MG tablet Take 1 tablet by mouth once daily  Qty: 30 tablet, Refills: 0    Associated  Diagnoses: Cerebrovascular accident (CVA) due to embolism of left anterior cerebral artery (H)      Cyanocobalamin (VITAMIN B 12 PO) Take 1,000 mcg by mouth daily      gabapentin (NEURONTIN) 300 MG capsule TAKE 1 CAPSULE BY MOUTH AT  BEDTIME  Qty: 90 capsule, Refills: 3    Comments: Requesting 1 year supply  Associated Diagnoses: Dementia without behavioral disturbance, unspecified dementia type (H)      glipiZIDE (GLUCOTROL XL) 2.5 MG 24 hr tablet TAKE 1 TABLET BY MOUTH  DAILY  Qty: 90 tablet, Refills: 0    Comments: Requesting 1 year supply  Associated Diagnoses: Type 2 diabetes mellitus with diabetic peripheral angiopathy without gangrene, without long-term current use of insulin (H)      lisinopril (ZESTRIL) 20 MG tablet Take 1 tablet by mouth once daily  Qty: 30 tablet, Refills: 0    Associated Diagnoses: Cerebrovascular accident (CVA) due to embolism of left anterior cerebral artery (H)      meloxicam (MOBIC) 7.5 MG tablet Take 1 tablet (7.5 mg) by mouth daily  Qty: 90 tablet, Refills: 3    Comments: Requesting 1 year supply  Associated Diagnoses: Injury of left clavicle, initial encounter; Closed displaced fracture of left clavicle, unspecified part of clavicle, initial encounter      memantine (NAMENDA) 10 MG tablet Take 1 tablet (10 mg) by mouth daily  Qty: 90 tablet, Refills: 3    Comments: Requesting 1 year supply  Associated Diagnoses: Dementia without behavioral disturbance, unspecified dementia type (H)      metFORMIN (GLUCOPHAGE-XR) 500 MG 24 hr tablet Take 1 tablet (500 mg) by mouth 2 times daily (with meals)  Qty: 180 tablet, Refills: 1    Associated Diagnoses: Type 2 diabetes mellitus with diabetic peripheral angiopathy without gangrene, without long-term current use of insulin (H)      omeprazole (PRILOSEC) 40 MG DR capsule Take 1 capsule (40 mg) by mouth At Bedtime  Qty: 90 capsule, Refills: 1    Comments: Requesting 1 year supply  Associated Diagnoses: Abdominal pain, generalized; Chronic upset  stomach      order for DME Equipment being ordered: glucometer and related supplies.  Qty: 1 Units, Refills: 0    Associated Diagnoses: Type 2 diabetes mellitus with diabetic peripheral angiopathy without gangrene, without long-term current use of insulin (H)      phenytoin (DILANTIN) 50 MG chewable tablet CHEW AND SWALLOW 2 TABLETS BY MOUTH TWICE DAILY  Qty: 60 tablet, Refills: 0    Associated Diagnoses: Seizure (H)      polyethylene glycol (MIRALAX) 17 GM/Dose powder Take 17 g (1 capful) by mouth 2 times daily  Qty: 1000 g, Refills: 1    Associated Diagnoses: Abdominal pain, generalized; Chronic upset stomach      thin (NO BRAND SPECIFIED) lancets Use with lanceting device. To accompany: Blood Glucose Monitor Brands: per insurance.  Qty: 100 each, Refills: 6    Associated Diagnoses: Type 2 diabetes mellitus with diabetic peripheral angiopathy without gangrene, without long-term current use of insulin (H)                  Discharge Instructions and Follow-Up:     Discharge Procedure Orders   Adult Neurology  Referral   Standing Status: Future   Referral Priority: Routine: Next available opening Referral Type: Consultation   Number of Visits Requested: 1     Home Care Referral   Referral Priority: Routine: Next available opening Referral Type: Home Health Therapies & Aides   Number of Visits Requested: 1     Primary Care - Care Coordination Referral   Standing Status: Future   Referral Priority: Routine: Next available opening Referral Type: Care Coordination   Number of Visits Requested: 1     Reason for your hospital stay   Order Comments: You were admitted for management of your confusion and concern for stroke. Symptoms you presented with resolved relatively quickly, however, given the confusion you underwent workup with neurology as well as physical therapy and occupational therapy. Your seizure medication (levetiracetam) was increased to 1000mg twice daily, and this should be continued at least until  follow up with neurology as outpatient. You remained in the hospital due to concern for your recurrent falls and weakness, with safety concern of needing 24/7 care available. As such, discharging to a place for therapy was discussed vs going home with your family if they were open to providing high level of care/supervision. In discussion with your  and daughter, it was determined they were open to you being able to return home safely, and so you were discharged to home. You should follow up with your PCP in the next 1-2 weeks as well as neurology as scheduled.     Activity   Order Comments: Your activity upon discharge: activity as tolerated     Order Specific Question Answer Comments   Is discharge order? Yes      Adult [facility] Follow-up and recommended labs and tests   Order Comments: Follow up with primary care provider, Abdirahman Shrestha, within 1-2 weeks, for hospital follow- up and how home care with family is progressing. No follow up labs or test are needed.     Appointments on [place] and/or [facility] (with [facility] or [facility] provider or service). Call [phone] if you haven't heard regarding these appointments within 7 days of discharge.     Leadless EKG Monitor 8 to 14 Days   Standing Status: Future Standing Exp. Date: 03/20/23     Order Specific Question Answer Comments   Patient should wear the monitor for 14 days    Schedule hook-up appt at [facility] [12]      Leadless EKG Monitor 8 to 14 Days   Standing Status: Future Standing Exp. Date: 03/20/23     Order Specific Question Answer Comments   Patient should wear the monitor for 14 days    Schedule hook-up appt at [facility] [12]      Diet   Order Comments: Follow this diet upon discharge: Orders Placed This Encounter      Regular Diet Adult     Order Specific Question Answer Comments   Is discharge order? Yes                  Discharge Disposition:   To home with family opting to provide 24/7 supervision with home care         Condition on Discharge:      Discharge condition: Stable   Code status on discharge: Full Code        Date of service: 4/8/2022    The patient was discussed with Dr. Mo.    Cal Francisco MD  Internal Medicine PGY-3  [facility]

## 2022-04-06 NOTE — PROGRESS NOTES
"CLINICAL NUTRITION SERVICES - REASSESSMENT NOTE     Nutrition Prescription    RECOMMENDATIONS FOR MDs/PROVIDERS TO ORDER:  Encourage oral intake     Malnutrition Status:    Moderate malnutrition in the context of acute on chronic illness     Recommendations already ordered by Registered Dietitian (RD):  Discontinued glucerna     Future/Additional Recommendations:  Continue room service with assistance, monitor appetite and oral intake   Monitor ability to maintain weight      EVALUATION OF THE PROGRESS TOWARD GOALS   Diet: Regular + Glucerna with meals + room service with assist  Intake: %    -ordering 2-3 meals per day per [facility], items ordered include oatmeal, mac and cheese, pears, pot roast, deli sandwich, milk, ice cream     NEW FINDINGS   Possible discharge 4/7. Khalida was laying in bed reporting appetite was fine. She had 5 glucerna in room and reported she does not like them. Stated she wants to go home and \"if she tells enough people, maybe it will happen\" Discussed writer was not in control of discharge but wanted to ensure patient maintains nutrition status in hospital.     Weight Trends since admission:  04/06/22 0745 58.2 kg (128 lb 4.8 oz)   04/03/22 0129 58.6 kg (129 lb 3 oz)   03/25/22 0537 58.8 kg (129 lb 10.1 oz)   03/22/22 0554 60.5 kg (133 lb 6.1 oz)   03/21/22 0622 64 kg (141 lb 1.5 oz)   03/19/22 2301 60.5 kg (133 lb 6.1 oz)     GI: No nausea noted. Last bowel movement,4/6.   Skin: Jeovany 19   Labs: Electrolytes (WNL), Glucose 149-213 (past 3)   Medications: Cyancobalamin, protonix, Dilantin     MALNUTRITION  % Intake: Decreased intake does not meet criteria  % Weight Loss: 1-2% in 1 week (moderate)  Subcutaneous Fat Loss: Facial region:  mild  Muscle Loss: Facial & jaw region:  mild  Fluid Accumulation/Edema: None noted  Malnutrition Diagnosis: Moderate malnutrition in the context of acute on chronic illness     Previous Goals   Patient to consume % of nutritionally adequate " meal trays TID, or the equivalent with supplements/snacks.  Evaluation: Met    Previous Nutrition Diagnosis  Predicted inadequate nutrient intake (protein) related to acute illness/hospitalization   Evaluation: Modified     CURRENT NUTRITION DIAGNOSIS  Predicted inadequate nutrient intake (protein)  related to prolonged hospitalization, food choices and dislike of supplement     INTERVENTIONS  Implementation  Medical food supplement therapy    Goals  Patient to consume % of nutritionally adequate meal trays TID, or the equivalent with supplements/snacks.    Monitoring/Evaluation  Progress toward goals will be monitored and evaluated per protocol.    Jodie Sheridan RD, LD  5C/BMT pager: [phone]

## 2022-04-06 NOTE — PLAN OF CARE
"/69 (BP Location: Left arm)   Pulse 93   Temp 97.4  F (36.3  C) (Oral)   Resp 18   Ht 1.676 m (5' 6\")   Wt 58.6 kg (129 lb 3 oz)   SpO2 96%   BMI 20.85 kg/m      AVSS on RA. Pt disoriented to time, but otherwise oriented. Neuros intact, unchanged from previous. Pt reports HA this morning, tylenol x1. Slept well between cares. Bed alarm on for safety, pt intermittently calling appropriately. Voiding spontaneously, 2 very small stools. Pt anxious to discharge soon, currently awaiting TCU placement. Continue POC.    Problem: Plan of Care - These are the overarching goals to be used throughout the patient stay.    Goal: Optimal Comfort and Wellbeing  Outcome: Ongoing, Progressing     Problem: Cerebral Tissue Perfusion (Stroke, Ischemic/Transient Ischemic Attack)  Goal: Optimal Cerebral Tissue Perfusion  Outcome: Ongoing, Progressing  "

## 2022-04-07 ENCOUNTER — APPOINTMENT (OUTPATIENT)
Dept: PHYSICAL THERAPY | Facility: CLINIC | Age: 72
DRG: 064 | End: 2022-04-07
Attending: PEDIATRICS
Payer: COMMERCIAL

## 2022-04-07 LAB
ALBUMIN SERPL-MCNC: 3.4 G/DL (ref 3.4–5)
ALP SERPL-CCNC: 121 U/L (ref 40–150)
ALT SERPL W P-5'-P-CCNC: 17 U/L (ref 0–50)
ANION GAP SERPL CALCULATED.3IONS-SCNC: 6 MMOL/L (ref 3–14)
ANION GAP SERPL CALCULATED.3IONS-SCNC: 6 MMOL/L (ref 3–14)
AST SERPL W P-5'-P-CCNC: 14 U/L (ref 0–45)
BILIRUB SERPL-MCNC: 0.3 MG/DL (ref 0.2–1.3)
BUN SERPL-MCNC: 11 MG/DL (ref 7–30)
BUN SERPL-MCNC: 11 MG/DL (ref 7–30)
CALCIUM SERPL-MCNC: 9 MG/DL (ref 8.5–10.1)
CALCIUM SERPL-MCNC: 9 MG/DL (ref 8.5–10.1)
CHLORIDE BLD-SCNC: 108 MMOL/L (ref 94–109)
CHLORIDE BLD-SCNC: 108 MMOL/L (ref 94–109)
CO2 SERPL-SCNC: 28 MMOL/L (ref 20–32)
CO2 SERPL-SCNC: 28 MMOL/L (ref 20–32)
CREAT SERPL-MCNC: 0.58 MG/DL (ref 0.52–1.04)
CREAT SERPL-MCNC: 0.58 MG/DL (ref 0.52–1.04)
ERYTHROCYTE [DISTWIDTH] IN BLOOD BY AUTOMATED COUNT: 12.4 % (ref 10–15)
GFR SERPL CREATININE-BSD FRML MDRD: >90 ML/MIN/1.73M2
GFR SERPL CREATININE-BSD FRML MDRD: >90 ML/MIN/1.73M2
GLUCOSE BLD-MCNC: 185 MG/DL (ref 70–99)
GLUCOSE BLD-MCNC: 185 MG/DL (ref 70–99)
HCT VFR BLD AUTO: 39.2 % (ref 35–47)
HGB BLD-MCNC: 12.5 G/DL (ref 11.7–15.7)
MCH RBC QN AUTO: 30.5 PG (ref 26.5–33)
MCHC RBC AUTO-ENTMCNC: 31.9 G/DL (ref 31.5–36.5)
MCV RBC AUTO: 96 FL (ref 78–100)
PLATELET # BLD AUTO: 266 10E3/UL (ref 150–450)
POTASSIUM BLD-SCNC: 3.7 MMOL/L (ref 3.4–5.3)
POTASSIUM BLD-SCNC: 3.7 MMOL/L (ref 3.4–5.3)
PROT SERPL-MCNC: 6.7 G/DL (ref 6.8–8.8)
RBC # BLD AUTO: 4.1 10E6/UL (ref 3.8–5.2)
SODIUM SERPL-SCNC: 142 MMOL/L (ref 133–144)
SODIUM SERPL-SCNC: 142 MMOL/L (ref 133–144)
WBC # BLD AUTO: 4.6 10E3/UL (ref 4–11)

## 2022-04-07 PROCEDURE — 120N000005 HC R&B MS OVERFLOW UMMC

## 2022-04-07 PROCEDURE — 97530 THERAPEUTIC ACTIVITIES: CPT | Mod: GP | Performed by: REHABILITATION PRACTITIONER

## 2022-04-07 PROCEDURE — 36415 COLL VENOUS BLD VENIPUNCTURE: CPT | Performed by: STUDENT IN AN ORGANIZED HEALTH CARE EDUCATION/TRAINING PROGRAM

## 2022-04-07 PROCEDURE — 250N000013 HC RX MED GY IP 250 OP 250 PS 637: Performed by: STUDENT IN AN ORGANIZED HEALTH CARE EDUCATION/TRAINING PROGRAM

## 2022-04-07 PROCEDURE — 250N000013 HC RX MED GY IP 250 OP 250 PS 637: Performed by: HOSPITALIST

## 2022-04-07 PROCEDURE — 80053 COMPREHEN METABOLIC PANEL: CPT | Performed by: STUDENT IN AN ORGANIZED HEALTH CARE EDUCATION/TRAINING PROGRAM

## 2022-04-07 PROCEDURE — 250N000013 HC RX MED GY IP 250 OP 250 PS 637

## 2022-04-07 PROCEDURE — 99232 SBSQ HOSP IP/OBS MODERATE 35: CPT | Mod: GC | Performed by: STUDENT IN AN ORGANIZED HEALTH CARE EDUCATION/TRAINING PROGRAM

## 2022-04-07 PROCEDURE — 97116 GAIT TRAINING THERAPY: CPT | Mod: GP | Performed by: REHABILITATION PRACTITIONER

## 2022-04-07 PROCEDURE — 85027 COMPLETE CBC AUTOMATED: CPT | Performed by: STUDENT IN AN ORGANIZED HEALTH CARE EDUCATION/TRAINING PROGRAM

## 2022-04-07 RX ADMIN — ATORVASTATIN CALCIUM 40 MG: 40 TABLET, FILM COATED ORAL at 07:46

## 2022-04-07 RX ADMIN — CYANOCOBALAMIN TAB 500 MCG 1000 MCG: 500 TAB at 07:46

## 2022-04-07 RX ADMIN — PHENYTOIN 100 MG: 50 TABLET, CHEWABLE ORAL at 20:08

## 2022-04-07 RX ADMIN — LEVETIRACETAM 1000 MG: 500 TABLET, FILM COATED ORAL at 07:46

## 2022-04-07 RX ADMIN — ASPIRIN 81 MG CHEWABLE TABLET 81 MG: 81 TABLET CHEWABLE at 07:46

## 2022-04-07 RX ADMIN — CLOPIDOGREL BISULFATE 75 MG: 75 TABLET ORAL at 07:46

## 2022-04-07 RX ADMIN — Medication 6 MG: at 22:33

## 2022-04-07 RX ADMIN — LISINOPRIL 20 MG: 20 TABLET ORAL at 07:47

## 2022-04-07 RX ADMIN — LEVETIRACETAM 1000 MG: 500 TABLET, FILM COATED ORAL at 20:08

## 2022-04-07 RX ADMIN — PHENYTOIN 100 MG: 50 TABLET, CHEWABLE ORAL at 07:47

## 2022-04-07 RX ADMIN — MEMANTINE 10 MG: 10 TABLET ORAL at 07:47

## 2022-04-07 RX ADMIN — PANTOPRAZOLE SODIUM 40 MG: 40 TABLET, DELAYED RELEASE ORAL at 07:47

## 2022-04-07 RX ADMIN — GABAPENTIN 300 MG: 300 CAPSULE ORAL at 20:08

## 2022-04-07 RX ADMIN — TAMSULOSIN HYDROCHLORIDE 0.4 MG: 0.4 CAPSULE ORAL at 20:07

## 2022-04-07 RX ADMIN — ACETAMINOPHEN 975 MG: 325 TABLET ORAL at 22:33

## 2022-04-07 ASSESSMENT — ACTIVITIES OF DAILY LIVING (ADL)
ADLS_ACUITY_SCORE: 15
ADLS_ACUITY_SCORE: 15
ADLS_ACUITY_SCORE: 18
ADLS_ACUITY_SCORE: 19
ADLS_ACUITY_SCORE: 19
ADLS_ACUITY_SCORE: 18
ADLS_ACUITY_SCORE: 18
ADLS_ACUITY_SCORE: 15
ADLS_ACUITY_SCORE: 15
ADLS_ACUITY_SCORE: 19
ADLS_ACUITY_SCORE: 19
ADLS_ACUITY_SCORE: 15
ADLS_ACUITY_SCORE: 18
ADLS_ACUITY_SCORE: 15
ADLS_ACUITY_SCORE: 19
ADLS_ACUITY_SCORE: 18
ADLS_ACUITY_SCORE: 15
ADLS_ACUITY_SCORE: 15
ADLS_ACUITY_SCORE: 19
ADLS_ACUITY_SCORE: 15
ADLS_ACUITY_SCORE: 19
ADLS_ACUITY_SCORE: 15

## 2022-04-07 NOTE — PLAN OF CARE
A&OX 4, calls appropriately. Tolerating regular diet. Denies pain. No IV access, okayed by provider. Up to bathroom with SBA. Plan to discharge tomorrow around noon. Continue with cares.

## 2022-04-07 NOTE — PLAN OF CARE
"Goal Outcome Evaluation: A+O x3. Able to make needs known. Neuro intact. Ambulates with sba/walker. Frequent requests to urinate. Intermittent inability to void. Bladder scan 13ml. Passing flatus. No reported neuro, respiratory or cardiac concerns. No pain or nausea. Plan to discontinue home with family on Friday 4/8    Plan of Care Reviewed With: patient     /80 (BP Location: Left arm)   Pulse 82   Temp 97.3  F (36.3  C) (Oral)   Resp 18   Ht 1.676 m (5' 6\")   Wt 58.2 kg (128 lb 4.8 oz)   SpO2 94%   BMI 20.71 kg/m      Problem: Plan of Care - These are the overarching goals to be used throughout the patient stay.    Goal: Absence of Hospital-Acquired Illness or Injury  Intervention: Identify and Manage Fall Risk  Recent Flowsheet Documentation  Taken 4/7/2022 0016 by Rosalie Agudelo RN  Safety Promotion/Fall Prevention:    activity supervised    assistive device/personal items within reach    bed alarm on    clutter free environment maintained    lighting adjusted    nonskid shoes/slippers when out of bed    fall prevention program maintained     Problem: Plan of Care - These are the overarching goals to be used throughout the patient stay.    Goal: Readiness for Transition of Care  Outcome: Ongoing, Progressing     Problem: Bowel Elimination Impaired (Stroke, Ischemic/Transient Ischemic Attack)  Goal: Effective Bowel Elimination  Outcome: Ongoing, Progressing     Problem: Cerebral Tissue Perfusion (Stroke, Ischemic/Transient Ischemic Attack)  Goal: Optimal Cerebral Tissue Perfusion  Outcome: Ongoing, Progressing     Problem: Cognitive Impairment (Stroke, Ischemic/Transient Ischemic Attack)  Goal: Optimal Cognitive Function  Outcome: Ongoing, Progressing     Problem: Communication Impairment (Stroke, Ischemic/Transient Ischemic Attack)  Goal: Improved Communication Skills  Outcome: Ongoing, Progressing     Problem: Functional Ability Impaired (Stroke, Ischemic/Transient Ischemic Attack)  Goal: " Optimal Functional Ability  Intervention: Optimize Functional Ability  Recent Flowsheet Documentation  Taken 4/7/2022 0016 by Rosalie Agudelo, RN  Activity Management: activity adjusted per tolerance

## 2022-04-07 NOTE — PROGRESS NOTES
"/66 (BP Location: Left arm)   Pulse 84   Temp 97.6  F (36.4  C) (Oral)   Resp 16   Ht 1.676 m (5' 6\")   Wt 58.2 kg (128 lb 4.8 oz)   SpO2 96%   BMI 20.71 kg/m      Pt in bed at this time with eyes opened. Pleasant and cooperative, Skin warm and dry to touch. Stated that she had to go home today but she didn't have a ride. Took all PM medication without difficulties. No acute pain or distress noted at this time. Report given to on coming nurse. Care relinquished at this time.   "

## 2022-04-07 NOTE — PROGRESS NOTES
[facility]    Progress Note - Medicine Service, [facility] TEAM 1       Date of Admission:  3/19/2022    Assessment & Plan     Khalida Redding is a 71 year old female admitted on 3/19/2022. She has a history of T2DM, HTN, HLD, dementia, epilespy, and recent CVA (11/2021) and is admitted for acute CVA and hypotension likely secondary to polypharmacy.       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 CVA 11/2021 with residual left sided weakness. MRI at OSH: small acute to subacute infarct of the right aspect of the splenium of the corpus callosum. Neurology consulted, recommended no thrombolytics due to rapid improvement of her symptoms. TTE unremarkable, EF 55-60%. Stroke symptoms resolved, suspect that symptom recrudescence at OSH was due to hypotension and decreased cerebral perfusion in the setting of polypharmacy (see below).  - Cardiac Telemetry dc'd previously given patient agitation with presence and >48h w/o concerning rhythm  - Continue PTA statin ()  - Per Neurology - OK for normotension. Avoid hypotension.  - PT/OT/SLP Consulted recommended ARU/therapy, goal for TCU placement   - Neurology Consulted. Appreciate further recommendations of management.           - Continue DAPT for 90 days (through 5/17), ASA 324mg thereafter          - Continue  mg BID and Keppra 2000 mg BID          - Follow up with general neurology as outpatient          - Discharge on Ziopatch (ordered)          - No further stroke work up needed      # Post-CVA rehabilitation  # Dementia, neurocognitive impairment, severe  Patient appears at her mental status baseline on examination, not consentable given inability to  "articulate or appreciate present post-CVA concerns and mobility issues, risks of discharge home without rehab, and alternatives to rehabilitation.   - Pt refusing \"rehab\" but amenable to \"therapy\" likely given the similarity in name associated with rehab at a TCU (which she has done before and had a poor experience with)   - Not appropriate for ARU per [facility] given dementia  - Patient is holdable if she attempts or threatens to leave   - Delirium and Fall Precautions in Place  - Given concerns for agitation and disorientation leading to significant distress, olanzapine at bedtime PRN order placed, not used as yet     # Seizure disorder   She has a known seizure disorder. She was transferred for cEEG monitoring, however this is not able to be started overnight. She was loaded with 1,000mg of Keppra prior to transfer. No need for EEG per Neuro.  - Continue phenytoin 100 mg BID and Keppra increased to 1000 mg BID per neuro     # Urinary retention  Noted by nursing 04/03. Pt is adamant this is a non-issue, but worth monitoring. Will attempt medical therapy. Improved UOP by 4/4, no further concerns.  - Tamsulosin 0.4mg QPM, monitor for low BP  - Would defer PV bladder scans unless pt having discomfort or distension    # Moderate Malnutrition  Moderate malnutrition in the context of acute on chronic illness based on dietician consult, appreciate recs.  Diet: Regular + Glucerna with meals + room service with assist  Intake: %    -ordering 2-3 meals per day per [facility], items ordered include oatmeal, mac and cheese, pears, pot roast, deli sandwich, milk, ice cream      =====================  Chronic / Stable Conditions  =====================   # HLD  - Continue PTA Atorvastatin   # HTN - cautious resumptions of PTA antihypertensives in the setting of medication-induced hypotension  # Elevated troponin, suspect type II MI; resolved   Troponin peaked at 640 without ACS symptoms which is peaked. This likely " represents a type II MI - demand ischemia in the setting of hypotension.   - No further troponin checks     # T2DM - well-controlled with diet  Last A1c 5.5%.   - Diet Controlled.   - Monitor blood glucose with M/Th labs, given stability of sugars while inpatient               -- Start sliding scale insulin if BG > 250     #SIRS criteria (fever 100.8F, leukocytosis, tachycardia, lactic acidosis); resolved  #Transient hypotension, likely iatrogenic secondary to medications; resolved  #Somnolence, resolved   She had an episode of hypotension with SBP 60's. Per chart review, patient had received labetolol (30mg), hydralazine (10mg), fentanyl (55mcg), lorazepam (1mg), gabapentin (300mg), levetiracetam (1500mg), phenytoin (100mg) in the evening prior to the episode of hypotension and somnolence. BP normalized w/ fluid resuscitation. CT C/A/P without any concerning signs for infection. SIRS criteria likely met due to iatrogenic hypotension. Her somnolence is also likely related to polypharmacy.  No identifiable source of infection on physical exam, UA, or imaging. No indication to continue antibiotics at this time.   - Monitor for signs/symptoms of infection   - Avoid polypharmacy     Diet: Regular Diet Adult  Room Service    DVT Prophylaxis: DAPT  Gloria Catheter: Not present  Central Lines: None  Cardiac Monitoring: None  Code Status: Full Code      Disposition Plan   Expected Discharge: 04/07/2022     Anticipated discharge location: Home with family     Delays:     Family readiness       The patient's care was discussed with the Attending Physician, Dr. Mo.    Cal Francisco MD  Medicine Service, [facility] TEAM 1  [facility]  Securely message with the Vocera Web Console (learn more here)  Text page via [facility] Paging/Directory   Please see signed in provider for up to date coverage information      Clinically Significant Risk Factors Present on Admission                 "    ______________________________________________________________________    Interval History   ANÍBAL, nursing notes reviewed. Sitting on edge of bed for breakfast. Notably more cheerful today with the plan to discharge home in AM. Stated family was ready for pt in AM, she retorted no they're ready today. Replied they had told us tomorrow, to which she laughed and said \"I know, I told them they were just being bitches\" (in apparently joking manner). She had no concerns or questions otherwise, very much ready to return home to see her children, grandchildren,  and sister.    Data reviewed today: I reviewed all medications, new labs and imaging results over the last 24 hours. I personally reviewed no images or EKG's today.    Physical Exam   Vital Signs: Temp: 97.3  F (36.3  C) Temp src: Oral BP: 126/80 Pulse: 82   Resp: 18 SpO2: 94 % O2 Device: None (Room air)    Weight: 128 lbs 4.8 oz  General Appearance:  Alert, sitting on edge of bed eating breakfast, conversant, cheerful  Respiratory: CTAB, no tachypnea   Cardiovascular: RRR, no murmur   GI: BS+, soft, nontender   Skin: no rashes or lesions   Other:  4/5 strength in  LUE and LLE, 5/5 strength in RLE and RUE     Data   Medications       aspirin  81 mg Oral Daily     atorvastatin  40 mg Oral Daily     clopidogrel  75 mg Oral Daily     cyanocobalamin  1,000 mcg Oral Daily     gabapentin  300 mg Oral At Bedtime     levETIRAcetam  1,000 mg Oral BID     lisinopril  20 mg Oral Daily     memantine  10 mg Oral Daily     pantoprazole  40 mg Oral QAM AC     phenytoin  100 mg Oral BID     polyethylene glycol  17 g Oral Daily     tamsulosin  0.4 mg Oral QPM     "

## 2022-04-08 ENCOUNTER — APPOINTMENT (OUTPATIENT)
Dept: PHYSICAL THERAPY | Facility: CLINIC | Age: 72
DRG: 064 | End: 2022-04-08
Attending: PEDIATRICS
Payer: COMMERCIAL

## 2022-04-08 VITALS
OXYGEN SATURATION: 95 % | HEART RATE: 91 BPM | WEIGHT: 127.2 LBS | TEMPERATURE: 98.7 F | HEIGHT: 66 IN | RESPIRATION RATE: 16 BRPM | SYSTOLIC BLOOD PRESSURE: 98 MMHG | BODY MASS INDEX: 20.44 KG/M2 | DIASTOLIC BLOOD PRESSURE: 80 MMHG

## 2022-04-08 PROBLEM — R39.198 DIFFICULTY URINATING: Status: ACTIVE | Noted: 2022-04-08

## 2022-04-08 PROCEDURE — 97530 THERAPEUTIC ACTIVITIES: CPT | Mod: GP | Performed by: PHYSICAL THERAPIST

## 2022-04-08 PROCEDURE — 97116 GAIT TRAINING THERAPY: CPT | Mod: GP | Performed by: PHYSICAL THERAPIST

## 2022-04-08 PROCEDURE — 250N000013 HC RX MED GY IP 250 OP 250 PS 637

## 2022-04-08 PROCEDURE — 99239 HOSP IP/OBS DSCHRG MGMT >30: CPT | Mod: GC | Performed by: STUDENT IN AN ORGANIZED HEALTH CARE EDUCATION/TRAINING PROGRAM

## 2022-04-08 PROCEDURE — 250N000013 HC RX MED GY IP 250 OP 250 PS 637: Performed by: HOSPITALIST

## 2022-04-08 RX ORDER — GLIPIZIDE 2.5 MG/1
2.5 TABLET, EXTENDED RELEASE ORAL DAILY
Qty: 90 TABLET | Refills: 0 | Status: SHIPPED | OUTPATIENT
Start: 2022-04-08 | End: 2022-01-01

## 2022-04-08 RX ORDER — LISINOPRIL 20 MG/1
20 TABLET ORAL DAILY
Qty: 30 TABLET | Refills: 0 | Status: SHIPPED | OUTPATIENT
Start: 2022-04-08 | End: 2022-05-21

## 2022-04-08 RX ORDER — CLOPIDOGREL BISULFATE 75 MG/1
75 TABLET ORAL DAILY
Qty: 30 TABLET | Refills: 0 | Status: SHIPPED | OUTPATIENT
Start: 2022-04-08 | End: 2022-01-01

## 2022-04-08 RX ORDER — MEMANTINE HYDROCHLORIDE 10 MG/1
10 TABLET ORAL DAILY
Qty: 90 TABLET | Refills: 0 | Status: SHIPPED | OUTPATIENT
Start: 2022-04-08 | End: 2022-01-01

## 2022-04-08 RX ORDER — TAMSULOSIN HYDROCHLORIDE 0.4 MG/1
0.4 CAPSULE ORAL EVERY EVENING
Qty: 30 CAPSULE | Refills: 0 | Status: SHIPPED | OUTPATIENT
Start: 2022-04-08 | End: 2022-01-01

## 2022-04-08 RX ORDER — MELOXICAM 7.5 MG/1
7.5 TABLET ORAL DAILY
Qty: 90 TABLET | Refills: 0 | Status: SHIPPED | OUTPATIENT
Start: 2022-04-08 | End: 2022-01-01

## 2022-04-08 RX ORDER — PHENYTOIN 50 MG/1
TABLET, CHEWABLE ORAL
Qty: 60 TABLET | Refills: 0 | Status: SHIPPED | OUTPATIENT
Start: 2022-04-08 | End: 2022-01-01

## 2022-04-08 RX ORDER — LEVETIRACETAM 1000 MG/1
1000 TABLET ORAL 2 TIMES DAILY
Qty: 60 TABLET | Refills: 0 | Status: SHIPPED | OUTPATIENT
Start: 2022-04-08 | End: 2022-04-22

## 2022-04-08 RX ADMIN — PANTOPRAZOLE SODIUM 40 MG: 40 TABLET, DELAYED RELEASE ORAL at 08:02

## 2022-04-08 RX ADMIN — CYANOCOBALAMIN TAB 500 MCG 1000 MCG: 500 TAB at 08:03

## 2022-04-08 RX ADMIN — PHENYTOIN 100 MG: 50 TABLET, CHEWABLE ORAL at 08:03

## 2022-04-08 RX ADMIN — MEMANTINE 10 MG: 10 TABLET ORAL at 08:03

## 2022-04-08 RX ADMIN — ASPIRIN 81 MG CHEWABLE TABLET 81 MG: 81 TABLET CHEWABLE at 08:03

## 2022-04-08 RX ADMIN — CLOPIDOGREL BISULFATE 75 MG: 75 TABLET ORAL at 08:02

## 2022-04-08 RX ADMIN — LEVETIRACETAM 1000 MG: 500 TABLET, FILM COATED ORAL at 08:02

## 2022-04-08 RX ADMIN — ATORVASTATIN CALCIUM 40 MG: 40 TABLET, FILM COATED ORAL at 08:02

## 2022-04-08 ASSESSMENT — ACTIVITIES OF DAILY LIVING (ADL)
ADLS_ACUITY_SCORE: 17

## 2022-04-08 NOTE — PLAN OF CARE
Occupational Therapy Discharge Summary    Reason for therapy discharge:    Discharged to home with home therapy.    Progress towards therapy goal(s). See goals on Care Plan in [facility] electronic health record for goal details.  Goals not met.  Barriers to achieving goals:   discharge from facility.    Therapy recommendation(s):    Continue home exercise program. recommended TCU from OT standpoint however pt returned home. Pt will need 24/7 A with all transfers, ADL/IADL tasks with HHOT.

## 2022-04-08 NOTE — PLAN OF CARE
Problem: Plan of Care - These are the overarching goals to be used throughout the patient stay.    Goal: Plan of Care Review/Shift Note  Description: The Plan of Care Review/Shift note should be completed every shift.  The Outcome Evaluation is a brief statement about your assessment that the patient is improving, declining, or no change.  This information will be displayed automatically on your shift note.  Outcome: Ongoing, Progressing   Goal Outcome Evaluation:  Noted:  soft BP at 2000 - will recheck at midnight. Tylenol (headache) and melatonin given at HS.  Calls out consistently for help to bathroom.  Is forgetful (forgot that she ate lunch), otherwise neuros are intact.  Plan to discharge tomorrow at noon (daughter picking her up).

## 2022-04-08 NOTE — PLAN OF CARE
"Pt AxO x4 but forgetful. OVSS on RA. Pt denies pain, nausea, OSB. 1 BM, voiding frequently. Pt is up SBA with walker. Pt is ready for discharge.   Problem: Plan of Care - These are the overarching goals to be used throughout the patient stay.    Goal: Plan of Care Review/Shift Note  Description: The Plan of Care Review/Shift note should be completed every shift.  The Outcome Evaluation is a brief statement about your assessment that the patient is improving, declining, or no change.  This information will be displayed automatically on your shift note.  Outcome: Adequate for Care Transition  Goal: Patient-Specific Goal (Individualized)  Description: You can add care plan individualizations to a care plan. Examples of Individualization might be:  \"Parent requests to be called daily at 9am for status\", \"I have a hard time hearing out of my right ear\", or \"Do not touch me to wake me up as it startles me\".  Outcome: Adequate for Care Transition  Goal: Absence of Hospital-Acquired Illness or Injury  Outcome: Adequate for Care Transition  Intervention: Identify and Manage Fall Risk  Recent Flowsheet Documentation  Taken 4/8/2022 0800 by Awilda Talley RN  Safety Promotion/Fall Prevention:    activity supervised    nonskid shoes/slippers when out of bed    clutter free environment maintained    lighting adjusted    increase visualization of patient    fall prevention program maintained  Intervention: Prevent Skin Injury  Recent Flowsheet Documentation  Taken 4/8/2022 0800 by Awilda Talley RN  Body Position: position changed independently  Intervention: Prevent and Manage VTE (Venous Thromboembolism) Risk  Recent Flowsheet Documentation  Taken 4/8/2022 0800 by Awilda Talley RN  Activity Management: activity adjusted per tolerance  Goal: Optimal Comfort and Wellbeing  Outcome: Adequate for Care Transition  Goal: Readiness for Transition of Care  Outcome: Adequate for Care Transition     Problem: Adjustment to Illness " (Stroke, Ischemic/Transient Ischemic Attack)  Goal: Optimal Coping  Outcome: Adequate for Care Transition     Problem: Bowel Elimination Impaired (Stroke, Ischemic/Transient Ischemic Attack)  Goal: Effective Bowel Elimination  Outcome: Adequate for Care Transition     Problem: Cerebral Tissue Perfusion (Stroke, Ischemic/Transient Ischemic Attack)  Goal: Optimal Cerebral Tissue Perfusion  Outcome: Adequate for Care Transition     Problem: Cognitive Impairment (Stroke, Ischemic/Transient Ischemic Attack)  Goal: Optimal Cognitive Function  Outcome: Adequate for Care Transition     Problem: Communication Impairment (Stroke, Ischemic/Transient Ischemic Attack)  Goal: Improved Communication Skills  Outcome: Adequate for Care Transition     Problem: Functional Ability Impaired (Stroke, Ischemic/Transient Ischemic Attack)  Goal: Optimal Functional Ability  Outcome: Adequate for Care Transition  Intervention: Optimize Functional Ability  Recent Flowsheet Documentation  Taken 4/8/2022 0800 by Awilda Talley RN  Activity Management: activity adjusted per tolerance     Problem: Respiratory Compromise (Stroke, Ischemic/Transient Ischemic Attack)  Goal: Effective Oxygenation and Ventilation  Outcome: Adequate for Care Transition     Problem: Sensorimotor Impairment (Stroke, Ischemic/Transient Ischemic Attack)  Goal: Improved Sensorimotor Function  Outcome: Adequate for Care Transition     Problem: Swallowing Impairment (Stroke, Ischemic/Transient Ischemic Attack)  Goal: Optimal Eating and Swallowing without Aspiration  Outcome: Adequate for Care Transition     Problem: Urinary Elimination Impaired (Stroke, Ischemic/Transient Ischemic Attack)  Goal: Effective Urinary Elimination  Outcome: Adequate for Care Transition     Problem: Hypertension Acute  Goal: Blood Pressure Within Desired Range  Outcome: Adequate for Care Transition  Intervention: Normalize Blood Pressure  Recent Flowsheet Documentation  Taken 4/8/2022 0800 by Mayank  DARY Kaiser  Medication Review/Management: medications reviewed   Goal Outcome Evaluation:

## 2022-04-08 NOTE — PROGRESS NOTES
Pt discharged to: home with family   Accompanied by:  and daughter   Belongings: With pt   Teaching: discharge and medication teaching   Clinic appointment: TBD   Local housing: yes

## 2022-04-08 NOTE — PLAN OF CARE
"BP (P) 107/64 (BP Location: Left arm)   Pulse (P) 87   Temp (P) 97.4  F (36.3  C) (Oral)   Resp (P) 16   Ht 1.676 m (5' 6\")   Wt 57.7 kg (127 lb 3.2 oz)   SpO2 (P) 98%   BMI 20.53 kg/m      2300 - 0730    VSS on room air. BP still softer during midnight check, pt asymptomatic. Afebrile. Denies n/v/d. Neuro's intact though patient can be forgetful. Slept most of the night. Plan to discharge today at noon, daughter will be coming around 10. SBA in room, continue w plan of care.     Problem: Plan of Care - These are the overarching goals to be used throughout the patient stay.    Goal: Plan of Care Review/Shift Note  Description: The Plan of Care Review/Shift note should be completed every shift.  The Outcome Evaluation is a brief statement about your assessment that the patient is improving, declining, or no change.  This information will be displayed automatically on your shift note.  Outcome: Ongoing, Progressing  Flowsheets (Taken 4/8/2022 0251)  Plan of Care Reviewed With: spouse  Overall Patient Progress: improving  Goal: Patient-Specific Goal (Individualized)  Description: You can add care plan individualizations to a care plan. Examples of Individualization might be:  \"Parent requests to be called daily at 9am for status\", \"I have a hard time hearing out of my right ear\", or \"Do not touch me to wake me up as it startles me\".  Outcome: Ongoing, Progressing  Goal: Absence of Hospital-Acquired Illness or Injury  Outcome: Ongoing, Progressing  Intervention: Identify and Manage Fall Risk  Recent Flowsheet Documentation  Taken 4/7/2022 2300 by Mikki Austin, RN  Safety Promotion/Fall Prevention:    activity supervised    nonskid shoes/slippers when out of bed    clutter free environment maintained    lighting adjusted    increase visualization of patient    fall prevention program maintained  Intervention: Prevent Skin Injury  Recent Flowsheet Documentation  Taken 4/7/2022 2300 by Mikki Ausitn, RN  Body " Position: position changed independently  Intervention: Prevent and Manage VTE (Venous Thromboembolism) Risk  Recent Flowsheet Documentation  Taken 4/7/2022 2300 by Mikki Austin RN  VTE Prevention/Management: SCDs (sequential compression devices) off  Activity Management: activity adjusted per tolerance  Intervention: Prevent Infection  Recent Flowsheet Documentation  Taken 4/7/2022 2300 by Mikki Austin, RN  Infection Prevention:    rest/sleep promoted    cohorting utilized    environmental surveillance performed    hand hygiene promoted  Goal: Optimal Comfort and Wellbeing  Outcome: Ongoing, Progressing  Goal: Readiness for Transition of Care  Outcome: Ongoing, Progressing     Problem: Adjustment to Illness (Stroke, Ischemic/Transient Ischemic Attack)  Goal: Optimal Coping  Outcome: Ongoing, Progressing     Problem: Bowel Elimination Impaired (Stroke, Ischemic/Transient Ischemic Attack)  Goal: Effective Bowel Elimination  Outcome: Ongoing, Progressing     Problem: Cerebral Tissue Perfusion (Stroke, Ischemic/Transient Ischemic Attack)  Goal: Optimal Cerebral Tissue Perfusion  Outcome: Ongoing, Progressing     Problem: Cognitive Impairment (Stroke, Ischemic/Transient Ischemic Attack)  Goal: Optimal Cognitive Function  Outcome: Ongoing, Progressing     Problem: Communication Impairment (Stroke, Ischemic/Transient Ischemic Attack)  Goal: Improved Communication Skills  Outcome: Ongoing, Progressing     Problem: Functional Ability Impaired (Stroke, Ischemic/Transient Ischemic Attack)  Goal: Optimal Functional Ability  Outcome: Ongoing, Progressing  Intervention: Optimize Functional Ability  Recent Flowsheet Documentation  Taken 4/7/2022 2300 by Mikki Austin, RN  Activity Management: activity adjusted per tolerance     Problem: Respiratory Compromise (Stroke, Ischemic/Transient Ischemic Attack)  Goal: Effective Oxygenation and Ventilation  Outcome: Ongoing, Progressing  Intervention: Optimize Oxygenation and  Ventilation  Recent Flowsheet Documentation  Taken 4/7/2022 2300 by Mikki Austin, RN  Head of Bed (HOB) Positioning: HOB lowered     Problem: Sensorimotor Impairment (Stroke, Ischemic/Transient Ischemic Attack)  Goal: Improved Sensorimotor Function  Outcome: Ongoing, Progressing  Intervention: Optimize Range of Motion, Motor Control and Function  Recent Flowsheet Documentation  Taken 4/7/2022 2300 by Mikki Austin, RN  Positioning/Transfer Devices:    pillows    in use     Problem: Swallowing Impairment (Stroke, Ischemic/Transient Ischemic Attack)  Goal: Optimal Eating and Swallowing without Aspiration  Outcome: Ongoing, Progressing     Problem: Urinary Elimination Impaired (Stroke, Ischemic/Transient Ischemic Attack)  Goal: Effective Urinary Elimination  Outcome: Ongoing, Progressing     Problem: Hypertension Acute  Goal: Blood Pressure Within Desired Range  Outcome: Ongoing, Progressing  Intervention: Normalize Blood Pressure  Recent Flowsheet Documentation  Taken 4/7/2022 2300 by Mikki Austin, RN  Medication Review/Management: medications reviewed   Goal Outcome Evaluation:    Plan of Care Reviewed With: spouse     Overall Patient Progress: improving

## 2022-04-08 NOTE — PROGRESS NOTES
Care Management Discharge Note    Discharge Date: 04/08/2022     Discharge Disposition: Home    Discharge Services: Home Care    Discharge DME: N/A    Discharge Transportation: Car, family     Private pay costs discussed: Not applicable    PAS Confirmation Code: N/A  Patient/family educated on Medicare website which has current facility and service quality ratings: Yes    Education Provided on the Discharge Plan: Yes  Persons Notified of Discharge Plans: Patient, bedside RN, SW, [facility]  Patient/Family in Agreement with the Plan: Yes    Handoff Referral Completed: Yes    Additional Information:    Per team, patient is stable to discharge home today.     [facility] ([facility]] has accepted patient for RN, PT and OT services.     Patient's daughter, spouse, and granddaughter will transport patient home today.     Writer updated patient's spouse and daughter on home care agency. IMM completed and copy scanned to HIM. Family had no concerns surrounding discharge today.    Writer faxed discharge orders ([phone]) to [facility]3].    Bhavna Almaraz, RN, BSN, PHN  7D RN Care Coordinator   Phn: [phone]  Fax: [phone]

## 2022-04-09 NOTE — PLAN OF CARE
Physical Therapy Discharge Summary    Reason for therapy discharge:    Discharged to home with home therapy.    Progress towards therapy goal(s). See goals on Care Plan in [facility] electronic health record for goal details.  Goals partially met.  Barriers to achieving goals:   discharge from facility.    Therapy recommendation(s):    Continued therapy is recommended.  Rationale/Recommendations:  patient will benefit from continued skilled PT intervention in the home environment to address mobility deficits, improve strength & activity tolerance.

## 2022-04-11 ENCOUNTER — TELEPHONE (OUTPATIENT)
Dept: FAMILY MEDICINE | Facility: OTHER | Age: 72
End: 2022-04-11
Payer: COMMERCIAL

## 2022-04-11 ENCOUNTER — MEDICAL CORRESPONDENCE (OUTPATIENT)
Dept: HEALTH INFORMATION MANAGEMENT | Facility: CLINIC | Age: 72
End: 2022-04-11
Payer: COMMERCIAL

## 2022-04-11 DIAGNOSIS — E11.51 TYPE 2 DIABETES MELLITUS WITH DIABETIC PERIPHERAL ANGIOPATHY WITHOUT GANGRENE, WITHOUT LONG-TERM CURRENT USE OF INSULIN (H): ICD-10-CM

## 2022-04-11 NOTE — TELEPHONE ENCOUNTER
The form is signed and in the MA task box (next to the printer) for completion and scan of the document into the medical record.  Electronically signed:    Abdirahman Shrestha PA-C

## 2022-04-11 NOTE — TELEPHONE ENCOUNTER
Reason for Call:  Form, our goal is to have forms completed with 72 hours, however, some forms may require a visit or additional information.    Type of letter, form or note:  medical    Who is the form from?: Home care    Where did the form come from: form was faxed in    What clinic location was the form placed at?: [facility] [phone]    Where the form was placed: Team D Box/Folder    What number is listed as a contact on the form?: [phone]       Additional comments: Fax: [phone]    Call taken on 4/11/2022 at 10:47 AM by Valery Hinton

## 2022-04-12 ENCOUNTER — PATIENT OUTREACH (OUTPATIENT)
Dept: NURSING | Facility: CLINIC | Age: 72
End: 2022-04-12
Payer: COMMERCIAL

## 2022-04-12 NOTE — TELEPHONE ENCOUNTER
Prescription approved per [facility] Refill Protocol.  Jemma Whitney RN on 4/12/2022 at 3:21 PM

## 2022-04-12 NOTE — PROGRESS NOTES
Clinic Care Coordination Contact  Community Health Worker Initial Outreach    CHW Initial Information Gathering:  Referral Source: Care Team  Preferred Hospital: [facility] [place]  [phone]  Current living arrangement:: I live in a private home with spouse  Community Resources: Home Care  Equipment Currently Used at Home: cane, straight, shower chair, walker, rolling, wheelchair, manual, grab bar, tub/shower, grab bar, toilet  Informal Support system:: Family  No PCP office visit in Past Year: No (3/10/22 with Dr. Shrestha)  CHW Additional Questions  If ED/Hospital discharge, follow-up appointment scheduled as recommended?: No  Patient agreeable to assistance with scheduling?: No  Patient declined (specify): Patients daughter will call and schedule later today  Medication changes made following ED/Hospital discharge?: Yes, patient to be scheduled with CC RN/SW within approx 1 business day  MyChart active?: No    Patient accepts CC: No, Enrolled in home care. Patient will be sent Care Coordination introduction letter for future reference.     [facility]: Post-Discharge Note  SITUATION                                                      Admission:    Admission Date: 03/19/22   Reason for Admission: New onset confusion, slurred speech, and left facial droop  Discharge:   Discharge Date: 04/08/22  Discharge Diagnosis: Acute CVA of R Splenum of Corpus Callosum, POA  SIRS, resolved  Hx of seizure disorder  Dementia  DMII    BACKGROUND                                                      Per hospital discharge summary and inpatient provider notes:  #Acute CVA of R splenium of corpus callosum   #Prior CVA with residual left sided hemiparesis   # Post-CVA rehabilitation  # Dementia, neurocognitive impairment, severe  New onset confusion, slurred speech, and left facial droop around 09:30 on 3/18/22. CVA 11/2021 with residual left sided weakness. MRI at OSH: small acute to subacute  infarct of the right aspect of the splenium of the corpus callosum. Neurology consulted, recommended no thrombolytics due to rapid improvement of her symptoms, and transfer to [facility] for further evaluation. TTE unremarkable, EF 55-60%. Stroke symptoms resolved, suspect that symptom recrudescence at OSH was due to hypotension and decreased cerebral perfusion in the setting of polypharmacy. Plan for [facility] on discharge, follow up with neurology after discharge. Therapy recs for TCU given likely need for 24/7 monitoring/care given recurrent falls and concern for safety at home particularly given husbands upcoming shoulder surgery. Unable to place for TCU, ongoing discussions with family who felt further expansion of TCU search on larger geographical radius was not desired, but preferred to have patient return home in their care. Discharge to home with family with home cares.     # Seizure disorder   She has a known seizure disorder. She was transferred to [place] for cEEG monitoring, however this is not able to be started overnight following discharge, thus she was loaded with 1,000mg of Keppra prior to transfer, and ultimately no need for EEG per Neuro. Continue phenytoin 100 mg BID and Keppra increased to 1000 mg BID per neuro. Follow up with neuro as outpatient.     #SIRS criteria (fever 100.8F, leukocytosis, tachycardia, lactic acidosis)  #Transient hypotension  #Somnolence   She had an episode of hypotension with SBP 60's prior to transfer to [place]. Patient had received labetolol (30mg), hydralazine (10mg), fentanyl (55mcg), lorazepam (1mg), gabapentin (300mg), levetiracetam (1500mg), phenytoin (100mg) in the evening prior to the episode of hypotension and somnolence. BP normalized w/ fluid resuscitation. CT C/A/P without any concerning signs for infection. SIRS criteria likely met due to iatrogenic hypotension. Her somnolence is also likely related to polypharmacy.  No identifiable source of infection  "on physical exam, UA, or imaging. Remained stable during rest of admission without further concerns.     # Low UOP  Noted by nursing 04/03. Started tamsulosin, with notable improvement in UOP. Pt denied abd discomfort or pain. Will continue on discharge, follow up with PCP for duration.     =====================  Chronic / Stable Conditions  =====================   # HLD  - Continued PTA Atorvastatin   # HTN - cautious resumption of PTA antihypertensives in the setting of medication-induced hypotension, well tolerated, will discharge on stable regimen  # Elevated troponin, suspect type II MI; resolved   Troponin peaked at 640 without ACS symptoms which is peaked. This likely represents a type II MI - demand ischemia in the setting of transient hypotension.      # T2DM - well-controlled with diet  Last A1c 5.5%.         Physical Exam on day of Discharge:  Blood pressure (!) 133/92, pulse 79, temperature 97.3  F (36.3  C), temperature source Oral, resp. rate 16, height 1.676 m (5' 6\"), weight 58.2 kg (128 lb 4.8 oz), SpO2 100 %.  General Appearance:  Alert, comfortable in bed, NAD, luggage packed at bedside  Respiratory: CTAB, no tachypnea   Cardiovascular: RRR, no murmur   GI: BS+, soft, nontender   Skin: no rashes or lesions   Other:  4/5 strength in  LUE and LLE, 5/5 strength in RLE and RUE     ASSESSMENT           Discharge Assessment  How are you doing now that you are home?: Doing really good.  How are your symptoms? (Red Flag symptoms escalate to triage hotline per guidelines): Improved  Do you feel your condition is stable enough to be safe at home until your provider visit?: Yes  Does the patient have their discharge instructions? : Yes  Does the patient have questions regarding their discharge instructions? : No  Were you started on any new medications or were there changes to any of your previous medications? : Yes (2 new medications)  Does the patient have all of their medications?: Yes  Do you have " questions regarding any of your medications? : No  Do you have all of your needed medical supplies or equipment (DME)?  (i.e. oxygen tank, CPAP, cane, etc.): Yes  Discharge follow-up appointment scheduled within 14 calendar days? : No  Is patient agreeable to assistance with scheduling? : No (Daughter will call to schedule)              Care Management   Community Health Worker Initial Outreach    CHW Initial Information Gathering:  Referral Source: Care Team  Preferred Hospital: [facility]  [phone]  Current living arrangement:: I live in a private home with spouse  Community Resources: Home Care  Equipment Currently Used at Home: cane, straight, shower chair, walker, rolling, wheelchair, manual, grab bar, tub/shower, grab bar, toilet  Informal Support system:: Family  No PCP office visit in Past Year: No (3/10/22 with Dr. Shrestha)  CHW Additional Questions  If ED/Hospital discharge, follow-up appointment scheduled as recommended?: No  Patient agreeable to assistance with scheduling?: No  Patient declined (specify): Patients daughter will call and schedule later today  Medication changes made following ED/Hospital discharge?: Yes, patient to be scheduled with CC RN/SW within approx 1 business day  MyChart active?: No    Patient accepts CC: No, Enrolled in home care. Patient will be sent Care Coordination introduction letter for future reference.       PLAN                                                      Outpatient Plan:    Adult [facility] Follow-up and recommended labs and tests   Order Comments: Follow up with primary care provider, Abdirahman Shrestha, within 1-2 weeks, for hospital follow- up and how home care with family is progressing. No follow up labs or test are needed.      Appointments on [place] and/or [facility] (with [facility] or [facility] provider or service). Call [phone] if you haven't heard regarding these appointments within 7 days of discharge.       Future  Appointments   Date Time Provider Department Center   7/20/2022 11:00 AM Antonio Machado MD [facility] Owned         For any urgent concerns, please contact our 24 hour nurse triage line: [phone] ([phone])         Gina Casas

## 2022-04-12 NOTE — LETTER
[facility] CARE COORDINATION  [address] [facility] [address]    April 12, 2022    Khalida Coffeyenfer  [address]   [place] [address]      Dear Khalida,    I am a clinic community health worker who works with Abdirahman Shrestha PA-C at [facility]. I wanted to thank you for spending the time to talk with me.  Below is a description of clinic care coordination and how I can further assist you.      The clinic care coordination team is made up of a registered nurse,  and community health worker who understand the health care system. The goal of clinic care coordination is to help you manage your health and improve access to the health care system in the most efficient manner. The team can assist you in meeting your health care goals by providing education, coordinating services, strengthening the communication among your providers and supporting you with any resource needs.    Please feel free to contact the Community Health Worker [place] at [phone] with any questions or concerns. We are focused on providing you with the highest-quality healthcare experience possible and that all starts with you.     Sincerely,     Gina Casas  Community Health Worker  [facility]  Clinic Care Coordination

## 2022-04-13 ENCOUNTER — TELEPHONE (OUTPATIENT)
Dept: FAMILY MEDICINE | Facility: OTHER | Age: 72
End: 2022-04-13
Payer: COMMERCIAL

## 2022-04-13 NOTE — TELEPHONE ENCOUNTER
Reason for Call:  Form, our goal is to have forms completed with 72 hours, however, some forms may require a visit or additional information.    Type of letter, form or note:  medical    Who is the form from?: Home care    Where did the form come from: form was faxed in    What clinic location was the form placed at?: [facility] [phone]    Where the form was placed: Team D Box/Folder    What number is listed as a contact on the form?: [phone]       Additional comments: Please complete form and return to [phone]    Call taken on 4/13/2022 at 7:58 AM by Mona Mcnair

## 2022-04-14 ENCOUNTER — MEDICAL CORRESPONDENCE (OUTPATIENT)
Dept: HEALTH INFORMATION MANAGEMENT | Facility: CLINIC | Age: 72
End: 2022-04-14
Payer: COMMERCIAL

## 2022-04-14 ENCOUNTER — TELEPHONE (OUTPATIENT)
Dept: FAMILY MEDICINE | Facility: OTHER | Age: 72
End: 2022-04-14
Payer: COMMERCIAL

## 2022-04-14 NOTE — TELEPHONE ENCOUNTER
Reason for Call:  Form, our goal is to have forms completed with 72 hours, however, some forms may require a visit or additional information.    Type of letter, form or note:  medical    Who is the form from?: Home care    Where did the form come from: form was faxed in    What clinic location was the form placed at?: [facility] [phone]    Where the form was placed: Team D Box/Folder    What number is listed as a contact on the form?: [phone]       Additional comments: Please complete form and return to [phone]    Call taken on 4/14/2022 at 10:22 AM by Mona Mcnair

## 2022-04-15 ENCOUNTER — TELEPHONE (OUTPATIENT)
Dept: FAMILY MEDICINE | Facility: OTHER | Age: 72
End: 2022-04-15
Payer: COMMERCIAL

## 2022-04-15 ENCOUNTER — MEDICAL CORRESPONDENCE (OUTPATIENT)
Dept: HEALTH INFORMATION MANAGEMENT | Facility: CLINIC | Age: 72
End: 2022-04-15
Payer: COMMERCIAL

## 2022-04-15 NOTE — TELEPHONE ENCOUNTER
Reason for Call:  Form, our goal is to have forms completed with 72 hours, however, some forms may require a visit or additional information.    Type of letter, form or note:  medical    Who is the form from?: [facility] needs signature (if other please explain)    Where did the form come from: form was faxed in    What clinic location was the form placed at?: [facility] [phone]    Where the form was placed: Team D Box/Folder    What number is listed as a contact on the form?: [phone]       Additional comments: Please sign and fax to [phone]    Call taken on 4/15/2022 at 7:02 AM by Jasmyn Honeycutt

## 2022-04-15 NOTE — TELEPHONE ENCOUNTER
Another form from [facility] needing the provider's signature has come in. I have placed the form in Box D. Once signed, please fax to [phone].

## 2022-04-15 NOTE — TELEPHONE ENCOUNTER
Form has been placed in provider bin with previously mentioned form for completion.    Liss Medved

## 2022-04-18 ENCOUNTER — MEDICAL CORRESPONDENCE (OUTPATIENT)
Dept: HEALTH INFORMATION MANAGEMENT | Facility: CLINIC | Age: 72
End: 2022-04-18
Payer: COMMERCIAL

## 2022-04-21 NOTE — PROGRESS NOTES
Assessment & Plan     Urinary frequency  Increased urinary frequency since she has left the hospital.  She continues to take medications as noted without much for success.  We did discuss urology consult for possible change in medication.  We did discuss increasing the dose of her tamsulosin but in the end settled on having her see the urologist for further evaluation and treatment options.  Follow-up with me as needed.  - Adult Urology Referral; Future    Seizure disorder (H)  Refilled medication that has been adjusted by neurology during hospitalization.  - levETIRAcetam (KEPPRA) 1000 MG tablet; Take 1 tablet (1,000 mg) by mouth 2 times daily    Age-related osteoporosis without current pathological fracture  Refilled medication.  Follow-up as needed.  - alendronate (FOSAMAX) 70 MG tablet; Take 1 tablet (70 mg) by mouth every 7 days Tuesdays    Reviewed discharge summary from care everywhere.       No follow-ups on file.    Abdirahman Shrestha PA-C  [facility] RAMIREZ Gaxiola is a 71 year old who presents for the following health issues  accompanied by her spouse and daughter.    HPI     Post Discharge Outreach 4/12/2022   Admission Date 3/19/2022   Reason for Admission New onset confusion, slurred speech, and left facial droop   Discharge Date 4/8/2022   Discharge Diagnosis Acute CVA of R Splenum of Corpus Callosum, POA  SIRS, resolved  Hx of seizure disorder  Dementia  DMII   How are you doing now that you are home? Doing really good.   How are your symptoms? (Red Flag symptoms escalate to triage hotline per guidelines) Improved   Do you feel your condition is stable enough to be safe at home until your provider visit? Yes   Does the patient have their discharge instructions?  Yes   Does the patient have questions regarding their discharge instructions?  No   Were you started on any new medications or were there changes to any of your previous medications?  Yes   Does the patient have  all of their medications? Yes   Do you have questions regarding any of your medications?  No   Do you have all of your needed medical supplies or equipment (DME)?  (i.e. oxygen tank, CPAP, cane, etc.) Yes   Discharge follow-up appointment scheduled within 14 calendar days?  No     Hospital Follow-up Visit:    Hospital/Nursing Home/IP Rehab Facility: [facility]  Date of Admission: 03/19/2022  Date of Discharge: 04/08/2022  Reason(s) for Admission:History of multiple cerebrovascular accidents (CVAs)      Was your hospitalization related to COVID-19? No   Problems taking medications regularly:  None  Medication changes since discharge: took off quinapril 5 mg, leuetiracetam change in dose  Problems adhering to non-medication therapy:  None    Summary of hospitalization:  CareEverywhere information obtained and reviewed  Diagnostic Tests/Treatments reviewed.  Follow up needed: Continued follow-up with home cares as well as urology  Other Healthcare Providers Involved in Patient s Care:         None  Update since discharge: improved. Post Discharge Medication Reconciliation: discharge medications reconciled, continue medications without change.  Plan of care communicated with patient            Review of Systems   Constitutional, HEENT, cardiovascular, pulmonary, GI, , musculoskeletal, neuro, skin, endocrine and psych systems are negative, except as otherwise noted.      Objective    /62   Pulse 78   Temp 97.6  F (36.4  C) (Temporal)   Resp 20   Wt 60.5 kg (133 lb 6.4 oz)   SpO2 100%   BMI 21.53 kg/m    Body mass index is 21.53 kg/m .  Physical Exam   GENERAL: healthy, alert and no distress  NECK: no adenopathy, no asymmetry, masses, or scars and thyroid normal to palpation  RESP: lungs clear to auscultation - no rales, rhonchi or wheezes  CV: regular rate and rhythm, normal S1 S2, no S3 or S4, no murmur, click or rub, no peripheral edema and peripheral pulses strong  ABDOMEN: soft,  nontender, no hepatosplenomegaly, no masses and bowel sounds normal  MS: no gross musculoskeletal defects noted, no edema    No results found for this or any previous visit (from the past 24 hour(s)).

## 2022-04-22 ENCOUNTER — TELEPHONE (OUTPATIENT)
Dept: UROLOGY | Facility: CLINIC | Age: 72
End: 2022-04-22

## 2022-04-22 ENCOUNTER — TELEPHONE (OUTPATIENT)
Dept: FAMILY MEDICINE | Facility: OTHER | Age: 72
End: 2022-04-22

## 2022-04-22 ENCOUNTER — OFFICE VISIT (OUTPATIENT)
Dept: FAMILY MEDICINE | Facility: OTHER | Age: 72
End: 2022-04-22
Payer: COMMERCIAL

## 2022-04-22 VITALS
TEMPERATURE: 97.6 F | WEIGHT: 133.4 LBS | OXYGEN SATURATION: 100 % | DIASTOLIC BLOOD PRESSURE: 62 MMHG | HEART RATE: 78 BPM | SYSTOLIC BLOOD PRESSURE: 100 MMHG | RESPIRATION RATE: 20 BRPM | BODY MASS INDEX: 21.53 KG/M2

## 2022-04-22 DIAGNOSIS — R35.0 URINARY FREQUENCY: Primary | ICD-10-CM

## 2022-04-22 DIAGNOSIS — I63.422 CEREBROVASCULAR ACCIDENT (CVA) DUE TO EMBOLISM OF LEFT ANTERIOR CEREBRAL ARTERY (H): Primary | ICD-10-CM

## 2022-04-22 DIAGNOSIS — M81.0 AGE-RELATED OSTEOPOROSIS WITHOUT CURRENT PATHOLOGICAL FRACTURE: ICD-10-CM

## 2022-04-22 DIAGNOSIS — G40.909 SEIZURE DISORDER (H): ICD-10-CM

## 2022-04-22 DIAGNOSIS — E11.52 TYPE 2 DIABETES MELLITUS WITH DIABETIC PERIPHERAL ANGIOPATHY AND GANGRENE, WITHOUT LONG-TERM CURRENT USE OF INSULIN (H): ICD-10-CM

## 2022-04-22 PROCEDURE — 99495 TRANSJ CARE MGMT MOD F2F 14D: CPT | Performed by: PHYSICIAN ASSISTANT

## 2022-04-22 RX ORDER — LEVETIRACETAM 1000 MG/1
1000 TABLET ORAL 2 TIMES DAILY
Qty: 180 TABLET | Refills: 3 | Status: SHIPPED | OUTPATIENT
Start: 2022-04-22

## 2022-04-22 RX ORDER — ALENDRONATE SODIUM 70 MG/1
70 TABLET ORAL
Qty: 13 TABLET | Refills: 3 | Status: SHIPPED | OUTPATIENT
Start: 2022-04-22

## 2022-04-22 ASSESSMENT — PAIN SCALES - GENERAL: PAINLEVEL: NO PAIN (0)

## 2022-04-22 NOTE — TELEPHONE ENCOUNTER
Patients daughter calling.   Patient was seen today in clinic. States it was discussed with provider that patient has not been getting correct test strips for checking blood sugars.     Daughter states the brand patient needs is the Accucheck Violette test strips.     Pharmacy - [facility]    Routing to provider.     Yolanda ALLANN, RN

## 2022-04-22 NOTE — TELEPHONE ENCOUNTER
[facility] Call [facility]    Phone Message    May a detailed message be left on voicemail: yes     Reason for Call: Appointment Intake    Referring Provider Name: Abdirahman Munoz PA-C  Diagnosis and/or Symptoms: urinary frequency    This referral came through as priority 1-2 weeks, first available at this time is 05/17 at this time. Please follow up with pt    Action Taken: Message routed to:  Other: [facility]    Travel Screening: Not Applicable

## 2022-04-25 DIAGNOSIS — I63.422 CEREBROVASCULAR ACCIDENT (CVA) DUE TO EMBOLISM OF LEFT ANTERIOR CEREBRAL ARTERY (H): ICD-10-CM

## 2022-04-26 RX ORDER — ATORVASTATIN CALCIUM 40 MG/1
TABLET, FILM COATED ORAL
Qty: 30 TABLET | Refills: 0 | Status: SHIPPED | OUTPATIENT
Start: 2022-04-26 | End: 2022-01-01

## 2022-04-26 NOTE — TELEPHONE ENCOUNTER
Patient scheduled with Dr. Austin.  Informed spouse via telephone.  Sherin Conrad RN on 4/26/2022 at 9:27 AM

## 2022-04-27 ENCOUNTER — TELEPHONE (OUTPATIENT)
Dept: FAMILY MEDICINE | Facility: OTHER | Age: 72
End: 2022-04-27
Payer: COMMERCIAL

## 2022-04-27 DIAGNOSIS — E11.51 TYPE 2 DIABETES MELLITUS WITH DIABETIC PERIPHERAL ANGIOPATHY WITHOUT GANGRENE, WITHOUT LONG-TERM CURRENT USE OF INSULIN (H): ICD-10-CM

## 2022-04-27 NOTE — TELEPHONE ENCOUNTER
"  Glucose Blood in vitro strip    Use to test blood sugar 1 times daily or as directed.  Take out \"or\"  In sig for insurance compliance   "

## 2022-04-27 NOTE — TELEPHONE ENCOUNTER
Sig change requested.     Jemma Whitney, BSN, RN, PHN  Registered Nurse-Clinic Triage  [facility]  4/27/2022 at 3:54 PM

## 2022-05-11 ENCOUNTER — OFFICE VISIT (OUTPATIENT)
Dept: UROLOGY | Facility: CLINIC | Age: 72
End: 2022-05-11
Payer: COMMERCIAL

## 2022-05-11 DIAGNOSIS — R35.0 URINARY FREQUENCY: Primary | ICD-10-CM

## 2022-05-11 DIAGNOSIS — R33.9 INCOMPLETE BLADDER EMPTYING: ICD-10-CM

## 2022-05-11 PROCEDURE — 99203 OFFICE O/P NEW LOW 30 MIN: CPT | Mod: 25 | Performed by: UROLOGY

## 2022-05-11 PROCEDURE — 51798 US URINE CAPACITY MEASURE: CPT | Performed by: UROLOGY

## 2022-05-11 NOTE — PROGRESS NOTES
S: Patient is a pleasant 71-year-old  female who was requested to be seen by Kahlil Cervantes for a consultation with regard to patient's urinary frequency.  Patient complains of urine frequency and nocturia.  She voids many times at night and every 2 hours during the daytime.  These problems have been going for a number of years.  Symptoms however are not bothersome to her.  She is currently on tamsulosin for it.  She has history of CVA and seizure in the past.  Current Outpatient Medications   Medication Sig Dispense Refill     acetaminophen (TYLENOL) 500 MG tablet Take 1,000 mg by mouth every 6 hours as needed for mild pain       alcohol swab prep pads Use to swab area of injection/carlos manuel as directed. 100 each 3     alendronate (FOSAMAX) 70 MG tablet Take 1 tablet (70 mg) by mouth every 7 days Tuesdays 13 tablet 3     aspirin (ASA) 81 MG chewable tablet Take 1 tablet (81 mg) by mouth daily Take 1 tablet daily through Feb 28. 100 tablet 3     atorvastatin (LIPITOR) 40 MG tablet Take 1 tablet by mouth once daily 30 tablet 0     blood glucose (NO BRAND SPECIFIED) test strip Use to test blood sugar once daily. To accompany: Blood Glucose Monitor Brands: per insurance. 100 strip 3     blood glucose (NO BRAND SPECIFIED) test strip Use to test blood sugar 1 times daily or as directed. 100 strip 3     blood glucose monitoring (NO BRAND SPECIFIED) meter device kit Use to test blood sugar once daily. Preferred blood glucose meter OR supplies to accompany: Blood Glucose Monitor Brands: per insurance. 1 kit 0     clopidogrel (PLAVIX) 75 MG tablet Take 1 tablet (75 mg) by mouth daily 30 tablet 0     Cyanocobalamin (VITAMIN B 12 PO) Take 1,000 mcg by mouth daily       gabapentin (NEURONTIN) 300 MG capsule TAKE 1 CAPSULE BY MOUTH AT  BEDTIME 90 capsule 3     glipiZIDE (GLUCOTROL XL) 2.5 MG 24 hr tablet Take 1 tablet (2.5 mg) by mouth daily 90 tablet 0     levETIRAcetam (KEPPRA) 1000 MG tablet Take 1 tablet (1,000 mg) by mouth  2 times daily 180 tablet 3     lisinopril (ZESTRIL) 20 MG tablet Take 1 tablet (20 mg) by mouth daily 30 tablet 0     meloxicam (MOBIC) 7.5 MG tablet Take 1 tablet (7.5 mg) by mouth daily 90 tablet 0     memantine (NAMENDA) 10 MG tablet Take 1 tablet (10 mg) by mouth daily 90 tablet 0     metFORMIN (GLUCOPHAGE-XR) 500 MG 24 hr tablet Take 1 tablet (500 mg) by mouth 2 times daily (with meals) 180 tablet 1     omeprazole (PRILOSEC) 40 MG DR capsule Take 1 capsule (40 mg) by mouth At Bedtime 90 capsule 1     order for DME Equipment being ordered: glucometer and related supplies. 1 Units 0     phenytoin (DILANTIN) 50 MG chewable tablet CHEW AND SWALLOW 2 TABLETS BY MOUTH TWICE DAILY 60 tablet 0     polyethylene glycol (MIRALAX) 17 GM/Dose powder Take 17 g (1 capful) by mouth 2 times daily (Patient taking differently: Take 1 capful by mouth daily as needed for constipation) 1000 g 1     tamsulosin (FLOMAX) 0.4 MG capsule Take 1 capsule (0.4 mg) by mouth every evening 30 capsule 0     thin (NO BRAND SPECIFIED) lancets Use with lanceting device. To accompany: Blood Glucose Monitor Brands: per insurance. 100 each 6     Allergies   Allergen Reactions     Amoxicillin Hives and Rash     Pt reports she has taken PCN without problems     Ampicillin Rash     Past Medical History:   Diagnosis Date     Atrial tachycardia (H)     nonsustained, detected on Ziopatch     Convulsions, unspecified convulsion type (H) 9/20/2018     CVA (cerebral vascular accident) (H)      Dementia (H) 9/20/2018     Diabetes (H)      Falls      History of CVA (cerebrovascular accident) 9/20/2018     Hyperlipidemia LDL goal <100 9/20/2018     Hypertension goal BP (blood pressure) < 140/90 9/20/2018     Osteoarthritis 9/20/2018     Pain in both lower legs 9/20/2018     Seizures (H)      Smoking      Syncope      Tobacco abuse disorder 9/20/2018     Tubular adenoma of colon 11/29/2018     Type 2 diabetes mellitus with circulatory disorder, without long-term  current use of insulin (H) 2018     Past Surgical History:   Procedure Laterality Date     COLONOSCOPY N/A 2018    Procedure: Colonoscopy, Polypectomy by Biopsy;  Surgeon: Arcadio Mcmullen DO;  Location:  GI     COLONOSCOPY N/A 2020    Procedure: Colonoscopy with possible biopsy and/or polypectomy;  Surgeon: Fabián Hines MD;  Location:  GI     HIP SURGERY Left       No family history on file.  Social History     Socioeconomic History     Marital status:    Tobacco Use     Smoking status: Former Smoker     Packs/day: 1.00     Types: Cigarettes     Quit date: 2021     Years since quittin.4     Smokeless tobacco: Never Used     Tobacco comment: QUIT   Vaping Use     Vaping Use: Never used   Substance and Sexual Activity     Alcohol use: No     Drug use: No     Sexual activity: Yes     Partners: Male       REVIEW OF SYSTEMS  =================  C: NEGATIVE for fever, chills, change in weight  I: NEGATIVE for worrisome rashes, moles or lesions  E/M: NEGATIVE for ear, mouth and throat problems  R: NEGATIVE for significant cough or SHORTNESS OF BREATH  CV:  NEGATIVE for chest pain, palpitations or peripheral edema  GI: NEGATIVE for nausea, abdominal pain, heartburn, or change in bowel habits  NEURO: NEGATIVE numbness/weakness  : see HPI  PSYCH: NEGATIVE depression/anxiety  LYmph: no new enlarged lymph nodes  Ortho: no new trauma/movements      Physical Exam:    Patient is pleasant, in no acute distress, good general condition.  Heart:  negative, PMI normal  Lung: no evidence of respiratory distress    Abdomen: Soft, nondistended, non tender. No masses. No rebound or guarding.   Exam: Normal female.  Bladder scan with residual urine of 250 mL.  Skin: Warm and dry.  No redness.  Neuro: grossly normal  Musculaskeletal: moving all extremities  Psych normal mood and affect  Musculoskeletal  moving all extremities  Hematologic/Lymphatic/Immunologic: normal ant/post  cervical, axillary, supraclavicular and inguinal nodes    Assessment/Plan:       71-year-old  female with urinary frequency and nocturia.  She has incomplete bladder emptying.  Etiologies are multifactorial.  Discussed treatment options.  She is not interested in any treatments at all since symptoms are not bothersome to her.  Okay to stop tamsulosin since it will not help with the symptoms.  Discussed neuromodulation as an option.

## 2022-05-20 DIAGNOSIS — I63.422 CEREBROVASCULAR ACCIDENT (CVA) DUE TO EMBOLISM OF LEFT ANTERIOR CEREBRAL ARTERY (H): ICD-10-CM

## 2022-05-21 RX ORDER — LISINOPRIL 20 MG/1
TABLET ORAL
Qty: 30 TABLET | Refills: 0 | Status: SHIPPED | OUTPATIENT
Start: 2022-05-21 | End: 2022-01-01

## 2022-05-22 NOTE — DISCHARGE INSTRUCTIONS
1.  Keep a log of your blood pressure and check this daily.  If it is still running high, please contact your doctor to discuss adjustments your blood pressure medications.

## 2022-05-22 NOTE — ED PROVIDER NOTES
History     Chief Complaint   Patient presents with     Hypertension     HPI  Khalida Redding is a 71 year old female who brought in by her daughter with concerns of an elevated blood pressure reading at home and some cold-like symptoms the last few days.  Daughter was concerned because patient's had a strong history of strokes recently and checked it at home and had a reading of 180/90.  They rechecked it and it was still high so they called the on-call doctor and they told her to immediately go to the emergency department.  Patient has been dealing with some cold-like symptoms last few days including a sore throat runny nose and stuffy nose.  No significant cough no shortness of breath.  Patient has been urinating well.  Denies any chest pain or back pain or belly pain.  Denies any nausea or vomiting.    Allergies:  Allergies   Allergen Reactions     Amoxicillin Hives and Rash     Pt reports she has taken PCN without problems     Ampicillin Rash       Problem List:    Patient Active Problem List    Diagnosis Date Noted     Difficulty urinating 04/08/2022     Priority: Medium     SIRS (systemic inflammatory response syndrome) - fever, leukocytosis, sinus tachycardia, elevated lactic acid 03/19/2022     Priority: Medium     Acute CVA (cerebrovascular accident) (H) 03/19/2022     Priority: Medium     Transient hypotension 03/19/2022     Priority: Medium     Somnolence 03/19/2022     Priority: Medium     History of paroxysmal supraventricular tachycardia 03/18/2022     Priority: Medium     Cerebrovascular accident (CVA) due to embolism of left anterior cerebral artery (H) 12/09/2021     Priority: Medium     Seizure disorder (H) 11/26/2021     Priority: Medium     History of colonic polyps 07/13/2020     Priority: Medium     Abdominal pain, generalized 06/16/2020     Priority: Medium     Slow transit constipation 06/16/2020     Priority: Medium     Chronic upset stomach 06/16/2020     Priority: Medium     Left  hemiparesis (H) 01/13/2020     Priority: Medium     Age-related osteoporosis without current pathological fracture 11/25/2019     Priority: Medium     Lumbar radiculopathy 11/18/2019     Priority: Medium     Closed displaced fracture of left clavicle, unspecified part of clavicle, initial encounter 11/18/2019     Priority: Medium     Injury of left clavicle, initial encounter 11/18/2019     Priority: Medium     Syncope, unspecified syncope type 12/17/2018     Priority: Medium     Added automatically from request for surgery [id number]       Tubular adenoma of colon 11/29/2018     Priority: Medium     Supraventricular tachycardia (H) 10/22/2018     Priority: Medium     Gait instability 10/22/2018     Priority: Medium     Type 2 diabetes mellitus with circulatory disorder, without long-term current use of insulin (H) 09/20/2018     Priority: Medium     Hyperlipidemia LDL goal <100 09/20/2018     Priority: Medium     Hypertension goal BP (blood pressure) < 140/90 09/20/2018     Priority: Medium     History of multiple cerebrovascular accidents (CVAs) 09/20/2018     Priority: Medium     Convulsions, unspecified convulsion type (H) 09/20/2018     Priority: Medium     Osteoarthritis 09/20/2018     Priority: Medium     Tobacco abuse disorder 09/20/2018     Priority: Medium     Pain in both lower legs 09/20/2018     Priority: Medium     Dementia (H) 09/20/2018     Priority: Medium        Past Medical History:    Past Medical History:   Diagnosis Date     Atrial tachycardia (H)      Convulsions, unspecified convulsion type (H) 9/20/2018     CVA (cerebral vascular accident) (H)      Dementia (H) 9/20/2018     Diabetes (H)      Falls      History of CVA (cerebrovascular accident) 9/20/2018     Hyperlipidemia LDL goal <100 9/20/2018     Hypertension goal BP (blood pressure) < 140/90 9/20/2018     Osteoarthritis 9/20/2018     Pain in both lower legs 9/20/2018     Seizures (H)      Smoking      Syncope      Tobacco abuse disorder  2018     Tubular adenoma of colon 2018     Type 2 diabetes mellitus with circulatory disorder, without long-term current use of insulin (H) 2018       Past Surgical History:    Past Surgical History:   Procedure Laterality Date     COLONOSCOPY N/A 2018    Procedure: Colonoscopy, Polypectomy by Biopsy;  Surgeon: Arcadio Mcmullen DO;  Location:  GI     COLONOSCOPY N/A 2020    Procedure: Colonoscopy with possible biopsy and/or polypectomy;  Surgeon: Fabián Hines MD;  Location:  GI     HIP SURGERY Left        Family History:    History reviewed. No pertinent family history.    Social History:  Marital Status:   [2]  Social History     Tobacco Use     Smoking status: Former Smoker     Packs/day: 1.00     Types: Cigarettes     Quit date: 2021     Years since quittin.4     Smokeless tobacco: Never Used     Tobacco comment: QUIT   Vaping Use     Vaping Use: Never used   Substance Use Topics     Alcohol use: No     Drug use: No        Medications:    acetaminophen (TYLENOL) 500 MG tablet  alcohol swab prep pads  alendronate (FOSAMAX) 70 MG tablet  aspirin (ASA) 81 MG chewable tablet  atorvastatin (LIPITOR) 40 MG tablet  blood glucose (NO BRAND SPECIFIED) test strip  blood glucose (NO BRAND SPECIFIED) test strip  blood glucose monitoring (NO BRAND SPECIFIED) meter device kit  clopidogrel (PLAVIX) 75 MG tablet  Cyanocobalamin (VITAMIN B 12 PO)  gabapentin (NEURONTIN) 300 MG capsule  glipiZIDE (GLUCOTROL XL) 2.5 MG 24 hr tablet  levETIRAcetam (KEPPRA) 1000 MG tablet  lisinopril (ZESTRIL) 20 MG tablet  meloxicam (MOBIC) 7.5 MG tablet  memantine (NAMENDA) 10 MG tablet  metFORMIN (GLUCOPHAGE-XR) 500 MG 24 hr tablet  omeprazole (PRILOSEC) 40 MG DR capsule  order for DME  phenytoin (DILANTIN) 50 MG chewable tablet  polyethylene glycol (MIRALAX) 17 GM/Dose powder  tamsulosin (FLOMAX) 0.4 MG capsule  thin (NO BRAND SPECIFIED) lancets          Review of Systems   All  "other systems reviewed and are negative.      Physical Exam   BP: (!) 161/79  Pulse: 84  Temp: 98  F (36.7  C)  Resp: 14  Height: 165.1 cm (5' 5\")  Weight: 64 kg (141 lb)  SpO2: 98 %      Physical Exam  Vitals and nursing note reviewed.   Constitutional:       General: She is not in acute distress.     Appearance: She is well-developed. She is not diaphoretic.   HENT:      Head: Normocephalic and atraumatic.      Nose: Nose normal.      Mouth/Throat:      Pharynx: No oropharyngeal exudate.   Eyes:      Conjunctiva/sclera: Conjunctivae normal.   Cardiovascular:      Rate and Rhythm: Normal rate and regular rhythm.      Heart sounds: Normal heart sounds. No murmur heard.    No friction rub.   Pulmonary:      Effort: Pulmonary effort is normal. No respiratory distress.      Breath sounds: Normal breath sounds. No stridor. No wheezing or rales.   Abdominal:      General: Bowel sounds are normal. There is no distension.      Palpations: Abdomen is soft. There is no mass.      Tenderness: There is no abdominal tenderness. There is no guarding.   Musculoskeletal:         General: No tenderness. Normal range of motion.      Cervical back: Normal range of motion and neck supple.   Skin:     General: Skin is warm and dry.      Capillary Refill: Capillary refill takes less than 2 seconds.      Findings: No erythema.   Neurological:      Mental Status: She is alert and oriented to person, place, and time.   Psychiatric:         Judgment: Judgment normal.         ED Course                 Procedures      Results for orders placed or performed during the hospital encounter of 05/22/22 (from the past 24 hour(s))   CBC with platelets differential    Narrative    The following orders were created for panel order CBC with platelets differential.  Procedure                               Abnormality         Status                     ---------                               -----------         ------                     CBC with " platelets and d...[[id number]]  Abnormal            Final result                 Please view results for these tests on the individual orders.   Basic metabolic panel   Result Value Ref Range    Sodium 140 133 - 144 mmol/L    Potassium 4.1 3.4 - 5.3 mmol/L    Chloride 108 94 - 109 mmol/L    Carbon Dioxide (CO2) 25 20 - 32 mmol/L    Anion Gap 7 3 - 14 mmol/L    Urea Nitrogen 14 7 - 30 mg/dL    Creatinine 0.57 0.52 - 1.04 mg/dL    Calcium 8.3 (L) 8.5 - 10.1 mg/dL    Glucose 116 (H) 70 - 99 mg/dL    GFR Estimate >90 >60 mL/min/1.73m2   Troponin I   Result Value Ref Range    Troponin I High Sensitivity 4 <54 ng/L   CBC with platelets and differential   Result Value Ref Range    WBC Count 4.8 4.0 - 11.0 10e3/uL    RBC Count 4.16 3.80 - 5.20 10e6/uL    Hemoglobin 12.2 11.7 - 15.7 g/dL    Hematocrit 40.6 35.0 - 47.0 %    MCV 98 78 - 100 fL    MCH 29.3 26.5 - 33.0 pg    MCHC 30.0 (L) 31.5 - 36.5 g/dL    RDW 12.6 10.0 - 15.0 %    Platelet Count 67 (L) 150 - 450 10e3/uL    % Neutrophils 45 %    % Lymphocytes 41 %    % Monocytes 9 %    % Eosinophils 3 %    % Basophils 1 %    % Immature Granulocytes 1 %    NRBCs per 100 WBC 0 <1 /100    Absolute Neutrophils 2.2 1.6 - 8.3 10e3/uL    Absolute Lymphocytes 2.0 0.8 - 5.3 10e3/uL    Absolute Monocytes 0.4 0.0 - 1.3 10e3/uL    Absolute Eosinophils 0.1 0.0 - 0.7 10e3/uL    Absolute Basophils 0.1 0.0 - 0.2 10e3/uL    Absolute Immature Granulocytes 0.1 <=0.4 10e3/uL    Absolute NRBCs 0.0 10e3/uL       Medications - No data to display     Labs are reviewed and were unremarkable.  I did offer a COVID test to the patient but she refused.  Patient's blood pressure has remained high but not critically high, nothing that we need to do anything about.  I do recommend that she start keeping a log of her blood pressure and she will check these daily.  If they are still running high, I recommend calling her doctor to discuss adjustments to her meds.  She has been on lisinopril for many years.  This  certainly could be adjusted or another medicine added at the low dose.    Assessments & Plan (with Medical Decision Making)  Hypertension, URI     I have reviewed the nursing notes.    I have reviewed the findings, diagnosis, plan and need for follow up with the patient.          Final diagnoses:   Benign essential hypertension   Upper respiratory tract infection, unspecified type       5/22/2022   [facility] EMERGENCY DEPT     Franky Richardson MD  05/22/22 [id number]

## 2022-05-22 NOTE — ED TRIAGE NOTES
Brought in by daughter with concerns over elevated blood pressure today. Pt has a history of a stroke. Denies headache or dizziness at this time.     Triage Assessment     Row Name 05/22/22 0431       Triage Assessment (Adult)    Airway WDL WDL       Respiratory WDL    Respiratory WDL WDL       Skin Circulation/Temperature WDL    Skin Circulation/Temperature WDL WDL       Cardiac WDL    Cardiac WDL WDL       Peripheral/Neurovascular WDL    Peripheral Neurovascular WDL WDL       Cognitive/Neuro/Behavioral WDL    Cognitive/Neuro/Behavioral WDL WDL

## 2022-05-22 NOTE — TELEPHONE ENCOUNTER
Daughter calling about patient having high blood pressure. Patient is not present with caller. Will call patient at home to triage.    Spoke with other daughter who is with patient. Verbal consent to communicate given. At 3pm today Blood Pressure was:    178/102- sitting  187/108 - standing    Per protocol, patient should go to the ED now. Daughter verbalized understanding and agreed with plan. Patient will go to [place] ED for evaluation.    Mikala Song RN  05/22/22 3:25 PM  [facility] Nurse Advisor    Reason for Disposition    [1] Systolic BP  >= 160 OR Diastolic >= 100 AND [2] cardiac or neurologic symptoms (e.g., chest pain, difficulty breathing, unsteady gait, blurred vision)    Additional Information    Negative: Difficult to awaken or acting confused (e.g., disoriented, slurred speech)    Negative: Severe difficulty breathing (e.g., struggling for each breath, speaks in single words)    Negative: [1] Weakness of the face, arm or leg on one side of the body AND [2] new onset    Negative: [1] Numbness (i.e., loss of sensation) of the face, arm or leg on one side of the body AND [2] new onset    Negative: [1] Chest pain lasts > 5 minutes AND [2] history of heart disease  (i.e., heart attack, bypass surgery, angina, angioplasty, CHF)    Negative: [1] Chest pain AND [2] took nitrogylcerin AND [3] pain was not relieved    Negative: Sounds like a life-threatening emergency to the triager    Negative: Symptom is main concern  (e.g., headache, chest pain)    Negative: Low blood pressure is main concern    Protocols used: HIGH BLOOD PRESSURE-A-

## 2022-05-28 NOTE — TELEPHONE ENCOUNTER
Pt's daughter, Laura calling (C2C on file) with medication request;    States the pharmacy has not yet gotten refill authorization for phenytoin 50 mg.    Page sent to [facility], Dr. Rosalie Beltran who advised:    Send one month refill    During time of phone conversation with [facility] provider, RX was E-precribed to pharmacy by Omer Álvarez PA-C    Daughter is notified and verbalized understanding.    Ernestina Aquino RN, Nurse Advisor 8:25 PM 5/27/2022  COVID 19 Nurse Triage Plan/Patient Instructions    Please be aware that novel coronavirus (COVID-19) may be circulating in the community. If you develop symptoms such as fever, cough, or SOB or if you have concerns about the presence of another infection including coronavirus (COVID-19), please contact your health care provider or visit [email or web address].     Disposition/Instructions    Home care recommended. Follow home care protocol based instructions.    Thank you for taking steps to prevent the spread of this virus.  o Limit your contact with others.  o Wear a simple mask to cover your cough.  o Wash your hands well and often.    Resources    [facility]: About COVID-19: [email or web address]    CDC: What to Do If You're Sick: www.cdc.gov/coronavirus/2019-ncov/about/steps-when-sick.html    CDC: Ending Home Isolation: www.cdc.gov/coronavirus/2019-ncov/hcp/disposition-in-home-patients.html     CDC: Caring for Someone: www.cdc.gov/coronavirus/2019-ncov/if-you-are-sick/care-for-someone.html     [facility]: Interim Guidance for Hospital Discharge to Home: www.health.[facility].mn.us/diseases/coronavirus/hcp/hospdischarge.pdf    [facility] clinical trials (COVID-19 research studies): [email or web address]/umn-clinical-trials     Below are the COVID-19 hotlines at the [facility] ([facility]). Interpreters are available.   o For health questions: Call 353-437-0548 or 1-944.323.6619 (7 a.m. to 7 p.m.)  o For questions about schools  and childcare: Call 830-999-0189 or 1-994.860.9557 (7 a.m. to 7 p.m.)

## 2022-05-31 NOTE — TELEPHONE ENCOUNTER
Pharmacy message: is she still taking this med? Not refilling according to directions.    [facility]

## 2022-05-31 NOTE — TELEPHONE ENCOUNTER
Per pharmacy.  It was the phenytoin and it was refilled already.  Aware is to be a 30 day supply only.  Sandra MALAGON RN

## 2022-05-31 NOTE — TELEPHONE ENCOUNTER
RN Called pharmacy ([facility]), but it does not open until 9am.  No medication listed on message below, but I believe it is related to Phenytoin reqest from 5/27/22.     Darlin Stout RN on 5/31/2022 at 8:10 AM

## 2022-06-02 NOTE — TELEPHONE ENCOUNTER
Reason for Call:  Form, our goal is to have forms completed with 72 hours, however, some forms may require a visit or additional information.    Type of letter, form or note:  order/plan of treatment    Who is the form from?: [facility] (if other please explain)    Where did the form come from: form was faxed in    What clinic location was the form placed at?: [facility] [phone]    Where the form was placed: Team D Box/Folder    What number is listed as a contact on the form?: [phone]       Additional comments:  Fax [phone]    Call taken on 6/2/2022 at 6:58 AM by Kaleigh Morales

## 2022-06-10 NOTE — PROGRESS NOTES
Assessment & Plan     Hypertension goal BP (blood pressure) < 140/90  Patient was seen at the [facility] ED on 05/22 with concerns about hypertensive urgency. The patient was brought in by her daughters, who are here today with her. Workup at the ED was unremarkable. Today  The BP is normal, but the patient's machine read ~5-10 points higher systolic. Discussed with the daughters the concern for pushing BP too low and recommended an as needed treatment of hydralazine if the systolic pressure reaches or exceeds 180. I did also discuss this with PCP who was in support of the plan. If the pressures remain consistently elevated the patient's daughters will reach out at which time a BP challenge will be advised and possible increase in lisinopril.   - hydrALAZINE (APRESOLINE) 10 MG tablet; Take 1 tab (10mg) by mouth if systolic BP exceeds 180    Type 2 diabetes mellitus with diabetic peripheral angiopathy without gangrene, without long-term current use of insulin (H)  Patient's most recent A1c was 5.5%, this is excellent. I do not feel that she needs as much of her oral diabetic medication as she is currently taking. PCP was in agreement with reducing the metformin to once daily and rechecking sugars later this summer.   - FOOT EXAM  - metFORMIN (GLUCOPHAGE XR) 500 MG 24 hr tablet; Take 1 tablet (500 mg) by mouth daily (with dinner)  - blood glucose (NO BRAND SPECIFIED) test strip; Use to test blood sugar once daily. To accompany: Blood Glucose Monitor Brands: per insurance.    Dementia without behavioral disturbance, unspecified dementia type (H)  Patient has been tolerating this medication without adverse effect. Daughters report that she is not a good historian which limits their ability to track how she is doing, what symptoms are occurring, etc.   - memantine (NAMENDA) 10 MG tablet; Take 1 tablet (10 mg) by mouth daily    Hand injury, left, initial encounter  Bruise over the dorsal surface of the left hand and pain  with flexion of fingers. No bony abnormalities noted on exam. Patient does have a history of falls. Fortunately, imaging returned without fracture. Advised conservative cares.   - XR Hand Left G/E 3 Views; Future    Age-related osteoporosis without current pathological fracture  Patient will continue mobic as needed for pain. She has been struggling with movement of the left lower extremity since discontinuing the PT services 1-2 weeks ago. I will see if this can be restarted and continued for a while.   - meloxicam (MOBIC) 7.5 MG tablet; Take 1 tablet (7.5 mg) by mouth daily  - Home Care Referral    Pain in both lower legs  See above. No new injury. Patient does have a history of falls. Daughters are monitoring this. Patient's pain has limited her ability and/or willingness to move legs during transfers at home.   - meloxicam (MOBIC) 7.5 MG tablet; Take 1 tablet (7.5 mg) by mouth daily  - Home Care Referral    History of CVA (cerebrovascular accident)  See above.   - Home Care Referral    Gait instability  See above. Patient's dementia limits her ability to report when she falls. As such bruising or pains will present without clear cause as how or when they occurred.   - Home Care Referral      Return in about 3 months (around 9/10/2022) for Return for scheduled annual checkup with PCP.    Omer Álvarez PA-C  [facility]   Khalida is a 71 year old who presents for the following health issues  accompanied by her daughter.    HPI     Diabetes Follow-up    How often are you checking your blood sugar? Two times daily  Blood sugar testing frequency justification:  Uncontrolled diabetes  What time of day are you checking your blood sugars (select all that apply)?  After meals  Have you had any blood sugars above 200?  Yes sometimes  Have you had any blood sugars below 70?  No    What symptoms do you notice when your blood sugar is low?  None    What concerns do you have today  about your diabetes? None     Do you have any of these symptoms? (Select all that apply)  No numbness or tingling in feet.  No redness, sores or blisters on feet.  No complaints of excessive thirst.  No reports of blurry vision.  No significant changes to weight.    Have you had a diabetic eye exam in the last 12 months? No        BP Readings from Last 2 Encounters:   06/10/22 136/74   05/22/22 (!) 163/99     Hemoglobin A1C POCT (%)   Date Value   04/19/2021 6.1 (H)   06/16/2020 6.1 (H)     Hemoglobin A1C (%)   Date Value   03/10/2022 5.5   11/27/2021 5.4     LDL Cholesterol Calculated (mg/dL)   Date Value   03/19/2022 113 (H)   12/01/2021 93   04/19/2021 78   01/13/2020 80     LDL Cholesterol Direct (mg/dL)   Date Value   03/10/2022 80         Hypertension Follow-up      Do you check your blood pressure regularly outside of the clinic? Yes     Are you following a low salt diet? No    Are your blood pressures ever more than 140 on the top number (systolic) OR more   than 90 on the bottom number (diastolic), for example 140/90? Yes    ER Follow UP    High Blood Pressure 5/22/22        How many servings of fruits and vegetables do you eat daily?  2-3    On average, how many sweetened beverages do you drink each day (Examples: soda, juice, sweet tea, etc.  Do NOT count diet or artificially sweetened beverages)?   5    How many days per week do you exercise enough to make your heart beat faster? 3 or less    How many minutes a day do you exercise enough to make your heart beat faster? 9 or less    How many days per week do you miss taking your medication? 0    05/22/2022 - [facility] ED  Labs are reviewed and were unremarkable.  I did offer a COVID test to the patient but she refused.  Patient's blood pressure has remained high but not critically high, nothing that we need to do anything about.  I do recommend that she start keeping a log of her blood pressure and she will check these daily.  If they are still running  "high, I recommend calling her doctor to discuss adjustments to her meds.  She has been on lisinopril for many years.  This certainly could be adjusted or another medicine added at the low dose.      Review of Systems   Constitutional, HEENT, cardiovascular, pulmonary, gi and gu systems are negative, except as otherwise noted.      Objective    /74 (BP Location: Right arm, Patient Position: Sitting, Cuff Size: Adult Regular)   Pulse 78   Temp 97.2  F (36.2  C) (Temporal)   Resp 20   Ht 1.651 m (5' 5\")   Wt 63.4 kg (139 lb 12.8 oz)   SpO2 100%   Breastfeeding No   BMI 23.26 kg/m    Body mass index is 23.26 kg/m .  Physical Exam   GENERAL: healthy, alert and no distress  EYES: Eyes grossly normal to inspection, PERRL and conjunctivae and sclerae normal  HENT: ear canals and TM's normal, nose and mouth without ulcers or lesions  NECK: no adenopathy, no asymmetry, masses, or scars and thyroid normal to palpation  RESP: lungs clear to auscultation - no rales, rhonchi or wheezes. Poor breathing effort  CV: regular rate and rhythm, normal S1 S2, no S3 or S4, no murmur, click or rub, no peripheral edema and peripheral pulses strong  MS: bruise noted along the dorsal surface of the left hand, tender to palpation, normal AROM of fingers and wrist  PSYCH: mentation appears normal, affect normal/bright    XR Hand Left G/E 3 Views    Result Date: 6/10/2022  HAND LEFT THREE OR MORE VIEWS   6/10/2022 10:56 AM HISTORY:  Large bruise on the dorsal surface of the left hand, unknown injury. History of falls. Pain with moving fingers. Hand injury, left, initial encounter. COMPARISON: None.     IMPRESSION: Mild multifocal osteoarthrosis. There is also diffuse bone demineralization. Dorsal soft tissue swelling. No evidence of fracture. ELGIN GARCIA MD   SYSTEM ID:  [id number]            "

## 2022-06-10 NOTE — TELEPHONE ENCOUNTER
Reason for Call:  Other referral     Detailed comments: [facility] was unable to receive the referral at this time due to no capacity     Phone Number Patient can be reached at: Other phone number:  Jocelyne RAMOS [phone]     Best Time: any    Can we leave a detailed message on this number? YES    Call taken on 6/10/2022 at 3:00 PM by Roshni Mart MA

## 2022-06-10 NOTE — TELEPHONE ENCOUNTER
Sent to [facility] home care for possible assistance. Forwarding as fyi to providers

## 2022-06-11 NOTE — ED TRIAGE NOTES
Patient getting up to use the bathroom around 0330 and fell hitting her head on the floor, laceration to R eyebrow. Also c/o L hand/finger pain.

## 2022-06-11 NOTE — DISCHARGE INSTRUCTIONS
Keep the wound clean and dry.  It is OK to shower but do not submerge the wound. (No swimming, etc)  Vaseline or bacitracin to the wound once or twice a day.  Watch for signs of infection  Tylenol/ibuprofen as needed for discomfort.  The sutures can be removed in clinic in 5-7 days.  It was a pleasure visiting with both of you this morning.  I hope this heals up quickly for you.  Remember to ask for help when getting up at night.    Thank you for choosing [facility]. We appreciate the opportunity to meet your urgent medical needs. Please let us know if we could have done anything to make your stay more satisfying.    After discharge, please closely monitor for any new or worsening symptoms. Return to the Emergency Department if you develop any acute worsening signs or symptoms.    If you had lab work, cultures or imaging studies done during your stay, the final results may still be pending. We will call you if your plan of care needs to change. However, if you are not improving as expected, please follow up with your primary care provider or clinic.     Start any prescription medications that were prescribed to you and take them as directed.     Please see additional handouts that may be pertinent to your condition.

## 2022-06-11 NOTE — ED PROVIDER NOTES
History     Chief Complaint   Patient presents with     Laceration     Fall     HPI  Khalida Redding is a 71 year old female who presents to the ED this morning after falling at home.  She has poor balance to begin with and is supposed to wake up her  to help her.  She had to go to the bathroom and he would not wake up so she became impatient and did on her own.  She lost her balance and fell.  She did not lose consciousness.  Denies any chest pain or shortness of breath.  She was not dizzy or lightheaded leading up to it.  Suffered a laceration of the right eyebrow with some swelling and also the left index finger is painful to touch.  She is right-hand dominant.  Denies any lower extremity injuries.  Neck is sore.  She is on Plavix and aspirin.  She is up-to-date on her tetanus , last was in October 2018.      Allergies:  Allergies   Allergen Reactions     Amoxicillin Hives and Rash     Pt reports she has taken PCN without problems     Ampicillin Rash       Problem List:    Patient Active Problem List    Diagnosis Date Noted     Difficulty urinating 04/08/2022     Priority: Medium     SIRS (systemic inflammatory response syndrome) - fever, leukocytosis, sinus tachycardia, elevated lactic acid 03/19/2022     Priority: Medium     Acute CVA (cerebrovascular accident) (H) 03/19/2022     Priority: Medium     Transient hypotension 03/19/2022     Priority: Medium     Somnolence 03/19/2022     Priority: Medium     History of paroxysmal supraventricular tachycardia 03/18/2022     Priority: Medium     Cerebrovascular accident (CVA) due to embolism of left anterior cerebral artery (H) 12/09/2021     Priority: Medium     Seizure disorder (H) 11/26/2021     Priority: Medium     History of colonic polyps 07/13/2020     Priority: Medium     Abdominal pain, generalized 06/16/2020     Priority: Medium     Slow transit constipation 06/16/2020     Priority: Medium     Chronic upset stomach 06/16/2020     Priority:  Medium     Left hemiparesis (H) 01/13/2020     Priority: Medium     Age-related osteoporosis without current pathological fracture 11/25/2019     Priority: Medium     Lumbar radiculopathy 11/18/2019     Priority: Medium     Closed displaced fracture of left clavicle, unspecified part of clavicle, initial encounter 11/18/2019     Priority: Medium     Injury of left clavicle, initial encounter 11/18/2019     Priority: Medium     Syncope, unspecified syncope type 12/17/2018     Priority: Medium     Added automatically from request for surgery [id number]       Tubular adenoma of colon 11/29/2018     Priority: Medium     Supraventricular tachycardia (H) 10/22/2018     Priority: Medium     Gait instability 10/22/2018     Priority: Medium     Type 2 diabetes mellitus with circulatory disorder, without long-term current use of insulin (H) 09/20/2018     Priority: Medium     Hyperlipidemia LDL goal <100 09/20/2018     Priority: Medium     Hypertension goal BP (blood pressure) < 140/90 09/20/2018     Priority: Medium     History of multiple cerebrovascular accidents (CVAs) 09/20/2018     Priority: Medium     Convulsions, unspecified convulsion type (H) 09/20/2018     Priority: Medium     Osteoarthritis 09/20/2018     Priority: Medium     Tobacco abuse disorder 09/20/2018     Priority: Medium     Pain in both lower legs 09/20/2018     Priority: Medium     Dementia (H) 09/20/2018     Priority: Medium        Past Medical History:    Past Medical History:   Diagnosis Date     Atrial tachycardia (H)      Convulsions, unspecified convulsion type (H) 9/20/2018     CVA (cerebral vascular accident) (H)      Dementia (H) 9/20/2018     Diabetes (H)      Falls      History of CVA (cerebrovascular accident) 9/20/2018     Hyperlipidemia LDL goal <100 9/20/2018     Hypertension goal BP (blood pressure) < 140/90 9/20/2018     Osteoarthritis 9/20/2018     Pain in both lower legs 9/20/2018     Seizures (H)      Smoking      Syncope      Tobacco  abuse disorder 2018     Tubular adenoma of colon 2018     Type 2 diabetes mellitus with circulatory disorder, without long-term current use of insulin (H) 2018       Past Surgical History:    Past Surgical History:   Procedure Laterality Date     COLONOSCOPY N/A 2018    Procedure: Colonoscopy, Polypectomy by Biopsy;  Surgeon: Arcadio Mcmullen DO;  Location:  GI     COLONOSCOPY N/A 2020    Procedure: Colonoscopy with possible biopsy and/or polypectomy;  Surgeon: Fabián Hines MD;  Location: [facility]     HIP SURGERY Left        Family History:    History reviewed. No pertinent family history.    Social History:  Marital Status:   [2]  Social History     Tobacco Use     Smoking status: Former Smoker     Packs/day: 1.00     Types: Cigarettes     Quit date: 2021     Years since quittin.5     Smokeless tobacco: Never Used     Tobacco comment: QUIT   Vaping Use     Vaping Use: Never used   Substance Use Topics     Alcohol use: No     Drug use: No        Medications:    acetaminophen (TYLENOL) 500 MG tablet  alcohol swab prep pads  alendronate (FOSAMAX) 70 MG tablet  aspirin (ASA) 81 MG chewable tablet  atorvastatin (LIPITOR) 40 MG tablet  blood glucose (NO BRAND SPECIFIED) test strip  blood glucose (NO BRAND SPECIFIED) test strip  blood glucose monitoring (NO BRAND SPECIFIED) meter device kit  clopidogrel (PLAVIX) 75 MG tablet  Cyanocobalamin (VITAMIN B 12 PO)  gabapentin (NEURONTIN) 300 MG capsule  glipiZIDE (GLUCOTROL XL) 2.5 MG 24 hr tablet  hydrALAZINE (APRESOLINE) 10 MG tablet  levETIRAcetam (KEPPRA) 1000 MG tablet  lisinopril (ZESTRIL) 20 MG tablet  meloxicam (MOBIC) 7.5 MG tablet  memantine (NAMENDA) 10 MG tablet  metFORMIN (GLUCOPHAGE XR) 500 MG 24 hr tablet  omeprazole (PRILOSEC) 40 MG DR capsule  order for DME  phenytoin (DILANTIN) 50 MG chewable tablet  polyethylene glycol (MIRALAX) 17 GM/Dose powder  tamsulosin (FLOMAX) 0.4 MG capsule  thin (NO BRAND  SPECIFIED) lancets          Review of Systems   All other systems reviewed and are negative.      Physical Exam   BP: (!) 176/92  Pulse: 83  Temp: 98.1  F (36.7  C)  Resp: 18  Weight: 63 kg (139 lb)  SpO2: 99 %      Physical Exam  Constitutional:       General: She is not in acute distress.     Appearance: Normal appearance.   HENT:      Head: Contusion and laceration present.        Right Ear: Tympanic membrane normal.      Left Ear: Tympanic membrane normal.      Mouth/Throat:      Dentition: Has dentures.      Pharynx: Oropharynx is clear.   Eyes:      Extraocular Movements: Extraocular movements intact.      Pupils: Pupils are equal, round, and reactive to light.   Cardiovascular:      Rate and Rhythm: Normal rate.   Pulmonary:      Effort: Pulmonary effort is normal.      Breath sounds: Normal breath sounds.   Abdominal:      Palpations: Abdomen is soft.      Tenderness: There is no abdominal tenderness.   Musculoskeletal:      Comments: Tenderness to the proximal phalanx left index finger but no appreciable swelling.  Good range of motion.  No other extremity injuries noted.   Skin:     General: Skin is warm and dry.   Neurological:      General: No focal deficit present.      Mental Status: She is alert and oriented to person, place, and time.      GCS: GCS eye subscore is 4. GCS verbal subscore is 5. GCS motor subscore is 6.      Cranial Nerves: Cranial nerves are intact.      Sensory: Sensation is intact.      Motor: No weakness (no acute changes).   Psychiatric:         Mood and Affect: Mood normal.         ED [facility]    -Laceration Repair    Date/Time: 6/11/2022 5:27 AM  Performed by: Viktor Vale MD  Authorized by: Viktor Vale MD     Risks, benefits and alternatives discussed.      ANESTHESIA (see MAR for exact dosages):     Anesthesia method:  Local infiltration    Local anesthetic:  Bupivacaine 0.5% w/o epi  LACERATION  DETAILS     Location:  Face    Face location:  R eyebrow    Length (cm):  1.8    REPAIR TYPE:     Repair type:  Intermediate      EXPLORATION:     Hemostasis achieved with:  Direct pressure    Wound exploration: entire depth of wound probed and visualized      Wound extent: areolar tissue violated      Contaminated: no      TREATMENT:     Area cleansed with:  Shur-Clens    Amount of cleaning:  Standard    Visualized foreign bodies/material removed: no      SUBCUTANEOUS REPAIR     Suture size:  4-0    Suture material:  Vicryl    Subcutaneous suture technique: inverted subcutaneous.    Number of sutures:  1    SKIN REPAIR     Repair method:  Sutures    Suture size:  5-0    Suture material:  Nylon    Number of sutures:  4    APPROXIMATION     Approximation:  Close    POST-PROCEDURE DETAILS     Dressing:  Antibiotic ointment and non-adherent dressing        PROCEDURE    Patient Tolerance:  Patient tolerated the procedure well with no immediate complications                Critical Care time:  none               Results for orders placed or performed during the hospital encounter of 06/11/22 (from the past 24 hour(s))   CT Head w/o Contrast    Narrative    EXAM: CT HEAD WITHOUT CONTRAST, CT CERVICAL SPINE WITHOUT CONTRAST  LOCATION: [facility]  DATE/TIME: 06/11/2022, 4:10 AM    INDICATION: Fall, head and neck injury.  COMPARISON: None.  TECHNIQUE:   1) Routine CT Head without IV contrast. Multiplanar reformats. Dose reduction techniques were used.  2) Routine CT Cervical Spine without IV contrast. Multiplanar reformats. Dose reduction techniques were used.    FINDINGS:   HEAD CT:   INTRACRANIAL CONTENTS: No intracranial hemorrhage, extra-axial collection, or mass effect.  No CT evidence of acute infarct. Mild to moderate volume loss and moderate burden presumed chronic small vessel ischemia. Chronic cortical infarct in the right   frontal lobe. Chronic infarct in the splenium of the corpus  callosum on the right again noted.     VISUALIZED ORBITS/SINUSES/MASTOIDS: No intraorbital abnormality. No paranasal sinus mucosal disease. No middle ear or mastoid effusion.    BONES/SOFT TISSUES: Right frontal and periorbital soft tissue swelling. No calvarial fracture.    CERVICAL SPINE CT:   VERTEBRA: Alignment is normal. No acute cervical spine fracture or posttraumatic subluxation. Moderate to marked loss of disc space height C5-C6 and C6-C7 and moderate changes C3-C4 and C4-C5. Mild to moderate multilevel facet arthropathy.    CANAL/FORAMINA: Mild multilevel canal narrowing. No high-grade canal narrowing. Moderate right and moderate to marked left C4-C5 and moderate left C6-C7 degenerative foraminal narrowing.    PARASPINAL: 6 mm low-attenuation right thyroid nodule. No other extraspinal abnormality. The visualized lung apices are clear.      Impression    IMPRESSION:  HEAD CT:  1.  No acute intracranial abnormality or significant change compared to the prior study.    CERVICAL SPINE CT:  1.  No CT evidence for acute fracture or post traumatic subluxation.    2.  6 mm low-attenuation right thyroid nodule.    REFERENCE:  Willam CRANDALL et al. Managing Incidental Thyroid Nodules Detected on Imaging: White Paper of the ACR Incidental Thyroid Findings Committee. JACR 2015; 12:143-150.    Incidental thyroid nodule detected on CT or MRI without suspicious findings. Applies to general population without limited life expectancy or significant comorbidities.    Age greater than or equal to 35 years  Less than 1.5 cm: No further evaluation.  Greater than or equal to 1.5 cm: Evaluate with thyroid ultrasound.       CT Cervical Spine w/o Contrast    Narrative    EXAM: CT HEAD WITHOUT CONTRAST, CT CERVICAL SPINE WITHOUT CONTRAST  LOCATION: [facility]  DATE/TIME: 06/11/2022, 4:10 AM    INDICATION: Fall, head and neck injury.  COMPARISON: None.  TECHNIQUE:   1) Routine CT Head without IV contrast.  Multiplanar reformats. Dose reduction techniques were used.  2) Routine CT Cervical Spine without IV contrast. Multiplanar reformats. Dose reduction techniques were used.    FINDINGS:   HEAD CT:   INTRACRANIAL CONTENTS: No intracranial hemorrhage, extra-axial collection, or mass effect.  No CT evidence of acute infarct. Mild to moderate volume loss and moderate burden presumed chronic small vessel ischemia. Chronic cortical infarct in the right   frontal lobe. Chronic infarct in the splenium of the corpus callosum on the right again noted.     VISUALIZED ORBITS/SINUSES/MASTOIDS: No intraorbital abnormality. No paranasal sinus mucosal disease. No middle ear or mastoid effusion.    BONES/SOFT TISSUES: Right frontal and periorbital soft tissue swelling. No calvarial fracture.    CERVICAL SPINE CT:   VERTEBRA: Alignment is normal. No acute cervical spine fracture or posttraumatic subluxation. Moderate to marked loss of disc space height C5-C6 and C6-C7 and moderate changes C3-C4 and C4-C5. Mild to moderate multilevel facet arthropathy.    CANAL/FORAMINA: Mild multilevel canal narrowing. No high-grade canal narrowing. Moderate right and moderate to marked left C4-C5 and moderate left C6-C7 degenerative foraminal narrowing.    PARASPINAL: 6 mm low-attenuation right thyroid nodule. No other extraspinal abnormality. The visualized lung apices are clear.      Impression    IMPRESSION:  HEAD CT:  1.  No acute intracranial abnormality or significant change compared to the prior study.    CERVICAL SPINE CT:  1.  No CT evidence for acute fracture or post traumatic subluxation.    2.  6 mm low-attenuation right thyroid nodule.    REFERENCE:  Willam CRANDALL et al. Managing Incidental Thyroid Nodules Detected on Imaging: White Paper of the ACR Incidental Thyroid Findings Committee. JACR 2015; 12:143-150.    Incidental thyroid nodule detected on CT or MRI without suspicious findings. Applies to general population without limited life  expectancy or significant comorbidities.    Age greater than or equal to 35 years  Less than 1.5 cm: No further evaluation.  Greater than or equal to 1.5 cm: Evaluate with thyroid ultrasound.       XR Finger Left 2 Views    Narrative    EXAM: XR FINGER LEFT G/E 2 VIEWS  LOCATION: [facility]  DATE/TIME: 6/11/2022 4:10 AM    INDICATION: pain after fall  COMPARISON: None      Impression    IMPRESSION: 3 views of the left second finger show no acute bony finding such as fracture or dislocation.         Medications   bupivacaine (MARCAINE) 0.5% preservative free injection (has no administration in time range)       Assessments & Plan (with Medical Decision Making)  71-year-old female has poor balance and did not ask for help before getting up to go to the bathroom.  She lost her balance and fell suffering a laceration to the right lateral eyebrow.  Also has left index finger pain and neck pain.  On Plavix and aspirin.  CT of the head and C-spine were both negative for any acute injury.  Left index finger was also negative for fracture.  Right eyebrow laceration was repaired as above.  Sutures out in 5- 7 days in clinic.  She is encouraged to ask for help when she gets up at night.  Verbal and written discharge instructions given.  She and her daughter are comfortable with this plan.       I have reviewed the nursing notes.    I have reviewed the findings, diagnosis, plan and need for follow up with the patient.       New Prescriptions    No medications on file       Final diagnoses:   Eyebrow laceration, right, initial encounter - 1.8 cm   Sprain of left index finger, unspecified site of digit, initial encounter   Fall, initial encounter       6/11/2022   [facility] EMERGENCY DEPT     Viktor Vale MD  06/11/22 [id number]

## 2022-06-14 NOTE — TELEPHONE ENCOUNTER
Please call patient's daughters to inform them that the home care services are unable to accept the referral at this time due to over-capacity. I have put in a care coordination referral to help find alternative resources for PT at home. One of the representatives from this service will reach out to them.     Omer Álvarez PA-C on 6/14/2022 at 3:07 PM

## 2022-06-15 NOTE — TELEPHONE ENCOUNTER
Spoke to pt's spouse. Pt used [facility] in past and would be good using them again.    Spoke to nydia @[facility]. They do have openings to take on PT. They need referral to [facility] however. If you will add that to referral, we can fax to [phone]. They will also need med list and diagnosis list faxed.

## 2022-06-15 NOTE — TELEPHONE ENCOUNTER
New referral placed today. Please fax to [phone]. They will also need med list and diagnosis list faxed.     Thanks,  Omer Álvarez PA-C on 6/15/2022 at 2:26 PM

## 2022-06-15 NOTE — PROGRESS NOTES
Clinic Care Coordination Contact    Situation: Patient chart reviewed by care coordinator.    Background: patient physically was seen in clinic 6/10/22 by a MD, and home care was ordered as patient has difficulty ambulating >100 ft    Assessment: [facility] declined home care referral due to being at capacity. Therefore, a care coordination referral was placed 6/14/22 to help secure alternative home care agency. Upon chart review, care team went ahead and faxed home care order to [facility] at spouses request. CC RN called agency and spoke with nydia valladares RN, she did receive the order, and will admit patient in the next 1-2 days.     Plan/Recommendations: CC RN will cancel the CC order as reason for CC order (securing alternative home care agency) has been completed.     Radha Serna RN, BSN, PHN Care Coordinator  [facility]agen and Deirdre Pedroza   Phone: [phone]

## 2022-06-15 NOTE — TELEPHONE ENCOUNTER
Printed referral and med list and problem list and faxed over to [phone].  Sitting in bin in  Team A pod.  TRAVIS TORRES MA

## 2022-06-17 NOTE — TELEPHONE ENCOUNTER
Reason for Call:  Form, our goal is to have forms completed with 72 hours, however, some forms may require a visit or additional information.    Type of letter, form or note:  medical    Who is the form from?: [facility] (if other please explain)    Where did the form come from: form was faxed in    What clinic location was the form placed at?: [facility] [phone]    Where the form was placed: team d bin     What number is listed as a contact on the form?: fax [phone]       Additional comments: please complete and fax     Call taken on 6/17/2022 at 9:52 AM by Ligia Rubio

## 2022-06-20 NOTE — TELEPHONE ENCOUNTER
"Daughter calling in asking for a neurology eval referral for pt. Home PT told them to call to get one to address pt's leg that \"doesn't really want to move.\" would like [facility]   "

## 2022-06-20 NOTE — TELEPHONE ENCOUNTER
Neurology referral was placed 3/20/22 to follow up on Acute CVA. Apt scheduled with Dr. Machado, epilepsy provider (seen initially 1/19/22)    RN called and spoke to daughter Laura. Notified her the referral is in patient's chart, number given to get patient scheduled.     Daughter had no other questions at this time.     Darlin Stout RN on 6/20/2022 at 3:29 PM

## 2022-07-11 NOTE — TELEPHONE ENCOUNTER
Reason for Call: Request for an order or referral:    Order or referral being requested: OT eval and treat.     Date needed: as soon as possible    Has the patient been seen by the PCP for this problem? YES    Additional comments: verbal    Phone number Patient can be reached at:  Other phone number:  Destinee olson [place] [phone]    Best Time:  anytime    Can we leave a detailed message on this number?  YES    Call taken on 7/11/2022 at 8:50 AM by Roxi Nolasco

## 2022-07-11 NOTE — TELEPHONE ENCOUNTER
Patient calling regarding Phenytoin and mentioned her last refill was sent to [facility] due to [facility] not having the RX. [facility] did not have a refill. RN called [facility] to verify if there were refills on file. There were not. Per protocol sent two refills to [facility].     Lei Esposito, SANJANAN, RN, PHN  Casual Clinic RN for [facility]  July 11, 2022

## 2022-07-13 NOTE — TELEPHONE ENCOUNTER
Reason for Call:  Form, our goal is to have forms completed with 72 hours, however, some forms may require a visit or additional information.    Type of letter, form or note:  medical    Who is the form from?: Home care    Where did the form come from: form was faxed in    What clinic location was the form placed at?: [facility] [phone]    Where the form was placed: Team D form bin    What number is listed as a contact on the form?: fax [phone]       Additional comments: Please complete and fax    Call taken on 7/13/2022 at 7:06 AM by Cris Dallas

## 2022-07-19 NOTE — ED TRIAGE NOTES
Patient with generalized weakness and dizziness starting yesterday afternoon. Denies any pain/discomfort, nausea or vomiting or cough.

## 2022-07-19 NOTE — ED NOTES
Pt up to bedside commode, with assist of 2.  Pt states that this is her norm.  Her  picks her up and transfers her to chair or toilet.

## 2022-07-19 NOTE — ED PROVIDER NOTES
History     Chief Complaint   Patient presents with     Generalized Weakness     Dizziness     HPI  Khalida Redding is a 71 year old female who presents with dizziness and weakness.  She presents with her daughter who provides the majority of the history.  Home nurse was out today and due to weakness and dizziness recommended she come in here.  Vital signs apparently were normal.  However, she feels dizzy when she gets up, lightheaded.  She has not been out of bed for 3 days accept to get to the bathroom with significant assistance.  She has had no fever.  No cough.  No recent fall.  No chest pain.  No abdominal pain.  No vomiting.  No diarrhea.  No urinary symptoms.    Allergies:  Allergies   Allergen Reactions     Amoxicillin Hives and Rash     Pt reports she has taken PCN without problems     Ampicillin Rash       Problem List:    Patient Active Problem List    Diagnosis Date Noted     Difficulty urinating 04/08/2022     Priority: Medium     SIRS (systemic inflammatory response syndrome) - fever, leukocytosis, sinus tachycardia, elevated lactic acid 03/19/2022     Priority: Medium     Acute CVA (cerebrovascular accident) (H) 03/19/2022     Priority: Medium     Transient hypotension 03/19/2022     Priority: Medium     Somnolence 03/19/2022     Priority: Medium     History of paroxysmal supraventricular tachycardia 03/18/2022     Priority: Medium     Cerebrovascular accident (CVA) due to embolism of left anterior cerebral artery (H) 12/09/2021     Priority: Medium     Seizure disorder (H) 11/26/2021     Priority: Medium     History of colonic polyps 07/13/2020     Priority: Medium     Abdominal pain, generalized 06/16/2020     Priority: Medium     Slow transit constipation 06/16/2020     Priority: Medium     Chronic upset stomach 06/16/2020     Priority: Medium     Left hemiparesis (H) 01/13/2020     Priority: Medium     Age-related osteoporosis without current pathological fracture 11/25/2019     Priority:  Medium     Lumbar radiculopathy 11/18/2019     Priority: Medium     Closed displaced fracture of left clavicle, unspecified part of clavicle, initial encounter 11/18/2019     Priority: Medium     Injury of left clavicle, initial encounter 11/18/2019     Priority: Medium     Syncope, unspecified syncope type 12/17/2018     Priority: Medium     Added automatically from request for surgery [id number]       Tubular adenoma of colon 11/29/2018     Priority: Medium     Supraventricular tachycardia (H) 10/22/2018     Priority: Medium     Gait instability 10/22/2018     Priority: Medium     Type 2 diabetes mellitus with circulatory disorder, without long-term current use of insulin (H) 09/20/2018     Priority: Medium     Hyperlipidemia LDL goal <100 09/20/2018     Priority: Medium     Hypertension goal BP (blood pressure) < 140/90 09/20/2018     Priority: Medium     History of multiple cerebrovascular accidents (CVAs) 09/20/2018     Priority: Medium     Convulsions, unspecified convulsion type (H) 09/20/2018     Priority: Medium     Osteoarthritis 09/20/2018     Priority: Medium     Tobacco abuse disorder 09/20/2018     Priority: Medium     Pain in both lower legs 09/20/2018     Priority: Medium     Dementia (H) 09/20/2018     Priority: Medium        Past Medical History:    Past Medical History:   Diagnosis Date     Atrial tachycardia (H)      Convulsions, unspecified convulsion type (H) 9/20/2018     CVA (cerebral vascular accident) (H)      Dementia (H) 9/20/2018     Diabetes (H)      Falls      History of CVA (cerebrovascular accident) 9/20/2018     Hyperlipidemia LDL goal <100 9/20/2018     Hypertension goal BP (blood pressure) < 140/90 9/20/2018     Osteoarthritis 9/20/2018     Pain in both lower legs 9/20/2018     Seizures (H)      Smoking      Syncope      Tobacco abuse disorder 9/20/2018     Tubular adenoma of colon 11/29/2018     Type 2 diabetes mellitus with circulatory disorder, without long-term current use of  insulin (H) 2018       Past Surgical History:    Past Surgical History:   Procedure Laterality Date     COLONOSCOPY N/A 2018    Procedure: Colonoscopy, Polypectomy by Biopsy;  Surgeon: Arcadio Mcmullen DO;  Location:  GI     COLONOSCOPY N/A 2020    Procedure: Colonoscopy with possible biopsy and/or polypectomy;  Surgeon: Fabián Hines MD;  Location:  GI     HIP SURGERY Left        Family History:    History reviewed. No pertinent family history.    Social History:  Marital Status:   [2]  Social History     Tobacco Use     Smoking status: Former Smoker     Packs/day: 1.00     Types: Cigarettes     Quit date: 2021     Years since quittin.6     Smokeless tobacco: Never Used     Tobacco comment: QUIT   Vaping Use     Vaping Use: Never used   Substance Use Topics     Alcohol use: No     Drug use: No        Medications:    acetaminophen (TYLENOL) 500 MG tablet  alcohol swab prep pads  alendronate (FOSAMAX) 70 MG tablet  aspirin (ASA) 81 MG chewable tablet  atorvastatin (LIPITOR) 40 MG tablet  blood glucose (NO BRAND SPECIFIED) test strip  blood glucose (NO BRAND SPECIFIED) test strip  blood glucose monitoring (NO BRAND SPECIFIED) meter device kit  clopidogrel (PLAVIX) 75 MG tablet  Cyanocobalamin (VITAMIN B 12 PO)  gabapentin (NEURONTIN) 300 MG capsule  glipiZIDE (GLUCOTROL XL) 2.5 MG 24 hr tablet  hydrALAZINE (APRESOLINE) 10 MG tablet  levETIRAcetam (KEPPRA) 1000 MG tablet  lisinopril (ZESTRIL) 20 MG tablet  meloxicam (MOBIC) 7.5 MG tablet  memantine (NAMENDA) 10 MG tablet  metFORMIN (GLUCOPHAGE XR) 500 MG 24 hr tablet  omeprazole (PRILOSEC) 40 MG DR capsule  order for DME  phenytoin (DILANTIN) 50 MG chewable tablet  polyethylene glycol (MIRALAX) 17 GM/Dose powder  tamsulosin (FLOMAX) 0.4 MG capsule  thin (NO BRAND SPECIFIED) lancets          Review of Systems  All other systems are reviewed and are negative    Physical Exam   BP: (!) 160/89  Pulse: 74  Temp: 98.4   F (36.9  C)  Resp: 18  Weight: 63 kg (139 lb)  SpO2: 98 %      Physical Exam  Vitals and nursing note reviewed.   Constitutional:       General: She is not in acute distress.     Appearance: She is well-developed. She is not diaphoretic.   HENT:      Head: Normocephalic and atraumatic.   Eyes:      General: No scleral icterus.     Pupils: Pupils are equal, round, and reactive to light.   Cardiovascular:      Rate and Rhythm: Normal rate and regular rhythm.      Heart sounds: Normal heart sounds. No murmur heard.  Pulmonary:      Effort: No respiratory distress.      Breath sounds: No stridor. No wheezing or rales.   Abdominal:      Palpations: Abdomen is soft.      Tenderness: There is no abdominal tenderness.   Musculoskeletal:         General: No tenderness.      Cervical back: Normal range of motion and neck supple.   Skin:     General: Skin is warm and dry.      Coloration: Skin is not pale.      Findings: No erythema or rash.   Neurological:      Mental Status: She is alert.      Comments: Generalized weakness.  Left lower extremity slightly weaker than right but no other new focal deficit.  This deficit is known from previous CVA         ED Course                 Procedures         EKG reveals normal sinus rhythm at 76 bpm.  No acute ST segment or T wave changes noted.  Interpreted by myself.    Critical Care time:  none               Results for orders placed or performed during the hospital encounter of 07/19/22 (from the past 24 hour(s))   CBC with platelets differential    Narrative    The following orders were created for panel order CBC with platelets differential.  Procedure                               Abnormality         Status                     ---------                               -----------         ------                     CBC with platelets and d...[[id number]]  Abnormal            Final result                 Please view results for these tests on the individual orders.   Comprehensive  metabolic panel   Result Value Ref Range    Sodium 141 133 - 144 mmol/L    Potassium 4.1 3.4 - 5.3 mmol/L    Chloride 106 94 - 109 mmol/L    Carbon Dioxide (CO2) 31 20 - 32 mmol/L    Anion Gap 4 3 - 14 mmol/L    Urea Nitrogen 22 7 - 30 mg/dL    Creatinine 0.73 0.52 - 1.04 mg/dL    Calcium 8.5 8.5 - 10.1 mg/dL    Glucose 74 70 - 99 mg/dL    Alkaline Phosphatase 129 40 - 150 U/L    AST 20 0 - 45 U/L    ALT 30 0 - 50 U/L    Protein Total 7.2 6.8 - 8.8 g/dL    Albumin 3.4 3.4 - 5.0 g/dL    Bilirubin Total 0.2 0.2 - 1.3 mg/dL    GFR Estimate 86 >60 mL/min/1.73m2   [place] Draw    Narrative    The following orders were created for panel order [place] Draw.  Procedure                               Abnormality         Status                     ---------                               -----------         ------                     Extra Blue Top Tube[[id number]]                              Final result                 Please view results for these tests on the individual orders.   Troponin I   Result Value Ref Range    Troponin I High Sensitivity 3 <54 ng/L   CBC with platelets and differential   Result Value Ref Range    WBC Count 6.9 4.0 - 11.0 10e3/uL    RBC Count 4.54 3.80 - 5.20 10e6/uL    Hemoglobin 13.3 11.7 - 15.7 g/dL    Hematocrit 41.6 35.0 - 47.0 %    MCV 92 78 - 100 fL    MCH 29.3 26.5 - 33.0 pg    MCHC 32.0 31.5 - 36.5 g/dL    RDW 15.4 (H) 10.0 - 15.0 %    Platelet Count 221 150 - 450 10e3/uL    % Neutrophils 54 %    % Lymphocytes 33 %    % Monocytes 9 %    % Eosinophils 3 %    % Basophils 1 %    % Immature Granulocytes 0 %    NRBCs per 100 WBC 0 <1 /100    Absolute Neutrophils 3.7 1.6 - 8.3 10e3/uL    Absolute Lymphocytes 2.3 0.8 - 5.3 10e3/uL    Absolute Monocytes 0.6 0.0 - 1.3 10e3/uL    Absolute Eosinophils 0.2 0.0 - 0.7 10e3/uL    Absolute Basophils 0.1 0.0 - 0.2 10e3/uL    Absolute Immature Granulocytes 0.0 <=0.4 10e3/uL    Absolute NRBCs 0.0 10e3/uL   Extra Blue Top Tube   Result Value Ref Range    Hold  Specimen [facility]    UA with Microscopic reflex to Culture    Specimen: Urine, Clean Catch   Result Value Ref Range    Color Urine Yellow Colorless, Straw, Light Yellow, Yellow    Appearance Urine Clear Clear    Glucose Urine Negative Negative mg/dL    Bilirubin Urine Negative Negative    Ketones Urine Negative Negative mg/dL    Specific Gravity Urine 1.015 1.003 - 1.035    Blood Urine Negative Negative    pH Urine 6.5 5.0 - 7.0    Protein Albumin Urine Negative Negative mg/dL    Urobilinogen Urine Normal Normal, 2.0 mg/dL    Nitrite Urine Negative Negative    Leukocyte Esterase Urine Negative Negative    Bacteria Urine Few (A) None Seen /HPF    Mucus Urine Present (A) None Seen /LPF    RBC Urine 1 <=2 /HPF    WBC Urine 1 <=5 /HPF    Squamous Epithelials Urine 7 (H) <=1 /HPF    Narrative    Urine Culture not indicated       Medications   0.9% sodium chloride BOLUS (0 mLs Intravenous Stopped 7/19/22 1235)     Followed by   sodium chloride 0.9% infusion (0 mLs Intravenous Pending 7/19/22 1428)       Assessments & Plan (with Medical Decision Making)  71-year-old female with some generalized weakness and family became concerned.  At baseline, however, she does not normally walk at home.  Family placed in a wheelchair and she gets around this way.  They report the only time she walks is with physical therapy when they show up.  She has had previous CVA and is debilitated from this.  Work-up reveals normal vital signs.  She is afebrile.  Urinalysis clear.  Blood work without worrisome findings.  She is given some fluids to cover for any possible dehydration.  After 4 hours, she appears stable for discharge home.  Have recommended follow-up in clinic as needed.  Return anytime sooner to the emergency department if condition worsens or other concerns     I have reviewed the nursing notes.    I have reviewed the findings, diagnosis, plan and need for follow up with the patient.       New Prescriptions    No medications on file        Final diagnoses:   Generalized muscle weakness       7/19/2022   [facility] EMERGENCY DEPT     Gustavo Chanel MD  07/19/22 2862

## 2022-07-26 NOTE — TELEPHONE ENCOUNTER
Janneth from Occupational Therapy is calling to get an order for a new shower chair for pt. Pt needs a chair with arms and a higher back on the chair-17 inches high or taller as this is what is comfortable for the pt. Currently they have to have two people to safely help her shower. Pt is 5' 11'' and 139 lbs    Send to [facility] in [place]  Fax [phone]    Contact Janneth [phone] with any questions.

## 2022-07-26 NOTE — TELEPHONE ENCOUNTER
Reason for Call:  Form, our goal is to have forms completed with 72 hours, however, some forms may require a visit or additional information.    Type of letter, form or note:  medical    Who is the form from?: [facility]  (if other please explain)    Where did the form come from: form was faxed in    What clinic location was the form placed at?: [facility] [phone]    Where the form was placed: Team d bin     What number is listed as a contact on the form?: fax [phone]       Additional comments: please complete and fax     Call taken on 7/26/2022 at 7:15 AM by Ligia Rubio

## 2022-07-28 NOTE — TELEPHONE ENCOUNTER
Left a message for the caller to contact us.  The last height that we have on this individual is 5.5 and she is nowhere near 5 foot 11 as recorded.  Need to make sure that we are using the proper equipment still need to verify measurements.    The form is signed and in the MA task box (next to the printer) for completion and scan of the document into the medical record.  Electronically signed:    Abdirahman Shrestha PA-C

## 2022-08-01 NOTE — TELEPHONE ENCOUNTER
Was sent to mail order on 02/27/2021 should not be out at this time.  Azam Lilly, SANJANAN, RN, PHN  [facility] ~ Registered Nurse  Clinic Triage ~ [facility]  August 1, 2022

## 2022-08-08 NOTE — PROGRESS NOTES
Clinic Care Coordination Contact  Community Health Worker Initial Outreach    CHW Initial Information Gathering:  Referral Source: Care Team  Preferred Hospital: [facility] [place]  [phone]  Current living arrangement:: I live in a private home with spouse  Type of residence:: Private home - no stairs  Community Resources: [facility], [facility] Care (Home care is PT/OP only, no RN)  Supplies Currently Used at Home: None  Equipment Currently Used at Home: none  Informal Support system:: Shelter, Family  No PCP office visit in Past Year: No  Transportation means:: Family  CHW Additional Questions  If ED/Hospital discharge, follow-up appointment scheduled as recommended?: N/A  Medication changes made following ED/Hospital discharge?: N/A  MyChart active?: No  Patient agreeable to assistance with activating MyChart?: Other (Laura, patient's daughter, said that her sister manages patients MyChart. However, CHW let her know that on my end it looks like her MyChart might not be set up)    Patient accepts CC: Yes. Patient scheduled for assessment with CC SW on 08/19/2022 at 2:30 PM. Patient noted desire to discuss qualifying for additional resources to help support caring for patient in the home and caregiver support for her  and daughters. Patient has multi-infarct dementia, complicated by seizures, deconditioning and diabetes.   Patient's daughters will be present for the phone assessment. There is a consent to communicate with them on file.      Cary Morrow  Community Health Worker  [facility]  Ph: [phone]

## 2022-08-08 NOTE — TELEPHONE ENCOUNTER
Please call the patient's daughter to inform her that I reviewed her recent testing and saw that CT of the head and cervical spine were completed in June 2022 at the ED visit. I chose not to do an econsult and instead placed the referral for the neurologist.   Call member services at your health plan with any benefit or coverage questions.   [facility] will call you to coordinate your care as prescribed by your provider. If you don't hear from a representative within 2 business days, please call [phone].     Omer Álvarez PA-C on 8/8/2022 at 4:59 PM

## 2022-08-08 NOTE — PROGRESS NOTES
Assessment & Plan     Dementia without behavioral disturbance, unspecified dementia type (H)  Left hemiparesis (H)  Patient's daughters brought her in today to discuss concerns about deterioration of functioning. They have been observing the patient's left lower extremity weakness worsening which is causing more frequent loss of balance and/or falls. In addition, they have witness her behaviors including motivation, focus and cooperation decrease. This has interfered with her willingness to participate in physical therapy as well as placed a strain on the interactions with the family. The patient's dementia has limited her ability to accurately report history for some time. As such, it is unclear how accurate the PHQ is today. I believe that the daughters completed majority of this screen for the patient. The patient does report irritability towards others and is interested in starting a medication to help with depressive symptoms. Reviewed interactions and will start patient on low dose escitalopram. Educated on possible adverse effects to monitor for and realistic timeframe for improvement. Daughters are feeling overwhelmed with trying to maintain patient's independence at her home. Have not met with dementia specialist. I feel that this would be a helpful visit so that they can receive additional education on the disease as well as what to expect over the next several years. They will let me know if virtual is not possible and we can look for similar specialists closer to this area.   - gabapentin (NEURONTIN) 300 MG capsule; TAKE 1 CAPSULE BY MOUTH AT  BEDTIME  - Memory Clinic Referral; Future  - Primary Care - Care Coordination Referral; Future  - Home Care Referral  - escitalopram (LEXAPRO) 5 MG tablet; Take 1 tablet (5 mg) by mouth daily  - Adult Neurology  Referral; Future    History of multiple cerebrovascular accidents (CVAs)  Left hemiparesis (H)  Patient has history of multiple CVAs over the  past several years. Most recent was this past 03/2022. Patient has been taking high intensity statin, daily aspirin and has good control on blood pressure and sugars. She has ongoing left sided hemiparesis for which she has been working with home physical therapy services. The patient may be being discharged from these services as her cooperation with the therapist is decreasing. The daughters are trying to see if the patient would qualify for any sort of overnight service. They said that the patient's insurance would cover it to a limited degree. I have placed a new home care referral to see if she meets the need for such services. I understand that the family would like to keep the patient in her home as long as possible.   - Memory Clinic Referral; Future  - Primary Care - Care Coordination Referral; Future  - Home Care Referral  - Adult Neurology  Referral; Future    Seizure disorder (H)  History of multiple cerebrovascular accidents (CVAs)  Recurrent falls  Daughters were concerned about subsequent stroke today given the continued deterioration of the patient's functioning. CT of head and cervical spine were completed in June 2022, unremarkable. Patient has continued statin, aspirin and other medications to maintain good blood pressure and blood sugar. I have reviewed the previous neurology visit which advised a return in 6 months. I discussed with the daughters scheduling this visit prior to pursuit of additional testing. They were in agreement with this plan. In addition, I will ask for care coordination to reconnect with the family to discuss whether there are other resources which could be accessed to help patient maintain independence.   - Primary Care - Care Coordination Referral; Future  - Home Care Referral  - Adult Neurology  Referral; Future    Recurrent falls  Daughters state that the last significant fall was in June 2022, see ED visit from 06/11/2022. Patient does lose balance or  tangle her legs several times a week, if not daily. Denies full fall to the ground, but has shifted against walls or fallen back into chairs/seat. Daughters state that she has been complaining of left hip pain. Given the falls and unsteady gait I opted to do xray of this hip to ensure that hardware has not been affected. Radiologist was able to review this quickly, no concerning findings.   - XR Hip Left 2-3 Views; Future  - Primary Care - Care Coordination Referral; Future  - Home Care Referral  - Adult Neurology  Referral; Future    Type 2 diabetes mellitus with other circulatory complication, without long-term current use of insulin (H)  Patient's last A1c was 5.5% in June 2022. She was advised to reduce the amount of metformin she was taking. It is too early to recheck. Instructed daughters to return in 1-2 months for diabetic follow up. If sugars remain low can consider stopping glipizide.   - Primary Care - Care Coordination Referral; Future    Hypertension goal BP (blood pressure) < 140/90  BP is excellent today at 110/70. Daughters have not needed to use the PRN hydralazine. They will continue to monitor BP at home. No changes made to pressures today.   - Primary Care - Care Coordination Referral; Future       Over 60 minutes was spent reviewing past charting, collecting history, performing exam and completing testing as well as discussing care plan and providing education.       Depression Screening Follow Up    PHQ 8/8/2022   PHQ-9 Total Score 11   Q9: Thoughts of better off dead/self-harm past 2 weeks Several days   F/U: Thoughts of suicide or self-harm No   F/U: Safety concerns No     Return in about 6 weeks (around 9/19/2022) for Medication Recheck.    Omer Álvarez PA-C  [facility]    Regina Gaxiola is a 72 year old accompanied by her daughters, presenting for the following health issues:  ER F/U (7/19/22)      HPI     ED/UC Followup:    Facility:   "[facility]  Date of visit: 7/19/22  Reason for visit: muscle weakness/heat stroke  Current Status: drinking more water.     Patient is a 71 year old female with complex medical history who is brought in by daughters with concerns about ongoing left lower extremity weakness, falls, behavioral changes and vertigo. Per the daughters, the patient's functioning has been deteriorating for the past 10-12 years. Dementia diagnosis was made in 2017, per previous documentation. However, I do not seem to have access to the documentation from the testing/visit.     The patient has had multiple brain scans which have identified right frontal encephalomalacia, left frontal encephalomalacia, and infarct in corpus collosum. This is consistent with her history of multiple cerebral infarctions. I did discuss with the patient and her daughters that an MRI from 11/30/2021 identified a right lateral ventricular cyst in addition to ischemic stroke in the left frontal lobe. The significance of this is not clear. Most recent brain MRI completed 03/18/22 identified new small acute to subacute infarct in the right aspect of the splenium of the corpus collosum, evolving subacute/subacute to chronic infarcts in the  posterior left frontal lobe and unchanged large area of chronic encephalomalacia in the right  frontal lobe. No mention of the previous cyst from the MRI on 11/30/22.     She had met with neurologist in 01/2022 who noted, \"long history of attacks with falling and loss of consciousness, which were variably diagnosed as epileptic seizures versus syncope\". The neurologist did note that there is a potential for her to taper off of the phenytoin and manage her seizures with keppra monotherapy. However, they recommended prolonged electroencephalogram prior to making any medication changes to more accurately evaluate the number of electrographic seizures the patient has been experiencing. Daughter's inform me that they tried to schedule " with neurologist, but were told it was going to be a $400 copay. Then the , per their report, kept trying to schedule them with obstetrics.     The neurologist also suspected the dementia to be secondary to the multiple infarctions. There was some discussion about consulting with stroke vs dementia specialist. Daughters do not recall this conversation, but were receptive to meeting with dementia specialist to receive additional education on the disease, management for the patient and resources in the community.     Daughter's inform me that the patient has continued to fall at home. Last serious fall was on 06/11/22. She was seen at the [facility] ED following the fall. She required sutures for laceration to the right eyebrow. CT of the head and cervical spine were unremarkable.     Daughters inform me that the patient's ongoing weakness in left lower extremity is why she is falling. It is suspected that the seizure disorder is also contributing to the recurrent falls. The patient has been receiving home PT services, but it sounds as if she will soon be discharged due to poor compliance with the program. Patient has not been able to focus during sessions, will laugh at inappropriate times or will become irritable towards the therapist. Daughter's state that the patient has difficulty initiating movement in the left lower extremity. Per their report the patient will perceive that she is moving the leg, but in actuality the extremity has not moved and will become tangled in the right resulting in a fall or loss of balance. The daughters also inform me that the patient has continued to complain of vertigo or dizziness which is worse with standing. The patient has been working with PT at her home to address this. Per the daughters, they have been unable to successfully perform Epley maneuver as the patient has not tolerated it.      Review of Systems   Constitutional, HEENT, cardiovascular, pulmonary, gi and gu  systems are negative, except as otherwise noted.      Objective    /70 (BP Location: Left arm, Patient Position: Sitting, Cuff Size: Adult Regular)   Pulse 80   Temp 96.8  F (36  C) (Temporal)   Wt 63 kg (139 lb)   BMI 23.13 kg/m    Body mass index is 23.13 kg/m .  Physical Exam   GENERAL: healthy, alert and no distress  NECK: no adenopathy, no asymmetry, masses, or scars and thyroid normal to palpation  RESP: lungs clear to auscultation - no rales, rhonchi or wheezes  CV: regular rate and rhythm, normal S1 S2, no S3 or S4, no murmur, click or rub, no peripheral edema and peripheral pulses strong  MS: left upper and lower leg musculature with visible atrophy compared to right  NEURO: weakness of left lower extremity to hip and knee flexion, sensory exam grossly normal, mentation intact, cranial nerves 2-12 intact and DTR's normal and symmetric   PSYCH: mentation appears normal and affect normal/bright    Admission on 07/19/2022, Discharged on 07/19/2022   Component Date Value Ref Range Status     Sodium 07/19/2022 141  133 - 144 mmol/L Final     Potassium 07/19/2022 4.1  3.4 - 5.3 mmol/L Final     Chloride 07/19/2022 106  94 - 109 mmol/L Final     Carbon Dioxide (CO2) 07/19/2022 31  20 - 32 mmol/L Final     Anion Gap 07/19/2022 4  3 - 14 mmol/L Final     Urea Nitrogen 07/19/2022 22  7 - 30 mg/dL Final     Creatinine 07/19/2022 0.73  0.52 - 1.04 mg/dL Final     Calcium 07/19/2022 8.5  8.5 - 10.1 mg/dL Final     Glucose 07/19/2022 74  70 - 99 mg/dL Final     Alkaline Phosphatase 07/19/2022 129  40 - 150 U/L Final     AST 07/19/2022 20  0 - 45 U/L Final     ALT 07/19/2022 30  0 - 50 U/L Final     Protein Total 07/19/2022 7.2  6.8 - 8.8 g/dL Final     Albumin 07/19/2022 3.4  3.4 - 5.0 g/dL Final     Bilirubin Total 07/19/2022 0.2  0.2 - 1.3 mg/dL Final     GFR Estimate 07/19/2022 86  >60 mL/min/1.73m2 Final    Effective December 21, 2021 eGFRcr in adults is calculated using the 2021 CKD-EPI creatinine equation  which includes age and gender (Payam et al., NE, DOI: 10.1056/VQLZor9456661)     Color Urine 07/19/2022 Yellow  Colorless, Straw, Light Yellow, Yellow Final     Appearance Urine 07/19/2022 Clear  Clear Final     Glucose Urine 07/19/2022 Negative  Negative mg/dL Final     Bilirubin Urine 07/19/2022 Negative  Negative Final     Ketones Urine 07/19/2022 Negative  Negative mg/dL Final     Specific Gravity Urine 07/19/2022 1.015  1.003 - 1.035 Final     Blood Urine 07/19/2022 Negative  Negative Final     pH Urine 07/19/2022 6.5  5.0 - 7.0 Final     Protein Albumin Urine 07/19/2022 Negative  Negative mg/dL Final     Urobilinogen Urine 07/19/2022 Normal  Normal, 2.0 mg/dL Final     Nitrite Urine 07/19/2022 Negative  Negative Final     Leukocyte Esterase Urine 07/19/2022 Negative  Negative Final     Bacteria Urine 07/19/2022 Few (A) None Seen /HPF Final     Mucus Urine 07/19/2022 Present (A) None Seen /LPF Final     RBC Urine 07/19/2022 1  <=2 /HPF Final     WBC Urine 07/19/2022 1  <=5 /HPF Final     Squamous Epithelials Urine 07/19/2022 7 (A) <=1 /HPF Final     Troponin I High Sensitivity 07/19/2022 3  <54 ng/L Final    This Troponin-I result was obtained using a Siemens Dimension Vista High Sensitivity Troponin-I assay (TNIH). Effective 11/23/21, nine labs/sites in the [facility] switched from a Siemens [place] Contemporary Troponin I assay (CTNI) to a Siemens [place] High-Sensitivity Troponin I assay (TNIH).     WBC Count 07/19/2022 6.9  4.0 - 11.0 10e3/uL Final     RBC Count 07/19/2022 4.54  3.80 - 5.20 10e6/uL Final     Hemoglobin 07/19/2022 13.3  11.7 - 15.7 g/dL Final     Hematocrit 07/19/2022 41.6  35.0 - 47.0 % Final     MCV 07/19/2022 92  78 - 100 fL Final     MCH 07/19/2022 29.3  26.5 - 33.0 pg Final     MCHC 07/19/2022 32.0  31.5 - 36.5 g/dL Final     RDW 07/19/2022 15.4 (A) 10.0 - 15.0 % Final     Platelet Count 07/19/2022 221  150 - 450 10e3/uL Final     % Neutrophils 07/19/2022 54  % Final     %  Lymphocytes 07/19/2022 33  % Final     % Monocytes 07/19/2022 9  % Final     % Eosinophils 07/19/2022 3  % Final     % Basophils 07/19/2022 1  % Final     % Immature Granulocytes 07/19/2022 0  % Final     NRBCs per 100 WBC 07/19/2022 0  <1 /100 Final     Absolute Neutrophils 07/19/2022 3.7  1.6 - 8.3 10e3/uL Final     Absolute Lymphocytes 07/19/2022 2.3  0.8 - 5.3 10e3/uL Final     Absolute Monocytes 07/19/2022 0.6  0.0 - 1.3 10e3/uL Final     Absolute Eosinophils 07/19/2022 0.2  0.0 - 0.7 10e3/uL Final     Absolute Basophils 07/19/2022 0.1  0.0 - 0.2 10e3/uL Final     Absolute Immature Granulocytes 07/19/2022 0.0  <=0.4 10e3/uL Final     Absolute NRBCs 07/19/2022 0.0  10e3/uL Final     Hold Specimen 07/19/2022 [facility]   Final       Answers for HPI/ROS submitted by the patient on 8/8/2022  If you checked off any problems, how difficult have these problems made it for you to do your work, take care of things at home, or get along with other people?: Somewhat difficult  PHQ9 TOTAL SCORE: 11

## 2022-08-10 NOTE — TELEPHONE ENCOUNTER
Spoke with daughter, Lashae Danielle.  Made aware of provider's note/instructions as per below.  Given scheduling number to schedule the neurology consult.  Sandra MALAGON RN

## 2022-08-10 NOTE — TELEPHONE ENCOUNTER
Daughter requesting refill.  Will need for this pm dose.  Phenytoin 50mg  [facility] in [place]  Last OV: 8/8/22 KARELY Álvarez PA-C  Med pended for refill needs  Routing refill request to provider for review/approval because:  Drug not on the [facility] refill protocol     Sandra Godoy RN

## 2022-08-11 NOTE — TELEPHONE ENCOUNTER
Forms/Letter Request    Type of form/letter: [facility]    Have you been seen for this request: N/A    Do we have the form/letter: Yes: Placed in TEam  D form bin    When is form/letter needed by: ASAP    How would you like the form/letter returned: Fax    Patient Notified form requests are processed in 3-5 business days:No    Okay to leave a detailed message?: No at Other phone number:  N/a*

## 2022-08-11 NOTE — TELEPHONE ENCOUNTER
The form is signed and in the MA task box (next to the printer) for completion and scan of the document into the medical record.  Please reconcile medications.  Electronically signed:    Abdirahman Shrestha PA-C

## 2022-08-11 NOTE — TELEPHONE ENCOUNTER
New Patient reported medications  Escitalopram 5 mg take one tablet daily    Form placed in providers bin for review and signature if appropriate  Sailaja Rivera MA on 8/11/2022 at 2:06 PM

## 2022-08-12 NOTE — TELEPHONE ENCOUNTER
Forms/Letter Request    Type of form/letter: order/plan of treatment    Have you been seen for this request: N/A    Do we have the form/letter: Yes:  Placed in Team D form bin    When is form/letter needed by:  asap    How would you like the form/letter returned: Fax [phone]    Patient Notified form requests are processed in 3-5 business days:No  Form received via fax    Okay to leave a detailed message?:  n/a

## 2022-08-12 NOTE — TELEPHONE ENCOUNTER
Form faxed and sent to scanning. New medication is on patients medication list    Sailaja Rivera MA on 8/12/2022 at 11:20 AM

## 2022-08-15 NOTE — TELEPHONE ENCOUNTER
Form placed in [facility] box for review/signature.  Verona Bonilla MA

## 2022-08-15 NOTE — TELEPHONE ENCOUNTER
Reason for Call:  Form, our goal is to have forms completed with 72 hours, however, some forms may require a visit or additional information.    Type of letter, form or note:  medical    Who is the form from?: Insurance comp    Where did the form come from: form was faxed in    What clinic location was the form placed at?: [facility] - [phone]    Where the form was placed: [facility] Box/Folder    What number is listed as a contact on the form?: [phone] Goldie       Additional comments: Please fill out and fax to [phone]    Call taken on 4/23/2019 at 1:42 PM by Vandana Mckeon         no

## 2022-08-17 NOTE — LETTER
2022     RE: Khalida Redding  : 1950   MRN: [id number]      Dear Colleague,    Thank you for referring your patient, Khalida Redding, to the [facility]PILE[facility] at [facility]. Please see a copy of my visit note below.    [facility]:  RETURN VISIT          Service Date: 2022    I spent 46 minutes in this patient care, with 32 minutes in direct patient contact, and 14 minutes in chart review and document preparation.     Antonio Machado M.D.

## 2022-08-18 NOTE — TELEPHONE ENCOUNTER
Form was already completed and faxed back to [facility] last week    Sailaja Rivera MA on 8/18/2022 at 5:20 PM

## 2022-08-18 NOTE — PROGRESS NOTES
"  [facility]:  RETURN VISIT          Service Date: 08/17/2022     HISTORY:  Ms. Khalida Redding is a 72-year-old, right-handed woman with focal epilepsy, recurrent syncope, history of bilateral frontal lobe cerebral infarctions and dementia, who was referred for epilepsy consultation.  The patient was not able to provide useful medical history.  She was accompanied to the visit today by her two daughters, who provided detailed history.      The patient reportedly has had one complex partial seizure since the last visit here, as staring unresponsively without falling but with fumbling hand movements lasting about 30 seconds, which occurred on 08/15/2022.  She has not had any grand mal seizures, syncope, or other events with unresponsiveness since the last visit here on 01/19/2022.      Ictal semiology-history:   The patient has a long history of attacks with falling and loss of consciousness, which were variably diagnosed as epileptic seizures versus syncope.  The patient's  and her daughter provided history suggesting that she had 2 types of falls with unconsciousness.     Events consistent with generalized tonic-clonic seizures began approximately in 2007.  Her family members never noted any sort of change in behavior or evidence that she had any aura or warning before these.  She might be supine, seated or standing at onset.  When standing, she would have a sudden fall.  Her family members would find her down on the floor with increased muscle tone, usually with trunk and limbs extended.  She did not have anything that they would term shaking or jerking, but she often seemed to be \"trembling all over.\" This would last about a minute.  They usually found that she had urinary incontinence and occasionally they found that she had bitten her tongue.  She also has had confusion and prolonged lethargy afterwards.  She probably had more than 50 of these events in total, the most recent of which " occurred in 10/2021.       They noted that she was on levetiracetam between  and , during which time she had very few of these events, but they recurred when she was taken off levetiracetam in .  In 2021, she was started on levetiracetam and phenytoin, after which none of these events occurred.     A second type of fall with unconsciousness was always preceded by lightheadedness and nausea, and usually okay occurred during micturition or defecation.  She would fall if not assisted and was always diffusely limp while unresponsive.  These usually would last less than 30 seconds and there was no postictal state.  She occasionally had urinary incontinence, but never had tongue biting with these.  These probably began approximately in  and occurred many times over the years.  She did not have any of these in the last 6 months or so.     A third type of event was reported by one of her daughters, at a follow-up visit on 2022.  This consists of sudden motionless staring and unresponsiveness with fumbling hand movements lasting about 30 seconds.  Her daughter thought that this type of event has occurred in the past, but could not specify year of onset or overall frequency.  One such event was observed in 2022.      The patient and her family members did not describe any other paroxysmal phenomena either subjective or observed.     Epilepsy-seizure predispositions:   The patient has no family history of epilepsy or seizures.       The patient has had multiple cerebral infarctions involving both frontal lobes, right more than left.  The first stroke probably occurred before the first grand mal seizure, although this is not entirely clear from the history.  I do not have access to records of evaluations in [place] that were performed at the time of the first stroke.     She has no history of gestational or  injury, febrile convulsions, developmental delay, meningitis, encephalitis,  severe head injury, or other epileptic predispositions.  Several of her falls have caused bumps on the head with scalp hematomas, but brain imaging at these times did not show intracranial hemorrhage or other new findings.     Laboratory evaluations:   The patient has had multiple brain MRI scans that have shown extensive right frontal encephalomalacia and patchy left frontal encephalomalacia, consistent with history of cerebral infarctions.  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       She had video EEG monitoring at [facility] on 11/27 to 11/30/2021, which showed generalized delta theta slowing, bihemispheric independent slowing and subclinical electrographic seizures over the right hemisphere on days 1 and 2 (treated with fosphenytoin and levetiracetam).     The patient had evaluation of syncope by a [facility] clinic, which included a loop recording that did not show significant arrhythmias during an episode of falling with unconsciousness in 2017, according to the notes.  She was considered more likely to have vasovagal or other causes of syncope than to be having significant arrhythmias.  Presumably, supraventricular tachycardia was asymptomatic.     Epilepsy therapeutics:   The patient was treated with levetiracetam in [id number].  Apparently, this was discontinued by a physician who thought that she had exclusively syncope as a cause of falls and unconsciousness.  Levetiracetam was restarted and phenytoin was added in 11/2021.  Her family members do not think that she is having adverse effects with this.  The patient was not in steady state on current dosing at the time of available blood levels.     Other history:   The patient began to have difficulty with recalling recent and remote events approximately 10 years ago, according to her family  members.  She later became unable to perform complex and then moderately simple activities of daily living.  She was diagnosed with dementia in 2017.  The family members think that treatment with memantine did not improve her condition.  Currently, she needs care in essentially all activities of daily living, although she is able to feed herself and to swallow without difficulty.  She is non-ambulatory and living in a nursing home.     PAST MEDICAL-SURGICAL HISTORY:    1.  Focal epilepsy, with secondarily generalized tonic-clonic seizures.  2.  Chronic neck bifrontal cerebral infarctions.  3.  History of recurrent syncope, probably due to neurocardiogenic or hypovolemic causes for diabetes mellitus.  4.  Systemic hypertension.  5.  History of supraventricular tachycardia, possibly asymptomatic.  6.  Osteoarthritis.       FAMILY HISTORY:  There is no family history of seizures, epilepsy or other chronic neurological conditions.  A sister had meningitis in childhood, but apparently did not develop epilepsy.     PERSONAL AND SOCIAL HISTORY:  The patient has been living with her  in [place] for approximately 4 years.  She currently is living in a nursing home, but her  would like to take her home with him.  She currently does not use alcohol or tobacco, but had a long history of tobacco use earlier in life.     REVIEW OF SYSTEMS:  The patient herself had no complaints.  She endorsed having thoughts about dying, but not about suicide.       MEDICATIONS:  Levetiracetam 1500 mg b.i.d., phenytoin 100 mg b.i.d. (using 50 mg chewable tablets), gabapentin 300 mg at bedtime (for leg pain), memantine 10 mg daily, metformin, and other medications as per the electronic medical record.      PHYSICAL EXAMINATION:    On physical examination the patient appeared well nourished and in no acute distress.  Pulse was 96/min, and /76, without significant orthostatic changes; respirations 15/min.  Skull was  normocephalic and atraumatic.  Neck was supple, without signs of meningeal irritation.       On examination, the patient was a continuously alert woman, sitting in a wheelchair in apparent comfort.  Speech was fluent, but demonstrated simplified content.  She was able to state her full name, but she was incorrect as to her year of birth and her 's year of birth.  She was disoriented to date and place.  She demonstrated mild facial apraxias and severe somatic apraxias.  She scored 12/30 on the Folstein mini mental status examination.   Spontaneous gaze appeared to be full and conjugate, but was difficult to test due to apraxia.  Pupils were round and reactive to light.  Her face moved bilaterally, but there was flattening of the left nasolabial fold.  Muscle tone and masses were normal throughout.  She demonstrated 4/5 strength on the right and 3/5 strength on the left.  During spontaneous movements, but did not or could not cooperate with specific muscle strength testing.  She appeared to feel light touch throughout.  She did not perform coordination testing.  She was unable to stand from the wheelchair.  Deep tendon reflexes were mildly hyperactive on the left and normal on the right, except that Achilles tendon jerks could not be obtained.  She had bilaterally upgoing toes.     IMPRESSION:    The patient probably has focal epilepsy due to bilateral frontal lobe infarctions.  Prior falls have not recurred recently, and probably represented generalized tonic-clonic seizures and syncope.      Today her daughters described a non-convulsive staring event consistent with a focal impaired (complex partial) seizure.   We discussed reasons to check a current EEG, and they would like to proceed with this.      We reviewed considerations of shifting to levetiracetam monotherapy, given the multiple negative metabolic effects of phenytoin.  They will consider this, after the EEG is completed.  They would like to  continue having medications prescribed by physicians who practice closer to their home.  We can review the EEG results by My Chart or telephone, to avoid another trip to this clinic. .     The patient probably has multi-infarct dementia.  Her family members were concerned about having her see a stroke specialist and her primary care provider, in particular.  She might also benefit by evaluation with dementia specialist, but her family members were more interested in completing the other evaluations first.  They will arrange these evaluations with her primary care provider, Abdirahman Munoz PA-C.       PLAN:    1.  Check phenytoin and levetiracetam levels today.  2.  Continue the current doses of levetiracetam and phenytoin, which apparently are being prescribed by nursing home medical staff.  3.  Out-patient 3hr VEEG.   4.  No return visit to this clinic will be scheduled at this time, but we will review EEG results by telephone or My Chart.   I spent 46 minutes in this patient care, with 32 minutes in direct patient contact, and 14 minutes in chart review and document preparation.        Antonio Machado M.D.   Professor of Neurology

## 2022-08-18 NOTE — TELEPHONE ENCOUNTER
Forms/Letter Request    Type of form/letter: Home Care    Have you been seen for this request: N/A    Do we have the form/letter: Yes: Placed in Team D form bin    When is form/letter needed by: unknown    How would you like the form/letter returned: Fax    Patient Notified form requests are processed in 3-5 business days:No    Fax [phone]

## 2022-08-18 NOTE — TELEPHONE ENCOUNTER
Forms/Letter Request    Type of form/letter: [facility]    Have you been seen for this request: N/A    Do we have the form/letter: Yes: Team D    When is form/letter needed by: unknown    How would you like the form/letter returned: Fax    Patient Notified form requests are processed in 3-5 business days:    Fax form to [phone]

## 2022-08-19 NOTE — PROGRESS NOTES
"Clinic Care Coordination Contact  Care Team Conversations    Call placed to daughter Lashae at scheduled time.  She noted that she had called the number on the back of the insurance card and was told she would get PCA services and had a set amount of hours/minutes \"no more no less\" and thought she could get this every night. She needed an order from the PCP.      Attempted several times to review Medicare rules about PCAS not being a covered service.  Also noted that [facility] does have a one time PCA support for after a hospital stay.  And education that [facility] is a medicare advantage plan and follows Medicare rules.  Daughter still appeared to doubt this and encouraged a more detailed conversation with [facility] on PCA service coverage.      Provided daughter also with the phone number for the [facility] [place] try and get [facility] supports.     Daughter then ended the conversation.     Will not enroll or do future calls at this time.     ONESIMO Cazares  [facility] - Care Coordinator   8/19/2022   3:07 PM  [phone]      "

## 2022-08-25 NOTE — TELEPHONE ENCOUNTER
Order/Referral Request    Who is requesting: daughter Lashea Danielle     Orders being requested: home health aid     Reason service is needed/diagnosis: someone to stay at night to help with getting patient to the toilet     When are orders needed by: asap    Has this been discussed with Provider: Yes    Does patient have a preference on a Group/Provider/Facility? [facility] [phone] or [facility] (P) [phone] , [facility] (P) [phone] and (P) [phone]    Does patient have an appointment scheduled?: No    Where to send orders: Fax    Okay to leave a detailed message?: Yes at Other phone number:  [phone]

## 2022-08-26 NOTE — TELEPHONE ENCOUNTER
Please see RODNEY BLOCK's note with daughter Lashae dated 8/19/22.    This is the same concern that what was (attempted to be) discussed on this call. These services are not covered by health insurance long term. She would need to call [place]. This assessment will see if pt qualifies for a waiver program to pay for in home care. This information was given to her by Zarina BLOCK.     Can this order be closed out?     Thank you,     Care Coordination

## 2022-08-27 PROBLEM — R55 SYNCOPE, UNSPECIFIED SYNCOPE TYPE: Status: ACTIVE | Noted: 2018-12-17

## 2022-08-27 NOTE — ED PROVIDER NOTES
History     Chief Complaint   Patient presents with     Altered Mental Status     HPI  Khalida Redding is a 72 year old female who has a history of a cerebrovascular accident in 2022, transient hypotension, seizure disorder who presents to the emergency department via EMS secondary to altered mental status and difficulty arousing.  She said she was not feeling very well and she was sitting at the table in her studio apartment and her  tried to help her get to the bed and then she became unresponsive.  She did not have any tonic-clonic activity.  Paramedics arrived and found that her blood pressure was quite low, systolic in the 60s.  There was no urinary or bowel incontinence.  No preceding chest pain or palpitations.  No tongue biting or tonic-clonic seizure activity.  She was unarousable for paramedics.  On arrival here she started to wake up.  Normally she does not have a postictal.  Apparently and she has also not been having tonic-clonic activity with her seizures.  She did not have any new facial droop or new weakness.  She does have some residual left-sided weakness from her previous CVA.    Allergies:  Allergies   Allergen Reactions     Amoxicillin Hives and Rash     Pt reports she has taken PCN without problems     Ampicillin Rash       Problem List:    Patient Active Problem List    Diagnosis Date Noted     Difficulty urinating 04/08/2022     Priority: Medium     SIRS (systemic inflammatory response syndrome) - fever, leukocytosis, sinus tachycardia, elevated lactic acid 03/19/2022     Priority: Medium     Acute CVA (cerebrovascular accident) (H) 03/19/2022     Priority: Medium     Transient hypotension 03/19/2022     Priority: Medium     Somnolence 03/19/2022     Priority: Medium     History of paroxysmal supraventricular tachycardia 03/18/2022     Priority: Medium     Cerebrovascular accident (CVA) due to embolism of left anterior cerebral artery (H) 12/09/2021     Priority: Medium      2021       Alex Montero DO  [address]  [address]  [facility] [address]  Via In Basket      Patient: Enrrique Fernández   YOB: 1952   Date of Visit: 2021       Dear Dr. Montero:    I saw your patient, Enrrique Fernández, for an evaluation. Below are my notes for this visit with him.    If you have questions, please do not hesitate to call me.      Sincerely,        Rowan Dodge MD        CC: No Recipients  Rowan Dodge MD  2021  2:34 PM  Signed    Patient ID: Enrrique is a 68 year old male.    Chief Complaint   Patient presents with   • Diabetes Mellitus     smbg-3 Eye exam 2020 across from [facility]   • Office Visit       In brief,  T2DM since   Secondary neuropathy     Follows w ophtalmology every years/p cataracts bilat      Interval hx:     Currently on:  -Metformin 1000 mg BID  - Jardiance 25 mg a day   -Novolog 15 units TID  -Lantus 32 units daily in am     Compliant to his medications, not forgetting to take anymore    No side effects from meds      SMBG 3 times a day:  160 170 sometimes down to 107 like this am   Denies hypogycemia     Had a fall in Nov saw ortho last week: has a prob w rt knee and rt hip as well, was given a cortisone injection in hip   He says his sugars did not increased after the injection   getting PT as well   No broken bone     He is in pain and thinks this is why BP is higher   following w pain clinic     Asking questions about COVID vaccine      Wife w breast cancer s/p chemo doing better, follows every 6 months now    Son  of sudden heart attack at age 40 back in 2020      Lab Results   Component Value Date    POCGLU 316 (A) 2020     Lab Results   Component Value Date    HGBA1C 8.5 (H) 2020    HGBA1C 9.5 (H) 2020    HGBA1C 10.3 (H) 2020     Lab Results   Component Value Date    CREATININE 1.19 (H) 2020     Lab Results   Component Value Date    CALCIUM 8.6 2020     Lab Results   Component  Seizure disorder (H) 11/26/2021     Priority: Medium     History of colonic polyps 07/13/2020     Priority: Medium     Abdominal pain, generalized 06/16/2020     Priority: Medium     Slow transit constipation 06/16/2020     Priority: Medium     Chronic upset stomach 06/16/2020     Priority: Medium     Left hemiparesis (H) 01/13/2020     Priority: Medium     Age-related osteoporosis without current pathological fracture 11/25/2019     Priority: Medium     Lumbar radiculopathy 11/18/2019     Priority: Medium     Closed displaced fracture of left clavicle, unspecified part of clavicle, initial encounter 11/18/2019     Priority: Medium     Injury of left clavicle, initial encounter 11/18/2019     Priority: Medium     Syncope, unspecified syncope type 12/17/2018     Priority: Medium     Added automatically from request for surgery [id number]       Tubular adenoma of colon 11/29/2018     Priority: Medium     Supraventricular tachycardia (H) 10/22/2018     Priority: Medium     Gait instability 10/22/2018     Priority: Medium     Type 2 diabetes mellitus with circulatory disorder, without long-term current use of insulin (H) 09/20/2018     Priority: Medium     Hyperlipidemia LDL goal <100 09/20/2018     Priority: Medium     Hypertension goal BP (blood pressure) < 140/90 09/20/2018     Priority: Medium     History of multiple cerebrovascular accidents (CVAs) 09/20/2018     Priority: Medium     Convulsions, unspecified convulsion type (H) 09/20/2018     Priority: Medium     Osteoarthritis 09/20/2018     Priority: Medium     Tobacco abuse disorder 09/20/2018     Priority: Medium     Pain in both lower legs 09/20/2018     Priority: Medium     Dementia (H) 09/20/2018     Priority: Medium        Past Medical History:    Past Medical History:   Diagnosis Date     Atrial tachycardia (H)      Convulsions, unspecified convulsion type (H) 9/20/2018     CVA (cerebral vascular accident) (H)      Dementia (H) 9/20/2018     Diabetes (H)       Value Date    AST 25 06/16/2020     Lab Results   Component Value Date    GPT 37 06/16/2020     Lab Results   Component Value Date    CALCLDL 98 05/21/2019     Lab Results   Component Value Date    TRIGLYCERIDE 164 (H) 05/21/2019     Lab Results   Component Value Date    MALBCR 5.2 06/16/2020     Lab Results   Component Value Date    TSH 1.253 01/04/2019       ROS:  All other 12 ROS reviewed and are negative otherwise except for what is reported in HPI     Past Medical History:   Diagnosis Date   • H/O colonoscopy 01/01/2015     No past surgical history on file.  No family history on file.  Social History     Socioeconomic History   • Marital status: /Civil Union     Spouse name: Not on file   • Number of children: Not on file   • Years of education: Not on file   • Highest education level: Not on file   Occupational History   • Not on file   Social Needs   • Financial resource strain: Not on file   • Food insecurity     Worry: Not on file     Inability: Not on file   • Transportation needs     Medical: Not on file     Non-medical: Not on file   Tobacco Use   • Smoking status: Never Smoker   • Smokeless tobacco: Never Used   Substance and Sexual Activity   • Alcohol use: No     Frequency: Never   • Drug use: Never   • Sexual activity: Not Currently     Partners: Female   Lifestyle   • Physical activity     Days per week: 2 days     Minutes per session: 20 min   • Stress: Only a little   Relationships   • Social connections     Talks on phone: Not on file     Gets together: Not on file     Attends Taoist service: Not on file     Active member of club or organization: Not on file     Attends meetings of clubs or organizations: Not on file     Relationship status: Not on file   • Intimate partner violence     Fear of current or ex partner: Not on file     Emotionally abused: Not on file     Physically abused: Not on file     Forced sexual activity: Not on file   Other Topics Concern   • Not on file   Social  Falls      History of CVA (cerebrovascular accident) 2018     Hyperlipidemia LDL goal <100 2018     Hypertension goal BP (blood pressure) < 140/90 2018     Osteoarthritis 2018     Pain in both lower legs 2018     Seizures (H)      Smoking      Syncope      Tobacco abuse disorder 2018     Tubular adenoma of colon 2018     Type 2 diabetes mellitus with circulatory disorder, without long-term current use of insulin (H) 2018       Past Surgical History:    Past Surgical History:   Procedure Laterality Date     COLONOSCOPY N/A 2018    Procedure: Colonoscopy, Polypectomy by Biopsy;  Surgeon: Arcadio Mcmullen DO;  Location:  GI     COLONOSCOPY N/A 2020    Procedure: Colonoscopy with possible biopsy and/or polypectomy;  Surgeon: Fabián Hines MD;  Location:  GI     HIP SURGERY Left        Family History:    History reviewed. No pertinent family history.    Social History:  Marital Status:   [2]  Social History     Tobacco Use     Smoking status: Former Smoker     Packs/day: 1.00     Types: Cigarettes     Quit date: 2021     Years since quittin.7     Smokeless tobacco: Never Used     Tobacco comment: QUIT   Vaping Use     Vaping Use: Never used   Substance Use Topics     Alcohol use: No     Drug use: No        Medications:    acetaminophen (TYLENOL) 500 MG tablet  alcohol swab prep pads  aspirin (ASA) 81 MG chewable tablet  atorvastatin (LIPITOR) 40 MG tablet  blood glucose (NO BRAND SPECIFIED) test strip  blood glucose (NO BRAND SPECIFIED) test strip  blood glucose monitoring (NO BRAND SPECIFIED) meter device kit  clopidogrel (PLAVIX) 75 MG tablet  Cyanocobalamin (VITAMIN B 12 PO)  escitalopram (LEXAPRO) 5 MG tablet  gabapentin (NEURONTIN) 300 MG capsule  glipiZIDE (GLUCOTROL XL) 2.5 MG 24 hr tablet  hydrALAZINE (APRESOLINE) 10 MG tablet  levETIRAcetam (KEPPRA) 1000 MG tablet  lisinopril (ZESTRIL) 20 MG tablet  meloxicam (MOBIC) 7.5  History Narrative   • Not on file     ALLERGIES:   Allergen Reactions   • Ace Inhibitors RASH   • Angiotensin Receptor Blockers RASH     Current Outpatient Medications   Medication Sig Dispense Refill   • traMADol (ULTRAM) 50 MG tablet Take 1 tablet by mouth 3 times daily as needed for Pain. Do not start before January 12, 2021. Home Delivery 70 tablet 0   • fentaNYL (DURAGESIC) 25 MCG/HR patch Place 1 patch onto the skin every 72 hours. Home delivery 10 patch 0   • B-D U/F PEN NEEDLE 31G X 5 MM Misc USE AS DIRECTED FOUR TIMES DAILY 400 each 1   • naLOXone (NARCAN) 4 MG/0.1ML nasal spray Spray 4 mg in each nostril 1 time. Call 911. Spray the content of 1 device into 1 nostril. May repeat with 2nd device in alternate nostril if no response in 2-3 minutes. PRN accidental overdose     • diclofenac (CATAFLAM) 50 MG tablet Take 1 tablet by mouth 2 times daily as needed (PRN). 60 tablet 2   • gabapentin (NEURONTIN) 300 MG capsule Take 3 capsules by mouth 3 times daily. 810 capsule 0   • empagliflozin (JARDIANCE) 25 MG tablet Take 1 tablet by mouth daily (before breakfast). 30 tablet 6   • insulin aspart (NovoLOG FlexPen) 100 UNIT/ML pen-injector Inject 15 Units into the skin 3 times daily (before meals). 15 mL 3   • insulin glargine (LANTUS SOLOSTAR) 100 UNIT/ML pen-injector Inject 32 units one a day 15 mL 6   • metformin (GLUCOPHAGE) 1000 MG tablet TAKE 1 TABLET BY MOUTH TWICE DAILY 180 tablet 1   • atorvastatin (LIPITOR) 40 MG tablet TOME MORGAN TABLETA POR VIA ORAL AL ACOSTARSE     • diclofenac (VOLTAREN) 50 MG EC tablet Take 50 mg by mouth 2 times daily.     • diclofenac (CATAFLAM) 50 MG tablet Take 50 mg by mouth 3 times daily.       No current facility-administered medications for this visit.        Physical exam    Visit Vitals  BP (!) 146/62 (BP Location: RUE - Right upper extremity)   Pulse 64   Temp 97.8 °F (36.6 °C)     GA: NAD, well appearing, gen obesity    Head and Neck: Normocephalic atraumatic, swallows ok,  MG tablet  memantine (NAMENDA) 10 MG tablet  metFORMIN (GLUCOPHAGE XR) 500 MG 24 hr tablet  omeprazole (PRILOSEC) 40 MG DR capsule  order for DME  phenytoin (DILANTIN) 50 MG chewable tablet  polyethylene glycol (MIRALAX) 17 GM/Dose powder  thin (NO BRAND SPECIFIED) lancets  alendronate (FOSAMAX) 70 MG tablet          Review of Systems   All other systems reviewed and are negative.      Physical Exam   BP: 94/53  Pulse: 72  Temp: 97.7  F (36.5  C)  Resp: 10  Weight: 70.8 kg (156 lb)  SpO2: 90 %      Physical Exam  Vitals and nursing note reviewed.   Constitutional:       General: She is not in acute distress.     Appearance: She is well-developed. She is not diaphoretic.   HENT:      Head: Normocephalic and atraumatic.   Eyes:      General: No visual field deficit or scleral icterus.  Cardiovascular:      Rate and Rhythm: Normal rate and regular rhythm.   Pulmonary:      Effort: Pulmonary effort is normal.      Breath sounds: Normal breath sounds.   Musculoskeletal:      Cervical back: Normal range of motion and neck supple.   Skin:     General: Skin is warm and dry.      Coloration: Skin is not pale.      Findings: No erythema or rash.   Neurological:      Mental Status: She is alert and oriented to person, place, and time.      GCS: GCS eye subscore is 4. GCS verbal subscore is 5. GCS motor subscore is 6.      Cranial Nerves: No cranial nerve deficit, dysarthria or facial asymmetry.      Sensory: No sensory deficit.      Motor: No weakness.         ED Course                 Procedures              EKG Interpretation:      Interpreted by Danilo Rucker MD  Time reviewed: 1834  Symptoms at time of EKG: ams  Rhythm: normal sinus   Rate: normal  Axis: normal  Ectopy: none  Conduction: normal  ST Segments/ T Waves: No ST-T wave changes  Q Waves: none  Comparison to prior: Unchanged    Clinical Impression: normal EKG      Initially discussed with the stroke neurologist.  And they agreed with proceeding with CT CTA.   good ROM of neck   Moist mucous membranes  ENT: EOM intact, no scleral icterus, Hearing ok, vision ok, no hoarseness   Thyroid not enlarged  Resp: Non labored breathing, no wheezing, No resp distress   Neuro: Alert, awake, conversive, ambulating ok, speech ok, no focal deficits   Sites of injection/insertion: intact     Musculoskelettal: uses a cane   Psych: appropriate mood and affect, cooperative    Assessment      1- T2DM uncontrolled w secondary neuropathy, glycemia better and A1c improving :   Emphasize lifestyle changes  Keep DM meds same   Get A1c, Cr, Lipids       Yearly eye exam at least         SMBG x 4     BP above goal was ok in Nov pt in pain , will reassess next visit     Chart reviewed  Labs reviewed and d/w pt     Also D/w pt:  - importance of glucose control to prevent long term complications   - A1c significance and target     2- Dyslipidemia:   on Atorva   keep same     RTC in 3-4 months    Rowan Dodge MD, FACE   of Clinical Medicine  Endocrinology Division, Internal Medicine Department   [facility]/ [facility]              That came back normal and the code stroke was de-escalated.  It was felt that most likely her symptoms are not related to a CVA and MRI was not necessarily indicated.  We do not have MRI available at this point on the weekend here anyway.      Results for orders placed or performed during the hospital encounter of 08/27/22 (from the past 24 hour(s))   CT Head w/o Contrast    Narrative    EXAM: CTA HEAD NECK W CONTRAST, CT HEAD W/O CONTRAST  LOCATION: [facility]  DATE/TIME: 8/27/2022 6:11 PM    INDICATION: Acute onset unresponsiveness.  COMPARISON: CT head 06/11/2022  CONTRAST: 70ml isovue 370  TECHNIQUE: Head and neck CT angiogram with IV contrast. Noncontrast head CT followed by axial helical CT images of the head and neck vessels obtained during the arterial phase of intravenous contrast administration. Axial 2D reconstructed images and   multiplanar 3D MIP reconstructed images of the head and neck vessels were performed by the technologist. Dose reduction techniques were used. All stenosis measurements made according to NASCET criteria unless otherwise specified.    FINDINGS:   NONCONTRAST HEAD CT:   INTRACRANIAL CONTENTS: No intracranial hemorrhage, extraaxial collection, or mass effect.  No CT evidence of acute infarct. Old infarct right anterior superior frontal lobe with ex vacuo dilatation of right frontal horn. Mild chronic periventricular   white matter changes. Mild generalized volume loss. No hydrocephalus.     VISUALIZED ORBITS/SINUSES/MASTOIDS: No intraorbital abnormality. No paranasal sinus mucosal disease. No middle ear or mastoid effusion.    BONES/SOFT TISSUES: No acute abnormality.    HEAD CTA:  ANTERIOR CIRCULATION: No stenosis/occlusion, aneurysm, or high flow vascular malformation. Fetal origin of the right posterior cerebral artery from the anterior circulation.    POSTERIOR CIRCULATION: No stenosis/occlusion, aneurysm, or high flow vascular malformation.  Balanced vertebral arteries supply a normal basilar artery.     DURAL VENOUS SINUSES: Expected enhancement of the major dural venous sinuses.    NECK CTA:  RIGHT CAROTID: No measurable stenosis or dissection. Retropharyngeal course.    LEFT CAROTID: No measurable stenosis or dissection.    VERTEBRAL ARTERIES: No focal stenosis or dissection. Balanced vertebral arteries.    AORTIC ARCH: Classic aortic arch anatomy with no significant stenosis at the origin of the great vessels.    NONVASCULAR STRUCTURES: Moderate emphysema.      Impression    IMPRESSION:   HEAD CT:  1.  No CT evidence for acute intracranial process.  2.  Old right frontal infarct and chronic microvascular ischemic changes as above, unchanged from prior.    HEAD CTA:   1.  Normal CTA [place].    NECK CTA:  1.  Normal neck CTA.    As discussed with Dr. Rucker at 6:21 PM CDT.     CTA Head Neck with Contrast    Narrative    EXAM: CTA HEAD NECK W CONTRAST, CT HEAD W/O CONTRAST  LOCATION: [facility]  DATE/TIME: 8/27/2022 6:11 PM    INDICATION: Acute onset unresponsiveness.  COMPARISON: CT head 06/11/2022  CONTRAST: 70ml isovue 370  TECHNIQUE: Head and neck CT angiogram with IV contrast. Noncontrast head CT followed by axial helical CT images of the head and neck vessels obtained during the arterial phase of intravenous contrast administration. Axial 2D reconstructed images and   multiplanar 3D MIP reconstructed images of the head and neck vessels were performed by the technologist. Dose reduction techniques were used. All stenosis measurements made according to NASCET criteria unless otherwise specified.    FINDINGS:   NONCONTRAST HEAD CT:   INTRACRANIAL CONTENTS: No intracranial hemorrhage, extraaxial collection, or mass effect.  No CT evidence of acute infarct. Old infarct right anterior superior frontal lobe with ex vacuo dilatation of right frontal horn. Mild chronic periventricular   white matter changes. Mild  generalized volume loss. No hydrocephalus.     VISUALIZED ORBITS/SINUSES/MASTOIDS: No intraorbital abnormality. No paranasal sinus mucosal disease. No middle ear or mastoid effusion.    BONES/SOFT TISSUES: No acute abnormality.    HEAD CTA:  ANTERIOR CIRCULATION: No stenosis/occlusion, aneurysm, or high flow vascular malformation. Fetal origin of the right posterior cerebral artery from the anterior circulation.    POSTERIOR CIRCULATION: No stenosis/occlusion, aneurysm, or high flow vascular malformation. Balanced vertebral arteries supply a normal basilar artery.     DURAL VENOUS SINUSES: Expected enhancement of the major dural venous sinuses.    NECK CTA:  RIGHT CAROTID: No measurable stenosis or dissection. Retropharyngeal course.    LEFT CAROTID: No measurable stenosis or dissection.    VERTEBRAL ARTERIES: No focal stenosis or dissection. Balanced vertebral arteries.    AORTIC ARCH: Classic aortic arch anatomy with no significant stenosis at the origin of the great vessels.    NONVASCULAR STRUCTURES: Moderate emphysema.      Impression    IMPRESSION:   HEAD CT:  1.  No CT evidence for acute intracranial process.  2.  Old right frontal infarct and chronic microvascular ischemic changes as above, unchanged from prior.    HEAD CTA:   1.  Normal CTA [place].    NECK CTA:  1.  Normal neck CTA.    As discussed with Dr. Rucker at 6:21 PM CDT.     CBC with Platelets & Differential    Narrative    The following orders were created for panel order CBC with Platelets & Differential.  Procedure                               Abnormality         Status                     ---------                               -----------         ------                     CBC with platelets and d...[[id number]]  Abnormal            Final result                 Please view results for these tests on the individual orders.   Basic metabolic panel   Result Value Ref Range    Sodium 136 133 - 144 mmol/L    Potassium 4.4 3.4 - 5.3 mmol/L     Chloride 103 94 - 109 mmol/L    Carbon Dioxide (CO2) 29 20 - 32 mmol/L    Anion Gap 4 3 - 14 mmol/L    Urea Nitrogen 11 7 - 30 mg/dL    Creatinine 0.68 0.52 - 1.04 mg/dL    Calcium 7.3 (L) 8.5 - 10.1 mg/dL    Glucose 132 (H) 70 - 99 mg/dL    GFR Estimate >90 >60 mL/min/1.73m2   INR   Result Value Ref Range    INR 1.11 0.85 - 1.15   Partial thromboplastin time   Result Value Ref Range    aPTT 20 (L) 22 - 38 Seconds   Troponin I   Result Value Ref Range    Troponin I High Sensitivity 4 <54 ng/L   CBC with platelets and differential   Result Value Ref Range    WBC Count 5.0 4.0 - 11.0 10e3/uL    RBC Count 3.77 (L) 3.80 - 5.20 10e6/uL    Hemoglobin 11.2 (L) 11.7 - 15.7 g/dL    Hematocrit 35.3 35.0 - 47.0 %    MCV 94 78 - 100 fL    MCH 29.7 26.5 - 33.0 pg    MCHC 31.7 31.5 - 36.5 g/dL    RDW 13.8 10.0 - 15.0 %    Platelet Count 189 150 - 450 10e3/uL    % Neutrophils 56 %    % Lymphocytes 30 %    % Monocytes 10 %    % Eosinophils 3 %    % Basophils 1 %    % Immature Granulocytes 0 %    NRBCs per 100 WBC 0 <1 /100    Absolute Neutrophils 2.8 1.6 - 8.3 10e3/uL    Absolute Lymphocytes 1.5 0.8 - 5.3 10e3/uL    Absolute Monocytes 0.5 0.0 - 1.3 10e3/uL    Absolute Eosinophils 0.1 0.0 - 0.7 10e3/uL    Absolute Basophils 0.0 0.0 - 0.2 10e3/uL    Absolute Immature Granulocytes 0.0 <=0.4 10e3/uL    Absolute NRBCs 0.0 10e3/uL   Symptomatic; Unknown Influenza A/B & SARS-CoV2 (COVID-19) Virus PCR Multiplex Nose    Specimen: Nose; Swab   Result Value Ref Range    Influenza A PCR Negative Negative    Influenza B PCR Negative Negative    SARS CoV2 PCR Negative Negative    Narrative    Testing was performed using the amy SARS-CoV-2 & Influenza A/B Assay on the amy Nicole System. This test should be ordered for the detection of SARS-CoV-2 and influenza viruses in individuals who meet clinical and/or epidemiological criteria. Test performance is unknown in asymptomatic patients. This test is for in vitro diagnostic use under the FDA  EUA for laboratories certified under CLIA to perform moderate and/or high complexity testing. This test has not been FDA cleared or approved. A negative result does not rule out the presence of PCR inhibitors in the specimen or target RNA in concentration below the limit of detection for the assay. If only one viral target is positive but coinfection with multiple targets is suspected, the sample should be re-tested with another FDA cleared, approved or authorized test, if coinfection would change clinical management. [facility] are certified under the Clinical Laboratory Improvement Amendments of 1988 (CLIA-88) as  qualified to perform moderate and/or high complexity laboratory testing.   UA with Microscopic reflex to Culture    Specimen: Urine, Catheter   Result Value Ref Range    Color Urine Yellow Colorless, Straw, Light Yellow, Yellow    Appearance Urine Clear Clear    Glucose Urine Negative Negative mg/dL    Bilirubin Urine Negative Negative    Ketones Urine Negative Negative mg/dL    Specific Gravity Urine 1.010 1.003 - 1.035    Blood Urine Negative Negative    pH Urine 7.5 (H) 5.0 - 7.0    Protein Albumin Urine Negative Negative mg/dL    Urobilinogen Urine Normal Normal, 2.0 mg/dL    Nitrite Urine Negative Negative    Leukocyte Esterase Urine Negative Negative    Bacteria Urine Few (A) None Seen /HPF    Mucus Urine Present (A) None Seen /LPF    RBC Urine 1 <=2 /HPF    WBC Urine 1 <=5 /HPF    Squamous Epithelials Urine 1 <=1 /HPF    Narrative    Urine Culture not indicated       Medications   iopamidol (ISOVUE-370) solution 500 mL (70 mLs Intravenous Given 8/27/22 1804)   sodium chloride 0.9 % bag 100mL for CT scan flush use (100 mLs Intravenous Given 8/27/22 1804)         Assessments & Plan (with Medical Decision Making)  72-year-old female who had a syncopal episode and was unresponsive has come around now in the emergency department and is back to her baseline.  She does have some  confusion related to her dementia.  She has some baseline left-sided weakness from previous CVA.  He is uncertain as to whether or not this was seizure activity or possibly a CVA although she does not have any focal neurologic weaknesses now.  Her blood pressure was quite low at the time of the episode and that may have contributed.  Labs are reassuring.  According to the daughters the patient does not have tonic-clonic activity with her seizures and when she was admitted previously epileptiform EEG findings were occurring without any tonic-clonic activity.  She is on antiseizure medications already and has a epileptologist.     I have reviewed the nursing notes.    I have reviewed the findings, diagnosis, plan and need for follow up with the patient.      New Prescriptions    No medications on file       Final diagnoses:   Syncope, unspecified syncope type       8/27/2022   [facility] EMERGENCY DEPT     Danilo Rucker MD  08/27/22 [id number]

## 2022-08-27 NOTE — CONSULTS
[facility]    Stroke Telephone Note    I was called by Daniol Rucker on 08/27/22 regarding patient Khalida Redding. The patient is a 72 year old female who has history of bilateral frontal strokes and epilepsy. She is on dual antiplatelet therapy and 2 antiepileptic drugs, keppra and phenytoin. She follows with Dr Machado, epileptologist at [facility]. According to his notes, the patient has history of both convulsive seizures (GTCs) and non-convulsive seizures (loss of consciousness).     Today, the patient was at home with her  when she told him that she was not feeling well. He helped her lay down then she lost consciousness. He called ambulance. EMS reported that she was almost comatose (GCS 8) and her BP was low when they initially arrived and that she improved remarkably by the time they arrived to the hospital. After imaging was done I called the ED MD again and he indicated that the patient was almost back to baseline.       Stroke Code Data (for stroke code without tele)  Stroke code activated 08/27/22   1746   Stroke provider first response  08/27/22   1744            Last known normal 08/27/22            Time of discovery   (or onset of symptoms) 08/27/22       Head CT read by Stroke Neuro Dr/Provider 08/27/22   1805   Was stroke code de-escalated? Yes 08/27/22 1818          Imaging Findings   CT head: no acute pathology, bilateral frontal encephalomalacia   CTA head and neck: no LVO     Intravenous Thrombolysis  Not given due to:   - stroke mimic: seizure    Endovascular Treatment  Not initiated due to absence of proximal vessel occlusion    Impression  Loss of consciousness episode. Patient has history of prior episodes. Some of these episodes were thought to be due to syncope and some due to seizures. The low blood pressure initially suggests syncope but the recovery of consciousness was a bit too slow for syncope.       Recommendations   No further stroke  "workup. Cardiac workup per ED MD.  I would not change her AEDs today because she is not having frequent spells. If the patient is otherwise medically stable she may discharge home and follow up with her primary neurologist as an outpatient.     My recommendations are based on the information provided over the phone by Khalida Redding's in-person providers. They are not intended to replace the clinical judgment of her in-person providers. I was not requested to personally see or examine the patient at this time.      Criselda Sanchez MD, Msc, ALYX, SANYAN   of Neurology  [facility]     08/27/2022 6:27 PM  To page me or covering stroke neurology team member, click here: AMCOM  Choose \"On Call\" tab at top, then search dropdown box for \"Neurology Adult\" & press Enter, look for Neuro ICU/Stroke      "

## 2022-08-27 NOTE — ED TRIAGE NOTES
Pt was eating dinner and reported to  she felt funny and then they reported she was not responding much and was seizure like, hx of seizure and stroke

## 2022-08-28 PROBLEM — D69.1 PLATELET FUNCTION DEFECT (H): Status: ACTIVE | Noted: 2022-01-01

## 2022-08-28 PROBLEM — E88.09 HYPOALBUMINEMIA: Status: ACTIVE | Noted: 2022-01-01

## 2022-08-28 PROBLEM — I95.9 HYPOTENSION, UNSPECIFIED HYPOTENSION TYPE: Status: ACTIVE | Noted: 2022-01-01

## 2022-08-28 PROBLEM — Z99.3 WHEELCHAIR BOUND: Status: ACTIVE | Noted: 2022-01-01

## 2022-08-28 PROBLEM — I69.354 HEMIPARESIS AFFECTING LEFT SIDE AS LATE EFFECT OF STROKE (H): Status: ACTIVE | Noted: 2020-01-13

## 2022-08-28 PROBLEM — G93.40 ENCEPHALOPATHY: Status: ACTIVE | Noted: 2022-01-01

## 2022-08-28 PROBLEM — Z95.818 STATUS POST PLACEMENT OF IMPLANTABLE LOOP RECORDER: Status: ACTIVE | Noted: 2022-01-01

## 2022-08-28 PROBLEM — R41.0 DISORIENTATION: Status: ACTIVE | Noted: 2022-01-01

## 2022-08-28 PROBLEM — Z95.0 CARDIAC PACEMAKER IN SITU: Status: ACTIVE | Noted: 2022-01-01

## 2022-08-28 NOTE — PROGRESS NOTES
S-(situation): Patient changed to inpatient status    B-(background): Patient status change due to observation goals not being met.     A-(assessment): Patient having dizzy spells when getting up out of bed and has elevated d-dimer.       R-(recommendations):  . Will monitor patient per MD orders. Starting patient on telemetry and doing orthostatic blood pressures      Inpatient nursing criteria listed below were met:    Adult Profile completedYes  Health care directives status obtained and documented: Yes  VTE prophylaxis orders: SCD  (FYI: Asprin is not an approved anticoagulation for DVT prophylaxis)  SCD's Documented: Yes  Vaccine assessment done and vaccine ordered if needed. Yes  My Chart patient sign up addressed and documented: Yes  Care Plan initiated: Yes  Discharge planning review completed (admission navigator) Yes

## 2022-08-28 NOTE — PROGRESS NOTES
"S-(situation): Patient registered to Observation. Patient arrived to room 268 via cart from ED    B-(background): Pt tx to room in OBS status due possible Sz or syncopal episode while at home.  Pt had a stroke \"a few years ago\" and has a Sz history.  Pt has no memory of event and feels like she is back at her baseline.    A-(assessment): Pt VSS and at baseline    R-(recommendations): Orders and observation goals reviewed with Pt & provider    Nursing Observation criteria listed below was met:    Skin issues/needs documented:Yes  Isolation needs addressed and Signage up: NA  Fall Prevention: Education given and documented: Yes  Education Assessment documented:Yes  Admission Education Documented: Yes  New medication patient education completed and documented (Possible Side Effects of Common Medications handout): Yes  OBS video/handout Reviewed & Documented: Yes  Allergies Reviewed: Yes  Medication Reconciliation Complete: Yes  Home medications if not able to send immediately home with family stored here: NA  Reminder note placed in discharge instructions of home meds: NA  Patient has discharge needs (If yes, please explain): Yes, needs to be sx free overnight  Patient discharge preferences addressed and charted on white board:  Yes  Provider notified that patient has arrived to the unit: Yes     /64 (BP Location: Right arm, Cuff Size: Adult Small)   Pulse 85   Temp 98  F (36.7  C) (Oral)   Resp 18   Ht 1.651 m (5' 5\")   Wt 69.7 kg (153 lb 10.6 oz)   SpO2 95%   BMI 25.57 kg/m            "

## 2022-08-28 NOTE — H&P
History and Physical     FACILITY: [facility]      Name: Khalida Redding   Age: 72 year old    : 1950                                                                       MRN: [id number]                                                                                          PCP: Abdirahman Munoz                                                                                              Chief Complaint   Patient presents with     Altered Mental Status          HPI:   Khalida Redding is a 72 year old female who presented with altered mental status and difficulty arousing. Patient was not feeling well and while sitting at the table in her apartment, her  then tried to help her get to the bed and then she became unresponsive. Patient was reported difficult to arouse. She did not have any tonic-clonic activity. Paramedics arrived and found that her blood pressure was low, systolic in the 60s. There was no urinary or bowel incontinence. No reports of chest pain, shortness of breath or palpitations. She was difficult to arouse for the paramedics. Patient improved when she arrived to the ED. Patient does have mild residual left-sided weakness from her previous CVA.    Past Medical: Hypertension, Diabetes, CVA, Seizures    Social History:    Lives with , Uses wheelchair for last 2 months, Former smoker 1 ppd, no Alcohol, no Drugs       Past Surgical: Left hip surgery         Family History: Father with cancer    Patient Active Problem List    Diagnosis Date Noted     Disorientation 2022     Priority: Medium     Difficulty urinating 2022     Priority: Medium     SIRS (systemic inflammatory response syndrome) - fever, leukocytosis, sinus tachycardia, elevated lactic acid 2022     Priority: Medium     Acute CVA (cerebrovascular accident) (H) 2022     Priority: Medium     Transient hypotension 2022     Priority: Medium     Somnolence  03/19/2022     Priority: Medium     History of paroxysmal supraventricular tachycardia 03/18/2022     Priority: Medium     Cerebrovascular accident (CVA) due to embolism of left anterior cerebral artery (H) 12/09/2021     Priority: Medium     Seizure disorder (H) 11/26/2021     Priority: Medium     History of colonic polyps 07/13/2020     Priority: Medium     Abdominal pain, generalized 06/16/2020     Priority: Medium     Slow transit constipation 06/16/2020     Priority: Medium     Chronic upset stomach 06/16/2020     Priority: Medium     Left hemiparesis (H) 01/13/2020     Priority: Medium     Age-related osteoporosis without current pathological fracture 11/25/2019     Priority: Medium     Lumbar radiculopathy 11/18/2019     Priority: Medium     Closed displaced fracture of left clavicle, unspecified part of clavicle, initial encounter 11/18/2019     Priority: Medium     Injury of left clavicle, initial encounter 11/18/2019     Priority: Medium     Syncope, unspecified syncope type 12/17/2018     Priority: Medium     Added automatically from request for surgery [id number]       Tubular adenoma of colon 11/29/2018     Priority: Medium     Supraventricular tachycardia (H) 10/22/2018     Priority: Medium     Gait instability 10/22/2018     Priority: Medium     Type 2 diabetes mellitus with circulatory disorder, without long-term current use of insulin (H) 09/20/2018     Priority: Medium     Hyperlipidemia LDL goal <100 09/20/2018     Priority: Medium     Hypertension goal BP (blood pressure) < 140/90 09/20/2018     Priority: Medium     History of multiple cerebrovascular accidents (CVAs) 09/20/2018     Priority: Medium     Convulsions, unspecified convulsion type (H) 09/20/2018     Priority: Medium     Osteoarthritis 09/20/2018     Priority: Medium     Tobacco abuse disorder 09/20/2018     Priority: Medium     Pain in both lower legs 09/20/2018     Priority: Medium     Dementia (H) 09/20/2018     Priority: Medium       Past Medical History:   Diagnosis Date     Atrial tachycardia (H)     nonsustained, detected on Ziopatch     Convulsions, unspecified convulsion type (H) 9/20/2018     CVA (cerebral vascular accident) (H)      Dementia (H) 9/20/2018     Diabetes (H)      Falls      History of CVA (cerebrovascular accident) 9/20/2018     Hyperlipidemia LDL goal <100 9/20/2018     Hypertension goal BP (blood pressure) < 140/90 9/20/2018     Osteoarthritis 9/20/2018     Pain in both lower legs 9/20/2018     Seizures (H)      Smoking      Syncope      Tobacco abuse disorder 9/20/2018     Tubular adenoma of colon 11/29/2018     Type 2 diabetes mellitus with circulatory disorder, without long-term current use of insulin (H) 9/20/2018     Home Medications:  No current facility-administered medications on file prior to encounter.  acetaminophen (TYLENOL) 500 MG tablet, Take 1,000 mg by mouth every 6 hours as needed for mild pain  alcohol swab prep pads, Use to swab area of injection/carlos manuel as directed.  aspirin (ASA) 81 MG chewable tablet, Take 1 tablet (81 mg) by mouth daily Take 1 tablet daily through Feb 28.  atorvastatin (LIPITOR) 40 MG tablet, Take 1 tablet (40 mg) by mouth daily  blood glucose (NO BRAND SPECIFIED) test strip, Use to test blood sugar once daily. To accompany: Blood Glucose Monitor Brands: per insurance.  blood glucose (NO BRAND SPECIFIED) test strip, Use to test blood sugar 1 times daily or as directed.  blood glucose monitoring (NO BRAND SPECIFIED) meter device kit, Use to test blood sugar once daily. Preferred blood glucose meter OR supplies to accompany: Blood Glucose Monitor Brands: per insurance.  clopidogrel (PLAVIX) 75 MG tablet, Take 1 tablet (75 mg) by mouth daily  Cyanocobalamin (VITAMIN B 12 PO), Take 1,000 mcg by mouth daily  escitalopram (LEXAPRO) 5 MG tablet, Take 1 tablet (5 mg) by mouth daily  gabapentin (NEURONTIN) 300 MG capsule, TAKE 1 CAPSULE BY MOUTH AT  BEDTIME  glipiZIDE (GLUCOTROL XL) 2.5 MG 24  hr tablet, TAKE 1 TABLET BY MOUTH  DAILY  hydrALAZINE (APRESOLINE) 10 MG tablet, Take 1 tab (10mg) by mouth if systolic BP exceeds 180  levETIRAcetam (KEPPRA) 1000 MG tablet, Take 1 tablet (1,000 mg) by mouth 2 times daily  lisinopril (ZESTRIL) 20 MG tablet, Take 1 tablet (20 mg) by mouth daily  meloxicam (MOBIC) 7.5 MG tablet, Take 1 tablet (7.5 mg) by mouth daily  memantine (NAMENDA) 10 MG tablet, Take 1 tablet (10 mg) by mouth daily  metFORMIN (GLUCOPHAGE XR) 500 MG 24 hr tablet, Take 1 tablet (500 mg) by mouth daily (with dinner)  omeprazole (PRILOSEC) 40 MG DR capsule, Take 1 capsule (40 mg) by mouth At Bedtime  order for DME, Equipment being ordered: glucometer and related supplies.  phenytoin (DILANTIN) 50 MG chewable tablet, CHEW AND SWALLOW 2 TABLETS BY MOUTH TWICE DAILY  polyethylene glycol (MIRALAX) 17 GM/Dose powder, Take 17 g (1 capful) by mouth 2 times daily  thin (NO BRAND SPECIFIED) lancets, Use with lanceting device. To accompany: Blood Glucose Monitor Brands: per insurance.  alendronate (FOSAMAX) 70 MG tablet, Take 1 tablet (70 mg) by mouth every 7 days Tuesdays      Allergies   Allergen Reactions     Amoxicillin Hives and Rash     Pt reports she has taken PCN without problems     Ampicillin Rash     Past Surgical History:   Procedure Laterality Date     COLONOSCOPY N/A 11/28/2018    Procedure: Colonoscopy, Polypectomy by Biopsy;  Surgeon: Arcadio Mcmullen DO;  Location:  GI     COLONOSCOPY N/A 8/24/2020    Procedure: Colonoscopy with possible biopsy and/or polypectomy;  Surgeon: Fabián Hines MD;  Location:  GI     HIP SURGERY Left      History reviewed. No pertinent family history.  Social History     Socioeconomic History     Marital status:      Spouse name: Not on file     Number of children: Not on file     Years of education: Not on file     Highest education level: Not on file   Occupational History     Not on file   Tobacco Use     Smoking status: Former  Smoker     Packs/day: 1.00     Types: Cigarettes     Quit date: 2021     Years since quittin.7     Smokeless tobacco: Never Used     Tobacco comment: QUIT   Vaping Use     Vaping Use: Never used   Substance and Sexual Activity     Alcohol use: No     Drug use: No     Sexual activity: Not Currently     Partners: Male   Other Topics Concern     Parent/sibling w/ CABG, MI or angioplasty before 65F 55M? Not Asked   Social History Narrative     Not on file     Social Determinants of Health     Financial Resource Strain: Not on file   Food Insecurity: Not on file   Transportation Needs: Not on file   Physical Activity: Not on file   Stress: Not on file   Social Connections: Not on file   Intimate Partner Violence: Not on file   Housing Stability: Not on file       Review of Systems  All ten systems were reviewed and negative except as detailed under HPI.      Vitals:     B/P: 134/64, T: 98, P: 85, R: 18       Physical Exam:  General appearance: AAOx3, NAD, obese  HEENT: Normocephalic, atraumatic, PERRL, EOMI  Neck: Supple, no JVD, non-tender  Cardiac: RRR, normal S1 and S2  Respiratory/Chest: CTAB. No rhonchi, rales or wheezing. Equal breath sounds bilaterally  GI/Abdomen: +BS in all 4 quadrants, soft, non-tender, non-distended, no guarding, no rebound  Back/Spine: Normal alignment, no tenderness  Musculoskeletal/Extremities: ROM intact, non-tender  Neuro: CN 2-12 intact, strength and sensation intact  Skin: No breakdown, no ulcers. Warm, dry.     Lab Review:  Results for orders placed or performed during the hospital encounter of 22   CT Head w/o Contrast     Status: None    Narrative    EXAM: CTA HEAD NECK W CONTRAST, CT HEAD W/O CONTRAST  LOCATION: [facility]  DATE/TIME: 2022 6:11 PM    INDICATION: Acute onset unresponsiveness.  COMPARISON: CT head 2022  CONTRAST: 70ml isovue 370  TECHNIQUE: Head and neck CT angiogram with IV contrast. Noncontrast head CT  followed by axial helical CT images of the head and neck vessels obtained during the arterial phase of intravenous contrast administration. Axial 2D reconstructed images and   multiplanar 3D MIP reconstructed images of the head and neck vessels were performed by the technologist. Dose reduction techniques were used. All stenosis measurements made according to NASCET criteria unless otherwise specified.    FINDINGS:   NONCONTRAST HEAD CT:   INTRACRANIAL CONTENTS: No intracranial hemorrhage, extraaxial collection, or mass effect.  No CT evidence of acute infarct. Old infarct right anterior superior frontal lobe with ex vacuo dilatation of right frontal horn. Mild chronic periventricular   white matter changes. Mild generalized volume loss. No hydrocephalus.     VISUALIZED ORBITS/SINUSES/MASTOIDS: No intraorbital abnormality. No paranasal sinus mucosal disease. No middle ear or mastoid effusion.    BONES/SOFT TISSUES: No acute abnormality.    HEAD CTA:  ANTERIOR CIRCULATION: No stenosis/occlusion, aneurysm, or high flow vascular malformation. Fetal origin of the right posterior cerebral artery from the anterior circulation.    POSTERIOR CIRCULATION: No stenosis/occlusion, aneurysm, or high flow vascular malformation. Balanced vertebral arteries supply a normal basilar artery.     DURAL VENOUS SINUSES: Expected enhancement of the major dural venous sinuses.    NECK CTA:  RIGHT CAROTID: No measurable stenosis or dissection. Retropharyngeal course.    LEFT CAROTID: No measurable stenosis or dissection.    VERTEBRAL ARTERIES: No focal stenosis or dissection. Balanced vertebral arteries.    AORTIC ARCH: Classic aortic arch anatomy with no significant stenosis at the origin of the great vessels.    NONVASCULAR STRUCTURES: Moderate emphysema.      Impression    IMPRESSION:   HEAD CT:  1.  No CT evidence for acute intracranial process.  2.  Old right frontal infarct and chronic microvascular ischemic changes as above,  unchanged from prior.    HEAD CTA:   1.  Normal CTA [place] [facility] [place].    NECK CTA:  1.  Normal neck CTA.    As discussed with Dr. Rucker at 6:21 PM CDT.     CTA Head Neck with Contrast     Status: None    Narrative    EXAM: CTA HEAD NECK W CONTRAST, CT HEAD W/O CONTRAST  LOCATION: [facility]  DATE/TIME: 8/27/2022 6:11 PM    INDICATION: Acute onset unresponsiveness.  COMPARISON: CT head 06/11/2022  CONTRAST: 70ml isovue 370  TECHNIQUE: Head and neck CT angiogram with IV contrast. Noncontrast head CT followed by axial helical CT images of the head and neck vessels obtained during the arterial phase of intravenous contrast administration. Axial 2D reconstructed images and   multiplanar 3D MIP reconstructed images of the head and neck vessels were performed by the technologist. Dose reduction techniques were used. All stenosis measurements made according to NASCET criteria unless otherwise specified.    FINDINGS:   NONCONTRAST HEAD CT:   INTRACRANIAL CONTENTS: No intracranial hemorrhage, extraaxial collection, or mass effect.  No CT evidence of acute infarct. Old infarct right anterior superior frontal lobe with ex vacuo dilatation of right frontal horn. Mild chronic periventricular   white matter changes. Mild generalized volume loss. No hydrocephalus.     VISUALIZED ORBITS/SINUSES/MASTOIDS: No intraorbital abnormality. No paranasal sinus mucosal disease. No middle ear or mastoid effusion.    BONES/SOFT TISSUES: No acute abnormality.    HEAD CTA:  ANTERIOR CIRCULATION: No stenosis/occlusion, aneurysm, or high flow vascular malformation. Fetal origin of the right posterior cerebral artery from the anterior circulation.    POSTERIOR CIRCULATION: No stenosis/occlusion, aneurysm, or high flow vascular malformation. Balanced vertebral arteries supply a normal basilar artery.     DURAL VENOUS SINUSES: Expected enhancement of the major dural venous sinuses.    NECK CTA:  RIGHT CAROTID: No  measurable stenosis or dissection. Retropharyngeal course.    LEFT CAROTID: No measurable stenosis or dissection.    VERTEBRAL ARTERIES: No focal stenosis or dissection. Balanced vertebral arteries.    AORTIC ARCH: Classic aortic arch anatomy with no significant stenosis at the origin of the great vessels.    NONVASCULAR STRUCTURES: Moderate emphysema.      Impression    IMPRESSION:   HEAD CT:  1.  No CT evidence for acute intracranial process.  2.  Old right frontal infarct and chronic microvascular ischemic changes as above, unchanged from prior.    HEAD CTA:   1.  Normal CTA [place] [facility] [place].    NECK CTA:  1.  Normal neck CTA.    As discussed with Dr. Rucker at 6:21 PM CDT.     Basic metabolic panel     Status: Abnormal   Result Value Ref Range    Sodium 136 133 - 144 mmol/L    Potassium 4.4 3.4 - 5.3 mmol/L    Chloride 103 94 - 109 mmol/L    Carbon Dioxide (CO2) 29 20 - 32 mmol/L    Anion Gap 4 3 - 14 mmol/L    Urea Nitrogen 11 7 - 30 mg/dL    Creatinine 0.68 0.52 - 1.04 mg/dL    Calcium 7.3 (L) 8.5 - 10.1 mg/dL    Glucose 132 (H) 70 - 99 mg/dL    GFR Estimate >90 >60 mL/min/1.73m2   INR     Status: Normal   Result Value Ref Range    INR 1.11 0.85 - 1.15   Partial thromboplastin time     Status: Abnormal   Result Value Ref Range    aPTT 20 (L) 22 - 38 Seconds   Troponin I     Status: Normal   Result Value Ref Range    Troponin I High Sensitivity 4 <54 ng/L   CBC with platelets and differential     Status: Abnormal   Result Value Ref Range    WBC Count 5.0 4.0 - 11.0 10e3/uL    RBC Count 3.77 (L) 3.80 - 5.20 10e6/uL    Hemoglobin 11.2 (L) 11.7 - 15.7 g/dL    Hematocrit 35.3 35.0 - 47.0 %    MCV 94 78 - 100 fL    MCH 29.7 26.5 - 33.0 pg    MCHC 31.7 31.5 - 36.5 g/dL    RDW 13.8 10.0 - 15.0 %    Platelet Count 189 150 - 450 10e3/uL    % Neutrophils 56 %    % Lymphocytes 30 %    % Monocytes 10 %    % Eosinophils 3 %    % Basophils 1 %    % Immature Granulocytes 0 %    NRBCs per 100 WBC 0 <1 /100    Absolute  Neutrophils 2.8 1.6 - 8.3 10e3/uL    Absolute Lymphocytes 1.5 0.8 - 5.3 10e3/uL    Absolute Monocytes 0.5 0.0 - 1.3 10e3/uL    Absolute Eosinophils 0.1 0.0 - 0.7 10e3/uL    Absolute Basophils 0.0 0.0 - 0.2 10e3/uL    Absolute Immature Granulocytes 0.0 <=0.4 10e3/uL    Absolute NRBCs 0.0 10e3/uL   Symptomatic; Unknown Influenza A/B & SARS-CoV2 (COVID-19) Virus PCR Multiplex Nose     Status: Normal    Specimen: Nose; Swab   Result Value Ref Range    Influenza A PCR Negative Negative    Influenza B PCR Negative Negative    SARS CoV2 PCR Negative Negative    Narrative    Testing was performed using the amy SARS-CoV-2 & Influenza A/B Assay on the amy Nicole System. This test should be ordered for the detection of SARS-CoV-2 and influenza viruses in individuals who meet clinical and/or epidemiological criteria. Test performance is unknown in asymptomatic patients. This test is for in vitro diagnostic use under the FDA EUA for laboratories certified under CLIA to perform moderate and/or high complexity testing. This test has not been FDA cleared or approved. A negative result does not rule out the presence of PCR inhibitors in the specimen or target RNA in concentration below the limit of detection for the assay. If only one viral target is positive but coinfection with multiple targets is suspected, the sample should be re-tested with another FDA cleared, approved or authorized test, if coinfection would change clinical management. [facility] are certified under the Clinical Laboratory Improvement Amendments of 1988 (CLIA-88) as  qualified to perform moderate and/or high complexity laboratory testing.   UA with Microscopic reflex to Culture     Status: Abnormal    Specimen: Urine, Catheter   Result Value Ref Range    Color Urine Yellow Colorless, Straw, Light Yellow, Yellow    Appearance Urine Clear Clear    Glucose Urine Negative Negative mg/dL    Bilirubin Urine Negative Negative    Ketones Urine  Negative Negative mg/dL    Specific Gravity Urine 1.010 1.003 - 1.035    Blood Urine Negative Negative    pH Urine 7.5 (H) 5.0 - 7.0    Protein Albumin Urine Negative Negative mg/dL    Urobilinogen Urine Normal Normal, 2.0 mg/dL    Nitrite Urine Negative Negative    Leukocyte Esterase Urine Negative Negative    Bacteria Urine Few (A) None Seen /HPF    Mucus Urine Present (A) None Seen /LPF    RBC Urine 1 <=2 /HPF    WBC Urine 1 <=5 /HPF    Squamous Epithelials Urine 1 <=1 /HPF    Narrative    Urine Culture not indicated   Glucose by meter     Status: Abnormal   Result Value Ref Range    GLUCOSE BY METER POCT 151 (H) 70 - 99 mg/dL   CBC with Platelets & Differential     Status: Abnormal    Narrative    The following orders were created for panel order CBC with Platelets & Differential.  Procedure                               Abnormality         Status                     ---------                               -----------         ------                     CBC with platelets and d...[[id number]]  Abnormal            Final result                 Please view results for these tests on the individual orders.       ASSESSMENT:  Principal Problem:    Disorientation  Active Problems:    Type 2 diabetes mellitus with circulatory disorder, without long-term current use of insulin (H)    Hyperlipidemia LDL goal <100    Hypertension goal BP (blood pressure) < 140/90    History of multiple cerebrovascular accidents (CVAs)    Convulsions, unspecified convulsion type (H)    Dementia (H)    Seizure disorder (H)    History of paroxysmal supraventricular tachycardia     PLAN:   1) Altered mental status  Unclear etiology, probable vasovagal.  CT and CTA were unremarkable  Teleneurology was consulted by ED; recommends no further stroke workup, no change to seizure medications.   Cardiac workup negative thus far; repeat troponin in AM  EMS reported patient had low systolic blood pressure at the scene when they arrived.  PT/OT  evaluation  Continue to monitor    2) History of CVAs/TIAs  Continue ASA and Plavix  Continue statin  Monitor    3) Seizure disorder  Continue Keppra and Dilantin    4) Diabetes  Insulin sliding scale  Monitor ACHS    5) Hypertension  Continue Lisinopril    6) Dementia  Continue Namenda    Medication Reconciliation Complete: Yes  DVT prophylaxis: SCD  Dispostition: Med/surg 268  Code Status: Full    Level of Care: Observation    I performed this consultation using real-time telehealth tools, including a live video connection between my location and the patient's location. My location is a different campus than the patient's campus. This consultation happened during the COVID health crisis.  Video start time: he patient's campus. This consultation happened during the COVID health crisis.  Video start time: 08/27/2022 23:27     Video end time: 08/27/2022 23:35    As the provider for this telehealth service, I attest that I introduced myself to the patient, provided my credentials, disclosed my location, and determined that, based on a review of the patients chart and/or a discussion with members of the patient's treatment team, telemedicine via a real-time, two-way, interactive audio and video platform is an appropriate and effective means of providing this service. The patient and I mutually agree this visit is appropriate for telemedicine as well.      Neha Saavedra,   August 28, 2022

## 2022-08-28 NOTE — ED NOTES
Per Daughters     Laura (daughter and POA) should be contacted first [phone]  Lashae another daughter should be contacted 2nd [phone]  Then  third Yeyo [phone]    Pt prefers oatmeal, banana and chocolate milk for breakfast    Lilly Castillo RN

## 2022-08-28 NOTE — UTILIZATION REVIEW
Admission Status; Secondary Review Determination   Admission date:  8/27/2022  5:34 PM    Under the authority of the Utilization Management Committee, the utilization review process indicated a secondary review on the above patient. The review outcome is based on review of the medical records, discussions with staff, and applying clinical experience noted on the date of the review.     (x) Inpatient Status Appropriate - This patient's medical care is consistent with medical management for inpatient care and reasonable inpatient medical practice.     RATIONALE FOR DETERMINATION   72-year-old female with a history of stroke with residual hemiparesis, epilepsy, dementia, diabetes, and hypertension was admitted yesterday after an episode of syncope.  She was found by EMS to be severely hypotensive with systolic blood pressure in the 60s.  She was encephalopathic following a syncopal event.  She remains lightheaded today despite improved blood pressure and her BUN and creatinine ratio has elevated significantly.  She has elevated D-dimer concerning for possible PE.  There is also concern for potential bleeding given that she is on dual antiplatelet therapy.  This patient is complicated, has multiple ongoing issues, is high risk for clinical deterioration, and is expected to require greater than 2 midnight hospitalization so per Medicare guidelines is appropriate for inpatient hospitalization at the time of this review.  The severity of illness, intensity of service provided, expected LOS and risk for adverse outcome make the care complex, high risk and appropriate for hospital admission.    At the time of admission with the information available to the attending physician more than 2 nights Hospital complex care was anticipated, based on patient risk of adverse outcome if treated as outpatient and complex care required.  Inpatient admission is appropriate based on the Medicare guidelines.      The information on this  document is developed by the utilization review team in order for the business office to ensure compliance. This only denotes the appropriateness of proper admission status and does not reflect the quality of care rendered.   The definitions of Inpatient Status and Observation Status used in making the determination above are those provided in the CMS Coverage Manual, Chapter 1 and Chapter 6, section 70.4.     Sincerely,   Arcadio Nelson DO MPH   Physician Advisor  Utilization Review  [facility]

## 2022-08-28 NOTE — PROGRESS NOTES
"S-(situation): OBS overnight due to AMS unresponsive episode at home witnessed by family    B-(background): Hx of Stroke, Dementia, Seizures.    A-(assessment): Pt has no memory of AMS event.  Daughter Laura is POA.  , 116 overnight.    R-(recommendations): Pt appears to be back to baseline.      BP (!) 146/76 (BP Location: Right arm)   Pulse 81   Temp 96.8  F (36  C) (Oral)   Resp 16   Ht 1.651 m (5' 5\")   Wt 69.7 kg (153 lb 10.6 oz)   SpO2 90%   BMI 25.57 kg/m    "

## 2022-08-28 NOTE — PROGRESS NOTES
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  By report, she was found to be severely hypotensive by paramedics with systolic blood pressure of 60 although blood pressure return to the normal range by the time she arrived to the emergency room.  She also had protracted confusion after the syncopal event concerning for an encephalopathy superimposed upon apparent dementia.  She was hospitalized for observation.  Blood pressure has remained stable over the course of the day without episodes of recurrent syncope, but her chronic lisinopril has not been restarted and she continues to be lightheaded with position change raising concern for orthostasis.  Her BUN to creatinine ratio has almost doubled overnight for unclear reasons and raising concern for hypovolemia.  Serum albumin is low which is new compared with previously raising concern for inadequate oral nutritional intake.  Blood sugars have been normal today in the hospital despite holding chronic oral hypoglycemic medication raising concern that restarting her normal diabetic medication would precipitate severe hypoglycemia which can cause syncope.  Additional diagnostic evaluation today is notable for elevated D-dimer, and she has been mildly hypoxic  with oxygen saturations as low as 90% in room air both of which raise concern for the possibility of pulmonary embolism in this clinical context of syncope and hypotension.  Although seizure precipitating unresponsiveness is possible, it is unlikely that seizure would have precipitated severe hypotension at the time of her unresponsiveness.  Occult bleeding is possible particularly in the context of her platelet function defect from chronic aspirin and Plavix therapy and hemoglobin is lower than it had been in July, but active bleeding is not clinically evident at this time.  For these reasons, there is concern that she remains at increased risk of an adverse outcome including recurrent hypotension, syncope, or other adverse consequences of severe hypotension, so ongoing hospitalization is deemed medically necessary to expedite additional diagnostic evaluation and adjust her treatment plan appropriately.  Based on the presently available information, hospitalization for at least 2 midnights is anticipated.      Principal Problem:    Syncope, cardiogenic  Active Problems:    Transient hypotension    Encephalopathy    Type 2 diabetes mellitus with circulatory disorder, without long-term current use of insulin (H)    Hypertension goal BP (blood pressure) < 140/90    Dementia (H)    Seizure disorder (H)    History of nonsustained atrial tachycardia July 2017    Platelet function defect (H)-ASA and Plavix    Hypoalbuminemia    Hypotension, unspecified hypotension type    Hyperlipidemia LDL goal <100    History of multiple cerebrovascular accidents (CVAs)    Hemiparesis affecting left side as late effect of stroke (H)    Wheelchair bound    Status post placement of implantable loop recorder    Admit to inpatient  Obtain chest CT by PE protocol  Start cardiac monitoring because of syncope and suspicion for cardiogenic syncope  Monitor serial orthostatic blood pressures and if she has orthostatic hypotension would treat  with IV fluids  Continue to hold chronic lisinopril  Monitor serial hemoglobin  Monitor blood sugars   Continue to hold chronic glipizide  Resume chronic Keppra and phenytoin at their normal doses  Monitor mentation clinically  Anticipate outpatient follow-up with her epilepsy specialist particularly if there is increasing concern for recurring seizures       Diet: Moderate Consistent Carb (60 g CHO per Meal) Diet    DVT Prophylaxis: Enoxaparin (Lovenox) SQ  Gloria Catheter: Not present  Central Lines: None  Cardiac Monitoring: None  Code Status: Full Code      Disposition Plan         The patient's care was discussed with the Bedside Nurse, Care Coordinator/ and Patient.    Sudhakar Allen MD  Hospitalist Service  [facility]  Securely message with the Vocera Web Console (learn more here)  Text page via [facility] Paging/Directory         Clinically Significant Risk Factors Present on Admission                  ______________________________________________________________________  Chief complaint: Unresponsive spell    Interval History   Patient reports that she was in her usual health yesterday until the afternoon.  She does not recall exactly what happened.  According to reports from ER physician Dr. Rucker and hospitalist Dr. Saavedra, family had said that the patient did not feel well in the afternoon while seated at a table.  Her  tried to help her get up to move to a couch to lie down at which time she became unresponsive.  Family was present and did not report convulsive seizure activity or incontinence of bowel or bladder.  Patient denies mouth pain and there is no known mouth injury including tongue injury.  Family called 911 and when ambulance arrived her systolic blood pressure was reportedly between 60 and 70.  Patient had protracted unresponsiveness at home.  She was transported to the emergency room and blood pressure had normalized by the time she arrived  in the ER.  She reportedly became more responsive during transport to the ER although was very confused.  Ambulance report is not presently available for review and it is not known whether she received any treatment between home in the ER.  Mentation gradually improved while she was in the ER and her vital signs remained stable.  Diagnostic evaluation in the ER was inconclusive.  She was hospitalized overnight for observation and has not had any syncope or near syncope in the hospital.  Vital signs remained stable in the hospital although oxygenation has fluctuated with saturations ranging 90 to 98% in room air.  Patient denies any shortness of breath or chest discomfort.  She does states she continues to feel lightheaded when she sits up in bed and so she is preferring to lie supine in bed today.  She says she has been feeling dizzy at home lately when she gets up.  Within the last 2 months, she says her legs stopped working such that she cannot ambulate independently and fell about 2 months ago when her legs gave way.  She has difficulty describing exactly what her leg symptoms are but simply indicates they are not working.  She has been using a wheelchair for mobility.  She otherwise says she has been feeling okay lately.  She denies recent cold symptoms or stomach flu.  She does not think that any of her medications have changed lately.    Past medical history, past surgical history, past social history, and family history were reviewed and are unchanged as compared with yesterday.  She is not aware of family history of syncope, blood clots, or sudden cardiac death, but history from the patient may not be completely reliable.  Patient has seen an electrophysiology cardiologist because of episodes of recurrent nonsustained atrial tachycardia in July 2017 noted on a cardiac monitor.  No specific treatment for that dysrhythmia was recommended.  She did have an implanted loop recorder for 1-2 years, but the loop  "recorder was subsequently deactivated.  She has chosen not to have that implanted loop recorder removed surgically so far.  Patient denies any previous history of venous thromboembolism.    10 point review of systems is otherwise negative except as outlined above.    Data reviewed today: I reviewed all medications, new labs and imaging results over the last 24 hours. I personally reviewed the EKG tracing showing Normal sinus rhythm with normal intervals and no acute ischemic changes.    Physical Exam   Vital Signs: Temp: 97.9  F (36.6  C) Temp src: Oral BP: 122/60 Pulse: 78   Resp: 14 SpO2: 93 % O2 Device: None (Room air)     Patient Vitals for the past 24 hrs:   BP Temp Temp src Pulse Resp SpO2 Height Weight   08/28/22 1053 122/60 97.9  F (36.6  C) Oral -- 14 93 % -- --   08/28/22 0736 (!) 153/64 97.3  F (36.3  C) Oral 78 16 93 % -- --   08/28/22 0538 -- -- -- -- -- -- -- 67 kg (147 lb 11.3 oz)   08/28/22 0348 (!) 146/76 96.8  F (36  C) Oral 81 16 90 % -- --   08/27/22 2202 134/64 98  F (36.7  C) Oral 85 18 95 % 1.651 m (5' 5\") 69.7 kg (153 lb 10.6 oz)   08/27/22 2139 -- -- -- 93 12 94 % -- --   08/27/22 2130 136/87 -- -- 81 11 93 % -- --   08/27/22 2115 -- -- -- 81 12 94 % -- --   08/27/22 2100 122/76 -- -- 84 22 95 % -- --   08/27/22 2045 -- -- -- 85 13 93 % -- --   08/27/22 2030 128/77 -- -- 85 13 92 % -- --   08/27/22 2015 (!) 140/76 -- -- 87 13 93 % -- --   08/27/22 2000 121/80 -- -- 85 13 95 % -- --   08/27/22 1945 101/83 -- -- 83 15 93 % -- --   08/27/22 1930 (!) 140/86 -- -- 84 16 96 % -- --   08/27/22 1915 137/77 -- -- 85 16 96 % -- --   08/27/22 1900 (!) 114/94 -- -- 84 9 95 % -- --   08/27/22 1845 131/72 -- -- 81 17 96 % -- --   08/27/22 1830 (!) 145/76 -- -- 81 11 -- -- --   08/27/22 1815 138/80 -- -- 84 13 -- -- --   08/27/22 1745 105/52 -- -- 75 13 98 % -- --   08/27/22 1737 94/53 97.7  F (36.5  C) Oral 72 10 90 % -- 70.8 kg (156 lb)     Weight: 147 lbs 11.33 oz   Wt Readings from Last 4 Encounters: "   08/28/22 67 kg (147 lb 11.3 oz)   08/08/22 63 kg (139 lb)   07/19/22 63 kg (139 lb)   06/11/22 63 kg (139 lb)     General Appearance: Elderly woman, no distress while lying supine in bed  HEENT: Anicteric sclerae, clear conjunctivae, symmetric pupils, no spontaneous nystagmus and minimal horizontal nystagmus during lateral gaze at end gaze, no signs of recent head injury, clear ear canals and nares, normal oropharynx  Neck: Trachea midline, symmetric, nontender, no stridor  Chest: Implanted device palpable in the left upper chest above the breast, no gross anomalies, symmetric excursion  Respiratory: Normal respiratory effort, clear lungs  Cardiovascular: Regular rate and rhythm, no gallop or murmur, symmetric radial pulses, no peripheral edema  GI: Nondistended abdomen, soft, nontender, no hepatosplenomegaly, normal bowel sounds  Skin: Normal color, no rash  Lymph: No cervical or supraclavicular lymphadenopathy  Neuro: Alert and maintains wakefulness and attention, answers questions appropriately aside from some memory problems, follows simple instructions, no obvious cranial nerve deficits, able to move all limbs purposefully, symmetric tone in the lower extremities, no involuntary movements at rest, clonus present at the left ankle but not at the right, deep tendon reflexes are brisk and symmetric at the knees  Musculoskeletal: No cords or tenderness in the lower extremities    Data   Recent Labs   Lab 08/28/22  1145 08/28/22  0733 08/28/22  0600 08/27/22  2230 08/27/22  1818   WBC  --   --  5.8  --  5.0   HGB  --   --  12.3  --  11.2*   MCV  --   --  96  --  94   PLT  --   --  167  --  189   INR  --   --   --   --  1.11   NA  --   --  136  --  136   POTASSIUM  --   --  4.1  --  4.4   CHLORIDE  --   --  105  --  103   CO2  --   --  27  --  29   BUN  --   --  13  --  11   CR  --   --  0.47*  --  0.68   ANIONGAP  --   --  4  --  4   PINA  --   --  7.6*  --  7.3*   * 108* 104*   < > 132*   ALBUMIN  --   --   2.8*  --   --    PROTTOTAL  --   --  6.0*  --   --    BILITOTAL  --   --  0.2  --   --    ALKPHOS  --   --  123  --   --    ALT  --   --  37  --   --    AST  --   --  26  --   --     < > = values in this interval not displayed.     Blood sugars have ranged 104-151 in the hospital and ER, hemoglobin A1c 6.1  Phenytoin level 17.7 which is within the therapeutic range of 10-20  Troponin levels last evening and this morning have been normal  Urinalysis was negative  COVID PCR testing was negative  D-dimer elevated at 0.79 with 0.5 the upper limit of normal    Previous test results reviewed for comparison:  August 17 Keppra level 39 with 10-40 the therapeutic range  July 19 Hemoglobin was 13.3  Echocardiogram March 18, 2022 demonstrated structurally normal heart    Recent Results (from the past 24 hour(s))   CT Head w/o Contrast    Narrative    EXAM: CTA HEAD NECK W CONTRAST, CT HEAD W/O CONTRAST  LOCATION: [facility]  DATE/TIME: 8/27/2022 6:11 PM    INDICATION: Acute onset unresponsiveness.  COMPARISON: CT head 06/11/2022  CONTRAST: 70ml isovue 370  TECHNIQUE: Head and neck CT angiogram with IV contrast. Noncontrast head CT followed by axial helical CT images of the head and neck vessels obtained during the arterial phase of intravenous contrast administration. Axial 2D reconstructed images and   multiplanar 3D MIP reconstructed images of the head and neck vessels were performed by the technologist. Dose reduction techniques were used. All stenosis measurements made according to NASCET criteria unless otherwise specified.    FINDINGS:   NONCONTRAST HEAD CT:   INTRACRANIAL CONTENTS: No intracranial hemorrhage, extraaxial collection, or mass effect.  No CT evidence of acute infarct. Old infarct right anterior superior frontal lobe with ex vacuo dilatation of right frontal horn. Mild chronic periventricular   white matter changes. Mild generalized volume loss. No hydrocephalus.      VISUALIZED ORBITS/SINUSES/MASTOIDS: No intraorbital abnormality. No paranasal sinus mucosal disease. No middle ear or mastoid effusion.    BONES/SOFT TISSUES: No acute abnormality.    HEAD CTA:  ANTERIOR CIRCULATION: No stenosis/occlusion, aneurysm, or high flow vascular malformation. Fetal origin of the right posterior cerebral artery from the anterior circulation.    POSTERIOR CIRCULATION: No stenosis/occlusion, aneurysm, or high flow vascular malformation. Balanced vertebral arteries supply a normal basilar artery.     DURAL VENOUS SINUSES: Expected enhancement of the major dural venous sinuses.    NECK CTA:  RIGHT CAROTID: No measurable stenosis or dissection. Retropharyngeal course.    LEFT CAROTID: No measurable stenosis or dissection.    VERTEBRAL ARTERIES: No focal stenosis or dissection. Balanced vertebral arteries.    AORTIC ARCH: Classic aortic arch anatomy with no significant stenosis at the origin of the great vessels.    NONVASCULAR STRUCTURES: Moderate emphysema.      Impression    IMPRESSION:   HEAD CT:  1.  No CT evidence for acute intracranial process.  2.  Old right frontal infarct and chronic microvascular ischemic changes as above, unchanged from prior.    HEAD CTA:   1.  Normal CTA [place].    NECK CTA:  1.  Normal neck CTA.    As discussed with Dr. Rucker at 6:21 PM CDT.     CTA Head Neck with Contrast    Narrative    EXAM: CTA HEAD NECK W CONTRAST, CT HEAD W/O CONTRAST  LOCATION: [facility]  DATE/TIME: 8/27/2022 6:11 PM    INDICATION: Acute onset unresponsiveness.  COMPARISON: CT head 06/11/2022  CONTRAST: 70ml isovue 370  TECHNIQUE: Head and neck CT angiogram with IV contrast. Noncontrast head CT followed by axial helical CT images of the head and neck vessels obtained during the arterial phase of intravenous contrast administration. Axial 2D reconstructed images and   multiplanar 3D MIP reconstructed images of the head and neck vessels were  performed by the technologist. Dose reduction techniques were used. All stenosis measurements made according to NASCET criteria unless otherwise specified.    FINDINGS:   NONCONTRAST HEAD CT:   INTRACRANIAL CONTENTS: No intracranial hemorrhage, extraaxial collection, or mass effect.  No CT evidence of acute infarct. Old infarct right anterior superior frontal lobe with ex vacuo dilatation of right frontal horn. Mild chronic periventricular   white matter changes. Mild generalized volume loss. No hydrocephalus.     VISUALIZED ORBITS/SINUSES/MASTOIDS: No intraorbital abnormality. No paranasal sinus mucosal disease. No middle ear or mastoid effusion.    BONES/SOFT TISSUES: No acute abnormality.    HEAD CTA:  ANTERIOR CIRCULATION: No stenosis/occlusion, aneurysm, or high flow vascular malformation. Fetal origin of the right posterior cerebral artery from the anterior circulation.    POSTERIOR CIRCULATION: No stenosis/occlusion, aneurysm, or high flow vascular malformation. Balanced vertebral arteries supply a normal basilar artery.     DURAL VENOUS SINUSES: Expected enhancement of the major dural venous sinuses.    NECK CTA:  RIGHT CAROTID: No measurable stenosis or dissection. Retropharyngeal course.    LEFT CAROTID: No measurable stenosis or dissection.    VERTEBRAL ARTERIES: No focal stenosis or dissection. Balanced vertebral arteries.    AORTIC ARCH: Classic aortic arch anatomy with no significant stenosis at the origin of the great vessels.    NONVASCULAR STRUCTURES: Moderate emphysema.      Impression    IMPRESSION:   HEAD CT:  1.  No CT evidence for acute intracranial process.  2.  Old right frontal infarct and chronic microvascular ischemic changes as above, unchanged from prior.    HEAD CTA:   1.  Normal CTA [place].    NECK CTA:  1.  Normal neck CTA.    As discussed with Dr. Rucker at 6:21 PM CDT.     XR Chest Port 1 View    Narrative    EXAM: XR CHEST PORT 1 VIEW  LOCATION: [facility]  MEDICAL CENTER  DATE/TIME: 8/28/2022 2:16 PM    INDICATION: syncope, mild hypoxia  COMPARISON: 3/19/2022      Impression    IMPRESSION: Lungs are clear. No effusions or pneumothorax. Heart size is normal. Loop recorder over the left chest. Old, healed left humerus fracture with residual posttraumatic deformity. Osteopenia. Atherosclerosis.     Medications       aspirin  81 mg Oral Daily     atorvastatin  40 mg Oral Daily     clopidogrel  75 mg Oral Daily     escitalopram  5 mg Oral Daily     gabapentin  300 mg Oral At Bedtime     insulin aspart  1-7 Units Subcutaneous TID AC     insulin aspart  1-5 Units Subcutaneous At Bedtime     levETIRAcetam  1,000 mg Oral BID     memantine  10 mg Oral Daily     pantoprazole  40 mg Oral BID     [Held by provider] phenytoin  50 mg Oral BID

## 2022-08-28 NOTE — PROGRESS NOTES
PRIMARY DIAGNOSIS: GENERALIZED WEAKNESS    OUTPATIENT/OBSERVATION GOALS TO BE MET BEFORE DISCHARGE  1. Orthostatic performed: No    2. Tolerating PO medications: Yes    3. Return to near baseline physical activity: Yes    4. Cleared for discharge by consultants (if involved): No    Discharge Planner Nurse   Safe discharge environment identified: Yes  Barriers to discharge: Yes       Entered by: London García RN 08/28/2022 4:09 AM

## 2022-08-28 NOTE — PROGRESS NOTES
S-(situation): PT Eval orders in chart for pt    B-(background): During rounding, MD put hold on PT Eval at this time until he ca check toxicity levels further.    A-(assessment): possible toxicity levels would alter proper PT assessment at this time, defer for now.    R-(recommendations): PT will defer Eval today and team to reassess tomorrow in rounding for appropriateness.

## 2022-08-28 NOTE — PLAN OF CARE
Goal Outcome Evaluation:    Plan of Care Reviewed With: patient     Overall Patient Progress: no change    Outcome Evaluation: Patient had multiple dizzy spells this shift while moving from laying to sitting position. Orthostatic vital signs ordered with no significant change in BP or pulse. Patient had elevated d-dimer and had chest xray and chest CT. Blood sugars stable this shift with no insulin coverage required. No PRN medications given. Up with assist of 2 to bedside commode.

## 2022-08-29 PROBLEM — J43.8 OTHER EMPHYSEMA (H): Status: ACTIVE | Noted: 2022-01-01

## 2022-08-29 PROBLEM — R26.9 ABNORMAL GAIT: Status: ACTIVE | Noted: 2022-01-01

## 2022-08-29 PROBLEM — I95.2 HYPOTENSION DUE TO MEDICATION: Status: ACTIVE | Noted: 2022-01-01

## 2022-08-29 NOTE — CONSULTS
Care Management Initial Consult    General Information  Assessment completed with: Patient, Family,    Type of CM/SW Visit: Initial Assessment    Primary Care Provider verified and updated as needed:     Readmission within the last 30 days:        Reason for Consult: discharge planning  Advance Care Planning:          Communication Assessment  Patient's communication style: spoken language (English or Bilingual)    Hearing Difficulty or Deaf: no   Wear Glasses or Blind: no    Cognitive  Cognitive/Neuro/Behavioral: .WDL except  Level of Consciousness: confused  Arousal Level: opens eyes spontaneously  Orientation: disoriented to, situation  Mood/Behavior: calm, cooperative  Best Language: 0 - No aphasia  Speech: clear    Living Environment:   People in home: spouse     Current living Arrangements: apartment      Able to return to prior arrangements: yes     Family/Social Support:  Care provided by: spouse/significant other, child(edy)  Provides care for: no one, unable/limited ability to care for self  Marital Status:   , Children  Yeyo       Description of Support System: Supportive, Involved    Support Assessment: Adequate family and caregiver support    Current Resources:   Patient receiving home care services: Yes  Skilled Home Care Services: Physicial Therapy, Occupational Therapy  Community Resources:    Equipment currently used at home: wheelchair, manual, walker, standard, cane, straight, shower chair, commode chair, grab bar, toilet, grab bar, tub/shower (bed rails)  Supplies currently used at home:      Employment/Financial:  Employment Status:          Financial Concerns:         Lifestyle & Psychosocial Needs:  Social Determinants of Health     Tobacco Use: Medium Risk     Smoking Tobacco Use: Former Smoker     Smokeless Tobacco Use: Never Used   Alcohol Use: Not on file   Financial Resource Strain: Not on file   Food Insecurity: Not on file   Transportation Needs: Not on file   Physical  Activity: Not on file   Stress: Not on file   Social Connections: Not on file   Intimate Partner Violence: Not on file   Depression: At risk     PHQ-2 Score: 3   Housing Stability: Not on file       Functional Status:  Prior to admission patient needed assistance:          Mental Health Status:          Chemical Dependency Status:              Values/Beliefs:  Spiritual, Cultural Beliefs, Anab[employer]ist Practices, Values that affect care:               Additional Information:  Referral received for discharge planning. Initial assessment completed with patient and her  and daughter.  Patient lives in an apartment with her , Yeyo.  Patient's children assist her and are involved in her cares daily.  Patient is mainly wheelchair bound.  She receives [facility] for PT/OT  [phone] Phone [phone] Fax.  Notified [facility] of patient's discharge.    Family transport.    ONESIMO Hernandez  [facility] [phone]/ [place] [phone]  Care Management

## 2022-08-29 NOTE — TELEPHONE ENCOUNTER
"Pending Prescriptions:                       Disp   Refills    clopidogrel (PLAVIX) 75 MG tablet [Pharmac*90 tab*0        Sig: Take 1 tablet by mouth once daily    Routing refill request to provider for review/approval because:  Drug interaction veronika    Requested Prescriptions   Pending Prescriptions Disp Refills    clopidogrel (PLAVIX) 75 MG tablet [Pharmacy Med Name: Clopidogrel Bisulfate 75 MG Oral Tablet] 90 tablet 0     Sig: Take 1 tablet by mouth once daily        Plavix Failed - 8/26/2022 12:59 PM        Failed - No active PPI on record unless is Protonix        Passed - Normal HGB on file in past 12 months     Recent Labs   Lab Test 08/29/22  0654   HGB 12.9                 Passed - Normal Platelets on file in past 12 months       Recent Labs   Lab Test 08/28/22  0600                  Passed - Recent (12 mo) or future (30 days) visit within the authorizing provider's specialty     Patient has had an office visit with the authorizing provider or a provider within the authorizing providers department within the previous 12 mos or has a future within next 30 days. See \"Patient Info\" tab in inbasket, or \"Choose Columns\" in Meds & Orders section of the refill encounter.              Passed - Medication is active on med list        Passed - Patient is age 18 or older        Passed - No active pregnancy on record        Passed - No positive pregnancy test in past 12 months                    "

## 2022-08-29 NOTE — DISCHARGE SUMMARY
[facility]  Hospitalist Discharge Summary      Date of Admission:  8/27/2022  Date of Discharge:  8/29/2022  Discharging Provider: Sudhakar Allen MD  Discharge Service: Hospitalist Service    Discharge Diagnoses   Principal Problem:    Hypotension due to medication  Active Problems:    Syncope, cardiogenic    Encephalopathy    Type 2 diabetes mellitus with circulatory disorder, without long-term current use of insulin (H)    Hypertension goal BP (blood pressure) < 140/90    Dementia (H)    Seizure disorder (H)    Abnormal gait    Platelet function defect (H)-ASA and Plavix    Hyperlipidemia LDL goal <100    History of multiple cerebrovascular accidents (CVAs)    Hemiparesis affecting left side as late effect of stroke (H)    History of nonsustained atrial tachycardia July 2017    Status post placement of implantable loop recorder    Hypoalbuminemia      Follow-ups Needed After Discharge   Follow-up Appointments     Follow-up and recommended labs and tests       Follow up with primary care provider, Abdirahman Shrestha, within 7 days to   evaluate medication change and for hospital follow- up.             Discharge Disposition   Resume previous home care  Discharged to home  Condition at discharge: Stable      Hospital Course          72-year-old woman with complex health history notable for history of ischemic right frontal stroke with chronic residual left hemiparesis, epilepsy with recurring nonconvulsive seizures, dementia, history of nonsustained atrial tachycardia without specific treatment for same in the past, type 2 diabetes treated with oral medication, hypertension, previous episodes of syncope versus unresponsiveness of unclear etiology, platelet function defect due to chronic aspirin and Plavix therapy, and recent worsening ataxia with inability to ambulate presented to the ER after an episode of syncope at home.  By report, she was found to be severely hypotensive by  paramedics with systolic blood pressure of 60 although blood pressure return to the normal range by the time she arrived to the emergency room.  She also had protracted confusion after the syncopal event concerning for an encephalopathy superimposed upon dementia.  She was initially hospitalized for observation and subsequently admitted.  See history and physical for details.    Chronic lisinopril was discontinued due to concern for syncope from hypotension.  After stopping lisinopril, she remained normotensive to mildly hypertensive during hospitalization.  Her dizziness at rest and sitting resolved although she continued to be dizzy with standing.  As her dizziness improved, she was able to resume standing for transfers which was her baseline functional status.  She was evaluated and treated by PT recommended resuming previous home care PT. Troponin was normal and she had no acute ischemic EKG changes.  She had no dysrhythmias on telemetry monitoring.  Although nonconvulsive seizure precipitating unresponsiveness was possible, it is unlikely that seizure would have precipitated severe hypotension at the time of her unresponsiveness and her serum phenytoin and Keppra levels were within the therapeutic range.  Head CT demonstrated stable chronic radiographic findings but no acute abnormalities.  She did not have new focal neurologic deficits as she recovered from her syncopal event and hypotension, so additional radiographic evaluation was not thought to be necessary.  Family was informed.    Serum albumin was low which was new compared with previously raising concern for inadequate oral nutritional intake.  By discharge, oral intake appeared to be good and at her baseline.  Hemoglobin A1c was 6.1 and blood sugars in the hospital were well controlled ranging 104-164 despite holding chronic glipizide.  Due to concern that restarting glipizide might precipitate severe hypoglycemia, glipizide was discontinued.   Although she had elevated D-dimer with initial mild hypoxia, chest CT did not demonstrate any pulmonary emboli or other acute abnormalities although incidental note was made of emphysema at the lung apices.  She was not dyspneic, and oxygenation improved over the course of her hospitalization.        Consultations This Hospital Stay   PHYSICAL THERAPY ADULT IP CONSULT  OCCUPATIONAL THERAPY ADULT IP CONSULT  CARE MANAGEMENT / SOCIAL WORK IP CONSULT    Code Status   Full Code    Time Spent on this Encounter   I, Sudhakar Allen MD, personally saw the patient today and spent greater than 30 minutes discharging this patient.       Sudhakar Allen MD  [address] MEDICAL SURGICAL  [address] [facility]   [place] [address]  Phone: [phone]  ______________________________________________________________________    Physical Exam   Vital Signs: Temp: 97.8  F (36.6  C) Temp src: Oral BP: (!) 143/68 Pulse: 88   Resp: 16 SpO2: 94 % O2 Device: None (Room air)    Weight: 147 lbs 11.33 oz  General Appearance: Appears alert lying supine in bed, no signs of distress  Respiratory: Normal respiratory effort  Cardiovascular: Regular rate and rhythm  Other: Alert and maintains wakefulness and attention       Primary Care Physician   Abdirhaman Shrestha    Discharge Orders      Reason for your hospital stay    Hospitalized after a fainting spell from low blood pressure and improved. Advised to stop lisinopril due to low blood pressure. Also advised to stop glipizide due to normal blood sugars without it in the hospital.     Follow-up and recommended labs and tests     Follow up with primary care provider, Abdirahman Shrestha, within 7 days to evaluate medication change and for hospital follow- up.     Activity    Your activity upon discharge: activity as tolerated     Monitor and record    blood glucose according to your usual home routine blood pressure daily at varying times if able     Resume Home Care Services     Diet     Follow this diet upon discharge: Orders Placed This Encounter      Moderate Consistent Carb (60 g CHO per Meal) Diet       Significant Results and Procedures   Most Recent 3 CBC's:Recent Labs   Lab Test 08/29/22  0654 08/28/22  0600 08/27/22 1818 07/19/22  1217   WBC  --  5.8 5.0 6.9   HGB 12.9 12.3 11.2* 13.3   MCV  --  96 94 92   PLT  --  167 189 221     Most Recent 3 BMP's:Recent Labs   Lab Test 08/29/22  0747 08/28/22 2026 08/28/22  1632 08/28/22  0733 08/28/22  0600 08/27/22  2230 08/27/22 1818 07/19/22  1217   NA  --   --   --   --  136  --  136 141   POTASSIUM  --   --   --   --  4.1  --  4.4 4.1   CHLORIDE  --   --   --   --  105  --  103 106   CO2  --   --   --   --  27  --  29 31   BUN  --   --   --   --  13  --  11 22   CR  --   --   --   --  0.47*  --  0.68 0.73   ANIONGAP  --   --   --   --  4  --  4 4   PINA  --   --   --   --  7.6*  --  7.3* 8.5   * 164* 137*   < > 104*   < > 132* 74    < > = values in this interval not displayed.     Most Recent 2 LFT's:Recent Labs   Lab Test 08/28/22  0600 07/19/22  1217   AST 26 20   ALT 37 30   ALKPHOS 123 129   BILITOTAL 0.2 0.2   ,   Results for orders placed or performed during the hospital encounter of 08/27/22   CT Head w/o Contrast    Narrative    EXAM: CTA HEAD NECK W CONTRAST, CT HEAD W/O CONTRAST  LOCATION: [facility]  DATE/TIME: 8/27/2022 6:11 PM    INDICATION: Acute onset unresponsiveness.  COMPARISON: CT head 06/11/2022  CONTRAST: 70ml isovue 370  TECHNIQUE: Head and neck CT angiogram with IV contrast. Noncontrast head CT followed by axial helical CT images of the head and neck vessels obtained during the arterial phase of intravenous contrast administration. Axial 2D reconstructed images and   multiplanar 3D MIP reconstructed images of the head and neck vessels were performed by the technologist. Dose reduction techniques were used. All stenosis measurements made according to NASCET criteria unless otherwise  specified.    FINDINGS:   NONCONTRAST HEAD CT:   INTRACRANIAL CONTENTS: No intracranial hemorrhage, extraaxial collection, or mass effect.  No CT evidence of acute infarct. Old infarct right anterior superior frontal lobe with ex vacuo dilatation of right frontal horn. Mild chronic periventricular   white matter changes. Mild generalized volume loss. No hydrocephalus.     VISUALIZED ORBITS/SINUSES/MASTOIDS: No intraorbital abnormality. No paranasal sinus mucosal disease. No middle ear or mastoid effusion.    BONES/SOFT TISSUES: No acute abnormality.    HEAD CTA:  ANTERIOR CIRCULATION: No stenosis/occlusion, aneurysm, or high flow vascular malformation. Fetal origin of the right posterior cerebral artery from the anterior circulation.    POSTERIOR CIRCULATION: No stenosis/occlusion, aneurysm, or high flow vascular malformation. Balanced vertebral arteries supply a normal basilar artery.     DURAL VENOUS SINUSES: Expected enhancement of the major dural venous sinuses.    NECK CTA:  RIGHT CAROTID: No measurable stenosis or dissection. Retropharyngeal course.    LEFT CAROTID: No measurable stenosis or dissection.    VERTEBRAL ARTERIES: No focal stenosis or dissection. Balanced vertebral arteries.    AORTIC ARCH: Classic aortic arch anatomy with no significant stenosis at the origin of the great vessels.    NONVASCULAR STRUCTURES: Moderate emphysema.      Impression    IMPRESSION:   HEAD CT:  1.  No CT evidence for acute intracranial process.  2.  Old right frontal infarct and chronic microvascular ischemic changes as above, unchanged from prior.    HEAD CTA:   1.  Normal CTA [place].    NECK CTA:  1.  Normal neck CTA.    As discussed with Dr. Rucker at 6:21 PM CDT.     CTA Head Neck with Contrast    Narrative    EXAM: CTA HEAD NECK W CONTRAST, CT HEAD W/O CONTRAST  LOCATION: [facility]  DATE/TIME: 8/27/2022 6:11 PM    INDICATION: Acute onset unresponsiveness.  COMPARISON: CT head  06/11/2022  CONTRAST: 70ml isovue 370  TECHNIQUE: Head and neck CT angiogram with IV contrast. Noncontrast head CT followed by axial helical CT images of the head and neck vessels obtained during the arterial phase of intravenous contrast administration. Axial 2D reconstructed images and   multiplanar 3D MIP reconstructed images of the head and neck vessels were performed by the technologist. Dose reduction techniques were used. All stenosis measurements made according to NASCET criteria unless otherwise specified.    FINDINGS:   NONCONTRAST HEAD CT:   INTRACRANIAL CONTENTS: No intracranial hemorrhage, extraaxial collection, or mass effect.  No CT evidence of acute infarct. Old infarct right anterior superior frontal lobe with ex vacuo dilatation of right frontal horn. Mild chronic periventricular   white matter changes. Mild generalized volume loss. No hydrocephalus.     VISUALIZED ORBITS/SINUSES/MASTOIDS: No intraorbital abnormality. No paranasal sinus mucosal disease. No middle ear or mastoid effusion.    BONES/SOFT TISSUES: No acute abnormality.    HEAD CTA:  ANTERIOR CIRCULATION: No stenosis/occlusion, aneurysm, or high flow vascular malformation. Fetal origin of the right posterior cerebral artery from the anterior circulation.    POSTERIOR CIRCULATION: No stenosis/occlusion, aneurysm, or high flow vascular malformation. Balanced vertebral arteries supply a normal basilar artery.     DURAL VENOUS SINUSES: Expected enhancement of the major dural venous sinuses.    NECK CTA:  RIGHT CAROTID: No measurable stenosis or dissection. Retropharyngeal course.    LEFT CAROTID: No measurable stenosis or dissection.    VERTEBRAL ARTERIES: No focal stenosis or dissection. Balanced vertebral arteries.    AORTIC ARCH: Classic aortic arch anatomy with no significant stenosis at the origin of the great vessels.    NONVASCULAR STRUCTURES: Moderate emphysema.      Impression    IMPRESSION:   HEAD CT:  1.  No CT evidence for acute  intracranial process.  2.  Old right frontal infarct and chronic microvascular ischemic changes as above, unchanged from prior.    HEAD CTA:   1.  Normal CTA [place].    NECK CTA:  1.  Normal neck CTA.    As discussed with Dr. Rucker at 6:21 PM CDT.     XR Chest Port 1 View    Narrative    EXAM: XR CHEST PORT 1 VIEW  LOCATION: [facility]  DATE/TIME: 8/28/2022 2:16 PM    INDICATION: syncope, mild hypoxia  COMPARISON: 3/19/2022      Impression    IMPRESSION: Lungs are clear. No effusions or pneumothorax. Heart size is normal. Loop recorder over the left chest. Old, healed left humerus fracture with residual posttraumatic deformity. Osteopenia. Atherosclerosis.   CT Chest Pulmonary Embolism w Contrast    Narrative    EXAM: CT CHEST PULMONARY EMBOLISM W CONTRAST  LOCATION: [facility]  DATE/TIME: 8/28/2022 6:18 PM    INDICATION: Syncope and hypotension. Elevated D-dimer. Mild hypoxia. Nonambulatory at baseline. Question pulmonary artery embolism.    COMPARISON: CT chest dated 3/19/2022.  TECHNIQUE: CT chest pulmonary angiogram during arterial phase injection of IV contrast. Multiplanar reformats and MIP reconstructions were performed. Dose reduction techniques were used.   CONTRAST: 53 ml Isovue 370.     FINDINGS:  ANGIOGRAM CHEST: Pulmonary arteries are normal caliber and negative for pulmonary emboli. Thoracic aorta is negative for dissection. No CT evidence of right heart strain.    LUNGS AND PLEURA: Focal density in the posterolateral left costophrenic angle likely represents scarring or atelectasis. Groundglass densities in the bilateral lungs are likely secondary to mild vascular congestion or atelectasis. Mild emphysematous   changes are noted in the lung apices. No pneumothorax, significant pleural fluid collection or definite infiltrate is identified.    MEDIASTINUM/AXILLAE: Heart is upper normal size. Mildly prominent AP window lymph node  measures up to 0.7 cm in short axis, unchanged since the prior study dated 3/19/2022. No other mediastinal, hilar, or axillary lymphadenopathy is identified.   Visualized portions of the thyroid are unremarkable.    CORONARY ARTERY CALCIFICATION: Moderate.    UPPER ABDOMEN: There is nonaneurysmal atherosclerosis. Minimal thickening left adrenal gland is again noted, similar to the prior studies. Area of previous aneurysmal dilatation of the abdominal aorta is not included on this study. Visualized portions of   the upper abdominal contents are otherwise unremarkable.    MUSCULOSKELETAL: Healed proximal left humeral fracture is again noted. No aggressive osseous lesions or acute osseous fractures are seen.      Impression    IMPRESSION:  1.  No evidence for pulmonary artery embolism.  2.  Mild groundglass density in the lungs could represent mild pulmonary edema or atelectasis.  3.  Mild emphysematous changes of the lung apices.  4.  Atherosclerosis with moderate coronary artery calcifications.  5.   Stable mildly prominent lymph node in the AP window. No overt lymphadenopathy.  6.  Etiology for patient's symptoms is not definitely seen.       Discharge Medications   Current Discharge Medication List      CONTINUE these medications which have CHANGED    Details   aspirin (ASA) 81 MG chewable tablet Take 1 tablet (81 mg) by mouth daily  Qty: 100 tablet, Refills: 3    Associated Diagnoses: Cerebrovascular accident (CVA) due to embolism of left anterior cerebral artery (H)         CONTINUE these medications which have NOT CHANGED    Details   acetaminophen (TYLENOL) 500 MG tablet Take 1,000 mg by mouth every 6 hours as needed for mild pain      alcohol swab prep pads Use to swab area of injection/carlos manuel as directed.  Qty: 100 each, Refills: 3    Associated Diagnoses: Type 2 diabetes mellitus with diabetic peripheral angiopathy without gangrene, without long-term current use of insulin (H)      atorvastatin (LIPITOR) 40  MG tablet Take 1 tablet (40 mg) by mouth daily  Qty: 90 tablet, Refills: 0    Associated Diagnoses: Cerebrovascular accident (CVA) due to embolism of left anterior cerebral artery (H)      !! blood glucose (NO BRAND SPECIFIED) test strip Use to test blood sugar once daily. To accompany: Blood Glucose Monitor Brands: per insurance.  Qty: 100 strip, Refills: 3    Comments: Accucheck brand  Associated Diagnoses: Type 2 diabetes mellitus with diabetic peripheral angiopathy without gangrene, without long-term current use of insulin (H)      !! blood glucose (NO BRAND SPECIFIED) test strip Use to test blood sugar 1 times daily or as directed.  Qty: 100 strip, Refills: 3    Comments: Accu-chek [place] needed  Associated Diagnoses: Cerebrovascular accident (CVA) due to embolism of left anterior cerebral artery (H); Type 2 diabetes mellitus with diabetic peripheral angiopathy and gangrene, without long-term current use of insulin (H)      blood glucose monitoring (NO BRAND SPECIFIED) meter device kit Use to test blood sugar once daily. Preferred blood glucose meter OR supplies to accompany: Blood Glucose Monitor Brands: per insurance.  Qty: 1 kit, Refills: 0    Associated Diagnoses: Type 2 diabetes mellitus with diabetic peripheral angiopathy without gangrene, without long-term current use of insulin (H)      Cyanocobalamin (VITAMIN B 12 PO) Take 1,000 mcg by mouth daily      escitalopram (LEXAPRO) 5 MG tablet Take 1 tablet (5 mg) by mouth daily  Qty: 30 tablet, Refills: 1    Associated Diagnoses: Dementia without behavioral disturbance, unspecified dementia type (H)      gabapentin (NEURONTIN) 300 MG capsule TAKE 1 CAPSULE BY MOUTH AT  BEDTIME  Qty: 15 capsule, Refills: 0    Associated Diagnoses: Dementia without behavioral disturbance, unspecified dementia type (H)      hydrALAZINE (APRESOLINE) 10 MG tablet Take 1 tab (10mg) by mouth if systolic BP exceeds 180  Qty: 30 tablet, Refills: 1    Associated Diagnoses: Hypertension  goal BP (blood pressure) < 140/90      levETIRAcetam (KEPPRA) 1000 MG tablet Take 1 tablet (1,000 mg) by mouth 2 times daily  Qty: 180 tablet, Refills: 3    Associated Diagnoses: Seizure disorder (H)      meloxicam (MOBIC) 7.5 MG tablet Take 1 tablet (7.5 mg) by mouth daily  Qty: 90 tablet, Refills: 0    Associated Diagnoses: Age-related osteoporosis without current pathological fracture; Pain in both lower legs      memantine (NAMENDA) 10 MG tablet Take 1 tablet (10 mg) by mouth daily  Qty: 90 tablet, Refills: 0    Associated Diagnoses: Dementia without behavioral disturbance, unspecified dementia type (H)      metFORMIN (GLUCOPHAGE XR) 500 MG 24 hr tablet Take 1 tablet (500 mg) by mouth daily (with dinner)  Qty: 90 tablet, Refills: 1    Associated Diagnoses: Type 2 diabetes mellitus with diabetic peripheral angiopathy without gangrene, without long-term current use of insulin (H)      omeprazole (PRILOSEC) 40 MG DR capsule Take 1 capsule (40 mg) by mouth At Bedtime  Qty: 90 capsule, Refills: 1    Comments: Requesting 1 year supply  Associated Diagnoses: Abdominal pain, generalized; Chronic upset stomach      order for DME Equipment being ordered: glucometer and related supplies.  Qty: 1 Units, Refills: 0    Associated Diagnoses: Type 2 diabetes mellitus with diabetic peripheral angiopathy without gangrene, without long-term current use of insulin (H)      phenytoin (DILANTIN) 50 MG chewable tablet CHEW AND SWALLOW 2 TABLETS BY MOUTH TWICE DAILY  Qty: 120 tablet, Refills: 0    Associated Diagnoses: Seizure (H)      polyethylene glycol (MIRALAX) 17 GM/Dose powder Take 17 g (1 capful) by mouth 2 times daily  Qty: 1000 g, Refills: 1    Associated Diagnoses: Abdominal pain, generalized; Chronic upset stomach      thin (NO BRAND SPECIFIED) lancets Use with lanceting device. To accompany: Blood Glucose Monitor Brands: per insurance.  Qty: 100 each, Refills: 6    Associated Diagnoses: Type 2 diabetes mellitus with  diabetic peripheral angiopathy without gangrene, without long-term current use of insulin (H)      alendronate (FOSAMAX) 70 MG tablet Take 1 tablet (70 mg) by mouth every 7 days Tuesdays  Qty: 13 tablet, Refills: 3    Associated Diagnoses: Age-related osteoporosis without current pathological fracture      clopidogrel (PLAVIX) 75 MG tablet Take 1 tablet by mouth once daily  Qty: 90 tablet, Refills: 0    Associated Diagnoses: Cerebrovascular accident (CVA) due to embolism of left anterior cerebral artery (H)       !! - Potential duplicate medications found. Please discuss with provider.      STOP taking these medications       glipiZIDE (GLUCOTROL XL) 2.5 MG 24 hr tablet Comments:   Reason for Stopping:         lisinopril (ZESTRIL) 20 MG tablet Comments:   Reason for Stopping:             Allergies   Allergies   Allergen Reactions     Amoxicillin Hives and Rash     Pt reports she has taken PCN without problems     Ampicillin Rash

## 2022-08-29 NOTE — PLAN OF CARE
Occupational Therapy: Orders received. Chart reviewed and discussed with care team.? Occupational Therapy not indicated due to patient with 24/7 assist at baseline, none deficits, no acute change that warrants inpatient OT assessment and the assessment would not change patient's discharge disposition, receiving HHOT at baseline.? Defer discharge recommendations to PT.? Will complete orders.     Thank you for your referral.  Lisa Crawley, PT, DPT, ATC, LAT    [facility]  O: [phone]  E: [email or web address]

## 2022-08-29 NOTE — PLAN OF CARE
"Goal Outcome Evaluation:    Plan of Care Reviewed With: patient     Overall Patient Progress: improving    Outcome Evaluation: Pt with no c/o dizziness overnight.  Ortho vitals completed.  Up with del-steady to BR.  No insulin needed overnight.  Possible MRI today        BP (!) 154/75 (BP Location: Right arm)   Pulse 88   Temp 98.7  F (37.1  C) (Oral)   Resp 16   Ht 1.651 m (5' 5\")   Wt 67 kg (147 lb 11.3 oz)   SpO2 100%   BMI 24.58 kg/m      "

## 2022-08-29 NOTE — PLAN OF CARE
S-(situation): Patient discharged to home via w/c with family    B-(background): Syncope, hypotension    A-(assessment): Patient is alert and disoriented to situation. Ortho's completed this shift. Reports ongoing numbness in the right LE. Uses w/c at baseline therefore sarasteady and 2 assist was utilized this shift.     R-(recommendations): Discharge instructions reviewed with patient and family. Listed belongings gathered and returned to patient.          Discharge Nursing Criteria:     Care Plan and Patient education resolved: Yes    New Medications- pt has been educated about purpose and side effects: Not Applicable    Vaccines  Influenza status verified at discharge:  Not Applicable      MISC  Prescriptions if needed, hard copies sent with patient  NA  Home medications returned to patient: NA  Medication Bin checked and emptied on discharge Yes  Patient reports post-discharge pain management plan is effective: Yes

## 2022-08-29 NOTE — PROGRESS NOTES
08/29/22 1000   Quick Adds   Quick Adds Vestibular Eval   Type of Visit Initial PT Evaluation       Present no   Living Environment   People in Home spouse   Current Living Arrangements apartment   Home Accessibility no concerns  (handicap accessible set up in the home)   Transportation Anticipated family or friend will provide   Self-Care   Usual Activity Tolerance fair   Current Activity Tolerance poor   Regular Exercise No   Equipment Currently Used at Home wheelchair, manual;walker, standard;cane, straight;shower chair;commode chair;grab bar, toilet;grab bar, tub/shower  (bed rails)   Fall history within last six months yes   Number of times patient has fallen within last six months 1   Activity/Exercise/Self-Care Comment Pt is normally I in BADLs, although pt states  will occasionally help her with bathing and transfers.  does all the cooking, most of the cleaning, and sets up pt's medications.   General Information   Onset of Illness/Injury or Date of Surgery 08/27/22   Referring Physician Dr. Saavedra   Pertinent History of Current Problem (include personal factors and/or comorbidities that impact the POC) Patient is a 72 year old female, admitted due to unreponsive episode at home and altered mental status. Diagnosed with syncope and elevated d-dimer. patient with a previous medical history of several CVAs, left sinan, seizure disorder, hypotension, supraventricular tachycardia, osteoporosis, DM type 2, hyperlipidemia, dementia, smoker. Patient currently receiving home care PT and OT.   Existing Precautions/Restrictions fall   Weight-Bearing Status - LUE full weight-bearing   Weight-Bearing Status - RUE full weight-bearing   Weight-Bearing Status - LLE full weight-bearing   Weight-Bearing Status - RLE full weight-bearing   General Observations PT orders: eval and treat dc recommendations. activity orders: up with assist   Cognition   Affect/Mental Status (Cognition) WFL    Orientation Status (Cognition) oriented to;person;situation;place;time   Follows Commands (Cognition) WFL   Pain Assessment   Patient Currently in Pain No   Integumentary/Edema   Integumentary/Edema no deficits were identifed   Posture    Posture Forward head position  (cerival posturing left rotated. off weights left side)   Range of Motion (ROM)   Range of Motion ROM is WFL   Strength (Manual Muscle Testing)   Strength Comments left side: UE flexion 3+/5, hip flexion 3+/5. hip adduction 3+/5, hip extension 3+/5, knee extension 3+/5   Bed Mobility   Bed Mobility supine-sit;sit-supine;scooting/bridging   Scooting/Bridging [place] (Bed Mobility) modified independence   Supine-Sit [place] (Bed Mobility) modified independence   Sit-Supine [place] (Bed Mobility) modified independence   Bed Mobility Limitations decreased ability to use legs for bridging/pushing;cognitive deficits   Impairments Contributing to Impaired Bed Mobility impaired coordination;decreased strength   Assistive Device (Bed Mobility) bed rails   Transfers   Transfers bed-chair transfer;sit-stand transfer   Transfer Safety Concerns Noted decreased sequencing ability;losing balance backward   Impairments Contributing to Impaired Transfers impaired balance;impaired coordination;decreased strength   Bed-Chair Transfer   Assistive Device (Bed-Chair Transfers) walker, front-wheeled   Bed-Chair [place] (Transfers) minimum assist (75% patient effort)   Sit-Stand Transfer   Sit-Stand [place] (Transfers) minimum assist (75% patient effort)   Assistive Device (Sit-Stand Transfers) walker, front-wheeled   Gait/Stairs (Locomotion)   Comment, (Gait/Stairs) unable to assess due to fatigue, does not complete at baseline   Balance   Balance Comments Short sitting with increased dizziness unable to hold posture (head movements and gaze stabilization tasks). standing requires MIN assist to maintain standing balance   Sensory Examination    Sensory Perception Comments impaired LLE   Coordination   Coordination Comments impaired   Cervicogenic Screen   Neck ROM limited cervical AROM in all directions   Vertebral Artery Test Normal   Alar Ligament Test Normal   Transverse Ligament Test Normal   Distraction Normal   Oculomotor Exam   Smooth Pursuit Abnormal   Smooth Pursuit Comment poor command following, easily distracted, nystagmus with upward gaze right   Saccades Abnormal   Saccades Comments unable to tolerate horizontal   VOR Abnormal   VOR Comments unable to tolerate any repeated motion   Convergence Testing Abnormal   Convergence Testing Comments increased dizziness   Infrared Goggle Exam or Frenzel Lense Exam   Spontaneous Nystagmus Negative   Gaze Evoked Nystagmus Negative   Head Shake Horizontal Nystagmus Horizontal R   Positional testing Horizontal R   [place]-Hallpike (Right) Negative   [place]-Hallpike (Left) Negative   HSCC Supine Roll Test (Right) Negative   Clinical Impression   Criteria for Skilled Therapeutic Intervention Current level of function same as previous level of function;Evaluation only   PT Diagnosis (PT) vestibular dysfunction, weakness, deconditioning   Influenced by the following impairments baseline dyfunction, fear of falling, dizziness, medical history   Functional limitations due to impairments baseline requires physical assistance for transfers and mobility   Clinical Presentation (PT Evaluation Complexity) Evolving/Changing   Clinical Presentation Rationale at baseline, vestibular symptoms consistent throughout sesssion and as baseline per family   Clinical Decision Making (Complexity) low complexity   Anticipated Equipment Needs at Discharge (PT)   (DME per baseline)   Risk & Benefits of therapy have been explained evaluation/treatment results reviewed;participants included;patient;spouse/significant other;daughter   Clinical Impression Comments Patient from home with her , he provides night cares while one  daughter assists in morning and another afternoon into evening. Patient is able to complete transfers with heavy assist from family at baseline and mobilizes with a wheelchair. Patient demonstrates poor attention, poor command following and is difficult to assess her true functional capacity given her behaviors. She demonstrates central vestibular dysfunction, primarily vestibular occular reflex, however would benefit from more formal vestibular assessment and intervention in the outpatient setting. She is currently receiving home care physical therapy and occupational therapy interventions. Patient would benefit from return to her home environment with baseline assistance.   PT Discharge Planning   PT Discharge Recommendation (DC Rec) home with assist;home with home care physical therapy  (Vestibular rehab- if unable to complete in the home requires outpatient PT for intervention)   PT Rationale for DC Rec Patient from home with her , he provides night cares while one daughter assists in morning and another afternoon into evening. Patient is able to complete transfers with heavy assist from family at baseline and mobilizes with a wheelchair. Patient demonstrates poor attention, poor command following and is difficult to assess her true functional capacity given her behaviors. She demonstrates central vestibular dysfunction, primarily vestibular occular reflex, however would benefit from more formal vestibular assessment and intervention in the outpatient setting. She is currently receiving home care physical therapy and occupational therapy interventions. Patient would benefit from return to her home environment with baseline assistance.   PT Brief overview of current status MOD IND bed mobility. CGA sit to stand from EOB, MIN assist from chair with 2WW. Bed to/from chair transfer CGA with ability to move legs and support self however fatigues quickly. Positive vestibular assessment for: saccades, smooth  pursuit, VOR head movements   Plan of Care Review   Plan of Care Reviewed With patient   Total Evaluation Time   Total Evaluation Time (Minutes) 30       Thank you for your referral.  Lisa Crawley, PT, DPT, ATC, LAT    [facility]  O: [phone]  E: [email or web address]

## 2022-08-30 NOTE — PROGRESS NOTES
Clinic Care Coordination Contact  [facility]: Post-Discharge Note  SITUATION                                                      Admission:    Admission Date: 08/27/22   Reason for Admission: Hypotension due to medication  Active Problems:    Syncope, cardiogenic    Encephalopathy    Type 2 diabetes mellitus with circulatory disorder, without long-term current use of insulin (H)    Hypertension goal BP (blood pressure) < 140/90    Dementia (H)    Seizure disorder (H)    Abnormal gait    Platelet function defect (H)-ASA and Plavix    Hyperlipidemia LDL goal <100    History of multiple cerebrovascular accidents (CVAs)    Hemiparesis affecting left side as late effect of stroke (H)    History of nonsustained atrial tachycardia July 2017    Status post placement of implantable loop recorder    Hypoalbuminemia  Discharge:   Discharge Date: 08/29/22  Discharge Diagnosis: Hypotension due to medication  Active Problems:    Syncope, cardiogenic    Encephalopathy    Type 2 diabetes mellitus with circulatory disorder, without long-term current use of insulin (H)    Hypertension goal BP (blood pressure) < 140/90    Dementia (H)    Seizure disorder (H)    Abnormal gait    Platelet function defect (H)-ASA and Plavix    Hyperlipidemia LDL goal <100    History of multiple cerebrovascular accidents (CVAs)    Hemiparesis affecting left side as late effect of stroke (H)    History of nonsustained atrial tachycardia July 2017    Status post placement of implantable loop recorder    Hypoalbuminemia    BACKGROUND                                                      Per hospital discharge summary and inpatient provider notes:    72-year-old woman with complex health history notable for history of ischemic right frontal stroke with chronic residual left hemiparesis, epilepsy with recurring nonconvulsive seizures, dementia, history of nonsustained atrial tachycardia without specific treatment for same in the past, type 2 diabetes  treated with oral medication, hypertension, previous episodes of syncope versus unresponsiveness of unclear etiology, 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 (Red Flag symptoms escalate to triage hotline per guidelines): Improved  Do you feel your condition is stable enough to be safe at home until your provider visit?: Yes  Does the patient have their discharge instructions? : Yes  Does the patient have questions regarding their discharge instructions? : No  Were you started on any new medications or were there changes to any of your previous medications? : Yes  Does the patient have all of their medications?: Yes  Do you have questions regarding any of your medications? : No  Do you have all of your needed medical supplies or equipment (DME)?  (i.e. oxygen tank, CPAP, cane, etc.): Yes  Discharge follow-up appointment scheduled within 14 calendar days? : Yes  Discharge Follow Up Appointment Date: 09/08/22  Discharge Follow Up Appointment Scheduled with?: Specialty Care Provider    Post-op ([facility] Only)  If the patient had a surgery or procedure, do they have any questions for a nurse?: No         PLAN                                                      Outpatient Plan:     Follow-ups Needed After Discharge     Follow-up Appointments     Follow-up and recommended labs and tests       Follow up with primary care provider, Abdirahman Munoz, within 7 days to   evaluate medication change and for hospital follow- up.        Future Appointments   Date Time Provider Department Center   9/8/2022 11:30 AM Abdirahman Muonz PA-C [facility]   9/19/2022  9:30 AM [id number] M[facility]G [facility]       For any urgent concerns, please contact our 24 hour nurse triage line:  [phone] ([phone])       MARYAM Garcia  [phone]  [facility]

## 2022-09-07 NOTE — TELEPHONE ENCOUNTER
Call transferred on red flag line to triage RN    S-(situation): call came from Janneth, home care OT. Janneth at  the pt's home now. She had PT this morning and her BP was 117/85. This was about 1030 this morning. Once PT left about 10 minutes after the pt felt  hot, clammy ,pale and family checked her BP and  it was 175/99.  Janneth got to the home later and /98 when she called for Abdirahman Landis    Per pt she  feels good now, she denies anything sx now , no further hot flashes and no longer clammy    Janneth rechecked BP while on phone with this RN  and 169/90 with pulse of 75    B-(background):   She has this hydralazine prn   Disp Refills Start End LAURA   hydrALAZINE (APRESOLINE) 10 MG tablet 30 tablet 1 6/10/2022  --   Sig: Take 1 tab (10mg) by mouth if systolic BP exceeds 180    Family checked blood sugar now when on the phone with triage RN and it was 190 after lunch    Recently in hospital and from discharge notes: Chronic lisinopril was discontinued due to concern for syncope from hypotension.  After stopping lisinopril, she remained normotensive to mildly hypertensive during hospitalization.     Also: Due to concern that restarting glipizide might precipitate severe hypoglycemia, glipizide was discontinued    A-(assessment): no need for emergency eval as she is feeling better and BP down now     R-(recommendations): No need to take the hydralazine now as her BP has dropped. It was advised she take it easy the rest of the day. OT should do very easy exercises with her. If problems recur to call back. RN will let PCP know of this situation and if another course of active is recommended please let her know    This will be routed to PCP as FYI. Pt has appt with him tomorrow and if he advises anything different please call pt    Sylvia Lantigua RN, BSN  [facility]

## 2022-09-08 NOTE — PROGRESS NOTES
"  Assessment & Plan     Syncope, cardiogenic  Hypotension due to medication  Cerebrovascular accident (CVA) due to embolism of left anterior cerebral artery (H)  Supraventricular tachycardia (H)  Hypertension goal BP (blood pressure) < 140/90  Transient hypotension  Tobacco abuse disorder  Gait instability  History of multiple cerebrovascular accidents (CVAs)  Other emphysema (H)  Type 2 diabetes mellitus with diabetic peripheral angiopathy without gangrene, without long-term current use of insulin (H)  Today her blood pressure seems to be in good control.  We will have her maintain current medications and changes that were recommended recently.  Recheck related labs in 3 months.  - Home Care Referral    Platelet function defect (H)  Historically noted no new concerns.  Follow-up as needed.    Encephalopathy  All part of what she has going on.    We did have a discussion around the end of life wishes and CODE STATUS.  We also discussed how and living situation options should things continue to slip out of her control.  Family will be following up with this.     BMI:   Estimated body mass index is 24.21 kg/m  as calculated from the following:    Height as of 8/27/22: 1.651 m (5' 5\").    Weight as of this encounter: 66 kg (145 lb 8 oz).   Weight management plan: Discussed healthy diet and exercise guidelines      No follow-ups on file.    Abdirahman Shrestha PA-C  [facility]    Regina Gaxiola is a 72 year old accompanied by her daughter, presenting for the following health issues:  Hospital F/U      HPI     Post Discharge Outreach 8/30/2022   Admission Date 8/27/2022   Reason for Admission Hypotension due to medication  Active Problems:    Syncope, cardiogenic    Encephalopathy    Type 2 diabetes mellitus with circulatory disorder, without long-term current use of insulin (H)    Hypertension goal BP (blood pressure) < 140/90    Dementia (H)    Seizure disorder (H)    Abnormal gait    Platelet " function defect (H)-ASA and Plavix    Hyperlipidemia LDL goal <100    History of multiple cerebrovascular accidents (CVAs)    Hemiparesis affecting left side as late effect of stroke (H)    History of nonsustained atrial tachycardia July 2017    Status post placement of implantable loop recorder    Hypoalbuminemia   Discharge Date 8/29/2022   Discharge Diagnosis Hypotension due to medication  Active Problems:    Syncope, cardiogenic    Encephalopathy    Type 2 diabetes mellitus with circulatory disorder, without long-term current use of insulin (H)    Hypertension goal BP (blood pressure) < 140/90    Dementia (H)    Seizure disorder (H)    Abnormal gait    Platelet function defect (H)-ASA and Plavix    Hyperlipidemia LDL goal <100    History of multiple cerebrovascular accidents (CVAs)    Hemiparesis affecting left side as late effect of stroke (H)    History of nonsustained atrial tachycardia July 2017    Status post placement of implantable loop recorder    Hypoalbuminemia   How are you doing now that you are home? doing well   How are your symptoms? (Red Flag symptoms escalate to triage hotline per guidelines) Improved   Do you feel your condition is stable enough to be safe at home until your provider visit? Yes   Does the patient have their discharge instructions?  Yes   Does the patient have questions regarding their discharge instructions?  No   Were you started on any new medications or were there changes to any of your previous medications?  Yes   Does the patient have all of their medications? Yes   Do you have questions regarding any of your medications?  No   Do you have all of your needed medical supplies or equipment (DME)?  (i.e. oxygen tank, CPAP, cane, etc.) Yes   Discharge follow-up appointment scheduled within 14 calendar days?  Yes   Discharge Follow Up Appointment Date 9/8/2022   Discharge Follow Up Appointment Scheduled with? Specialty Care Provider     Hospital Follow-up  Visit:    Hospital/Nursing Home/IP Rehab Facility: [facility]  Date of Admission: 8/27  Date of Discharge: 8/29  Reason(s) for Admission: Hypotension due to medication      Was your hospitalization related to COVID-19? No   Problems taking medications regularly:  None  Medication changes since discharge: None  Problems adhering to non-medication therapy:  None    Summary of hospitalization: Discharge summary reviewed  Diagnostic Tests/Treatments reviewed.  Follow up needed: Cardiology to review cardiac monitoring ongoing cares with home care specialty.  Other Healthcare Providers Involved in Patient s Care:         Homecare  Update since discharge: improved.         Post Medication Reconciliation Status:        Plan of care communicated with her youngest and oldest daughter today in clinic           Review of Systems   Constitutional, HEENT, cardiovascular, pulmonary, GI, , musculoskeletal, neuro, skin, endocrine and psych systems are negative, except as otherwise noted.      Objective    /70   Pulse 84   Temp 97.5  F (36.4  C) (Temporal)   Resp 18   Wt 66 kg (145 lb 8 oz)   SpO2 96%   BMI 24.21 kg/m    Body mass index is 24.21 kg/m .  Physical Exam   GENERAL: healthy, alert and no distress  NECK: no adenopathy, no asymmetry, masses, or scars and thyroid normal to palpation  RESP: lungs clear to auscultation - no rales, rhonchi or wheezes  CV: regular rate and rhythm, normal S1 S2, no S3 or S4, no murmur, click or rub, no peripheral edema and peripheral pulses strong  ABDOMEN: soft, nontender, no hepatosplenomegaly, no masses and bowel sounds normal  MS: no gross musculoskeletal defects noted, no edema  NEURO: Normal strength and tone, mentation intact and speech normal  PSYCH: mentation appears normal, affect normal/bright    No results found for this or any previous visit (from the past 24 hour(s)).

## 2022-09-12 NOTE — TELEPHONE ENCOUNTER
Patient needing refill of her dilantin. Is out.  Requesting multiple refills  Last OV was 9/8/22.  Routing refill request to provider for review/approval because:  Drug not on the [facility] refill protocol   Sandra Godoy RN

## 2022-09-25 NOTE — PROGRESS NOTES
"Khalida Redding  Gender: female  : 1950  [address]   [facility] [address]  [phone] (home)     Medical Record: [id number]  Pharmacy:    [facility] - [place], [place] - [address]  [facility] - [place], [place] - [address]  [facility]) - [place], [place] - [address]  Primary Care Provider: Abdirahman Munoz    Parent's names are: Data Unavailable (mother) and Data Unavailable (father).      [facility]  2022     Discharge Phone Call:  Key Words/Key Times      Introduction - AIDET (Acknowledge, Introduce, Duration, Explanation)      Empathy-   We are calling to see how you are since your recent stay in the hospital?     Call back COMMENTS:       Clinical Questions -  (f/u appts, medication side effects/purpose, ability to care for self at home) \"For your safety, it is important to us that you understand the purpose and side effects of your medications, can you tell me what your new medications are?\"     Call back COMMENTS:       Staff Recognition -  We like to recognize staff and physicians who have done an excellent job.  Do you remember any people from your care team that you would like recognize?     Call back COMMENTS:       Very Good Care -  We want to provide very good care to all patients.  How was your care?     Call back COMMENTS:       Opportunities for Improvement -  Our goal is to be the best.  Do you have any suggestions for things that we could improve upon?     Call back COMMENTS:       Thank You       22 1st call back attempted.  No message left.  Jaime Pipkin RN  "

## 2022-09-26 NOTE — TELEPHONE ENCOUNTER
Patient daughter Lashae would like a call back to discuss EEG that was done.       next opening is not till November.   15-Apr-2018 15:10

## 2022-09-27 NOTE — TELEPHONE ENCOUNTER
Spoke with Lashae, patients daughter. Informed EEG did not record any seizures or subclinical seizures but that it does support the diagnosis of epilepsy.     Lashae notes that it is difficult to drive down to clinic and wonders if there is a doctor they can see closer to [place]. She would prefer to be seen at [facility] if possible as this is much closer for her. I did let her know that Dr. Machado does not go to [place]. She will call [facility] to make f/u appointment either with Dr. Machado or with doctor at [facility]. Did let her know that she could do some of her visits as video visits.       Jazmin Johnson PA-C

## 2022-09-29 NOTE — PROGRESS NOTES
"Khalida Redding  Gender: female  : 1950  [address]   [facility] [address]  [phone] (home)     Medical Record: [id number]  Pharmacy:    [facility] - [place], [place] - [address]  [facility] - [place], [place] - [address]  [facility]) - [place], [place] - [address] [facility]  Primary Care Provider: Abdirahman Munoz    Parent's names are: Data Unavailable (mother) and Data Unavailable (father).      [facility]  2022     Discharge Phone Call:  Key Words/Key Times      Introduction - AIDET (Acknowledge, Introduce, Duration, Explanation)      Empathy-   We are calling to see how you are since your recent stay in the hospital?     Call back COMMENTS: \"Doing okay.\"      Clinical Questions -  (f/u appts, medication side effects/purpose, ability to care for self at home) \"For your safety, it is important to us that you understand the purpose and side effects of your medications, can you tell me what your new medications are?\"     Call back COMMENTS: Patient was a bit confused, so  got on the phone to answer questions. Stated she saw a neurologist, but that she is still having some dizzy spells. Continues to take her meds as ordered. No questions about treatment. Writer encouraged telling patient to rise slowly and to have assistance with moving. Agreed.       Staff Recognition -  We like to recognize staff and physicians who have done an excellent job.  Do you remember any people from your care team that you would like recognize?     Call back COMMENTS: \" Everybody was wonderful to her.\"      Very Good Care -  We want to provide very good care to all patients.  How was your care?     Call back COMMENTS: \"Good!\"      Opportunities for Improvement -  Our goal is to be the best.  Do you have any suggestions for things that we could improve " "upon?     Call back COMMENTS: No suggestions. \"Everybody did their jobs well.\"       Thank You           "

## 2022-10-06 NOTE — TELEPHONE ENCOUNTER
Forms/Letter Request    Type of form/letter: [facility] - DISCHARGE ORDERS- ORDER ID [id number]    Have you been seen for this request: N/A    Do we have the form/letter: Yes: PLACED IN PRIMARY CARE FORM BOX AT     When is form/letter needed by: ASAP    How would you like the form/letter returned: Fax [phone] PHONE:[phone]    COMMENTS: PLEASE REVIEW, SIGN, AND RETURN FAX

## 2022-10-06 NOTE — TELEPHONE ENCOUNTER
Form placed in providers bin for review and signature if appropriate  Sailaja Rivera MA on 10/6/2022 at 1:25 PM

## 2022-10-07 NOTE — TELEPHONE ENCOUNTER
Form faxed back to [phone]. Placed in TC bin for scanning.     Caty Martinez      [facility]

## 2022-10-09 NOTE — ED TRIAGE NOTES
Pt presents with a couple days of dizziness and headaches for a couple days.      Triage Assessment     Row Name 10/09/22 6157       Triage Assessment (Adult)    Airway WDL WDL       Respiratory WDL    Respiratory WDL WDL       Cardiac WDL    Cardiac WDL WDL

## 2022-10-12 NOTE — TELEPHONE ENCOUNTER
Rama calls back from [facility]. Looking for ICD code for the the diagnoses of seizure disorder and acute CVA. Provided per chart. She denies further questions.     Jemma Whitney, SANJANAN, RN, PHN  Registered Nurse-Clinic Triage  [facility]/[place]  10/12/2022 at 2:22 PM

## 2022-10-12 NOTE — TELEPHONE ENCOUNTER
Rama from [facility] is calling needing the ICD 10 codes for billing purposes for the PCA start of care.    Rama [facility]  Phone [phone]  Fax [phone]    Patience Antonio RN  [facility] ~ Registered Nurse  Clinic Triage ~ [place] [facility] [place]  October 12, 2022

## 2022-10-12 NOTE — TELEPHONE ENCOUNTER
Forms/Letter Request    Type of form/letter: orders    Have you been seen for this request: N/A    Do we have the form/letter: Yes:  FP form bin    When is form/letter needed by: soledad    How would you like the form/letter returned: Fax    Patient Notified form requests are processed in 3-5 business days:No    Fax [phone]

## 2022-10-12 NOTE — TELEPHONE ENCOUNTER
No referral /order for a PCA. There was question to whether it would be covered with care coordination. See enc 08/19 and 8/25.    There was a referral placed 9/8/22 by pcp for a home care which included a home health aid. Unsure if this is what [facility] is referring to.     Left message for Rama for callback.

## 2022-10-18 NOTE — TELEPHONE ENCOUNTER
"Pending Prescriptions:                       Disp   Refills    meloxicam (MOBIC) 7.5 MG tablet [Pharmacy *100 ta*2        Sig: TAKE 1 TABLET BY MOUTH  DAILY    Routing refill request to provider for review/approval because:  age    Requested Prescriptions   Pending Prescriptions Disp Refills    meloxicam (MOBIC) 7.5 MG tablet [Pharmacy Med Name: Meloxicam 7.5 MG Oral Tablet] 100 tablet 2     Sig: TAKE 1 TABLET BY MOUTH  DAILY       NSAID Medications Failed - 10/16/2022  9:51 PM        Failed - Patient is age 6-64 years        Failed - Normal serum creatinine on file in past 12 months     Recent Labs   Lab Test 08/28/22  0600 09/11/17  1142 08/23/17  1721   CR 0.47*   < >  --    CREAT  --   --  0.5*    < > = values in this interval not displayed.       Ok to refill medication if creatinine is low          Passed - Blood pressure under 140/90 in past 12 months     BP Readings from Last 3 Encounters:   09/08/22 124/70   08/29/22 (!) 143/68   08/17/22 128/83                 Passed - Normal ALT on file in past 12 months     Recent Labs   Lab Test 08/28/22  0600   ALT 37             Passed - Normal AST on file in past 12 months     Recent Labs   Lab Test 08/28/22  0600   AST 26             Passed - Recent (12 mo) or future (30 days) visit within the authorizing provider's specialty     Patient has had an office visit with the authorizing provider or a provider within the authorizing providers department within the previous 12 mos or has a future within next 30 days. See \"Patient Info\" tab in inbasket, or \"Choose Columns\" in Meds & Orders section of the refill encounter.              Passed - Normal CBC on file in past 12 months     Recent Labs   Lab Test 08/29/22  0654 08/28/22  0600   WBC  --  5.8   RBC  --  4.03   HGB 12.9 12.3   HCT  --  38.8   PLT  --  167                 Passed - Medication is active on med list        Passed - No active pregnancy on record        Passed - No positive pregnancy test in past 12 months "

## 2022-10-19 NOTE — TELEPHONE ENCOUNTER
Form completed please fax.       CONCEPCION Hess CNP  Questions or concerns please feel free to send me a Markr message or call me  Phone : [phone]

## 2022-10-19 NOTE — TELEPHONE ENCOUNTER
Forms/Letter Request    Type of form/letter: [facility]    Have you been seen for this request: N/A    Do we have the form/letter: Yes: Family Practice Forms Box    When is form/letter needed by: asap    How would you like the form/letter returned: Fax    Patient Notified form requests are processed in 3-5 business days:    Please complete form and return to [phone]

## 2022-10-21 NOTE — TELEPHONE ENCOUNTER
"Huddled with provider. Provider commented: \"Nothing appears to be emergent at this point in time.  She can have any green slot that works for them next week.  This needs to be a face-to-face visit.  Thank you!   Electronically signed:     Abdirahman Munoz PA-C  \"    Contacted Laura and advised her of provider's message. She verbalized understanding. Appointment scheduled.     Next 5 appointments (look out 90 days)    Oct 24, 2022 11:30 AM  (Arrive by 11:10 AM)  Provider Visit with Abdirahman Munoz PA-C  [facility] ([facility] ) [address] [place] [address] [address]  [facility] [id number] [phone]        Jemma Whitney, SANJANAN, RN, PHN  Registered Nurse-Clinic Triage  [facility]/[place]  10/21/2022 at 11:23 AM          "

## 2022-10-21 NOTE — TELEPHONE ENCOUNTER
Daughter, Laura, is calling. Consent to communicate on file. She states that pt is sleeping a lot. Started noticing this around the beginning of October around 10/9 when they took mother into the ER for dehydration and dizziness. She gets up and eats and then goes back to sleep. Sleeping approximately 20 hours per day.     Also, left leg and foot she is not moving at all. As of a couple days ago, they noticed this. Pt tells family that it is moving but it is not. Denies swelling or redness. There are no visible abnormalities. Denies changes to personality or speech. Denies facial drooping. 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 Not voicing any thoughts that make daughter think that pt is depressed or thoughts of harming self. Denies difficulty breathing and urinary symptoms. Last hospitalized 8/27/22. No med changes. Taking everything as prescribed.     Daughter is concerned about the increase in sleep. They are trying to make an appointment with PCP. They were advised to take mother to ER if any worsening symptoms (signs of stroke, difficulty breathing, etc). She verbalized understanding and is in agreement with plan.     PCP-ok to add into schedule or further recommendations? Please call daughterLaura, back with provider response.     Jemma Whitney, SANJANAN, RN, PHN  Registered Nurse-Clinic Triage  [facility]/[place]  10/21/2022 at 10:55 AM

## 2022-10-24 NOTE — PATIENT INSTRUCTIONS

## 2022-10-24 NOTE — PROGRESS NOTES
Assessment & Plan     History of multiple cerebrovascular accidents (CVAs)  Cerebrovascular accident (CVA) due to embolism of left anterior cerebral artery (H)  Somnolence  More than what we are isType 2 diabetes mellitus with diabetic peripheral angiopathy without gangrene, without long-term current use of insulin (H)  Acute CVA (cerebrovascular accident) (H)  Seizure disorder (H)  Other emphysema (H)  Hypertension goal BP (blood pressure) < 140/90  Supraventricular tachycardia (H)  Transient hypotension  Prolonged discussion with the family members today about her overall progression into stroke related signs and symptoms.  Further evaluation with neurology is pending advised to make sure that they keep that appointment.  Advised that they also consider making a move towards assisted living or something to take some of the stress and strain off the family.  Must stay that her family is taking very good care of her to this point but may be struggling to keep up this level of care for any length of time in the near future.  Overall control of blood pressure remains very good.  She does present with a [place] healthcare directive form that is certified by our  today.  We note that this gives her daughter and her son the ability to speak on her behalf as to life-saving measures.  Currently she is still full code.  - Adult Eye  Referral; Future  - Albumin Random Urine Quantitative with Creat Ratio; Future  - Primary Care - Care Coordination Referral; Future    No follow-ups on file.  NOTE:  I SPENT 28 MINUTES IN DIRECT CONTACT WITH THIS PATIENT GREATER THAN 50% OF WHICH WAS RELATED TO COUNSELING HER IN RELATIONSHIP TO THE ABOVE MENTIONED CONDITIONS.    Abdirahman Shrestha PA-C  [facility]   Khalida is a 72 year old, presenting for the following health issues:  Fatigue      Fatigue  Associated symptoms include fatigue.   History of Present Illness       Reason  "for visit:  Check up    She eats 2-3 servings of fruits and vegetables daily.She consumes 2 sweetened beverage(s) daily.She exercises with enough effort to increase her heart rate 9 or less minutes per day.  She exercises with enough effort to increase her heart rate 3 or less days per week.   She is taking medications regularly.     Excessive fatigue x 1 week, sleeping 22 hours a day. Up to eat and drink then goes back to sleep    Annual Wellness Visit    Patient has been advised of split billing requirements and indicates understanding: Yes     Are you in the first 12 months of your Medicare Part B coverage?  No    Physical Health:    In general, how would you rate your overall physical health? poor    Outside of work, how many days during the week do you exercise?none    Outside of work, approximately how many minutes a day do you exercise?not applicable    If you drink alcohol do you typically have >3 drinks per day or >7 drinks per week? Not Applicable    Do you usually eat at least 4 servings of fruit and vegetables a day, include whole grains & fiber and avoid regularly eating high fat or \"junk\" foods? Yes    Do you have any problems taking medications regularly? YES - hard time swallowing, been crushing them    Do you have any side effects from medications? none    Needs assistance for the following daily activities: transportation, shopping, preparing meals, housework, bathing, laundry, money management and taking medicine    Which of the following safety concerns are present in your home?  none identified     Hearing impairment: No    In the past 6 months, have you been bothered by leaking of urine? no    Mental Health:    In general, how would you rate your overall mental or emotional health? fair  PHQ-2 Score:      Do you feel safe in your environment? Yes    Have you ever done Advance Care Planning? (For example, a Health Directive, POLST, or a discussion with a medical provider or your loved ones about " your wishes)? Yes, patient states has an Advance Care Planning document and will bring a copy to the clinic.    Fall risk:  Fallen 2 or more times in the past year?: Yes  Any fall with injury in the past year?: Yes  Get up and go not completed do to patient in wheel chair  Cognitive Screening: Not appropriate due to known dementia        Current providers sharing in care for this patient include:   Patient Care Team:  Abdirahman Munoz PA-C as PCP - General (Physician Assistant)  Abdirahman Munoz PA-C as Assigned PCP  Antonio Machado MD as Assigned Neuroscience Provider  Nicho Austin MD as Assigned Surgical Provider    Patient has been advised of split billing requirements and indicates understanding: Yes    Review of Systems   Constitutional: Positive for fatigue.      Constitutional, HEENT, cardiovascular, pulmonary, GI, , musculoskeletal, neuro, skin, endocrine and psych systems are negative, except as otherwise noted.      Objective    /72 (BP Location: Left arm, Patient Position: Sitting, Cuff Size: Adult Regular)   Pulse 88   Temp 98.3  F (36.8  C) (Temporal)   Resp 16   Wt 65.8 kg (145 lb)   SpO2 91%   BMI 24.13 kg/m    Body mass index is 24.13 kg/m .  Physical Exam   GENERAL: healthy, alert and no distress  RESP: lungs clear to auscultation - no rales, rhonchi or wheezes  CV: regular rate and rhythm, normal S1 S2, no S3 or S4, no murmur, click or rub, no peripheral edema and peripheral pulses strong  ABDOMEN: soft, nontender, no hepatosplenomegaly, no masses and bowel sounds normal  MS: Patient presents to the clinic today in her wheelchair with her 2 daughters.  She is slumped over to the left-hand side not able to control musculature to keep her self upright.  This has been progressive over the last month or more.  Suspect this is due to stroke history in progress. Sensation is maintained.  SKIN: no suspicious lesions or rashes to exposed visible skin today  NEURO: weakness of left  side, sensory exam grossly normal and all things considered she is doing as well as able to cut back.  PSYCH: mentation appears normal, affect normal/bright, she is still bright of devin.    No results found for this or any previous visit (from the past 24 hour(s)).

## 2022-10-26 NOTE — PROGRESS NOTES
Clinic Care Coordination Contact  [facility]/Voicemail       Clinical Data: Care Coordinator Outreach  Outreach attempted x 1.  Left message on patient's voicemail with call back information and requested return call.  Plan: Care Coordinator will try to reach patient again in 1-2 business days.    MARYAM Saucedo  [facility] Care Coordination  [facility]  [phone]

## 2022-10-26 NOTE — LETTER
[facility] CARE COORDINATION  [address] [facility] [address]  October 28, 2022    Khalida Redding  [address]   [facility] [address]      Dear Khalida,      I am a clinic community health worker who works with Abdirahman Shrestha PA-C with the [facility]. I have been trying to reach you recently to introduce Clinic Care Coordination. Below is a description of clinic care coordination and how I can further assist you.       The clinic care coordination team is made up of a registered nurse, , financial resource worker and community health worker who understand the health care system. The goal of clinic care coordination is to help you manage your health and improve access to the health care system. Our team works alongside your provider to assist you in determining your health and social needs. We can help you obtain health care and community resources, providing you with necessary information and education. We can work with you through any barriers and develop a care plan that helps coordinate and strengthen the communication between you and your care team.    Please feel free to contact me at [phone] with any questions or concerns regarding care coordination and what we can offer.      We are focused on providing you with the highest-quality healthcare experience possible.    Sincerely,     MARYAM Saucedo  [facility] Care Coordination

## 2022-10-27 NOTE — PROGRESS NOTES
Clinic Care Coordination Contact  Community Health Worker Initial Outreach    CHW spoke to patient's  Yeyo briefly. Patient's  requested CHW to call back after 3:30 to speak with their daughter Laura.     MARYAM Saucedo  [facility]  Clinic Care Coordination  [facility]  [phone]

## 2022-10-28 NOTE — PROGRESS NOTES
Clinic Care Coordination Contact  [facility]/Voicemail       Clinical Data: Care Coordinator Outreach  Outreach attempted x 2.  Left message on patient's voicemail with call back information and requested return call.  Plan: Care Coordinator will send care coordination introduction letter with care coordinator contact information and explanation of care coordination services via mail. Care Coordinator will do no further outreaches at this time.    MARYAM Saucedo  [facility] Care Coordination  [facility]  [phone]

## 2022-11-05 NOTE — TELEPHONE ENCOUNTER
"**Consent on file to speak with patients klaudia Sanchez**    Nurse Triage SBAR    Is this a 2nd Level Triage? YES, LICENSED PRACTITIONER REVIEW IS REQUIRED    Situation: Vaginal irritation, burning with urination    Background: Patients daughter calling, states that Khalida has redness in her vagnial area. States that she has stated that it burns when she urinates. Khalida says there is itching as well.   **    Assessment: Possible UTI    Protocol Recommended Disposition:   See HCP Within 4 Hours (Or PCP Triage), See More Appropriate Guideline    Recommendation:     Patient's daughter states \"There is no way she is going to let a doctor look at her gayle-ha\". Writer advised the importance of being seen with UTI symptoms. Patient's daughter stated that is she is still complaining of burning tomorrow she will take her to urgent care.      NO     JAZZMINE PARNELL RN      Does the patient meet one of the following criteria for ADS visit consideration? 16+ years old, with an [facility] PCP     TIP  Providers, please consider if this condition is appropriate for management at one of our Acute and Diagnostic Services sites.     If patient is a good candidate, please use dotphrase <dot>triageresponse and select Refer to ADS to document.                          Red, in vagnial area, itching, bright red, irritated and red, urine next to skin, patients daughter calling,     Reason for Disposition    Rash of female genital area (vulva)    Pain or burning with passing urine (urination) is main symptom    Bedridden (e.g., nursing home patient, CVA, chronic illness, recovering from surgery)    Additional Information    Negative: [1] Sudden onset of rash (within last 2 hours) AND [2] difficulty breathing or swallowing    Negative: Sounds like a life-threatening emergency to the triager    Negative: Athlete's Foot suspected (i.e., itchy rash between the toes)    Negative: Impetigo suspected (i.e., painless infected superficial " small sores, less than 1 inch or 2.5 cm, often covered by a soft, yellow-brown scab or crust; sometimes occurring near nasal openings)    Negative: Insect bite(s) suspected    Negative: Jock Itch suspected (i.e., itchy rash on inner thighs near genital area)    Negative: Localized lump (or swelling) without redness or rash    Negative: Poison ivy, oak, or sumac contact suspected    Negative: Followed a genital area injury (e.g., vagina, vulva)    Negative: Symptoms could be from sexual assault    Negative: Shock suspected (e.g., cold/pale/clammy skin, too weak to stand, low BP, rapid pulse)    Negative: Sounds like a life-threatening emergency to the triager    Negative: Followed a genital area injury (e.g., vagina, vulva)    Negative: Taking antibiotic for urinary tract infection (UTI)    Negative: Pregnant    Negative: Postpartum (from 0 to 6 weeks after delivery)    Negative: [1] Unable to urinate (or only a few drops) > 4 hours AND [2] bladder feels very full (e.g., palpable bladder or strong urge to urinate)    Negative: Vomiting    Negative: Patient sounds very sick or weak to the triager    Negative: [1] SEVERE pain with urination (e.g., excruciating) AND [2] not improved after 2 hours of pain medicine and Sitz bath    Negative: Fever > 100.4 F (38.0 C)    Negative: Side (flank) or lower back pain present    Negative: Diabetes mellitus or weak immune system (e.g., HIV positive, cancer chemo, splenectomy, organ transplant, chronic steroids)    Protocols used: RASH OR REDNESS - QPUENADAK-Y-VG, VULVAR SYMPTOMS-A-AH, URINATION PAIN - FEMALE-A-AH

## 2022-11-08 NOTE — TELEPHONE ENCOUNTER
Reason for Call:  Medication or medication refill: Pharmacy told pt they have faxed over the request last week and have not heard from us.    Do you use a [facility]?  Name of the pharmacy and phone number for the current request:  [facility] [place] - [phone]     Name of the medication requested: escitalopam 5mg    Other request: Please call pt once we contact pharmacy.    Can we leave a detailed message on this number? YES    Phone number patient can be reached at: Cell number on file:    Telephone Information:   Mobile [phone]       Best Time: Anytime    Call taken on 11/8/2022 at 10:32 AM by VERONICA CHAUDHARY

## 2022-11-10 NOTE — TELEPHONE ENCOUNTER
Pt daughter calling to get prescription for Escitalopran transferred to [facility] from now on. Sending to provider as fyi. She stated that they did  this months refill from [facility] but starting in December they want it to be sent via the mail.

## 2022-12-04 NOTE — TELEPHONE ENCOUNTER
Routing refill request to provider for review/approval because:  Drug not active on patient's medication list     Kelton Lemus RN

## 2022-12-22 NOTE — TELEPHONE ENCOUNTER
General Call    Contacts       Type Contact Phone/Fax    12/22/2022 10:05 AM CST Phone (Incoming) Lashae Danielle (Daughter) [phone]     phone number is father's number to call back at        Reason for Call:  Dry and cracking skin.    What are your questions or concerns:  Daughter called about patient. Her skin is severely dry and becoming scaly and cracking. They have tried lotions and diabetic creams and nothing is providing relief. Patient is currently bed ridden and they are wondering if anything can be prescribed to help with her skin. This has been going on for 1 month.    Date of last appointment with provider: n/a    Okay to leave a detailed message?: Yes at Cell number on file:    Telephone Information:   Mobile [phone]

## 2022-12-22 NOTE — TELEPHONE ENCOUNTER
Called and spoke with .   Patient is bed bound.  Says patients feet have been peeling and very flaky. Needing to vacuum the bed due to how much skin is flaking off.   Denies any open areas, bleeding, or drainage from feet. Denies any swelling.   He is wondering what types of creams they could try. They have tried lotion, diabetic foot, cream and Remedy ointment.     Discussed other options such as Aquaphor. Washing and drying feet completely. Continue to monitor for any open areas, drainage or swelling.   He will call with any changes.     Yolanda ALLANN, RN  [facility]      Reason for Disposition    Diabetes - foot problems, questions about    Additional Information    Negative: Sounds like a life-threatening emergency to the triager    Negative: SEVERE pain    Negative: Foot is cool or blue in comparison to other side    Negative: [1] Looks infected (spreading redness, red streak, pus) AND [2] fever    Negative: Patient sounds very sick or weak to the triager    Negative: Fever > 100.4 F (38.0 C)    Negative: [1] Drainage from foot AND [2] new or increased    Negative: [1] Foul-smelling odor from foot AND [2] new or increased    Negative: [1] Spreading redness or red streak AND [2] area > 2 in. (5 cm)    Negative: [1] Purple or black skin on foot or toe AND [2] new or increased    Negative: Looks like a boil or deep ulcer    Negative: Blood glucose > 400 mg/dl (22 mmol/l)    Negative: [1] Ulcer, wound, blister, sore, or red area AND [2] new or increasing    Negative: [1] Drainage from foot AND [2] unchanged since last physician examination    Negative: [1] Foul-smelling odor from foot AND [2] unchanged since last physician examination    Negative: Foot or toe pain    Negative: Itchy foot rash    Negative: White moist peeling skin between toes    Negative: Yellow pus seen in skin around toenail (cuticle area), or pus seen under toenail    Negative: [1] Ulcer, wound, blister  or sore AND [2] unchanged since last examination    Negative: [1] Purple or black skin on foot or toe AND [2] unchanged since last examination    Negative: [1] Numbness or tingling in both feet AND [2] new or increased    Negative: Thickened, long, or mis-shapen toenails    Negative: Foot callouses (i.e., corns, thickened skin on foot)    Negative: Last foot examination > 1 year ago    Protocols used: DIABETES - FOOT PROBLEMS AND [id number]-GU

## 2023-01-01 ENCOUNTER — PATIENT OUTREACH (OUTPATIENT)
Dept: CARE COORDINATION | Facility: CLINIC | Age: 73
End: 2023-01-01
Payer: COMMERCIAL

## 2023-01-01 ENCOUNTER — NURSE TRIAGE (OUTPATIENT)
Dept: FAMILY MEDICINE | Facility: OTHER | Age: 73
End: 2023-01-01

## 2023-01-01 ENCOUNTER — APPOINTMENT (OUTPATIENT)
Dept: CT IMAGING | Facility: CLINIC | Age: 73
End: 2023-01-01
Attending: EMERGENCY MEDICINE
Payer: COMMERCIAL

## 2023-01-01 ENCOUNTER — HOSPITAL ENCOUNTER (EMERGENCY)
Facility: CLINIC | Age: 73
Discharge: ANOTHER HOSPITAL | End: 2023-01-14
Attending: PHYSICIAN ASSISTANT | Admitting: PHYSICIAN ASSISTANT
Payer: COMMERCIAL

## 2023-01-01 ENCOUNTER — LAB REQUISITION (OUTPATIENT)
Dept: LAB | Facility: CLINIC | Age: 73
End: 2023-01-01
Payer: COMMERCIAL

## 2023-01-01 ENCOUNTER — TELEPHONE (OUTPATIENT)
Dept: OTHER | Facility: CLINIC | Age: 73
End: 2023-01-01
Payer: COMMERCIAL

## 2023-01-01 ENCOUNTER — HOSPITAL ENCOUNTER (OUTPATIENT)
Age: 73
End: 2023-01-01
Attending: PEDIATRICS | Admitting: PEDIATRICS
Payer: COMMERCIAL

## 2023-01-01 ENCOUNTER — APPOINTMENT (OUTPATIENT)
Dept: ULTRASOUND IMAGING | Facility: CLINIC | Age: 73
End: 2023-01-01
Attending: PHYSICIAN ASSISTANT
Payer: COMMERCIAL

## 2023-01-01 ENCOUNTER — APPOINTMENT (OUTPATIENT)
Dept: CT IMAGING | Facility: CLINIC | Age: 73
End: 2023-01-01
Attending: FAMILY MEDICINE
Payer: COMMERCIAL

## 2023-01-01 ENCOUNTER — HOSPITAL ENCOUNTER (INPATIENT)
Facility: CLINIC | Age: 73
LOS: 6 days | Discharge: NURSING, REHAB OR LONG-TERM CARE | DRG: 299 | End: 2023-01-20
Attending: HOSPITALIST | Admitting: HOSPITALIST
Payer: COMMERCIAL

## 2023-01-01 ENCOUNTER — APPOINTMENT (OUTPATIENT)
Dept: INTERVENTIONAL RADIOLOGY/VASCULAR | Facility: CLINIC | Age: 73
DRG: 299 | End: 2023-01-01
Attending: RADIOLOGY
Payer: COMMERCIAL

## 2023-01-01 ENCOUNTER — APPOINTMENT (OUTPATIENT)
Dept: PHYSICAL THERAPY | Facility: CLINIC | Age: 73
DRG: 299 | End: 2023-01-01
Attending: HOSPITALIST
Payer: COMMERCIAL

## 2023-01-01 VITALS
TEMPERATURE: 97.3 F | RESPIRATION RATE: 16 BRPM | HEART RATE: 96 BPM | SYSTOLIC BLOOD PRESSURE: 112 MMHG | WEIGHT: 149.69 LBS | DIASTOLIC BLOOD PRESSURE: 72 MMHG | OXYGEN SATURATION: 94 % | BODY MASS INDEX: 24.06 KG/M2 | HEIGHT: 66 IN

## 2023-01-01 VITALS
BODY MASS INDEX: 23.78 KG/M2 | HEART RATE: 100 BPM | DIASTOLIC BLOOD PRESSURE: 100 MMHG | SYSTOLIC BLOOD PRESSURE: 144 MMHG | WEIGHT: 142.9 LBS | RESPIRATION RATE: 14 BRPM | OXYGEN SATURATION: 96 % | TEMPERATURE: 97.5 F

## 2023-01-01 DIAGNOSIS — R56.9 UNSPECIFIED CONVULSIONS (H): ICD-10-CM

## 2023-01-01 DIAGNOSIS — I82.4Y2 ACUTE DEEP VEIN THROMBOSIS (DVT) OF PROXIMAL VEIN OF LEFT LOWER EXTREMITY (H): Primary | ICD-10-CM

## 2023-01-01 DIAGNOSIS — F03.90 DEMENTIA WITHOUT BEHAVIORAL DISTURBANCE (H): ICD-10-CM

## 2023-01-01 DIAGNOSIS — I63.422 CEREBROVASCULAR ACCIDENT (CVA) DUE TO EMBOLISM OF LEFT ANTERIOR CEREBRAL ARTERY (H): ICD-10-CM

## 2023-01-01 DIAGNOSIS — K92.2 GI BLEED: ICD-10-CM

## 2023-01-01 DIAGNOSIS — E87.6 HYPOKALEMIA: ICD-10-CM

## 2023-01-01 DIAGNOSIS — I82.402 ACUTE DEEP VEIN THROMBOSIS (DVT) OF LEFT LOWER EXTREMITY (H): ICD-10-CM

## 2023-01-01 DIAGNOSIS — Z79.01 LONG TERM CURRENT USE OF ANTICOAGULANT THERAPY: ICD-10-CM

## 2023-01-01 DIAGNOSIS — K92.2 GASTROINTESTINAL HEMORRHAGE, UNSPECIFIED GASTROINTESTINAL HEMORRHAGE TYPE: ICD-10-CM

## 2023-01-01 DIAGNOSIS — D64.9 ANEMIA, UNSPECIFIED TYPE: ICD-10-CM

## 2023-01-01 DIAGNOSIS — E11.65 UNCONTROLLED DIABETES MELLITUS WITH HYPERGLYCEMIA (H): ICD-10-CM

## 2023-01-01 LAB
ABO/RH(D): NORMAL
ALBUMIN SERPL-MCNC: 2.7 G/DL (ref 3.4–5)
ALBUMIN UR-MCNC: NEGATIVE MG/DL
ALP SERPL-CCNC: 133 U/L (ref 40–150)
ALT SERPL W P-5'-P-CCNC: 23 U/L (ref 0–50)
ANION GAP SERPL CALCULATED.3IONS-SCNC: 2 MMOL/L (ref 3–14)
ANION GAP SERPL CALCULATED.3IONS-SCNC: 6 MMOL/L (ref 3–14)
ANION GAP SERPL CALCULATED.3IONS-SCNC: 7 MMOL/L (ref 3–14)
ANION GAP SERPL CALCULATED.3IONS-SCNC: 7 MMOL/L (ref 7–15)
ANION GAP SERPL CALCULATED.3IONS-SCNC: 9 MMOL/L (ref 3–14)
ANTIBODY SCREEN: NEGATIVE
APPEARANCE UR: CLEAR
APTT PPP: 21 SECONDS (ref 22–38)
AST SERPL W P-5'-P-CCNC: 17 U/L (ref 0–45)
BASOPHILS # BLD AUTO: 0 10E3/UL (ref 0–0.2)
BASOPHILS NFR BLD AUTO: 0 %
BASOPHILS NFR BLD AUTO: 1 %
BASOPHILS NFR BLD AUTO: 1 %
BILIRUB SERPL-MCNC: 0.3 MG/DL (ref 0.2–1.3)
BILIRUB UR QL STRIP: NEGATIVE
BUN SERPL-MCNC: 11 MG/DL (ref 7–30)
BUN SERPL-MCNC: 11.5 MG/DL (ref 8–23)
BUN SERPL-MCNC: 25 MG/DL (ref 7–30)
BUN SERPL-MCNC: 6 MG/DL (ref 7–30)
BUN SERPL-MCNC: 7 MG/DL (ref 7–30)
CALCIUM SERPL-MCNC: 7.8 MG/DL (ref 8.5–10.1)
CALCIUM SERPL-MCNC: 8.3 MG/DL (ref 8.5–10.1)
CALCIUM SERPL-MCNC: 8.5 MG/DL (ref 8.5–10.1)
CALCIUM SERPL-MCNC: 8.5 MG/DL (ref 8.5–10.1)
CALCIUM SERPL-MCNC: 8.8 MG/DL (ref 8.8–10.2)
CHLORIDE BLD-SCNC: 105 MMOL/L (ref 94–109)
CHLORIDE BLD-SCNC: 107 MMOL/L (ref 94–109)
CHLORIDE BLD-SCNC: 107 MMOL/L (ref 94–109)
CHLORIDE BLD-SCNC: 108 MMOL/L (ref 94–109)
CHLORIDE SERPL-SCNC: 100 MMOL/L (ref 98–107)
CO2 SERPL-SCNC: 25 MMOL/L (ref 20–32)
CO2 SERPL-SCNC: 26 MMOL/L (ref 20–32)
CO2 SERPL-SCNC: 32 MMOL/L (ref 20–32)
CO2 SERPL-SCNC: 32 MMOL/L (ref 20–32)
COLONOSCOPY: NORMAL
COLOR UR AUTO: YELLOW
CREAT SERPL-MCNC: 0.33 MG/DL (ref 0.52–1.04)
CREAT SERPL-MCNC: 0.34 MG/DL (ref 0.52–1.04)
CREAT SERPL-MCNC: 0.4 MG/DL (ref 0.52–1.04)
CREAT SERPL-MCNC: 0.44 MG/DL (ref 0.51–0.95)
CREAT SERPL-MCNC: 0.52 MG/DL (ref 0.52–1.04)
CRP SERPL-MCNC: 54.5 MG/L (ref 0–8)
DEPRECATED HCO3 PLAS-SCNC: 31 MMOL/L (ref 22–29)
EOSINOPHIL # BLD AUTO: 0.1 10E3/UL (ref 0–0.7)
EOSINOPHIL NFR BLD AUTO: 2 %
EOSINOPHIL NFR BLD AUTO: 2 %
EOSINOPHIL NFR BLD AUTO: 3 %
ERYTHROCYTE [DISTWIDTH] IN BLOOD BY AUTOMATED COUNT: 12.5 % (ref 10–15)
ERYTHROCYTE [DISTWIDTH] IN BLOOD BY AUTOMATED COUNT: 13 % (ref 10–15)
ERYTHROCYTE [DISTWIDTH] IN BLOOD BY AUTOMATED COUNT: 13 % (ref 10–15)
GFR SERPL CREATININE-BSD FRML MDRD: >90 ML/MIN/1.73M2
GLUCOSE BLD-MCNC: 171 MG/DL (ref 70–99)
GLUCOSE BLD-MCNC: 219 MG/DL (ref 70–99)
GLUCOSE BLD-MCNC: 237 MG/DL (ref 70–99)
GLUCOSE BLD-MCNC: 303 MG/DL (ref 70–99)
GLUCOSE BLDC GLUCOMTR-MCNC: 146 MG/DL (ref 70–99)
GLUCOSE BLDC GLUCOMTR-MCNC: 146 MG/DL (ref 70–99)
GLUCOSE BLDC GLUCOMTR-MCNC: 149 MG/DL (ref 70–99)
GLUCOSE BLDC GLUCOMTR-MCNC: 155 MG/DL (ref 70–99)
GLUCOSE BLDC GLUCOMTR-MCNC: 158 MG/DL (ref 70–99)
GLUCOSE BLDC GLUCOMTR-MCNC: 164 MG/DL (ref 70–99)
GLUCOSE BLDC GLUCOMTR-MCNC: 179 MG/DL (ref 70–99)
GLUCOSE BLDC GLUCOMTR-MCNC: 180 MG/DL (ref 70–99)
GLUCOSE BLDC GLUCOMTR-MCNC: 182 MG/DL (ref 70–99)
GLUCOSE BLDC GLUCOMTR-MCNC: 184 MG/DL (ref 70–99)
GLUCOSE BLDC GLUCOMTR-MCNC: 188 MG/DL (ref 70–99)
GLUCOSE BLDC GLUCOMTR-MCNC: 188 MG/DL (ref 70–99)
GLUCOSE BLDC GLUCOMTR-MCNC: 189 MG/DL (ref 70–99)
GLUCOSE BLDC GLUCOMTR-MCNC: 200 MG/DL (ref 70–99)
GLUCOSE BLDC GLUCOMTR-MCNC: 201 MG/DL (ref 70–99)
GLUCOSE BLDC GLUCOMTR-MCNC: 202 MG/DL (ref 70–99)
GLUCOSE BLDC GLUCOMTR-MCNC: 211 MG/DL (ref 70–99)
GLUCOSE BLDC GLUCOMTR-MCNC: 212 MG/DL (ref 70–99)
GLUCOSE BLDC GLUCOMTR-MCNC: 215 MG/DL (ref 70–99)
GLUCOSE BLDC GLUCOMTR-MCNC: 220 MG/DL (ref 70–99)
GLUCOSE BLDC GLUCOMTR-MCNC: 231 MG/DL (ref 70–99)
GLUCOSE BLDC GLUCOMTR-MCNC: 234 MG/DL (ref 70–99)
GLUCOSE BLDC GLUCOMTR-MCNC: 238 MG/DL (ref 70–99)
GLUCOSE BLDC GLUCOMTR-MCNC: 252 MG/DL (ref 70–99)
GLUCOSE BLDC GLUCOMTR-MCNC: 256 MG/DL (ref 70–99)
GLUCOSE BLDC GLUCOMTR-MCNC: 261 MG/DL (ref 70–99)
GLUCOSE BLDC GLUCOMTR-MCNC: 261 MG/DL (ref 70–99)
GLUCOSE BLDC GLUCOMTR-MCNC: 267 MG/DL (ref 70–99)
GLUCOSE SERPL-MCNC: 188 MG/DL (ref 70–99)
GLUCOSE UR STRIP-MCNC: 500 MG/DL
HBA1C MFR BLD: 9.4 % (ref 0–5.6)
HCT VFR BLD AUTO: 27.6 % (ref 35–47)
HCT VFR BLD AUTO: 28.5 % (ref 35–47)
HCT VFR BLD AUTO: 28.8 % (ref 35–47)
HCT VFR BLD AUTO: 29.5 % (ref 35–47)
HCT VFR BLD AUTO: 36.5 % (ref 35–47)
HEMOCCULT STL QL: POSITIVE
HGB BLD-MCNC: 10.3 G/DL (ref 11.7–15.7)
HGB BLD-MCNC: 10.4 G/DL (ref 11.7–15.7)
HGB BLD-MCNC: 10.6 G/DL (ref 11.7–15.7)
HGB BLD-MCNC: 10.6 G/DL (ref 11.7–15.7)
HGB BLD-MCNC: 10.9 G/DL (ref 11.7–15.7)
HGB BLD-MCNC: 11.6 G/DL (ref 11.7–15.7)
HGB BLD-MCNC: 8 G/DL (ref 11.7–15.7)
HGB BLD-MCNC: 8.5 G/DL (ref 11.7–15.7)
HGB BLD-MCNC: 8.7 G/DL (ref 11.7–15.7)
HGB BLD-MCNC: 8.8 G/DL (ref 11.7–15.7)
HGB BLD-MCNC: 9 G/DL (ref 11.7–15.7)
HGB BLD-MCNC: 9.1 G/DL (ref 11.7–15.7)
HGB BLD-MCNC: 9.1 G/DL (ref 11.7–15.7)
HGB BLD-MCNC: 9.2 G/DL (ref 11.7–15.7)
HGB BLD-MCNC: 9.3 G/DL (ref 11.7–15.7)
HGB BLD-MCNC: 9.4 G/DL (ref 11.7–15.7)
HGB BLD-MCNC: 9.4 G/DL (ref 11.7–15.7)
HGB BLD-MCNC: 9.6 G/DL (ref 11.7–15.7)
HGB BLD-MCNC: 9.9 G/DL (ref 11.7–15.7)
HGB UR QL STRIP: NEGATIVE
HOLD SPECIMEN: NORMAL
IMM GRANULOCYTES # BLD: 0 10E3/UL
IMM GRANULOCYTES NFR BLD: 0 %
INR PPP: 1.24 (ref 0.85–1.15)
KETONES UR STRIP-MCNC: NEGATIVE MG/DL
LACTATE SERPL-SCNC: 1.5 MMOL/L (ref 0.7–2)
LEUKOCYTE ESTERASE UR QL STRIP: NEGATIVE
LYMPHOCYTES # BLD AUTO: 1.4 10E3/UL (ref 0.8–5.3)
LYMPHOCYTES # BLD AUTO: 1.5 10E3/UL (ref 0.8–5.3)
LYMPHOCYTES # BLD AUTO: 1.7 10E3/UL (ref 0.8–5.3)
LYMPHOCYTES NFR BLD AUTO: 18 %
LYMPHOCYTES NFR BLD AUTO: 24 %
LYMPHOCYTES NFR BLD AUTO: 34 %
MCH RBC QN AUTO: 29.3 PG (ref 26.5–33)
MCH RBC QN AUTO: 29.8 PG (ref 26.5–33)
MCH RBC QN AUTO: 29.9 PG (ref 26.5–33)
MCH RBC QN AUTO: 29.9 PG (ref 26.5–33)
MCH RBC QN AUTO: 30.8 PG (ref 26.5–33)
MCHC RBC AUTO-ENTMCNC: 31.8 G/DL (ref 31.5–36.5)
MCHC RBC AUTO-ENTMCNC: 31.9 G/DL (ref 31.5–36.5)
MCHC RBC AUTO-ENTMCNC: 32.6 G/DL (ref 31.5–36.5)
MCV RBC AUTO: 92 FL (ref 78–100)
MCV RBC AUTO: 94 FL (ref 78–100)
MONOCYTES # BLD AUTO: 0.3 10E3/UL (ref 0–1.3)
MONOCYTES # BLD AUTO: 0.4 10E3/UL (ref 0–1.3)
MONOCYTES # BLD AUTO: 0.5 10E3/UL (ref 0–1.3)
MONOCYTES NFR BLD AUTO: 6 %
MONOCYTES NFR BLD AUTO: 7 %
MONOCYTES NFR BLD AUTO: 7 %
MUCOUS THREADS #/AREA URNS LPF: PRESENT /LPF
NEUTROPHILS # BLD AUTO: 2.4 10E3/UL (ref 1.6–8.3)
NEUTROPHILS # BLD AUTO: 4.7 10E3/UL (ref 1.6–8.3)
NEUTROPHILS # BLD AUTO: 5.6 10E3/UL (ref 1.6–8.3)
NEUTROPHILS NFR BLD AUTO: 55 %
NEUTROPHILS NFR BLD AUTO: 66 %
NEUTROPHILS NFR BLD AUTO: 74 %
NITRATE UR QL: NEGATIVE
NRBC # BLD AUTO: 0 10E3/UL
NRBC BLD AUTO-RTO: 0 /100
PH UR STRIP: 5.5 [PH] (ref 5–7)
PLATELET # BLD AUTO: 213 10E3/UL (ref 150–450)
PLATELET # BLD AUTO: 238 10E3/UL (ref 150–450)
PLATELET # BLD AUTO: 256 10E3/UL (ref 150–450)
PLATELET # BLD AUTO: 278 10E3/UL (ref 150–450)
PLATELET # BLD AUTO: 302 10E3/UL (ref 150–450)
POTASSIUM BLD-SCNC: 3 MMOL/L (ref 3.4–5.3)
POTASSIUM BLD-SCNC: 3.2 MMOL/L (ref 3.4–5.3)
POTASSIUM BLD-SCNC: 3.3 MMOL/L (ref 3.4–5.3)
POTASSIUM BLD-SCNC: 3.6 MMOL/L (ref 3.4–5.3)
POTASSIUM BLD-SCNC: 3.7 MMOL/L (ref 3.4–5.3)
POTASSIUM BLD-SCNC: 4.3 MMOL/L (ref 3.4–5.3)
POTASSIUM BLD-SCNC: 4.6 MMOL/L (ref 3.4–5.3)
POTASSIUM SERPL-SCNC: 3.7 MMOL/L (ref 3.4–5.3)
POTASSIUM SERPL-SCNC: 3.7 MMOL/L (ref 3.4–5.3)
POTASSIUM SERPL-SCNC: 3.9 MMOL/L (ref 3.4–5.3)
POTASSIUM SERPL-SCNC: 3.9 MMOL/L (ref 3.4–5.3)
PROT SERPL-MCNC: 6.1 G/DL (ref 6.8–8.8)
RBC # BLD AUTO: 3 10E6/UL (ref 3.8–5.2)
RBC # BLD AUTO: 3.02 10E6/UL (ref 3.8–5.2)
RBC # BLD AUTO: 3.08 10E6/UL (ref 3.8–5.2)
RBC # BLD AUTO: 3.15 10E6/UL (ref 3.8–5.2)
RBC # BLD AUTO: 3.88 10E6/UL (ref 3.8–5.2)
RBC URINE: 1 /HPF
SARS-COV-2 RNA RESP QL NAA+PROBE: NEGATIVE
SODIUM SERPL-SCNC: 138 MMOL/L (ref 136–145)
SODIUM SERPL-SCNC: 141 MMOL/L (ref 133–144)
SODIUM SERPL-SCNC: 143 MMOL/L (ref 133–144)
SP GR UR STRIP: 1.02 (ref 1–1.03)
SPECIMEN EXPIRATION DATE: NORMAL
SQUAMOUS EPITHELIAL: 1 /HPF
UPPER GI ENDOSCOPY: NORMAL
URATE SERPL-MCNC: 1.8 MG/DL (ref 2.6–6)
UROBILINOGEN UR STRIP-MCNC: NORMAL MG/DL
VALPROATE SERPL-MCNC: 52.4 UG/ML
WBC # BLD AUTO: 4.3 10E3/UL (ref 4–11)
WBC # BLD AUTO: 5.5 10E3/UL (ref 4–11)
WBC # BLD AUTO: 5.8 10E3/UL (ref 4–11)
WBC # BLD AUTO: 7 10E3/UL (ref 4–11)
WBC # BLD AUTO: 7.5 10E3/UL (ref 4–11)
WBC URINE: 1 /HPF

## 2023-01-01 PROCEDURE — 85018 HEMOGLOBIN: CPT | Performed by: HOSPITALIST

## 2023-01-01 PROCEDURE — 84132 ASSAY OF SERUM POTASSIUM: CPT | Performed by: HOSPITALIST

## 2023-01-01 PROCEDURE — 258N000003 HC RX IP 258 OP 636: Performed by: PHYSICIAN ASSISTANT

## 2023-01-01 PROCEDURE — 0DJD8ZZ INSPECTION OF LOWER INTESTINAL TRACT, VIA NATURAL OR ARTIFICIAL OPENING ENDOSCOPIC: ICD-10-PCS | Performed by: INTERNAL MEDICINE

## 2023-01-01 PROCEDURE — 36415 COLL VENOUS BLD VENIPUNCTURE: CPT | Performed by: HOSPITALIST

## 2023-01-01 PROCEDURE — 120N000001 HC R&B MED SURG/OB

## 2023-01-01 PROCEDURE — 99285 EMERGENCY DEPT VISIT HI MDM: CPT | Performed by: PHYSICIAN ASSISTANT

## 2023-01-01 PROCEDURE — 99152 MOD SED SAME PHYS/QHP 5/>YRS: CPT

## 2023-01-01 PROCEDURE — 99232 SBSQ HOSP IP/OBS MODERATE 35: CPT | Performed by: HOSPITALIST

## 2023-01-01 PROCEDURE — 85018 HEMOGLOBIN: CPT | Performed by: FAMILY MEDICINE

## 2023-01-01 PROCEDURE — 36415 COLL VENOUS BLD VENIPUNCTURE: CPT | Performed by: PHYSICIAN ASSISTANT

## 2023-01-01 PROCEDURE — 96361 HYDRATE IV INFUSION ADD-ON: CPT

## 2023-01-01 PROCEDURE — 82962 GLUCOSE BLOOD TEST: CPT | Mod: 91

## 2023-01-01 PROCEDURE — 85025 COMPLETE CBC W/AUTO DIFF WBC: CPT | Performed by: PHYSICIAN ASSISTANT

## 2023-01-01 PROCEDURE — C9113 INJ PANTOPRAZOLE SODIUM, VIA: HCPCS | Performed by: HOSPITALIST

## 2023-01-01 PROCEDURE — 250N000011 HC RX IP 250 OP 636: Performed by: HOSPITALIST

## 2023-01-01 PROCEDURE — 85025 COMPLETE CBC W/AUTO DIFF WBC: CPT | Performed by: INTERNAL MEDICINE

## 2023-01-01 PROCEDURE — 82272 OCCULT BLD FECES 1-3 TESTS: CPT | Performed by: PHYSICIAN ASSISTANT

## 2023-01-01 PROCEDURE — 250N000012 HC RX MED GY IP 250 OP 636 PS 637: Performed by: FAMILY MEDICINE

## 2023-01-01 PROCEDURE — 36415 COLL VENOUS BLD VENIPUNCTURE: CPT | Performed by: FAMILY MEDICINE

## 2023-01-01 PROCEDURE — G0500 MOD SEDAT ENDO SERVICE >5YRS: HCPCS | Performed by: INTERNAL MEDICINE

## 2023-01-01 PROCEDURE — 97161 PT EVAL LOW COMPLEX 20 MIN: CPT | Mod: GP

## 2023-01-01 PROCEDURE — C9803 HOPD COVID-19 SPEC COLLECT: HCPCS

## 2023-01-01 PROCEDURE — 999N000127 HC STATISTIC PERIPHERAL IV START W US GUIDANCE

## 2023-01-01 PROCEDURE — 45378 DIAGNOSTIC COLONOSCOPY: CPT | Performed by: INTERNAL MEDICINE

## 2023-01-01 PROCEDURE — P9603 ONE-WAY ALLOW PRORATED MILES: HCPCS | Mod: ORL | Performed by: NURSE PRACTITIONER

## 2023-01-01 PROCEDURE — 83036 HEMOGLOBIN GLYCOSYLATED A1C: CPT | Performed by: PHYSICIAN ASSISTANT

## 2023-01-01 PROCEDURE — 250N000009 HC RX 250: Performed by: EMERGENCY MEDICINE

## 2023-01-01 PROCEDURE — 36416 COLLJ CAPILLARY BLOOD SPEC: CPT | Performed by: FAMILY MEDICINE

## 2023-01-01 PROCEDURE — 80048 BASIC METABOLIC PNL TOTAL CA: CPT | Performed by: HOSPITALIST

## 2023-01-01 PROCEDURE — 83605 ASSAY OF LACTIC ACID: CPT | Performed by: PHYSICIAN ASSISTANT

## 2023-01-01 PROCEDURE — 250N000011 HC RX IP 250 OP 636: Performed by: RADIOLOGY

## 2023-01-01 PROCEDURE — 250N000009 HC RX 250: Performed by: INTERNAL MEDICINE

## 2023-01-01 PROCEDURE — 250N000013 HC RX MED GY IP 250 OP 250 PS 637: Performed by: HOSPITALIST

## 2023-01-01 PROCEDURE — 250N000013 HC RX MED GY IP 250 OP 250 PS 637: Performed by: PHYSICIAN ASSISTANT

## 2023-01-01 PROCEDURE — 80053 COMPREHEN METABOLIC PANEL: CPT | Performed by: PHYSICIAN ASSISTANT

## 2023-01-01 PROCEDURE — 250N000013 HC RX MED GY IP 250 OP 250 PS 637: Performed by: FAMILY MEDICINE

## 2023-01-01 PROCEDURE — 85018 HEMOGLOBIN: CPT | Performed by: PHYSICIAN ASSISTANT

## 2023-01-01 PROCEDURE — 250N000011 HC RX IP 250 OP 636: Performed by: EMERGENCY MEDICINE

## 2023-01-01 PROCEDURE — 250N000009 HC RX 250: Performed by: RADIOLOGY

## 2023-01-01 PROCEDURE — C1880 VENA CAVA FILTER: HCPCS

## 2023-01-01 PROCEDURE — 71275 CT ANGIOGRAPHY CHEST: CPT

## 2023-01-01 PROCEDURE — 85025 COMPLETE CBC W/AUTO DIFF WBC: CPT | Performed by: HOSPITALIST

## 2023-01-01 PROCEDURE — C9113 INJ PANTOPRAZOLE SODIUM, VIA: HCPCS | Performed by: PHYSICIAN ASSISTANT

## 2023-01-01 PROCEDURE — 82962 GLUCOSE BLOOD TEST: CPT

## 2023-01-01 PROCEDURE — 85610 PROTHROMBIN TIME: CPT | Performed by: PHYSICIAN ASSISTANT

## 2023-01-01 PROCEDURE — 99285 EMERGENCY DEPT VISIT HI MDM: CPT | Mod: 25

## 2023-01-01 PROCEDURE — 99222 1ST HOSP IP/OBS MODERATE 55: CPT | Mod: AI | Performed by: HOSPITALIST

## 2023-01-01 PROCEDURE — 250N000013 HC RX MED GY IP 250 OP 250 PS 637: Performed by: EMERGENCY MEDICINE

## 2023-01-01 PROCEDURE — 99232 SBSQ HOSP IP/OBS MODERATE 35: CPT | Performed by: INTERNAL MEDICINE

## 2023-01-01 PROCEDURE — 36415 COLL VENOUS BLD VENIPUNCTURE: CPT | Mod: ORL | Performed by: NURSE PRACTITIONER

## 2023-01-01 PROCEDURE — 84550 ASSAY OF BLOOD/URIC ACID: CPT | Performed by: PHYSICIAN ASSISTANT

## 2023-01-01 PROCEDURE — 250N000012 HC RX MED GY IP 250 OP 636 PS 637: Performed by: HOSPITALIST

## 2023-01-01 PROCEDURE — 43235 EGD DIAGNOSTIC BRUSH WASH: CPT | Performed by: INTERNAL MEDICINE

## 2023-01-01 PROCEDURE — 86140 C-REACTIVE PROTEIN: CPT | Performed by: PHYSICIAN ASSISTANT

## 2023-01-01 PROCEDURE — 81001 URINALYSIS AUTO W/SCOPE: CPT | Performed by: PHYSICIAN ASSISTANT

## 2023-01-01 PROCEDURE — 99231 SBSQ HOSP IP/OBS SF/LOW 25: CPT | Performed by: HOSPITALIST

## 2023-01-01 PROCEDURE — 272N000196 HC ACCESSORY CR5

## 2023-01-01 PROCEDURE — 86901 BLOOD TYPING SEROLOGIC RH(D): CPT | Performed by: FAMILY MEDICINE

## 2023-01-01 PROCEDURE — 258N000003 HC RX IP 258 OP 636: Performed by: INTERNAL MEDICINE

## 2023-01-01 PROCEDURE — 999N000040 HC STATISTIC CONSULT NO CHARGE VASC ACCESS

## 2023-01-01 PROCEDURE — 97530 THERAPEUTIC ACTIVITIES: CPT | Mod: GP

## 2023-01-01 PROCEDURE — 250N000011 HC RX IP 250 OP 636: Performed by: PHYSICIAN ASSISTANT

## 2023-01-01 PROCEDURE — 80048 BASIC METABOLIC PNL TOTAL CA: CPT | Performed by: INTERNAL MEDICINE

## 2023-01-01 PROCEDURE — 250N000011 HC RX IP 250 OP 636: Performed by: FAMILY MEDICINE

## 2023-01-01 PROCEDURE — 84132 ASSAY OF SERUM POTASSIUM: CPT | Mod: ORL | Performed by: NURSE PRACTITIONER

## 2023-01-01 PROCEDURE — 96372 THER/PROPH/DIAG INJ SC/IM: CPT | Performed by: FAMILY MEDICINE

## 2023-01-01 PROCEDURE — C1769 GUIDE WIRE: HCPCS

## 2023-01-01 PROCEDURE — 76937 US GUIDE VASCULAR ACCESS: CPT

## 2023-01-01 PROCEDURE — 74177 CT ABD & PELVIS W/CONTRAST: CPT

## 2023-01-01 PROCEDURE — 06H03DZ INSERTION OF INTRALUMINAL DEVICE INTO INFERIOR VENA CAVA, PERCUTANEOUS APPROACH: ICD-10-PCS | Performed by: RADIOLOGY

## 2023-01-01 PROCEDURE — 80164 ASSAY DIPROPYLACETIC ACD TOT: CPT | Mod: ORL | Performed by: NURSE PRACTITIONER

## 2023-01-01 PROCEDURE — 85027 COMPLETE CBC AUTOMATED: CPT | Performed by: HOSPITALIST

## 2023-01-01 PROCEDURE — 85018 HEMOGLOBIN: CPT | Mod: 91 | Performed by: FAMILY MEDICINE

## 2023-01-01 PROCEDURE — 0DJ08ZZ INSPECTION OF UPPER INTESTINAL TRACT, VIA NATURAL OR ARTIFICIAL OPENING ENDOSCOPIC: ICD-10-PCS | Performed by: INTERNAL MEDICINE

## 2023-01-01 PROCEDURE — 250N000009 HC RX 250: Performed by: FAMILY MEDICINE

## 2023-01-01 PROCEDURE — 250N000013 HC RX MED GY IP 250 OP 250 PS 637: Performed by: INTERNAL MEDICINE

## 2023-01-01 PROCEDURE — 250N000011 HC RX IP 250 OP 636: Performed by: INTERNAL MEDICINE

## 2023-01-01 PROCEDURE — 93971 EXTREMITY STUDY: CPT | Mod: LT

## 2023-01-01 PROCEDURE — 255N000002 HC RX 255 OP 636: Performed by: RADIOLOGY

## 2023-01-01 PROCEDURE — 36415 COLL VENOUS BLD VENIPUNCTURE: CPT | Performed by: INTERNAL MEDICINE

## 2023-01-01 PROCEDURE — 85014 HEMATOCRIT: CPT | Performed by: HOSPITALIST

## 2023-01-01 PROCEDURE — U0003 INFECTIOUS AGENT DETECTION BY NUCLEIC ACID (DNA OR RNA); SEVERE ACUTE RESPIRATORY SYNDROME CORONAVIRUS 2 (SARS-COV-2) (CORONAVIRUS DISEASE [COVID-19]), AMPLIFIED PROBE TECHNIQUE, MAKING USE OF HIGH THROUGHPUT TECHNOLOGIES AS DESCRIBED BY CMS-2020-01-R: HCPCS | Performed by: PHYSICIAN ASSISTANT

## 2023-01-01 PROCEDURE — 85730 THROMBOPLASTIN TIME PARTIAL: CPT | Performed by: PHYSICIAN ASSISTANT

## 2023-01-01 PROCEDURE — 99239 HOSP IP/OBS DSCHRG MGMT >30: CPT | Performed by: INTERNAL MEDICINE

## 2023-01-01 PROCEDURE — 96374 THER/PROPH/DIAG INJ IV PUSH: CPT | Mod: 59

## 2023-01-01 RX ORDER — PHENYTOIN 50 MG/1
100 TABLET, CHEWABLE ORAL 2 TIMES DAILY
Status: DISCONTINUED | OUTPATIENT
Start: 2023-01-01 | End: 2023-01-01 | Stop reason: HOSPADM

## 2023-01-01 RX ORDER — NICOTINE POLACRILEX 4 MG
15-30 LOZENGE BUCCAL
Status: DISCONTINUED | OUTPATIENT
Start: 2023-01-01 | End: 2023-01-01 | Stop reason: HOSPADM

## 2023-01-01 RX ORDER — LIDOCAINE 40 MG/G
CREAM TOPICAL
Status: DISCONTINUED | OUTPATIENT
Start: 2023-01-01 | End: 2023-01-01 | Stop reason: HOSPADM

## 2023-01-01 RX ORDER — AMOXICILLIN 250 MG
2 CAPSULE ORAL 2 TIMES DAILY PRN
Status: DISCONTINUED | OUTPATIENT
Start: 2023-01-01 | End: 2023-01-01 | Stop reason: HOSPADM

## 2023-01-01 RX ORDER — ACETAMINOPHEN 650 MG/1
650 SUPPOSITORY RECTAL EVERY 6 HOURS PRN
Status: DISCONTINUED | OUTPATIENT
Start: 2023-01-01 | End: 2023-01-01 | Stop reason: HOSPADM

## 2023-01-01 RX ORDER — LEVETIRACETAM 500 MG/1
1000 TABLET ORAL 2 TIMES DAILY
Status: DISCONTINUED | OUTPATIENT
Start: 2023-01-01 | End: 2023-01-01 | Stop reason: HOSPADM

## 2023-01-01 RX ORDER — ATORVASTATIN CALCIUM 40 MG/1
TABLET, FILM COATED ORAL
Qty: 90 TABLET | Refills: 0 | Status: SHIPPED | OUTPATIENT
Start: 2023-01-01

## 2023-01-01 RX ORDER — ASPIRIN 81 MG/1
81 TABLET, CHEWABLE ORAL DAILY
Status: DISCONTINUED | OUTPATIENT
Start: 2023-01-01 | End: 2023-01-01

## 2023-01-01 RX ORDER — NALOXONE HYDROCHLORIDE 0.4 MG/ML
0.2 INJECTION, SOLUTION INTRAMUSCULAR; INTRAVENOUS; SUBCUTANEOUS
Status: DISCONTINUED | OUTPATIENT
Start: 2023-01-01 | End: 2023-01-01 | Stop reason: HOSPADM

## 2023-01-01 RX ORDER — ATORVASTATIN CALCIUM 40 MG/1
40 TABLET, FILM COATED ORAL EVERY EVENING
Status: DISCONTINUED | OUTPATIENT
Start: 2023-01-01 | End: 2023-01-01 | Stop reason: HOSPADM

## 2023-01-01 RX ORDER — PANTOPRAZOLE SODIUM 40 MG/1
40 TABLET, DELAYED RELEASE ORAL 2 TIMES DAILY
Status: DISCONTINUED | OUTPATIENT
Start: 2023-01-01 | End: 2023-01-01 | Stop reason: HOSPADM

## 2023-01-01 RX ORDER — POTASSIUM CHLORIDE 1500 MG/1
40 TABLET, EXTENDED RELEASE ORAL ONCE
Status: COMPLETED | OUTPATIENT
Start: 2023-01-01 | End: 2023-01-01

## 2023-01-01 RX ORDER — SODIUM CHLORIDE 9 MG/ML
INJECTION, SOLUTION INTRAVENOUS CONTINUOUS
Status: DISCONTINUED | OUTPATIENT
Start: 2023-01-01 | End: 2023-01-01 | Stop reason: HOSPADM

## 2023-01-01 RX ORDER — NALOXONE HYDROCHLORIDE 0.4 MG/ML
0.4 INJECTION, SOLUTION INTRAMUSCULAR; INTRAVENOUS; SUBCUTANEOUS
Status: DISCONTINUED | OUTPATIENT
Start: 2023-01-01 | End: 2023-01-01 | Stop reason: HOSPADM

## 2023-01-01 RX ORDER — ACETAMINOPHEN 500 MG
1000 TABLET ORAL EVERY 6 HOURS PRN
Status: CANCELLED | OUTPATIENT
Start: 2023-01-01

## 2023-01-01 RX ORDER — AMOXICILLIN 250 MG
1 CAPSULE ORAL 2 TIMES DAILY PRN
Status: DISCONTINUED | OUTPATIENT
Start: 2023-01-01 | End: 2023-01-01 | Stop reason: HOSPADM

## 2023-01-01 RX ORDER — ESCITALOPRAM OXALATE 5 MG/1
5 TABLET ORAL DAILY
Status: DISCONTINUED | OUTPATIENT
Start: 2023-01-01 | End: 2023-01-01 | Stop reason: HOSPADM

## 2023-01-01 RX ORDER — IOPAMIDOL 612 MG/ML
50 INJECTION, SOLUTION INTRAVASCULAR ONCE
Status: COMPLETED | OUTPATIENT
Start: 2023-01-01 | End: 2023-01-01

## 2023-01-01 RX ORDER — MEMANTINE HYDROCHLORIDE 10 MG/1
TABLET ORAL
Qty: 100 TABLET | Refills: 2 | Status: SHIPPED | OUTPATIENT
Start: 2023-01-01

## 2023-01-01 RX ORDER — ACETAMINOPHEN 325 MG/1
975 TABLET ORAL ONCE
Status: COMPLETED | OUTPATIENT
Start: 2023-01-01 | End: 2023-01-01

## 2023-01-01 RX ORDER — ONDANSETRON 4 MG/1
4 TABLET, ORALLY DISINTEGRATING ORAL EVERY 6 HOURS PRN
Status: DISCONTINUED | OUTPATIENT
Start: 2023-01-01 | End: 2023-01-01 | Stop reason: HOSPADM

## 2023-01-01 RX ORDER — DEXTROSE MONOHYDRATE 25 G/50ML
25-50 INJECTION, SOLUTION INTRAVENOUS
Status: DISCONTINUED | OUTPATIENT
Start: 2023-01-01 | End: 2023-01-01 | Stop reason: HOSPADM

## 2023-01-01 RX ORDER — FLUMAZENIL 0.1 MG/ML
0.2 INJECTION, SOLUTION INTRAVENOUS
Status: DISCONTINUED | OUTPATIENT
Start: 2023-01-01 | End: 2023-01-01 | Stop reason: HOSPADM

## 2023-01-01 RX ORDER — POTASSIUM CHLORIDE 1500 MG/1
20 TABLET, EXTENDED RELEASE ORAL ONCE
Status: COMPLETED | OUTPATIENT
Start: 2023-01-01 | End: 2023-01-01

## 2023-01-01 RX ORDER — CLOPIDOGREL BISULFATE 75 MG/1
75 TABLET ORAL DAILY
Status: DISCONTINUED | OUTPATIENT
Start: 2023-01-01 | End: 2023-01-01

## 2023-01-01 RX ORDER — GABAPENTIN 300 MG/1
300 CAPSULE ORAL AT BEDTIME
Status: DISCONTINUED | OUTPATIENT
Start: 2023-01-01 | End: 2023-01-01 | Stop reason: HOSPADM

## 2023-01-01 RX ORDER — FENTANYL CITRATE 50 UG/ML
25-50 INJECTION, SOLUTION INTRAMUSCULAR; INTRAVENOUS EVERY 5 MIN PRN
Status: DISCONTINUED | OUTPATIENT
Start: 2023-01-01 | End: 2023-01-01 | Stop reason: HOSPADM

## 2023-01-01 RX ORDER — MEMANTINE HYDROCHLORIDE 10 MG/1
10 TABLET ORAL DAILY
Status: DISCONTINUED | OUTPATIENT
Start: 2023-01-01 | End: 2023-01-01 | Stop reason: HOSPADM

## 2023-01-01 RX ORDER — IOPAMIDOL 755 MG/ML
500 INJECTION, SOLUTION INTRAVASCULAR ONCE
Status: COMPLETED | OUTPATIENT
Start: 2023-01-01 | End: 2023-01-01

## 2023-01-01 RX ORDER — ONDANSETRON 2 MG/ML
4 INJECTION INTRAMUSCULAR; INTRAVENOUS EVERY 6 HOURS PRN
Status: DISCONTINUED | OUTPATIENT
Start: 2023-01-01 | End: 2023-01-01 | Stop reason: HOSPADM

## 2023-01-01 RX ORDER — ACETAMINOPHEN 325 MG/1
650 TABLET ORAL EVERY 6 HOURS PRN
Status: DISCONTINUED | OUTPATIENT
Start: 2023-01-01 | End: 2023-01-01 | Stop reason: HOSPADM

## 2023-01-01 RX ORDER — LISINOPRIL 20 MG/1
20 TABLET ORAL DAILY
Status: DISCONTINUED | OUTPATIENT
Start: 2023-01-01 | End: 2023-01-01 | Stop reason: HOSPADM

## 2023-01-01 RX ORDER — ONDANSETRON 2 MG/ML
4 INJECTION INTRAMUSCULAR; INTRAVENOUS
Status: DISCONTINUED | OUTPATIENT
Start: 2023-01-01 | End: 2023-01-01 | Stop reason: HOSPADM

## 2023-01-01 RX ORDER — FENTANYL CITRATE 50 UG/ML
INJECTION, SOLUTION INTRAMUSCULAR; INTRAVENOUS PRN
Status: DISCONTINUED | OUTPATIENT
Start: 2023-01-01 | End: 2023-01-01 | Stop reason: HOSPADM

## 2023-01-01 RX ORDER — SODIUM CHLORIDE 9 MG/ML
INJECTION, SOLUTION INTRAVENOUS CONTINUOUS PRN
Status: COMPLETED | OUTPATIENT
Start: 2023-01-01 | End: 2023-01-01

## 2023-01-01 RX ORDER — GABAPENTIN 300 MG/1
CAPSULE ORAL
Qty: 100 CAPSULE | Refills: 2 | Status: SHIPPED | OUTPATIENT
Start: 2023-01-01

## 2023-01-01 RX ORDER — ACETAMINOPHEN 500 MG
500 TABLET ORAL EVERY 6 HOURS PRN
Status: DISCONTINUED | OUTPATIENT
Start: 2023-01-01 | End: 2023-01-01 | Stop reason: HOSPADM

## 2023-01-01 RX ADMIN — SODIUM CHLORIDE 1000 ML: 9 INJECTION, SOLUTION INTRAVENOUS at 17:59

## 2023-01-01 RX ADMIN — POLYETHYLENE GLYCOL 3350, SODIUM SULFATE ANHYDROUS, SODIUM BICARBONATE, SODIUM CHLORIDE, POTASSIUM CHLORIDE 4000 ML: 236; 22.74; 6.74; 5.86; 2.97 POWDER, FOR SOLUTION ORAL at 19:29

## 2023-01-01 RX ADMIN — ACETAMINOPHEN 500 MG: 500 TABLET ORAL at 22:24

## 2023-01-01 RX ADMIN — IOPAMIDOL 55 ML: 755 INJECTION, SOLUTION INTRAVENOUS at 09:19

## 2023-01-01 RX ADMIN — PHENYTOIN 100 MG: 50 TABLET, CHEWABLE ORAL at 20:25

## 2023-01-01 RX ADMIN — GABAPENTIN 300 MG: 300 CAPSULE ORAL at 21:35

## 2023-01-01 RX ADMIN — POTASSIUM CHLORIDE 40 MEQ: 1500 TABLET, EXTENDED RELEASE ORAL at 16:26

## 2023-01-01 RX ADMIN — PHENYTOIN 100 MG: 50 TABLET, CHEWABLE ORAL at 14:18

## 2023-01-01 RX ADMIN — LEVETIRACETAM 1000 MG: 500 TABLET, FILM COATED ORAL at 20:41

## 2023-01-01 RX ADMIN — LEVETIRACETAM 1000 MG: 500 TABLET, FILM COATED ORAL at 20:47

## 2023-01-01 RX ADMIN — ATORVASTATIN CALCIUM 40 MG: 40 TABLET, FILM COATED ORAL at 20:41

## 2023-01-01 RX ADMIN — SODIUM CHLORIDE 60 ML: 9 INJECTION, SOLUTION INTRAVENOUS at 03:08

## 2023-01-01 RX ADMIN — APIXABAN 5 MG: 5 TABLET, FILM COATED ORAL at 09:45

## 2023-01-01 RX ADMIN — LEVETIRACETAM 1000 MG: 500 TABLET, FILM COATED ORAL at 21:36

## 2023-01-01 RX ADMIN — PANTOPRAZOLE SODIUM 40 MG: 40 INJECTION, POWDER, FOR SOLUTION INTRAVENOUS at 20:41

## 2023-01-01 RX ADMIN — ATORVASTATIN CALCIUM 40 MG: 40 TABLET, FILM COATED ORAL at 20:47

## 2023-01-01 RX ADMIN — PANTOPRAZOLE SODIUM 40 MG: 40 INJECTION, POWDER, FOR SOLUTION INTRAVENOUS at 21:13

## 2023-01-01 RX ADMIN — APIXABAN 5 MG: 5 TABLET, FILM COATED ORAL at 08:48

## 2023-01-01 RX ADMIN — ACETAMINOPHEN 650 MG: 325 TABLET, FILM COATED ORAL at 11:58

## 2023-01-01 RX ADMIN — INSULIN ASPART 3 UNITS: 100 INJECTION, SOLUTION INTRAVENOUS; SUBCUTANEOUS at 11:58

## 2023-01-01 RX ADMIN — INSULIN ASPART 3 UNITS: 100 INJECTION, SOLUTION INTRAVENOUS; SUBCUTANEOUS at 10:15

## 2023-01-01 RX ADMIN — POTASSIUM CHLORIDE 20 MEQ: 1500 TABLET, EXTENDED RELEASE ORAL at 18:28

## 2023-01-01 RX ADMIN — PANTOPRAZOLE SODIUM 40 MG: 40 INJECTION, POWDER, FOR SOLUTION INTRAVENOUS at 21:28

## 2023-01-01 RX ADMIN — SODIUM CHLORIDE: 9 INJECTION, SOLUTION INTRAVENOUS at 02:41

## 2023-01-01 RX ADMIN — INSULIN ASPART 1 UNITS: 100 INJECTION, SOLUTION INTRAVENOUS; SUBCUTANEOUS at 21:17

## 2023-01-01 RX ADMIN — POTASSIUM CHLORIDE 40 MEQ: 1500 TABLET, EXTENDED RELEASE ORAL at 10:25

## 2023-01-01 RX ADMIN — PANTOPRAZOLE SODIUM 40 MG: 40 INJECTION, POWDER, FOR SOLUTION INTRAVENOUS at 08:20

## 2023-01-01 RX ADMIN — PANTOPRAZOLE SODIUM 40 MG: 40 INJECTION, POWDER, FOR SOLUTION INTRAVENOUS at 09:24

## 2023-01-01 RX ADMIN — INSULIN ASPART 1 UNITS: 100 INJECTION, SOLUTION INTRAVENOUS; SUBCUTANEOUS at 01:00

## 2023-01-01 RX ADMIN — ESCITALOPRAM 5 MG: 5 TABLET, FILM COATED ORAL at 08:20

## 2023-01-01 RX ADMIN — PANTOPRAZOLE SODIUM 40 MG: 40 TABLET, DELAYED RELEASE ORAL at 21:49

## 2023-01-01 RX ADMIN — LEVETIRACETAM 1000 MG: 500 TABLET, FILM COATED ORAL at 09:45

## 2023-01-01 RX ADMIN — LEVETIRACETAM 1000 MG: 500 TABLET, FILM COATED ORAL at 21:35

## 2023-01-01 RX ADMIN — LIDOCAINE HYDROCHLORIDE 7 ML: 10 INJECTION, SOLUTION INFILTRATION; PERINEURAL at 15:40

## 2023-01-01 RX ADMIN — GABAPENTIN 300 MG: 300 CAPSULE ORAL at 22:20

## 2023-01-01 RX ADMIN — LEVETIRACETAM 1000 MG: 500 TABLET, FILM COATED ORAL at 12:59

## 2023-01-01 RX ADMIN — SODIUM CHLORIDE: 9 INJECTION, SOLUTION INTRAVENOUS at 22:21

## 2023-01-01 RX ADMIN — GABAPENTIN 300 MG: 300 CAPSULE ORAL at 22:04

## 2023-01-01 RX ADMIN — APIXABAN 5 MG: 5 TABLET, FILM COATED ORAL at 08:21

## 2023-01-01 RX ADMIN — APIXABAN 5 MG: 5 TABLET, FILM COATED ORAL at 21:50

## 2023-01-01 RX ADMIN — PHENYTOIN 100 MG: 50 TABLET, CHEWABLE ORAL at 10:05

## 2023-01-01 RX ADMIN — POTASSIUM CHLORIDE 40 MEQ: 1500 TABLET, EXTENDED RELEASE ORAL at 22:21

## 2023-01-01 RX ADMIN — INSULIN ASPART 1 UNITS: 100 INJECTION, SOLUTION INTRAVENOUS; SUBCUTANEOUS at 07:55

## 2023-01-01 RX ADMIN — GABAPENTIN 300 MG: 300 CAPSULE ORAL at 21:39

## 2023-01-01 RX ADMIN — ATORVASTATIN CALCIUM 40 MG: 40 TABLET, FILM COATED ORAL at 21:35

## 2023-01-01 RX ADMIN — SODIUM CHLORIDE 70 ML: 9 INJECTION, SOLUTION INTRAVENOUS at 09:18

## 2023-01-01 RX ADMIN — SODIUM CHLORIDE: 9 INJECTION, SOLUTION INTRAVENOUS at 18:46

## 2023-01-01 RX ADMIN — INSULIN ASPART 3 UNITS: 100 INJECTION, SOLUTION INTRAVENOUS; SUBCUTANEOUS at 17:07

## 2023-01-01 RX ADMIN — LEVETIRACETAM 1000 MG: 500 TABLET, FILM COATED ORAL at 22:21

## 2023-01-01 RX ADMIN — ESCITALOPRAM 5 MG: 5 TABLET, FILM COATED ORAL at 10:05

## 2023-01-01 RX ADMIN — FENTANYL CITRATE 25 MCG: 50 INJECTION INTRAMUSCULAR; INTRAVENOUS at 15:20

## 2023-01-01 RX ADMIN — MEMANTINE 10 MG: 10 TABLET ORAL at 08:21

## 2023-01-01 RX ADMIN — INSULIN ASPART 1 UNITS: 100 INJECTION, SOLUTION INTRAVENOUS; SUBCUTANEOUS at 08:08

## 2023-01-01 RX ADMIN — PANTOPRAZOLE SODIUM 40 MG: 40 INJECTION, POWDER, FOR SOLUTION INTRAVENOUS at 21:50

## 2023-01-01 RX ADMIN — PHENYTOIN 100 MG: 50 TABLET, CHEWABLE ORAL at 08:48

## 2023-01-01 RX ADMIN — MEMANTINE 10 MG: 10 TABLET ORAL at 09:45

## 2023-01-01 RX ADMIN — PHENYTOIN 100 MG: 50 TABLET, CHEWABLE ORAL at 09:25

## 2023-01-01 RX ADMIN — LEVETIRACETAM 1000 MG: 500 TABLET, FILM COATED ORAL at 20:25

## 2023-01-01 RX ADMIN — INSULIN ASPART 2 UNITS: 100 INJECTION, SOLUTION INTRAVENOUS; SUBCUTANEOUS at 13:28

## 2023-01-01 RX ADMIN — LEVETIRACETAM 1000 MG: 500 TABLET, FILM COATED ORAL at 09:02

## 2023-01-01 RX ADMIN — PHENYTOIN 100 MG: 50 TABLET, CHEWABLE ORAL at 21:35

## 2023-01-01 RX ADMIN — MEMANTINE 10 MG: 10 TABLET ORAL at 14:18

## 2023-01-01 RX ADMIN — LEVETIRACETAM 1000 MG: 500 TABLET, FILM COATED ORAL at 21:50

## 2023-01-01 RX ADMIN — PHENYTOIN 100 MG: 50 TABLET, CHEWABLE ORAL at 09:02

## 2023-01-01 RX ADMIN — ESCITALOPRAM 5 MG: 5 TABLET, FILM COATED ORAL at 09:46

## 2023-01-01 RX ADMIN — SODIUM CHLORIDE: 9 INJECTION, SOLUTION INTRAVENOUS at 09:01

## 2023-01-01 RX ADMIN — SODIUM CHLORIDE: 9 INJECTION, SOLUTION INTRAVENOUS at 02:51

## 2023-01-01 RX ADMIN — ONDANSETRON 4 MG: 4 TABLET, ORALLY DISINTEGRATING ORAL at 12:56

## 2023-01-01 RX ADMIN — LEVETIRACETAM 1000 MG: 500 TABLET, FILM COATED ORAL at 08:21

## 2023-01-01 RX ADMIN — MEMANTINE 10 MG: 10 TABLET ORAL at 09:31

## 2023-01-01 RX ADMIN — MEMANTINE 10 MG: 10 TABLET ORAL at 10:05

## 2023-01-01 RX ADMIN — PHENYTOIN 100 MG: 50 TABLET, CHEWABLE ORAL at 09:45

## 2023-01-01 RX ADMIN — MEMANTINE 10 MG: 10 TABLET ORAL at 08:48

## 2023-01-01 RX ADMIN — GABAPENTIN 300 MG: 300 CAPSULE ORAL at 21:29

## 2023-01-01 RX ADMIN — PHENYTOIN 100 MG: 50 TABLET, CHEWABLE ORAL at 21:37

## 2023-01-01 RX ADMIN — PHENYTOIN 100 MG: 50 TABLET, CHEWABLE ORAL at 09:31

## 2023-01-01 RX ADMIN — PANTOPRAZOLE SODIUM 40 MG: 40 INJECTION, POWDER, FOR SOLUTION INTRAVENOUS at 22:22

## 2023-01-01 RX ADMIN — LEVETIRACETAM 1000 MG: 500 TABLET, FILM COATED ORAL at 08:48

## 2023-01-01 RX ADMIN — PANTOPRAZOLE SODIUM 40 MG: 40 INJECTION, POWDER, FOR SOLUTION INTRAVENOUS at 09:45

## 2023-01-01 RX ADMIN — ACETAMINOPHEN 975 MG: 325 TABLET, FILM COATED ORAL at 13:58

## 2023-01-01 RX ADMIN — APIXABAN 5 MG: 5 TABLET, FILM COATED ORAL at 13:30

## 2023-01-01 RX ADMIN — LEVETIRACETAM 1000 MG: 500 TABLET, FILM COATED ORAL at 09:24

## 2023-01-01 RX ADMIN — PANTOPRAZOLE SODIUM 40 MG: 40 INJECTION, POWDER, FOR SOLUTION INTRAVENOUS at 09:31

## 2023-01-01 RX ADMIN — ESCITALOPRAM 5 MG: 5 TABLET, FILM COATED ORAL at 08:48

## 2023-01-01 RX ADMIN — ESCITALOPRAM 5 MG: 5 TABLET, FILM COATED ORAL at 12:59

## 2023-01-01 RX ADMIN — GABAPENTIN 300 MG: 300 CAPSULE ORAL at 21:36

## 2023-01-01 RX ADMIN — IOPAMIDOL 69 ML: 755 INJECTION, SOLUTION INTRAVENOUS at 03:07

## 2023-01-01 RX ADMIN — SODIUM CHLORIDE: 9 INJECTION, SOLUTION INTRAVENOUS at 14:32

## 2023-01-01 RX ADMIN — PHENYTOIN 100 MG: 50 TABLET, CHEWABLE ORAL at 20:47

## 2023-01-01 RX ADMIN — PHENYTOIN 100 MG: 50 TABLET, CHEWABLE ORAL at 21:50

## 2023-01-01 RX ADMIN — PANTOPRAZOLE SODIUM 40 MG: 40 INJECTION, POWDER, FOR SOLUTION INTRAVENOUS at 20:25

## 2023-01-01 RX ADMIN — APIXABAN 5 MG: 5 TABLET, FILM COATED ORAL at 20:41

## 2023-01-01 RX ADMIN — PANTOPRAZOLE SODIUM 40 MG: 40 INJECTION, POWDER, FOR SOLUTION INTRAVENOUS at 16:27

## 2023-01-01 RX ADMIN — PHENYTOIN 100 MG: 50 TABLET, CHEWABLE ORAL at 21:45

## 2023-01-01 RX ADMIN — PHENYTOIN 100 MG: 50 TABLET, CHEWABLE ORAL at 20:41

## 2023-01-01 RX ADMIN — GABAPENTIN 300 MG: 300 CAPSULE ORAL at 21:50

## 2023-01-01 RX ADMIN — GABAPENTIN 300 MG: 300 CAPSULE ORAL at 21:49

## 2023-01-01 RX ADMIN — LEVETIRACETAM 1000 MG: 500 TABLET, FILM COATED ORAL at 09:31

## 2023-01-01 RX ADMIN — PHENYTOIN 100 MG: 50 TABLET, CHEWABLE ORAL at 08:20

## 2023-01-01 RX ADMIN — APIXABAN 5 MG: 5 TABLET, FILM COATED ORAL at 20:47

## 2023-01-01 RX ADMIN — LEVETIRACETAM 1000 MG: 500 TABLET, FILM COATED ORAL at 09:59

## 2023-01-01 RX ADMIN — IOPAMIDOL 25 ML: 612 INJECTION, SOLUTION INTRAVENOUS at 15:39

## 2023-01-01 RX ADMIN — GABAPENTIN 300 MG: 300 CAPSULE ORAL at 20:27

## 2023-01-01 RX ADMIN — INSULIN ASPART 1 UNITS: 100 INJECTION, SOLUTION INTRAVENOUS; SUBCUTANEOUS at 06:01

## 2023-01-01 RX ADMIN — PHENYTOIN 100 MG: 50 TABLET, CHEWABLE ORAL at 21:29

## 2023-01-01 RX ADMIN — LEVETIRACETAM 1000 MG: 500 TABLET, FILM COATED ORAL at 10:05

## 2023-01-01 RX ADMIN — ATORVASTATIN CALCIUM 40 MG: 40 TABLET, FILM COATED ORAL at 19:57

## 2023-01-01 RX ADMIN — PANTOPRAZOLE SODIUM 40 MG: 40 INJECTION, POWDER, FOR SOLUTION INTRAVENOUS at 08:48

## 2023-01-01 RX ADMIN — PANTOPRAZOLE SODIUM 40 MG: 40 TABLET, DELAYED RELEASE ORAL at 07:40

## 2023-01-01 RX ADMIN — LEVETIRACETAM 1000 MG: 500 TABLET, FILM COATED ORAL at 21:44

## 2023-01-01 RX ADMIN — PANTOPRAZOLE SODIUM 40 MG: 40 INJECTION, POWDER, FOR SOLUTION INTRAVENOUS at 20:47

## 2023-01-01 RX ADMIN — LEVETIRACETAM 1000 MG: 500 TABLET, FILM COATED ORAL at 21:28

## 2023-01-01 RX ADMIN — INSULIN ASPART 1 UNITS: 100 INJECTION, SOLUTION INTRAVENOUS; SUBCUTANEOUS at 13:49

## 2023-01-01 RX ADMIN — ESCITALOPRAM 5 MG: 5 TABLET, FILM COATED ORAL at 09:31

## 2023-01-01 RX ADMIN — PANTOPRAZOLE SODIUM 40 MG: 40 TABLET, DELAYED RELEASE ORAL at 21:36

## 2023-01-01 RX ADMIN — SODIUM CHLORIDE: 9 INJECTION, SOLUTION INTRAVENOUS at 18:28

## 2023-01-01 RX ADMIN — ATORVASTATIN CALCIUM 40 MG: 40 TABLET, FILM COATED ORAL at 20:25

## 2023-01-01 RX ADMIN — PANTOPRAZOLE SODIUM 40 MG: 40 INJECTION, POWDER, FOR SOLUTION INTRAVENOUS at 10:08

## 2023-01-01 RX ADMIN — ATORVASTATIN CALCIUM 40 MG: 40 TABLET, FILM COATED ORAL at 21:28

## 2023-01-01 RX ADMIN — ESCITALOPRAM 5 MG: 5 TABLET, FILM COATED ORAL at 09:24

## 2023-01-01 RX ADMIN — FENTANYL CITRATE 50 MCG: 50 INJECTION INTRAMUSCULAR; INTRAVENOUS at 15:28

## 2023-01-01 RX ADMIN — MEMANTINE 10 MG: 10 TABLET ORAL at 09:24

## 2023-01-01 ASSESSMENT — ACTIVITIES OF DAILY LIVING (ADL)
ADLS_ACUITY_SCORE: 49
ADLS_ACUITY_SCORE: 51
ADLS_ACUITY_SCORE: 54
ADLS_ACUITY_SCORE: 53
ADLS_ACUITY_SCORE: 47
ADLS_ACUITY_SCORE: 54
ADLS_ACUITY_SCORE: 35
ADLS_ACUITY_SCORE: 35
ADLS_ACUITY_SCORE: 53
ADLS_ACUITY_SCORE: 54
ADLS_ACUITY_SCORE: 54
ADLS_ACUITY_SCORE: 47
ADLS_ACUITY_SCORE: 48
ADLS_ACUITY_SCORE: 37
ADLS_ACUITY_SCORE: 48
ADLS_ACUITY_SCORE: 53
ADLS_ACUITY_SCORE: 47
ADLS_ACUITY_SCORE: 47
ADLS_ACUITY_SCORE: 48
ADLS_ACUITY_SCORE: 54
ADLS_ACUITY_SCORE: 48
ADLS_ACUITY_SCORE: 54
ADLS_ACUITY_SCORE: 37
ADLS_ACUITY_SCORE: 48
ADLS_ACUITY_SCORE: 58
ADLS_ACUITY_SCORE: 54
ADLS_ACUITY_SCORE: 37
ADLS_ACUITY_SCORE: 37
ADLS_ACUITY_SCORE: 35
ADLS_ACUITY_SCORE: 35
ADLS_ACUITY_SCORE: 47
ADLS_ACUITY_SCORE: 54
ADLS_ACUITY_SCORE: 49
ADLS_ACUITY_SCORE: 49
ADLS_ACUITY_SCORE: 54
ADLS_ACUITY_SCORE: 54
ADLS_ACUITY_SCORE: 48
ADLS_ACUITY_SCORE: 54
ADLS_ACUITY_SCORE: 49
ADLS_ACUITY_SCORE: 48
ADLS_ACUITY_SCORE: 48
ADLS_ACUITY_SCORE: 47
ADLS_ACUITY_SCORE: 48
ADLS_ACUITY_SCORE: 47
ADLS_ACUITY_SCORE: 37
ADLS_ACUITY_SCORE: 54
ADLS_ACUITY_SCORE: 48
ADLS_ACUITY_SCORE: 35
ADLS_ACUITY_SCORE: 54
ADLS_ACUITY_SCORE: 48
ADLS_ACUITY_SCORE: 35
ADLS_ACUITY_SCORE: 47
ADLS_ACUITY_SCORE: 35
ADLS_ACUITY_SCORE: 47
ADLS_ACUITY_SCORE: 35
ADLS_ACUITY_SCORE: 54
ADLS_ACUITY_SCORE: 47
ADLS_ACUITY_SCORE: 51
ADLS_ACUITY_SCORE: 47
ADLS_ACUITY_SCORE: 48
ADLS_ACUITY_SCORE: 53
ADLS_ACUITY_SCORE: 47
ADLS_ACUITY_SCORE: 35
ADLS_ACUITY_SCORE: 37
ADLS_ACUITY_SCORE: 54
ADLS_ACUITY_SCORE: 54
ADLS_ACUITY_SCORE: 48
ADLS_ACUITY_SCORE: 54
ADLS_ACUITY_SCORE: 53
ADLS_ACUITY_SCORE: 47
ADLS_ACUITY_SCORE: 48
ADLS_ACUITY_SCORE: 52
ADLS_ACUITY_SCORE: 48
ADLS_ACUITY_SCORE: 54
ADLS_ACUITY_SCORE: 54
ADLS_ACUITY_SCORE: 47
ADLS_ACUITY_SCORE: 37
ADLS_ACUITY_SCORE: 54
ADLS_ACUITY_SCORE: 37
ADLS_ACUITY_SCORE: 37
ADLS_ACUITY_SCORE: 48
ADLS_ACUITY_SCORE: 47
ADLS_ACUITY_SCORE: 54
ADLS_ACUITY_SCORE: 47
ADLS_ACUITY_SCORE: 54
ADLS_ACUITY_SCORE: 47
ADLS_ACUITY_SCORE: 54
ADLS_ACUITY_SCORE: 49
ADLS_ACUITY_SCORE: 48
ADLS_ACUITY_SCORE: 54
ADLS_ACUITY_SCORE: 47
ADLS_ACUITY_SCORE: 54
ADLS_ACUITY_SCORE: 54
ADLS_ACUITY_SCORE: 48
ADLS_ACUITY_SCORE: 37
ADLS_ACUITY_SCORE: 53
ADLS_ACUITY_SCORE: 53
ADLS_ACUITY_SCORE: 54
ADLS_ACUITY_SCORE: 48

## 2023-01-11 NOTE — TELEPHONE ENCOUNTER
Call from daughter regarding patient. She says patient is bed bound and incontinent. Daughter went to check brief to see if she had voided, and says her brief was full of blood with clots.   No stool present in brief.   Patient to be evaluated in ED. Unable to transport patient in vehicle, so will call 911 to bring her in.     Yolanda ESPINOSA, RN  [facility]      Reason for Disposition    MODERATE rectal bleeding (small blood clots, passing blood without stool, or toilet water turns red) more than once a day    Additional Information    Negative: Passed out (i.e., fainted, collapsed and was not responding)    Negative: Shock suspected (e.g., cold/pale/clammy skin, too weak to stand, low BP, rapid pulse)    Negative: Vomiting red blood or black (coffee ground) material    Negative: Sounds like a life-threatening emergency to the triager    Negative: SEVERE rectal bleeding (large blood clots; constant or on and off bleeding)    Negative: SEVERE dizziness (e.g., unable to stand, requires support to walk, feels like passing out now)    Protocols used: RECTAL BLEEDING-A-OH

## 2023-01-11 NOTE — ED PROVIDER NOTES
History     Chief Complaint   Patient presents with     Leg Swelling     HPI  Khalida Redding is a 72 year old female who has significant dementia.  Her 2 daughters, primary caregivers, are with here in the ED.  Patient lives with her , but the daughters live within a mile.  The patient has had decreased urinary output over the last 2 days.  She has not urinated today.  They also noticed some blood in her undergarment today, and they are not sure where it is coming from.  Patient has chronic urinary incontinence.  Patient has had 2 small loose stools in the past 2 days.  No or diarrhea.  Apparently the daughters were able to get her to drink about 24 ounces of fluid this morning, and the patient has still not urinated.  They have also noticed that her left lower extremity has been edematous.  She is bed ridden.  She is unable to stand and walk due to chronic hip pain that apparently had surgery in the past.  Patient denies any chest pain or dyspnea.  No palpitations.  She denies any abdominal pain.  She did have one episode of vomiting yesterday.  No nausea or vomiting today.  Denies fevers or chills.  No dysuria.        Allergies:  Allergies   Allergen Reactions     Amoxicillin Hives and Rash     Pt reports she has taken PCN without problems     Ampicillin Rash       Problem List:    Patient Active Problem List    Diagnosis Date Noted     Other emphysema (H)-per CT results 08/29/2022     Priority: Medium     Encephalopathy 08/28/2022     Priority: Medium     Abnormal gait 08/28/2022     Priority: Medium     Status post placement of implantable loop recorder 08/28/2022     Priority: Medium     Platelet function defect (H)-ASA and Plavix 08/28/2022     Priority: Medium     Hypoalbuminemia 08/28/2022     Priority: Medium     Hypotension due to medication 08/28/2022     Priority: Medium     Difficulty urinating 04/08/2022     Priority: Medium     SIRS (systemic inflammatory response syndrome) - fever,  leukocytosis, sinus tachycardia, elevated lactic acid 03/19/2022     Priority: Medium     Acute CVA (cerebrovascular accident) (H) 03/19/2022     Priority: Medium     Transient hypotension 03/19/2022     Priority: Medium     Somnolence 03/19/2022     Priority: Medium     History of nonsustained atrial tachycardia July 2017 03/18/2022     Priority: Medium     Cerebrovascular accident (CVA) due to embolism of left anterior cerebral artery (H) 12/09/2021     Priority: Medium     Seizure disorder (H) 11/26/2021     Priority: Medium     History of colonic polyps 07/13/2020     Priority: Medium     Abdominal pain, generalized 06/16/2020     Priority: Medium     Slow transit constipation 06/16/2020     Priority: Medium     Chronic upset stomach 06/16/2020     Priority: Medium     Hemiparesis affecting left side as late effect of stroke (H) 01/13/2020     Priority: Medium     Age-related osteoporosis without current pathological fracture 11/25/2019     Priority: Medium     Lumbar radiculopathy 11/18/2019     Priority: Medium     Closed displaced fracture of left clavicle, unspecified part of clavicle, initial encounter 11/18/2019     Priority: Medium     Injury of left clavicle, initial encounter 11/18/2019     Priority: Medium     Syncope, cardiogenic 12/17/2018     Priority: Medium     Added automatically from request for surgery [id number]       Tubular adenoma of colon 11/29/2018     Priority: Medium     Supraventricular tachycardia (H) 10/22/2018     Priority: Medium     Gait instability 10/22/2018     Priority: Medium     Type 2 diabetes mellitus with circulatory disorder, without long-term current use of insulin (H) 09/20/2018     Priority: Medium     Hyperlipidemia LDL goal <100 09/20/2018     Priority: Medium     Hypertension goal BP (blood pressure) < 140/90 09/20/2018     Priority: Medium     History of multiple cerebrovascular accidents (CVAs) 09/20/2018     Priority: Medium     Convulsions, unspecified convulsion  type (H) 2018     Priority: Medium     Osteoarthritis 2018     Priority: Medium     Tobacco abuse disorder 2018     Priority: Medium     Pain in both lower legs 2018     Priority: Medium     Dementia (H) 2018     Priority: Medium        Past Medical History:    Past Medical History:   Diagnosis Date     Atrial tachycardia (H)      Convulsions, unspecified convulsion type (H) 2018     CVA (cerebral vascular accident) (H)      Dementia (H) 2018     Diabetes (H)      Falls      History of CVA (cerebrovascular accident) 2018     Hyperlipidemia LDL goal <100 2018     Hypertension goal BP (blood pressure) < 140/90 2018     Osteoarthritis 2018     Pain in both lower legs 2018     Seizures (H)      Smoking      Syncope      Tobacco abuse disorder 2018     Tubular adenoma of colon 2018     Type 2 diabetes mellitus with circulatory disorder, without long-term current use of insulin (H) 2018       Past Surgical History:    Past Surgical History:   Procedure Laterality Date     COLONOSCOPY N/A 2018    Procedure: Colonoscopy, Polypectomy by Biopsy;  Surgeon: Arcadio Mcmullen DO;  Location:  GI     COLONOSCOPY N/A 2020    Procedure: Colonoscopy with possible biopsy and/or polypectomy;  Surgeon: Fabián Hines MD;  Location:  GI     HIP SURGERY Left        Family History:    No family history on file.    Social History:  Marital Status:   [2]  Social History     Tobacco Use     Smoking status: Former     Packs/day: 1.00     Types: Cigarettes     Quit date: 2021     Years since quittin.1     Smokeless tobacco: Never     Tobacco comments:     QUIT   Vaping Use     Vaping Use: Never used   Substance Use Topics     Alcohol use: No     Drug use: No        Medications:    acetaminophen (TYLENOL) 500 MG tablet  alendronate (FOSAMAX) 70 MG tablet  aspirin (ASA) 81 MG chewable tablet  atorvastatin (LIPITOR) 40  MG tablet  blood glucose (NO BRAND SPECIFIED) test strip  clopidogrel (PLAVIX) 75 MG tablet  Cyanocobalamin (VITAMIN B 12 PO)  escitalopram (LEXAPRO) 5 MG tablet  gabapentin (NEURONTIN) 300 MG capsule  hydrALAZINE (APRESOLINE) 10 MG tablet  levETIRAcetam (KEPPRA) 1000 MG tablet  meloxicam (MOBIC) 7.5 MG tablet  memantine (NAMENDA) 10 MG tablet  metFORMIN (GLUCOPHAGE XR) 500 MG 24 hr tablet  omeprazole (PRILOSEC) 40 MG DR capsule  phenytoin (DILANTIN) 50 MG chewable tablet  alcohol swab prep pads  blood glucose (NO BRAND SPECIFIED) test strip  blood glucose monitoring (NO BRAND SPECIFIED) meter device kit  lisinopril (ZESTRIL) 20 MG tablet  order for DME  polyethylene glycol (MIRALAX) 17 GM/Dose powder  thin (NO BRAND SPECIFIED) lancets          Review of Systems   All other systems reviewed and are negative.      Physical Exam   BP: (!) 147/91  Pulse: 89  Temp: 98  F (36.7  C)  Resp: 18  Weight: 64.8 kg (142 lb 14.4 oz)  SpO2: 94 %      Physical Exam  Vitals and nursing note reviewed.   Constitutional:       General: She is not in acute distress.     Appearance: She is not ill-appearing, toxic-appearing or diaphoretic.   HENT:      Head: Normocephalic and atraumatic.      Right Ear: Tympanic membrane, ear canal and external ear normal.      Left Ear: Tympanic membrane, ear canal and external ear normal.      Nose: Nose normal. No congestion or rhinorrhea.      Mouth/Throat:      Mouth: Mucous membranes are dry.      Pharynx: No oropharyngeal exudate or posterior oropharyngeal erythema.      Comments: Upper denture in place.  Eyes:      General: No scleral icterus.        Right eye: No discharge.         Left eye: No discharge.      Conjunctiva/sclera: Conjunctivae normal.      Pupils: Pupils are equal, round, and reactive to light.   Neck:      Thyroid: No thyromegaly.   Cardiovascular:      Rate and Rhythm: Normal rate and regular rhythm.      Heart sounds: Normal heart sounds. No murmur heard.  Pulmonary:       Effort: Pulmonary effort is normal. No respiratory distress.      Breath sounds: Normal breath sounds. No wheezing or rales.   Chest:      Chest wall: No tenderness.   Abdominal:      General: Bowel sounds are normal. There is no distension.      Palpations: Abdomen is soft. There is no mass.      Tenderness: There is no abdominal tenderness. There is no right CVA tenderness, left CVA tenderness, guarding or rebound.   Genitourinary:     General: Normal vulva.      Vagina: No vaginal discharge.      Rectum: Guaiac result positive.      Comments: Mild amount of blood around the vaginal/rectal area.  This was wiped off and cleaned.  Digital rectal exam performed with nurse present.  Digit was covered in blood from the rectum.  Occult positive.  Therefore, the blood in her pad is likely all coming from the rectum with a GI bleed.  Musculoskeletal:         General: No tenderness, deformity or signs of injury. Normal range of motion.      Cervical back: Normal range of motion and neck supple. No rigidity or tenderness.      Right lower leg: No edema.      Left lower leg: Edema (1+.) present.      Comments: Generally swelling of the left knee.  Some tenderness on the joint line.  Pain with flexion and extension of the knee.  Ligamentous testing intact.  No increased warmth to palpation.  No erythema, induration, or fluctuance.  Distal pulses 2+.  Extremity is warm.  Sensation intact to light touch.  Pain with any range of motion of her left hip.  Left hip discomfort with range of motion is a chronic finding for her.   Lymphadenopathy:      Cervical: No cervical adenopathy.   Skin:     General: Skin is warm and dry.      Capillary Refill: Capillary refill takes less than 2 seconds.      Coloration: Skin is not jaundiced or pale.      Findings: No bruising, erythema, lesion or rash.   Neurological:      General: No focal deficit present.      Mental Status: She is alert.      Cranial Nerves: No cranial nerve deficit.       Sensory: No sensory deficit.      Motor: No weakness.      Deep Tendon Reflexes: Reflexes normal.   Psychiatric:         Behavior: Behavior normal.         Thought Content: Thought content normal.      Comments: Patient appears to be demented.  She does make some inappropriate statements.         ED Course         Colonoscopy from 8/24/2020:    Findings:        A few small and large-mouthed diverticula were found in the sigmoid        colon.                                                                                     Moderate Sedation:        Moderate (conscious) sedation was administered by the endoscopy nurse        and supervised by the endoscopist. The following parameters were        monitored: oxygen saturation, heart rate, blood pressure, respiratory        rate, EKG, adequacy of pulmonary ventilation, and response to care.        Total physician intraservice time was 15 minutes.   Impression:          - Diverticulosis in the sigmoid colon.   Recommendation:      - Discharge patient to home.                        - The patient uses Miralax as needed. I advised using it                        regularly every day, adjusting the dose upward or                        downward for desired results. I suspect the occasional                        incontinence episodes are due to overfill of the colon,                        with spontaneous purging.                        - Patient has a contact number available for                        emergencies. The signs and symptoms of potential delayed                        complications were discussed with the patient. Return to                        normal activities tomorrow. Written discharge                        instructions were provided to the patient.                                                                                       Signed electronically by Fabián Hines   ______________________   Fabián Hines MD   8/24/2020 2:46:43 PM   I  was physically present for the entire viewing portion of the exam.Fabián Hines MD              Procedures              Critical Care time:  none               Results for orders placed or performed during the hospital encounter of 01/11/23 (from the past 24 hour(s))   Occult blood stool   Result Value Ref Range    Occult Blood Positive (A) Negative   Uric acid   Result Value Ref Range    Uric Acid 1.8 (L) 2.6 - 6.0 mg/dL   CBC with platelets differential    Narrative    The following orders were created for panel order CBC with platelets differential.  Procedure                               Abnormality         Status                     ---------                               -----------         ------                     CBC with platelets and d...[[id number]]  Abnormal            Final result                 Please view results for these tests on the individual orders.   Comprehensive metabolic panel   Result Value Ref Range    Sodium 141 133 - 144 mmol/L    Potassium 4.3 3.4 - 5.3 mmol/L    Chloride 107 94 - 109 mmol/L    Carbon Dioxide (CO2) 32 20 - 32 mmol/L    Anion Gap 2 (L) 3 - 14 mmol/L    Urea Nitrogen 25 7 - 30 mg/dL    Creatinine 0.52 0.52 - 1.04 mg/dL    Calcium 8.5 8.5 - 10.1 mg/dL    Glucose 303 (H) 70 - 99 mg/dL    Alkaline Phosphatase 133 40 - 150 U/L    AST 17 0 - 45 U/L    ALT 23 0 - 50 U/L    Protein Total 6.1 (L) 6.8 - 8.8 g/dL    Albumin 2.7 (L) 3.4 - 5.0 g/dL    Bilirubin Total 0.3 0.2 - 1.3 mg/dL    GFR Estimate >90 >60 mL/min/1.73m2   Lactic acid whole blood   Result Value Ref Range    Lactic Acid 1.5 0.7 - 2.0 mmol/L   CRP inflammation   Result Value Ref Range    CRP Inflammation 54.5 (H) 0.0 - 8.0 mg/L   INR   Result Value Ref Range    INR 1.24 (H) 0.85 - 1.15   Partial thromboplastin time   Result Value Ref Range    aPTT 21 (L) 22 - 38 Seconds   CBC with platelets and differential   Result Value Ref Range    WBC Count 7.0 4.0 - 11.0 10e3/uL    RBC Count 3.88 3.80 - 5.20 10e6/uL     Hemoglobin 11.6 (L) 11.7 - 15.7 g/dL    Hematocrit 36.5 35.0 - 47.0 %    MCV 94 78 - 100 fL    MCH 29.9 26.5 - 33.0 pg    MCHC 31.8 31.5 - 36.5 g/dL    RDW 12.5 10.0 - 15.0 %    Platelet Count 238 150 - 450 10e3/uL    % Neutrophils 66 %    % Lymphocytes 24 %    % Monocytes 7 %    % Eosinophils 2 %    % Basophils 1 %    % Immature Granulocytes 0 %    NRBCs per 100 WBC 0 <1 /100    Absolute Neutrophils 4.7 1.6 - 8.3 10e3/uL    Absolute Lymphocytes 1.7 0.8 - 5.3 10e3/uL    Absolute Monocytes 0.5 0.0 - 1.3 10e3/uL    Absolute Eosinophils 0.1 0.0 - 0.7 10e3/uL    Absolute Basophils 0.0 0.0 - 0.2 10e3/uL    Absolute Immature Granulocytes 0.0 <=0.4 10e3/uL    Absolute NRBCs 0.0 10e3/uL   Hemoglobin A1c   Result Value Ref Range    Hemoglobin A1C 9.4 (H) 0.0 - 5.6 %   US Lower Extremity Venous Duplex Left    Narrative    EXAM: US LOWER EXTREMITY VENOUS DUPLEX LEFT  LOCATION: [facility]  DATE/TIME: 1/11/2023 6:04 PM    INDICATION: left lower extremity swelling  COMPARISON: None.  TECHNIQUE: Venous Duplex ultrasound of the left lower extremity with and without compression, augmentation and duplex. Color flow and spectral Doppler with waveform analysis performed.    FINDINGS: Exam includes the common femoral, femoral, popliteal, and contralateral common femoral veins as well as segmentally visualized deep calf veins and greater saphenous vein.     LEFT: Occlusive deep vein thrombosis in the left external iliac, common femoral, profunda femoral, femoral, popliteal and peroneal veins. No superficial thrombophlebitis. No popliteal cyst.      Impression    IMPRESSION:  1.  Extensive deep vein thrombosis extending from the left iliac vein into the peroneal vein at the level of the mid calf. Consider consultation with interventional radiology for mechanical thrombectomy.    Findings were discussed with Dr. Stubbs on 1/11/2023 at 6:00PM   Hemoglobin   Result Value Ref Range    Hemoglobin 10.9 (L) 11.7 -  15.7 g/dL       Medications   0.9% sodium chloride BOLUS (0 mLs Intravenous Stopped 1/11/23 1846)     Followed by   sodium chloride 0.9% infusion ( Intravenous New Bag 1/11/23 1846)   levETIRAcetam (KEPPRA) tablet 1,000 mg (1,000 mg Oral Given 1/11/23 2221)   gabapentin (NEURONTIN) capsule 300 mg (300 mg Oral Given 1/11/23 2220)   pantoprazole (PROTONIX) IV push injection 40 mg (40 mg Intravenous Given 1/11/23 2222)       Assessments & Plan (with Medical Decision Making)     Acute deep vein thrombosis (DVT) of left lower extremity (H)  GI bleed  Long term current use of anticoagulant therapy  Uncontrolled diabetes mellitus with hyperglycemia (H)     72 year old female with dementia awaiting placement in nursing home who presents with her caregivers, 2 daughters, for evaluation of decreased urinary output, blood in her briefs, left lower extremity edema, and one episode of vomiting yesterday.  She has had 2 small loose stools in the past 2 days.  No fevers or chills.  Daughters were able to get her to drink about 24 ounces of fluid this morning, but she still has not urinated.  On exam blood pressure 147/91, temperature 98.0, pulse 89, respiration 18, oxygen saturation 94% on room air.  Patient is in no acute distress.  She does make some inappropriate statements and appears to be demented.  Otherwise, she is neurologically intact.  No neurological deficit.  Dry oral mucous membranes noted.  Cardiopulmonary exam normal.  Abdomen soft and nontender.  Good bowel sounds.  1+ lower extremity edema.  No erythema, increased warmth, induration, or fluctuance.  She does have some tenderness and swelling of her left knee, of which her family states she struggles with chronic left knee pain.  Remainder of the exam is otherwise negative.  IV was established.  CBC with a stable hemoglobin at 11.6.  White blood cell count normal at 7000.  INR minor elevation at 1.24 and her PTT was 21.  CRP elevated 54.5.  Lactic acid level  normal at 1.5.  Comprehensive metabolic panel with an elevated glucose of 303.  LFTs and renal function normal.  Electrolytes normal.  Uric acid low at 1.8.  Hemoglobin A1c elevated at 9.4% suggesting uncontrolled diabetes.  Left lower extremity ultrasound with an extensive DVT extending from the left iliac vein into the peroneal vein at the level of the mid calf.  This patient has a new extensive DVT of the left lower extremity currently on Plavix therapy.  She takes Plavix therapy presumably related to her history of CVA and atrial tachycardia.  She also has an active GI bleed with a small amount of bleeding coming from her rectum with some clots.  Vital signs are stable.  Ultimately, this patient requires transfer to a higher level of care where GI and interventional radiology have a presents.  We will have nursing staff contact other hospitals for transfer.  We will monitor the patient's vital signs very closely and monitor hemoglobin.  Patient denies any pain at this point.  She is very comfortable.  I have updated the family regarding this.  Patient is full code.  Patient does have dementia, but she converses well and follows directions well.  She is very kind and appropriate.  12:02 AM -we have called all hospitals in the [facility] that would potentially have interventional radiology and gastroenterology in-house.  There are no open beds.  This patient will need to board here until tomorrow, and then we will look for beds again.  Patient continues to be vitally stable thankfully.  She is having a small amount of blood coming from her rectum.  Hemoglobin dropped from 11.6 down to 10.9 after 3 hours here in the ED.  Patient continues to feel well.  Denies any pain.  We have administered her important p.m. medications.  She was given a dose of IV Protonix.  Will hold her Plavix.  At shift change, Dr. Haro has kindly agreed to accept her care.  I have given her the p.m. dose of Keppra and gabapentin.      I have reviewed the nursing notes.    I have reviewed the findings, diagnosis, plan and need for follow up with the patient.       Medical Decision Making  The patient presented with a problem that is an acute illness with systemic symptoms.    The patient's evaluation involved:  ordering and review of 3+ test(s) (see separate area of note for details)    The patient's management involved a decision regarding hospitalization.        New Prescriptions    No medications on file       Final diagnoses:   Acute deep vein thrombosis (DVT) of left lower extremity (H)   GI bleed   Long term current use of anticoagulant therapy   Uncontrolled diabetes mellitus with hyperglycemia (H)     Disclaimer: This note consists of symbols derived from keyboarding, dictation and/or voice recognition software. As a result, there may be errors in the script that have gone undetected. Please consider this when interpreting information found in this chart.      1/11/2023   [facility] EMERGENCY DEPT     Keron Stubbs PA-C  01/12/23 0015

## 2023-01-12 NOTE — ED NOTES
Terra-care done, large clots noted to be blocking pure wick tip, changed to a new set and hooked back to suction at 60 mmhg.

## 2023-01-12 NOTE — PROGRESS NOTES
[facility]  Transfer Triage Note    Date of call: 23  Time of call: 8:54 AM    Reason for transfer: Procedure can be done here and not at referring hospital   Diagnosis:     #Suspected LGIB  #hematochezia  #Extensive DVT (left iliac vein thru peroneal vein)   #Suspected LGIB  #AAA -- 4.2 x 3.8 cm AAA slightly increased in caliber since 2021 with prominent intramural thrombus  #DM2  #Dementia  #Szr disorder  #Plt dysfunction due to Plavix  #Hx of CVA  #Hemiparesis of left side       Outside Records: Available. Additional records requested to be faxed to [phone].    Stability of Patient: Patient is vitally stable, with no critical labs, and will likely remain stable throughout the transfer process  ICU: No    We received a phone call through our Physician Access line from Dr. Lantigua at [facility] Emergency Department.  My understanding from this phone call is that Khalida Redding with  1950 is a 72 year old female with PMH dementia DM2, CVA on plavix, left sided dfx, Szr dx, AAA<5cm.       Transfer Accepted? Yes -- patient needs GI and IR consult, no beds at [facility], would be reasonable candidate for [facility]; expected that [facility] will have beds open sooner and currently tentetive plan for [facility] admission with GI and IR consult, Hospitalist primary      Additional Comments   -- spoke to DR Garcia and Melodie IR team at [facility] and [facility] offer same services, appr their asst, CT abd scan does not appear to show anything that would require admission to [facility]    Recommendations for Management and Stabilization: not given  Sending facility plans to transport patient via ambulance: Yes  Expected Time of Arrival for Transfer:  TBD -- beds not available  Arrival Location:  [facility] or [facility] whichever bed opens sooner   Unit: TBD  med surg bed, tele x24h     Omer Nagy MD

## 2023-01-12 NOTE — ED NOTES
Patient's purwick completely clogged with blood. Rectal bag applied for her continued blood clot diarrhea. New purwick placed.

## 2023-01-12 NOTE — ED PROVIDER NOTES
I was asked to take over the care of this patient at shift change by Esau Stubbs.  Esau stated this is a 72-year-old female who presented to the ER today secondary to decreased urine output and with the finding in her adult diaper of some blood.  She lives at her home and her 2 daughters care for her.  She has a history for dementia.  Her daughters are concerned about possible urine infection.  He stated on exam she was found to have bleeding from the rectal area.  Patient is on Plavix medication secondary to history for a stroke.  Her daughter is also concerned about some swelling in her left leg which is more prominent than typical recently.  His exam identified a left leg DVT despite her current Plavix therapy.  He stated the patient is full code CODE STATUS.  He stated that his recommendation is the patient to be admitted to the hospital to manage her GI bleeding likely associated with her antiplatelet therapy/Plavix and also her to manage her left leg DVT.  He states that there is no hospital beds currently available in the [facility].  He stated that he did order her home medications and initiated her on a IV PPI.    I reviewed the patient's test results today and copied those results below:                                           Uric acid     Status: Abnormal   Result Value Ref Range    Uric Acid 1.8 (L) 2.6 - 6.0 mg/dL   Comprehensive metabolic panel     Status: Abnormal   Result Value Ref Range    Sodium 141 133 - 144 mmol/L    Potassium 4.3 3.4 - 5.3 mmol/L    Chloride 107 94 - 109 mmol/L    Carbon Dioxide (CO2) 32 20 - 32 mmol/L    Anion Gap 2 (L) 3 - 14 mmol/L    Urea Nitrogen 25 7 - 30 mg/dL    Creatinine 0.52 0.52 - 1.04 mg/dL    Calcium 8.5 8.5 - 10.1 mg/dL    Glucose 303 (H) 70 - 99 mg/dL    Alkaline Phosphatase 133 40 - 150 U/L    AST 17 0 - 45 U/L    ALT 23 0 - 50 U/L    Protein Total 6.1 (L) 6.8 - 8.8 g/dL    Albumin 2.7 (L) 3.4 - 5.0 g/dL    Bilirubin Total 0.3 0.2 - 1.3 mg/dL    GFR  Estimate >90 >60 mL/min/1.73m2   Lactic acid whole blood     Status: Normal   Result Value Ref Range    Lactic Acid 1.5 0.7 - 2.0 mmol/L   CRP inflammation     Status: Abnormal   Result Value Ref Range    CRP Inflammation 54.5 (H) 0.0 - 8.0 mg/L   Occult blood stool     Status: Abnormal   Result Value Ref Range    Occult Blood Positive (A) Negative   INR     Status: Abnormal   Result Value Ref Range    INR 1.24 (H) 0.85 - 1.15   Partial thromboplastin time     Status: Abnormal   Result Value Ref Range    aPTT 21 (L) 22 - 38 Seconds   CBC with platelets and differential     Status: Abnormal   Result Value Ref Range    WBC Count 7.0 4.0 - 11.0 10e3/uL    RBC Count 3.88 3.80 - 5.20 10e6/uL    Hemoglobin 11.6 (L) 11.7 - 15.7 g/dL    Hematocrit 36.5 35.0 - 47.0 %    MCV 94 78 - 100 fL    MCH 29.9 26.5 - 33.0 pg    MCHC 31.8 31.5 - 36.5 g/dL    RDW 12.5 10.0 - 15.0 %    Platelet Count 238 150 - 450 10e3/uL    % Neutrophils 66 %    % Lymphocytes 24 %    % Monocytes 7 %    % Eosinophils 2 %    % Basophils 1 %    % Immature Granulocytes 0 %    NRBCs per 100 WBC 0 <1 /100    Absolute Neutrophils 4.7 1.6 - 8.3 10e3/uL    Absolute Lymphocytes 1.7 0.8 - 5.3 10e3/uL    Absolute Monocytes 0.5 0.0 - 1.3 10e3/uL    Absolute Eosinophils 0.1 0.0 - 0.7 10e3/uL    Absolute Basophils 0.0 0.0 - 0.2 10e3/uL    Absolute Immature Granulocytes 0.0 <=0.4 10e3/uL    Absolute NRBCs 0.0 10e3/uL   Hemoglobin A1c     Status: Abnormal   Result Value Ref Range    Hemoglobin A1C 9.4 (H) 0.0 - 5.6 %   Hemoglobin     Status: Abnormal   Result Value Ref Range    Hemoglobin 10.9 (L) 11.7 - 15.7 g/dL   Asymptomatic COVID-19 Virus (Coronavirus) by PCR Nose     Status: Normal    Specimen: Nose; Swab   Result Value Ref Range    SARS CoV2 PCR Negative Negative    Narrative    Testing was performed using the Xpert Xpress SARS-CoV-2 Assay on the [employer]. Additional information about this Emergency Use Authorization (EUA) assay can  be found via the Lab Guide. This test should be ordered for the detection of SARS-CoV-2 in individuals who meet SARS-CoV-2 clinical and/or epidemiological criteria as well as from individuals without symptoms or other reasons to suspect COVID-19. Test performance for asymptomatic patients has only been established in anterior nasal swab specimens. This test is for in vitro diagnostic use under the FDA EUA for laboratories certified under CLIA to perform high complexity testing. This test has not been FDA cleared or approved. A negative result does not rule out the presence of PCR inhibitors in the specimen or target RNA concentration below the limit of detection for the assay. The possibility of a false negative should be considered if the patient's recent exposure or clinical presentation suggests COVID-19. This test was validated by the [facility]. This laboratory is certified under the Clinical Laboratory Improvement Amendments (CLIA) as qualified to perform high complexity laboratory testing.     Hemoglobin     Status: Abnormal   Result Value Ref Range    Hemoglobin 10.3 (L) 11.7 - 15.7 g/dL   Extra Tube     Status: None    Narrative    The following orders were created for panel order Extra Tube.  Procedure                               Abnormality         Status                     ---------                               -----------         ------                     Extra Green Top (Lithium...[[id number]]                      Final result                 Please view results for these tests on the individual orders.   Extra Green Top (Lithium Heparin) Tube     Status: None   Result Value Ref Range    Hold Specimen [facility]    CBC with platelets differential     Status: Abnormal    Narrative    The following orders were created for panel order CBC with platelets differential.  Procedure                               Abnormality         Status                      ---------                               -----------         ------                     CBC with platelets and d...[[id number]]  Abnormal            Final result                 Please view results for these tests on the individual orders.       Patient has had no significant urine output during the night shift and her pure wick is clotted with blood from the rectum.  I asked the nursing staff to place a quick urinary catheter and 500 mL of urine was expressed from the bladder.    Serial hemoglobins have been done the most recent hemoglobin at 10.3.  We will continue to monitor hemoglobin level.  I did not see evidence of any imaging to look for possible reasons for the rectal bleeding.  Concern about possible diverticulitis or similar as because of the bleeding event.  CT of the abdomen pelvis ordered.    Results for orders placed or performed during the hospital encounter of 01/11/23   US Lower Extremity Venous Duplex Left     Status: None    Narrative    EXAM: US LOWER EXTREMITY VENOUS DUPLEX LEFT  LOCATION: [facility]  DATE/TIME: 1/11/2023 6:04 PM    INDICATION: left lower extremity swelling  COMPARISON: None.  TECHNIQUE: Venous Duplex ultrasound of the left lower extremity with and without compression, augmentation and duplex. Color flow and spectral Doppler with waveform analysis performed.    FINDINGS: Exam includes the common femoral, femoral, popliteal, and contralateral common femoral veins as well as segmentally visualized deep calf veins and greater saphenous vein.     LEFT: Occlusive deep vein thrombosis in the left external iliac, common femoral, profunda femoral, femoral, popliteal and peroneal veins. No superficial thrombophlebitis. No popliteal cyst.      Impression    IMPRESSION:  1.  Extensive deep vein thrombosis extending from the left iliac vein into the peroneal vein at the level of the mid calf. Consider consultation with interventional radiology for mechanical  thrombectomy.    Findings were discussed with Dr. Stubbs on 1/11/2023 at 6:00PM   CT Abdomen Pelvis w Contrast     Status: None (Preliminary result)    Narrative    EXAM: CT ABDOMEN AND PELVIS WITH CONTRAST  LOCATION: [facility]  DATE/TIME: 1/12/2023 3:29 AM    INDICATION: Lower gastrointestinal bleeding.  COMPARISON: 6/1/2021.    TECHNIQUE: CT scan of the abdomen and pelvis was performed following injection of IV contrast. Multiplanar reformats were obtained. Dose reduction techniques were used.  CONTRAST: 69 mL Isovue 370.    FINDINGS:    LOWER CHEST: Mild emphysematous changes in the lung bases. Coronary artery calcification.    HEPATOBILIARY: Unremarkable.    SPLEEN: Unremarkable.    PANCREAS: Unremarkable.    ADRENAL GLANDS: Nonspecific mild diffuse thickening of bilateral adrenal glands.    KIDNEYS/BLADDER: Malrotation of the left kidney.    BOWEL: No dilatation of the small or large bowel. A few colonic diverticula are present without evidence of diverticulitis. Normal appendix. No visualized bowel wall thickening, pneumatosis or free intraperitoneal gas.    LYMPH NODES: Unremarkable.    PELVIC ORGANS: No acute findings.    MUSCULOSKELETAL: Partial visualization of a left hip arthroplasty.    OTHER: 4.2 x 3.8 cm abdominal aortic aneurysm. Prominent mural thrombus is present in the aneurysm. The aneurysm measured 4.0 x 3.7 cm on 6/1/2021.      Impression    IMPRESSION:   1.  No acute abnormality identified in the abdomen or pelvis.  2.  Colonic diverticulosis without evidence of diverticulitis.  3.  4.2 x 3.8 cm abdominal aortic aneurysm, slightly increased in caliber since 6/1/2021.        A type and screen was also added to prior blood testing.    Patient had no specific complaints at the time of exam.  She denied nausea on exam.  We will continue to monitor through the night shift pending identifying a hospital facility willing to accept the patient in transfer.    Patient  signed out to Dr. Lantigua at shift change.     Julius Haro,   01/13/23 0728

## 2023-01-12 NOTE — ED PROVIDER NOTES
Signout received at change of shift from Dr. Alexandr Haro, who assumed care from Esau Stubbs PA-C.  See their notes for patient presenting details and initial work-up.  Briefly, this is a 72-year-old female who presented to the emergency room secondary to decreased urine output and blood in her brief, is cared for by her 2 daughters.  Has history of dementia.  Evaluation revealed rectal bleeding, concern for lower GI bleed.  Is on Plavix but no anticoagulation.  Plavix has been held.  PPI was initiated.  Hemoglobin has remained stable just above 10 upon arrival and on recheck.  Additional complicating factor of left leg swelling, ultrasound revealed significant DVT.  She has been boarding in the ED overnight for the recommendation of admission for further management of GI bleed and lower extremity DVT.  Currently no beds available.  The patient is currently full code.  This morning she is vitally stable.  Assumed care of patient to follow-up on ultimate disposition and transfer    Patient was vitally stable upon transfer of care this morning.  However, during the morning, noticed oxygen saturation of 89%.  RN went in to assess the patient and she was sleeping but had the oxygen off.  This was placed and saturations came back up into the 90s.  However, after reviewing the patient's work-up and imaging thus far, she has an extensive DVT of the left lower extremity, is requiring oxygen and is hypoxic, and has not had any imaging of her chest.  CT PE study was ordered to rule out PE at this time.    0851 spoke with Dr. Nagy, triage hospitalist at [facility].  Discussed the patient's case, presenting details, initial work-up and findings.  Currently, [facility], [facility], and [facility] are all full, but patient would be appropriate for either [facility] or [facility] as they both have IR and GI available.  They will put her on the list and update us when they know of availability. PE study resulted and was negative.  Patient has  remained stable on supplemental oxygen once it was replaced    Patient remained stable throughout the day.  Unfortunately, no beds did become available and she remained boarding in the ED.  Will sign out at change of shift to Dr. Alexandr Haro to follow-up on patient disposition if bed becomes available     Aditi Lantigua,   01/12/23 3584

## 2023-01-13 NOTE — PROGRESS NOTES
"Spoke with daughter, Lashae, to provide update. Lashae states that Khalida lives in apartment with her , Yeyo, who is also unwell. Lashae and her sister are caretakers for Khalida and Yeyo. Lashae reports that Khalida is bedbound, they have not been able to get her up to chair since November as when they try to stand her up she \"tate\". She reports that she is turned and repositioned. She requires to be fed as well. Lashae and sister do not live in the apartment, but are \"close by\".   "

## 2023-01-13 NOTE — ED NOTES
Patient brief changed, purewick changed, repositioned and boosted in bed. Has stuffed animal in hand. Blankets on. Call light within reach. IV fluids running per MAR. No other needs at this time.

## 2023-01-13 NOTE — ED PROVIDER NOTES
I was asked to take over the care of this patient once again at shift change by Dr. Lantigua.  Again there was no available beds in the [facility] in order to hospitalize this patient so she continues to board in the emergency room.  Patient with issues of GI bleeding with bright red blood in the rectum associated with being on chronic Plavix therapy.  Patient also found to have a DVT in her left leg despite her Plavix therapy.  Patient's hemoglobin had been stable through the night but it looks like the last check on her hemoglobin was 8:00 this morning and has not been checked since.  There was also reports of possible bleeding vaginally.  I introduced myself to the patient once again tonight and she was in agreement to allow us to do a vaginal exam.  Nurse Angi was present with me during the exam and there was no significant blood noted from the vaginal area on exam.  All bleeding appears to be rectal at this time.  I also went through her home medications with the inpatient pharmacist and the patient's phenytoin had not been reordered as yet so that was ordered at this time.  Her PPI was also reordered as it was only ordered as a one-time medication yesterday.  I also ordered diabetic hyperglycemia orders and a diabetic diet for the patient given her extended stay in the ER.    Hemoglobin   Date Value Ref Range Status   01/12/2023 9.6 (L) 11.7 - 15.7 g/dL Final   01/12/2023 10.6 (L) 11.7 - 15.7 g/dL Final   06/01/2021 15.1 11.7 - 15.7 g/dL Final   04/19/2021 14.4 11.7 - 15.7 g/dL Final     Repeat hemoglobin ordered for the a.m. once again.  Patient had less evidence of bleeding through the night but her bladder scan revealed retained urine of over 500 cc this morning so quick cath was ordered once again.    Patient signed out to Dr. Richardson at shift change.         Julius Haro,   01/13/23 0599

## 2023-01-13 NOTE — ED NOTES
Large clots appears from vaginal area. Purewik in making urine bloody from vaginal clots. Pt moderate bleeding from rectum. Pt repositioned . Skin looks intact. Dr. Haro notified of status. Vss stable.

## 2023-01-13 NOTE — ED NOTES
Pt left knee and lower leg swelling baseline from yesterday. Pt had large amount clots from rectum. MD and this RN did ensure bleeding not coming from vagina. Negative. Pure wic replaced due to clots from rectum into vaginal area. Pt alert and orientated. Pt color pale. Pt vss stable.

## 2023-01-13 NOTE — ED NOTES
Pt awake and alert. Offered hs cares pt declined. Vss stable. Bloody urine in suction container. Approx 400 ml. Dr. Lantigua notified.

## 2023-01-13 NOTE — PROGRESS NOTES
Repeat bladder scan 501ml, orders to straight cath. Procedure performed without any difficulty, patient tolerated well and 525ml of urine drained from bladder. Patient reports some discomfort from Prurewick, external catheter not replaced.

## 2023-01-13 NOTE — PROGRESS NOTES
Last void with external catheter in place and 2200 and 600ml in amount. Patient bladder scanned for 374ml at 0345. Patient does not feel like she needs to urinate. Provider updated, recheck bladder scan in two hours if still unable to void. Will continue to monitor.

## 2023-01-14 PROBLEM — R56.9 CONVULSIONS, UNSPECIFIED CONVULSION TYPE (H): Status: ACTIVE | Noted: 2018-09-20

## 2023-01-14 PROBLEM — E78.5 HYPERLIPIDEMIA LDL GOAL <100: Status: ACTIVE | Noted: 2018-09-20

## 2023-01-14 PROBLEM — F03.90 DEMENTIA (H): Status: ACTIVE | Noted: 2018-09-20

## 2023-01-14 PROBLEM — D64.9 ANEMIA: Status: ACTIVE | Noted: 2023-01-01

## 2023-01-14 PROBLEM — I10 HYPERTENSION GOAL BP (BLOOD PRESSURE) < 140/90: Status: ACTIVE | Noted: 2018-09-20

## 2023-01-14 PROBLEM — E11.59 TYPE 2 DIABETES MELLITUS WITH CIRCULATORY DISORDER, WITHOUT LONG-TERM CURRENT USE OF INSULIN (H): Status: ACTIVE | Noted: 2018-09-20

## 2023-01-14 PROBLEM — K92.2 GIB (GASTROINTESTINAL BLEEDING): Status: ACTIVE | Noted: 2023-01-01

## 2023-01-14 PROBLEM — Z86.73 HISTORY OF MULTIPLE CEREBROVASCULAR ACCIDENTS (CVAS): Status: ACTIVE | Noted: 2018-09-20

## 2023-01-14 NOTE — ED PROVIDER NOTES
Khalida Redding is a 72 year old female with complex medical history who was signed out to me at the change of shift.  Patient has had lower GI bleed with bright red blood per rectum associated with being on chronic Plavix therapy.  She was found to have a DVT in her left leg, with a drop in her hemoglobin from baseline of 15.1 back in June of 2021 to a level of 10.6 on Thursday.  Since that time, her hemoglobin had continued to drop to her most recent level of 9.0.  The rectal bleeding has subsided and she is having her hemoglobin rechecked again this morning.  Patient had a prior colonoscopy back in August 2020 demonstrating diverticulosis in the sigmoid colon.  Patient is still awaiting transfer to a higher level of care for potential interventional radiology and gastroenterology.  Patient has extensive DVT to the left lower extremity, with need for decision regarding appropriate treatment for DVT in light of her current episode of active bleeding.  Patient will be signed back to Dr. Ortez at change of shift.     Prosper Tony MD  01/14/23 0605

## 2023-01-14 NOTE — IR NOTE
RADIOLOGY POST PROCEDURE NOTE    Patient name: Khalida Redding  MRN: [id number]  : 1950    Pre-procedure diagnosis: LLE extensive DVT and GIB  Post-procedure diagnosis: Same    Procedure Date/Time: 2023  3:37 PM  Procedure: Right Int Jug venous access and IVC venogram.  Placement of retrievable IVC filter.  No complications.  Tolerated well.  Estimated blood loss: 5 ml  Specimen(s) collected with description: Please see above.    The patient tolerated the procedure well with no immediate complications.  Significant findings:  Please see above.    See imaging dictation for procedural details.    Provider name: Melvin Cade MD  Assistant(s):None

## 2023-01-14 NOTE — IR NOTE
Interventional Radiology Intra-procedural Nursing Note    Patient Name: Khalida Redding  Medical Record Number: [id number]  Today's Date: January 14, 2023    Procedure: IVC filter placement with moderate sedation  Start time: 1525  End time: 1539  Report provided to: Gen Surg RN  Patient depart time and location: 1545 to 220    Note: Patient entered Interventional Radiology Suite number 1 via cart. Patient awake, alert and oriented. Assisted onto procedural table in supine position. Prepped and draped.  Dr. Cade in room. Time out and procedure started. Vital signs stable. Telemetry reading st.    Procedure well tolerated by patient without complications. Procedure end with debrief by Dr. Cade.  Pressure held until hemostasis achieved. Quick Clot and tegaderm dressing applied to R neck.    Administered medication totals:  Lidocaine 1% 7 mL Intradermal  Fentanyl 50 mcg IVP    Last dose of sedation administered at 1528.

## 2023-01-14 NOTE — CONSULTS
[facility]  Gastroenterology Consultation         Khalida Redding  [address]   [facility] [address]  72 year old female    Admission Date/Time: 1/14/2023  Primary Care Provider: Abdirahman Munoz  Referring / Attending Physician:  Dr. Kimball    We were asked to see the patient in consultation by Dr. Kimball for evaluation of GI bleed melena.      CC: GI bleed melena    HPI:  Khalida Redding is a 72 year old female who was transferred from [facility] patient has history of DVT patient is on dual antiplatelet therapy patient has history of stroke patient was transferred due to large lower extremity DVT for which patient is scheduled today to have IVC filter.  Patient is off her aspirin and Plavix patient has been noticed to have blood in the stool rectal exam showed melanotic stool patient's hemoglobin has been stable.  Most of the information was obtained by discussing with hospitalist team and also reviewing medical record.  Patient is denying any active GI complaint laying fairly comfortably.  No new significant GI complaint rest of review of system is negative    ROS: A comprehensive ten point review of systems was negative aside from those in mentioned in the HPI.      PAST MED HX:  I have reviewed this patient's medical history and updated it with pertinent information if needed.   Past Medical History:   Diagnosis Date     Atrial tachycardia (H)     nonsustained, detected on Ziopatch     Convulsions, unspecified convulsion type (H) 9/20/2018     CVA (cerebral vascular accident) (H)      Dementia (H) 9/20/2018     Diabetes (H)      Falls      History of CVA (cerebrovascular accident) 9/20/2018     Hyperlipidemia LDL goal <100 9/20/2018     Hypertension goal BP (blood pressure) < 140/90 9/20/2018     Osteoarthritis 9/20/2018     Pain in both lower legs 9/20/2018     Seizures (H)      Smoking      Syncope      Tobacco abuse disorder 9/20/2018     Tubular adenoma of  colon 11/29/2018     Type 2 diabetes mellitus with circulatory disorder, without long-term current use of insulin (H) 9/20/2018       MEDICATIONS:   Prior to Admission Medications   Prescriptions Last Dose Informant Patient Reported? Taking?   Cyanocobalamin (VITAMIN B 12 PO)  Daughter Yes No   Sig: Take 1,000 mcg by mouth daily   acetaminophen (TYLENOL) 500 MG tablet  Daughter Yes No   Sig: Take 1,000 mg by mouth every 6 hours as needed for mild pain   alcohol swab prep pads  Daughter No No   Sig: Use to swab area of injection/carlos manuel as directed.   alendronate (FOSAMAX) 70 MG tablet  Daughter No No   Sig: Take 1 tablet (70 mg) by mouth every 7 days Tuesdays   aspirin (ASA) 81 MG chewable tablet  Daughter Yes No   Sig: Take 1 tablet (81 mg) by mouth daily   atorvastatin (LIPITOR) 40 MG tablet  Daughter No No   Sig: Take 1 tablet by mouth once daily   Patient taking differently: Take 40 mg by mouth At Bedtime   blood glucose (NO BRAND SPECIFIED) test strip  Daughter No No   Sig: Use to test blood sugar 1 times daily or as directed.   blood glucose (NO BRAND SPECIFIED) test strip  Daughter No No   Sig: Use to test blood sugar once daily. To accompany: Blood Glucose Monitor Brands: per insurance.   blood glucose monitoring (NO BRAND SPECIFIED) meter device kit  Daughter No No   Sig: Use to test blood sugar once daily. Preferred blood glucose meter OR supplies to accompany: Blood Glucose Monitor Brands: per insurance.   clopidogrel (PLAVIX) 75 MG tablet  Daughter No No   Sig: Take 1 tablet by mouth once daily   Patient taking differently: Take 75 mg by mouth At Bedtime   escitalopram (LEXAPRO) 5 MG tablet  Daughter No No   Sig: Take 1 tablet (5 mg) by mouth daily   gabapentin (NEURONTIN) 300 MG capsule  Daughter No No   Sig: TAKE 1 CAPSULE BY MOUTH AT  BEDTIME   Patient taking differently: Take 300 mg by mouth At Bedtime TAKE 1 CAPSULE BY MOUTH AT  BEDTIME   hydrALAZINE (APRESOLINE) 10 MG tablet  Daughter No No   Sig:  Take 1 tab (10mg) by mouth if systolic BP exceeds 180   Patient taking differently: Take 10 mg by mouth At Bedtime Take 1 tab (10mg) by mouth if systolic BP exceeds 180   levETIRAcetam (KEPPRA) 1000 MG tablet  Daughter No No   Sig: Take 1 tablet (1,000 mg) by mouth 2 times daily   lisinopril (ZESTRIL) 20 MG tablet  Daughter No No   Sig: Take 1 tablet by mouth once daily   Patient not taking: Reported on 2023   meloxicam (MOBIC) 7.5 MG tablet  Daughter No No   Sig: TAKE 1 TABLET BY MOUTH  DAILY   Patient taking differently: Take 7.5 mg by mouth daily   memantine (NAMENDA) 10 MG tablet   No No   Sig: TAKE 1 TABLET BY MOUTH  DAILY   metFORMIN (GLUCOPHAGE XR) 500 MG 24 hr tablet  Daughter No No   Sig: Take 1 tablet (500 mg) by mouth daily (with dinner)   omeprazole (PRILOSEC) 40 MG DR capsule  Daughter No No   Sig: TAKE 1 CAPSULE BY MOUTH AT  BEDTIME   Patient taking differently: 40 mg At Bedtime   order for DME  Daughter No No   Sig: Equipment being ordered: glucometer and related supplies.   phenytoin (DILANTIN) 50 MG chewable tablet  Daughter No No   Sig: Take 2 tablets (100 mg) by mouth 2 times daily CHEW AND SWALLOW 2 TABLETS BY MOUTH TWICE DAILY   polyethylene glycol (MIRALAX) 17 GM/Dose powder  Daughter No No   Sig: Take 17 g (1 capful) by mouth 2 times daily   Patient not taking: Reported on 10/24/2022   thin (NO BRAND SPECIFIED) lancets  Daughter No No   Sig: Use with lanceting device. To accompany: Blood Glucose Monitor Brands: per insurance.      Facility-Administered Medications: None       ALLERGIES:   Allergies   Allergen Reactions     Amoxicillin Hives and Rash     Pt reports she has taken PCN without problems     Ampicillin Rash       SOCIAL HISTORY:  Social History     Tobacco Use     Smoking status: Former     Packs/day: 1.00     Types: Cigarettes     Quit date: 2021     Years since quittin.1     Smokeless tobacco: Never     Tobacco comments:     QUIT   Vaping Use     Vaping Use: Never  used   Substance Use Topics     Alcohol use: No     Drug use: No       FAMILY HISTORY:  No family history on file.    PHYSICAL EXAM:   General awake responding appropriately  Vital Signs with Ranges  Temp: 98.5  F (36.9  C) Temp src: Oral BP: 132/78 Pulse: 98   Resp: 16 SpO2: 97 % O2 Device: None (Room air)    No intake/output data recorded.    Constitutional: healthy, alert and no distress   Cardiovascular: negative, PMI normal. No lifts, heaves, or thrills. RRR. No murmurs, clicks gallops or rub  Respiratory: negative, Percussion normal. Good diaphragmatic excursion. Lungs clear  Head: Normocephalic. No masses, lesions, tenderness or abnormalities  Neck: Neck supple. No adenopathy. Thyroid symmetric, normal size,, Carotids without bruits.  Abdomen: Abdomen soft, non-tender. BS normal. No masses, organomegaly  SKIN: no suspicious lesions or rashes          ADDITIONAL COMMENTS:   I reviewed the patient's new clinical lab test results.   Recent Labs   Lab Test 01/14/23 1339 01/14/23  0752 01/13/23 1758 01/11/23 2046 01/11/23 1750 08/29/22  0654 08/28/22  0600 08/27/22  1818 03/19/22  0106 03/18/22  1440   WBC 5.8  --   --   --  7.0  --  5.8 5.0   < >  --    HGB 9.3* 10.6* 9.0*   < > 11.6*   < > 12.3 11.2*   < >  --    MCV 94  --   --   --  94  --  96 94   < >  --      --   --   --  238  --  167 189   < >  --    INR  --   --   --   --  1.24*  --   --  1.11  --  0.98    < > = values in this interval not displayed.     Recent Labs   Lab Test 01/14/23 1339 01/11/23 1750 08/28/22  0600   POTASSIUM 3.0* 4.3 4.1   CHLORIDE 108 107 105   CO2 26 32 27   BUN 11 25 13   ANIONGAP 7 2* 4     Recent Labs   Lab Test 01/12/23  0406 01/11/23 1750 08/28/22  0600 08/27/22 2023 07/19/22  1343 07/19/22  1217   ALBUMIN  --  2.7* 2.8*  --   --  3.4   BILITOTAL  --  0.3 0.2  --   --  0.2   ALT  --  23 37  --   --  30   AST  --  17 26  --   --  20   PROTEIN Negative  --   --  Negative Negative  --        I reviewed the  patient's new imaging results.        CONSULTATION ASSESSMENT AND PLAN:    Principal Problem:    GIB (gastrointestinal bleeding)    Assessment: Very pleasant 72-year-old female with history of advanced dementia with history of stroke DVT recent IVC filter placed with episodes of rectal bleeding patient's hemoglobin has been fairly stable.  I will recommend to continue on supportive care continue to monitor hemoglobin.  Patient's finding and plan discussed with the hospitalist team once patient is stabilized with the IVC filter placement patient will need further evaluation including upper GI endoscopy and possible colonoscopy.  Risk-benefit plan discussed in detail.  Thank you very much for letting me participate in her care.                Sheldon Spencer MD, FACP  [facility].  Office: [phone]  Cell : [phone]      [facility]  Ph: [phone] Fax: [phone]

## 2023-01-14 NOTE — ED NOTES
Was able to get verbal consent from Laura Yun, her health care agent for pt to transfer to [facility].  Daughter Lashae is in the room currently.

## 2023-01-14 NOTE — ED PROVIDER NOTES
Patient was signed out to me at change of shift by Dr. Tony, patient has been signed out multiple times, please see all the previous notes for initial presentation and work-up thus far.  Patient had no issues overnight.  Patient remained stable for me.  Fortunately this morning we were finally able to find a bed at [facility] and patient will be transferred via ground ambulance at this time.  Patient remained stable and will be transferred at this time.     Franky Richardson MD  01/14/23 0800

## 2023-01-14 NOTE — LETTER
Transition Communication Hand-off for Care Transitions to Next Level of Care Provider    Name: Khalida Redding  : 1950  MRN #: [id number]  Primary Care Provider: Abdirahman Shrestha     Primary Clinic: [id number] [facility] [place], operated by [facility] [address]     Reason for Hospitalization:  GIB (gastrointestinal bleeding) [K92.2]  Admit Date/Time: 2023 10:58 AM  Discharge Date: 23  Payor Source: Payor: [employer] / Plan: [employer] MEDICARE ADVANTAGE / Product Type: HMO /     Readmission Assessment Measure (GRACIA) Risk Score/category: 19%           Reason for Communication Hand-off Referral: Transfer to TCU    Discharge Plan:to d\c to TCU       Concern for non-adherence with plan of care: No     Discharge Needs Assessment:  Needs    Flowsheet Row Most Recent Value   Equipment Currently Used at Home none   PAS Number [id number]          Already enrolled in Tele-monitoring program and name of program:  No  Follow-up specialty is recommended: Yes    Follow-up plan:  No future appointments.    Any outstanding tests or procedures:              Key Recommendations:      ONESIMO Lanier    AVS/Discharge Summary is the source of truth; this is a helpful guide for improved communication of patient story

## 2023-01-14 NOTE — H&P
[facility]    History and Physical - Hospitalist Service       Date of Admission:  1/14/2023    Assessment & Plan      Khalida Redding is a 72 year old female admitted on 1/14/2023.  She is a transfer from [facility] emergency room for further evaluation of GI bleed and possible need for IVC filter.    Possible GI bleed  --Patient with history of CVA on aspirin and Plavix.  Had witnessed rectal bleeding while at outside hospital.  -- Vital signs have remained stable, hemoglobin is trended between 9 and 10.9.  -- On arrival here hemoglobin is 9.3  Plan  -- Protonix IV twice daily  -- Hgb Q6H   -- [facility] consult  -- NPO after midnight   -- hold PTA ASA, and plavix    Acute left lower extremity DVT  *Patient presented at outside hospital with complaint of left lower extremity swelling  * US revealing extensive deep vein thrombosis extending from the left iliac vein into the peroneal vein at the level of the mid calf.  *Given GI bleed, unable to anticoagulate  Plan  -- IR consult for IVC placement    Hx of multiple CVAs  -- on asa, statin and plavix at home  -- will hold PTA ASA and Plavix given GI bleed  -- Continue PTA statin    Chronic problems  Hyperlipidemia -PTA statin  Hypertension-holding PTA lisinopril in light of GI bleed, restarted as needed.  Dementia -continue PTA Namenda  Convulsions -continue PTA Keppra and Dilantin.  Urinary incontinence - monitor       Diet:   Clear liquids; n.p.o. after midnight  DVT Prophylaxis: Pneumatic Compression Devices  Gloria Catheter: Not present  Lines: None     Cardiac Monitoring: None  Code Status:  DNR/DNI    Clinically Significant Risk Factors Present on Admission                # Drug Induced Platelet Defect: home medication list includes an antiplatelet medication   # Hypertension: home medication list includes antihypertensive(s)   # Dementia: noted on problem list   # DMII: A1C = 9.4 % (Ref range: 0.0 - 5.6 %) within past 3  months            Disposition Plan           Marline Courtney MD  Hospitalist Service  [facility]  Securely message with Enabled Employment (more info)  Text page via [employer] Paging/Directory     ______________________________________________________________________    Chief Complaint   GIB, left lower extremity DVT    History is obtained from the patient    History of Present Illness   Khalida Redding is a 72 year old female who is a transfer from [facility] emergency room for further evaluation of GI bleed and possible need for IVC filter.    She has a PMHx of type 2 diabetes, hyperlipidemia, hypertension, multiple CVAs, dementia, convulsions, urinary incontinence.    Patient is a poor historian hence history is obtained from medical chart.  Patient is bedbound due to chronic hip pain but lives at home with her .  She was brought in to [facility] ER with complaints of worsening left lower extremity swelling and decreased urine output.  An ultrasound of the left lower extremity was obtained which revealed extensive DVT.  He was also found to have rectal bleeding while in the ED.  Aspirin and Plavix were held and attempts were began to transfer patient out for GI and possible IR consult.  Due to bed unavailability, patient boarded in the [facility] ED from 1/11/2023 to today 1/14/2023.  Patient remained hemodynamically stable while at the ED.  Also reports were documented that the rectal bleeding slowed down.  Hemoglobin also remained stable between 9 and 10.    On arrival here at [facility], patient was hemodynamically stable.  Hemoglobin is 9.3, potassium 3.0.  All other labs unremarkable.  Patient remains pleasantly confused.  Denies any abdominal pain, no chest pain, no shortness of breath, no rectal bleed currently, no nausea or vomiting.  Discussed with patient's POA-one of her daughters Yahir, patient is DNR/DNI.  They do want to proceed with procedures.        Past Medical  History    Past Medical History:   Diagnosis Date     Atrial tachycardia (H)     nonsustained, detected on Ziopatch     Convulsions, unspecified convulsion type (H) 9/20/2018     CVA (cerebral vascular accident) (H)      Dementia (H) 9/20/2018     Diabetes (H)      Falls      History of CVA (cerebrovascular accident) 9/20/2018     Hyperlipidemia LDL goal <100 9/20/2018     Hypertension goal BP (blood pressure) < 140/90 9/20/2018     Osteoarthritis 9/20/2018     Pain in both lower legs 9/20/2018     Seizures (H)      Smoking      Syncope      Tobacco abuse disorder 9/20/2018     Tubular adenoma of colon 11/29/2018     Type 2 diabetes mellitus with circulatory disorder, without long-term current use of insulin (H) 9/20/2018       Past Surgical History   Past Surgical History:   Procedure Laterality Date     COLONOSCOPY N/A 11/28/2018    Procedure: Colonoscopy, Polypectomy by Biopsy;  Surgeon: Arcadio Mcmullen DO;  Location:  GI     COLONOSCOPY N/A 8/24/2020    Procedure: Colonoscopy with possible biopsy and/or polypectomy;  Surgeon: Fabián Hines MD;  Location:  GI     HIP SURGERY Left        Prior to Admission Medications   Prior to Admission Medications   Prescriptions Last Dose Informant Patient Reported? Taking?   Cyanocobalamin (VITAMIN B 12 PO)  Daughter Yes No   Sig: Take 1,000 mcg by mouth daily   acetaminophen (TYLENOL) 500 MG tablet  Daughter Yes No   Sig: Take 1,000 mg by mouth every 6 hours as needed for mild pain   alcohol swab prep pads  Daughter No No   Sig: Use to swab area of injection/carlos manuel as directed.   alendronate (FOSAMAX) 70 MG tablet  Daughter No No   Sig: Take 1 tablet (70 mg) by mouth every 7 days Tuesdays   aspirin (ASA) 81 MG chewable tablet  Daughter Yes No   Sig: Take 1 tablet (81 mg) by mouth daily   atorvastatin (LIPITOR) 40 MG tablet  Daughter No No   Sig: Take 1 tablet by mouth once daily   Patient taking differently: Take 40 mg by mouth At Bedtime   blood  glucose (NO BRAND SPECIFIED) test strip  Daughter No No   Sig: Use to test blood sugar 1 times daily or as directed.   blood glucose (NO BRAND SPECIFIED) test strip  Daughter No No   Sig: Use to test blood sugar once daily. To accompany: Blood Glucose Monitor Brands: per insurance.   blood glucose monitoring (NO BRAND SPECIFIED) meter device kit  Daughter No No   Sig: Use to test blood sugar once daily. Preferred blood glucose meter OR supplies to accompany: Blood Glucose Monitor Brands: per insurance.   clopidogrel (PLAVIX) 75 MG tablet  Daughter No No   Sig: Take 1 tablet by mouth once daily   Patient taking differently: Take 75 mg by mouth At Bedtime   escitalopram (LEXAPRO) 5 MG tablet  Daughter No No   Sig: Take 1 tablet (5 mg) by mouth daily   gabapentin (NEURONTIN) 300 MG capsule  Daughter No No   Sig: TAKE 1 CAPSULE BY MOUTH AT  BEDTIME   Patient taking differently: Take 300 mg by mouth At Bedtime TAKE 1 CAPSULE BY MOUTH AT  BEDTIME   hydrALAZINE (APRESOLINE) 10 MG tablet  Daughter No No   Sig: Take 1 tab (10mg) by mouth if systolic BP exceeds 180   Patient taking differently: Take 10 mg by mouth At Bedtime Take 1 tab (10mg) by mouth if systolic BP exceeds 180   levETIRAcetam (KEPPRA) 1000 MG tablet  Daughter No No   Sig: Take 1 tablet (1,000 mg) by mouth 2 times daily   lisinopril (ZESTRIL) 20 MG tablet  Daughter No No   Sig: Take 1 tablet by mouth once daily   Patient not taking: Reported on 1/11/2023   meloxicam (MOBIC) 7.5 MG tablet  Daughter No No   Sig: TAKE 1 TABLET BY MOUTH  DAILY   Patient taking differently: Take 7.5 mg by mouth daily   memantine (NAMENDA) 10 MG tablet   No No   Sig: TAKE 1 TABLET BY MOUTH  DAILY   metFORMIN (GLUCOPHAGE XR) 500 MG 24 hr tablet  Daughter No No   Sig: Take 1 tablet (500 mg) by mouth daily (with dinner)   omeprazole (PRILOSEC) 40 MG DR capsule  Daughter No No   Sig: TAKE 1 CAPSULE BY MOUTH AT  BEDTIME   Patient taking differently: 40 mg At Bedtime   order for DME   Daughter No No   Sig: Equipment being ordered: glucometer and related supplies.   phenytoin (DILANTIN) 50 MG chewable tablet  Daughter No No   Sig: Take 2 tablets (100 mg) by mouth 2 times daily CHEW AND SWALLOW 2 TABLETS BY MOUTH TWICE DAILY   polyethylene glycol (MIRALAX) 17 GM/Dose powder  Daughter No No   Sig: Take 17 g (1 capful) by mouth 2 times daily   Patient not taking: Reported on 10/24/2022   thin (NO BRAND SPECIFIED) lancets  Daughter No No   Sig: Use with lanceting device. To accompany: Blood Glucose Monitor Brands: per insurance.      Facility-Administered Medications: None        Review of Systems    The 10 point Review of Systems is negative other than noted in the HPI or here.      Physical Exam   Vital Signs: Temp: 98.5  F (36.9  C) Temp src: Oral BP: 132/78 Pulse: 98   Resp: 16 SpO2: 97 % O2 Device: None (Room air)    Weight: 149 lbs 11.08 oz    General Appearance: Well appearing for stated age.  Respiratory: CTAB, no rales or ronchi  Cardiovascular: S1, S2 normal, no murmurs  GI: non-tender on palpation, BS present      Medical Decision Making       60 MINUTES SPENT BY ME on the date of service doing chart review, history, exam, documentation & further activities per the note.      Data     I have personally reviewed the following data over the past 24 hrs:    5.8  \   8.7 (L)   / 213     141 108 11 /  219 (H)   3.0 (L) 26 0.40 (L) \       Imaging results reviewed over the past 24 hrs:   No results found for this or any previous visit (from the past 24 hour(s)).

## 2023-01-15 NOTE — PLAN OF CARE
OT orders received, chart reviewed and consult completed with other disciplines including physical therapy. It was noted that at baseline pt has been bedbound since last November, receives full assist with all self-cares and family looking for higher level of care. It was determined that acute skilled OT intervention not warranted at this time as patient rehab potential limited due to impaired cognition, pain and dependence on mob of mechanical lift. Therefore, OT to sign off and defer any further OT to next level of care.

## 2023-01-15 NOTE — PROGRESS NOTES
"  Interventional Radiology - Progress Note  Inpatient - [facility]  1/15/2023    S:  Pt found resting in bed. Pt denies pain at neck site. Pt with no complaints other than wanting to go home.       O:  BP (!) 165/86 (BP Location: Right arm)   Pulse 101   Temp 98.1  F (36.7  C) (Oral)   Resp 18   Ht 1.676 m (5' 6\")   Wt 67.9 kg (149 lb 11.1 oz)   SpO2 97%   BMI 24.16 kg/m    General:  Stable.  In no acute distress.    Neuro:  A&O x 1-2.   Resp:  breathing unlabored on room air.  Abdomen:  Soft, non-distended  Vascular:  R internal jugular site with dressing C/D/I  Skin:  Without excoriations, ecchymosis, erythema, lesions or open sores on visible skin.  MSK:  No gross motor weakness.       LABS:  CBC RESULTS: Recent Labs   Lab Test 01/15/23  0802   WBC 7.5   RBC 3.08*   HGB 9.2*   HCT 28.8*   MCV 94   MCH 29.9   MCHC 31.9   RDW 12.5           A:  72 year old female who has significant dementia who presented to the emergency room secondary to decreased urine output and blood in her brief. Pt found to have a GI bleed and lower extremity DVT. IR was asked for IVC filter placement S/P IVC filter placement 1/14/23    P:    -Continue routine post-procedure cares  -IR recommends IVC filter removal in <3 months or as soon as able to ensure IVC filter is able to be appropriately removed  -IR clinic RN will arrange follow-up and removal  -Please contact IR when pt is appropriate for IVC removal  -IR will sign off    Total time spent on the date of the encounter is 15 minutes, including time spent counseling the patient, performing a medically appropriate evaluation, reviewing prior medical history, ordering medications and tests, documenting clinical information in the medical record, and communication of results.      Rhoda Klein PA-C  Interventional Radiology  [facility] IR PA desk phone *[phone]    "

## 2023-01-15 NOTE — PROGRESS NOTES
Pt is alert to self, confused. VSS on RA. NPO. Turn/repo, A2 with lift. incontinent of bowel and bladder. No BM on this shift. Purewick in place with adequate output. Slept good between the cares. Will continue to monitor.

## 2023-01-15 NOTE — PLAN OF CARE
Summary: GI Bleed, LLE DVT  Date & Time: 01/15/23 [id number]  Orientation: Aox self  POD# 1  Surgeon: Trishsavianey  Activity Level: Ax2 Lift  Fall Risk: Yes  Behavior & Aggression: Green  Pain Management: Denies  ABNL VS/O2: VSS on RA  ABNL Lab/BG: Hgb: 9.2   Diet: Clears, NPO @0000  Bowel/Bladder: Inc both  Drains/Devices: PIV SL, Purewick  Tests/Procedures: EGD on 1/16  Anticipated  DC Date: Pending    Other Important Info: Pt tx here around 1100 from [facility] ED. Aox self but pleasant. VSS on RA. No BM, incontinent bladder, pure wick in place.  LLE +2 edema. Q6 Hgb checks. (Blood consent in chart). IR and GI consulted. Pt went down for an IVC filter placement 1/14. Palpable pedal pulses in both feet. Tolerating clears. Pt is bed bound per family report and cannot stand. Pt lives at home with  who is also sick. Daughters are caretakers and live nearby but not in the home. SW/PT/OT consulted for discharge planning.

## 2023-01-15 NOTE — PLAN OF CARE
Denies pain. Bed bound at baseline. Turn and repo. Incontinent, purewick in place.LLE DVT IVC filter placed today with R jugular dressing. NPO at midnight for GI consult tomorrow.

## 2023-01-15 NOTE — PROGRESS NOTES
[facility]    Medicine Progress Note - Hospitalist Service    Date of Admission:  1/14/2023    Assessment & Plan   Khalida Redding is a 72 year old female admitted on 1/14/2023.  She is a transfer from [facility] emergency room for further evaluation of GI bleed and possible need for IVC filter.    Possible GI bleed  Patient with history of CVA on aspirin and Plavix.  Had witnessed rectal bleeding while at outside hospital. Daughter noted dark red blood/blood clots. Hemodynamically stable.    Admitted to inpatient.    Hemoglobin down to 8.0 on 1/14, up to 9.2 on 1/15 without intervention beyond holding aspirin and clopidogrel.    [facility] consulted, plan for EGD/colonoscopy tomorrow.    NPO after midnight.    PTA aspirin and clopidogrel on hold for now.    Acute left lower extremity DVT  Patient presented at outside hospital with complaint of left lower extremity swelling. US revealed extensive deep vein thrombosis extending from the left iliac vein into the peroneal vein at the level of the mid calf. Given GI bleed, unable to anticoagulate    IR consulted, appreciate their assistance.    S/p IVC filter placement 1/14/23.    Follow up with IR as an outpatient, recommend IVC filter removal within 3 months.    History of multiple CVAs    PTA aspirin and clopidogrel on hold in setting of GI bleed.    Continue PTA atorvastatin.    Hyperlipidemia    Continue PTA atorvastatin.    Hypertension    Hold PTA lisinopril for now in setting of GI bleed, restart as needed.    Dementia    Continue PTA Namenda.    Plan of care discussed with her daughter, Laura, by phone on 1/15/23.    Convulsions    Continue PTA Keppra and Dilantin.    Urinary incontinence    Monitor.       Diet: Clear Liquid Diet    DVT Prophylaxis: Pneumatic Compression Devices  Gloria Catheter: Not present  Lines: None     Cardiac Monitoring: None  Code Status: No CPR- Do NOT Intubate      Clinically Significant Risk  Factors        # Hypokalemia: Lowest K = 3 mmol/L in last 2 days, will replace as needed                # DMII: A1C = 9.4 % (Ref range: 0.0 - 5.6 %) within past 3 months, PRESENT ON ADMISSION          Disposition Plan      Expected Discharge Date: 01/17/2023                  Kamron Rojas MD  Hospitalist Service  [facility]  Securely message with [employer] (more info)  Text page via [employer] Paging/Directory   ______________________________________________________________________    Interval History   Khalida GOMES Glycenfer was seen today. She feels OK. Wants to go home. No new complaints/concerns, although history limited by dementia. Denies fevers, chest pain, shortness of breath, nausea, abdominal pain. No BM since admission per nursing report.    Physical Exam   Vital Signs: Temp: 99.3  F (37.4  C) Temp src: Oral BP: (!) 148/86 Pulse: 105   Resp: 18 SpO2: 96 % O2 Device: None (Room air)    Weight: 149 lbs 11.08 oz    Constitutional: awake, alert, cooperative, no apparent distress, laying in the hospital bed with the head of the bed elevated  Respiratory: no increased work of breathing, clear to auscultation bilaterally, no crackles or wheezing  Cardiovascular: regular rate and rhythm, normal S1 and S2, no murmur noted  GI: normal bowel sounds, soft, non-distended, non-tender  Skin: warm, dry  Musculoskeletal: no lower extremity pitting edema present  Neurologic: awake, alert, appropriate in conversation but forgetful and not oriented    Medical Decision Making       **CLEAR ALL SELECTIONS**      Data     I have personally reviewed the following data over the past 24 hrs:    7.5  \   9.1 (L)   / 256     141 107 7 /  171 (H)   3.7 25 0.33 (L) \

## 2023-01-15 NOTE — PLAN OF CARE
Summary: GI Bleed, LLE DVT  Date & Time: 01/14/23 5286-7480  Orientation: Aox self  POD# 0  Surgeon: Decesare  Activity Level: Ax2 Lift  Fall Risk: Yes  Behavior & Aggression: Green  Pain Management: Denies  ABNL VS/O2: VSS on RA  ABNL Lab/BG: Hgb: 9.3, 8.7 K+: 3.0  Diet: Clears, NPO @0000  Bowel/Bladder: Inc both  Drains/Devices: PIV SL, Purewick  Tests/Procedures: IVC filter placement 1/14, GI consult  Anticipated  DC Date: Pending    Other Important Info: Pt tx here around 1100 from [facility] ED. Aox self but pleasant. VSS on RA. No BM, incontinent bladder, pure wick in place.  LLE +2 edema. Q6 Hgb checks. (Blood consent in chart). IR and GI consulted. Pt went down for an IVC filter placement. R neck site CDI. Palpable pedal pulses in both feet. Tolerating clears. Pt is bed bound per family report and cannot stand. Pt lives at home with  who is also sick. Daughters are caretakers and live nearby but not in the home. SW/PT/OT consulted for discharge planning.

## 2023-01-15 NOTE — PROGRESS NOTES
01/15/23 0942   Appointment Info   Signing Clinician's Name / Credentials (PT) Giuliana Isidro, PT, DPT   Living Environment   People in Home spouse   Current Living Arrangements apartment   Home Accessibility no concerns   Living Environment Comments Per chart review and discussion with pt's daughter pt lives in a home...   Self-Care   Usual Activity Tolerance poor   Current Activity Tolerance poor   Fall history within last six months no   Activity/Exercise/Self-Care Comment Per pt's daughter, pt has been bed ridden for over 6 months, requries assist with all ADLs and IADLs. pt's 2 daughters and spouse totalA to roll pt and reposition her every 2 hours. They do not get pt out of bed due to difficulty and pt reports pain with most movement. Pt requires assist for feeding as well.   General Information   Onset of Illness/Injury or Date of Surgery 01/14/23   Referring Physician Marline Hamilton MD   Patient/Family Therapy Goals Statement (PT) Family would like pt to get into a LTC facility.   Pertinent History of Current Problem (include personal factors and/or comorbidities that impact the POC) 72 year old female admitted on 1/14/2023.  She is a transfer from [facility] emergency room for further evaluation of GI bleed and possible need for IVC filter. Pt found to have acute L LE DVT.  Pt underwent IVC filter placement 1/14/23. Pt is POD#1. PMHx significant for CVA, HLD, HTN, dementia, convulsions, urinary incontinence.   Existing Precautions/Restrictions fall;NPO;other (see comments)  (DNR/DNI)   Cognition   Affect/Mental Status (Cognition) confused   Orientation Status (Cognition) oriented to;person;disoriented to;place;situation;time   Cognitive Status Comments Pt has dementia at baseline.   Pain Assessment   Patient Currently in Pain No   Integumentary/Edema   Integumentary/Edema Comments L lower leg appears warm to touch, edema present consistent with DVT.   Range of Motion (ROM)   ROM Comment L  ankle ROM limited, unable to get to neutral DF. otherwise grossly WFL BUE and LE with PROM.   Strength (Manual Muscle Testing)   Strength Comments Unable to perform SLR on L, very limited clearance on RLE. Global weakness. Grossly antigravity strength BUE.   Bed Mobility   Comment, (Bed Mobility) Supine HOB approx 60 deg>sitting EOB, total A.   Transfers   Comment, (Transfers) Total A via lift bed>chair   Gait/Stairs (Locomotion)   Comment, (Gait/Stairs) NA   Balance   Balance Comments Unable to maintain seated balance at EOB   Sensory Examination   Sensory Perception patient reports no sensory changes   Clinical Impression   Criteria for Skilled Therapeutic Intervention Yes, treatment indicated   PT Diagnosis (PT) Impaired mobility   Influenced by the following impairments pain, decreased functional strength, decreased activity tolerance   Functional limitations due to impairments impaired functional independence   Clinical Presentation (PT Evaluation Complexity) Stable/Uncomplicated   Clinical Presentation Rationale per MR and clinical judgement   Clinical Decision Making (Complexity) low complexity   Planned Therapy Interventions (PT) balance training;bed mobility training;home exercise program;ROM (range of motion);strengthening;stretching;transfer training;wheelchair management/propulsion training;progressive activity/exercise;patient/family education;neuromuscular re-education   Risk & Benefits of therapy have been explained evaluation/treatment results reviewed;care plan/treatment goals reviewed;risks/benefits reviewed;current/potential barriers reviewed;participants voiced agreement with care plan;participants included;patient;daughter   PT Total Evaluation Time   PT Eval, Low Complexity Minutes (30940) 5   Physical Therapy Goals   PT Frequency 3x/week   PT Predicted Duration/Target Date for Goal Attainment 01/25/23   PT Goals Bed Mobility   PT: Bed Mobility Moderate assist;Rolling;Supine to/from sit   PT  Discharge Planning   PT Plan progress bed mobility as able.   PT Discharge Recommendation (DC Rec) Long term care facility   PT Rationale for DC Rec Pt appears to be near baseline functional independence. Per discussion with daughter, they have pt on waitlist for several assisted living facilities and are having a hard time caring for pt at home. Pt is currently requiring A2-3 for bed mobility, use of sling and overhead lift for transfers. Assist with all ADLs. Pt's rehab potential is limited due to cognition, pain in back with movement and lack of carryover. Pt would most likely require LTC facility for safe discharge.   PT Brief overview of current status total A via lift.   Total Session Time   Total Session Time (sum of timed and untimed services) 5

## 2023-01-16 PROBLEM — I82.4Y2 ACUTE DEEP VEIN THROMBOSIS (DVT) OF PROXIMAL VEIN OF LEFT LOWER EXTREMITY (H): Status: ACTIVE | Noted: 2023-01-01

## 2023-01-16 NOTE — CONSULTS
IR consult for IVC filter placement which was done 1/14/23. Consult completed.     Thanks, [facility] Interventional Radiology CNP ([phone]) (phone [phone])

## 2023-01-16 NOTE — PLAN OF CARE
Goal Outcome Evaluation:      Plan of Care Reviewed With: patient    Overall Patient Progress: no changeOverall Patient Progress: no change         1/16/23 Night shift. Possible GI bleed. S/PIVC filter placement on 1/14/23.  A&disorientated to time, place and situation. /85, on RA. Ax 2 with lift. Denies pain. Dressing on right neck CDI. Had multiple watery black stool from bowel prep, bowel prep drink still in progress. GI following, plan for Colonoscopy and EGD today. Colonoscopy at 1630. NPO.

## 2023-01-16 NOTE — PROGRESS NOTES
SW:   D: RODNEY met with the patient's daughters. Initial assessment not completed. The daughters are requesting referrals sent to LTC.   Referrals sent to:  [facility] in [facility]  [facility] in [place]  [facility] in [facility] will continue to follow.     ONESIMO Andrade

## 2023-01-16 NOTE — PLAN OF CARE
Pt is AOx3, not to time, pleasantly confused at times, denies pain, L neck dressing CDI, VSS on RA, swallow pills whole, weak BLE, assist x2, bowel prep started, stools black water near end of shift, had 5 BMs this evening, NPO at midnight pending EGD and colonoscopy tomorrow.

## 2023-01-16 NOTE — PROGRESS NOTES
Date: January 16, 2023  Shift: 0700-1930  Name: Khalida Redding  Age: 72 year old  YOB: 1950  Date of Admission: 01/14/2023    Reason for Admission: GIB (gastrointestinal bleeding) [K92.2]     Cognitive/Mentation: Oriented to self and location, confused with situation and day/time. Very nice, pleasant, and cooperative    VS: VSS on RA  Cardiac: WDL  GI: Loose stools d/t bowel prep, incontinent  : incontinent  Pulmonary: LS clear  Pain: prn tylenol given for headache and neck pain     Lines/Drains: PIV-SL  Skin: R IVC site WDL.  Activity: A2 lift, able to move self in bed    Diet: NPO     Discharge: Pending     Shift summary: Colonoscopy and EGD today    Potassium replaced orally, pt down for procedure during time of scheduled k recheck. Writer told lab nurse will call when pt is back on the floor

## 2023-01-16 NOTE — PROGRESS NOTES
[facility]  Gastroenterology Progress Note     Khalida Redding MRN# [id number]   YOB: 1950 Age: 72 year old          Assessment and Plan:       GIB (gastrointestinal bleeding)    Type 2 diabetes mellitus with circulatory disorder, without long-term current use of insulin (H)    Hyperlipidemia LDL goal <100    Hypertension goal BP (blood pressure) < 140/90    History of multiple cerebrovascular accidents (CVAs)    Convulsions, unspecified convulsion type (H)    Dementia (H)    Anemia  Patient remained hemodynamically stable patient's hemoglobin is stable.  Patient is status post IVC filter placement.  Plan to proceed with upper GI endoscopy and colonoscopy tomorrow.           Data Unavailable      Interval History:     doing well; no cp, sob, n/v/d, or abd pain.              Review of Systems:     C: NEGATIVE for fever, chills, change in weight  E/M: NEGATIVE for ear, mouth and throat problems  R: NEGATIVE for significant cough or SOB  CV: NEGATIVE for chest pain, palpitations or peripheral edema             Medications:   I have reviewed this patient's current medications    [Held by provider] aspirin  81 mg Oral Daily     atorvastatin  40 mg Oral QPM     [Held by provider] clopidogrel  75 mg Oral Daily     escitalopram  5 mg Oral Daily     gabapentin  300 mg Oral At Bedtime     levETIRAcetam  1,000 mg Oral BID     [Held by provider] lisinopril  20 mg Oral Daily     memantine  10 mg Oral Daily     pantoprazole  40 mg Intravenous BID     phenytoin  100 mg Oral BID     polyethylene glycol  4,000 mL Oral Once     sodium chloride (PF)  3 mL Intracatheter Q8H                  Physical Exam:   Vitals were reviewed  Vital Signs with Ranges  Temp:  [97.6  F (36.4  C)-99.3  F (37.4  C)] 97.6  F (36.4  C)  Pulse:  [] 94  Resp:  [16-18] 18  BP: (145-165)/(82-86) 157/82  SpO2:  [96 %-97 %] 96 %  I/O last 3 completed shifts:  In: 600 [P.O.:600]  Out: 2300  [Urine:2300]  Constitutional: healthy, alert and no distress   Cardiovascular: negative, PMI normal. No lifts, heaves, or thrills. RRR. No murmurs, clicks gallops or rub  Respiratory: negative, Percussion normal. Good diaphragmatic excursion. Lungs clear  Head: Normocephalic. No masses, lesions, tenderness or abnormalities  Neck: Neck supple. No adenopathy. Thyroid symmetric, normal size,, Carotids without bruits.  Abdomen: Abdomen soft, non-tender. BS normal. No masses, organomegaly  SKIN: no suspicious lesions or rashes           Data:   I reviewed the patient's new clinical lab test results.   Recent Labs   Lab Test 01/15/23  1609 01/15/23  0802 01/15/23  0040 01/14/23  1738 01/14/23  1339 01/11/23  2046 01/11/23  1750 08/28/22  0600 08/27/22  1818 03/19/22  0106 03/18/22  1440   WBC  --  7.5  --   --  5.8  --  7.0   < > 5.0   < >  --    HGB 9.1* 9.2* 8.0*   < > 9.3*   < > 11.6*   < > 11.2*   < >  --    MCV  --  94  --   --  94  --  94   < > 94   < >  --    PLT  --  256  --   --  213  --  238   < > 189   < >  --    INR  --   --   --   --   --   --  1.24*  --  1.11  --  0.98    < > = values in this interval not displayed.     Recent Labs   Lab Test 01/15/23  0740 01/15/23  0040 01/14/23  1339 01/11/23  1750   POTASSIUM 3.7 3.6 3.0* 4.3   CHLORIDE 107  --  108 107   CO2 25  --  26 32   BUN 7  --  11 25   ANIONGAP 9  --  7 2*     Recent Labs   Lab Test 01/12/23  0406 01/11/23  1750 08/28/22  0600 08/27/22  2023 07/19/22  1343 07/19/22  1217   ALBUMIN  --  2.7* 2.8*  --   --  3.4   BILITOTAL  --  0.3 0.2  --   --  0.2   ALT  --  23 37  --   --  30   AST  --  17 26  --   --  20   PROTEIN Negative  --   --  Negative Negative  --        I reviewed the patient's new imaging results.    All laboratory data reviewed  All imaging studies reviewed by me.    Sheldon Spencer MD,  1/15/2023  [facility]  Office : [phone]  Cell: [phone]      [facility]  Ph: [phone] Fax:  [phone]

## 2023-01-16 NOTE — PROGRESS NOTES
[facility]  Gastroenterology Progress Note     Khalida Redding MRN# [id number]   YOB: 1950 Age: 72 year old          Assessment and Plan:   Khalida Redding is a 72 year old female admitted on 1/14/2023.  She is a transfer from [facility] emergency room for further evaluation of GI bleed and need for IVC filter due to DVT.    DVT  S/p IVC filter on 1/14/23    GI bleed  Acute blood loss anemia  Hemoglobin baseline unknown but on arrival 10.6 and had drifted to 8-9 range  Has h/o CVA and had been on DOAC (plavix and ASA- held since 1/14/23)  Had reported red rectal bleeding at outside ED  There is concern for upper vs lower GI bleed- including PUD, neoplastic, hemorrhoidal, or diverticular bleed    -- EGD and colonoscopy today  -- IV pantoprazole 40 mg BID  -- NPO  -- serial hemoglobin  -- Hold DOAC             Interval History:   no new complaints, doing well, denies chest pain, denies shortness of breath, denies abdominal pain, has had a bowel movement in the last 24 hours and doing well; no cp, sob, n/v or abd pain. Stools brown in color with prep- nearly finished with prep- stools watery and brown- encourage to finish prep;              Review of Systems:   C: NEGATIVE for fever, chills, change in weight  E/M: NEGATIVE for ear, mouth and throat problems  R: NEGATIVE for significant cough or SOB  CV: NEGATIVE for chest pain, palpitations or peripheral edema             Medications:   I have reviewed this patient's current medications    [Held by provider] aspirin  81 mg Oral Daily     atorvastatin  40 mg Oral QPM     [Held by provider] clopidogrel  75 mg Oral Daily     escitalopram  5 mg Oral Daily     gabapentin  300 mg Oral At Bedtime     levETIRAcetam  1,000 mg Oral BID     [Held by provider] lisinopril  20 mg Oral Daily     memantine  10 mg Oral Daily     pantoprazole  40 mg Intravenous BID     phenytoin  100 mg Oral BID     sodium chloride (PF)  3 mL  Intracatheter Q8H                  Physical Exam:   Vitals were reviewed  Vital Signs with Ranges  Temp:  [97.6  F (36.4  C)-99.3  F (37.4  C)] 98.1  F (36.7  C)  Pulse:  [] 93  Resp:  [18] 18  BP: (148-158)/(76-86) 158/76  SpO2:  [94 %-96 %] 95 %  I/O last 3 completed shifts:  In: 1060 [P.O.:1060]  Out: 950 [Urine:950]  Constitutional: healthy, alert and no distress   Cardiovascular: negative, PMI normal. No lifts, heaves, or thrills. RRR. No murmurs, clicks gallops or rub  Respiratory: negative, Percussion normal. Good diaphragmatic excursion. Lungs clear  Abdomen: Abdomen soft, non-tender. BS normal. No masses, organomegaly           Data:   I reviewed the patient's new clinical lab test results.   Recent Labs   Lab Test 01/16/23  0825 01/15/23  2224 01/15/23  1609 01/15/23  0802 01/14/23  1738 01/14/23  1339 01/11/23  2046 01/11/23  1750 08/28/22  0600 08/27/22  1818 03/19/22  0106 03/18/22  1440   WBC 5.5  --   --  7.5  --  5.8  --  7.0   < > 5.0   < >  --    HGB 9.4*  9.4* 8.5* 9.1* 9.2*   < > 9.3*   < > 11.6*   < > 11.2*   < >  --    MCV 94  --   --  94  --  94  --  94   < > 94   < >  --      --   --  256  --  213  --  238   < > 189   < >  --    INR  --   --   --   --   --   --   --  1.24*  --  1.11  --  0.98    < > = values in this interval not displayed.     Recent Labs   Lab Test 01/16/23  0825 01/15/23  0740 01/15/23  0040 01/14/23  1339   POTASSIUM 3.2* 3.7 3.6 3.0*   CHLORIDE 105 107  --  108   CO2 32 25  --  26   BUN 6* 7  --  11   ANIONGAP 6 9  --  7     Recent Labs   Lab Test 01/12/23  0406 01/11/23  1750 08/28/22  0600 08/27/22  2023 07/19/22  1343 07/19/22  1217   ALBUMIN  --  2.7* 2.8*  --   --  3.4   BILITOTAL  --  0.3 0.2  --   --  0.2   ALT  --  23 37  --   --  30   AST  --  17 26  --   --  20   PROTEIN Negative  --   --  Negative Negative  --        I reviewed the patient's new imaging results.    All laboratory data reviewed  All imaging studies reviewed by me.    Sherin Satnley,  BRIAN,  1/16/2023  [facility]  Office : [phone]  Cell: [phone] (Dr. Spencer)  Cell: [phone] (Sherin Stanley PA-C)

## 2023-01-16 NOTE — PROGRESS NOTES
[facility]    Medicine Progress Note - Hospitalist Service    Date of Admission:  1/14/2023    Assessment & Plan   Khalida Redding is a 72 year old female admitted on 1/14/2023.  She is a transfer from [facility] emergency room for further evaluation of GI bleed and possible need for IVC filter.    Possible GI bleed  Patient with history of CVA on aspirin and Plavix.  Had witnessed rectal bleeding while at outside hospital. Daughter noted dark red blood/blood clots. Hemodynamically stable.    Admitted to inpatient.    Hemoglobin down to 8.0 on 1/14, up to 9.2 on 1/15 without intervention beyond holding aspirin and clopidogrel.    [facility] consulted, plan for EGD/colonoscopy this afternoon.    PTA aspirin and clopidogrel on hold for now.    Acute left lower extremity DVT  Patient presented at outside hospital with complaint of left lower extremity swelling. US revealed extensive deep vein thrombosis extending from the left iliac vein into the peroneal vein at the level of the mid calf. Given GI bleed, unable to anticoagulate.    IR consulted, appreciate their assistance.    S/p IVC filter placement 1/14/23.    Follow up with IR as an outpatient, recommend IVC filter removal within 3 months.    Can reconsider anticoagulation pending results of EGD/colonoscopy.    Hypokalemia    Replace and recheck per protocol.    History of multiple CVAs    PTA aspirin and clopidogrel on hold in setting of GI bleed.  Reassess pending results of endoscopy.    Continue PTA atorvastatin.    Hyperlipidemia    Continue PTA atorvastatin.    Hypertension    Blood pressure mildly elevated.    Hold PTA lisinopril for now in setting of GI bleed, restart as needed.    Dementia    Continue PTA Namenda.    Plan of care discussed with her daughters in the room on 1/16/23.    Convulsions    Continue PTA Keppra and Dilantin.    Urinary incontinence    Monitor.    Discharge planning  Patient lives with family.  Family is concerned that they are not able to care for her anymore due to the increasing amount of care she needs.    Consult care transitions, appreciate their assistance.       Diet: NPO per Anesthesia Guidelines for Procedure/Surgery Except for: Meds    DVT Prophylaxis: Pneumatic Compression Devices  Gloria Catheter: Not present  Lines: None     Cardiac Monitoring: None  Code Status: No CPR- Do NOT Intubate      Clinically Significant Risk Factors        # Hypokalemia: Lowest K = 3 mmol/L in last 2 days, will replace as needed                # DMII: A1C = 9.4 % (Ref range: 0.0 - 5.6 %) within past 3 months, PRESENT ON ADMISSION          Disposition Plan     Expected Discharge Date: 01/17/2023                  Kamron Rojas MD  Hospitalist Service  [facility]  Securely message with [employer] (more info)  Text page via [facility] Paging/Directory   ______________________________________________________________________    Interval History   Khalida GOMES Miladis was seen this morning. She feels OK. Doesn't like the prep for the colonoscopy, but she has been taking it with encouragement. Denies fevers, chest pain, shortness of breath, nausea, abdominal pain. No melena or hematochezia noted.    Physical Exam   Vital Signs: Temp: 98.1  F (36.7  C) Temp src: Oral BP: (!) 158/76 Pulse: 93   Resp: 18 SpO2: 95 % O2 Device: None (Room air)    Weight: 149 lbs 11.08 oz    Constitutional: awake, alert, cooperative, no apparent distress, laying in the hospital bed with the head of the bed elevated  Respiratory: no increased work of breathing, clear to auscultation bilaterally, no crackles or wheezing  Cardiovascular: regular rate and rhythm, normal S1 and S2, no murmur noted  GI: normal bowel sounds, soft, non-distended, non-tender  Skin: warm, dry  Musculoskeletal: no lower extremity pitting edema present  Neurologic: awake, alert, answers questions appropriately, forgetful, moves all extremities    Medical  Decision Making       MANAGEMENT DISCUSSED with the following over the past 24 hours: GI, Social work, Patient Family   Tests ORDERED & REVIEWED in the past 24 hours:  - BMP  - CBC      Data     I have personally reviewed the following data over the past 24 hrs:    5.5  \   9.4 (L); 9.4 (L)   / 278     143 105 6 (L) /  237 (H)   3.2 (L) 32 0.34 (L) \

## 2023-01-17 NOTE — CONSULTS
Care Management Initial Consult    General Information  Assessment completed with: VM-chart review,    Type of CM/SW Visit: Initial Assessment    Primary Care Provider verified and updated as needed: Yes   Readmission within the last 30 days: no previous admission in last 30 days      Reason for Consult: care coordination/care conference  Advance Care Planning: Advance Care Planning Reviewed: present on chart          Communication Assessment  Patient's communication style: spoken language (English or Bilingual)             Cognitive  Cognitive/Neuro/Behavioral: .WDL except  Level of Consciousness: confused  Arousal Level: opens eyes spontaneously  Orientation: disoriented to, time, situation  Mood/Behavior: calm, cooperative  Best Language: 0 - No aphasia  Speech: clear    Living Environment:   People in home: spouse     Current living Arrangements: house      Able to return to prior arrangements:         Family/Social Support:  Care provided by: spouse/significant other, child(edy)  Provides care for: no one, unable/limited ability to care for self  Marital Status:   , Children          Description of Support System: Involved, Supportive    Support Assessment: Adequate family and caregiver support    Current Resources:   Patient receiving home care services: Yes     Community Resources: [facility], Home Care  Equipment currently used at home: none  Supplies currently used at home: None    Employment/Financial:  Employment Status: retired        Financial Concerns:             Lifestyle & Psychosocial Needs:  Social Determinants of Health     Tobacco Use: Medium Risk     Smoking Tobacco Use: Former     Smokeless Tobacco Use: Never     Passive Exposure: Not on file   Alcohol Use: Not on file   Financial Resource Strain: Not on file   Food Insecurity: Not on file   Transportation Needs: Not on file   Physical Activity: Not on file   Stress: Not on file   Social Connections: Not on file   Intimate Partner  Violence: Not on file   Depression: At risk     PHQ-2 Score: 3   Housing Stability: Not on file       Functional Status:  Prior to admission patient needed assistance:              Mental Health Status:          Chemical Dependency Status:                Values/Beliefs:  Spiritual, Cultural Beliefs, Confucianism Practices, Values that affect care: no               Additional Information:  RODNEY spoke wit pt's daughter, Laura to make sure it is okay to speak with Lashae. Laura is noted as the HCA. Laura states it is okay to share information with pt's daughter, Lashae. Laura reports that pt has been bed ridden for the last 6 months. Pt has not been able to get up on her own or walk. Laura reports at home they have two people assist with bed mobility to help get pt transferred. Laura reports her sister, Lashae moved from [place] to [place] to come and assist in taking care of pt. Lashae is a former nurse. Laura also believes that her father is beginning to show signs of memory loss.Laura states she realizes that soon her father will need placement too. Pt and her  live alone in their home and Laura and Lashae come over to assist. Laura and Lashae are in agreement that pt needs LTC. Laura requested that RODNEY does not send a referral to [facility]. Pt received infections, beds sores, and falls at this facility.     ONESIMO Lanier

## 2023-01-17 NOTE — PLAN OF CARE
Goal Outcome Evaluation:      Plan of Care Reviewed With: patient, caregiver    Overall Patient Progress: no changeOverall Patient Progress: no change    Pt here with GI bleed and IVC filter placement. Pt is disoriented to time and place, she didn't know the specific city or the date. VSS. K+ 4.6, recheck in the AM per MD. Tolerating moderate carb, high fiber diet, and thin liquids. Takes pills whole. Purewick in place due to incontinence, changed this shift. Baseline activity level is lift assistance, turning and repositioning q2 hours. Denies pain. Plan is to monitor for bleeding due to starting eliquis. Discharge pending LTC placement, referals sent via .

## 2023-01-17 NOTE — PROGRESS NOTES
[facility]  Gastroenterology Progress Note     Khalida Redding MRN# [id number]   YOB: 1950 Age: 72 year old          Assessment and Plan:   Khalida Redding is a 72 year old female admitted on 1/14/2023.  She is a transfer from [facility] emergency room for further evaluation of GI bleed and need for IVC filter due to DVT.     DVT  S/p IVC filter on 1/14/23     GI bleed  Acute blood loss anemia  Hemoglobin baseline unknown but on arrival 10.6 and had drifted to 8-9 range and remains stable  Has h/o CVA and had been on DOAC (plavix and ASA- held since 1/14/23)  Had reported red rectal bleeding at outside ED  1/16/23 EGD was unremarkable  1/16/23 Colonoscopy noted diverticulosis in sigmoid and descending colon- no active bleeding noted or evidence of blood in colon  Likely had diverticular bleed as no other source of potential blood loss on procedures.    -- High fiber diet  -- serial hemoglobin  -- Ok with GI restart DOAC  -- Gi will sign off             Interval History:   doing well; no cp, sob, n/v/d, or abd pain.              Review of Systems:   C: NEGATIVE for fever, chills, change in weight  E/M: NEGATIVE for ear, mouth and throat problems  R: NEGATIVE for significant cough or SOB  CV: NEGATIVE for chest pain, palpitations or peripheral edema             Medications:   I have reviewed this patient's current medications    [Held by provider] aspirin  81 mg Oral Daily     atorvastatin  40 mg Oral QPM     [Held by provider] clopidogrel  75 mg Oral Daily     escitalopram  5 mg Oral Daily     gabapentin  300 mg Oral At Bedtime     insulin aspart  1-6 Units Subcutaneous Q4H     levETIRAcetam  1,000 mg Oral BID     [Held by provider] lisinopril  20 mg Oral Daily     memantine  10 mg Oral Daily     pantoprazole  40 mg Intravenous BID     phenytoin  100 mg Oral BID     sodium chloride (PF)  3 mL Intracatheter Q8H                  Physical Exam:   Vitals were  reviewed  Vital Signs with Ranges  Temp:  [98  F (36.7  C)-98.1  F (36.7  C)] 98.1  F (36.7  C)  Pulse:  [] 98  Resp:  [6-26] 16  BP: (101-134)/() 134/78  SpO2:  [62 %-100 %] 91 %  No intake/output data recorded.  Constitutional: healthy, alert and no distress   Cardiovascular: negative, PMI normal. No lifts, heaves, or thrills. RRR. No murmurs, clicks gallops or rub  Respiratory: negative, Percussion normal. Good diaphragmatic excursion. Lungs clear  Abdomen: Abdomen soft, non-tender. BS normal. No masses, organomegaly             Data:   I reviewed the patient's new clinical lab test results.   Recent Labs   Lab Test 01/16/23 2103 01/16/23  0825 01/15/23  2224 01/15/23  1609 01/15/23  0802 01/14/23  1738 01/14/23  1339 01/11/23  2046 01/11/23  1750 08/28/22  0600 08/27/22  1818 03/19/22  0106 03/18/22  1440   WBC  --  5.5  --   --  7.5  --  5.8  --  7.0   < > 5.0   < >  --    HGB 8.7* 9.4*  9.4* 8.5*   < > 9.2*   < > 9.3*   < > 11.6*   < > 11.2*   < >  --    MCV  --  94  --   --  94  --  94  --  94   < > 94   < >  --    PLT  --  278  --   --  256  --  213  --  238   < > 189   < >  --    INR  --   --   --   --   --   --   --   --  1.24*  --  1.11  --  0.98    < > = values in this interval not displayed.     Recent Labs   Lab Test 01/17/23  0344 01/16/23  2103 01/16/23  0825 01/15/23  0740 01/15/23  0040 01/14/23  1339   POTASSIUM 4.6 3.3* 3.2* 3.7   < > 3.0*   CHLORIDE  --   --  105 107  --  108   CO2  --   --  32 25  --  26   BUN  --   --  6* 7  --  11   ANIONGAP  --   --  6 9  --  7    < > = values in this interval not displayed.     Recent Labs   Lab Test 01/12/23  0406 01/11/23  1750 08/28/22  0600 08/27/22  2023 07/19/22  1343 07/19/22  1217   ALBUMIN  --  2.7* 2.8*  --   --  3.4   BILITOTAL  --  0.3 0.2  --   --  0.2   ALT  --  23 37  --   --  30   AST  --  17 26  --   --  20   PROTEIN Negative  --   --  Negative Negative  --        I reviewed the patient's new imaging results.    All laboratory  data reviewed  All imaging studies reviewed by me.    Sherin Stanley PA-C,  1/17/2023  [facility]  Office : [phone]  Cell: [phone] (Dr. Spencer)  Cell: [phone] (Sherin Stanley PA-C)

## 2023-01-17 NOTE — PLAN OF CARE
Goal Outcome Evaluation:       Summary: GI Bleed, LLE DVT  Date & Time: 01/16/23 [id number]  Orientation: Alert to self only  Activity Level: Ax2 Lift  Fall Risk: Yes  Behavior & Aggression: Green  Pain Management: Denies  ABNL VS/O2: VSS on RA  ABNL Lab/BG: Hgb: 8.6, from 9.2 K+ 4.6 after replacement  Diet:Regular  Bowel/Bladder: Inc of both  Drains/Devices: DESIRE FULLER, Esha overnight  Tests/Procedures: EGD and Coloscopy completed on 1/16  Anticipated  DC Date: Pending  Other Important Info: Pt is AOx1, disoriented about time,place and situation, pleasantly confused at times, denies pain, L neck dressing CDI, VSS on RA, swallow pills whole, weak BLE, assist x2/lift

## 2023-01-17 NOTE — PROGRESS NOTES
[facility]    Medicine Progress Note - Hospitalist Service    Date of Admission:  1/14/2023    Assessment & Plan   Khalida Redding is a 72 year old female admitted on 1/14/2023.  She is a transfer from [facility] emergency room for further evaluation of GI bleed and possible need for IVC filter.    Possible GI bleed  Patient with history of CVA on aspirin and Plavix.  Had witnessed rectal bleeding while at outside hospital. Daughter noted dark red blood/blood clots. Hemodynamically stable.    Admitted to inpatient.    Hemoglobin down to 8.0 on 1/14, up to 9.2 on 1/15 without intervention beyond holding aspirin and clopidogrel.    [facility] consulted.    S/p EGD/colonoscopy on 1/16/23, no obvious source of bleeding identified. Suspect diverticular bleed.    Management of blood thinners as noted below. Monitor for bleeding as Eliquis is started.    Acute left lower extremity DVT  Patient presented at outside hospital with complaint of left lower extremity swelling. US revealed extensive deep vein thrombosis extending from the left iliac vein into the peroneal vein at the level of the mid calf. Given GI bleed, unable to anticoagulate.    IR consulted, appreciate their assistance.    S/p IVC filter placement 1/14/23.    Follow up with IR as an outpatient, recommend IVC filter removal within 3 months.    Per GI notes, OK to start DOAC.    Discussed risks/benefits of starting Eliquis at 5 mg BID with patient's daughters. She would like to proceed with Eliquis. Will hold PTA aspirin/plavix while on Eliquis as dual/triple therapy felt to impose too significant of a bleeding risk at this time.    Hypokalemia    Replace and recheck per protocol.    History of multiple CVAs    PTA aspirin and clopidogrel on hold in setting of GI bleed.  Plan to start Eliquis as noted above. Will hold off on aspirin and clopidogrel while she is on Eliquis due to increased bleeding risk.    Continue PTA  atorvastatin.    Hyperlipidemia    Continue PTA atorvastatin.    Hypertension    Blood pressure mildly elevated.    Hold PTA lisinopril for now in setting of GI bleed, restart as needed.    Dementia    Continue PTA Namenda.    Plan of care discussed with her daughter by phone on 1/17/23.    Convulsions    Continue PTA Keppra and Dilantin.    Urinary incontinence    Monitor.    Discharge planning  Patient lives with family. Family is concerned that they are not able to care for her anymore due to the increasing amount of care she needs.    Consult care transitions, appreciate their assistance.    Awaiting LTC placement.       Diet: Regular Diet Adult  Room Service    DVT Prophylaxis: DOAC  Gloria Catheter: Not present  Lines: None     Cardiac Monitoring: None  Code Status: No CPR- Do NOT Intubate      Clinically Significant Risk Factors        # Hypokalemia: Lowest K = 3.2 mmol/L in last 2 days, will replace as needed                # DMII: A1C = 9.4 % (Ref range: 0.0 - 5.6 %) within past 3 months, PRESENT ON ADMISSION          Disposition Plan     Expected Discharge Date: 01/18/2023                  Kamron Rojas MD  Hospitalist Service  [facility]  Securely message with [employer] (more info)  Text page via [employer] Paging/Directory   ______________________________________________________________________    Interval History   Khalida GOMES Miladis was seen today.  She feels okay.  No new complaints/concerns.  No bloody bowel movements per nursing report.  Discussed plan of care with her daughter by phone.  Discussed risks/benefits of starting Eliquis, daughter would like to proceed.    Physical Exam   Vital Signs: Temp: 98.1  F (36.7  C) Temp src: Oral BP: 104/68 Pulse: 97   Resp: 16 SpO2: 92 % O2 Device: Nasal cannula Oxygen Delivery: 2 LPM  Weight: 149 lbs 11.08 oz    Constitutional: awake, alert, cooperative, no apparent distress, laying in the hospital bed  Respiratory: no increased work of  breathing, clear to auscultation bilaterally, no crackles or wheezing  Cardiovascular: regular rate and rhythm, normal S1 and S2, no murmur noted  GI: normal bowel sounds, soft, non-distended, non-tender  Skin: warm ,dry  Musculoskeletal: no lower extremity pitting edema present  Neurologic: awake, alert, answers questions appropriately, forgetful, moves all extremities    Medical Decision Making       **CLEAR ALL SELECTIONS**      Data   NOTE: Data reviewed over the past 24 hrs contributes toward MDM complexity

## 2023-01-17 NOTE — PLAN OF CARE
Goal Outcome Evaluation:    DATE & TIME: 01/16/23, 1922-3045    Summary: GI Bleed    Cognitive Concerns/ Orientation : A&O x 2, ds to situation and time      BEHAVIOR & AGGRESSION TOOL COLOR: GREEN    ABNL VS/O2: VSS on RA    MOBILITY: A x 2, lift, Self repos    PAIN MANAGMENT: Denies    DIET: Regular    BOWEL/BLADDER: Incont B/B, purewick in place    ABNL LAB/BG: AM K 3.2- replaced, recheck 3.3, replaced again. , 164    DRAIN/DEVICES: L PIV SL     SKIN: R IVC site -CDI    TESTS/PROCEDURES: EGD and colonoscopy done today     D/C DAY/GOALS/PLACE: TBD

## 2023-01-17 NOTE — PROGRESS NOTES
Care Management Follow Up    Length of Stay (days): 3    Expected Discharge Date: 01/20/2023     Concerns to be Addressed:       Patient plan of care discussed at interdisciplinary rounds: Yes    Anticipated Discharge Disposition: Long Term Care     Anticipated Discharge Services: None  Anticipated Discharge DME:      Patient/family educated on Medicare website which has current facility and service quality ratings:    Education Provided on the Discharge Plan:    Patient/Family in Agreement with the Plan: yes    Referrals Placed by CM/SW:    Private pay costs discussed: Not applicable    Additional Information:  Call from an admission staff inquiring if pt was TCU or LTC. SW called staff back at [phone] and left a VM informing them that SW is looking for LTC placement.       ONESIMO Lanier

## 2023-01-18 NOTE — PROGRESS NOTES
Care Management Follow Up    Length of Stay (days): 4    Expected Discharge Date: 01/20/2023     Concerns to be Addressed:       Patient plan of care discussed at interdisciplinary rounds: Yes    Anticipated Discharge Disposition: Long Term Care     Anticipated Discharge Services: None  Anticipated Discharge DME:      Patient/family educated on Medicare website which has current facility and service quality ratings:    Education Provided on the Discharge Plan:    Patient/Family in Agreement with the Plan: yes    Referrals Placed by CM/SW:    Private pay costs discussed: private room/amenity fees    Additional Information:  Patient has been accepted at [facility] in [facility] into a private room.  Concepción with admissions did say that patient would not be able to get therapy because the facility does not take her insurance. They also charge a $35/day private room fee but they may be able to get it covered through the [facility] since patient is on [place]. Concepción also wanted the family to know that they have COVID positive patients that are being quarantined at their facility currently. RODNEY placed call to patient's daughter Laura and provided her with the above information. Laura stated that she doesn't feel her mother would benefit from therapy so that would not be a problem. Concepción is also unconcerned with the private room fee and the COVID status. Laura would like to accept the bed at [facility] and RODNEY called Concepción to inform her of this. Patient does not appear to be medically ready at this time but will coordinate discharge when patient is ready.     SW will continue to follow.       ONESIMO Massey

## 2023-01-18 NOTE — PLAN OF CARE
Goal Outcome Evaluation:      Plan of Care Reviewed With: patient    Overall Patient Progress: improvingOverall Patient Progress: improving    Pt here with GI bleed and IVC filter placement. Pt is disoriented to time, situation and place, oriented to self only. VSS. Tolerating moderate carb, high fiber diet, and thin liquids; takes pills whole. Purewick in place due to incontinence, changed this shift; pt did not have a BM since 1/16. Baseline activity level is lift assistance, turning and repositioning q2 hours. Denies pain. K+ 3.7, recheck in the AM. Plan is to monitor for bleeding due to starting eliquis yesterday. Per social work note, patient was accepted at a LTC facility today. Possible discharge tomorrow per MD.

## 2023-01-18 NOTE — PLAN OF CARE
Goal Outcome Evaluation: POD4 IVC filter placement r/t DVT . Pt is disoriented to time.  VSS on RA. Denies pain. Tolerating moderate carb, high fiber diet, and thin liquids. Takes pills whole. Purewick in place due to incontinence.  No BM. Baseline activity level is lift assistance, turning and repositioning q2 hours. Restarted back on Eliquis, will monitor for bleeding. Discharge pending LTC placement.         Plan of Care Reviewed With: patient    Overall Patient Progress: improvingOverall Patient Progress: improving

## 2023-01-18 NOTE — PROGRESS NOTES
[facility]    Medicine Progress Note - Hospitalist Service    Date of Admission:  1/14/2023    Assessment & Plan   Khalida Redding is a 72 year old female admitted on 1/14/2023.  She is a transfer from [facility] emergency room for further evaluation of GI bleed and possible need for IVC filter.    Possible GI bleed  Patient with history of CVA on aspirin and Plavix.  Had witnessed rectal bleeding while at outside hospital. Daughter noted dark red blood/blood clots. Hemodynamically stable.    Admitted to inpatient.    Hemoglobin down to 8.0 on 1/14, up to 9.2 on 1/15 without intervention beyond holding aspirin and clopidogrel. Hemoglobin stable/improved at 9.9 on 1/18/23.    [facility] consulted.    S/p EGD/colonoscopy on 1/16/23, no obvious source of bleeding identified. Suspect diverticular bleed.    Management of blood thinners as noted below. Monitor for bleeding as Eliquis is started.    Acute left lower extremity DVT  Patient presented at outside hospital with complaint of left lower extremity swelling. US revealed extensive deep vein thrombosis extending from the left iliac vein into the peroneal vein at the level of the mid calf. Given GI bleed, unable to anticoagulate.    IR consulted, appreciate their assistance.    S/p IVC filter placement 1/14/23.    Follow up with IR as an outpatient, recommend IVC filter removal within 3 months.    Per GI notes, OK to start DOAC on 1/17/23.    Discussed risks/benefits of starting Eliquis at 5 mg BID with patient's daughters. She would like to proceed with Eliquis. Started with 5 mg BID dose rather than 10 mg BID dose to help decrease risk of bleeding. Will hold PTA aspirin/plavix while on Eliquis as dual/triple therapy felt to impose too significant of a bleeding risk at this time.    Hypokalemia    Replace and recheck per protocol.    History of multiple CVAs    PTA aspirin and clopidogrel on hold in setting of GI bleed.  Started  on Eliquis as noted above. Will hold off on aspirin and clopidogrel while she is on Eliquis due to increased bleeding risk.    Continue PTA atorvastatin.    Hyperlipidemia    Continue PTA atorvastatin.    Hypertension    Blood pressure mildly elevated.    Held PTA lisinopril in setting of GI bleed, restart as needed BP trends up.    Dementia    Continue PTA Namenda.    Plan of care discussed with her daughter by phone on 1/17/23.    Convulsions    Continue PTA Keppra and Dilantin.    Urinary incontinence    Monitor.    Discharge planning  Patient lives with family. Family is concerned that they are not able to care for her anymore due to the increasing amount of care she needs.    Consult care transitions, appreciate their assistance.    Awaiting LTC placement. If hemoglobin stable on 1/19/23 and no new issues, would likely be stable for discharge on 1/19/23.       Diet: Room Service  Combination Diet Moderate Consistent Carb (60 g CHO per Meal) Diet; High Fiber Diet    DVT Prophylaxis: DOAC  Gloria Catheter: Not present  Lines: None     Cardiac Monitoring: None  Code Status: No CPR- Do NOT Intubate      Clinically Significant Risk Factors        # Hypokalemia: Lowest K = 3.3 mmol/L in last 2 days, will replace as needed                # DMII: A1C = 9.4 % (Ref range: 0.0 - 5.6 %) within past 3 months           Disposition Plan     Expected Discharge Date: 01/20/2023                  Kamron Rojas MD  Hospitalist Service  [facility]  Securely message with [employer] (more info)  Text page via [employer] Paging/Directory   ______________________________________________________________________    Interval History   Khalida GOMES Miladis was seen today.  She feels good, no complaints/concerns for me.    Physical Exam   Vital Signs: Temp: 98.5  F (36.9  C) Temp src: Oral BP: 138/75 Pulse: 87   Resp: 18 SpO2: 98 % O2 Device: None (Room air)    Weight: 149 lbs 11.08 oz    Constitutional: awake, alert,  cooperative, no apparent distress, laying in the hospital bed  Respiratory: no increased work of breathing, clear to auscultation bilaterally, no crackles or wheezing  Cardiovascular: regular rate and rhythm, normal S1 and S2, no murmur noted  GI: normal bowel sounds, soft, non-distended, non-tender  Skin: warm, dry  Musculoskeletal: no lower extremity pitting edema present  Neurologic: awake, alert, forgetful but appropriate in conversation, moves all extremities    Medical Decision Making       **CLEAR ALL SELECTIONS**      Data     I have personally reviewed the following data over the past 24 hrs:    N/A  \   9.9 (L)   / N/A     N/A N/A N/A /  188 (H)   3.7 N/A N/A \

## 2023-01-19 NOTE — PROGRESS NOTES
[facility]    Medicine Progress Note - Hospitalist Service    Date of Admission:  1/14/2023    Assessment & Plan   Khalida Redding is a 72 year old female admitted on 1/14/2023.  She is a transfer from [facility] emergency room for further evaluation of GI bleed and possible need for IVC filter.    Possible GI bleed  Patient with history of CVA on aspirin and Plavix.  Had witnessed rectal bleeding while at outside hospital. Daughter noted dark red blood/blood clots. Hemodynamically stable.    Admitted to inpatient.    Hemoglobin down to 8.0 on 1/14, up to 9.2 on 1/15 without intervention beyond holding aspirin and clopidogrel. Hemoglobin stable/improved at 9.9 on 1/18/23.    [facility] consulted.    S/p EGD/colonoscopy on 1/16/23, no obvious source of bleeding identified. Suspect diverticular bleed.    Management of blood thinners as noted below. Monitor for bleeding as Eliquis is started.    No further bleeding and Hb is stable at this time.     Acute left lower extremity DVT  Patient presented at outside hospital with complaint of left lower extremity swelling. US revealed extensive deep vein thrombosis extending from the left iliac vein into the peroneal vein at the level of the mid calf. Given GI bleed, unable to anticoagulate.    IR consulted, appreciate their assistance.    S/p IVC filter placement 1/14/23.    Follow up with IR as an outpatient, recommend IVC filter removal within 3 months.    Per GI notes, OK to start DOAC on 1/17/23.    Discussed risks/benefits of starting Eliquis at 5 mg BID with patient's daughters. She would like to proceed with Eliquis. Started with 5 mg BID dose rather than 10 mg BID dose to help decrease risk of bleeding. Will hold PTA aspirin/plavix while on Eliquis as dual/triple therapy felt to impose too significant of a bleeding risk at this time.    Tolerating Eliquis at this time. Will place compression stocking to LLE      Hypokalemia    Replace and recheck per protocol.    History of multiple CVAs    PTA aspirin and clopidogrel on hold in setting of GI bleed.  Started on Eliquis as noted above. Will hold off on aspirin and clopidogrel while she is on Eliquis due to increased bleeding risk.    Continue PTA atorvastatin.    Hyperlipidemia    Continue PTA atorvastatin.    Hypertension    Blood pressure mildly elevated.    Held PTA lisinopril in setting of GI bleed, restart as needed BP trends up.    Dementia    Continue PTA Namenda.    Plan of care discussed with her daughter by phone on 1/17/23.    Convulsions    Continue PTA Keppra and Dilantin.    Urinary incontinence    Monitor.    Discharge planning  Patient lives with family. Family is concerned that they are not able to care for her anymore due to the increasing amount of care she needs.    Consult care transitions, appreciate their assistance.    Awaiting LTC placement. If hemoglobin stable on 1/19/23 and no new issues, would likely be stable for discharge on 1/20/23.    Awaiting placement.        Diet: Room Service  Combination Diet Moderate Consistent Carb (60 g CHO per Meal) Diet; High Fiber Diet    DVT Prophylaxis: DOAC  Gloria Catheter: Not present  Lines: None     Cardiac Monitoring: None  Code Status: No CPR- Do NOT Intubate      Clinically Significant Risk Factors                       # DMII: A1C = 9.4 % (Ref range: 0.0 - 5.6 %) within past 3 months           Disposition Plan      Expected Discharge Date: 01/20/2023, 11:00 AM  Discharge Delays: *Early Discharge Anticipated    Discharge Comments: 1/20 11AM discharge          Juliocesar Arechiga MD  Hospitalist Service  [facility]  Securely message with [employer] (more info)  Text page via [employer] Paging/Directory   ______________________________________________________________________    Interval History   Patient seen and evaluated in her room today, doing well, denies any chest pain, SOB, fever,  chills, nausea, vomiting, headache or dizziness at this time.     No other significant event     Physical Exam   Vital Signs: Temp: 98.6  F (37  C) Temp src: Oral BP: 121/76 Pulse: 100   Resp: 17 SpO2: 93 % O2 Device: None (Room air)    Weight: 149 lbs 11.08 oz    Constitutional: awake, alert, cooperative, no apparent distress, laying in the hospital bed  Respiratory: no increased work of breathing, clear to auscultation bilaterally, no crackles or wheezing  Cardiovascular: regular rate and rhythm, normal S1 and S2, no murmur noted  GI: normal bowel sounds, soft, non-distended, non-tender  Skin: warm, dry  Musculoskeletal: left LE edema positive,   Neurologic: awake, alert, forgetful but appropriate in conversation, moves all extremities    Medical Decision Making       **CLEAR ALL SELECTIONS**      Data     I have personally reviewed the following data over the past 24 hrs:    N/A  \   8.7 (L)   / N/A     N/A N/A N/A /  256 (H)   3.9 N/A N/A \

## 2023-01-19 NOTE — PROGRESS NOTES
Care Management Follow Up    Length of Stay (days): 5    Expected Discharge Date: 01/20/2023     Concerns to be Addressed:       Patient plan of care discussed at interdisciplinary rounds: Yes    Anticipated Discharge Disposition: Long Term Care     Anticipated Discharge Services: None  Anticipated Discharge DME:      Patient/family educated on Medicare website which has current facility and service quality ratings:    Education Provided on the Discharge Plan:    Patient/Family in Agreement with the Plan: yes    Referrals Placed by CM/SW:    Private pay costs discussed: Not applicable    Additional Information:  RODNEY placed a call to [facility] to see if they have a bed available for discharge today. RODNEY was informed by bedside nurse that pt is medically ready for discharge today at the earliest.       [facility] called and report they can take pt 1/20 anytime before 2 pm. DARY Beebe took down the phone number for a nurse-to-nurse. RODNEY called pt's daughter Laura to update her. RODNEY set up a discharge stretcher transport for 1/20 at 11 am. Str ride due to dementia and supervision.  RODNEY updated pt. PCS form completed and on chart.       ONESIMO Lanier

## 2023-01-19 NOTE — PLAN OF CARE
Goal Outcome Evaluation: POD5 IVC filter placement r/t DVT . Pt is disoriented to time.  VSS on RA. Denies pain. Tolerating moderate carb, high fiber diet, and thin liquids. Takes pills whole. Purewick in place due to incontinence.  No BM. Baseline activity level is lift assistance, turning and repositioning q2 hours. Discharge today to [facility].      Plan of Care Reviewed With: patient    Overall Patient Progress: improvingOverall Patient Progress: improving

## 2023-01-20 NOTE — PLAN OF CARE
Pt here with GI bleed. A&O to self and situation only (knows she is in the hospital but doesn't know which hospital). 2 small continent BMs tan/brown/moe colored. VSS. Mod CHO diet, high fiber diet. Up with lift - T/R Q2. Denies pain. Voiding adequately - incontinent at times, using bedpan at times. Refused LLE compression stocking. Refuses PCDs at all times. Discharge to LTC tomorrow at 11 AM.

## 2023-01-20 NOTE — PROGRESS NOTES
Pt. Is A/O x 2 (person and place-hospital). VSS on RA. T/R q2h. A/2 with Lift. Incontinence overnight. No BM this shift. Denies any pain. Mod Carb diet-tolerating well. Discharge @ 11 am via stretcher to [facility].

## 2023-01-20 NOTE — DISCHARGE SUMMARY
[facility]    Discharge Summary  Hospitalist    Date of Admission:  1/14/2023  Date of Discharge:  1/20/2023 11:35 AM  Discharging Provider: Juliocesar Arechiga MD, MD  Date of Service (when I saw the patient): 01/20/23    Discharge Diagnoses   Please refer below    History of Present Illness   Khalida Redding is an 72 year old female who presented with Gi bleeding     Hospital Course   Khalida Redding is a 72 year old female admitted on 1/14/2023.  She is a transfer from [facility] emergency room for further evaluation of GI bleed and possible need for IVC filter.    Final Discharge Diagnosis and Hospital Course.      Acute Lower GI bleeding likely Diverticular now resolved.   Patient with history of CVA on aspirin and Plavix.  Had witnessed rectal bleeding while at outside hospital. Daughter noted dark red blood/blood clots. Hemodynamically stable.    Admitted to inpatient.    Hemoglobin down to 8.0 on 1/14, up to 9.2 on 1/15 without intervention beyond holding aspirin and clopidogrel. Hemoglobin stable/improved at 9.9 on 1/18/23.    [facility] consulted.    S/p EGD/colonoscopy on 1/16/23, no obvious source of bleeding identified. Suspect diverticular bleed.    Management of blood thinners as noted below. Monitor for bleeding as Eliquis is started.    No further bleeding and Hb is stable at this time.       Acute left lower extremity DVT  Patient presented at outside hospital with complaint of left lower extremity swelling. US revealed extensive deep vein thrombosis extending from the left iliac vein into the peroneal vein at the level of the mid calf. Given GI bleed, unable to anticoagulate.    IR consulted, appreciate their assistance.    S/p IVC filter placement 1/14/23.    Follow up with IR as an outpatient, recommend IVC filter removal within 3 months.    Per GI notes, OK to start DOAC on 1/17/23.    Discussed risks/benefits of starting Eliquis at 5 mg BID with patient's  daughters. She would like to proceed with Eliquis. Started with 5 mg BID dose rather than 10 mg BID dose to help decrease risk of bleeding. Will hold PTA aspirin/plavix while on Eliquis as dual/triple therapy felt to impose too significant of a bleeding risk at this time.    Tolerating Eliquis at this time. Will place compression stocking to LLE . No further GI bleeding and Hb remain stable.      Hypokalemia    Replace and recheck per protocol.     History of multiple CVAs    PTA aspirin and clopidogrel on hold in setting of GI bleed.  Started on Eliquis as noted above. Will hold off on aspirin and clopidogrel while she is on Eliquis due to increased bleeding risk.    Continue PTA atorvastatin.     Hyperlipidemia    Continue PTA atorvastatin.     Hypertension    Blood pressure mildly elevated.    Held PTA lisinopril in setting of GI bleed, restart as needed BP trends up.     Dementia    Continue PTA Namenda.    Plan of care discussed with her daughter by phone on 1/17/23.     Convulsions    Continue PTA Keppra and Dilantin.     Urinary incontinence    Monitor.     Discharge to LTC facility in stable condition         Diet: Room Service  Combination Diet Moderate Consistent Carb (60 g CHO per Meal) Diet; High Fiber Diet    Code Status: No CPR- Do NOT Intubate       Juliocesar Arechiga MD, MD    Significant Results and Procedures   IVC filter placement by IR     Pending Results   These results will be followed up by PCP  Unresulted Labs Ordered in the Past 30 Days of this Admission     No orders found from 12/15/2022 to 1/15/2023.          Code Status   DNR / DNI       Primary Care Physician   Abdirahman Shrestha    Physical Exam   Temp: 97.3  F (36.3  C) Temp src: Axillary BP: 112/72 Pulse: 96   Resp: 16 SpO2: 94 % O2 Device: None (Room air)    Vitals:    01/14/23 1108   Weight: 67.9 kg (149 lb 11.1 oz)     Vital Signs with Ranges  Temp:  [97.3  F (36.3  C)-99.3  F (37.4  C)] 97.3  F (36.3  C)  Pulse:  [89-98] 96  Resp:  [16-18]  16  BP: (112-150)/(58-78) 112/72  SpO2:  [92 %-95 %] 94 %  I/O last 3 completed shifts:  In: 240 [P.O.:240]  Out: 250 [Urine:250]    Constitutional: awake, alert, cooperative, no apparent distress, and appears stated age  Eyes: Lids and lashes normal, pupils equal, round and reactive to light, extra ocular muscles intact, sclera clear, conjunctiva normal  Respiratory: No increased work of breathing, good air exchange, clear to auscultation bilaterally, no crackles or wheezing  Cardiovascular: Normal apical impulse, regular rate and rhythm, normal S1 and S2, no S3 or S4, and no murmur noted  GI: No scars, normal bowel sounds, soft, non-distended, non-tender, no masses palpated, no hepatosplenomegally    Discharge Disposition   Discharged to long-term care facility  Condition at discharge: Stable    Consultations This Hospital Stay   VASCULAR ACCESS ADULT IP CONSULT  GASTROENTEROLOGY IP CONSULT  INTERVENTIONAL RADIOLOGY ADULT/PEDS IP CONSULT  GASTROENTEROLOGY IP CONSULT  CARE MANAGEMENT / SOCIAL WORK IP CONSULT  PHYSICAL THERAPY ADULT IP CONSULT  OCCUPATIONAL THERAPY ADULT IP CONSULT  OCCUPATIONAL THERAPY ADULT IP CONSULT  PHYSICAL THERAPY ADULT IP CONSULT    Time Spent on this Encounter   IJuliocesar MD, personally saw the patient today and spent greater than 30 minutes discharging this patient.    Discharge Orders      Anti-Embolism Stockings    Bilateral below knee length.  On in the morning, off at night     Primary Care - Care Coordination Referral      Brief Discharge Instructions    You have a puncture site in your neck where the vein was accessed for your procedure. This site was closed with a pressure dressing. Once 24 hours has passed since the site was accessed, the pressure dressing can be removed and the site left open to air. It's ok to get wet (in the shower) after the dressing has come off, but wait for 2 weeks or until the site is well-healed before allowing it to be submerged in water (tub  baths, swimming, hot tub, etc.). Call the IR office if it starts to actively bleed, drains purulent or foul smelling material, or swells up significantly. The dye that was used is hard on your kidneys, so drinking more fluids is encouraged for 24-48 hours post-procedure as long as your heart is healthy. You should also avoid over the counter NSAID medications (Ibuprofen, Aleve, Advil, Naproxen, etc) for 2-3 days as these are also processed through the kidneys. Apsirin and Tylenol are ok from a kidney standpoint.    Please call Myrna IR RN clinician at  or Amy IR NP at  for questions or concerns about the tube. You can leave a voicemail. We work Monday through Friday [phone] or [phone].     An alternative number to call for questions is Interventional Radiology at      General info for SNF    Length of Stay Estimate: Long Term Care  Condition at Discharge: Stable  Level of care:board and care  Rehabilitation Potential: Poor  Admission H&P remains valid and up-to-date: Yes  Recent Chemotherapy: N/A  Use Nursing Home Standing Orders: Yes     Mantoux instructions    Give two-step Mantoux (PPD) Per Facility Policy Yes     Follow Up and recommended labs and tests    Follow up with custodial physician.  The following labs/tests are recommended: CBC and BMP.  Follow up with Interventional Radiology in 1-2 months to consider IVC filter removal.     Reason for your hospital stay    GI bleed, DVT     Glucose monitor nursing POCT    Before meals and at bedtime     Activity - Up with nursing assistance     General info for SNF    Length of Stay Estimate: Long Term Care  Condition at Discharge: Stable  Level of care:skilled   Rehabilitation Potential: Fair  Admission H&P remains valid and up-to-date: Yes  Recent Chemotherapy: N/A  Use Nursing Home Standing Orders: Yes     Mantoux instructions    Give two-step Mantoux (PPD) Per Facility Policy Yes     Follow Up and recommended labs and tests     Follow up with Nursing home physician.  The following labs/tests are recommended: cbc     Reason for your hospital stay    Possible Diverticular bleed  DVT     Intake and output    Every shift     Daily weights    Call Provider for weight gain of more than 2 pounds per day or 5 pounds per week.     Activity - Up ad donte     No CPR- Do NOT Intubate     Fall precautions     Diet    Follow this diet upon discharge: Combination Diet Moderate Consistent Carb (60 g CHO per Meal) Diet; High Fiber Diet     Diet    Follow this diet upon discharge: Orders Placed This Encounter      Room Service      Combination Diet Moderate Consistent Carb (60 g CHO per Meal) Diet; High Fiber Diet     Discharge Medications   Discharge Medication List as of 1/20/2023  9:57 AM      CONTINUE these medications which have NOT CHANGED    Details   acetaminophen (TYLENOL) 500 MG tablet Take 1,000 mg by mouth every 6 hours as needed for mild pain, Historical      alcohol swab prep pads Use to swab area of injection/carlos manuel as directed.Disp-100 each, [id number]      alendronate (FOSAMAX) 70 MG tablet Take 1 tablet (70 mg) by mouth every 7 days Tuesdays, Disp-13 tablet, R-3, E-Prescribe      atorvastatin (LIPITOR) 40 MG tablet Take 1 tablet by mouth once daily, Disp-90 tablet, R-0, E-Prescribe      !! blood glucose (NO BRAND SPECIFIED) test strip Use to test blood sugar once daily. To accompany: Blood Glucose Monitor Brands: per insurance., Disp-100 strip, R-3, E-PrescribeAccucheck brand      !! blood glucose (NO BRAND SPECIFIED) test strip Use to test blood sugar 1 times daily or as directed., Disp-100 strip, R-3, [id number]-snls Violette needed      blood glucose monitoring (NO BRAND SPECIFIED) meter device kit Use to test blood sugar once daily. Preferred blood glucose meter OR supplies to accompany: Blood Glucose Monitor Brands: per insurance.Disp-1 kit, [id number]      Cyanocobalamin (VITAMIN B 12 PO) Take 1,000 mcg by mouth daily,  Historical      escitalopram (LEXAPRO) 5 MG tablet Take 1 tablet (5 mg) by mouth daily, Disp-90 tablet, R-3, E-Prescribe      gabapentin (NEURONTIN) 300 MG capsule TAKE 1 CAPSULE BY MOUTH AT  BEDTIME, Disp-100 capsule, R-2, E-PrescribeRequesting 1 year supply      hydrALAZINE (APRESOLINE) 10 MG tablet Take 1 tab (10mg) by mouth if systolic BP exceeds 180, Disp-30 tablet, R-1, E-Prescribe      levETIRAcetam (KEPPRA) 1000 MG tablet Take 1 tablet (1,000 mg) by mouth 2 times daily, Disp-180 tablet, R-3, E-Prescribe      memantine (NAMENDA) 10 MG tablet TAKE 1 TABLET BY MOUTH  DAILY, Disp-100 tablet, R-2, E-PrescribeRequesting 1 year supply      metFORMIN (GLUCOPHAGE XR) 500 MG 24 hr tablet Take 1 tablet (500 mg) by mouth daily (with dinner), Disp-90 tablet, R-1, E-Prescribe      omeprazole (PRILOSEC) 40 MG DR capsule TAKE 1 CAPSULE BY MOUTH AT  BEDTIME, Disp-100 capsule, R-2, E-PrescribeRequesting 1 year supply      order for DME Equipment being ordered: glucometer and related supplies.Disp-1 Units, R-0, Local Print      phenytoin (DILANTIN) 50 MG chewable tablet Take 2 tablets (100 mg) by mouth 2 times daily CHEW AND SWALLOW 2 TABLETS BY MOUTH TWICE DAILY, Disp-360 tablet, R-3, E-Prescribe      thin (NO BRAND SPECIFIED) lancets Use with lanceting device. To accompany: Blood Glucose Monitor Brands: per insurance., Disp-100 each, R-6, E-Prescribe      aspirin (ASA) 81 MG chewable tablet Take 1 tablet (81 mg) by mouth daily, Disp-100 tablet, R-3, Historical      clopidogrel (PLAVIX) 75 MG tablet Take 1 tablet by mouth once daily, Disp-90 tablet, R-0, E-Prescribe      lisinopril (ZESTRIL) 20 MG tablet Take 1 tablet by mouth once daily, Disp-30 tablet, R-0, E-Prescribe      meloxicam (MOBIC) 7.5 MG tablet TAKE 1 TABLET BY MOUTH  DAILY, Disp-100 tablet, R-2, E-PrescribeRequesting 1 year supply      polyethylene glycol (MIRALAX) 17 GM/Dose powder Take 17 g (1 capful) by mouth 2 times daily, Disp-1000 g, R-1, E-Prescribe        !! - Potential duplicate medications found. Please discuss with provider.        Allergies   Allergies   Allergen Reactions     Amoxicillin Hives and Rash     Pt reports she has taken PCN without problems     Ampicillin Rash     Data   Most Recent 3 CBC's:Recent Labs   Lab Test 01/20/23  0829 01/19/23  0803 01/18/23  0803 01/16/23  2103 01/16/23  0825 01/15/23  1609 01/15/23  0802   WBC 4.3  --   --   --  5.5  --  7.5   HGB 8.8* 8.7* 9.9*   < > 9.4*  9.4*   < > 9.2*   MCV 92  --   --   --  94  --  94     --   --   --  278  --  256    < > = values in this interval not displayed.      Most Recent 3 BMP's:  Recent Labs   Lab Test 01/20/23  0829 01/20/23  0748 01/20/23  0207 01/19/23  0820 01/19/23  0803 01/18/23  0821 01/18/23  0803 01/16/23  1225 01/16/23  0825 01/15/23  0740     --   --   --   --   --   --   --  143 141   POTASSIUM 3.9  --   --   --  3.9  --  3.7   < > 3.2* 3.7   CHLORIDE 100  --   --   --   --   --   --   --  105 107   CO2 31*  --   --   --   --   --   --   --  32 25   BUN 11.5  --   --   --   --   --   --   --  6* 7   CR 0.44*  --   --   --   --   --   --   --  0.34* 0.33*   ANIONGAP 7  --   --   --   --   --   --   --  6 9   PINA 8.8  --   --   --   --   --   --   --  8.5 8.3*   * 201* 215*   < >  --    < >  --    < > 237* 171*    < > = values in this interval not displayed.     Most Recent 2 LFT's:  Recent Labs   Lab Test 01/11/23  1750 08/28/22  0600   AST 17 26   ALT 23 37   ALKPHOS 133 123   BILITOTAL 0.3 0.2     Most Recent INR's and Anticoagulation Dosing History:  Anticoagulation Dose History     Recent Dosing and Labs Latest Ref Rng & Units 11/26/2021 2/4/2022 3/18/2022 8/27/2022 1/11/2023    INR 0.85 - 1.15 1.00 0.98 0.98 1.11 1.24(H)        Most Recent 3 Troponin's:  Recent Labs   Lab Test 11/26/21  1547 08/25/21  1412 06/01/21  1224 02/03/19  1605 07/16/17  1632   TROPI  --   --  <0.015 <0.015 <0.015  The 99th percentile for upper reference range is 0.045 ug/L.   Troponin values in   the range of 0.045 - 0.120 ug/L may be associated with risks of adverse   clinical events.     TROPONIN <0.015 <0.015  --   --   --      Most Recent Cholesterol Panel:  Recent Labs   Lab Test 03/19/22  0550   CHOL 223*   *   HDL 84   TRIG 129     Most Recent 6 Bacteria Isolates From Any Culture (See EPIC Reports for Culture Details):  Recent Labs   Lab Test 05/22/19  1244   CULT <10,000 colonies/mL  urogenital donny  Susceptibility testing not routinely done       Most Recent TSH, T4 and A1c Labs:  Recent Labs   Lab Test 01/11/23  1750 11/27/21  0818 11/26/21  1547 06/16/20  1617 01/13/20  0813   TSH  --   --  2.32   < > 4.45*   T4  --   --   --   --  0.99   A1C 9.4*   < >  --    < > 6.6*    < > = values in this interval not displayed.     Results for orders placed or performed during the hospital encounter of 01/14/23   IR IVC Filter Placement    Narrative    INTERVENTIONAL RADIOLOGY IVC FILTER PLACEMENT 1/14/2023 3:37 PM    HISTORY:  72-year-old woman with history of extensive left lower  extremity DVT and GI bleed. Given GI bleed and contraindication to  anticoagulation, request made for placement of an IVC filter.    TECHNIQUE: Patient was brought to the interventional radiology  department after informed consent obtained with patient's daughter as  patient has underlying memory care. Patient was placed in a supine  position and skin overlying the right neck was prepped and draped in  standard sterile fashion. 1% lidocaine was used for local anesthesia.  Ultrasound was used to visualize the right internal jugular vein,  patency confirmed, and image stored for documentation. With continuous  ultrasound guidance, a micropuncture kit was used to access the right  internal jugular vein. An 035 wire was then advanced into the inferior  vena cava, over which a 9 [place] sheath was placed. A venogram was  performed through this sheath. A retrievable IVC filter was then  deployed in an  infrarenal location. Patient tolerated the procedure  well without complication. Sheath was removed and hemostasis achieved  with manual compression.    Sedation: A moderate level of sedation was achieved with 50 mcg IV  fentanyl.  Sedation time: 14 minutes  Please note the above medications were administered by the  interventional radiology staff under my direct supervision. Patient's  vital signs were monitored and remained stable throughout the  procedure.  Fluoroscopic time: 0.9 minutes  Air kerma: 23.4 mGy  Contrast: 25 mL of Isovue 300 administered intravenously without  complication.  Local anesthetic: 7 mL of 1% lidocaine.    FINDINGS: Two spot fluoroscopic images and venogram sequences were  obtained demonstrating patent IVC, somewhat narrowed in diameter in  the infrarenal segment. Little outflow from the left iliac venous  system as it is thought to be occluded with DVT. Successful placement  of Bard Bridgett retrievable filter at completion.      Impression    IMPRESSION:  Successful placement of Bard Bridgett retrievable IVC  filter. We will plan for filter removal in 2 months.    ELEAZAR HUA MD         SYSTEM ID:  [id number]     Most Recent 3 CBC's:Recent Labs   Lab Test 01/20/23  0829 01/19/23  0803 01/18/23  0803 01/16/23  2103 01/16/23  0825 01/15/23  1609 01/15/23  0802   WBC 4.3  --   --   --  5.5  --  7.5   HGB 8.8* 8.7* 9.9*   < > 9.4*  9.4*   < > 9.2*   MCV 92  --   --   --  94  --  94     --   --   --  278  --  256    < > = values in this interval not displayed.     Most Recent 3 BMP's:Recent Labs   Lab Test 01/20/23  0829 01/20/23  0748 01/20/23  0207 01/19/23  0820 01/19/23  0803 01/18/23  0821 01/18/23  0803 01/16/23  1225 01/16/23  0825 01/15/23  0740     --   --   --   --   --   --   --  143 141   POTASSIUM 3.9  --   --   --  3.9  --  3.7   < > 3.2* 3.7   CHLORIDE 100  --   --   --   --   --   --   --  105 107   CO2 31*  --   --   --   --   --   --   --  32 25   BUN 11.5  --    --   --   --   --   --   --  6* 7   CR 0.44*  --   --   --   --   --   --   --  0.34* 0.33*   ANIONGAP 7  --   --   --   --   --   --   --  6 9   PINA 8.8  --   --   --   --   --   --   --  8.5 8.3*   * 201* 215*   < >  --    < >  --    < > 237* 171*    < > = values in this interval not displayed.     Most Recent 2 LFT's:Recent Labs   Lab Test 01/11/23  1750 08/28/22  0600   AST 17 26   ALT 23 37   ALKPHOS 133 123   BILITOTAL 0.3 0.2

## 2023-01-20 NOTE — PROGRESS NOTES
Care Management Discharge Note    Discharge Date: 01/20/2023       Discharge Disposition: Long Term Care    Discharge Services: None    Discharge DME:      Discharge Transportation:      Private pay costs discussed: private room/amenity fees    PAS Confirmation Code: [id number]  Patient/family educated on Medicare website which has current facility and service quality ratings:      Education Provided on the Discharge Plan:    Persons Notified of Discharge Plans: pt, pt's daughters (Carmel)   Patient/Family in Agreement with the Plan: yes    Handoff Referral Completed: Yes    Additional Information:  Pt to discharge to [facility] LTC at 11 am via  stretcher transport. SW has completed the PAS, faxed it, and placed it on the chart. SW spoke with provider this morning who reports that  he will be able to complete the orders for discharge this mornign at 11 am.     PAS-RR    D: Per DHS regulation, SW completed and submitted PAS-RR to [place] [employer] Direct Connect via the Senior LinkAge Line.  PAS-RR confirmation # is : [id number]    I: RODNEY spoke with pt and family and they are aware a PAS-RR has been submitted.  RODNEY reviewed with pt and family that they may be contacted for a follow up appointment within 10 days of hospital discharge if their SNF stay is < 30 days.  Contact information for [facility] LinkAge Line was also provided.    A: pt and family  verbalized understanding.    P: Further questions may be directed to [facility] LinkAge Line at #[phone], option #4 for PAS-RR staff.    Call from [facility] reporting they would like the PT/OT orders removed since pt would not be working with therapy at LTC. RODNEY paged provider        ONESIMO Lanier

## 2023-01-20 NOTE — PROGRESS NOTES
Discharge Note    Patient discharged to Nursing Home via EMS/BLS accompanied by no family/friend .  IV: Discontinued  Prescriptions sent with patient to discharge facility .   Belongings reviewed and sent with patient.   Home medications returned to patient: NA  Equipment sent with: patient, N/A.   patient verbalizes understanding of discharge instructions. AVS given to patient.

## 2023-01-20 NOTE — PLAN OF CARE
Physical Therapy Discharge Summary    Reason for therapy discharge:    Discharged to long term care facility.    Progress towards therapy goal(s). See goals on Care Plan in [facility] electronic health record for goal details.  Goals partially met.  Barriers to achieving goals:   limited tolerance for therapy and discharge from facility.    Therapy recommendation(s):    No further therapy is recommended.

## 2023-01-23 NOTE — PROGRESS NOTES
Clinic Care Coordination Contact    Situation: Patient chart reviewed by care coordinator.    Background: Patient was recently discharged from the hospital. Care Coordination referral was placed at discharge.     Assessment: Patient was discharged to Long Term Care.     Plan/Recommendations: RN CC will do no further outreaches as patient is no longer a candidate since she is now at LTC.     Melony Salvador RN Care Coordination   [facility]  Email: [email or web address]  Phone: [phone]

## 2023-03-06 NOTE — TELEPHONE ENCOUNTER
Spoke with daughter Lashae.   Patient ( mother)has IVC filter in place.  Recommend removal in 2-3 months if medically cleared.  Contacted Nurse Manager Yasmin at [facility] in [place].  Recommend care conference with physician and family to determine risks versus benefits of removing IVC filter.  Left VM, will await return phone call from Era.    Myrna Vanessa RN  IR nurse clinician  [phone]

## 2023-03-07 NOTE — TELEPHONE ENCOUNTER
Maryam FOOTE care coordinator returned phone call.  Pcp and family are electing to keep filter in at this time.  Myrna Vanessa RN  IR nurse clinician  [phone]

## 2023-03-14 NOTE — CONSULTS
[facility]    Stroke Consult Note    Reason for Consult:  Acute to subacute stroke in right splenium of corpus callosum    Chief Complaint: No chief complaint on file.       HPI  This is a 71 year old woman transferred from OSH on 3/19 with a history of T2DM, HTN, HLD, dementia, seizure disorder, and recent stroke in November of '21 with residual left sided deficits who was admitted to OSH with hypotension likely secondary to polypharmacy as well as acute stroke on MRI.  Of note, she was given multiple antihypertensive medications prior to this episode of confusion, labetolol, hydralazine, as well as some sedating medications including fentanyl, Ativan, gabapentin, Keppra, and Phenytoin.      When she presented to OSH she was noted to have a chief complaint of new onset confusion, slurred speech, and left facial droop around 0930 on 3/18.  MRI showed small acute to subacute infarct of right splenium of the corpus callosum.  Neurology was consulted at the time and recommended to not push thrombolytics due to the pt having rapid improvement of symptoms.  Stroke work up was initiated, resulted below.      She was also found to be febrile (Tmax 100.8), tachycardic, and hypotensive with SBP in the 70's at OSH.  She was fluid resuscitated with ~3L of IVF which improved pressures.  However, her somnolence persisted despite this and stroke symptoms were still present, and transfer to [facility] was initiated.     Stroke Evaluation Summarized  MRI/Head CT MRI Brain 3/18  IMPRESSION:  1. New small acute to subacute infarct involving the right aspect of the splenium of the corpus callosum.  2. Evolving subacute/subacute to chronic infarcts involving the high  posterior left frontal lobe, as described.  3. Unchanged large area of chronic encephalomalacia in the right frontal lobe, as well as other unchanged chronic findings, as described in the body of the report.   Intracranial  Patient to floor Post-Procedure. Vasculature HEAD CTA:  1.  No high-grade intracranial stenosis. Mild stenosis at the left P1-P2 junction.  2.  Diffusely diminished caliber of the distal right EDWARD presumably relates to the adjacent encephalomalacia in the right frontal lobe.  3.  No aneurysm and no high flow vascular malformation.   Cervical Vasculature NECK CTA:  1.  No high-grade stenosis of the neck vessels by NASCET criteria.      Echocardiogram Interpretation Summary   Technically difficult imaging ( patient unable to cooperate well with exam)  A cardiac source of embolus was not identified.  Sinus rhythm was noted.  The left ventricle is normal in structure, function and size.  The visual ejection fraction is 55-60%.  Grossly Doppler interrogation does not demonstrate signficant stenosis or insufficinecy involving cardiac valves.   EKG/Telemetry Sinus rhythm   Other Testing Not Applicable      LDL  3/19/2022: 113 mg/dL   A1C  3/10/2022: 5.5 %   Troponin 3/19/2022: 393 ng/L ()         Impression  #Acute ischemic stroke of the right splenium of the corpus callosum due to undetermined etiology   #Hx of chronic right MCA stroke  Pt was noted to have new onset confusion, slurred speech, and left facial droop at presentation to OSH.  She has a history of right MCA stroke with residual left sided deficits.  Suspect recrudescence due to hypotension due to polypharmacy has some accountability to the episode of the current hospitalization.  However, she was found to have diffusion restriction in the right splenium of the corpus callosum indicating acute to subacute stroke. Stroke work up is completed.  EEG is unnecessary as pt is at her baseline.  She does have distal PCA stenosis so atherosclerotic etiology cannot be excluded.  Although this is less likely, we will still recommend dual anti platelet therapy for 90 days and then aspirin monotherapy thereafter.  Stroke etiology is still most likely ESUS, and there fore it is appropriate to discharge  on leadless cardiac monitoring.    Recommendations  - Neurochecks Q 4 hours  - Avoid hypotonic IV fluids  - Daily aspirin 81 mg  - Plavix 75mg daily x 90 days  - Statin: atorvastatin 40mg QHS  - Discharge on Ziopatch (ordered for you)  - General neurology referral placed (ordered for you)  - TTE resulted above  - Bedside Glucose Monitoring  -   - A1C 5.5  - PT/OT/SLP  - Stroke Education  - Depression Screen  - Euthermia, Euglycemia  - No need for EEG as pt is at her baseline.        Patient Follow-up    - in 4-6 weeks at the [facility]  [phone]     Thank you for this consult. No further stroke evaluation is recommended, so we will sign off. Please contact us with any additional questions.    The patient was discussed with Stroke attending Dr. Daphne Sky,   Neurology Resident, PGY-1  ASCOM: *[id number]  _____________________________________________________    Clinically Significant Risk Factors Present on Admission         # Hypernatremia: Na = 145 mmol/L (Ref range: 133 - 144 mmol/L) on admission, will monitor as appropriate      # Platelet Defect: home medication list includes an antiplatelet medication          Past Medical History   Past Medical History:   Diagnosis Date     Atrial tachycardia (H)     nonsustained, detected on Ziopatch     Convulsions, unspecified convulsion type (H) 9/20/2018     CVA (cerebral vascular accident) (H)      Dementia (H) 9/20/2018     Diabetes (H)      Falls      History of CVA (cerebrovascular accident) 9/20/2018     Hyperlipidemia LDL goal <100 9/20/2018     Hypertension goal BP (blood pressure) < 140/90 9/20/2018     Osteoarthritis 9/20/2018     Pain in both lower legs 9/20/2018     Seizures (H)      Smoking      Syncope      Tobacco abuse disorder 9/20/2018     Tubular adenoma of colon 11/29/2018     Type 2 diabetes mellitus with circulatory disorder, without long-term current use of insulin (H) 9/20/2018     Past Surgical  History   Past Surgical History:   Procedure Laterality Date     COLONOSCOPY N/A 11/28/2018    Procedure: Colonoscopy, Polypectomy by Biopsy;  Surgeon: Arcadio Mcmullen DO;  Location:  GI     COLONOSCOPY N/A 8/24/2020    Procedure: Colonoscopy with possible biopsy and/or polypectomy;  Surgeon: Fabián Hines MD;  Location:  GI     HIP SURGERY Left      Medications   Home Meds  Prior to Admission medications    Medication Sig Start Date End Date Taking? Authorizing Provider   acetaminophen (TYLENOL) 500 MG tablet Take 1,000 mg by mouth every 6 hours as needed for mild pain    Reported, Patient   alcohol swab prep pads Use to swab area of injection/carlos manuel as directed. 5/7/21   Danilo Yun MD   alendronate (FOSAMAX) 70 MG tablet Take 1 tablet (70 mg) by mouth every 7 days Tuesdays 2/4/22   Abdirahman Munoz PA-C   aspirin (ASA) 81 MG chewable tablet Take 1 tablet (81 mg) by mouth daily Take 1 tablet daily through Feb 28. 1/27/22   Abdirahman Munoz PA-C   atorvastatin (LIPITOR) 40 MG tablet Take 1 tablet by mouth once daily 3/17/22   Abdirahman Munoz PA-C   blood glucose (NO BRAND SPECIFIED) test strip Use to test blood sugar once daily. To accompany: Blood Glucose Monitor Brands: per insurance. 5/7/21   Danilo Yun MD   blood glucose monitoring (NO BRAND SPECIFIED) meter device kit Use to test blood sugar once daily. Preferred blood glucose meter OR supplies to accompany: Blood Glucose Monitor Brands: per insurance. 5/7/21   Danilo Yun MD   clopidogrel (PLAVIX) 75 MG tablet Take 1 tablet by mouth once daily 2/25/22   Abdirahman Munoz PA-C   Cyanocobalamin (VITAMIN B 12 PO) Take 1,000 mcg by mouth daily    Reported, Patient   gabapentin (NEURONTIN) 300 MG capsule TAKE 1 CAPSULE BY MOUTH AT  BEDTIME 12/27/21   Abdirahman Munoz PA-C   glipiZIDE (GLUCOTROL XL) 2.5 MG 24 hr tablet TAKE 1 TABLET BY MOUTH  DAILY 2/25/22   Abdirahman Munoz PA-C   levETIRAcetam (KEPPRA) 750 MG tablet  Take 2 tablets by mouth twice daily 3/17/22   Abdirahman Munoz PA-C   lisinopril (ZESTRIL) 20 MG tablet Take 1 tablet by mouth once daily 2/25/22   Abdirahman Munoz PA-C   meloxicam (MOBIC) 7.5 MG tablet Take 1 tablet (7.5 mg) by mouth daily 11/10/21   Abdirahman Munoz PA-C   memantine (NAMENDA) 10 MG tablet Take 1 tablet (10 mg) by mouth daily 11/10/21   Abdirahman Munoz PA-C   metFORMIN (GLUCOPHAGE-XR) 500 MG 24 hr tablet Take 1 tablet (500 mg) by mouth 2 times daily (with meals) 8/24/21   Abdirahman Munoz PA-C   omeprazole (PRILOSEC) 40 MG DR capsule Take 1 capsule (40 mg) by mouth At Bedtime 12/27/21   Abdirahman Munoz PA-C   order for DME Equipment being ordered: glucometer and related supplies. 2/11/19   Abdirahman Munoz PA-C   phenytoin (DILANTIN) 50 MG chewable tablet CHEW AND SWALLOW 2 TABLETS BY MOUTH TWICE DAILY 3/3/22   Abdirahman Munoz PA-C   polyethylene glycol (MIRALAX) 17 GM/Dose powder Take 17 g (1 capful) by mouth 2 times daily  Patient taking differently: Take 1 capful by mouth daily as needed for constipation  4/19/21   Abdirahman Munoz PA-C   thin (NO BRAND SPECIFIED) lancets Use with lanceting device. To accompany: Blood Glucose Monitor Brands: per insurance. 5/7/21   Danilo Yun MD       Scheduled Meds    aspirin  81 mg Oral Daily     atorvastatin  40 mg Oral Daily     clopidogrel  75 mg Oral Daily     cyanocobalamin  1,000 mcg Oral Daily     gabapentin  300 mg Oral At Bedtime     levETIRAcetam  750 mg Oral BID     memantine  10 mg Oral Daily     pantoprazole  40 mg Oral QAM AC     phenytoin  100 mg Oral BID     polyethylene glycol  17 g Oral Daily     sodium chloride (PF)  3 mL Intracatheter Q8H       Infusion Meds    - MEDICATION INSTRUCTIONS -         PRN Meds  acetaminophen, lidocaine 4%, lidocaine (buffered or not buffered), melatonin, ondansetron **OR** ondansetron, - MEDICATION INSTRUCTIONS -, sodium chloride (PF), traZODone    Allergies   Allergies   Allergen Reactions     Amoxicillin Hives  and Rash     Pt reports she has taken PCN without problems     Ampicillin Rash     Family History   No family history on file.  Social History   Social History     Tobacco Use     Smoking status: Former Smoker     Packs/day: 1.00     Types: Cigarettes     Quit date: 2021     Years since quittin.3     Smokeless tobacco: Never Used     Tobacco comment: QUIT   Vaping Use     Vaping Use: Never used   Substance Use Topics     Alcohol use: No     Drug use: No       Review of Systems   The 10 point Review of Systems is negative other than noted in the HPI.       PHYSICAL EXAMINATION   Temp:  [96.3  F (35.7  C)-99.8  F (37.7  C)] 98  F (36.7  C)  Pulse:  [] 103  Resp:  [5-32] 16  BP: ()/(27-96) 164/96  MAP:  [76 mmHg-102 mmHg] 76 mmHg  Arterial Line BP: (104-157)/(55-98) 107/58  SpO2:  [89 %-100 %] 96 %    Neurologic  Mental Status:  alert, oriented x 3, follows commands, speech clear and fluent, naming and repetition normal  Cranial Nerves:  visual fields intact, PERRL, EOMI with normal smooth pursuit, facial sensation intact and symmetric, facial movements symmetric, hearing not formally tested but intact to conversation, no dysarthria, shoulder shrug strong bilaterally, tongue protrusion midline  Motor:  able to move all limbs spontaneously, strength 5/5 throughout upper and lower extremities  Reflexes:  toes down-going  Sensory:  light touch sensation intact and symmetric throughout upper and lower extremities, no extinction on double simultaneous stimulation   Coordination:  normal finger-to-nose and heel-to-shin bilaterally without dysmetria  Station/Gait:  ambulated with PT with walker    Dysphagia Screen  Per Nursing    Stroke Scales    NIHSS  1a. Level of Consciousness 0-->Alert, keenly responsive   1b. LOC Questions 0-->Answers both questions correctly   1c. LOC Commands 0-->Performs both tasks correctly   2.   Best Gaze 0-->Normal   3.   Visual 0-->No visual loss   4.   Facial Palsy  0-->Normal symmetrical movements   5a. Motor Arm, Left 0-->No drift, limb holds 90 (or 45) degrees for full 10 secs   5b. Motor Arm, Right 0-->No drift, limb holds 90 (or 45) degrees for full 10 secs   6a. Motor Leg, Left 0-->No drift, leg holds 30 degree position for full 5 secs   6b. Motor Leg, right 0-->No drift, leg holds 30 degree position for full 5 secs   7.   Limb Ataxia 0-->Absent   8.   Sensory 0-->Normal, no sensory loss   9.   Best Language 0-->No aphasia, normal   10. Dysarthria 0-->Normal   11. Extinction and Inattention  0-->No abnormality   Total 0 (03/20/22 0945)       Modified [place] Score (Pre-morbid)    -  2    Imaging  I personally reviewed all imaging; relevant findings per HPI.    Labs Data   CBC  Recent Labs   Lab 03/20/22  0443 03/19/22  0550 03/19/22  0106   WBC 5.8 13.1* 10.0   RBC 3.54* 4.67 4.32   HGB 10.5* 14.4 13.2   HCT 33.5* 43.7 40.1    309 335     Basic Metabolic Panel   Recent Labs   Lab 03/20/22  0443 03/19/22  1844 03/19/22  1437 03/19/22  0731 03/19/22  0550 03/18/22  2231 03/18/22  1403   *  --   --   --  136  --  143   POTASSIUM 3.3*  --   --   --  3.9  --  4.0   CHLORIDE 115*  --   --   --  103  --  101   CO2 26  --   --   --  26  --  23   BUN 9  --   --   --  12  --  15   CR 0.53  --   --   --  0.54  --  0.58   * 147* 146*   < > 196*   < > 118*   PINA 7.3*  --   --   --  8.0*  --  7.9*    < > = values in this interval not displayed.     Liver Panel  No results for input(s): PROTTOTAL, ALBUMIN, BILITOTAL, ALKPHOS, AST, ALT, BILIDIRECT in the last 168 hours.  INR    Recent Labs   Lab Test 03/18/22  1440 02/04/22  1130 11/26/21  1547   INR 0.98 0.98 1.00      Lipid Profile    Recent Labs   Lab Test 03/19/22  0550 03/10/22  1030 12/01/21  0645 11/30/21 2005   CHOL 223*  --  146 163   HDL 84  --  33* 32*   * 80 93 96   TRIG 129  --  102 174*     A1C    Recent Labs   Lab Test 03/10/22  1030 11/27/21  0818 04/19/21  1451   A1C 5.5 5.4 6.1*     Troponin I   No results for input(s): TROPONIN, GHTROP in the last 168 hours.       Stroke Consult Data Data   This was a non-emergent, non-telestroke consult.

## 2023-09-06 NOTE — PROGRESS NOTES
[facility]    Progress Note - Medicine Service, [facility] TEAM 1       Date of Admission:  3/19/2022    Assessment & Plan            Khalida Redidng is a 71 year old female admitted on 3/19/2022. She has a history of T2DM, HTN, HLD, dementia, epilespy, and recent CVA (11/2021) and is admitted for acute CVA and hypotension likely secondary to polypharmacy.       Changes today  - PT and OT recommending acute rehab   - Pt refusing, discussed at length with pt's daughter and  who are in agreement with acute rehab  - Pt holdable if threatening to leave as she needs 24/7 supervision, high risk for fall and bleed as she is on double antiplatelet and not able to work    #Acute CVA of R Splenium of Corpus Callosum   #H/O CVA with Residual Left Sided Hemiparesis   She was noted to have new onset confusion, slurred speech, and left facial droop around 09:30 on 3/18/22. She recently had a stroke in November that left her with residual left sided weakness. MRI at OSH showed small acute to subacute infarct involving the right aspect of the splenium of the corpus callosum. Neurology was consulted and recommended no thrombolytics due to rapid improvement of her symptoms. TTE was unremarkable with EF of 55-60%.stroke symptoms resolved, suspect that symptom recrudescence was due to hypotension and decreased cerebral perfusion in the setting of polypharmacy    - Cardiac Telemetry in place   - Continue DAPT with ASA 81mg and Clopidogrel 75mg daily for 90 days, then she will transition to  mg, po/day and will discontinue clopidogrel per neurology recommendation  - Continue Neuro Checks QShift   - Continue PTA Statin ()  - Per Neurology - OK for normotension. Avoid hypotension.  - PT/OT/SLP Consulted recommended TCU, working with the pt  - Neurology Consulted. Appreciate further recommendations of management.   Stroke work up completed as detailed below  Aspirin 81  mg and plavix 75 mg x 90 days then aspirin 325 mg daily thereafter. Although stroke mechanism is favored  to be ESUS but pt is found to have bilateral distal PCA stenosis which may contribute to her stroke based on location. It was decided to continue DAPT for 90 days  Heart monitoring at discharge  Continue  mg BID and Keppra 2000 mg BID  Follow up with general neurology as outpatient  - Discharge on Ziopatch (ordered for you)  - General neurology referral placed (ordered for you)  No further stroke work up needed. Stroke neurology will sign off and please reach out to us with questions or concerns     #SIRS Criteria (fever 100.8F, leukocytosis, tachycardia, lactic acidosis)   #Transient Hypotension, Likely Iatrogenic Secondary to Medications   #Somnolence, Resolved   She had an episode of hypotension with SBP 60's. Per chart review, patient had received labetolol (30mg), hydralazine (10mg), fentanyl (55mcg), lorazepam (1mg), gabapentin (300mg), levetiracetam (1500mg), phenytoin (100mg) in the evening prior to the episode of hypotension and somnolence. Her blood pressure normalized with fluid resuscitation. She had a CT C/A/P without any concerning signs for infection. SIRS criteria likely met due to iatrogenic hypotension. Her somnolence is also likely related to polypharmacy.  No identifiable source of infection on physical exam, UA, or imaging. No indication to continue antibiotics at this time.   - Lactic Acid Resolved on Admission   - Follow up blood cultures   - Monitor for signs/symptoms of infection   - HOLD antihypertensives for now   - Avoid polypharmacy  - Discontinue indwelling Gloria      #Elevated Troponin, Suspect Type II MI, Improved   Troponin peaked at 640 without ACS symptoms which is peaked. This likely represents a type II MI - demand ischemia in the setting of hypotension.   - No further troponin checks     #Seizure Disorder   She has a known seizure disorder. She was transferred for  cEEG monitoring, however this is not able to be started overnight. She was loaded with 1,000mg of Keppra prior to transfer.   - Continue PTA Levetiracetam and Phenytoin   - Neurology Consulted. Appreciate recommendations on whether EEG is still indicated.  - Continue  mg BID and Keppra increased to 2000 mg BID per neuro  - No need for EEG per neuro    #T2DM - Well-Controlled   Last A1c 5.5%.   - Diet Controlled.   - Monitor blood glucose. Start sliding scale insulin if AM BG > 250     #HLD   - Continue PTA Atorvastatin      #HTN  - Hold PTA antihypertensives in the setting of medication-induced hypotension     #Dementia   Patient appears at her mental status baseline on examination.   - Delirium and Fall Precautions in Place      # Rehab  - Pt refusing, discussed at length with pt's daughter and  who are in agreement with acute rehab  - Pt holdable if threatening to leave as she needs 24/7 supervision, high risk for fall and bleed as she is on double antiplatelet and not able to work       Diet: Regular Diet Adult  Room Service  Snacks/Supplements Adult: Glucerna; With Meals    DVT Prophylaxis: Low Risk/Ambulatory with no VTE prophylaxis indicated  Gloria Catheter: Not present  Fluids: None  Central Lines: None  Cardiac Monitoring: ACTIVE order. Indication: Stroke, acute (48 hours)  Code Status: Full Code      Disposition Plan   Expected Discharge: 2-3 days  Anticipated discharge location: inpatient rehabilitation facility         The patient's care was discussed with the Attending Physician, Dr. Ford.    Jess Elizondo MD  Medicine Service, [facility] TEAM [address] [place] [address]  Securely message with the Vocera Web Console (learn more here)  Text page via [facility] [place] [facility] Paging/Directory   Please see signed in provider for up to date coverage information      ______________________________________________________________________    Interval History   LISA. Pt working  with PT and OT. Pt refusing acute rehab. We  discussed at length with pt's daughter and  who are in agreement with acute rehab. Pt denies chest pain, SOB, urinary or bowel complaints.     Data reviewed today: I reviewed all medications, new labs and imaging results over the last 24 hours. I personally reviewed the EKG tracing showing Sinus rythm.    Physical Exam   Vital Signs: Temp: 97.3  F (36.3  C) Temp src: Oral BP: 119/61 Pulse: 94   Resp: 20 SpO2: 96 % O2 Device: None (Room air)    Weight: 141 lbs 1.51 oz  General Appearance: Not in distress, lying comfortably on her bed  Respiratory: good air entry bilateral, no added breath sounds  Cardiovascular: S1, S2 well heard, no murmur or gallop   GI: Non tender, non distended, no hepatosplenomegally  Skin: warm to touch  CNS: A &Ox3, CNII- CNXII grossly intact, no focal neurological deficit    Data   Recent Labs   Lab 03/21/22  1701 03/21/22  1347 03/21/22  1119 03/21/22  0725 03/21/22  0458 03/20/22  1533 03/20/22  0443 03/19/22  0731 03/19/22  0550 03/18/22  2231 03/18/22  1440   WBC  --   --   --   --  5.0  --  5.8  --  13.1*   < >  --    HGB  --   --   --   --  12.6  --  10.5*  --  14.4   < >  --    MCV  --   --   --   --  96  --  95  --  94   < >  --    PLT  --   --   --   --  243  --  218  --  309   < >  --    INR  --   --   --   --   --   --   --   --   --   --  0.98   NA  --   --   --   --  142  --  145*  --  136  --   --    POTASSIUM  --  3.7  --   --  3.2*  --  3.3*  --  3.9  --   --    CHLORIDE  --   --   --   --  111*  --  115*  --  103  --   --    CO2  --   --   --   --  24  --  26  --  26  --   --    BUN  --   --   --   --  11  --  9  --  12  --   --    CR  --   --   --   --  0.53  --  0.53  --  0.54  --   --    ANIONGAP  --   --   --   --  7  --  4  --  7  --   --    PINA  --   --   --   --  8.4*  --  7.3*  --  8.0*  --   --    *  --  186* 134* 106*   < > 106*   < > 196*   < >  --    ALBUMIN  --   --   --   --  3.2*  --   --   --   --   --    --    PROTTOTAL  --   --   --   --  6.8  --   --   --   --   --   --    BILITOTAL  --   --   --   --  0.4  --   --   --   --   --   --    ALKPHOS  --   --   --   --  80  --   --   --   --   --   --    ALT  --   --   --   --  13  --   --   --   --   --   --    AST  --   --   --   --  12  --   --   --   --   --   --     < > = values in this interval not displayed.     No results found for this or any previous visit (from the past 24 hour(s)).   Statement Selected

## (undated) RX ORDER — FENTANYL CITRATE 50 UG/ML
INJECTION, SOLUTION INTRAMUSCULAR; INTRAVENOUS
Status: DISPENSED
Start: 2017-09-11

## (undated) RX ORDER — LIDOCAINE HYDROCHLORIDE 10 MG/ML
INJECTION, SOLUTION INFILTRATION; PERINEURAL
Status: DISPENSED
Start: 2023-01-01

## (undated) RX ORDER — FENTANYL CITRATE 50 UG/ML
INJECTION, SOLUTION INTRAMUSCULAR; INTRAVENOUS
Status: DISPENSED
Start: 2023-01-01

## (undated) RX ORDER — CLINDAMYCIN PHOSPHATE 900 MG/50ML
INJECTION, SOLUTION INTRAVENOUS
Status: DISPENSED
Start: 2017-09-11

## (undated) RX ORDER — LIDOCAINE HYDROCHLORIDE 10 MG/ML
INJECTION, SOLUTION EPIDURAL; INFILTRATION; INTRACAUDAL; PERINEURAL
Status: DISPENSED
Start: 2017-09-11

## (undated) RX ORDER — FENTANYL CITRATE 50 UG/ML
INJECTION, SOLUTION INTRAMUSCULAR; INTRAVENOUS
Status: DISPENSED
Start: 2018-11-28

## (undated) RX ORDER — FENTANYL CITRATE 50 UG/ML
INJECTION, SOLUTION INTRAMUSCULAR; INTRAVENOUS
Status: DISPENSED
Start: 2020-08-24